# Patient Record
Sex: FEMALE | Race: WHITE | NOT HISPANIC OR LATINO | Employment: OTHER | URBAN - METROPOLITAN AREA
[De-identification: names, ages, dates, MRNs, and addresses within clinical notes are randomized per-mention and may not be internally consistent; named-entity substitution may affect disease eponyms.]

---

## 2017-04-03 ENCOUNTER — ALLSCRIPTS OFFICE VISIT (OUTPATIENT)
Dept: OTHER | Facility: OTHER | Age: 67
End: 2017-04-03

## 2017-04-03 ENCOUNTER — TRANSCRIBE ORDERS (OUTPATIENT)
Dept: ADMINISTRATIVE | Facility: HOSPITAL | Age: 67
End: 2017-04-03

## 2017-04-03 DIAGNOSIS — M75.41 IMPINGEMENT SYNDROME OF RIGHT SHOULDER: ICD-10-CM

## 2017-04-03 DIAGNOSIS — I10 ESSENTIAL (PRIMARY) HYPERTENSION: ICD-10-CM

## 2017-04-03 DIAGNOSIS — M75.41 IMPINGEMENT SYNDROME OF RIGHT SHOULDER: Primary | ICD-10-CM

## 2017-04-07 ENCOUNTER — HOSPITAL ENCOUNTER (OUTPATIENT)
Dept: RADIOLOGY | Age: 67
Discharge: HOME | End: 2017-04-07
Payer: MEDICARE

## 2017-04-07 DIAGNOSIS — M75.41 IMPINGEMENT SYNDROME OF RIGHT SHOULDER: ICD-10-CM

## 2017-04-07 PROCEDURE — 73221 MRI JOINT UPR EXTREM W/O DYE: CPT

## 2017-04-24 ENCOUNTER — ALLSCRIPTS OFFICE VISIT (OUTPATIENT)
Dept: OTHER | Facility: OTHER | Age: 67
End: 2017-04-24

## 2017-05-01 ENCOUNTER — ALLSCRIPTS OFFICE VISIT (OUTPATIENT)
Dept: OTHER | Facility: OTHER | Age: 67
End: 2017-05-01

## 2017-05-01 RX ORDER — TRIAMCINOLONE ACETONIDE 1 MG/G
1 CREAM TOPICAL DAILY
COMMUNITY
End: 2018-02-08 | Stop reason: ALTCHOICE

## 2017-05-01 RX ORDER — IBUPROFEN 400 MG/1
400 TABLET ORAL DAILY
COMMUNITY
End: 2017-07-01 | Stop reason: HOSPADM

## 2017-05-01 RX ORDER — ATENOLOL 50 MG/1
50 TABLET ORAL DAILY
COMMUNITY
End: 2018-03-01

## 2017-05-01 RX ORDER — ATORVASTATIN CALCIUM 20 MG/1
20 TABLET, FILM COATED ORAL
COMMUNITY
End: 2017-07-01 | Stop reason: HOSPADM

## 2017-05-01 RX ORDER — ACETAMINOPHEN 500 MG
500 TABLET ORAL DAILY
COMMUNITY
End: 2018-06-25

## 2017-05-01 RX ORDER — HYDROCHLOROTHIAZIDE 25 MG/1
12.5 TABLET ORAL DAILY
Status: ON HOLD | COMMUNITY
End: 2018-07-30 | Stop reason: SDUPTHER

## 2017-05-01 RX ORDER — FLUTICASONE FUROATE AND VILANTEROL 100; 25 UG/1; UG/1
1 POWDER RESPIRATORY (INHALATION) DAILY
Status: ON HOLD | COMMUNITY
End: 2017-05-10 | Stop reason: ALTCHOICE

## 2017-05-01 RX ORDER — CHOLECALCIFEROL (VITAMIN D3) 50 MCG
5000 TABLET ORAL DAILY
COMMUNITY
End: 2022-03-14

## 2017-05-01 RX ORDER — FLUOXETINE HYDROCHLORIDE 20 MG/1
20 CAPSULE ORAL DAILY
COMMUNITY
End: 2018-04-30 | Stop reason: SDUPTHER

## 2017-05-01 RX ORDER — BENZONATATE 100 MG/1
100 CAPSULE ORAL 3 TIMES DAILY PRN
Status: ON HOLD | COMMUNITY
End: 2017-05-10 | Stop reason: ALTCHOICE

## 2017-05-02 ENCOUNTER — GENERIC CONVERSION - ENCOUNTER (OUTPATIENT)
Dept: OTHER | Facility: OTHER | Age: 67
End: 2017-05-02

## 2017-05-02 LAB
AMBIG ABBREV BMP8 DEFAULT (HISTORICAL): NORMAL
AMBIG ABBREV LP DEFAULT (HISTORICAL): NORMAL
BASOPHILS # BLD AUTO: 0 %
BASOPHILS # BLD AUTO: 0 X10E3/UL (ref 0–0.2)
DEPRECATED RDW RBC AUTO: 13.3 % (ref 12.3–15.4)
EOSINOPHIL # BLD AUTO: 0.2 X10E3/UL (ref 0–0.4)
EOSINOPHIL # BLD AUTO: 3 %
HCT VFR BLD AUTO: 42.8 % (ref 34–46.6)
HGB BLD-MCNC: 14.3 G/DL (ref 11.1–15.9)
IMM.GRANULOCYTES (CD4/8) (HISTORICAL): 0 %
IMM.GRANULOCYTES (CD4/8) (HISTORICAL): 0 X10E3/UL (ref 0–0.1)
LYMPHOCYTES # BLD AUTO: 2.5 X10E3/UL (ref 0.7–3.1)
LYMPHOCYTES # BLD AUTO: 39 %
MCH RBC QN AUTO: 30 PG (ref 26.6–33)
MCHC RBC AUTO-ENTMCNC: 33.4 G/DL (ref 31.5–35.7)
MCV RBC AUTO: 90 FL (ref 79–97)
MONOCYTES # BLD AUTO: 0.4 X10E3/UL (ref 0.1–0.9)
MONOCYTES (HISTORICAL): 5 %
NEUTROPHILS # BLD AUTO: 3.4 X10E3/UL (ref 1.4–7)
NEUTROPHILS # BLD AUTO: 53 %
PLATELET # BLD AUTO: 309 X10E3/UL (ref 150–379)
RBC (HISTORICAL): 4.76 X10E6/UL (ref 3.77–5.28)
WBC # BLD AUTO: 6.5 X10E3/UL (ref 3.4–10.8)

## 2017-05-03 LAB
BUN SERPL-MCNC: 11 MG/DL (ref 8–27)
BUN/CREA RATIO (HISTORICAL): 10 (ref 12–28)
CALCIUM SERPL-MCNC: 9.7 MG/DL (ref 8.7–10.3)
CHLORIDE SERPL-SCNC: 102 MMOL/L (ref 96–106)
CHOLEST SERPL-MCNC: 174 MG/DL (ref 100–199)
CO2 SERPL-SCNC: 25 MMOL/L (ref 18–29)
CREAT SERPL-MCNC: 1.11 MG/DL (ref 0.57–1)
EGFR AFRICAN AMERICAN (HISTORICAL): 60 ML/MIN/1.73
EGFR-AMERICAN CALC (HISTORICAL): 52 ML/MIN/1.73
GLUCOSE SERPL-MCNC: 92 MG/DL (ref 65–99)
HDLC SERPL-MCNC: 74 MG/DL
LDLC SERPL CALC-MCNC: 76 MG/DL (ref 0–99)
POTASSIUM SERPL-SCNC: 4 MMOL/L (ref 3.5–5.2)
SODIUM SERPL-SCNC: 144 MMOL/L (ref 134–144)
TRIGL SERPL-MCNC: 118 MG/DL (ref 0–149)
TSH SERPL DL<=0.05 MIU/L-ACNC: 1.96 UIU/ML (ref 0.45–4.5)
VLDLC SERPL CALC-MCNC: 24 MG/DL (ref 5–40)

## 2017-05-04 ENCOUNTER — GENERIC CONVERSION - ENCOUNTER (OUTPATIENT)
Dept: OTHER | Facility: OTHER | Age: 67
End: 2017-05-04

## 2017-05-04 ENCOUNTER — APPOINTMENT (OUTPATIENT)
Dept: PHYSICAL THERAPY | Facility: CLINIC | Age: 67
End: 2017-05-04
Payer: MEDICARE

## 2017-05-04 DIAGNOSIS — M75.41 IMPINGEMENT SYNDROME OF RIGHT SHOULDER: ICD-10-CM

## 2017-05-04 PROCEDURE — G8990 OTHER PT/OT CURRENT STATUS: HCPCS

## 2017-05-04 PROCEDURE — 97161 PT EVAL LOW COMPLEX 20 MIN: CPT

## 2017-05-04 PROCEDURE — G8991 OTHER PT/OT GOAL STATUS: HCPCS

## 2017-05-05 ENCOUNTER — GENERIC CONVERSION - ENCOUNTER (OUTPATIENT)
Dept: OTHER | Facility: OTHER | Age: 67
End: 2017-05-05

## 2017-05-09 ENCOUNTER — ANESTHESIA EVENT (OUTPATIENT)
Dept: PERIOP | Facility: HOSPITAL | Age: 67
End: 2017-05-09
Payer: MEDICARE

## 2017-05-09 ENCOUNTER — GENERIC CONVERSION - ENCOUNTER (OUTPATIENT)
Dept: OTHER | Facility: OTHER | Age: 67
End: 2017-05-09

## 2017-05-09 DIAGNOSIS — I10 ESSENTIAL (PRIMARY) HYPERTENSION: ICD-10-CM

## 2017-05-09 LAB
AMBIG ABBREV BMP8 DEFAULT (HISTORICAL): NORMAL
BUN SERPL-MCNC: 14 MG/DL (ref 8–27)
BUN/CREA RATIO (HISTORICAL): 12 (ref 12–28)
CALCIUM SERPL-MCNC: 9.6 MG/DL (ref 8.7–10.3)
CHLORIDE SERPL-SCNC: 96 MMOL/L (ref 96–106)
CO2 SERPL-SCNC: 23 MMOL/L (ref 18–29)
CREAT SERPL-MCNC: 1.15 MG/DL (ref 0.57–1)
EGFR AFRICAN AMERICAN (HISTORICAL): 58 ML/MIN/1.73
EGFR-AMERICAN CALC (HISTORICAL): 50 ML/MIN/1.73
GLUCOSE SERPL-MCNC: 67 MG/DL (ref 65–99)
POTASSIUM SERPL-SCNC: 3.7 MMOL/L (ref 3.5–5.2)
SODIUM SERPL-SCNC: 137 MMOL/L (ref 134–144)

## 2017-05-10 ENCOUNTER — HOSPITAL ENCOUNTER (OUTPATIENT)
Facility: HOSPITAL | Age: 67
Setting detail: OUTPATIENT SURGERY
Discharge: HOME | End: 2017-05-10
Attending: ORTHOPAEDIC SURGERY | Admitting: ORTHOPAEDIC SURGERY
Payer: MEDICARE

## 2017-05-10 ENCOUNTER — ANESTHESIA (OUTPATIENT)
Dept: PERIOP | Facility: HOSPITAL | Age: 67
End: 2017-05-10
Payer: MEDICARE

## 2017-05-10 VITALS
OXYGEN SATURATION: 92 % | WEIGHT: 197 LBS | TEMPERATURE: 99.2 F | RESPIRATION RATE: 16 BRPM | DIASTOLIC BLOOD PRESSURE: 54 MMHG | HEIGHT: 60 IN | BODY MASS INDEX: 38.68 KG/M2 | SYSTOLIC BLOOD PRESSURE: 132 MMHG | HEART RATE: 69 BPM

## 2017-05-10 PROBLEM — M75.111 INCOMPLETE TEAR OF RIGHT ROTATOR CUFF: Status: ACTIVE | Noted: 2017-05-10

## 2017-05-10 PROCEDURE — C1713 ANCHOR/SCREW BN/BN,TIS/BN: HCPCS | Performed by: ORTHOPAEDIC SURGERY

## 2017-05-10 DEVICE — DEPUY MITEK HEALIX ADVANCE 4.5 BR: Type: IMPLANTABLE DEVICE | Site: SHOULDER | Status: FUNCTIONAL

## 2017-05-10 DEVICE — HEALIX ADVANCE KNOTLESS BR ANCHOR TCP/PLGA ABSORBABLE ANCHOR 5.5MM
Type: IMPLANTABLE DEVICE | Site: SHOULDER | Status: FUNCTIONAL
Brand: HEALIX ADVANCE

## 2017-05-10 RX ORDER — SODIUM CHLORIDE, SODIUM LACTATE, POTASSIUM CHLORIDE, CALCIUM CHLORIDE 600; 310; 30; 20 MG/100ML; MG/100ML; MG/100ML; MG/100ML
125 INJECTION, SOLUTION INTRAVENOUS CONTINUOUS
Status: DISCONTINUED | OUTPATIENT
Start: 2017-05-10 | End: 2017-05-10 | Stop reason: HOSPADM

## 2017-05-10 RX ORDER — OXYCODONE HYDROCHLORIDE 5 MG/1
TABLET ORAL
Qty: 30 TABLET | Refills: 0
Start: 2017-05-10 | End: 2018-02-08 | Stop reason: ALTCHOICE

## 2017-05-10 RX ORDER — ALBUTEROL SULFATE 2.5 MG/3ML
2.5 SOLUTION RESPIRATORY (INHALATION) AS NEEDED
Status: DISCONTINUED | OUTPATIENT
Start: 2017-05-10 | End: 2017-05-10 | Stop reason: HOSPADM

## 2017-05-10 RX ORDER — KETOROLAC TROMETHAMINE 30 MG/ML
INJECTION, SOLUTION INTRAMUSCULAR; INTRAVENOUS AS NEEDED
Status: DISCONTINUED | OUTPATIENT
Start: 2017-05-10 | End: 2017-05-10 | Stop reason: SURG

## 2017-05-10 RX ORDER — EPHEDRINE SULFATE 50 MG/ML
INJECTION, SOLUTION INTRAVENOUS AS NEEDED
Status: DISCONTINUED | OUTPATIENT
Start: 2017-05-10 | End: 2017-05-10 | Stop reason: SURG

## 2017-05-10 RX ORDER — METOCLOPRAMIDE HYDROCHLORIDE 5 MG/ML
10 INJECTION INTRAMUSCULAR; INTRAVENOUS EVERY 6 HOURS PRN
Status: DISCONTINUED | OUTPATIENT
Start: 2017-05-10 | End: 2017-05-10 | Stop reason: HOSPADM

## 2017-05-10 RX ORDER — MIDAZOLAM HYDROCHLORIDE 1 MG/ML
INJECTION INTRAMUSCULAR; INTRAVENOUS AS NEEDED
Status: DISCONTINUED | OUTPATIENT
Start: 2017-05-10 | End: 2017-05-10 | Stop reason: SURG

## 2017-05-10 RX ORDER — HYDROCODONE BITARTRATE AND ACETAMINOPHEN 5; 325 MG/1; MG/1
1 TABLET ORAL EVERY 6 HOURS PRN
Status: DISCONTINUED | OUTPATIENT
Start: 2017-05-10 | End: 2017-05-10 | Stop reason: HOSPADM

## 2017-05-10 RX ORDER — PROPOFOL 10 MG/ML
INJECTION, EMULSION INTRAVENOUS AS NEEDED
Status: DISCONTINUED | OUTPATIENT
Start: 2017-05-10 | End: 2017-05-10 | Stop reason: SURG

## 2017-05-10 RX ORDER — ROPIVACAINE HYDROCHLORIDE 5 MG/ML
INJECTION, SOLUTION EPIDURAL; INFILTRATION; PERINEURAL AS NEEDED
Status: DISCONTINUED | OUTPATIENT
Start: 2017-05-10 | End: 2017-05-10 | Stop reason: SURG

## 2017-05-10 RX ORDER — LIDOCAINE HYDROCHLORIDE 10 MG/ML
INJECTION, SOLUTION INFILTRATION; PERINEURAL AS NEEDED
Status: DISCONTINUED | OUTPATIENT
Start: 2017-05-10 | End: 2017-05-10 | Stop reason: SURG

## 2017-05-10 RX ORDER — ONDANSETRON 2 MG/ML
INJECTION INTRAMUSCULAR; INTRAVENOUS AS NEEDED
Status: DISCONTINUED | OUTPATIENT
Start: 2017-05-10 | End: 2017-05-10 | Stop reason: SURG

## 2017-05-10 RX ORDER — ONDANSETRON 2 MG/ML
4 INJECTION INTRAMUSCULAR; INTRAVENOUS ONCE AS NEEDED
Status: DISCONTINUED | OUTPATIENT
Start: 2017-05-10 | End: 2017-05-10 | Stop reason: HOSPADM

## 2017-05-10 RX ORDER — FENTANYL CITRATE 50 UG/ML
INJECTION, SOLUTION INTRAMUSCULAR; INTRAVENOUS AS NEEDED
Status: DISCONTINUED | OUTPATIENT
Start: 2017-05-10 | End: 2017-05-10 | Stop reason: SURG

## 2017-05-10 RX ORDER — FENTANYL CITRATE/PF 50 MCG/ML
25 SYRINGE (ML) INJECTION
Status: DISCONTINUED | OUTPATIENT
Start: 2017-05-10 | End: 2017-05-10 | Stop reason: HOSPADM

## 2017-05-10 RX ADMIN — CEFAZOLIN SODIUM 2000 MG: 2 SOLUTION INTRAVENOUS at 11:20

## 2017-05-10 RX ADMIN — ONDANSETRON 4 MG: 2 INJECTION INTRAMUSCULAR; INTRAVENOUS at 11:36

## 2017-05-10 RX ADMIN — EPHEDRINE SULFATE 5 MG: 50 INJECTION, SOLUTION INTRAMUSCULAR; INTRAVENOUS; SUBCUTANEOUS at 11:50

## 2017-05-10 RX ADMIN — MIDAZOLAM HYDROCHLORIDE 2 MG: 1 INJECTION, SOLUTION INTRAMUSCULAR; INTRAVENOUS at 10:45

## 2017-05-10 RX ADMIN — ROPIVACAINE HYDROCHLORIDE 20 ML: 5 INJECTION, SOLUTION EPIDURAL; INFILTRATION; PERINEURAL at 10:57

## 2017-05-10 RX ADMIN — FENTANYL CITRATE 50 MCG: 50 INJECTION, SOLUTION INTRAMUSCULAR; INTRAVENOUS at 10:45

## 2017-05-10 RX ADMIN — PROPOFOL 200 MG: 10 INJECTION, EMULSION INTRAVENOUS at 11:14

## 2017-05-10 RX ADMIN — DEXAMETHASONE SODIUM PHOSPHATE 4 MG: 10 INJECTION INTRAMUSCULAR; INTRAVENOUS at 12:08

## 2017-05-10 RX ADMIN — EPHEDRINE SULFATE 5 MG: 50 INJECTION, SOLUTION INTRAMUSCULAR; INTRAVENOUS; SUBCUTANEOUS at 11:39

## 2017-05-10 RX ADMIN — SODIUM CHLORIDE, SODIUM LACTATE, POTASSIUM CHLORIDE, AND CALCIUM CHLORIDE 125 ML/HR: .6; .31; .03; .02 INJECTION, SOLUTION INTRAVENOUS at 08:31

## 2017-05-10 RX ADMIN — DEXAMETHASONE SODIUM PHOSPHATE 1.5 MG: 10 INJECTION INTRAMUSCULAR; INTRAVENOUS at 10:57

## 2017-05-10 RX ADMIN — HYDROCODONE BITARTRATE AND ACETAMINOPHEN 1 TABLET: 5; 325 TABLET ORAL at 14:48

## 2017-05-10 RX ADMIN — FENTANYL CITRATE 50 MCG: 50 INJECTION, SOLUTION INTRAMUSCULAR; INTRAVENOUS at 10:48

## 2017-05-10 RX ADMIN — ROPIVACAINE HYDROCHLORIDE: 2 INJECTION, SOLUTION EPIDURAL; INFILTRATION at 15:05

## 2017-05-10 RX ADMIN — KETOROLAC TROMETHAMINE 15 MG: 30 INJECTION, SOLUTION INTRAMUSCULAR at 12:20

## 2017-05-10 RX ADMIN — LIDOCAINE HYDROCHLORIDE 2 ML: 10 INJECTION, SOLUTION INFILTRATION; PERINEURAL at 11:14

## 2017-05-15 ENCOUNTER — ALLSCRIPTS OFFICE VISIT (OUTPATIENT)
Dept: OTHER | Facility: OTHER | Age: 67
End: 2017-05-15

## 2017-05-16 ENCOUNTER — APPOINTMENT (OUTPATIENT)
Dept: PHYSICAL THERAPY | Facility: CLINIC | Age: 67
End: 2017-05-16
Payer: MEDICARE

## 2017-05-16 PROCEDURE — 97110 THERAPEUTIC EXERCISES: CPT

## 2017-05-19 ENCOUNTER — APPOINTMENT (OUTPATIENT)
Dept: PHYSICAL THERAPY | Facility: CLINIC | Age: 67
End: 2017-05-19
Payer: MEDICARE

## 2017-05-19 PROCEDURE — 97110 THERAPEUTIC EXERCISES: CPT

## 2017-05-19 PROCEDURE — 97140 MANUAL THERAPY 1/> REGIONS: CPT

## 2017-05-22 ENCOUNTER — APPOINTMENT (OUTPATIENT)
Dept: PHYSICAL THERAPY | Facility: CLINIC | Age: 67
End: 2017-05-22
Payer: MEDICARE

## 2017-05-22 PROCEDURE — 97140 MANUAL THERAPY 1/> REGIONS: CPT

## 2017-05-22 PROCEDURE — 97110 THERAPEUTIC EXERCISES: CPT

## 2017-05-24 ENCOUNTER — APPOINTMENT (OUTPATIENT)
Dept: PHYSICAL THERAPY | Facility: CLINIC | Age: 67
End: 2017-05-24
Payer: MEDICARE

## 2017-05-24 PROCEDURE — 97140 MANUAL THERAPY 1/> REGIONS: CPT

## 2017-05-24 PROCEDURE — 97110 THERAPEUTIC EXERCISES: CPT

## 2017-05-26 ENCOUNTER — APPOINTMENT (OUTPATIENT)
Dept: PHYSICAL THERAPY | Facility: CLINIC | Age: 67
End: 2017-05-26
Payer: MEDICARE

## 2017-05-26 PROCEDURE — 97140 MANUAL THERAPY 1/> REGIONS: CPT

## 2017-05-26 PROCEDURE — 97150 GROUP THERAPEUTIC PROCEDURES: CPT

## 2017-05-30 ENCOUNTER — APPOINTMENT (OUTPATIENT)
Dept: PHYSICAL THERAPY | Facility: CLINIC | Age: 67
End: 2017-05-30
Payer: MEDICARE

## 2017-05-30 PROCEDURE — 97140 MANUAL THERAPY 1/> REGIONS: CPT

## 2017-05-30 PROCEDURE — 97150 GROUP THERAPEUTIC PROCEDURES: CPT

## 2017-06-01 ENCOUNTER — APPOINTMENT (OUTPATIENT)
Dept: PHYSICAL THERAPY | Facility: CLINIC | Age: 67
End: 2017-06-01
Payer: MEDICARE

## 2017-06-01 PROCEDURE — 97110 THERAPEUTIC EXERCISES: CPT

## 2017-06-01 PROCEDURE — 97140 MANUAL THERAPY 1/> REGIONS: CPT

## 2017-06-05 ENCOUNTER — APPOINTMENT (OUTPATIENT)
Dept: PHYSICAL THERAPY | Facility: CLINIC | Age: 67
End: 2017-06-05
Payer: MEDICARE

## 2017-06-05 PROCEDURE — 97110 THERAPEUTIC EXERCISES: CPT

## 2017-06-05 PROCEDURE — 97140 MANUAL THERAPY 1/> REGIONS: CPT

## 2017-06-07 ENCOUNTER — APPOINTMENT (OUTPATIENT)
Dept: PHYSICAL THERAPY | Facility: CLINIC | Age: 67
End: 2017-06-07
Payer: MEDICARE

## 2017-06-07 ENCOUNTER — GENERIC CONVERSION - ENCOUNTER (OUTPATIENT)
Dept: OTHER | Facility: OTHER | Age: 67
End: 2017-06-07

## 2017-06-07 PROCEDURE — G8990 OTHER PT/OT CURRENT STATUS: HCPCS

## 2017-06-07 PROCEDURE — 97110 THERAPEUTIC EXERCISES: CPT

## 2017-06-07 PROCEDURE — G8991 OTHER PT/OT GOAL STATUS: HCPCS

## 2017-06-07 PROCEDURE — 97140 MANUAL THERAPY 1/> REGIONS: CPT

## 2017-06-09 ENCOUNTER — ALLSCRIPTS OFFICE VISIT (OUTPATIENT)
Dept: OTHER | Facility: OTHER | Age: 67
End: 2017-06-09

## 2017-06-09 ENCOUNTER — APPOINTMENT (OUTPATIENT)
Dept: PHYSICAL THERAPY | Facility: CLINIC | Age: 67
End: 2017-06-09
Payer: MEDICARE

## 2017-06-12 ENCOUNTER — APPOINTMENT (OUTPATIENT)
Dept: PHYSICAL THERAPY | Facility: CLINIC | Age: 67
End: 2017-06-12
Payer: MEDICARE

## 2017-06-12 PROCEDURE — 97140 MANUAL THERAPY 1/> REGIONS: CPT

## 2017-06-12 PROCEDURE — 97110 THERAPEUTIC EXERCISES: CPT

## 2017-06-15 ENCOUNTER — APPOINTMENT (OUTPATIENT)
Dept: PHYSICAL THERAPY | Facility: CLINIC | Age: 67
End: 2017-06-15
Payer: MEDICARE

## 2017-06-15 PROCEDURE — 97140 MANUAL THERAPY 1/> REGIONS: CPT

## 2017-06-15 PROCEDURE — 97150 GROUP THERAPEUTIC PROCEDURES: CPT

## 2017-06-19 ENCOUNTER — APPOINTMENT (OUTPATIENT)
Dept: PHYSICAL THERAPY | Facility: CLINIC | Age: 67
End: 2017-06-19
Payer: MEDICARE

## 2017-06-19 PROCEDURE — 97150 GROUP THERAPEUTIC PROCEDURES: CPT

## 2017-06-19 PROCEDURE — 97140 MANUAL THERAPY 1/> REGIONS: CPT

## 2017-06-22 ENCOUNTER — APPOINTMENT (OUTPATIENT)
Dept: PHYSICAL THERAPY | Facility: CLINIC | Age: 67
End: 2017-06-22
Payer: MEDICARE

## 2017-06-26 ENCOUNTER — APPOINTMENT (OUTPATIENT)
Dept: PHYSICAL THERAPY | Facility: CLINIC | Age: 67
End: 2017-06-26
Payer: MEDICARE

## 2017-06-26 PROCEDURE — 97140 MANUAL THERAPY 1/> REGIONS: CPT

## 2017-06-26 PROCEDURE — 97110 THERAPEUTIC EXERCISES: CPT

## 2017-06-29 ENCOUNTER — APPOINTMENT (OUTPATIENT)
Dept: PHYSICAL THERAPY | Facility: CLINIC | Age: 67
End: 2017-06-29
Payer: MEDICARE

## 2017-06-29 PROCEDURE — 97110 THERAPEUTIC EXERCISES: CPT

## 2017-06-29 PROCEDURE — 97140 MANUAL THERAPY 1/> REGIONS: CPT

## 2017-06-30 ENCOUNTER — OFFICE VISIT (OUTPATIENT)
Dept: LAB | Age: 67
End: 2017-06-30
Attending: PHYSICIAN ASSISTANT
Payer: MEDICARE

## 2017-06-30 ENCOUNTER — OFFICE VISIT (OUTPATIENT)
Dept: URGENT CARE | Age: 67
End: 2017-06-30
Payer: MEDICARE

## 2017-06-30 ENCOUNTER — TRANSCRIBE ORDERS (OUTPATIENT)
Dept: URGENT CARE | Age: 67
End: 2017-06-30

## 2017-06-30 ENCOUNTER — HOSPITAL ENCOUNTER (OUTPATIENT)
Facility: HOSPITAL | Age: 67
Setting detail: OBSERVATION
Discharge: HOME | End: 2017-07-01
Attending: EMERGENCY MEDICINE | Admitting: INTERNAL MEDICINE
Payer: MEDICARE

## 2017-06-30 ENCOUNTER — APPOINTMENT (EMERGENCY)
Dept: CT IMAGING | Facility: HOSPITAL | Age: 67
End: 2017-06-30
Payer: MEDICARE

## 2017-06-30 DIAGNOSIS — R10.9 ABDOMINAL PAIN, UNSPECIFIED LOCATION: ICD-10-CM

## 2017-06-30 DIAGNOSIS — R10.9 ABDOMINAL PAIN, UNSPECIFIED LOCATION: Primary | ICD-10-CM

## 2017-06-30 DIAGNOSIS — K85.90 PANCREATITIS, ACUTE: ICD-10-CM

## 2017-06-30 DIAGNOSIS — R10.9 ABDOMINAL PAIN: Primary | ICD-10-CM

## 2017-06-30 LAB
ALBUMIN SERPL BCP-MCNC: 3.7 G/DL (ref 3.5–5)
ALP SERPL-CCNC: 65 U/L (ref 46–116)
ALT SERPL W P-5'-P-CCNC: 30 U/L (ref 12–78)
ANION GAP SERPL CALCULATED.3IONS-SCNC: 10 MMOL/L (ref 4–13)
AST SERPL W P-5'-P-CCNC: 19 U/L (ref 5–45)
BASOPHILS # BLD AUTO: 0.02 THOUSANDS/ΜL (ref 0–0.1)
BASOPHILS NFR BLD AUTO: 0 % (ref 0–1)
BILIRUB SERPL-MCNC: 0.4 MG/DL (ref 0.2–1)
BUN SERPL-MCNC: 15 MG/DL (ref 5–25)
CALCIUM SERPL-MCNC: 9.3 MG/DL (ref 8.3–10.1)
CHLORIDE SERPL-SCNC: 100 MMOL/L (ref 100–108)
CO2 SERPL-SCNC: 26 MMOL/L (ref 21–32)
CREAT SERPL-MCNC: 1.15 MG/DL (ref 0.6–1.3)
EOSINOPHIL # BLD AUTO: 0.14 THOUSAND/ΜL (ref 0–0.61)
EOSINOPHIL NFR BLD AUTO: 1 % (ref 0–6)
ERYTHROCYTE [DISTWIDTH] IN BLOOD BY AUTOMATED COUNT: 12.9 % (ref 11.6–15.1)
GFR SERPL CREATININE-BSD FRML MDRD: 47.4 ML/MIN/1.73SQ M
GLUCOSE SERPL-MCNC: 112 MG/DL (ref 65–140)
HCT VFR BLD AUTO: 40 % (ref 34.8–46.1)
HGB BLD-MCNC: 13.6 G/DL (ref 11.5–15.4)
LIPASE SERPL-CCNC: 260 U/L (ref 73–393)
LYMPHOCYTES # BLD AUTO: 2.14 THOUSANDS/ΜL (ref 0.6–4.47)
LYMPHOCYTES NFR BLD AUTO: 15 % (ref 14–44)
MCH RBC QN AUTO: 30 PG (ref 26.8–34.3)
MCHC RBC AUTO-ENTMCNC: 34 G/DL (ref 31.4–37.4)
MCV RBC AUTO: 88 FL (ref 82–98)
MONOCYTES # BLD AUTO: 0.96 THOUSAND/ΜL (ref 0.17–1.22)
MONOCYTES NFR BLD AUTO: 7 % (ref 4–12)
NEUTROPHILS # BLD AUTO: 11.31 THOUSANDS/ΜL (ref 1.85–7.62)
NEUTS SEG NFR BLD AUTO: 77 % (ref 43–75)
PLATELET # BLD AUTO: 274 THOUSANDS/UL (ref 149–390)
PMV BLD AUTO: 10.3 FL (ref 8.9–12.7)
POTASSIUM SERPL-SCNC: 3.4 MMOL/L (ref 3.5–5.3)
PROT SERPL-MCNC: 7.4 G/DL (ref 6.4–8.2)
RBC # BLD AUTO: 4.54 MILLION/UL (ref 3.81–5.12)
SODIUM SERPL-SCNC: 136 MMOL/L (ref 136–145)
WBC # BLD AUTO: 14.57 THOUSAND/UL (ref 4.31–10.16)

## 2017-06-30 PROCEDURE — 85025 COMPLETE CBC W/AUTO DIFF WBC: CPT | Performed by: EMERGENCY MEDICINE

## 2017-06-30 PROCEDURE — 96374 THER/PROPH/DIAG INJ IV PUSH: CPT

## 2017-06-30 PROCEDURE — 96375 TX/PRO/DX INJ NEW DRUG ADDON: CPT

## 2017-06-30 PROCEDURE — 80053 COMPREHEN METABOLIC PANEL: CPT | Performed by: EMERGENCY MEDICINE

## 2017-06-30 PROCEDURE — 96361 HYDRATE IV INFUSION ADD-ON: CPT

## 2017-06-30 PROCEDURE — 99215 OFFICE O/P EST HI 40 MIN: CPT | Performed by: FAMILY MEDICINE

## 2017-06-30 PROCEDURE — 83690 ASSAY OF LIPASE: CPT | Performed by: EMERGENCY MEDICINE

## 2017-06-30 PROCEDURE — G0463 HOSPITAL OUTPT CLINIC VISIT: HCPCS | Performed by: FAMILY MEDICINE

## 2017-06-30 PROCEDURE — 36415 COLL VENOUS BLD VENIPUNCTURE: CPT | Performed by: EMERGENCY MEDICINE

## 2017-06-30 PROCEDURE — 74177 CT ABD & PELVIS W/CONTRAST: CPT

## 2017-06-30 PROCEDURE — 93005 ELECTROCARDIOGRAM TRACING: CPT

## 2017-06-30 RX ORDER — ONDANSETRON 2 MG/ML
4 INJECTION INTRAMUSCULAR; INTRAVENOUS ONCE
Status: COMPLETED | OUTPATIENT
Start: 2017-06-30 | End: 2017-06-30

## 2017-06-30 RX ORDER — ONDANSETRON 2 MG/ML
INJECTION INTRAMUSCULAR; INTRAVENOUS
Status: COMPLETED
Start: 2017-06-30 | End: 2017-06-30

## 2017-06-30 RX ORDER — MORPHINE SULFATE 10 MG/ML
6 INJECTION, SOLUTION INTRAMUSCULAR; INTRAVENOUS ONCE
Status: COMPLETED | OUTPATIENT
Start: 2017-06-30 | End: 2017-06-30

## 2017-06-30 RX ADMIN — MORPHINE SULFATE 6 MG: 10 INJECTION, SOLUTION INTRAMUSCULAR; INTRAVENOUS at 21:27

## 2017-06-30 RX ADMIN — ONDANSETRON 4 MG: 2 INJECTION INTRAMUSCULAR; INTRAVENOUS at 21:27

## 2017-06-30 RX ADMIN — IOHEXOL 100 ML: 350 INJECTION, SOLUTION INTRAVENOUS at 22:38

## 2017-06-30 RX ADMIN — SODIUM CHLORIDE 1000 ML: 0.9 INJECTION, SOLUTION INTRAVENOUS at 20:25

## 2017-07-01 VITALS
BODY MASS INDEX: 39.42 KG/M2 | WEIGHT: 200.8 LBS | DIASTOLIC BLOOD PRESSURE: 60 MMHG | RESPIRATION RATE: 16 BRPM | SYSTOLIC BLOOD PRESSURE: 133 MMHG | HEART RATE: 72 BPM | TEMPERATURE: 98.1 F | OXYGEN SATURATION: 95 % | HEIGHT: 60 IN

## 2017-07-01 PROBLEM — K85.90 ACUTE PANCREATITIS: Status: ACTIVE | Noted: 2017-07-01

## 2017-07-01 LAB
ANION GAP SERPL CALCULATED.3IONS-SCNC: 8 MMOL/L (ref 4–13)
ATRIAL RATE: 78 BPM
ATRIAL RATE: 79 BPM
BUN SERPL-MCNC: 12 MG/DL (ref 5–25)
CALCIUM SERPL-MCNC: 8.4 MG/DL (ref 8.3–10.1)
CHLORIDE SERPL-SCNC: 102 MMOL/L (ref 100–108)
CO2 SERPL-SCNC: 25 MMOL/L (ref 21–32)
CREAT SERPL-MCNC: 0.98 MG/DL (ref 0.6–1.3)
ERYTHROCYTE [DISTWIDTH] IN BLOOD BY AUTOMATED COUNT: 13 % (ref 11.6–15.1)
GFR SERPL CREATININE-BSD FRML MDRD: 57 ML/MIN/1.73SQ M
GLUCOSE P FAST SERPL-MCNC: 122 MG/DL (ref 65–99)
GLUCOSE SERPL-MCNC: 122 MG/DL (ref 65–140)
HCT VFR BLD AUTO: 35.7 % (ref 34.8–46.1)
HGB BLD-MCNC: 11.8 G/DL (ref 11.5–15.4)
LIPASE SERPL-CCNC: 178 U/L (ref 73–393)
MAGNESIUM SERPL-MCNC: 1.8 MG/DL (ref 1.6–2.6)
MCH RBC QN AUTO: 29.6 PG (ref 26.8–34.3)
MCHC RBC AUTO-ENTMCNC: 33.1 G/DL (ref 31.4–37.4)
MCV RBC AUTO: 90 FL (ref 82–98)
P AXIS: -15 DEGREES
P AXIS: 30 DEGREES
PHOSPHATE SERPL-MCNC: 2.9 MG/DL (ref 2.3–4.1)
PLATELET # BLD AUTO: 236 THOUSANDS/UL (ref 149–390)
PMV BLD AUTO: 10.1 FL (ref 8.9–12.7)
POTASSIUM SERPL-SCNC: 3.8 MMOL/L (ref 3.5–5.3)
PR INTERVAL: 140 MS
PR INTERVAL: 152 MS
QRS AXIS: -4 DEGREES
QRS AXIS: 0 DEGREES
QRSD INTERVAL: 132 MS
QRSD INTERVAL: 134 MS
QT INTERVAL: 402 MS
QT INTERVAL: 402 MS
QTC INTERVAL: 458 MS
QTC INTERVAL: 460 MS
RBC # BLD AUTO: 3.99 MILLION/UL (ref 3.81–5.12)
SODIUM SERPL-SCNC: 135 MMOL/L (ref 136–145)
T WAVE AXIS: 6 DEGREES
T WAVE AXIS: 8 DEGREES
VENTRICULAR RATE: 78 BPM
VENTRICULAR RATE: 79 BPM
WBC # BLD AUTO: 11.96 THOUSAND/UL (ref 4.31–10.16)

## 2017-07-01 PROCEDURE — 83690 ASSAY OF LIPASE: CPT | Performed by: PHYSICIAN ASSISTANT

## 2017-07-01 PROCEDURE — C9113 INJ PANTOPRAZOLE SODIUM, VIA: HCPCS | Performed by: PHYSICIAN ASSISTANT

## 2017-07-01 PROCEDURE — 85027 COMPLETE CBC AUTOMATED: CPT | Performed by: PHYSICIAN ASSISTANT

## 2017-07-01 PROCEDURE — 84100 ASSAY OF PHOSPHORUS: CPT | Performed by: PHYSICIAN ASSISTANT

## 2017-07-01 PROCEDURE — 80048 BASIC METABOLIC PNL TOTAL CA: CPT | Performed by: PHYSICIAN ASSISTANT

## 2017-07-01 PROCEDURE — 83735 ASSAY OF MAGNESIUM: CPT | Performed by: PHYSICIAN ASSISTANT

## 2017-07-01 PROCEDURE — 99285 EMERGENCY DEPT VISIT HI MDM: CPT

## 2017-07-01 RX ORDER — TRAMADOL HYDROCHLORIDE 50 MG/1
50 TABLET ORAL EVERY 6 HOURS PRN
Qty: 30 TABLET | Refills: 0 | Status: SHIPPED | OUTPATIENT
Start: 2017-07-01 | End: 2017-07-11

## 2017-07-01 RX ORDER — OMEPRAZOLE 20 MG/1
20 TABLET, DELAYED RELEASE ORAL 2 TIMES DAILY WITH MEALS
Qty: 60 TABLET | Refills: 0 | Status: SHIPPED | OUTPATIENT
Start: 2017-07-01 | End: 2018-02-26

## 2017-07-01 RX ORDER — MELATONIN
2000 DAILY
Status: DISCONTINUED | OUTPATIENT
Start: 2017-07-01 | End: 2017-07-01 | Stop reason: HOSPADM

## 2017-07-01 RX ORDER — PANTOPRAZOLE SODIUM 40 MG/1
40 TABLET, DELAYED RELEASE ORAL
Status: DISCONTINUED | OUTPATIENT
Start: 2017-07-01 | End: 2017-07-01 | Stop reason: HOSPADM

## 2017-07-01 RX ORDER — MORPHINE SULFATE 15 MG/1
15 TABLET ORAL EVERY 8 HOURS PRN
Status: DISCONTINUED | OUTPATIENT
Start: 2017-07-01 | End: 2017-07-01

## 2017-07-01 RX ORDER — METOCLOPRAMIDE HYDROCHLORIDE 5 MG/ML
10 INJECTION INTRAMUSCULAR; INTRAVENOUS
Status: DISCONTINUED | OUTPATIENT
Start: 2017-07-01 | End: 2017-07-01 | Stop reason: HOSPADM

## 2017-07-01 RX ORDER — TRAMADOL HYDROCHLORIDE 50 MG/1
50 TABLET ORAL EVERY 6 HOURS PRN
Status: DISCONTINUED | OUTPATIENT
Start: 2017-07-01 | End: 2017-07-01

## 2017-07-01 RX ORDER — ACETAMINOPHEN 325 MG/1
650 TABLET ORAL EVERY 6 HOURS PRN
Status: DISCONTINUED | OUTPATIENT
Start: 2017-07-01 | End: 2017-07-01 | Stop reason: HOSPADM

## 2017-07-01 RX ORDER — ONDANSETRON 2 MG/ML
4 INJECTION INTRAMUSCULAR; INTRAVENOUS EVERY 6 HOURS PRN
Status: DISCONTINUED | OUTPATIENT
Start: 2017-07-01 | End: 2017-07-01

## 2017-07-01 RX ORDER — TRAMADOL HYDROCHLORIDE 50 MG/1
50 TABLET ORAL EVERY 6 HOURS PRN
Status: DISCONTINUED | OUTPATIENT
Start: 2017-07-01 | End: 2017-07-01 | Stop reason: HOSPADM

## 2017-07-01 RX ORDER — OXYCODONE HYDROCHLORIDE 5 MG/1
5 TABLET ORAL EVERY 6 HOURS PRN
Status: DISCONTINUED | OUTPATIENT
Start: 2017-07-01 | End: 2017-07-01 | Stop reason: HOSPADM

## 2017-07-01 RX ORDER — FLUOXETINE HYDROCHLORIDE 20 MG/1
20 CAPSULE ORAL DAILY
Status: DISCONTINUED | OUTPATIENT
Start: 2017-07-01 | End: 2017-07-01 | Stop reason: HOSPADM

## 2017-07-01 RX ORDER — OXYCODONE HYDROCHLORIDE 5 MG/1
5 TABLET ORAL EVERY 6 HOURS PRN
Status: DISCONTINUED | OUTPATIENT
Start: 2017-07-01 | End: 2017-07-01

## 2017-07-01 RX ORDER — TRIAMCINOLONE ACETONIDE 1 MG/G
1 CREAM TOPICAL DAILY
Status: DISCONTINUED | OUTPATIENT
Start: 2017-07-01 | End: 2017-07-01 | Stop reason: HOSPADM

## 2017-07-01 RX ORDER — HYDROCODONE BITARTRATE AND ACETAMINOPHEN 5; 325 MG/1; MG/1
1 TABLET ORAL EVERY 4 HOURS PRN
Status: DISCONTINUED | OUTPATIENT
Start: 2017-07-01 | End: 2017-07-01 | Stop reason: HOSPADM

## 2017-07-01 RX ORDER — MORPHINE SULFATE 2 MG/ML
1 INJECTION, SOLUTION INTRAMUSCULAR; INTRAVENOUS EVERY 4 HOURS PRN
Status: DISCONTINUED | OUTPATIENT
Start: 2017-07-01 | End: 2017-07-01

## 2017-07-01 RX ORDER — ATENOLOL 50 MG/1
50 TABLET ORAL DAILY
Status: DISCONTINUED | OUTPATIENT
Start: 2017-07-01 | End: 2017-07-01 | Stop reason: HOSPADM

## 2017-07-01 RX ORDER — ATORVASTATIN CALCIUM 20 MG/1
20 TABLET, FILM COATED ORAL
Status: DISCONTINUED | OUTPATIENT
Start: 2017-07-01 | End: 2017-07-01

## 2017-07-01 RX ORDER — HYDROCHLOROTHIAZIDE 25 MG/1
25 TABLET ORAL DAILY
Status: DISCONTINUED | OUTPATIENT
Start: 2017-07-01 | End: 2017-07-01 | Stop reason: HOSPADM

## 2017-07-01 RX ORDER — ACETAMINOPHEN 325 MG/1
650 TABLET ORAL DAILY
Status: DISCONTINUED | OUTPATIENT
Start: 2017-07-01 | End: 2017-07-01 | Stop reason: HOSPADM

## 2017-07-01 RX ORDER — PANTOPRAZOLE SODIUM 40 MG/1
40 INJECTION, POWDER, FOR SOLUTION INTRAVENOUS
Status: DISCONTINUED | OUTPATIENT
Start: 2017-07-01 | End: 2017-07-01

## 2017-07-01 RX ORDER — ONDANSETRON 2 MG/ML
4 INJECTION INTRAMUSCULAR; INTRAVENOUS EVERY 4 HOURS PRN
Status: DISCONTINUED | OUTPATIENT
Start: 2017-07-01 | End: 2017-07-01 | Stop reason: HOSPADM

## 2017-07-01 RX ORDER — SODIUM CHLORIDE 9 MG/ML
75 INJECTION, SOLUTION INTRAVENOUS CONTINUOUS
Status: DISCONTINUED | OUTPATIENT
Start: 2017-07-01 | End: 2017-07-01 | Stop reason: HOSPADM

## 2017-07-01 RX ADMIN — ATENOLOL 50 MG: 50 TABLET ORAL at 08:02

## 2017-07-01 RX ADMIN — PANTOPRAZOLE SODIUM 40 MG: 40 INJECTION, POWDER, FOR SOLUTION INTRAVENOUS at 08:02

## 2017-07-01 RX ADMIN — SODIUM CHLORIDE 100 ML/HR: 0.9 INJECTION, SOLUTION INTRAVENOUS at 01:21

## 2017-07-01 RX ADMIN — METOCLOPRAMIDE 10 MG: 5 INJECTION, SOLUTION INTRAMUSCULAR; INTRAVENOUS at 10:58

## 2017-07-01 RX ADMIN — ACETAMINOPHEN 650 MG: 325 TABLET ORAL at 08:02

## 2017-07-01 RX ADMIN — HYDROCHLOROTHIAZIDE 25 MG: 25 TABLET ORAL at 08:02

## 2017-07-01 RX ADMIN — CHOLECALCIFEROL TAB 25 MCG (1000 UNIT) 2000 UNITS: 25 TAB at 08:02

## 2017-07-01 RX ADMIN — FLUOXETINE 20 MG: 20 CAPSULE ORAL at 08:02

## 2017-07-03 ENCOUNTER — APPOINTMENT (OUTPATIENT)
Dept: PHYSICAL THERAPY | Facility: CLINIC | Age: 67
End: 2017-07-03
Payer: MEDICARE

## 2017-07-03 PROCEDURE — 97140 MANUAL THERAPY 1/> REGIONS: CPT

## 2017-07-03 PROCEDURE — 97110 THERAPEUTIC EXERCISES: CPT

## 2017-07-06 ENCOUNTER — APPOINTMENT (OUTPATIENT)
Dept: PHYSICAL THERAPY | Facility: CLINIC | Age: 67
End: 2017-07-06
Payer: MEDICARE

## 2017-07-06 PROCEDURE — G8990 OTHER PT/OT CURRENT STATUS: HCPCS

## 2017-07-06 PROCEDURE — 97140 MANUAL THERAPY 1/> REGIONS: CPT

## 2017-07-06 PROCEDURE — G8991 OTHER PT/OT GOAL STATUS: HCPCS

## 2017-07-06 PROCEDURE — 97110 THERAPEUTIC EXERCISES: CPT

## 2017-07-11 ENCOUNTER — APPOINTMENT (OUTPATIENT)
Dept: PHYSICAL THERAPY | Facility: CLINIC | Age: 67
End: 2017-07-11
Payer: MEDICARE

## 2017-07-11 PROCEDURE — 97150 GROUP THERAPEUTIC PROCEDURES: CPT

## 2017-07-11 PROCEDURE — 97140 MANUAL THERAPY 1/> REGIONS: CPT

## 2017-07-12 ENCOUNTER — ALLSCRIPTS OFFICE VISIT (OUTPATIENT)
Dept: OTHER | Facility: OTHER | Age: 67
End: 2017-07-12

## 2017-07-12 DIAGNOSIS — R93.5 ABNORMAL FINDINGS ON DIAGNOSTIC IMAGING OF OTHER ABDOMINAL REGIONS, INCLUDING RETROPERITONEUM: ICD-10-CM

## 2017-07-12 DIAGNOSIS — R10.13 EPIGASTRIC PAIN: ICD-10-CM

## 2017-07-12 DIAGNOSIS — K85.90 ACUTE PANCREATITIS WITHOUT INFECTION OR NECROSIS: ICD-10-CM

## 2017-07-13 ENCOUNTER — TRANSCRIBE ORDERS (OUTPATIENT)
Dept: ADMINISTRATIVE | Facility: HOSPITAL | Age: 67
End: 2017-07-13

## 2017-07-13 ENCOUNTER — APPOINTMENT (OUTPATIENT)
Dept: PHYSICAL THERAPY | Facility: CLINIC | Age: 67
End: 2017-07-13
Payer: MEDICARE

## 2017-07-13 DIAGNOSIS — R93.5 ABNORMAL ABDOMINAL ULTRASOUND: ICD-10-CM

## 2017-07-13 DIAGNOSIS — R10.13 DYSPEPSIA AND OTHER SPECIFIED DISORDERS OF FUNCTION OF STOMACH: Primary | ICD-10-CM

## 2017-07-13 DIAGNOSIS — K31.89 DYSPEPSIA AND OTHER SPECIFIED DISORDERS OF FUNCTION OF STOMACH: Primary | ICD-10-CM

## 2017-07-17 ENCOUNTER — ALLSCRIPTS OFFICE VISIT (OUTPATIENT)
Dept: OTHER | Facility: OTHER | Age: 67
End: 2017-07-17

## 2017-07-18 ENCOUNTER — APPOINTMENT (OUTPATIENT)
Dept: PHYSICAL THERAPY | Facility: CLINIC | Age: 67
End: 2017-07-18
Payer: MEDICARE

## 2017-07-18 PROCEDURE — 97110 THERAPEUTIC EXERCISES: CPT

## 2017-07-20 ENCOUNTER — APPOINTMENT (OUTPATIENT)
Dept: PHYSICAL THERAPY | Facility: CLINIC | Age: 67
End: 2017-07-20
Payer: MEDICARE

## 2017-07-25 ENCOUNTER — APPOINTMENT (OUTPATIENT)
Dept: PHYSICAL THERAPY | Facility: CLINIC | Age: 67
End: 2017-07-25
Payer: MEDICARE

## 2017-07-26 ENCOUNTER — HOSPITAL ENCOUNTER (OUTPATIENT)
Dept: MRI IMAGING | Facility: HOSPITAL | Age: 67
Discharge: HOME | End: 2017-07-26
Payer: MEDICARE

## 2017-07-26 ENCOUNTER — GENERIC CONVERSION - ENCOUNTER (OUTPATIENT)
Dept: OTHER | Facility: OTHER | Age: 67
End: 2017-07-26

## 2017-07-26 DIAGNOSIS — R93.5 ABNORMAL FINDINGS ON DIAGNOSTIC IMAGING OF OTHER ABDOMINAL REGIONS, INCLUDING RETROPERITONEUM: ICD-10-CM

## 2017-07-26 DIAGNOSIS — R10.13 EPIGASTRIC PAIN: ICD-10-CM

## 2017-07-26 DIAGNOSIS — K85.90 ACUTE PANCREATITIS WITHOUT INFECTION OR NECROSIS: ICD-10-CM

## 2017-07-26 PROCEDURE — 76377 3D RENDER W/INTRP POSTPROCES: CPT

## 2017-07-26 PROCEDURE — 74183 MRI ABD W/O CNTR FLWD CNTR: CPT

## 2017-07-26 PROCEDURE — A9585 GADOBUTROL INJECTION: HCPCS | Performed by: INTERNAL MEDICINE

## 2017-07-26 RX ADMIN — GADOBUTROL 8 ML: 604.72 INJECTION INTRAVENOUS at 07:50

## 2017-07-27 ENCOUNTER — APPOINTMENT (OUTPATIENT)
Dept: PHYSICAL THERAPY | Facility: CLINIC | Age: 67
End: 2017-07-27
Payer: MEDICARE

## 2017-07-31 ENCOUNTER — APPOINTMENT (OUTPATIENT)
Dept: PHYSICAL THERAPY | Facility: CLINIC | Age: 67
End: 2017-07-31
Payer: MEDICARE

## 2017-09-12 DIAGNOSIS — F32.9 MAJOR DEPRESSIVE DISORDER, SINGLE EPISODE: ICD-10-CM

## 2017-09-12 DIAGNOSIS — K85.90 ACUTE PANCREATITIS WITHOUT INFECTION OR NECROSIS: ICD-10-CM

## 2017-09-12 DIAGNOSIS — R93.5 ABNORMAL FINDINGS ON DIAGNOSTIC IMAGING OF OTHER ABDOMINAL REGIONS, INCLUDING RETROPERITONEUM: ICD-10-CM

## 2017-09-12 DIAGNOSIS — M25.552 PAIN IN LEFT HIP: ICD-10-CM

## 2017-09-12 DIAGNOSIS — M25.551 PAIN IN RIGHT HIP: ICD-10-CM

## 2017-09-12 DIAGNOSIS — I10 ESSENTIAL (PRIMARY) HYPERTENSION: ICD-10-CM

## 2017-09-12 DIAGNOSIS — Z12.31 ENCOUNTER FOR SCREENING MAMMOGRAM FOR MALIGNANT NEOPLASM OF BREAST: ICD-10-CM

## 2017-09-12 DIAGNOSIS — R10.13 EPIGASTRIC PAIN: ICD-10-CM

## 2017-09-12 DIAGNOSIS — E78.5 HYPERLIPIDEMIA: ICD-10-CM

## 2017-09-18 ENCOUNTER — GENERIC CONVERSION - ENCOUNTER (OUTPATIENT)
Dept: OTHER | Facility: OTHER | Age: 67
End: 2017-09-18

## 2017-09-18 ENCOUNTER — HOSPITAL ENCOUNTER (OUTPATIENT)
Dept: RADIOLOGY | Facility: MEDICAL CENTER | Age: 67
Discharge: HOME | End: 2017-09-18
Payer: MEDICARE

## 2017-09-18 DIAGNOSIS — I10 ESSENTIAL (PRIMARY) HYPERTENSION: ICD-10-CM

## 2017-09-18 PROCEDURE — G0202 SCR MAMMO BI INCL CAD: HCPCS

## 2017-09-21 ENCOUNTER — HOSPITAL ENCOUNTER (OUTPATIENT)
Dept: RADIOLOGY | Facility: HOSPITAL | Age: 67
Discharge: HOME | End: 2017-09-21
Attending: ORTHOPAEDIC SURGERY
Payer: MEDICARE

## 2017-09-21 ENCOUNTER — TRANSCRIBE ORDERS (OUTPATIENT)
Dept: ADMINISTRATIVE | Facility: HOSPITAL | Age: 67
End: 2017-09-21

## 2017-09-21 DIAGNOSIS — M25.551 PAIN IN RIGHT HIP: ICD-10-CM

## 2017-09-21 DIAGNOSIS — M25.552 LEFT HIP PAIN: ICD-10-CM

## 2017-09-21 DIAGNOSIS — M25.552 PAIN IN LEFT HIP: ICD-10-CM

## 2017-09-21 DIAGNOSIS — M25.551 RIGHT HIP PAIN: Primary | ICD-10-CM

## 2017-09-21 PROCEDURE — 73522 X-RAY EXAM HIPS BI 3-4 VIEWS: CPT

## 2017-09-28 ENCOUNTER — HOSPITAL ENCOUNTER (OUTPATIENT)
Dept: RADIOLOGY | Facility: HOSPITAL | Age: 67
Discharge: HOME | End: 2017-09-28
Payer: MEDICARE

## 2017-09-28 DIAGNOSIS — M25.551 RIGHT HIP PAIN: ICD-10-CM

## 2017-09-28 DIAGNOSIS — M25.552 LEFT HIP PAIN: ICD-10-CM

## 2017-09-28 PROCEDURE — 77002 NEEDLE LOCALIZATION BY XRAY: CPT

## 2017-09-28 PROCEDURE — 20610 DRAIN/INJ JOINT/BURSA W/O US: CPT

## 2017-09-28 RX ORDER — LIDOCAINE HYDROCHLORIDE 10 MG/ML
20 INJECTION, SOLUTION INFILTRATION; PERINEURAL
Status: COMPLETED | OUTPATIENT
Start: 2017-09-28 | End: 2017-09-28

## 2017-09-28 RX ORDER — METHYLPREDNISOLONE ACETATE 80 MG/ML
80 INJECTION, SUSPENSION INTRA-ARTICULAR; INTRALESIONAL; INTRAMUSCULAR; SOFT TISSUE
Status: COMPLETED | OUTPATIENT
Start: 2017-09-28 | End: 2017-09-28

## 2017-09-28 RX ORDER — BUPIVACAINE HYDROCHLORIDE 2.5 MG/ML
30 INJECTION, SOLUTION EPIDURAL; INFILTRATION; INTRACAUDAL
Status: COMPLETED | OUTPATIENT
Start: 2017-09-28 | End: 2017-09-28

## 2017-09-28 RX ADMIN — METHYLPREDNISOLONE ACETATE 80 MG: 80 INJECTION, SUSPENSION INTRA-ARTICULAR; INTRALESIONAL; INTRAMUSCULAR; SOFT TISSUE at 13:35

## 2017-09-28 RX ADMIN — IOHEXOL 3 ML: 300 INJECTION, SOLUTION INTRAVENOUS at 13:15

## 2017-09-28 RX ADMIN — BUPIVACAINE HYDROCHLORIDE 2 ML: 2.5 INJECTION, SOLUTION EPIDURAL; INFILTRATION; INTRACAUDAL at 13:15

## 2017-09-28 RX ADMIN — LIDOCAINE HYDROCHLORIDE 2 ML: 10 INJECTION, SOLUTION INFILTRATION; PERINEURAL at 13:15

## 2017-09-28 RX ADMIN — IOHEXOL 3 ML: 300 INJECTION, SOLUTION INTRAVENOUS at 13:35

## 2017-09-28 RX ADMIN — METHYLPREDNISOLONE ACETATE 80 MG: 80 INJECTION, SUSPENSION INTRA-ARTICULAR; INTRALESIONAL; INTRAMUSCULAR; SOFT TISSUE at 13:15

## 2017-09-28 RX ADMIN — LIDOCAINE HYDROCHLORIDE 2 ML: 10 INJECTION, SOLUTION INFILTRATION; PERINEURAL at 13:35

## 2017-10-11 ENCOUNTER — APPOINTMENT (OUTPATIENT)
Dept: LAB | Facility: CLINIC | Age: 67
End: 2017-10-11
Payer: MEDICARE

## 2017-10-11 DIAGNOSIS — R10.13 EPIGASTRIC PAIN: ICD-10-CM

## 2017-10-11 DIAGNOSIS — I10 ESSENTIAL (PRIMARY) HYPERTENSION: ICD-10-CM

## 2017-10-11 DIAGNOSIS — R93.5 ABNORMAL FINDINGS ON DIAGNOSTIC IMAGING OF OTHER ABDOMINAL REGIONS, INCLUDING RETROPERITONEUM: ICD-10-CM

## 2017-10-11 DIAGNOSIS — E78.5 HYPERLIPIDEMIA: ICD-10-CM

## 2017-10-11 DIAGNOSIS — F32.9 MAJOR DEPRESSIVE DISORDER, SINGLE EPISODE: ICD-10-CM

## 2017-10-11 DIAGNOSIS — K85.90 ACUTE PANCREATITIS WITHOUT INFECTION OR NECROSIS: ICD-10-CM

## 2017-10-11 LAB
ALBUMIN SERPL BCP-MCNC: 3.4 G/DL (ref 3.5–5)
ALP SERPL-CCNC: 65 U/L (ref 46–116)
ALT SERPL W P-5'-P-CCNC: 30 U/L (ref 12–78)
AMYLASE SERPL-CCNC: 48 IU/L (ref 25–115)
ANION GAP SERPL CALCULATED.3IONS-SCNC: 7 MMOL/L (ref 4–13)
AST SERPL W P-5'-P-CCNC: 20 U/L (ref 5–45)
BASOPHILS # BLD AUTO: 0.03 THOUSANDS/ΜL (ref 0–0.1)
BASOPHILS NFR BLD AUTO: 0 % (ref 0–1)
BILIRUB SERPL-MCNC: 0.58 MG/DL (ref 0.2–1)
BUN SERPL-MCNC: 16 MG/DL (ref 5–25)
CALCIUM SERPL-MCNC: 9.2 MG/DL (ref 8.3–10.1)
CHLORIDE SERPL-SCNC: 101 MMOL/L (ref 100–108)
CHOLEST SERPL-MCNC: 174 MG/DL (ref 50–200)
CO2 SERPL-SCNC: 30 MMOL/L (ref 21–32)
CREAT SERPL-MCNC: 1.11 MG/DL (ref 0.6–1.3)
EOSINOPHIL # BLD AUTO: 0.13 THOUSAND/ΜL (ref 0–0.61)
EOSINOPHIL NFR BLD AUTO: 2 % (ref 0–6)
ERYTHROCYTE [DISTWIDTH] IN BLOOD BY AUTOMATED COUNT: 12.8 % (ref 11.6–15.1)
GFR SERPL CREATININE-BSD FRML MDRD: 52 ML/MIN/1.73SQ M
GLUCOSE P FAST SERPL-MCNC: 78 MG/DL (ref 65–99)
HCT VFR BLD AUTO: 42 % (ref 34.8–46.1)
HDLC SERPL-MCNC: 87 MG/DL (ref 40–60)
HGB BLD-MCNC: 13.9 G/DL (ref 11.5–15.4)
LDLC SERPL CALC-MCNC: 73 MG/DL (ref 0–100)
LIPASE SERPL-CCNC: 218 U/L (ref 73–393)
LYMPHOCYTES # BLD AUTO: 2.99 THOUSANDS/ΜL (ref 0.6–4.47)
LYMPHOCYTES NFR BLD AUTO: 35 % (ref 14–44)
MCH RBC QN AUTO: 29.7 PG (ref 26.8–34.3)
MCHC RBC AUTO-ENTMCNC: 33.1 G/DL (ref 31.4–37.4)
MCV RBC AUTO: 90 FL (ref 82–98)
MONOCYTES # BLD AUTO: 0.47 THOUSAND/ΜL (ref 0.17–1.22)
MONOCYTES NFR BLD AUTO: 6 % (ref 4–12)
NEUTROPHILS # BLD AUTO: 4.82 THOUSANDS/ΜL (ref 1.85–7.62)
NEUTS SEG NFR BLD AUTO: 57 % (ref 43–75)
NRBC BLD AUTO-RTO: 0 /100 WBCS
PLATELET # BLD AUTO: 244 THOUSANDS/UL (ref 149–390)
PMV BLD AUTO: 10.2 FL (ref 8.9–12.7)
POTASSIUM SERPL-SCNC: 3.5 MMOL/L (ref 3.5–5.3)
PROT SERPL-MCNC: 7.3 G/DL (ref 6.4–8.2)
RBC # BLD AUTO: 4.68 MILLION/UL (ref 3.81–5.12)
SODIUM SERPL-SCNC: 138 MMOL/L (ref 136–145)
TRIGL SERPL-MCNC: 71 MG/DL
TSH SERPL DL<=0.05 MIU/L-ACNC: 1.65 UIU/ML (ref 0.36–3.74)
WBC # BLD AUTO: 8.46 THOUSAND/UL (ref 4.31–10.16)

## 2017-10-11 PROCEDURE — 84443 ASSAY THYROID STIM HORMONE: CPT

## 2017-10-11 PROCEDURE — 80061 LIPID PANEL: CPT

## 2017-10-11 PROCEDURE — 82150 ASSAY OF AMYLASE: CPT

## 2017-10-11 PROCEDURE — 85025 COMPLETE CBC W/AUTO DIFF WBC: CPT

## 2017-10-11 PROCEDURE — 36415 COLL VENOUS BLD VENIPUNCTURE: CPT

## 2017-10-11 PROCEDURE — 83690 ASSAY OF LIPASE: CPT

## 2017-10-11 PROCEDURE — 80053 COMPREHEN METABOLIC PANEL: CPT

## 2017-10-19 ENCOUNTER — ALLSCRIPTS OFFICE VISIT (OUTPATIENT)
Dept: OTHER | Facility: OTHER | Age: 67
End: 2017-10-19

## 2017-10-19 DIAGNOSIS — M85.80 OTHER SPECIFIED DISORDERS OF BONE DENSITY AND STRUCTURE, UNSPECIFIED SITE (CODE): ICD-10-CM

## 2017-10-20 NOTE — PROGRESS NOTES
Assessment  1  Benign essential hypertension (401 1) (I10)   2  Depression (311) (F32 9)   3  Hyperlipidemia (272 4) (E78 5)   4  Advance directive discussed with patient (V65 49) (Z71 89)   5  Obesity due to excess calories, unspecified obesity severity (278 00) (E66 09)   6  Need for hepatitis C screening test (V73 89) (Z11 59)    Plan  Arthritis, Benign essential hypertension, Obesity due to excess calories, unspecified obesity  severity    · Contrave 8-90 MG Oral Tablet Extended Release 12 Hour; 1 CAP IN AM FOR 1 WEEK THEN 1  PO BID FOR 1 WEEK THEN 2 IN AM AND 1 AT PM FOR A WEEK THEN 2 PO BID  Benign essential hypertension    · (1) CBC/PLT/DIFF; Status:Active; Requested VYU:[id number];    · (1) COMPREHENSIVE METABOLIC PANEL; Status:Active; Requested for:19Apr2018;    · (1) TSH; Status:Active; Requested KNZ:[id number]; Benign essential hypertension, Hyperlipidemia    · (1) LIPID PANEL, FASTING; Status:Active; Requested for:19Apr2018;   Hyperlipidemia    · Omega-3-acid Ethyl Esters 1 GM Oral Capsule  Need for hepatitis C screening test, Need for prophylactic vaccination and inoculation against  influenza    · (1) HEP C ANTIBODY; Status:Active; Requested for:19Apr2018;   Need for prophylactic vaccination and inoculation against influenza    · Stop: Fluzone High-Dose 0 5 ML Intramuscular Suspension Prefilled Syringe  Osteopenia    · * DXA BONE DENSITY SPINE HIP AND PELVIS; Status:Hold For - Scheduling,Retrospective  Authorization; Requested [facility]:[id number];   Screening for genitourinary condition    · *VB - Urinary Incontinence Screen (Dx Z13 89 Screen for UI);  Status:Complete - Retrospective By  Protocol Authorization;   Done: [id number] 12:00AM  Subacute pancreatitis    · Omeprazole 20 MG Oral Tablet Delayed Release; 1 tab daily in am    Discussion/Summary  Discussion Summary:   HTN-P STABLE- ON FLUOXITINE= WILL START CONTRAVE FOR WT LOSS- IF SHE GETS TO 2 BID I MAY NEED TO DECREASE DOSE OF FLUOXITINE- FOLLOW UPPAIN - S/P INJECTIONS- DOING WELLHAD B/L KNEE REPLACEMENTSPAIN- SUBACUTE PANCREATITIS- STABLE- BETTER ON OMEPRAZOLE EVERY DAYUP TO DATE; IN [facility]IL AND FOLLOW UPUP IN 4-6 WEEKS IF STARTING CONTRAVE;  Chief Complaint  Chief Complaint Free Text Note Form: PT IS HERE FOR FOLLOW UPFEELS WELLHAD INJECTIONS INTO HIPS - AFTER THE INJECTIONS SHE HAD SOME FLUSHING AND ITCHING- USED BENADRYL - ;THEN SHE USED ALLEGRA AND GOT WORSE      History of Present Illness  Osteoarthritis (Follow-Up): The patient states her osteoarthritis has been B/L HIP OA- LEFT WORSE THAN RIGHT- RECENT HIP INJECTIONS, but improved since the last visit  Osteoarthritis complications include knee arthroplasty  The patient's osteoarthritis has not resulted in physical disability  She has no significant interval events  Symptoms: denies knee pain,-- improved hip pain,-- denies finger pain,-- denies shoulder pain-- and-- denies back pain  Associated symptoms include no localized joint swelling-- and-- no localized joint stiffness  Activities: no limitations  Abdominal Pain (Brief): The patient is being seen for follow-up from urgent care for and HAD SUBACUTE PANCREATITIS- BETTER ON OMEPRAZOLE- WAS ON QOD AND HAD SOME BREAKTHRU abdominal pain  Symptoms: abdominal pain, but-- no nausea,-- no vomiting-- and-- no diarrhea  Associated Symptoms: no abdominal bloating,-- no fever-- and-- no chills  Current Treatment: she is currently not being treated for this problem  Pertinent History: no pertinent medical history reported  Evaluation and Treatment History: she has not been previously evaluated for this problem  Obesity (Brief): The patient is being seen for a routine clinic follow-up of and PT LOST WEIGHT- WOULD LIKE TO LSOE MORE WEIGHT obesity  Symptoms:  excess weight-- and-- inability to lose weight, but-- no weight increase  Associated symptoms:  joint pain, but-- no depressed mood  No associated symptoms are reported   Current treatment includes food diary and exercise regimen  By report, there is good compliance with treatment, good tolerance of treatment and good symptom control  Reported interest in weight loss is high  Diets tried in the past include low calorie  Current diet includes well balanced meals  HPI: PT WITH OA OF HIPS- HAD CORTISONE INJECTION B/L WITH GOOD IMPROVEMENT- SOME S/E FROMT HE PREDNISONE; FEELING WELL NOW; HAS B/L KNEE REPLACEMENTS DONE IN [place] WOULD LIKE TO LOSE WEIGHT - SHE HAS LOST WEIGHT AND IS FOLLOWING A LOW CALORIE DIET; Depression (Follow-Up): The patient states her depression has ON FLUOXITINE 20 MG DAILY- WOULD LIKE TO TRY CONTRAVE- WILL NEED TO ADJUST DOSE AS WE TITRATE CONTRAVE, but been stable since the last visit  Interval Symptoms:       Hyperlipidemia (Follow-Up): The patient states her hyperlipidemia has been under good control since the last visit  Comorbid Illnesses: hypertension  She has no comorbid illnesses  She has no significant interval events  Symptoms: The patient is currently asymptomatic  Associated symptoms include no focal neurologic deficits-- and-- no memory loss  Hypertension (Follow-Up): The patient presents for follow-up of essential hypertension  The patient states she has been doing well with her blood pressure control since the last visit  She has no comorbid illnesses  She has no significant interval events  Symptoms: The patient is currently asymptomatic  Review of Systems  Complete-Female:   Constitutional: recent weight loss, but-- No fever, no chills, feels well, no tiredness, no recent weight gain or weight loss,-- no fever,-- not feeling poorly,-- no chills-- and-- not feeling tired  Eyes: WEARS GLASSES, but-- No complaints of eye pain, no red eyes, no eyesight problems, no discharge, no dry eyes, no itching of eyes,-- as noted in HPI-- and-- no eyesight problems     ENT: no complaints of earache, no loss of hearing, no nose bleeds, no nasal discharge, no sore throat, no hoarseness,-- no earache,-- no nosebleeds-- and-- no nasal discharge  Cardiovascular: No complaints of slow heart rate, no fast heart rate, no chest pain, no palpitations, no leg claudication, no lower extremity edema,-- the heart rate was not slow,-- no chest pain,-- the heart rate was not fast-- and-- no palpitations  Respiratory: No complaints of shortness of breath, no wheezing, no cough, no SOB on exertion, no orthopnea, no PND,-- no shortness of breath,-- no cough,-- no wheezing-- and-- no shortness of breath during exertion  Gastrointestinal: no abdominal pain,-- no nausea,-- no vomiting,-- no diarrhea-- and-- no blood in stools  Genitourinary: No complaints of dysuria, no incontinence, no pelvic pain, no dysmenorrhea, no vaginal discharge or bleeding-- and-- no dysuria  Musculoskeletal: arthralgias-- and-- HIP PAIN BETTER;, but-- as noted in HPI,-- no joint swelling-- and-- no joint stiffness  Integumentary: No complaints of skin rash or lesions, no itching, no skin wounds, no breast pain or lump,-- no rashes-- and-- no skin lesions  Neurological: No complaints of headache, no confusion, no convulsions, no numbness, no dizziness or fainting, no tingling, no limb weakness, no difficulty walking,-- no confusion-- and-- no convulsions  Psychiatric: Not suicidal, no sleep disturbance, no anxiety or depression, no change in personality, no emotional problems-- and-- no sleep disturbances  Endocrine: No complaints of proptosis, no hot flashes, no muscle weakness, no deepening of the voice, no feelings of weakness  Hematologic/Lymphatic: No complaints of swollen glands, no swollen glands in the neck, does not bleed easily, does not bruise easily,-- no tendency for easy bleeding-- and-- no tendency for easy bruising  Other Symptoms: C    ROS Reviewed:   ROS reviewed  Active Problems  1  Advance directive discussed with patient (V65 ) (U45 36)   2  Arthritis (716 90) (M19 90)   3  Benign essential hypertension (401 1) (I10)   4  Dense breast tissue on mammogram (793 89) (R92 2)   5  Depression (311) (F32 9)   6  Hyperlipidemia (272 4) (E78 5)   7  Need for prophylactic vaccination and inoculation against influenza (V04 81) (Z23)   8  Osteopenia (733 90) (M85 80)   9  Primary osteoarthritis of both hips (715 15) (M16 0)   10  Sacro-iliac pain (724 6) (M53 3)   11  Screening for genitourinary condition (V81 6) (Z13 89)   12  Subacromial impingement of right shoulder (726 19) (M75 41)   13  Subacute pancreatitis (577 0) (K85 90)   14  Urinary frequency (788 41) (R35 0)    Past Medical History  1  History of Abnormal CT of the abdomen (793 6) (R93 5)   2  History of Acute maxillary sinusitis, recurrence not specified (461 0) (J01 00)   3  History of Aftercare following surgery (V58 89) (Z48 89)   4  History of epigastric pain (V12 79) (Z87 19)   5  History of pneumococcal vaccination (V49 89) (Z92 29)   6  History of screening mammography (V15 89) (Z92 89)   7  History of Lower abdominal pain (789 09) (R10 30)   8  History of Pain of both hip joints (719 45) (M25 551,M25 552)   9  History of Pre-op evaluation (V72 84) (Z01 818)   10  History of Right bundle branch block (RBBB) on electrocardiography (426 4) (I45 10)   11  History of Welcome to Medicare preventive visit (V70 0) (Z00 00)  Active Problems And Past Medical History Reviewed: The active problems and past medical history were reviewed and updated today  Surgical History  1  History of Appendectomy   2  History of Hysterectomy   3  History of Knee Replacement   4  History of Rotator Cuff Repair   5  History of Tonsillectomy  Surgical History Reviewed: The surgical history was reviewed and updated today  Family History  Mother    1  Family history of atrial fibrillation (V17 49) (Z82 49)   2  Family history of breast cancer (V16 3) (Z80 3)   3   Family history of cerebrovascular accident (V17 1) (Z82 3)   4  Family history of coronary artery disease (V17 3) (Z82 49)   5  Family history of diabetes mellitus (V18 0) (Z83 3)   6  Family history of malignant neoplasm of female breast (V16 3) (Z80 3)   7  Family history of Pancreatitis, recurrent  Father    8  Family history of myocardial infarction (V17 3) (Z82 49)  Family History    9  Family history of arthritis (V17 7) (Z82 61)   10  Family history of malignant neoplasm (V16 9) (Z80 9)  Family History Reviewed: The family history was reviewed and updated today  Social History   · Drinks coffee   · Never a smoker   · Social alcohol use (Z78 9)  Social History Reviewed: The social history was reviewed and updated today  The social history was reviewed and is unchanged  Current Meds   1  Atenolol 50 MG Oral Tablet; take 1 tablet by mouth  daily as directed; Therapy: 15BNQ2831 to (Evaluate:02Jan2018)  Requested for: 08NOG8181; Last Rx:84Tdu3586 Ordered   2  Atorvastatin Calcium 20 MG Oral Tablet; Take 1 tablet by mouth 6  days a week; Therapy: 57NSI0197 to (Evaluate:36Roq1911)  Requested for: 88Kro6253; Last Rx:45Oqk3853   Ordered   3  Calcium + D TABS; TAKE 1 TABLET DAILY; Therapy: (XYYHDYFO:23HRZ5917) to Recorded   4  Centrum Silver TABS; Take 1 tablet daily; Therapy: (QUMRPJPA:43TRB8631) to Recorded   5  FLUoxetine HCl - 20 MG Oral Capsule; Take 1 capsule by mouth  every day; Therapy: 24JNU1712 to (Evaluate:77Sao9563)  Requested for: 55HAJ0654; Last Rx:81Tyz3679   Ordered   6  HydroCHLOROthiazide 25 MG Oral Tablet; Take 1 tablet daily  Requested for: 91Vnm2100; Last   Rx:92Bsl2333 Ordered   7  Omega-3 CAPS; Therapy: (Recorded:19Oct2017) to Recorded   8  Omega-3-acid Ethyl Esters 1 GM Oral Capsule; 1 CAPSULE TWICE A DAY; Therapy: 04Znt5955 to (Evaluate:02Zit2860)  Requested for: 39Gsi6601; Last Rx:24Ajl1850   Ordered   9  Omeprazole 20 MG Oral Tablet Delayed Release; take 1 tablet every other day;    Therapy: (Recorded:74Vsd5171) to Recorded   10  Vitamin D 2000 UNIT Oral Capsule; take 1 capsule daily; Therapy: (Recorded:46Ovw1126) to Recorded    Allergies  1  Bactrim DS TABS   2  Dilaudid   3  Sulfa Drugs    Vitals  Vital Signs    Recorded: 95HSZ2151 09:13AM   Temperature 97 7 F, Tympanic   Heart Rate 60   Respiration 16   Systolic 991, LUE, Sitting   Diastolic 66, LUE, Sitting   Height 5 ft    Weight 195 lb 6 oz   BMI Calculated 38 16   BSA Calculated 1 85     Physical Exam    Constitutional   General appearance: Abnormal   acutely ill  Eyes   Conjunctiva and lids: No swelling, erythema or discharge  Pupils and irises: Equal, round and reactive to light  Ears, Nose, Mouth, and Throat   External inspection of ears and nose: Normal     Otoscopic examination: Tympanic membranes translucent with normal light reflex  Canals patent without erythema  Nasal mucosa, septum, and turbinates: Normal without edema or erythema  Oropharynx: Normal with no erythema, edema, exudate or lesions  Inspection of the oropharynx showed fully visible tonsils, uvula and soft palate (Mallampati class 1)  Oral mucosa was moist, but-- was normal  The palate examination showed no abnormalities  The tongue was normal  The tonsils were normal    Pulmonary   Respiratory effort: No increased work of breathing or signs of respiratory distress  Auscultation of lungs: Clear to auscultation  Auscultation of the lungs revealed no expiratory wheezing,-- normal expiratory time-- and-- no inspiratory wheezing  no rales or crackles were heard bilaterally  no rhonchi  no friction rub  no wheezing  no diminished breath sounds  no bronchial breath sounds  Cardiovascular   Palpation of heart: Normal PMI, no thrills  Auscultation of heart: Normal rate and rhythm, normal S1 and S2, without murmurs  Examination of extremities for edema and/or varicosities: Normal   no edema  Carotid pulses: Normal  -- NO BRUITS     Abdomen   Abdomen: Non-tender, no masses  The abdomen was rounded  The abdomen was soft and nontender  The abdomen was not firm-- and-- not rigid  No guarding  There was a negative Weaver's sign,-- negative Rovsing's sign,-- negative obturator sign-- and-- negative psoas sign  no masses palpated  The abdomen was normal to percussion  no CVA tenderness   Liver and spleen: No hepatomegaly or splenomegaly  Lymphatic   Palpation of lymph nodes in neck: No lymphadenopathy  Musculoskeletal   Gait and station: Normal     Digits and nails: Normal without clubbing or cyanosis  Skin   Skin and subcutaneous tissue: Normal without rashes or lesions  Neurologic   Cranial nerves: Cranial nerves 2-12 intact  Reflexes: 2+ and symmetric  Sensation: No sensory loss  Psychiatric   Orientation to person, place, and time: Normal     Mood and affect: Normal          Results/Data  Falls Risk Assessment (Dx Z13 89 Screen for Neurologic Disorder) 58LUC7579 09:24AM User, [employer]     Test Name Result Flag Reference   Falls Risk      No falls in the past year     PHQ-9 Adult Depression Screening 19Oct2017 09:22AM User, [employer]     Test Name Result Flag Reference   PHQ-9 Adult Depression Score 2     Over the last two weeks, how often have you been bothered by any of the following problems? Little interest or pleasure in doing things: Not at all - 0  Feeling down, depressed, or hopeless: Not at all - 0  Trouble falling or staying asleep, or sleeping too much: Not at all - 0  Feeling tired or having little energy: Several days - 1  Poor appetite or over eating: Several days - 1  Feeling bad about yourself - or that you are a failure or have let yourself or your family down: Not at all - 0  Trouble concentrating on things, such as reading the newspaper or watching television: Not at all - 0  Moving or speaking so slowly that other people could have noticed   Or the opposite -  being so fidgety or restless that you have been moving around a lot more than usual: Not at all - 0  Thoughts that you would be better off dead, or of hurting yourself in some way: Not at all - 0   PHQ-9 Adult Depression Screening Negative     PHQ-9 Difficulty Level Not difficult at all     PHQ-9 Severity Minimal Depression       *VB - Urinary Incontinence Screen (Dx Z13 89 Screen for UI) 33UMX0172 12:00AM [facility]     Test Name Result Flag Reference   Urinary Incontinence Assessment 06CTS0419       (1) TSH 11Oct2017 09:52AM [facility]    Order Number: [id number]     Test Name Result Flag Reference   TSH 1 650 uIU/mL  0 358-3 740   Patients undergoing fluorescein dye angiography may retain small amounts of fluorescein in the body for 48-72 hours post procedure  Samples containing fluorescein can produce falsely depressed TSH values  If the patient had this procedure,a specimen should be resubmitted post fluorescein clearance  The recommended reference ranges for TSH during pregnancy are as follows:  First trimester 0 1 to 2 5 uIU/mL  Second trimester  0 2 to 3 0 uIU/mL  Third trimester 0 3 to 3 0 uIU/m     (1) LIPID PANEL, FASTING 96WMD1694 09:52AM [facility]    Order Number: [id number]     Test Name Result Flag Reference   CHOLESTEROL 174 mg/dL     HDL,DIRECT 87 mg/dL H 40-60   Specimen collection should occur prior to Metamizole administration due to the potential for falsley depressed results  LDL CHOLESTEROL CALCULATED 73 mg/dL  0-100   Triglyceride:        Normal <150 mg/dl   Borderline High 150-199 mg/dl   High 200-499 mg/dl   Very High >499 mg/dl      Cholesterol:       Desirable <200 mg/dl    Borderline High 200-239 mg/dl    High >239 mg/dl      HDL Cholesterol:       High>59 mg/dL    Low <41 mg/dL      This screening LDL is a calculated result  It does not have the accuracy of the Direct Measured LDL in the monitoring of patients with hyperlipidemia and/or statin therapy     Direct Measure LDL (RRJ688) must be ordered separately in these patients  TRIGLYCERIDES 71 mg/dL  <=150   Specimen collection should occur prior to N-Acetylcysteine or Metamizole administration due to the potential for falsely depressed results  (1) CBC/PLT/DIFF 90FCG8950 09:52AM [facility]   TW Order Number: [id number]     Test Name Result Flag Reference   WBC COUNT 8 46 Thousand/uL  4 31-10 16   RBC COUNT 4 68 Million/uL  3 81-5 12   HEMOGLOBIN 13 9 g/dL  11 5-15 4   HEMATOCRIT 42 0 %  34 8-46  1   MCV 90 fL  82-98   MCH 29 7 pg  26 8-34 3   MCHC 33 1 g/dL  31 4-37 4   RDW 12 8 %  11 6-15 1   MPV 10 2 fL  8 9-12 7   PLATELET COUNT 664 Thousands/uL  149-390   nRBC AUTOMATED 0 /100 WBCs     NEUTROPHILS RELATIVE PERCENT 57 %  43-75   LYMPHOCYTES RELATIVE PERCENT 35 %  14-44   MONOCYTES RELATIVE PERCENT 6 %  4-12   EOSINOPHILS RELATIVE PERCENT 2 %  0-6   BASOPHILS RELATIVE PERCENT 0 %  0-1   NEUTROPHILS ABSOLUTE COUNT 4 82 Thousands/? ??L  1 85-7 62   LYMPHOCYTES ABSOLUTE COUNT 2 99 Thousands/? ??L  0 60-4 47   MONOCYTES ABSOLUTE COUNT 0 47 Thousand/? ??L  0 17-1 22   EOSINOPHILS ABSOLUTE COUNT 0 13 Thousand/? ??L  0 00-0 61   BASOPHILS ABSOLUTE COUNT 0 03 Thousands/? ??L  0 00-0 10     (1) COMPREHENSIVE METABOLIC PANEL 03ZUM1191 27:18BH [facility]     Test Name Result Flag Reference   SODIUM 138 mmol/L  136-145   POTASSIUM 3 5 mmol/L  3 5-5 3   CHLORIDE 101 mmol/L  100-108   CARBON DIOXIDE 30 mmol/L  21-32   ANION GAP (CALC) 7 mmol/L  4-13   BLOOD UREA NITROGEN 16 mg/dL  5-25   CREATININE 1 11 mg/dL  0 60-1 30   Standardized to IDMS reference method   CALCIUM 9 2 mg/dL  8 3-10 1   BILI, TOTAL 0 58 mg/dL  0 20-1 00   ALK PHOSPHATAS 65 U/L     ALT (SGPT) 30 U/L  12-78   Specimen collection should occur prior to Sulfasalazine and/or Sulfapyridine administration due to the potential for falsely depressed results     AST(SGOT) 20 U/L  5-45   Specimen collection should occur prior to Sulfasalazine administration due to the potential for falsely depressed results  ALBUMIN 3 4 g/dL L 3 5-5 0   TOTAL PROTEIN 7 3 g/dL  6 4-8 2   eGFR 52 ml/min/1 73sq m     [facility] Disease Education Program recommendations are as follows:  GFR calculation is accurate only with a steady state creatinine  Chronic Kidney disease less than 60 ml/min/1 73 sq  meters  Kidney failure less than 15 ml/min/1 73 sq  meters  GLUCOSE FASTING 78 mg/dL  65-99   Specimen collection should occur prior to Sulfasalazine administration due to the potential for falsely depressed results  Specimen collection should occur prior to Sulfapyridine administration due to the potential for falsely elevated results  (1) AMYLASE 39TEG3728 09:52AM [facility]ust[facility]     Test Name Result Flag Reference   AMYLASE 48 IU/L         Future Appointments    Date/Time Provider Specialty Site   12/19/2017 09:00 AM GONZALO Baltazar  Orthopedic Surgery [address]     Signatures   Electronically signed by :  Vaishnavi [address] [place] [address]; Oct 19 2017 10:06AM EST                       (Author)

## 2017-12-19 ENCOUNTER — ALLSCRIPTS OFFICE VISIT (OUTPATIENT)
Dept: OTHER | Facility: OTHER | Age: 67
End: 2017-12-19

## 2017-12-20 NOTE — PROGRESS NOTES
Assessment  1  Primary osteoarthritis of both hips (715 15) (M16 0)  This patient had 6 weeks relief following injections of both hip joints, she is consider surgical options as her pain returns  I welcome the opportunity see him back in the office on an as-needed basis   Plan  Primary osteoarthritis of both hips    · Follow-up PRN Evaluation and Treatment  Follow-up  Status: Complete  Done:62Dzj1114 09:41AM    Chief Complaint  1  Hip Pain    History of Present Illness  HPI: 72-year-old female presents for follow-up  She is under fluoroscopic an injection of both hips, describes 6 weeks relief  She has had return of pain in both groins, she has pain that radiates down the anterior aspect of the thighs  Her function has been declining as result of increasing pain  Review of Systems   Constitutional: No fever, no chills, feels well, no tiredness, no recent weight gain or loss  Eyes: No complaints of eyesight problems, no red eyes  ENT: no loss of hearing, no nosebleeds, no sore throat  Cardiovascular: No complaints of chest pain, no palpitations, no leg claudication or lower extremity edema  Respiratory: no compliants of shortness of breath, no wheezing, no cough  Gastrointestinal: no complaints of abdominal pain, no constipation, no nausea or diarrhea, no vomiting, no bloody stools  Genitourinary: no complaints of dysuria, no incontinence  Musculoskeletal: as noted in HPI  Integumentary: no complaints of skin rash or lesion, no itching or dry skin, no skin wounds  Neurological: no complaints of headache, no confusion, no numbness or tingling, no dizziness  Endocrine: No complaints of muscle weakness, no feelings of weakness, no frequent urination, no excessive thirst   Psychiatric: No suicidal thoughts, no anxiety, no feelings of depression  Active Problems  1  Advance directive discussed with patient (V65 49) (Z71 89)   2  Arthritis (716 90) (M19 90)   3   Benign essential hypertension (401 1) (I10)   4  Dense breast tissue on mammogram (793 89) (R92 2)   5  Depression (311) (F32 9)   6  Hyperlipidemia (272 4) (E78 5)   7  Need for hepatitis C screening test (V73 89) (Z11 59)   8  Need for prophylactic vaccination and inoculation against influenza (V04 81) (Z23)   9  Obesity due to excess calories, unspecified obesity severity (278 00) (E66 09)   10  Osteopenia (733 90) (M85 80)   11  Primary osteoarthritis of both hips (715 15) (M16 0)   12  Sacro-iliac pain (724 6) (M53 3)   13  Screening for genitourinary condition (V81 6) (Z13 89)   14  Subacromial impingement of right shoulder (726 19) (M75 41)   15  Subacute pancreatitis (577 0) (K85 90)   16  Urinary frequency (788 41) (R35 0)    Past Medical History   · History of Abnormal CT of the abdomen (793 6) (R93 5)   · History of Acute maxillary sinusitis, recurrence not specified (461 0) (J01 00)   · History of Aftercare following surgery (V58 89) (Z48 89)   · History of epigastric pain (V12 79) (Z87 19)   · History of pneumococcal vaccination (V49 89) (Z92 29)   · History of screening mammography (V15 89) (Z92 89)   · History of Lower abdominal pain (789 09) (R10 30)   · History of Pain of both hip joints (719 45) (M25 551,M25 552)   · History of Pre-op evaluation (V72 84) (Z01 818)   · History of Right bundle branch block (RBBB) on electrocardiography (426 4) (I45 10)   · History of Welcome to Medicare preventive visit (V70 0) (Z00 00)    The active problems and past medical history were reviewed and updated today  Surgical History   · History of Appendectomy   · History of Hysterectomy   · History of Knee Replacement   · History of Rotator Cuff Repair   · History of Tonsillectomy    The surgical history was reviewed and updated today         Family History  Mother    · Family history of atrial fibrillation (V17 49) (Z82 49)   · Family history of breast cancer (V16 3) (Z80 3)   · Family history of cerebrovascular accident (V17 1) (Z82 3)   · Family history of coronary artery disease (V17 3) (Z82 49)   · Family history of diabetes mellitus (V18 0) (Z83 3)   · Family history of malignant neoplasm of female breast (V16 3) (Z80 3)   · Family history of Pancreatitis, recurrent  Father    · Family history of myocardial infarction (V17 3) (Z82 49)  Family History    · Family history of arthritis (V17 7) (Z82 61)   · Family history of malignant neoplasm (V16 9) (Z80 9)    Social History   · Drinks coffee   · Never a smoker   · Social alcohol use (Z78 9)    Current Meds   1  Atenolol 50 MG Oral Tablet; take 1 tablet by mouth  daily as directed; Therapy: 48IQI7577 to (Evaluate:2018)  Requested for: 17OJE0134; Last Rx:01Vdh9384 Ordered   2  Atorvastatin Calcium 20 MG Oral Tablet (Lipitor); Take 1 tablet by mouth 6  days a week; Therapy: 86RWF8731 to (Evaluate:16Wtb6838)  Requested for: 07Uih0574; Last Rx:75Ycf6888 Ordered   3  Calcium + D TABS; TAKE 1 TABLET DAILY; Therapy: (RXEXZMEN:52CWN9981) to Recorded   4  Centrum Silver TABS; Take 1 tablet daily; Therapy: (MMWHUV50OET9887) to Recorded   5  Contrave 8-90 MG Oral Tablet Extended Release 12 Hour; 1 CAP IN AM FOR 1 WEEK THEN 1 PO BID FOR 1 WEEK THEN 2 IN AM AND 1 AT PM FOR A WEEK THEN 2 PO BID; Therapy: 83NZO6060 to (Evaluate:20Dqd2690); Last Rx:86Eri6923 Ordered   6  FLUoxetine HCl - 20 MG Oral Capsule; Take 1 capsule by mouth  every day; Therapy: 10OOT6314 to (Evaluate:2018)  Requested for: 50Zap8448; Last Rx:32Oiy4365 Ordered   7  HydroCHLOROthiazide 25 MG Oral Tablet; Take 1 tablet daily  Requested for: 76Ncs3636; Last Rx:32Aje0296 Ordered   8  Omega-3 CAPS; Therapy: (Recorded:2017) to Recorded   9  Omeprazole 20 MG Oral Tablet Delayed Release; 1 tab daily in am  Requested for: 19ILY3979; Last Rx:2017 Ordered   10  Vitamin D 2000 UNIT Oral Capsule; take 1 capsule daily; Therapy: (Recorded:2015) to Recorded    Allergies  1  Bactrim DS TABS   2  Dilaudid   3   Sulfa Drugs    Vitals Recorded: 45AVO6604 09:17AM   Heart Rate 65   Systolic 389   Diastolic 82   Weight 599 lb 6 08 oz     Physical Exam  Gait pattern is with very little antalgia  Breathing is unlabored  There is modest restriction of hip motion bilaterally  This re-creates groin pain  There is no tenderness in the Area the foot toes strongly dorsiflex bilaterally        Future Appointments    Date/Time Provider Specialty Site   04/20/2018 09:00 AM Shelia Pino,  Internal Medicine [address] [facility]     Signatures   Electronically signed by : GONZALO Rivera ; Dec 19 2017  9:42AM EST                       (Author)

## 2018-01-09 NOTE — MISCELLANEOUS
Assessment    1  Abdominal pain, epigastric (789 06) (R10 13)   2  Subacute pancreatitis (577 0) (K85 90)   3  Abnormal CT of the abdomen (793 6) (R93 5)   4  Family history of Pancreatitis, recurrent : Mother   5  Benign essential hypertension (401 1) (I10)   6  Hyperlipidemia (272 4) (E78 5)    Plan  Abdominal pain, epigastric    · (1) AMYLASE; Status:Active; Requested for:33Bfc7830;    Perform:[facility]; Due:84Rdu7158; Ordered; For:Abdominal pain, epigastric; Ordered By:Kristine Pino; Abdominal pain, epigastric, Abnormal CT of the abdomen    · (1) COMPREHENSIVE METABOLIC PANEL; Status:Active; Requested for:44Wvi5231;    Perform:[facility]; Due:29Bmv4695; Ordered; For:Abdominal pain, epigastric, Abnormal CT of the abdomen; Ordered By:Kristine Pino; Abdominal pain, epigastric, Abnormal CT of the abdomen, Subacute pancreatitis    · * MRI ABDOMEN W WO CONTRAST AND MRCP; Status:Need Information - Financial  Authorization; Requested for:30Vny3506;    Perform:[address] [facility]; AXU:70INR8869; Ordered; For:Abdominal pain, epigastric, Abnormal CT of the abdomen, Subacute pancreatitis; Ordered By:Kristine Pino; Abdominal pain, epigastric, Subacute pancreatitis    · (1) LIPASE; Status:Active; Requested for:50Pwc9565;    Perform:[facility]; Due:23Dzi0203; Ordered; For:Abdominal pain, epigastric, Subacute pancreatitis; Ordered By:Kristine Pino;  Benign essential hypertension, Depression    · (1) CBC/PLT/DIFF; Status:Active; Requested for:46Dfu9029;    Perform:[facility]; Due:42Mti0063; Ordered; For:Benign essential hypertension, Depression; Ordered By:Kristine Pino;   · (1) TSH; Status:Active; Requested for:46Lqa8614;    Perform:[facility]; Due:82Zjd5197; Ordered;   For:Benign essential hypertension, Depression; Ordered By:Nevaeh Pino;  Benign essential hypertension, Hyperlipidemia    · (1) LIPID PANEL, FASTING; Status:Active; Requested for:05Ydf7619;    Perform:[facility]; Due:86Ktj3935; Ordered; For:Benign essential hypertension, Hyperlipidemia; Ordered By:Libby Pino;  Hyperlipidemia    · Atorvastatin Calcium 20 MG Oral Tablet (Lipitor); Take 1 tablet by mouth 6  days a  week   Rx By: Robin Leonard; Dispense: 90 Days ; #:78 Tablet; Refill: 0; For: Hyperlipidemia; GLENDY = N; Verified Transmission to [address] [place] [address]; Last Updated By: System, kooldiner; 7/12/2017 5:12:40 PM  Lower abdominal pain    · HydroCHLOROthiazide 25 MG Oral Tablet; Take 1 tablet daily   Rx By: Robin Leonard; Dispense: 90 Days ; #:1 X 90 Tablet Bottle; Refill: 3; For: Lower abdominal pain; GLENDY = N; Verified Transmission to [address] [place] [address]; Last Updated By: System, kooldiner; 7/12/2017 5:12:41 PM  Need for prophylactic vaccination against Streptococcus pneumoniae (pneumococcus)    · Prevnar 13 Intramuscular Suspension   For: Need for prophylactic vaccination against Streptococcus pneumoniae (pneumococcus); Ordered By:Libby Pino; Effective Date:12Jul2017; Administered by: Lisa Donis: 7/12/2017 5:00:00 PM; Last Updated By: Lisa Donis; 7/12/2017 5:26:28 PM    Discussion/Summary  Discussion Summary:   OBTAIN MRI AND MCRP OF ABDOMEN- RULE OUT PANCREATIC MALIGNANCY - IF ANY ABNORMALTIY WILL REFER TO DR Hamzah Raya FOR EVALUATION  ON PPI DAILY- ELEVATE HEAD OF BED      OK TO RESTART FISH OIL- MAY NEED TO CHANGE TO PRESCRIPTION   DEPRESSION - ON FLUOXITINE- STABLE  PT RSTARTED ATORVASTATIN  BP STABLE  Counseling Documentation With Imm: The patient was counseled regarding        Chief Complaint  Chief Complaint Free Text Note Form: PATIENT WAS IN HOSPiTAL FOR AB PAIN-P DX WITH PANCREATIITiS- HAD ABNorMAL CT OF ABDOMEN A ND ELEVATED LIPASe- RESOLVED IN 24 HOURS      History of Present Illness  TCM Communication [facility]: The patient is being contacted for follow-up after hospitalization and PANCREATITIS  Hospital records were reviewed  She was hospitalized at [facility]  Diagnosis: PANCREATITIS  Communication performed and completed by   HPI: PT DX WITH PANCREATITIS- HAD CT -SHOWED ABNORMALITY AT HEAD OF PANCREAS; HER MOTHER HAD PANCREATITIS X 2;   Abdominal Pain (Follow-Up): The patient is being seen for follow-up of abdominal pain and PANCREATITIS  The patient reports doing well  She has no comorbid illnesses  She has had no significant interval events  The patient is currently asymptomatic  Review of Systems  Complete-Female:   Constitutional: No fever, no chills, feels well, no tiredness, no recent weight gain or weight loss, no fever, not feeling poorly, no chills and not feeling tired  Eyes: WEARS GLASSES, but No complaints of eye pain, no red eyes, no eyesight problems, no discharge, no dry eyes, no itching of eyes, as noted in HPI and no eyesight problems  ENT: no complaints of earache, no loss of hearing, no nose bleeds, no nasal discharge, no sore throat, no hoarseness, no earache, no nosebleeds and no nasal discharge  Cardiovascular: No complaints of slow heart rate, no fast heart rate, no chest pain, no palpitations, no leg claudication, no lower extremity edema, the heart rate was not slow, no chest pain, the heart rate was not fast and no palpitations  Respiratory: No complaints of shortness of breath, no wheezing, no cough, no SOB on exertion, no orthopnea, no PND, no shortness of breath, no wheezing and no shortness of breath during exertion  Gastrointestinal: no abdominal pain, no nausea, no vomiting, no diarrhea and no blood in stools  Genitourinary: No complaints of dysuria, no incontinence, no pelvic pain, no dysmenorrhea, no vaginal discharge or bleeding and no dysuria  Musculoskeletal: arthralgias, joint stiffness and RIGHT SHOULDER PAIN, but as noted in HPI and no joint swelling     Integumentary: No complaints of skin rash or lesions, no itching, no skin wounds, no breast pain or lump, no rashes and no skin lesions  Neurological: No complaints of headache, no confusion, no convulsions, no numbness, no dizziness or fainting, no tingling, no limb weakness, no difficulty walking, no confusion and no convulsions  Psychiatric: Not suicidal, no sleep disturbance, no anxiety or depression, no change in personality, no emotional problems and no sleep disturbances  Endocrine: No complaints of proptosis, no hot flashes, no muscle weakness, no deepening of the voice, no feelings of weakness  Hematologic/Lymphatic: No complaints of swollen glands, no swollen glands in the neck, does not bleed easily, does not bruise easily, no tendency for easy bleeding and no tendency for easy bruising  Other Symptoms: C    ROS Reviewed:   ROS reviewed  Active Problems    1  Abdominal pain, epigastric (789 06) (R10 13)   2  Abnormal CT of the abdomen (793 6) (R93 5)   3  Acute maxillary sinusitis, recurrence not specified (461 0) (J01 00)   4  Aftercare following surgery (V58 89) (Z48 89)   5  Arthritis (716 90) (M19 90)   6  Benign essential hypertension (401 1) (I10)   7  Depression (311) (F32 9)   8  Hyperlipidemia (272 4) (E78 5)   9  Lower abdominal pain (789 09) (R10 30)   10  Osteopenia (733 90) (M85 80)   11  Pre-op evaluation (V72 84) (Z01 818)   12  Sacro-iliac pain (724 6) (M53 3)   13  Subacromial impingement of right shoulder (726 19) (M75 41)   14  Subacute pancreatitis (577 0) (K85 90)   15  Urinary frequency (788 41) (R35 0)    Past Medical History    1  History of Right bundle branch block (RBBB) on electrocardiography (426 4) (I45 10)    Surgical History    1  History of Appendectomy   2  History of Hysterectomy   3  History of Knee Replacement   4  History of Rotator Cuff Repair   5  History of Tonsillectomy  Surgical History Reviewed: The surgical history was reviewed and updated today  Family History  Mother    1   Family history of atrial fibrillation (V17 49) (Z82 49)   2  Family history of breast cancer (V16 3) (Z80 3)   3  Family history of cerebrovascular accident (V17 1) (Z82 3)   4  Family history of coronary artery disease (V17 3) (Z82 49)   5  Family history of diabetes mellitus (V18 0) (Z83 3)   6  Family history of malignant neoplasm of female breast (V16 3) (Z80 3)   7  Family history of Pancreatitis, recurrent  Father    8  Family history of myocardial infarction (V17 3) (Z82 49)  Family History    9  Family history of arthritis (V17 7) (Z82 61)   10  Family history of malignant neoplasm (V16 9) (Z80 9)  Family History Reviewed: The family history was reviewed and updated today  Social History    · Drinks coffee   · Never a smoker   · Social alcohol use (Z78 9)  Social History Reviewed: The social history was reviewed and updated today  The social history was reviewed and is unchanged  Current Meds   1  Atenolol 50 MG Oral Tablet; take 1 tablet by mouth  daily as directed; Therapy: 37GYT5917 to (Evaluate:02Jan2018)  Requested for: 62LEQ1798; Last   Rx:55Zxe5626 Ordered   2  Atorvastatin Calcium 20 MG Oral Tablet; Take 1 tablet by mouth 6  days a week; Therapy: 45LTN2165 to (Evaluate:41Mtu0559)  Requested for: 73WRY8325; Last   Rx:02Qaf0307 Ordered   3  Calcium + D TABS; TAKE 1 TABLET DAILY; Therapy: (HJKUJQRK:16WQX3728) to Recorded   4  Centrum Silver TABS; Take 1 tablet daily; Therapy: (EVKFCNQN:87CAQ5863) to Recorded   5  FLUoxetine HCl - 20 MG Oral Capsule; Take 1 capsule by mouth  every day; Therapy: 84VQE1966 to (Evaluate:77Jra0460)  Requested for: 04MIS7948; Last   Rx:66Zpo1891 Ordered   6  HydroCHLOROthiazide 25 MG Oral Tablet; Therapy: (Recorded:58Hec8003) to Recorded   7  Vitamin D 2000 UNIT Oral Capsule; take 1 capsule daily; Therapy: (UWNOGTWS:37ZYL0383) to Recorded  Medication List Reviewed: The medication list was reviewed and updated today  Allergies    1  Bactrim DS TABS   2  Dilaudid   3  Sulfa Drugs    Vitals  Signs   Recorded: 26Elj0864 04:31PM   Temperature: 97 6 F  Heart Rate: 78  Respiration: 16  Systolic: 024  Diastolic: 68  Height: 5 ft   Weight: 200 lb   BMI Calculated: 39 06  BSA Calculated: 1 87    Physical Exam    Constitutional   General appearance: Abnormal   acutely ill  Eyes   Conjunctiva and lids: No swelling, erythema or discharge  Pupils and irises: Equal, round and reactive to light  Ears, Nose, Mouth, and Throat   External inspection of ears and nose: Normal     Otoscopic examination: Tympanic membranes translucent with normal light reflex  Canals patent without erythema  Nasal mucosa, septum, and turbinates: Normal without edema or erythema  Oropharynx: Normal with no erythema, edema, exudate or lesions  Inspection of the oropharynx showed fully visible tonsils, uvula and soft palate (Mallampati class 1)  Oral mucosa was moist, but was normal  The palate examination showed no abnormalities  The tongue was normal  The tonsils were normal    Pulmonary   Respiratory effort: No increased work of breathing or signs of respiratory distress  Auscultation of lungs: Clear to auscultation  Auscultation of the lungs revealed no expiratory wheezing, normal expiratory time and no inspiratory wheezing  no rales or crackles were heard bilaterally  no rhonchi  no friction rub  no wheezing  no diminished breath sounds  no bronchial breath sounds  Cardiovascular   Palpation of heart: Normal PMI, no thrills  Auscultation of heart: Normal rate and rhythm, normal S1 and S2, without murmurs  Examination of extremities for edema and/or varicosities: Normal   no edema  Carotid pulses: Normal   NO BRUITS  Abdomen   Abdomen: Non-tender, no masses  The abdomen was rounded  The abdomen was soft and nontender  The abdomen was not firm and not rigid  No guarding  There was a negative Weaver's sign, negative Rovsing's sign, negative obturator sign and negative psoas sign   no masses palpated  The abdomen was normal to percussion  no CVA tenderness   Liver and spleen: No hepatomegaly or splenomegaly  Lymphatic   Palpation of lymph nodes in neck: No lymphadenopathy  Musculoskeletal   Gait and station: Normal     Digits and nails: Normal without clubbing or cyanosis  Skin   Skin and subcutaneous tissue: Normal without rashes or lesions  Neurologic   Cranial nerves: Cranial nerves 2-12 intact  Reflexes: 2+ and symmetric  Sensation: No sensory loss  Psychiatric   Orientation to person, place, and time: Normal     Mood and affect: Normal          Future Appointments    Date/Time Provider Specialty Site   10/19/2017 09:00 AM Miroslava Pino, DO Internal Medicine [address] [facility],[address]   07/17/2017 11:10 GONZALO Quiñones  Orthopedic Surgery [address]     Signatures   Electronically signed by :  Vaishnavi [address] [place] [address]; Jul 13 2017  7:26AM EST                       (Author)

## 2018-01-13 VITALS
DIASTOLIC BLOOD PRESSURE: 80 MMHG | SYSTOLIC BLOOD PRESSURE: 124 MMHG | HEIGHT: 60 IN | BODY MASS INDEX: 38.9 KG/M2 | HEART RATE: 64 BPM | WEIGHT: 198.13 LBS

## 2018-01-13 VITALS
BODY MASS INDEX: 39.08 KG/M2 | DIASTOLIC BLOOD PRESSURE: 75 MMHG | SYSTOLIC BLOOD PRESSURE: 118 MMHG | HEART RATE: 70 BPM | WEIGHT: 200.13 LBS

## 2018-01-13 VITALS
TEMPERATURE: 97.5 F | SYSTOLIC BLOOD PRESSURE: 130 MMHG | HEART RATE: 62 BPM | WEIGHT: 197.2 LBS | BODY MASS INDEX: 38.72 KG/M2 | RESPIRATION RATE: 16 BRPM | HEIGHT: 60 IN | DIASTOLIC BLOOD PRESSURE: 72 MMHG

## 2018-01-13 VITALS
WEIGHT: 195.38 LBS | DIASTOLIC BLOOD PRESSURE: 66 MMHG | BODY MASS INDEX: 38.36 KG/M2 | TEMPERATURE: 97.7 F | SYSTOLIC BLOOD PRESSURE: 132 MMHG | RESPIRATION RATE: 16 BRPM | HEART RATE: 60 BPM | HEIGHT: 60 IN

## 2018-01-13 NOTE — RESULT NOTES
Discussion/Summary   Labs are all stable  Verified Results  (1) CBC/PLT/DIFF 52NIF6743 09:33AM Beth Hardy     Test Name Result Flag Reference   WBC 6 5 x10E3/uL  3 4-10 8   RBC 4 76 x10E6/uL  3 77-5 28   Hemoglobin 14 3 g/dL  11 1-15 9   Hematocrit 42 8 %  34 0-46  6   MCV 90 fL  79-97   MCH 30 0 pg  26 6-33 0   MCHC 33 4 g/dL  31 5-35 7   RDW 13 3 %  12 3-15 4   Platelets 388 S74Y4/HH  150-379   Neutrophils 53 %     Lymphs 39 %     Monocytes 5 %     Eos 3 %     Basos 0 %     Neutrophils (Absolute) 3 4 x10E3/uL  1 4-7 0   Lymphs (Absolute) 2 5 x10E3/uL  0 7-3 1   Monocytes(Absolute) 0 4 x10E3/uL  0 1-0 9   Eos (Absolute) 0 2 x10E3/uL  0 0-0 4   Baso (Absolute) 0 0 x10E3/uL  0 0-0 2   Immature Granulocytes 0 %     Immature Grans (Abs) 0 0 x10E3/uL  0 0-0 1     (LC) Lipid Panel 02IKF0567 09:33AM Beth Hardy     Test Name Result Flag Reference   Cholesterol, Total 174 mg/dL  100-199   Triglycerides 118 mg/dL  0-149   HDL Cholesterol 74 mg/dL  >39   VLDL Cholesterol [place] 24 mg/dL  5-40   LDL Cholesterol Calc 76 mg/dL  0-99     (1) TSH 85EHS6517 09:33AM Beth Hardy     Test Name Result Flag Reference   TSH 1 960 uIU/mL  0 450-4 500     [facility]) Particia Velma LP Default 29WHU4750 09:33AM Beth Hardy     Test Name Result Flag Reference   Ambig Abbrev LP Default Comment     A hand-written panel/profile was received from your office  In  accordance with the [facility] Ambiguous Test Code Policy dated July 5423, we have completed your order by using the closest currently  or formerly recognized AMA panel  We have assigned Lipid Panel,  Test Code #166630 to this request  If this is not the testing you  wished to receive on this specimen, please contact the [address] [place] [address]  Client Inquiry/Technical Services Department to clarify the test  order  We appreciate your business

## 2018-01-14 VITALS
DIASTOLIC BLOOD PRESSURE: 68 MMHG | HEIGHT: 60 IN | HEART RATE: 78 BPM | TEMPERATURE: 97.6 F | SYSTOLIC BLOOD PRESSURE: 120 MMHG | WEIGHT: 200 LBS | RESPIRATION RATE: 16 BRPM | BODY MASS INDEX: 39.27 KG/M2

## 2018-01-14 VITALS
TEMPERATURE: 97.9 F | HEIGHT: 60 IN | WEIGHT: 199 LBS | SYSTOLIC BLOOD PRESSURE: 118 MMHG | BODY MASS INDEX: 39.07 KG/M2 | RESPIRATION RATE: 18 BRPM | DIASTOLIC BLOOD PRESSURE: 74 MMHG | HEART RATE: 70 BPM

## 2018-01-14 VITALS
WEIGHT: 199.5 LBS | HEART RATE: 67 BPM | SYSTOLIC BLOOD PRESSURE: 123 MMHG | DIASTOLIC BLOOD PRESSURE: 77 MMHG | BODY MASS INDEX: 37.69 KG/M2

## 2018-01-15 VITALS
SYSTOLIC BLOOD PRESSURE: 134 MMHG | WEIGHT: 198.38 LBS | BODY MASS INDEX: 37.48 KG/M2 | HEART RATE: 73 BPM | DIASTOLIC BLOOD PRESSURE: 81 MMHG

## 2018-01-15 NOTE — PROGRESS NOTES
Assessment    1  Abdominal pain, epigastric (789 06) (R10 13)   2  Subacute pancreatitis (577 0) (K85 90)   3  Abnormal CT of the abdomen (793 6) (R93 5)   4  Benign essential hypertension (401 1) (I10)   5  Family history of Pancreatitis, recurrent : Mother   6  Hyperlipidemia (272 4) (E78 5)   7  Welcome to Medicare preventive visit (V70 0) (Z00 00)    Plan  Abdominal pain, epigastric    · (1) AMYLASE; Status:Active; Requested for:92Agd2599; Abdominal pain, epigastric, Abnormal CT of the abdomen    · (1) COMPREHENSIVE METABOLIC PANEL; Status:Active; Requested for:55Rkx3023; Abdominal pain, epigastric, Abnormal CT of the abdomen, Subacute pancreatitis    · * MRI ABDOMEN W WO CONTRAST AND MRCP; Status:Need Information - Financial  Authorization; Requested for:[id number]; Abdominal pain, epigastric, Subacute pancreatitis    · (1) LIPASE; Status:Active; Requested for:82Zkd4648;   Benign essential hypertension, Depression    · (1) CBC/PLT/DIFF; Status:Active; Requested for:67Woy9567;    · (1) TSH; Status:Active; Requested for:91Lbm7347;   Benign essential hypertension, Hyperlipidemia    · (1) LIPID PANEL, FASTING; Status:Active; Requested for:70Xas3361;   Hyperlipidemia    · Atorvastatin Calcium 20 MG Oral Tablet (Lipitor); Take 1 tablet by mouth 6  days  a week  Lower abdominal pain    · HydroCHLOROthiazide 25 MG Oral Tablet;  Take 1 tablet daily  Need for prophylactic vaccination against Streptococcus pneumoniae (pneumococcus)    · Prevnar 13 Intramuscular Suspension    Discussion/Summary    OBTAIN MRI AND MCRP OF ABDOMEN- RULE OUT PANCREATIC MALIGNANCY - IF ANY ABNORMALTIY WILL REFER TO DR Teresita Bloom FOR EVALUATION  ON PPI DAILY- ELEVATE HEAD OF BED  PNEUMOVAX TODAY        OK TO RESTART FISH OIL- MAY NEED TO CHANGE TO PRESCRIPTION   DEPRESSION - ON FLUOXITINE- STABLE  PT RSTARTED ATORVASTATIN  BP STABLE  Impression: Subsequent Annual Wellness Visit, with preventive exam as well as age and risk appropriate counseling completed  Cardiovascular screening and counseling: screening is current, counseling was given on maintaining a healthy diet, counseling was given on maintaining a healthy weight and Dx - V81 2 Screen for CV Disorder  Diabetes screening and counseling: counseling was given on ways to improve physical activity and Dx - V77 1 Screen for DM  Cervical cancer screening and counseling: screening is current  Abdominal aortic aneurysm screening and counseling: Dx - V81 2 Screen for CV Disorder  Glaucoma screening and counseling: Dx - V80 1 Screen for Glaucoma  HIV screening and counseling: screening not indicated  Immunizations: influenza vaccine is up to date this year, the lifetime pneumococcal vaccine has been completed, hepatitis B vaccination series is not indicated at this time due to the patient's low risk of yuliya the disease and Zostavax vaccination up to date  Advance Directive Planning: complete and up to date, paperwork and instructions were given to the patient  The patient was counseled regarding  Chief Complaint  PATIENT WAS IN HOSPiTAL FOR AB PAIN-P DX WITH PANCREATIITiS- HAD ABNorMAL CT OF ABDOMEN A ND ELEVATED LIPASe- RESOLVED IN 24 HOURS  OTHERWISE SHE FEELS WELL AND ABDOMINAL PAIN RESOLVED; History of Present Illness  HPI: PT DX WITH PANCREATITIS- HAD CT -SHOWED ABNORMALITY AT HEAD OF PANCREAS; HER MOTHER HAD PANCREATITIS X 2;   PT FEELS WELL   Welcome to [employer] and Wellness Visits: The patient is being seen for the welcome to medicare visit  Medicare Screening and Risk Factors   Hospitalizations: she has been hospitalized 1 times  Medicare Screening Tests Risk Questions   Abdominal aortic aneurysm risk assessment: none indicated  Osteoporosis risk assessment: none indicated  HIV risk assessment: none indicated  Drug and Alcohol Use: The patient has never smoked cigarettes  The patient reports never drinking alcohol     Diet and Physical Activity: Current diet includes well balanced meals and low fat food choices  Mood Disorder and Cognitive Impairment Screening:   Depression screening  negative for symptoms  She denies feeling down, depressed, or hopeless over the past two weeks  She denies feeling little interest or pleasure in doing things over the past two weeks  Cognitive impairment screening: denies difficulty learning/retaining new information, denies difficulty handling complex tasks, denies difficulty with reasoning, denies difficulty with spatial ability and orientation, denies difficulty with language and denies difficulty with behavior  Functional Ability/Level of Safety: Hearing is normal bilaterally  The patient is currently able to do activities of daily living without limitations, able to do instrumental activities of daily living without limitations, able to participate in social activities without limitations and able to drive without limitations  Fall risk factors: The patient fell 0 times in the past 12 months , no polypharmacy, no alcohol use, no antidepressant use, no deconditioning, no sedative use, no antihypertensive use and no previous fall  Home safety risk factors:  no unfamiliar surroundings, no loose rugs, no poor household lighting, no uneven floors, no household clutter and handrails on the stairs  Advance Directives: Advance directives: no living will, no durable power of  for health care directives and no advance directives  end of life decisions were reviewed with the patient  Co-Managers and Medical Equipment/Suppliers: See Patient Care Team   Preventive Quality Program 65 and Older: Falls Risk: The patient fell 0 times in the past 12 months  The patient is currently asymptomatic Symptoms Include: no lightheadedness, no dizziness, no syncope, no impaired balance, no visual problems, no leg weakness, no recurring falls and no recent fall    The patient currently has no urinary incontinence symptoms  Date of last glaucoma screen was 2017   Abdominal Pain (Follow-Up): The patient is being seen for follow-up of abdominal pain and PT WITH PANCREATITIS- SHE HAD MID AB PAIN- GOT WORSE- WAS ADMITTED OVERNIGHT- AB NORMAL CT SCAN  The patient reports doing well  She has no comorbid illnesses  She has had no significant interval events  Interval symptoms:  denies abdominal pain, denies abdominal distention, denies pelvic pain and denies nausea  The patient is currently asymptomatic  Associated symptoms: no hematemesis, no melena, no hematochezia, no jaundice, no dysuria, no hematuria, no abnormal vaginal bleeding and no back pain  Patient Care Team    Care Team Member Role Specialty Office Number   Herber Mejia [phone]     Review of Systems    Constitutional: negative  Eyes: negative  ENT: negative  Cardiovascular: negative, no chest pain and no palpitations  Respiratory: negative, no shortness of breath and no wheezing  Gastrointestinal: abdominal pain and AB PAIN RESOOLVED, but negative  Genitourinary: negative, no dysuria, no urinary frequency, no suprapubic pain and no pelvic pain  Musculoskeletal: negative  Integumentary and Breasts: negative  Neurological: negative  Psychiatric: negative  Endocrine: negative  Hematologic and Lymphatic: negative, no tendency for easy bleeding and no tendency for easy bruising  Over the past 2 weeks, how often have you been bothered by the following problems? 1 ) Little interest or pleasure in doing things? Not at all    2 ) Feeling down, depressed or hopeless? Not at all    3 ) Trouble falling asleep or sleeping too much? Not at all    4 ) Feeling tired or having little energy? Not at all    5 ) Poor appetite or overeating? Not at all    6 ) Feeling bad about yourself, or that you are a failure, or have let yourself or your family down?  Not at all    7 ) Trouble concentrating on things, such as reading a newspaper or watching television? Not at all    8 ) Moving or speaking so slowly that other people could have noticed, or the opposite, moving or speaking faster than usual? Not at all  How difficult have these problems made it for you to do your work, take care of things at home, or get along with people? Not at all  Score      Constitutional: No fever, no chills, feels well, no tiredness, no recent weight gain or weight loss, no fever, not feeling poorly, no chills and not feeling tired  Eyes: WEARS GLASSES, but No complaints of eye pain, no red eyes, no eyesight problems, no discharge, no dry eyes, no itching of eyes, as noted in HPI and no eyesight problems  ENT: no complaints of earache, no loss of hearing, no nose bleeds, no nasal discharge, no sore throat, no hoarseness, no earache, no nosebleeds and no nasal discharge  Cardiovascular: No complaints of slow heart rate, no fast heart rate, no chest pain, no palpitations, no leg claudication, no lower extremity edema, the heart rate was not slow, no chest pain, the heart rate was not fast and no palpitations  Respiratory: No complaints of shortness of breath, no wheezing, no cough, no SOB on exertion, no orthopnea, no PND, no shortness of breath, no wheezing and no shortness of breath during exertion  Gastrointestinal: no abdominal pain, no nausea, no vomiting, no diarrhea and no blood in stools  Genitourinary: No complaints of dysuria, no incontinence, no pelvic pain, no dysmenorrhea, no vaginal discharge or bleeding and no dysuria  Musculoskeletal: arthralgias, joint stiffness and RIGHT SHOULDER PAIN, but as noted in HPI and no joint swelling  Integumentary: No complaints of skin rash or lesions, no itching, no skin wounds, no breast pain or lump, no rashes and no skin lesions     Neurological: No complaints of headache, no confusion, no convulsions, no numbness, no dizziness or fainting, no tingling, no limb weakness, no difficulty walking, no confusion and no convulsions  Psychiatric: Not suicidal, no sleep disturbance, no anxiety or depression, no change in personality, no emotional problems and no sleep disturbances  Endocrine: No complaints of proptosis, no hot flashes, no muscle weakness, no deepening of the voice, no feelings of weakness  Hematologic/Lymphatic: No complaints of swollen glands, no swollen glands in the neck, does not bleed easily, does not bruise easily, no tendency for easy bleeding and no tendency for easy bruising  Other Symptoms: C      ROS reviewed  Active Problems    1  Abdominal pain, epigastric (789 06) (R10 13)   2  Abnormal CT of the abdomen (793 6) (R93 5)   3  Acute maxillary sinusitis, recurrence not specified (461 0) (J01 00)   4  Aftercare following surgery (V58 89) (Z48 89)   5  Arthritis (716 90) (M19 90)   6  Benign essential hypertension (401 1) (I10)   7  Depression (311) (F32 9)   8  Hyperlipidemia (272 4) (E78 5)   9  Lower abdominal pain (789 09) (R10 30)   10  Osteopenia (733 90) (M85 80)   11  Pre-op evaluation (V72 84) (Z01 818)   12  Sacro-iliac pain (724 6) (M53 3)   13  Subacromial impingement of right shoulder (726 19) (M75 41)   14  Subacute pancreatitis (577 0) (K85 90)   15  Urinary frequency (788 41) (R35 0)    Past Medical History    · History of Right bundle branch block (RBBB) on electrocardiography (426 4) (I45 10)    The active problems and past medical history were reviewed and updated today  Surgical History    · History of Appendectomy   · History of Hysterectomy   · History of Knee Replacement   · History of Rotator Cuff Repair   · History of Tonsillectomy    The surgical history was reviewed and updated today         Family History  Mother    · Family history of atrial fibrillation (V17 49) (Z82 49)   · Family history of breast cancer (V16 3) (Z80 3)   · Family history of cerebrovascular accident (V17 1) (Z82 3)   · Family history of coronary artery disease (V17 3) (Z82 49)   · Family history of diabetes mellitus (V18 0) (Z83 3)   · Family history of malignant neoplasm of female breast (V16 3) (Z80 3)  Father    · Family history of myocardial infarction (V17 3) (Z82 49)  Family History    · Family history of arthritis (V17 7) (Z82 61)   · Family history of malignant neoplasm (V16 9) (Z80 9)    The family history was reviewed and updated today  The family history was reviewed and updated today  Social History    · Drinks coffee   · Never a smoker   · Social alcohol use (Z78 9)  The social history was reviewed and updated today  The social history was reviewed and is unchanged  The social history was reviewed and updated today  The social history was reviewed and is unchanged  Current Meds   1  Atenolol 50 MG Oral Tablet; take 1 tablet by mouth  daily as directed; Therapy: 96ELO4497 to (Evaluate:02Jan2018)  Requested for: 08PZG6110; Last   Rx:42Sxf6250 Ordered   2  Atorvastatin Calcium 20 MG Oral Tablet; Take 1 tablet by mouth 6  days a week; Therapy: 66HZK1074 to (Evaluate:93Jbl6500)  Requested for: 13FXM7178; Last   Rx:64Nmn4745 Ordered   3  Calcium + D TABS; TAKE 1 TABLET DAILY; Therapy: (UAHDFRMS:69TKN1720) to Recorded   4  Centrum Silver TABS; Take 1 tablet daily; Therapy: (KZOWFVIA:14SJG2094) to Recorded   5  FLUoxetine HCl - 20 MG Oral Capsule; Take 1 capsule by mouth  every day; Therapy: 95XWF4567 to (Evaluate:97Svx7751)  Requested for: 62JYR9265; Last   Rx:35Dgv3949 Ordered   6  HydroCHLOROthiazide 25 MG Oral Tablet; Therapy: (Recorded:43Gpa3598) to Recorded   7  Vitamin D 2000 UNIT Oral Capsule; take 1 capsule daily; Therapy: (Recorded:99Vba3161) to Recorded    The medication list was reviewed and updated today  Allergies    1  Bactrim DS TABS   2  Dilaudid   3   Sulfa Drugs    Immunizations   1 2 3    Influenza  15-Oct-2014  (64y) 05-Nov-2015  (65y) 18-Oct-2016  (66y)    PPSV  Oct 2013  (63y)      Tdap  24-Jun-2015  (41T) Zoster  2012       Vitals  Signs    Temperature: 97 6 F  Heart Rate: 78  Respiration: 16  Systolic: 744  Diastolic: 68  Height: 5 ft   Weight: 200 lb   BMI Calculated: 39 06  BSA Calculated: 1 87    Physical Exam    Constitutional   General appearance: No acute distress, well appearing and well nourished  WDWN FEMALE IN NAD  Eyes   Conjunctiva and lids: No swelling, erythema or discharge  Pupils and irises: Equal, round, reactive to light  Ears, Nose, Mouth, and Throat   External inspection of ears and nose: Normal     Otoscopic examination: Tympanic membranes translucent with normal light reflex  Canals patent without erythema  Hearing: Normal     Nasal mucosa, septum, and turbinates: Normal without edema or erythema  Lips, teeth, and gums: Normal, good dentition  Oropharynx: Normal with no erythema, edema, exudate or lesions  Inspection of the oropharynx showed visible hard and soft palate, upper portion of tonsils and uvula (Mallampati class 2)  Oral mucosa was moist, but was normal  The palate examination showed no abnormalities  The tongue was normal  The tonsils were normal    Neck   Neck: Supple, symmetric, trachea midline, no masses  Thyroid: Normal, no thyromegaly  Pulmonary   Respiratory effort: No increased work of breathing or signs of respiratory distress  Auscultation of lungs: Clear to auscultation  Auscultation of the lungs revealed no expiratory wheezing, normal expiratory time and no inspiratory wheezing  no rales or crackles were heard bilaterally  no rhonchi  no friction rub  no wheezing  no diminished breath sounds  no bronchial breath sounds  Cardiovascular   Palpation of heart: Normal PMI, no thrills  Auscultation of heart: Normal rate and rhythm, normal S1 and S2, no murmurs  Carotid pulses: 2+ bilaterally  Femoral pulses: 2+ bilaterally  Pedal pulses: 2+ bilaterally      Examination of extremities for edema and/or varicosities: Normal     Abdomen Abdomen: Non-tender, no masses  Liver and spleen: No hepatomegaly or splenomegaly  Lymphatic   Palpation of lymph nodes in neck: No lymphadenopathy  Musculoskeletal   Gait and station: Normal     Digits and nails: Normal without clubbing or cyanosis  Joints, bones, and muscles: Abnormal   PAIN RIGHT SHOULDER WITH A ROM;    Range of motion: Normal     Stability: Normal     Muscle strength/tone: Normal     Skin   Skin and subcutaneous tissue: Normal without rashes or lesions  Palpation of skin and subcutaneous tissue: Normal turgor  Neurologic   Cranial nerves: Cranial nerves II-XII intact  Reflexes: 2+ and symmetric  Sensation: No sensory loss  Psychiatric   Judgment and insight: Normal     Orientation to person, place, and time: Normal     Recent and remote memory: Intact  Mood and affect: Normal       Constitutional   General appearance: Abnormal   acutely ill  Eyes   Conjunctiva and lids: No swelling, erythema or discharge  Pupils and irises: Equal, round and reactive to light  Ears, Nose, Mouth, and Throat   External inspection of ears and nose: Normal     Otoscopic examination: Tympanic membranes translucent with normal light reflex  Canals patent without erythema  Nasal mucosa, septum, and turbinates: Normal without edema or erythema  Oropharynx: Normal with no erythema, edema, exudate or lesions  Inspection of the oropharynx showed fully visible tonsils, uvula and soft palate (Mallampati class 1)  Oral mucosa was moist, but was normal  The palate examination showed no abnormalities  The tongue was normal  The tonsils were normal    Pulmonary   Respiratory effort: No increased work of breathing or signs of respiratory distress  Auscultation of lungs: Clear to auscultation  Auscultation of the lungs revealed no expiratory wheezing, normal expiratory time and no inspiratory wheezing  no rales or crackles were heard bilaterally  no rhonchi  no friction rub   no wheezing  no diminished breath sounds  no bronchial breath sounds  Cardiovascular   Palpation of heart: Normal PMI, no thrills  Auscultation of heart: Normal rate and rhythm, normal S1 and S2, without murmurs  Examination of extremities for edema and/or varicosities: Normal   no edema  Carotid pulses: Normal   NO BRUITS  Abdomen   Abdomen: Non-tender, no masses  The abdomen was rounded  The abdomen was soft and nontender  The abdomen was not firm and not rigid  No guarding  There was a negative Weaver's sign, negative Rovsing's sign, negative obturator sign and negative psoas sign  no masses palpated  The abdomen was normal to percussion  no CVA tenderness   Liver and spleen: No hepatomegaly or splenomegaly  Lymphatic   Palpation of lymph nodes in neck: No lymphadenopathy  Musculoskeletal   Gait and station: Normal     Digits and nails: Normal without clubbing or cyanosis  Skin   Skin and subcutaneous tissue: Normal without rashes or lesions  Neurologic   Cranial nerves: Cranial nerves 2-12 intact  Reflexes: 2+ and symmetric  Sensation: No sensory loss  Psychiatric   Orientation to person, place, and time: Normal     Mood and affect: Normal          Future Appointments    Date/Time Provider Specialty Site   10/19/2017 09:00 AM Vernell Pino, DO Internal Medicine [id number] [address]   07/17/2017 11:10 GONZALO Mendez  Orthopedic Surgery [address]     Signatures   Electronically signed by :  [facility]ishnavi [facility]; Jul 13 2017  7:32AM EST                       (Author)

## 2018-01-16 NOTE — RESULT NOTES
Discussion/Summary   Mammogram stable- repeat in one year;  will do 3d mammogram next year due to dense breast tissue  Verified Results  * MAMMO SCREENING BILATERAL W CAD [id number] 07:58AM Natalio Verma    Order Number: [id number]    - Patient Instructions: To schedule this appointment, please contact Central Scheduling at [phone]  Do not wear any perfume, powder, lotion or deodorant on breast or underarm area  Please bring your doctors order, referral (if needed) and insurance information with you on the day of the test  Failure to bring this information may result in this test being rescheduled  Arrive 15 minutes prior to your appointment time to register  On the day of your test, please bring any prior mammogram or breast studies with you that were not performed at a [facility]  Failure to bring prior exams may result in your test needing to be rescheduled  Test Name Result Flag Reference   MAMMO SCREENING BILATERAL W CAD (Report)     Patient History:   Patient is postmenopausal and is nulliparous  Family history of breast cancer at age 76 in mother, endometrial    cancer at age 72 in maternal grandmother, prostate cancer at age    76 in paternal uncle, prostate cancer at age 54 in paternal    cousin, ovaries removed from maternal mother at age 48, ovaries    removed from sister at age 54  Benign cyst aspiration of the right breast, 1985  Patient has never smoked  Patient's BMI is 35 9  Reason for exam: screening, asymptomatic  Mammo Screening Bilateral W CAD: September 18, 2017 - Check In #:   [de-identified]   Bilateral MLO, CC, and XCCL view(s) were taken  Technologist: RT VINOD Shook   Prior study comparison: May 5, 2016, mammo screening bilateral W    CAD performed at [address] [facility]  April 9, 2015, bilateral [facility] dig scrn mammo w/CAD performed at [address] [facility]   September 19, 2013, digital bilateral screening mammogram, performed at [facility]  June 1, 2012, digital bilateral screening mammogram, performed    at [facility]  March 3, 2011, digital bilateral    screening mammogram, performed at [facility]  February    15, 2010, digital bilateral screening mammogram, performed at    [facility]  The breast tissue is heterogeneously dense, potentially limiting    the sensitivity of mammography  Therefore, automated breast    ultrasound or MRI may be beneficial  Patient risk, included in    this report, assists in determining the appropriate adjunct    screening exam  3-D mammography may also be indicated as next    year's screening mammogram (based again on the above factors)  No new dominant soft tissue mass, architectural distortion or    suspicious calcifications are noted  The skin and nipple    structures are within normal limits  Benign appearing    calcifications are noted  No mammographic evidence of malignancy  No    significant changes when compared with prior studies  ACR BI-RADSï¾® Assessments: BiRad:2 - Benign     Recommendation:   Routine screening mammogram of both breasts in 1 year  Analyzed by CAD     The patient is scheduled in a reminder system for screening    mammography  8-10% of cancers will be missed on mammography  Management of a    palpable abnormality must be based on clinical grounds  Patients   will be notified of their results via letter from our facility  Accredited by [employer] of Rad[employer] and FDA       Transcription Location:  [address]: [id number]     Risk Value(s):   Tyrer-Cuzick 10 Year: 4 800%, Tyrer-Cuzick Lifetime: 9 900%,    Myriad Table: 1 5%, BING 5 Year: 3 6%, NCI Lifetime: 13 1%   Signed by:   Stephany Collins MD   9/18/17

## 2018-01-17 NOTE — RESULT NOTES
Verified Results  * MRI ABDOMEN W WO CONTRAST AND MRCP [id number] 06:44AM Clarice Tolbert Order Number: [id number]    - Patient Instructions: To schedule this appointment, please contact Central Scheduling at [phone]  Test Name Result Flag Reference   MRI ABDOMEN W WO CONTRAST AND MRCP (Report)     This is a summary report  The complete report is available in the patient's medical record  If you cannot access the medical record, please contact the sending organization for a detailed fax or copy  MRI OF THE ABDOMEN WITH AND WITHOUT CONTRAST WITH MRCP     INDICATION: Abdominal pain with acute pancreatitis  Follow-up exam      COMPARISON: CT AP 6/30/2017  TECHNIQUE: The following pulse sequences were obtained on a 1 5 T scanner: Coronal and axial T2 with TE of 90 and 180 respectively, axial T2 with fat saturation, axial FIESTA fat-sat, axial T1-weighted in-and-out-of phase, axial DWI/ADC, pre-contrast    axial T1 with fat saturation, post-contrast dynamic axial T1 with fat saturation at 20, 70, and 180 seconds, followed by coronal and 7 minute delayed axial T1 with fat saturation  3D MRCP images were obtained with radial thick slabs and projections  3D    rendering was performed from the acquisition scanner  IV Contrast: 8 mL of Gadobutrol injection (SINGLE-DOSE)      FINDINGS:     LIVER:    General: Normal in size and contour  No loss of signal on out-of-phase images to suggest hepatic steatosis  Lesions: Scattered tiny 1-3 mm cysts  No solid lesions identified  No areas of abnormal arterial enhancement  Vasculature: Portal and hepatic veins patent without evidence of thrombosis  BILIARY TREE:    No intra-hepatic biliary ductal dilatation  Common bile duct is normal in caliber  No evidence of bile duct stricture or choledocholithiasis  No mass identified       GALLBLADDER: Normal      PANCREAS: Normal      ADRENAL GLANDS: Normal      SPLEEN: Normal      KIDNEYS: Normal      ABDOMINAL CAVITY: No lymphadenopathy or mass  No ascites  BOWEL: Unremarkable MRI appearance  OSSEOUS STRUCTURES: No osseous destruction  EXTRAHEPATIC VASCULAR STRUCTURES: Visualized vasculature is normal      ABDOMINAL WALL: Normal      LOWER CHEST: Unremarkable MRI appearance  IMPRESSION:     Normal enhanced MRI of the abdomen  The pancreas maintains a normal appearance  Workstation performed: [id number]     Signed by:    Demetrice Padilla MD   7/26/17

## 2018-01-23 VITALS
SYSTOLIC BLOOD PRESSURE: 141 MMHG | DIASTOLIC BLOOD PRESSURE: 82 MMHG | HEART RATE: 65 BPM | BODY MASS INDEX: 39.52 KG/M2 | WEIGHT: 202.38 LBS

## 2018-02-08 ENCOUNTER — OFFICE VISIT (OUTPATIENT)
Dept: OBGYN CLINIC | Facility: HOSPITAL | Age: 68
End: 2018-02-08
Payer: MEDICARE

## 2018-02-08 VITALS
BODY MASS INDEX: 39.42 KG/M2 | HEIGHT: 60 IN | DIASTOLIC BLOOD PRESSURE: 85 MMHG | WEIGHT: 200.8 LBS | HEART RATE: 60 BPM | SYSTOLIC BLOOD PRESSURE: 142 MMHG

## 2018-02-08 DIAGNOSIS — M16.12 PRIMARY OSTEOARTHRITIS OF LEFT HIP: Primary | ICD-10-CM

## 2018-02-08 PROCEDURE — 99213 OFFICE O/P EST LOW 20 MIN: CPT | Performed by: ORTHOPAEDIC SURGERY

## 2018-02-08 RX ORDER — ATORVASTATIN CALCIUM 20 MG/1
20 TABLET, FILM COATED ORAL DAILY
COMMUNITY
Start: 2016-10-25 | End: 2018-03-09 | Stop reason: SDUPTHER

## 2018-02-08 RX ORDER — CHLORAL HYDRATE 500 MG
CAPSULE ORAL DAILY
COMMUNITY

## 2018-02-08 NOTE — PROGRESS NOTES
74 y o female presents for evaluation  She has worsening pain in both hips left being greater than right  She has pain level both hip joints, the pain is made worse bear weight, the pain increases with increased activities  She desires a permanent solution to her current left hip problem, as greatly interferes with her activities daily living  Injection of corticosteroid performed fluoroscopically resulted only 6 weeks relief from both hip joints      Review of Systems  Review of systems negative unless otherwise specified in HPI    Past Medical History  Past Medical History:   Diagnosis Date    Arthritis     Depression     Hyperlipidemia     Hypertension     Osteopenia     Pneumonia        Past Surgical History  Past Surgical History:   Procedure Laterality Date    CARPAL TUNNEL RELEASE      COLONOSCOPY      HAND SURGERY      HYSTERECTOMY      JOINT REPLACEMENT      KNEE    DC ARTHROSCOPY SHOULDER SURGICAL BICEPS TENODESIS Right 5/10/2017    Procedure: ARTHROSCOPIC BICEPS TENODESIS WITH ROTATOR CUFF REPAIR ;  Surgeon: Heidy Thomas MD;  Location: [facility];  Service: Orthopedics    DC Reji Washington Right 5/10/2017    Procedure: SHOULDER ARTHROSCOPY SUBACROMIAL DECOMPRESSION;  Surgeon: Heidy Thomas MD;  Location: [facility];  Service: Orthopedics    TONSILLECTOMY         Current Medications  Current Outpatient Prescriptions on File Prior to Visit   Medication Sig Dispense Refill    acetaminophen (TYLENOL) 500 mg tablet Take 500 mg by mouth daily      atenolol (TENORMIN) 50 mg tablet Take 50 mg by mouth daily      calcium-vitamin D (OSCAL) 250-125 MG-UNIT per tablet Take 1 tablet by mouth daily      Cholecalciferol (VITAMIN D) 2000 units tablet Take 2,000 Units by mouth daily      FLUoxetine (PROzac) 20 mg capsule Take 20 mg by mouth daily      hydrochlorothiazide (HYDRODIURIL) 25 mg tablet Take 25 mg by mouth daily      Multiple Vitamins-Minerals (CENTRUM SILVER ULTRA WOMENS PO) Take by mouth      omeprazole (PriLOSEC OTC) 20 MG tablet Take 1 tablet by mouth 2 (two) times a day with meals for 30 days 60 tablet 0    [DISCONTINUED] oxyCODONE (ROXICODONE) 5 mg immediate release tablet Take 1-2 tablets by mouth every 4-6 hours as needed for pain  30 tablet 0    [DISCONTINUED] triamcinolone (KENALOG) 0 1 % cream Apply 1 application topically daily       No current facility-administered medications on file prior to visit  Recent Labs [facility])  @LABALLVALUEIP(HCT,HGB,WBC,PT,INR,ESR,CRP,GLUCOSE,HGBA1C)@      Physical exam  Gait pattern is with modest antalgia  Breathing is nonlabored  Cardiovascular exam reveals regular rate rhythm  Lungs are clear bilaterally  Left hip has intact soft tissues  There is nontender trochanteric flare  There is recreated will groin pain during forced internal external rotation of her flexed left hip  The foot and toes strongly dorsiflex  She has well-healed anterior knee scar  Imaging  There is no tenderness in the left calf  Review of previous x-ray show a scant arthritic change in both hips    Procedure  None indicated or performed today    Assessment/Plan:   74 y  o female with osteoarthritis of both hips left being symptomatic greater than right  After review of the risks and benefits, consent was established for elective left total hip replacement  No guarantees given    I would welcome the opportunity see back in the office a postoperative patient

## 2018-02-20 DIAGNOSIS — M16.12 PRIMARY OSTEOARTHRITIS OF ONE HIP, LEFT: Primary | ICD-10-CM

## 2018-02-26 ENCOUNTER — APPOINTMENT (OUTPATIENT)
Dept: LAB | Facility: HOSPITAL | Age: 68
End: 2018-02-26
Attending: ORTHOPAEDIC SURGERY
Payer: MEDICARE

## 2018-02-26 DIAGNOSIS — M16.12 PRIMARY OSTEOARTHRITIS OF ONE HIP, LEFT: ICD-10-CM

## 2018-02-26 LAB
ABO GROUP BLD: NORMAL
ALBUMIN SERPL BCP-MCNC: 3.7 G/DL (ref 3.5–5)
ALP SERPL-CCNC: 58 U/L (ref 46–116)
ALT SERPL W P-5'-P-CCNC: 32 U/L (ref 12–78)
ANION GAP SERPL CALCULATED.3IONS-SCNC: 6 MMOL/L (ref 4–13)
APTT PPP: 30 SECONDS (ref 23–35)
AST SERPL W P-5'-P-CCNC: 20 U/L (ref 5–45)
BASOPHILS # BLD AUTO: 0.02 THOUSANDS/ΜL (ref 0–0.1)
BASOPHILS NFR BLD AUTO: 0 % (ref 0–1)
BILIRUB SERPL-MCNC: 0.52 MG/DL (ref 0.2–1)
BLD GP AB SCN SERPL QL: NEGATIVE
BUN SERPL-MCNC: 17 MG/DL (ref 5–25)
CALCIUM SERPL-MCNC: 9 MG/DL (ref 8.3–10.1)
CHLORIDE SERPL-SCNC: 105 MMOL/L (ref 100–108)
CO2 SERPL-SCNC: 29 MMOL/L (ref 21–32)
CREAT SERPL-MCNC: 1.21 MG/DL (ref 0.6–1.3)
EOSINOPHIL # BLD AUTO: 0.15 THOUSAND/ΜL (ref 0–0.61)
EOSINOPHIL NFR BLD AUTO: 3 % (ref 0–6)
ERYTHROCYTE [DISTWIDTH] IN BLOOD BY AUTOMATED COUNT: 12.7 % (ref 11.6–15.1)
EST. AVERAGE GLUCOSE BLD GHB EST-MCNC: 108 MG/DL
GFR SERPL CREATININE-BSD FRML MDRD: 47 ML/MIN/1.73SQ M
GLUCOSE P FAST SERPL-MCNC: 86 MG/DL (ref 65–99)
HBA1C MFR BLD: 5.4 % (ref 4.2–6.3)
HCT VFR BLD AUTO: 40.2 % (ref 34.8–46.1)
HGB BLD-MCNC: 13.8 G/DL (ref 11.5–15.4)
INR PPP: 0.92 (ref 0.86–1.16)
LYMPHOCYTES # BLD AUTO: 2.28 THOUSANDS/ΜL (ref 0.6–4.47)
LYMPHOCYTES NFR BLD AUTO: 39 % (ref 14–44)
MCH RBC QN AUTO: 30.2 PG (ref 26.8–34.3)
MCHC RBC AUTO-ENTMCNC: 34.3 G/DL (ref 31.4–37.4)
MCV RBC AUTO: 88 FL (ref 82–98)
MONOCYTES # BLD AUTO: 0.38 THOUSAND/ΜL (ref 0.17–1.22)
MONOCYTES NFR BLD AUTO: 7 % (ref 4–12)
NEUTROPHILS # BLD AUTO: 2.95 THOUSANDS/ΜL (ref 1.85–7.62)
NEUTS SEG NFR BLD AUTO: 51 % (ref 43–75)
NRBC BLD AUTO-RTO: 0 /100 WBCS
PLATELET # BLD AUTO: 273 THOUSANDS/UL (ref 149–390)
PMV BLD AUTO: 10.4 FL (ref 8.9–12.7)
POTASSIUM SERPL-SCNC: 3.7 MMOL/L (ref 3.5–5.3)
PROT SERPL-MCNC: 7.4 G/DL (ref 6.4–8.2)
PROTHROMBIN TIME: 12.4 SECONDS (ref 12.1–14.4)
RBC # BLD AUTO: 4.57 MILLION/UL (ref 3.81–5.12)
RH BLD: NEGATIVE
SODIUM SERPL-SCNC: 140 MMOL/L (ref 136–145)
SPECIMEN EXPIRATION DATE: NORMAL
WBC # BLD AUTO: 5.78 THOUSAND/UL (ref 4.31–10.16)

## 2018-02-26 PROCEDURE — 83036 HEMOGLOBIN GLYCOSYLATED A1C: CPT

## 2018-02-26 PROCEDURE — 86901 BLOOD TYPING SEROLOGIC RH(D): CPT

## 2018-02-26 PROCEDURE — 80053 COMPREHEN METABOLIC PANEL: CPT

## 2018-02-26 PROCEDURE — 36415 COLL VENOUS BLD VENIPUNCTURE: CPT

## 2018-02-26 PROCEDURE — 86900 BLOOD TYPING SEROLOGIC ABO: CPT

## 2018-02-26 PROCEDURE — 85730 THROMBOPLASTIN TIME PARTIAL: CPT

## 2018-02-26 PROCEDURE — 86850 RBC ANTIBODY SCREEN: CPT

## 2018-02-26 PROCEDURE — 85610 PROTHROMBIN TIME: CPT

## 2018-02-26 PROCEDURE — 85025 COMPLETE CBC W/AUTO DIFF WBC: CPT

## 2018-02-26 RX ORDER — FOLIC ACID 1 MG/1
TABLET ORAL DAILY
COMMUNITY
End: 2018-04-24

## 2018-02-26 RX ORDER — ASCORBIC ACID 500 MG
500 TABLET ORAL DAILY
COMMUNITY
End: 2018-04-24

## 2018-02-26 RX ORDER — OMEPRAZOLE 20 MG/1
20 CAPSULE, DELAYED RELEASE ORAL DAILY
COMMUNITY
End: 2018-08-20 | Stop reason: SDUPTHER

## 2018-02-26 RX ORDER — FERROUS SULFATE 325(65) MG
325 TABLET ORAL
COMMUNITY
End: 2018-04-24

## 2018-02-26 NOTE — PRE-PROCEDURE INSTRUCTIONS
Pre-Surgery Instructions:   Medication Instructions    acetaminophen (TYLENOL) 500 mg tablet Instructed patient per Anesthesia Guidelines   ascorbic acid (VITAMIN C) 500 mg tablet Patient was instructed by Physician and understands   atenolol (TENORMIN) 50 mg tablet Instructed patient per Anesthesia Guidelines   atorvastatin (LIPITOR) 20 mg tablet Instructed patient per Anesthesia Guidelines   calcium-vitamin D (OSCAL) 250-125 MG-UNIT per tablet Instructed patient per Anesthesia Guidelines   Cholecalciferol (VITAMIN D) 2000 units tablet Instructed patient per Anesthesia Guidelines   ferrous sulfate 325 (65 Fe) mg tablet Patient was instructed by Physician and understands   FLUoxetine (PROzac) 20 mg capsule Instructed patient per Anesthesia Guidelines   folic acid (FOLVITE) 1 mg tablet Patient was instructed by Physician and understands   hydrochlorothiazide (HYDRODIURIL) 25 mg tablet Instructed patient per Anesthesia Guidelines   Multiple Vitamins-Minerals (CENTRUM SILVER ULTRA WOMENS PO) Instructed patient per Anesthesia Guidelines   Omega-3 1000 MG CAPS Instructed patient per Anesthesia Guidelines   omeprazole (PriLOSEC) 20 mg delayed release capsule Instructed patient per Anesthesia Guidelines  Patient had PAT appt  Medication list reviewed & instructed  As of 3 5 18 pt to stop all vitamins except folic acid, vit C, iron  Pt instructed on tylenol only  Pt will continue folic acid, vit C, iron until 3 11 18  On am DOS pt to take omeprazole, fluoxetine, lipitor, atenolol  Showering instructions & cloths given by patient  Soap and incentive given at PAT appt  All instructed & reviewed  No further questions  All instructions verbally understood by patient  Pt has callback number  Spoke to PCP office 3  1 18 - requested recommendations for GFR  Spoke to PCP office 3 2  18  Per PCP note in EPIC, hctz dose change  Recheck creat and BP in 1 week  PAT will f/u  Diuretic Med Class     Continue this medication up to the evening before surgery/procedure, but do not take the morning of the day of surgery  Acetaminophen Med Class     Continue to take this medication on your normal schedule  If this is an oral medication and you take it in the morning, then you may take this medicine with a sip of water  Antidepressant Med Class     Continue to take this medication on your normal schedule  If this is an oral medication and you take it in the morning, then you may take this medicine with a sip of water  Beta blocker Med Class     Continue to take this heart medication on your normal schedule  If this is an oral medication and you take it in the morning, then you may take this medicine with a sip of water  Statin Med Class     Continue to take this medication on your normal schedule  If this is an oral medication and you take it in the morning, then you may take this medicine with a sip of water

## 2018-03-01 ENCOUNTER — TELEPHONE (OUTPATIENT)
Dept: FAMILY MEDICINE CLINIC | Facility: CLINIC | Age: 68
End: 2018-03-01

## 2018-03-01 ENCOUNTER — OFFICE VISIT (OUTPATIENT)
Dept: FAMILY MEDICINE CLINIC | Facility: CLINIC | Age: 68
End: 2018-03-01
Payer: MEDICARE

## 2018-03-01 VITALS
RESPIRATION RATE: 16 BRPM | BODY MASS INDEX: 38.6 KG/M2 | DIASTOLIC BLOOD PRESSURE: 84 MMHG | HEART RATE: 74 BPM | SYSTOLIC BLOOD PRESSURE: 138 MMHG | WEIGHT: 196.6 LBS | HEIGHT: 60 IN

## 2018-03-01 DIAGNOSIS — Z01.818 PRE-OP EXAMINATION: Primary | ICD-10-CM

## 2018-03-01 DIAGNOSIS — M16.12 PRIMARY OSTEOARTHRITIS OF LEFT HIP: ICD-10-CM

## 2018-03-01 DIAGNOSIS — I10 ESSENTIAL HYPERTENSION: ICD-10-CM

## 2018-03-01 PROBLEM — K85.90 ACUTE PANCREATITIS: Status: RESOLVED | Noted: 2017-07-01 | Resolved: 2018-03-01

## 2018-03-01 PROCEDURE — 93000 ELECTROCARDIOGRAM COMPLETE: CPT | Performed by: INTERNAL MEDICINE

## 2018-03-01 PROCEDURE — 99214 OFFICE O/P EST MOD 30 MIN: CPT | Performed by: INTERNAL MEDICINE

## 2018-03-01 RX ORDER — METOPROLOL SUCCINATE 50 MG/1
50 TABLET, EXTENDED RELEASE ORAL DAILY
Qty: 30 TABLET | Refills: 3 | Status: SHIPPED | OUTPATIENT
Start: 2018-03-01 | End: 2018-03-08 | Stop reason: SDUPTHER

## 2018-03-01 NOTE — TELEPHONE ENCOUNTER
Lisandro Munoz from [facility] Pre Admission called  Pt has surgery on March 12 th [address]87 for Hip Replacement  She is asking if you have any recommendations for patient on her GFR because the value drop to 47 and if you would add that to her note   Call back number [phone]

## 2018-03-01 NOTE — PROGRESS NOTES
Assessment/Plan:   Bismark Gardner is a 74 y o  female with:   Problem List Items Addressed This Visit     Hypertension     Change to xr metoprolol for better coverage  Lower dose of hctz to 1/2 tab and check creat  Follow up bp in one week          Primary osteoarthritis of left hip    Pre-op examination - Primary     EKG:  Sinus rhythm withj rbbb (similar to previous) with occ svt noted;           Relevant Orders    Basic metabolic panel        Cardiology Pre Operative Clearance      PRE OPERATIVE CARDIAC RISK ASSESSMENT    03/01/18    Bismark Gardner  1950  [id number]    Date of Surgery:     Type of Surgery:thr    Surgeon: dr Yesi Barroso (see coments below)    Physician Comment: patient with long standing rbbb - unchanged; Will change meds and repeat blood pressure and creat  If improved, pt will be cleared for surgery  Anticoagulation: no    Physician Comment:     Calixto Lockett DO  Subjective:   Bismark Gardner is a 74 y o  female who presents today with a chief complaint of Pre-op Exam (left hip surgery (Dr Quang Sorto))    Patient here for pre op evaluation for hip rplacment- left dr Siddiqi Agent; Review of Systems   Constitutional: Positive for activity change (not as acitve due to hip pain)  HENT: Negative  Eyes: Negative  Respiratory: Negative  Cardiovascular: Negative  Negative for chest pain, palpitations and leg swelling  Gastrointestinal: Negative  Endocrine: Negative  Genitourinary: Negative  Musculoskeletal: Positive for arthralgias (hip pain b/l) and gait problem (hip pain)  Skin: Negative  Allergic/Immunologic: Negative  Hematological: Negative  Psychiatric/Behavioral: Negative  I have reviewed the patient's PMH, Social History, Medication List and Allergies        Objective:  /84   Pulse 74   Resp 16   Ht 5' (1 524 m)   Wt 89 2 kg (196 lb 9 6 oz)   BMI 38 40 kg/m²   Physical Exam   Constitutional: She is oriented to person, place, and time  She appears well-developed and well-nourished  HENT:   Head: Normocephalic and atraumatic  Right Ear: External ear normal    Left Ear: External ear normal    Nose: Nose normal    Eyes: Conjunctivae and EOM are normal  Pupils are equal, round, and reactive to light  Neck: Normal range of motion  Neck supple  No JVD present  No tracheal deviation present  No thyromegaly present  Cardiovascular: Normal rate, regular rhythm, normal heart sounds and intact distal pulses  Pulmonary/Chest: Effort normal and breath sounds normal  No stridor  No respiratory distress  She has no wheezes  She has no rales  She exhibits no tenderness  Abdominal: Soft  Bowel sounds are normal  She exhibits no distension and no mass  There is no tenderness  There is no rebound and no guarding  Obese ntnd    Musculoskeletal: She exhibits tenderness (pain left hip; antalgic gait )  Lymphadenopathy:     She has no cervical adenopathy  Neurological: She is alert and oriented to person, place, and time  She has normal reflexes  No cranial nerve deficit  Coordination normal    Skin: Skin is warm and dry  No rash noted  No erythema  Psychiatric: She has a normal mood and affect  Her behavior is normal  Judgment and thought content normal    Nursing note and vitals reviewed

## 2018-03-01 NOTE — ASSESSMENT & PLAN NOTE
Change to xr metoprolol for better coverage  Lower dose of hctz to 1/2 tab and check creat  Follow up bp in one week

## 2018-03-02 ENCOUNTER — EVALUATION (OUTPATIENT)
Dept: PHYSICAL THERAPY | Facility: CLINIC | Age: 68
End: 2018-03-02
Payer: MEDICARE

## 2018-03-02 DIAGNOSIS — M25.552 LEFT HIP PAIN: Primary | ICD-10-CM

## 2018-03-02 DIAGNOSIS — M16.12 PRIMARY OSTEOARTHRITIS OF LEFT HIP: ICD-10-CM

## 2018-03-02 PROCEDURE — 97110 THERAPEUTIC EXERCISES: CPT | Performed by: PHYSICAL THERAPIST

## 2018-03-02 PROCEDURE — 97162 PT EVAL MOD COMPLEX 30 MIN: CPT | Performed by: PHYSICAL THERAPIST

## 2018-03-02 PROCEDURE — G8979 MOBILITY GOAL STATUS: HCPCS | Performed by: PHYSICAL THERAPIST

## 2018-03-02 PROCEDURE — G8978 MOBILITY CURRENT STATUS: HCPCS | Performed by: PHYSICAL THERAPIST

## 2018-03-02 NOTE — PROGRESS NOTES
PT Evaluation     Today's date: 3/5/2018  Patient name: Renea Hinojosa  : 1950  MRN: [id number]  Referring provider: Kapil Morris PA-C  Dx:   Encounter Diagnosis     ICD-10-CM    1  Left hip pain M25 552 PT plan of care cert/re-cert   2  Primary osteoarthritis of left hip M16 12 PT plan of care cert/re-cert       Start Time: 1010  Stop Time: 1110  Total time in clinic (min): 60 minutes    Assessment    Assessment details: Renea Hinojosa is a pleasant 79 y o  presenting to physical therapy with MD referral for Left hip pain  Pt will be undergoing a left total hip replacement on on 3-12-18  Problem list:  Limited hip A/PROM bilaterally, decreased hip/core strength, limited lower extremity flexibility, poor balance, and abnormal gait  Treatment to include: Manual therapy techniques, lower extremity/core strengthening, neuromuscular control exercises, balance/proprioception training,  instruction in a comprehensive HEP, and modalities as needed  This pt would benefit from skilled PT services to address their impairments and functional limitations to maximize functional outcome  Barriers to therapy: Undergoing L GERARDO; however signficant limitation on R hip, depression, and osteopenia  Understanding of Dx/Px/POC: good   Prognosis: good    Goals  ST  Pt will improve hip flexion ROM to at least 60 degrees AROM in 4 weeks  2  Pt will improve SLS to at least 5 seconds post operatively in 4 weeks  LT  Pt will be able to walk >30 minutes with least restrictive device in 8 weeks  2  Pt will be independent in a comprehensive HEP in 8 weeks  * Goals will be adjusted as appropriate after surgery  Plan  Patient would benefit from: skilled PT  Planned modality interventions: cryotherapy  Frequency: 2x week  Duration in weeks: 10  Treatment plan discussed with: patient  Plan details: 2-3 x per week for 8-10 weeks          Subjective Evaluation    History of Present Illness  Mechanism of injury: Patient reports onset of left hip pain 1 year with insidious onset  Pt saw ortho in the fall who ordered x-rays which revealed "bone on bone" arthritis  Pt underwent cortisone injections in bilateral hips with 6 weeks of relief  Pt returned to ortho who scheduled patient for a total hip replacement of the left hip on 3-12-18  As per pt, both hips are very bad; however, the left causes her more symptoms so it will be replaced first     Premorbid status:  - ADLs: Independent with no difficulty  - Work: Not a working individual  - Recreation: going 2-3 x per week    Current status:  - ADLs/Functional activities:   - Stairs Reciprocal pattern with Pain Levels: moderate pain   - Sit to stand with mild pain   - Walking 10 minutes with moderate pain   - Standing 10 minutes with moderate pain   - Sleeping with 1 nightly sleep disturbances due to pain  - Work: Not a working individual  - Recreation: unable due to pain    LEFS: = 57 5% function  RAPT: 10/12= likely discharge destination to directly home    Pain  Current pain ratin  At best pain ratin  At worst pain ratin  Location: Groin and wrapping around to lower back  Quality: burning and sharp  Aggravating factors: stair climbing, standing and walking  Progression: worsening    Social Support  Steps to enter house: yes (3 with no handrail)  Lives in: multiple-level home  Lives with: spouse    Employment status: not working    Diagnostic Tests  X-ray: abnormal  Patient Goals  Patient goals for therapy: decreased pain, independence with ADLs/IADLs, increased motion, improved balance and increased strength          Objective     Active Range of Motion   Left Hip   Flexion: 90 degrees   Extension: 6 degrees   Abduction: 30 degrees   Adduction: 20 degrees   External rotation (90/90): 34 degrees   Internal rotation (90/90): 20 degrees     Right Hip   Flexion: 70 degrees   Extension: 0 degrees   Abduction: 30 degrees   Adduction: 16 degrees   External rotation (90/90): 35 degrees   Internal rotation (90/90): 25 degrees     Passive Range of Motion   Left Hip   Flexion: 101 degrees   Extension: 10 degrees   External rotation (90/90): 50 degrees   Internal rotation (90/90): 40 degrees     Right Hip   Flexion: 90 degrees   Extension: 8 degrees   External rotation (90/90): 45 degrees   Internal rotation (90/90): 32 degrees     Strength/Myotome Testing     Left Hip   Planes of Motion   Flexion: 5  External rotation: 5  Internal rotation: 5    Right Hip   Planes of Motion   Flexion: 5  External rotation: 5  Internal rotation: 4+    Left Knee   Flexion: 5  Extension: 5    Right Knee   Flexion: 5  Extension: 5    Left Ankle/Foot   Dorsiflexion: 5  Plantar flexion: 5    Right Ankle/Foot   Dorsiflexion: 5  Plantar flexion: 5    Additional Strength Details  SLS L: 12 seconds  SLS R: > 30 seconds    TUG: (no assistive device)  Trial 1: 10"  Trail 2: 10"  Trial 3: 11"  Average: 10 33" which is not indicative of increased fall risk      Flowsheet Rows    Flowsheet Row Most Recent Value   PT/OT G-Codes   Assessment Type  Evaluation   G code set  Mobility: Walking & Moving Around   Mobility: Walking and Moving Around Current Status ()  CK   Mobility: Walking and Moving Around Goal Status ()  CJ          Precautions:  Osteopenia, severe OA in right hip, depression    Daily Treatment Diary     Manual  3-2            PROM with gentle overpressure into hip flexion (< 90), Er, abduction, and extension (no hip flexion>  90, Ir, or adduction)                                                                     Exercise Diary  3-2                         Standing:             - hip abd 2 x 10 ea NV            - hip ext 2 x 10 ea NV            - mini squat 2 x 10 NV            - heel raises  2 x 10 NV            - toe raises 2 x 10 NV            - HS curls 2 x 10 ea NV            Sitting:             - LAQ 2 x 10 NV            - isometric adduction 10 x 6" hold NV Supine:             - heel slides  15x NV            - quad sets 10 x 10" NV                                                                                 Modalities  3-2            Cryo as needed                                         *On initial evaluation, performed post- operative gait training for level ground, stairs, and car transfers with a straight cane and rolling walker maintaining posterior total hip precautions  Reviewed immediate post-operative home exercise program and provided to patient  Reviewed virtual home assessment and home preparation checklist  Reviewed post operative pain management expectation as well as DOC and answered any questions  * Documentation performed late due to power outage in clinic on Friday, 3-2-18

## 2018-03-02 NOTE — TELEPHONE ENCOUNTER
Left message for Laura Hernandez that there is a note in Epic, asked her to call back and let us know if that is sufficient

## 2018-03-06 ENCOUNTER — APPOINTMENT (OUTPATIENT)
Dept: LAB | Facility: CLINIC | Age: 68
DRG: 470 | End: 2018-03-06
Payer: MEDICARE

## 2018-03-06 ENCOUNTER — DOCUMENTATION (OUTPATIENT)
Dept: FAMILY MEDICINE CLINIC | Facility: CLINIC | Age: 68
End: 2018-03-06

## 2018-03-06 DIAGNOSIS — Z01.818 PRE-OP EXAMINATION: ICD-10-CM

## 2018-03-06 LAB
ANION GAP SERPL CALCULATED.3IONS-SCNC: 6 MMOL/L (ref 4–13)
BUN SERPL-MCNC: 15 MG/DL (ref 5–25)
CALCIUM SERPL-MCNC: 9.1 MG/DL (ref 8.3–10.1)
CHLORIDE SERPL-SCNC: 100 MMOL/L (ref 100–108)
CO2 SERPL-SCNC: 28 MMOL/L (ref 21–32)
CREAT SERPL-MCNC: 1.17 MG/DL (ref 0.6–1.3)
GFR SERPL CREATININE-BSD FRML MDRD: 49 ML/MIN/1.73SQ M
GLUCOSE P FAST SERPL-MCNC: 109 MG/DL (ref 65–99)
POTASSIUM SERPL-SCNC: 3.9 MMOL/L (ref 3.5–5.3)
SODIUM SERPL-SCNC: 134 MMOL/L (ref 136–145)

## 2018-03-06 PROCEDURE — 80048 BASIC METABOLIC PNL TOTAL CA: CPT

## 2018-03-06 PROCEDURE — 36415 COLL VENOUS BLD VENIPUNCTURE: CPT

## 2018-03-06 NOTE — PROGRESS NOTES
Patient had repeat basic metabolic profile with some improvement in GFR and creat; No changes pre op; Monitor creat post op  Monitor fluid status

## 2018-03-08 ENCOUNTER — ANESTHESIA EVENT (OUTPATIENT)
Dept: PERIOP | Facility: HOSPITAL | Age: 68
DRG: 470 | End: 2018-03-08
Payer: MEDICARE

## 2018-03-08 ENCOUNTER — CLINICAL SUPPORT (OUTPATIENT)
Dept: FAMILY MEDICINE CLINIC | Facility: CLINIC | Age: 68
End: 2018-03-08
Payer: MEDICARE

## 2018-03-08 VITALS
HEIGHT: 60 IN | SYSTOLIC BLOOD PRESSURE: 124 MMHG | WEIGHT: 196 LBS | HEART RATE: 72 BPM | RESPIRATION RATE: 16 BRPM | TEMPERATURE: 98.9 F | BODY MASS INDEX: 38.48 KG/M2 | DIASTOLIC BLOOD PRESSURE: 76 MMHG

## 2018-03-08 DIAGNOSIS — I10 ESSENTIAL HYPERTENSION: ICD-10-CM

## 2018-03-08 DIAGNOSIS — I10 HYPERTENSION, UNSPECIFIED TYPE: ICD-10-CM

## 2018-03-08 DIAGNOSIS — Z96.649 HISTORY OF TOTAL HIP REPLACEMENT, UNSPECIFIED LATERALITY: Primary | ICD-10-CM

## 2018-03-08 PROCEDURE — 99211 OFF/OP EST MAY X REQ PHY/QHP: CPT | Performed by: INTERNAL MEDICINE

## 2018-03-08 RX ORDER — METOPROLOL SUCCINATE 50 MG/1
50 TABLET, EXTENDED RELEASE ORAL DAILY
Qty: 90 TABLET | Refills: 1 | Status: ON HOLD | OUTPATIENT
Start: 2018-03-08 | End: 2018-07-30 | Stop reason: SDUPTHER

## 2018-03-08 RX ORDER — AMOXICILLIN 500 MG/1
CAPSULE ORAL
Qty: 4 CAPSULE | Refills: 4 | Status: SHIPPED | OUTPATIENT
Start: 2018-03-08 | End: 2018-03-09

## 2018-03-08 NOTE — PROGRESS NOTES
Patient was here for blood pressure check per Dr Lien Small  At her last visit she changed her medications to Metoprolol Succ and decreased her HCTZ to 1/2 tab  Per Dr Lien Small I advised for her to continue on same medication  I also had Dr Lien Small review her recent blood work which was normal, and filled out a handicapped placard for her

## 2018-03-08 NOTE — TELEPHONE ENCOUNTER
Pt needs refill on Metoprolol 50 mg   She wants it sent to [facility]

## 2018-03-08 NOTE — TELEPHONE ENCOUNTER
Patient was here for B/P check and is asking for you to prescribe her predental Amoxil and send to [place] please

## 2018-03-09 DIAGNOSIS — E78.00 PURE HYPERCHOLESTEROLEMIA: Primary | ICD-10-CM

## 2018-03-09 RX ORDER — ATORVASTATIN CALCIUM 20 MG/1
TABLET, FILM COATED ORAL
Qty: 90 TABLET | Refills: 3 | Status: SHIPPED | OUTPATIENT
Start: 2018-03-09 | End: 2019-01-04 | Stop reason: SDUPTHER

## 2018-03-12 ENCOUNTER — ANESTHESIA (OUTPATIENT)
Dept: PERIOP | Facility: HOSPITAL | Age: 68
DRG: 470 | End: 2018-03-12
Payer: MEDICARE

## 2018-03-12 ENCOUNTER — HOSPITAL ENCOUNTER (INPATIENT)
Facility: HOSPITAL | Age: 68
LOS: 1 days | Discharge: HOME | DRG: 470 | End: 2018-03-13
Attending: ORTHOPAEDIC SURGERY | Admitting: ORTHOPAEDIC SURGERY
Payer: MEDICARE

## 2018-03-12 ENCOUNTER — APPOINTMENT (OUTPATIENT)
Dept: PHYSICAL THERAPY | Facility: CLINIC | Age: 68
End: 2018-03-12
Payer: MEDICARE

## 2018-03-12 DIAGNOSIS — E78.5 HYPERLIPIDEMIA, UNSPECIFIED HYPERLIPIDEMIA TYPE: ICD-10-CM

## 2018-03-12 DIAGNOSIS — M16.12 PRIMARY OSTEOARTHRITIS OF LEFT HIP: ICD-10-CM

## 2018-03-12 DIAGNOSIS — I10 HYPERTENSION, UNSPECIFIED TYPE: ICD-10-CM

## 2018-03-12 DIAGNOSIS — Z96.642 STATUS POST TOTAL REPLACEMENT OF LEFT HIP: Primary | ICD-10-CM

## 2018-03-12 PROCEDURE — 97163 PT EVAL HIGH COMPLEX 45 MIN: CPT

## 2018-03-12 PROCEDURE — C1776 JOINT DEVICE (IMPLANTABLE): HCPCS | Performed by: ORTHOPAEDIC SURGERY

## 2018-03-12 PROCEDURE — G8978 MOBILITY CURRENT STATUS: HCPCS

## 2018-03-12 PROCEDURE — C1713 ANCHOR/SCREW BN/BN,TIS/BN: HCPCS | Performed by: ORTHOPAEDIC SURGERY

## 2018-03-12 PROCEDURE — 0SRB02A REPLACEMENT OF LEFT HIP JOINT WITH METAL ON POLYETHYLENE SYNTHETIC SUBSTITUTE, UNCEMENTED, OPEN APPROACH: ICD-10-PCS | Performed by: ORTHOPAEDIC SURGERY

## 2018-03-12 PROCEDURE — 27130 TOTAL HIP ARTHROPLASTY: CPT | Performed by: ORTHOPAEDIC SURGERY

## 2018-03-12 PROCEDURE — 99024 POSTOP FOLLOW-UP VISIT: CPT | Performed by: ORTHOPAEDIC SURGERY

## 2018-03-12 PROCEDURE — G8979 MOBILITY GOAL STATUS: HCPCS

## 2018-03-12 DEVICE — ARTICUL/EZE FEMORAL HEAD DIAMETER 32MM +1 12/14 TAPER
Type: IMPLANTABLE DEVICE | Status: FUNCTIONAL
Brand: ARTICUL/EZE

## 2018-03-12 DEVICE — CORAIL HIP SYSTEM CEMENTLESS FEMORAL STEM 12/14 AMT 135 DEGREES KS SIZE 11 HA COATED STANDARD NO COLLAR
Type: IMPLANTABLE DEVICE | Status: FUNCTIONAL
Brand: CORAIL

## 2018-03-12 DEVICE — PINNACLE POROCOAT ACETABULAR SHELL SECTOR II 48MM OD
Type: IMPLANTABLE DEVICE | Status: FUNCTIONAL
Brand: PINNACLE POROCOAT

## 2018-03-12 DEVICE — PINNACLE HIP SOLUTIONS ALTRX POLYETHYLENE ACETABULAR LINER +4 10 DEGREES 32MM ID 48MM OD
Type: IMPLANTABLE DEVICE | Status: FUNCTIONAL
Brand: PINNACLE ALTRX

## 2018-03-12 DEVICE — PINNACLE CANCELLOUS BONE SCREW 6.5MM X 25MM
Type: IMPLANTABLE DEVICE | Status: FUNCTIONAL
Brand: PINNACLE

## 2018-03-12 RX ORDER — ONDANSETRON 2 MG/ML
4 INJECTION INTRAMUSCULAR; INTRAVENOUS EVERY 6 HOURS PRN
Status: DISCONTINUED | OUTPATIENT
Start: 2018-03-12 | End: 2018-03-13 | Stop reason: HOSPADM

## 2018-03-12 RX ORDER — TRAMADOL HYDROCHLORIDE 50 MG/1
50 TABLET ORAL EVERY 6 HOURS SCHEDULED
Status: DISCONTINUED | OUTPATIENT
Start: 2018-03-12 | End: 2018-03-13 | Stop reason: HOSPADM

## 2018-03-12 RX ORDER — ATORVASTATIN CALCIUM 20 MG/1
20 TABLET, FILM COATED ORAL
Status: DISCONTINUED | OUTPATIENT
Start: 2018-03-12 | End: 2018-03-13 | Stop reason: HOSPADM

## 2018-03-12 RX ORDER — SODIUM CHLORIDE, SODIUM LACTATE, POTASSIUM CHLORIDE, CALCIUM CHLORIDE 600; 310; 30; 20 MG/100ML; MG/100ML; MG/100ML; MG/100ML
1.5 INJECTION, SOLUTION INTRAVENOUS CONTINUOUS
Status: DISCONTINUED | OUTPATIENT
Start: 2018-03-12 | End: 2018-03-13 | Stop reason: HOSPADM

## 2018-03-12 RX ORDER — GABAPENTIN 300 MG/1
300 CAPSULE ORAL ONCE
Status: COMPLETED | OUTPATIENT
Start: 2018-03-12 | End: 2018-03-12

## 2018-03-12 RX ORDER — FENTANYL CITRATE 50 UG/ML
INJECTION, SOLUTION INTRAMUSCULAR; INTRAVENOUS AS NEEDED
Status: DISCONTINUED | OUTPATIENT
Start: 2018-03-12 | End: 2018-03-12 | Stop reason: SURG

## 2018-03-12 RX ORDER — ACETAMINOPHEN 325 MG/1
975 TABLET ORAL ONCE
Status: COMPLETED | OUTPATIENT
Start: 2018-03-12 | End: 2018-03-12

## 2018-03-12 RX ORDER — METOPROLOL SUCCINATE 50 MG/1
50 TABLET, EXTENDED RELEASE ORAL DAILY
Status: DISCONTINUED | OUTPATIENT
Start: 2018-03-13 | End: 2018-03-12

## 2018-03-12 RX ORDER — HYDRALAZINE HYDROCHLORIDE 25 MG/1
25 TABLET, FILM COATED ORAL EVERY 8 HOURS PRN
Status: DISCONTINUED | OUTPATIENT
Start: 2018-03-12 | End: 2018-03-13 | Stop reason: HOSPADM

## 2018-03-12 RX ORDER — MIDAZOLAM HYDROCHLORIDE 1 MG/ML
INJECTION INTRAMUSCULAR; INTRAVENOUS AS NEEDED
Status: DISCONTINUED | OUTPATIENT
Start: 2018-03-12 | End: 2018-03-12 | Stop reason: SURG

## 2018-03-12 RX ORDER — MAGNESIUM HYDROXIDE 1200 MG/15ML
LIQUID ORAL AS NEEDED
Status: DISCONTINUED | OUTPATIENT
Start: 2018-03-12 | End: 2018-03-12 | Stop reason: HOSPADM

## 2018-03-12 RX ORDER — PANTOPRAZOLE SODIUM 40 MG/1
40 TABLET, DELAYED RELEASE ORAL
Status: DISCONTINUED | OUTPATIENT
Start: 2018-03-13 | End: 2018-03-13 | Stop reason: HOSPADM

## 2018-03-12 RX ORDER — SODIUM CHLORIDE, SODIUM LACTATE, POTASSIUM CHLORIDE, CALCIUM CHLORIDE 600; 310; 30; 20 MG/100ML; MG/100ML; MG/100ML; MG/100ML
125 INJECTION, SOLUTION INTRAVENOUS CONTINUOUS
Status: DISCONTINUED | OUTPATIENT
Start: 2018-03-12 | End: 2018-03-12

## 2018-03-12 RX ORDER — ONDANSETRON 2 MG/ML
INJECTION INTRAMUSCULAR; INTRAVENOUS AS NEEDED
Status: DISCONTINUED | OUTPATIENT
Start: 2018-03-12 | End: 2018-03-12 | Stop reason: SURG

## 2018-03-12 RX ORDER — FERROUS SULFATE 325(65) MG
325 TABLET ORAL
Status: DISCONTINUED | OUTPATIENT
Start: 2018-03-13 | End: 2018-03-13 | Stop reason: HOSPADM

## 2018-03-12 RX ORDER — GABAPENTIN 100 MG/1
100 CAPSULE ORAL EVERY 8 HOURS SCHEDULED
Status: DISCONTINUED | OUTPATIENT
Start: 2018-03-12 | End: 2018-03-13 | Stop reason: HOSPADM

## 2018-03-12 RX ORDER — MORPHINE SULFATE 2 MG/ML
2 INJECTION, SOLUTION INTRAMUSCULAR; INTRAVENOUS EVERY 2 HOUR PRN
Status: DISCONTINUED | OUTPATIENT
Start: 2018-03-12 | End: 2018-03-13 | Stop reason: HOSPADM

## 2018-03-12 RX ORDER — HYDROCHLOROTHIAZIDE 12.5 MG/1
12.5 TABLET ORAL DAILY
Status: DISCONTINUED | OUTPATIENT
Start: 2018-03-13 | End: 2018-03-12

## 2018-03-12 RX ORDER — DIPHENHYDRAMINE HYDROCHLORIDE 50 MG/ML
12.5 INJECTION INTRAMUSCULAR; INTRAVENOUS ONCE AS NEEDED
Status: DISCONTINUED | OUTPATIENT
Start: 2018-03-12 | End: 2018-03-12 | Stop reason: HOSPADM

## 2018-03-12 RX ORDER — ONDANSETRON 2 MG/ML
4 INJECTION INTRAMUSCULAR; INTRAVENOUS ONCE AS NEEDED
Status: DISCONTINUED | OUTPATIENT
Start: 2018-03-12 | End: 2018-03-12 | Stop reason: HOSPADM

## 2018-03-12 RX ORDER — DOCUSATE SODIUM 100 MG/1
100 CAPSULE, LIQUID FILLED ORAL 2 TIMES DAILY
Status: DISCONTINUED | OUTPATIENT
Start: 2018-03-12 | End: 2018-03-13 | Stop reason: HOSPADM

## 2018-03-12 RX ORDER — METOCLOPRAMIDE HYDROCHLORIDE 5 MG/ML
10 INJECTION INTRAMUSCULAR; INTRAVENOUS ONCE AS NEEDED
Status: DISCONTINUED | OUTPATIENT
Start: 2018-03-12 | End: 2018-03-12 | Stop reason: HOSPADM

## 2018-03-12 RX ORDER — HYDROCODONE BITARTRATE AND ACETAMINOPHEN 5; 325 MG/1; MG/1
TABLET ORAL
Qty: 30 TABLET | Refills: 0 | Status: SHIPPED | OUTPATIENT
Start: 2018-03-12 | End: 2018-03-20 | Stop reason: SDUPTHER

## 2018-03-12 RX ORDER — METOPROLOL SUCCINATE 50 MG/1
50 TABLET, EXTENDED RELEASE ORAL DAILY
Status: DISCONTINUED | OUTPATIENT
Start: 2018-03-13 | End: 2018-03-13 | Stop reason: HOSPADM

## 2018-03-12 RX ORDER — BUPIVACAINE HYDROCHLORIDE 7.5 MG/ML
INJECTION, SOLUTION EPIDURAL; RETROBULBAR AS NEEDED
Status: DISCONTINUED | OUTPATIENT
Start: 2018-03-12 | End: 2018-03-12 | Stop reason: SURG

## 2018-03-12 RX ORDER — GABAPENTIN 100 MG/1
100 CAPSULE ORAL 3 TIMES DAILY
Qty: 60 CAPSULE | Refills: 0 | Status: SHIPPED | OUTPATIENT
Start: 2018-03-12 | End: 2018-06-25

## 2018-03-12 RX ORDER — ASCORBIC ACID 500 MG
500 TABLET ORAL DAILY
Status: DISCONTINUED | OUTPATIENT
Start: 2018-03-12 | End: 2018-03-13 | Stop reason: HOSPADM

## 2018-03-12 RX ORDER — SENNOSIDES 8.6 MG
1 TABLET ORAL DAILY
Status: DISCONTINUED | OUTPATIENT
Start: 2018-03-12 | End: 2018-03-13 | Stop reason: HOSPADM

## 2018-03-12 RX ORDER — TRAMADOL HYDROCHLORIDE 50 MG/1
50 TABLET ORAL EVERY 6 HOURS PRN
Qty: 30 TABLET | Refills: 0 | Status: SHIPPED | OUTPATIENT
Start: 2018-03-12 | End: 2018-03-20 | Stop reason: SDUPTHER

## 2018-03-12 RX ORDER — HYDROCODONE BITARTRATE AND ACETAMINOPHEN 5; 325 MG/1; MG/1
1 TABLET ORAL EVERY 4 HOURS PRN
Status: DISCONTINUED | OUTPATIENT
Start: 2018-03-12 | End: 2018-03-13 | Stop reason: HOSPADM

## 2018-03-12 RX ORDER — FENTANYL CITRATE/PF 50 MCG/ML
25 SYRINGE (ML) INJECTION
Status: DISCONTINUED | OUTPATIENT
Start: 2018-03-12 | End: 2018-03-12 | Stop reason: HOSPADM

## 2018-03-12 RX ORDER — FOLIC ACID 1 MG/1
1 TABLET ORAL DAILY
Status: DISCONTINUED | OUTPATIENT
Start: 2018-03-12 | End: 2018-03-13 | Stop reason: HOSPADM

## 2018-03-12 RX ORDER — HYDROCODONE BITARTRATE AND ACETAMINOPHEN 5; 325 MG/1; MG/1
2 TABLET ORAL EVERY 6 HOURS PRN
Status: DISCONTINUED | OUTPATIENT
Start: 2018-03-12 | End: 2018-03-13 | Stop reason: HOSPADM

## 2018-03-12 RX ORDER — MELATONIN
2000 DAILY
Status: DISCONTINUED | OUTPATIENT
Start: 2018-03-12 | End: 2018-03-13 | Stop reason: HOSPADM

## 2018-03-12 RX ORDER — FLUOXETINE HYDROCHLORIDE 20 MG/1
20 CAPSULE ORAL DAILY
Status: DISCONTINUED | OUTPATIENT
Start: 2018-03-12 | End: 2018-03-13 | Stop reason: HOSPADM

## 2018-03-12 RX ORDER — PROPOFOL 10 MG/ML
INJECTION, EMULSION INTRAVENOUS CONTINUOUS PRN
Status: DISCONTINUED | OUTPATIENT
Start: 2018-03-12 | End: 2018-03-12 | Stop reason: SURG

## 2018-03-12 RX ADMIN — SODIUM CHLORIDE, SODIUM LACTATE, POTASSIUM CHLORIDE, AND CALCIUM CHLORIDE 1.5 ML/KG/HR: .6; .31; .03; .02 INJECTION, SOLUTION INTRAVENOUS at 10:07

## 2018-03-12 RX ADMIN — CEFAZOLIN SODIUM 2000 MG: 2 SOLUTION INTRAVENOUS at 15:17

## 2018-03-12 RX ADMIN — GABAPENTIN 100 MG: 100 CAPSULE ORAL at 21:27

## 2018-03-12 RX ADMIN — SODIUM CHLORIDE, SODIUM LACTATE, POTASSIUM CHLORIDE, AND CALCIUM CHLORIDE: .6; .31; .03; .02 INJECTION, SOLUTION INTRAVENOUS at 07:18

## 2018-03-12 RX ADMIN — SENNOSIDES 8.6 MG: 8.6 TABLET, FILM COATED ORAL at 11:11

## 2018-03-12 RX ADMIN — FENTANYL CITRATE 40 MCG: 50 INJECTION, SOLUTION INTRAMUSCULAR; INTRAVENOUS at 07:51

## 2018-03-12 RX ADMIN — CEFAZOLIN SODIUM 2000 MG: 2 SOLUTION INTRAVENOUS at 07:43

## 2018-03-12 RX ADMIN — FENTANYL CITRATE 10 MCG: 50 INJECTION, SOLUTION INTRAMUSCULAR; INTRAVENOUS at 07:44

## 2018-03-12 RX ADMIN — MIDAZOLAM HYDROCHLORIDE 1 MG: 1 INJECTION, SOLUTION INTRAMUSCULAR; INTRAVENOUS at 07:34

## 2018-03-12 RX ADMIN — ACETAMINOPHEN 975 MG: 325 TABLET, FILM COATED ORAL at 06:01

## 2018-03-12 RX ADMIN — MORPHINE SULFATE 2 MG: 2 INJECTION, SOLUTION INTRAMUSCULAR; INTRAVENOUS at 16:45

## 2018-03-12 RX ADMIN — CEFAZOLIN SODIUM 2000 MG: 2 SOLUTION INTRAVENOUS at 23:38

## 2018-03-12 RX ADMIN — HYDROCODONE BITARTRATE AND ACETAMINOPHEN 2 TABLET: 5; 325 TABLET ORAL at 15:17

## 2018-03-12 RX ADMIN — TRAMADOL HYDROCHLORIDE 50 MG: 50 TABLET, FILM COATED ORAL at 11:12

## 2018-03-12 RX ADMIN — FOLIC ACID 1 MG: 1 TABLET ORAL at 11:11

## 2018-03-12 RX ADMIN — ATORVASTATIN CALCIUM 20 MG: 20 TABLET, FILM COATED ORAL at 16:45

## 2018-03-12 RX ADMIN — FENTANYL CITRATE 50 MCG: 50 INJECTION, SOLUTION INTRAMUSCULAR; INTRAVENOUS at 07:40

## 2018-03-12 RX ADMIN — PROPOFOL 100 MCG/KG/MIN: 10 INJECTION, EMULSION INTRAVENOUS at 08:00

## 2018-03-12 RX ADMIN — ONDANSETRON 4 MG: 2 INJECTION INTRAMUSCULAR; INTRAVENOUS at 08:28

## 2018-03-12 RX ADMIN — GABAPENTIN 300 MG: 300 CAPSULE ORAL at 06:01

## 2018-03-12 RX ADMIN — SODIUM CHLORIDE, SODIUM LACTATE, POTASSIUM CHLORIDE, AND CALCIUM CHLORIDE 500 ML: .6; .31; .03; .02 INJECTION, SOLUTION INTRAVENOUS at 09:24

## 2018-03-12 RX ADMIN — SODIUM CHLORIDE, SODIUM LACTATE, POTASSIUM CHLORIDE, AND CALCIUM CHLORIDE: .6; .31; .03; .02 INJECTION, SOLUTION INTRAVENOUS at 08:54

## 2018-03-12 RX ADMIN — GABAPENTIN 100 MG: 100 CAPSULE ORAL at 13:05

## 2018-03-12 RX ADMIN — SODIUM CHLORIDE, SODIUM LACTATE, POTASSIUM CHLORIDE, AND CALCIUM CHLORIDE 1.5 ML/KG/HR: .6; .31; .03; .02 INJECTION, SOLUTION INTRAVENOUS at 16:51

## 2018-03-12 RX ADMIN — MIDAZOLAM HYDROCHLORIDE 1 MG: 1 INJECTION, SOLUTION INTRAMUSCULAR; INTRAVENOUS at 07:40

## 2018-03-12 RX ADMIN — DOCUSATE SODIUM 100 MG: 100 CAPSULE, LIQUID FILLED ORAL at 17:38

## 2018-03-12 RX ADMIN — HYDROCODONE BITARTRATE AND ACETAMINOPHEN 1 TABLET: 5; 325 TABLET ORAL at 10:48

## 2018-03-12 RX ADMIN — TRAMADOL HYDROCHLORIDE 50 MG: 50 TABLET, FILM COATED ORAL at 17:38

## 2018-03-12 RX ADMIN — VITAMIN D, TAB 1000IU (100/BT) 2000 UNITS: 25 TAB at 11:11

## 2018-03-12 RX ADMIN — OXYCODONE HYDROCHLORIDE AND ACETAMINOPHEN 500 MG: 500 TABLET ORAL at 11:12

## 2018-03-12 RX ADMIN — HYDROCODONE BITARTRATE AND ACETAMINOPHEN 2 TABLET: 5; 325 TABLET ORAL at 21:28

## 2018-03-12 RX ADMIN — BUPIVACAINE HYDROCHLORIDE 1.2 ML: 7.5 INJECTION, SOLUTION EPIDURAL; RETROBULBAR at 07:44

## 2018-03-12 RX ADMIN — MORPHINE SULFATE 2 MG: 2 INJECTION, SOLUTION INTRAMUSCULAR; INTRAVENOUS at 12:52

## 2018-03-12 NOTE — ANESTHESIA PREPROCEDURE EVALUATION
Review of Systems/Medical History  Patient summary reviewed  Chart reviewed  No history of anesthetic complications     Cardiovascular  Hyperlipidemia, Hypertension ,    Pulmonary  Negative pulmonary ROS No recent URI , No sleep apnea ,        GI/Hepatic    GERD well controlled,   Comment: Pancreatitis 6/2017     Negative  ROS        Endo/Other    Obesity (BMI 38)    GYN    Hysterectomy,        Hematology  Negative hematology ROS      Musculoskeletal    Arthritis     Neurology  Negative neurology ROS      Psychology   Depression ,              Physical Exam    Airway    Mallampati score: II  TM Distance: >3 FB  Neck ROM: full     Dental   No notable dental hx     Cardiovascular      Pulmonary      Other Findings       Lab Results   Component Value Date    WBC 5 78 02/26/2018    HGB 13 8 02/26/2018     02/26/2018     Lab Results   Component Value Date     (L) 03/06/2018    K 3 9 03/06/2018    BUN 15 03/06/2018    CREATININE 1 17 03/06/2018    GLUCOSE 122 07/01/2017     Lab Results   Component Value Date    PTT 30 02/26/2018      Lab Results   Component Value Date    INR 0 92 02/26/2018     Blood type AB-/antibody neg  Lab Results   Component Value Date    HGBA1C 5 4 02/26/2018         Anesthesia Plan  ASA Score- 3     Anesthesia Type- spinal with ASA Monitors  Additional Monitors:   Airway Plan:         Plan Factors-    Induction- intravenous  Postoperative Plan-     Informed Consent- Anesthetic plan and risks discussed with patient and spouse  I personally reviewed this patient with the CRNA  Discussed and agreed on the Anesthesia Plan with the CRNA  Qamar Hahn

## 2018-03-12 NOTE — PLAN OF CARE
Problem: DISCHARGE PLANNING - CARE MANAGEMENT  Goal: Discharge to post-acute care or home with appropriate resources  INTERVENTIONS:  - Conduct assessment to determine patient/family and health care team treatment goals, and need for post-acute services based on payer coverage, community resources, and patient preferences, and barriers to discharge  - Address psychosocial, clinical, and financial barriers to discharge as identified in assessment in conjunction with the patient/family and health care team  - Arrange appropriate level of post-acute services according to patient's   needs and preference and payer coverage in collaboration with the physician and health care team  - Communicate with and update the patient/family, physician, and health care team regarding progress on the discharge plan  - Arrange appropriate transportation to post-acute venues  - Home w/ PT  Outcome: Progressing

## 2018-03-12 NOTE — INTERVAL H&P NOTE
H&P reviewed  After examining the patient I find no changes in the patients condition since the H&P had been written      [facility]:RRR  Lungs clear b/l    Preop for left total hip replacement

## 2018-03-12 NOTE — ANESTHESIA PROCEDURE NOTES
Spinal Block    Patient location during procedure: OR  Start time: 3/12/2018 7:44 AM  Reason for block: procedure for pain, at surgeon's request and primary anesthetic  Staffing  Anesthesiologist: Claudene Marine R  Preanesthetic Checklist  Completed: patient identified, site marked, surgical consent, pre-op evaluation, timeout performed, IV checked, risks and benefits discussed and monitors and equipment checked  Spinal Block  Patient position: sitting  Prep: ChloraPrep  Patient monitoring: cardiac monitor and frequent blood pressure checks  Approach: midline  Location: L3-4  Injection technique: single-shot  Needle  Needle type: pencil-tip   Needle gauge: 25 G  Needle length: 10 cm  Assessment  Sensory level: [id number] Assessment:  negative aspiration for heme, no paresthesia on injection and positive aspiration for clear CSF    Post-procedure:  adhesive bandage applied, pressure dressing applied, secured with tape, site cleaned and sterile dressing applied

## 2018-03-12 NOTE — PLAN OF CARE
Problem: PHYSICAL THERAPY ADULT  Goal: Performs mobility at highest level of function for planned discharge setting  See evaluation for individualized goals  Treatment/Interventions: Functional transfer training, LE strengthening/ROM, Elevations, Therapeutic exercise, Endurance training, Patient/family training, Equipment eval/education, Bed mobility, Gait training, Spoke to nursing  Equipment Recommended:  (Pt owns all DME)       See flowsheet documentation for full assessment, interventions and recommendations  Prognosis: Good  Problem List: Decreased strength, Decreased range of motion, Decreased endurance, Impaired balance, Decreased mobility, Orthopedic restrictions, Pain  Assessment: Pt is a 78 yo female presenting to [address] [place] [address] s/p ARTHROPLASTY HIP TOTAL (Left Hip)  [facility] significant for depression, hyperlipidemia, HTN, osteopenia, and pneumonia  PTA, pt living with  in multilevel home with first floor set-up and 3 COLLIN  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  Pt owns all necessary DME  PT consulted to assess functional mobility and safe discharge planning  Prior to mobilization, pt was educated on posterior hip precautions  Pt able to verbally and physically demonstrate throughout session  Pt was Min A for supine to sit for LE management  Pt was educated on proper use of RW for transfers and ambulation  Pt was supervision for sit to stand, stand to sit, and ambulation with RW and assist for line management  Current problem list includes pain, decreased strength and ROM L Le, decreased functional mobility and endurance, impaired balance, decreased activity tolerance, and orthopedic restrictions  PT to follow pt throughout course of stay to address above limitations and maximize functional mobility  Current recommendation is home with available family support and OPPT when medically appropriate    Barriers to Discharge: Inaccessible home environment (COLLIN)     Recommendation: Home with family support, Outpatient PT          See flowsheet documentation for full assessment

## 2018-03-12 NOTE — PHYSICAL THERAPY NOTE
PT EVALUATION   03/12/18 1508   Note Type   Note type Eval only   Pain Assessment   Pain Assessment 0-10   Pain Score 5   Pain Type Acute pain   Pain Location Hip   Pain Orientation Left   Hospital Pain Intervention(s) Cold applied;Repositioned; Ambulation/increased activity   Home Living   Type of [address] Multi-level;Performs ADLs on one level; Able to live on main level with bedroom/bathroom;Stairs to enter without rails   Bathroom Toilet Raised   Bathroom Equipment Commode;Grab bars in [facility] [address];Cane;Grab bars; Other (Comment)  (BS)   Additional Comments Pt states having full first floor set-up  Pt owns all Necessary  DME   Prior Function   Level of [place] Independent with ADLs and functional mobility   Lives With Spouse   Receives Help From Family   ADL Assistance Independent   IADLs Independent   Falls in the last 6 months 0   Vocational Retired   Comments Retired RN  Independent with all ADLs and driving   Restrictions/Precautions   Weight Bearing Precautions Per Order Yes   LLE Weight Bearing Per Order WBAT   Other Precautions WBS;THR;Fall Risk;Pain;Multiple lines   General   Additional Pertinent History POD #0 L GERARDO   Family/Caregiver Present No   Cognition   Overall Cognitive Status WFL   Arousal/Participation Alert   Orientation Level Oriented X4   Memory Within functional limits   Following Commands Follows all commands and directions without difficulty   Comments Pt friendly and cooperative throughout session   Pt able to converse appropriately   RUE Assessment   RUE Assessment WFL   LUE Assessment   LUE Assessment WFL   RLE Assessment   RLE Assessment WFL   LLE Assessment   LLE Assessment X   Strength LLE   L Hip Flexion 3/5   L Knee Extension 3/5   Coordination   Movements are Fluid and Coordinated 1   Sensation WFL   Proprioception   RLE Proprioception Grossly intact   LLE Proprioception Grossly Intact   Bed Mobility   Supine to Sit 4  Minimal assistance   Additional items Assist x 1; Increased time required;LE management   Transfers   Sit to Stand 5  Supervision   Additional items Increased time required;Verbal cues   Stand to Sit 5  Supervision   Additional items Increased time required;Verbal cues   Ambulation/Elevation   Gait pattern Improper Weight shift; Antalgic;Decreased L stance;Decreased foot clearance   Gait Assistance 5  Supervision   Additional items Assist x 1;Verbal cues  (line management)   Assistive Device Rolling walker   Distance 100'   Stair Management Assistance Not tested   Balance   Static Sitting Fair   Dynamic Sitting Fair   Static Standing Fair -   Ambulatory Fair -   Endurance Deficit   Endurance Deficit Yes   Endurance Deficit Description pain   Activity Tolerance   Activity Tolerance Patient tolerated treatment well   Nurse Made Aware Arlene Sinclair RN   Assessment   Prognosis Good   Problem List Decreased strength;Decreased range of motion;Decreased endurance; Impaired balance;Decreased mobility;Orthopedic restrictions;Pain   Assessment Pt is a 80 yo female presenting to [address] [place] [address] s/p ARTHROPLASTY HIP TOTAL (Left Hip)  [facility] significant for depression, hyperlipidemia, HTN, osteopenia, and pneumonia  PTA, pt living with  in multilevel home with first floor set-up and 3 COLLIN  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  Pt owns all necessary DME  PT consulted to assess functional mobility and safe discharge planning  Prior to mobilization, pt was educated on posterior hip precautions  Pt able to verbally and physically demonstrate throughout session  Pt was Min A for supine to sit for LE management  Pt was educated on proper use of RW for transfers and ambulation  Pt was supervision for sit to stand, stand to sit, and ambulation with RW and assist for line management   Current problem list includes pain, decreased strength and ROM L Le, decreased functional mobility and endurance, impaired balance, decreased activity tolerance, and orthopedic restrictions  PT to follow pt throughout course of stay to address above limitations and maximize functional mobility  Current recommendation is home with available family support and OPPT when medically appropriate  Barriers to Discharge Inaccessible home environment  (COLLIN)   Goals   Patient Goals to go home   STG Expiration Date 03/17/18   Short Term Goal #1 Pt will be Mod I for bed mobility  Pt will be Mod I for transfers  Pt will be Mod I for ambulation of 150' with least restrictive AD  Pt will be S for ascending and descending stairs  Pt will demonstrate posterior hip precautions 100%  Pt will participate in HEP  Pt will tolerate 40 minute treatment sessions including therex, balance, and ambulation   Treatment Day 0   Plan   Treatment/Interventions Functional transfer training;LE strengthening/ROM; Elevations; Therapeutic exercise; Endurance training;Patient/family training;Equipment eval/education; Bed mobility;Gait training;Spoke to nursing   PT Frequency 7x/wk; Twice a day   Recommendation   Recommendation Home with family support; Outpatient PT   Equipment Recommended (Pt owns all DME)   Additional Comments Pt left OOB in chair with all needs within reach   Ice donned and SCDs activated   Barthel Index   Feeding 10   Bathing 0   Grooming Score 5   Dressing Score 5   Bladder Score 0   Bowels Score 10   Toilet Use Score 5   Transfers (Bed/Chair) Score 10   Mobility (Level Surface) Score 0   Stairs Score 0   Barthel Index Score 45     VENITA Buck

## 2018-03-12 NOTE — DISCHARGE INSTRUCTIONS
Discharge Instructions - Orthopedics  Cornelius Chou 79 y o  female MRN: [id number]  Unit/Bed#: PACU 02    Weight Bearing Status:                                           Weight Bearing as tolerated to left leg  Be sure to maintain Hip Precautions (no bending at waist, no crossing legs, on internally rotating surgical leg)    DVT prophylaxis:  Complete course of Lovenox as directed    Pain:  Continue analgesics as directed    Showering Instructions:   Do not shower until 5 days    Dressing Instructions:   Keep dressing clean, dry and intact until follow up appointment  Driving Instructions:  No driving until cleared by Orthopaedic Surgery  PT/OT:  Continue PT/OT on outpatient basis as directed    Appt Instructions: If you do not have your appointment, please call the clinic at [phone] to schedule follow-up with Dr David Carreon in 1 week after surgery date (3/12/18)    Contact the office sooner if you experience any increased numbness/tingling in the extremities        Miscellaneous:  None

## 2018-03-12 NOTE — ANESTHESIA POSTPROCEDURE EVALUATION
Post-Op Assessment Note      CV Status:  Stable    Mental Status:  Alert and awake    Hydration Status:  Euvolemic    PONV Controlled:  Controlled    Airway Patency:  Patent    Post Op Vitals Reviewed: Yes          Staff: CRNA           BP   107/52   Temp   97 7   Pulse  52   Resp   18   SpO2   100%

## 2018-03-12 NOTE — CONSULTS
Office Visit     2/8/2018  Gabe Campuzano MD   Orthopedic Surgery   Primary osteoarthritis of left hip   Dx   Left Hip - Pain   Reason for Visit    Progress Notes     Expand All Collapse All    74 y  o female presents for evaluation  She has worsening pain in both hips left being greater than right  She has pain level both hip joints, the pain is made worse bear weight, the pain increases with increased activities  She desires a permanent solution to her current left hip problem, as greatly interferes with her activities daily living    Injection of corticosteroid performed fluoroscopically resulted only 6 weeks relief from both hip joints      Review of Systems  Review of systems negative unless otherwise specified in HPI     Past Medical History  Medical History        Past Medical History:   Diagnosis Date    Arthritis      Depression      Hyperlipidemia      Hypertension      Osteopenia      Pneumonia              Past Surgical History  Surgical History         Past Surgical History:   Procedure Laterality Date    CARPAL TUNNEL RELEASE        COLONOSCOPY        HAND SURGERY        HYSTERECTOMY        JOINT REPLACEMENT         KNEE    AK ARTHROSCOPY SHOULDER SURGICAL BICEPS TENODESIS Right 5/10/2017     Procedure: ARTHROSCOPIC BICEPS TENODESIS WITH ROTATOR CUFF REPAIR ;  Surgeon: Lacey Holley MD;  [facility];  Service: Orthopedics    AK Neriilla Mayito Right 5/10/2017     Procedure: SHOULDER ARTHROSCOPY SUBACROMIAL DECOMPRESSION;  Surgeon: Lacey Holley MD;  [facility];  Service: Orthopedics    TONSILLECTOMY                Current Medications         Current Outpatient Prescriptions on File Prior to Visit   Medication Sig Dispense Refill    acetaminophen (TYLENOL) 500 mg tablet Take 500 mg by mouth daily        atenolol (TENORMIN) 50 mg tablet Take 50 mg by mouth daily        calcium-vitamin D (OSCAL) 250-125 MG-UNIT per tablet Take 1 tablet by mouth daily        Cholecalciferol (VITAMIN D) 2000 units tablet Take 2,000 Units by mouth daily        FLUoxetine (PROzac) 20 mg capsule Take 20 mg by mouth daily        hydrochlorothiazide (HYDRODIURIL) 25 mg tablet Take 25 mg by mouth daily        Multiple Vitamins-Minerals (CENTRUM SILVER ULTRA WOMENS PO) Take by mouth        omeprazole (PriLOSEC OTC) 20 MG tablet Take 1 tablet by mouth 2 (two) times a day with meals for 30 days 60 tablet 0    [DISCONTINUED] oxyCODONE (ROXICODONE) 5 mg immediate release tablet Take 1-2 tablets by mouth every 4-6 hours as needed for pain  30 tablet 0    [DISCONTINUED] triamcinolone (KENALOG) 0 1 % cream Apply 1 application topically daily          No current facility-administered medications on file prior to visit           Recent Labs [facility])  @LABALLVALUEIP(HCT,HGB,WBC,PT,INR,ESR,CRP,GLUCOSE,HGBA1C)@        Physical exam  Gait pattern is with modest antalgia  Breathing is nonlabored  Cardiovascular exam reveals regular rate rhythm  Lungs are clear bilaterally  Left hip has intact soft tissues  There is nontender trochanteric flare  There is recreated will groin pain during forced internal external rotation of her flexed left hip  The foot and toes strongly dorsiflex  She has well-healed anterior knee scar      Imaging  There is no tenderness in the left calf  Review of previous x-ray show a scant arthritic change in both hips     Procedure  None indicated or performed today     Assessment/Plan:   74 y  o female with osteoarthritis of both hips left being symptomatic greater than right  After review of the risks and benefits, consent was established for elective left total hip replacement  No guarantees given    I would welcome the opportunity see back in the office a postoperative patient      Instructions      After Visit Summary (Printed 2/8/2018)   Additional Documentation Vitals:    /85    Pulse 60    Ht 5' (1 524 m)    Wt 91 1 kg (200 lb 12 8 oz)    BMI 39 22 kg/m²    BSA 1 87 m²         More Vitals    Flowsheets:    Custom Formula Data       SmartForms:    CATHY PRE-CHARTING       Encounter Info:    Billing Info,    History,    Allergies,    Detailed Report       Orders Placed      Case request operating room: ARTHROPLASTY HIP TOTAL Once    Questionnaire Series Once   Medication Changes           (Therapy completed)         (Therapy completed)         (Therapy completed)      Medication List   Visit Diagnoses         Primary osteoarthritis of left hip      Problem List

## 2018-03-12 NOTE — CONSULTS
Consultation - Marivel Oneal 79 y o  female MRN: [id number]    Unit/Bed#: CW3 338-01 Encounter: [id number]        History of Present Illness     HPI: Marivel Oneal is a 79y o  year old female, with PMH of HTN, hyperlipidemia, GERD, and depression, who presents for an elective Lt GERARDO by Dr To Kang  She had been experiencing worsening pain in the bilat hips Lt > Rt  She failed conservative tx and elected to have surgical correction  Pt tolerated the surgery well with an EBL of 75ml  ROS:  Constitutional: Negative  HENT: Negative  Respiratory: Negative  Cardiovascular: Negative  Gastrointestinal: Negative  Musculoskeletal: Lt hip pain   Neurological: Negative  Psychiatric/Behavioral: Negative          Historical Information   Past Medical History:   Diagnosis Date    Arthritis     Depression     Endometriosis     GERD (gastroesophageal reflux disease)     Hyperlipidemia     Hypertension     Osteopenia     Pancreatitis 06/2017    Pneumonia      Past Surgical History:   Procedure Laterality Date    ABDOMINAL SURGERY      endometriosis     APPENDECTOMY      CARPAL TUNNEL RELEASE      COLONOSCOPY      HAND SURGERY      HYSTERECTOMY      JOINT REPLACEMENT      KNEE    AL ARTHROSCOPY SHOULDER SURGICAL BICEPS TENODESIS Right 5/10/2017    Procedure: ARTHROSCOPIC BICEPS TENODESIS WITH ROTATOR CUFF REPAIR ;  Surgeon: Daniel Schneider MD;  [facility];  Service: Orthopedics    AL MERRICK Amato Right 5/10/2017    Procedure: SHOULDER ARTHROSCOPY SUBACROMIAL DECOMPRESSION;  Surgeon: Daniel Schneider MD;  [facility];  Service: Orthopedics    TONSILLECTOMY       Social History   History   Alcohol Use    Yes     Comment: SOCIAL     History   Drug Use No     History   Smoking Status    Never Smoker   Smokeless Tobacco    Never Used     Family History   Problem Relation Age of Onset    Atrial fibrillation Mother     Breast cancer Mother     Stroke Mother     Coronary artery disease Mother     Diabetes Mother     Pancreatitis Mother     Heart attack Father     Arthritis Family     Cancer Family        Meds/Allergies   current meds:  Current Facility-Administered Medications   Medication Dose Route Frequency    ascorbic acid (VITAMIN C) tablet 500 mg  500 mg Oral Daily    atorvastatin (LIPITOR) tablet 20 mg  20 mg Oral Daily With Dinner    ceFAZolin (ANCEF) IVPB (premix) 2,000 mg  2,000 mg Intravenous Q8H    cholecalciferol (VITAMIN D3) tablet 2,000 Units  2,000 Units Oral Daily    docusate sodium (COLACE) capsule 100 mg  100 mg Oral BID    [START ON 3/13/2018] enoxaparin (LOVENOX) subcutaneous injection 40 mg  40 mg Subcutaneous Q24H [address]    [START ON 3/13/2018] ferrous sulfate tablet 325 mg  325 mg Oral Daily With Breakfast    FLUoxetine (PROzac) capsule 20 mg  20 mg Oral Daily    folic acid (FOLVITE) tablet 1 mg  1 mg Oral Daily    gabapentin (NEURONTIN) capsule 100 mg  100 mg Oral Q8H [address]    [START ON 3/13/2018] hydrochlorothiazide (HYDRODIURIL) tablet 12 5 mg  12 5 mg Oral Daily    HYDROcodone-acetaminophen (NORCO) 5-325 mg per tablet 1 tablet  1 tablet Oral Q4H PRN    HYDROcodone-acetaminophen (NORCO) 5-325 mg per tablet 2 tablet  2 tablet Oral Q6H PRN    HYDROmorphone (DILAUDID) injection 1 mg  1 mg Intravenous Q4H PRN    lactated ringers infusion  1 5 mL/kg/hr Intravenous Continuous    [START ON 3/13/2018] metoprolol succinate (TOPROL-XL) 24 hr tablet 50 mg  50 mg Oral Daily    ondansetron (ZOFRAN) injection 4 mg  4 mg Intravenous Q6H PRN    [START ON 3/13/2018] pantoprazole (PROTONIX) EC tablet 40 mg  40 mg Oral Early Morning    senna (SENOKOT) tablet 8 6 mg  1 tablet Oral Daily    traMADol (ULTRAM) tablet 50 mg  50 mg Oral Q6H [address]       PTA meds:   Prescriptions Prior to Admission   Medication    acetaminophen (TYLENOL) 500 mg tablet    ascorbic acid (VITAMIN C) 500 mg tablet    atorvastatin (LIPITOR) 20 mg tablet    calcium-vitamin D (OSCAL) 250-125 MG-UNIT per tablet    ferrous sulfate 325 (65 Fe) mg tablet    FLUoxetine (PROzac) 20 mg capsule    folic acid (FOLVITE) 1 mg tablet    hydrochlorothiazide (HYDRODIURIL) 25 mg tablet    metoprolol succinate (TOPROL-XL) 50 mg 24 hr tablet    omeprazole (PriLOSEC) 20 mg delayed release capsule    Cholecalciferol (VITAMIN D) 2000 units tablet    Multiple Vitamins-Minerals (CENTRUM SILVER ULTRA WOMENS PO)    Omega-3 1000 MG CAPS     Allergies   Allergen Reactions    Hydromorphone Hcl Itching    Percocet [Oxycodone-Acetaminophen] GI Intolerance    Sulfamethoxazole-Trimethoprim Rash       Objective   Vitals: Blood pressure 115/64, pulse 56, temperature (!) 97 4 °F (36 3 °C), temperature source Oral, resp  rate 18, height 5' (1 524 m), weight 88 9 kg (196 lb), SpO2 99 %  Physical Exam   Constitutional: Pt is oriented to person, place, and time  HENT:   Head: Normocephalic  Eyes: EOM are normal  Pupils are equal, round, and reactive to light  Neck: Neck supple  Cardiovascular: Normal rate and regular rhythm  No murmur heard  Pulmonary/Chest: Breath sounds normal  No respiratory distress  Pt has no wheezes  Pt has no rales  Abdominal: Soft  Bowel sounds are normal  Pt exhibits no distension  There is no tenderness  There is no rebound and no guarding  Musculoskeletal: No edema  Hip incision dressed  Neurological: Pt is alert and oriented to person, place, and time  Psychiatric: Pt has a normal mood and affect         Lab Results:       Results from last 7 days  Lab Units 03/06/18  1051   SODIUM mmol/L 134*   POTASSIUM mmol/L 3 9   CHLORIDE mmol/L 100   CO2 mmol/L 28   BUN mg/dL 15   CREATININE mg/dL 1 17   CALCIUM mg/dL 9 1               Glucose (mg/dL)   Date Value   07/01/2017 122   06/30/2017 112   05/09/2017 67   05/02/2017 92   06/30/2014 89       Labs reviewed    Imaging: reviewed  EKG, Pathology, and Other Studies: I have personally reviewed pertinent reports  VTE Prophylaxis: Enoxaparin (Lovenox)    Code Status: Level 1 - Full Code   Advance Directive and Living Will:      Power of :    POLST:      Assessment/Plan     1  Lt hip OA s/p Lt GERARDO:  Continue post op pain control measures as prescribed  Follow bowel regimen to help decrease narcotic induced constipation  Follow post operative hemoglobin with serial CBC and treat accordingly  Monitor WBC and fever curve post op while encouraging use of incentive spirometer  DVT prophylaxis in place and reviewed  2  HTN:  Monitor BP  Pt takes Toprol XL 50mg daily and HCTZ 12 5mg daily  Will hold HCTZ in the post-op period  Add Hydralazine prn SBP > 160     3   Hyponatremia:  Mild  Present on pre-op labs  Hold HCTZ  BMP in AM     4   Hyperlipidemia:  Continue atorvastatin  Counseling / Coordination of Care  Total floor / unit time spent today 30 minutes  Greater than 50% of total time was spent with the patient and / or family counseling and / or coordination of care        Denisse Santizo PA-C

## 2018-03-12 NOTE — SOCIAL WORK
CM met w/pt and  Isabelle Palomino @ bedside to discuss CM role and possible d/c needs  Pt has POA and living will   is POA  Pt states she was driving, independent in ADLs/IADLs PTA  Pt lives w/ w/3STE; 1st fl setup  Pt has RW cane, raised toilet set, shower chair, bedside rail  Denies hx [facility], inpatient rehab  Preferred pharmacy is [facility]  Pt wishes to use [facility] for meds @ discharge   states he will transport home @ d/c  Pt confirms PT/OT set up by MD prior to surgery  CM reviewed d/c planning process including the following: identifying help at home, patient preference for d/c planning needs, Discharge Lounge, [facility] Meds to Bed program, availability of treatment team to discuss questions or concerns patient and/or family may have regarding understanding medications and recognizing signs and symptoms once discharged  CM also encouraged patient to follow up with all recommended appointments after discharge  Patient advised of importance for patient and family to participate in managing patients medical well being

## 2018-03-12 NOTE — H&P (VIEW-ONLY)
Office Visit     2/8/2018  Gabe Campuzano MD   Orthopedic Surgery   Primary osteoarthritis of left hip   Dx   Left Hip - Pain   Reason for Visit    Progress Notes     Expand All Collapse All    74 y  o female presents for evaluation  She has worsening pain in both hips left being greater than right  She has pain level both hip joints, the pain is made worse bear weight, the pain increases with increased activities  She desires a permanent solution to her current left hip problem, as greatly interferes with her activities daily living    Injection of corticosteroid performed fluoroscopically resulted only 6 weeks relief from both hip joints      Review of Systems  Review of systems negative unless otherwise specified in HPI     Past Medical History  Medical History        Past Medical History:   Diagnosis Date    Arthritis      Depression      Hyperlipidemia      Hypertension      Osteopenia      Pneumonia              Past Surgical History  Surgical History         Past Surgical History:   Procedure Laterality Date    CARPAL TUNNEL RELEASE        COLONOSCOPY        HAND SURGERY        HYSTERECTOMY        JOINT REPLACEMENT         KNEE    MN ARTHROSCOPY SHOULDER SURGICAL BICEPS TENODESIS Right 5/10/2017     Procedure: ARTHROSCOPIC BICEPS TENODESIS WITH ROTATOR CUFF REPAIR ;  Surgeon: Aram Ureña MD;  [facility];  Service: Orthopedics    MN Norma Marlow Right 5/10/2017     Procedure: SHOULDER ARTHROSCOPY SUBACROMIAL DECOMPRESSION;  Surgeon: Aram Ureña MD;  [facility];  Service: Orthopedics    TONSILLECTOMY                Current Medications         Current Outpatient Prescriptions on File Prior to Visit   Medication Sig Dispense Refill    acetaminophen (TYLENOL) 500 mg tablet Take 500 mg by mouth daily        atenolol (TENORMIN) 50 mg tablet Take 50 mg by mouth daily        calcium-vitamin D (OSCAL) 250-125 MG-UNIT per tablet Take 1 tablet by mouth daily        Cholecalciferol (VITAMIN D) 2000 units tablet Take 2,000 Units by mouth daily        FLUoxetine (PROzac) 20 mg capsule Take 20 mg by mouth daily        hydrochlorothiazide (HYDRODIURIL) 25 mg tablet Take 25 mg by mouth daily        Multiple Vitamins-Minerals (CENTRUM SILVER ULTRA WOMENS PO) Take by mouth        omeprazole (PriLOSEC OTC) 20 MG tablet Take 1 tablet by mouth 2 (two) times a day with meals for 30 days 60 tablet 0    [DISCONTINUED] oxyCODONE (ROXICODONE) 5 mg immediate release tablet Take 1-2 tablets by mouth every 4-6 hours as needed for pain  30 tablet 0    [DISCONTINUED] triamcinolone (KENALOG) 0 1 % cream Apply 1 application topically daily          No current facility-administered medications on file prior to visit           Recent Labs [facility])  @LABALLVALUEIP(HCT,HGB,WBC,PT,INR,ESR,CRP,GLUCOSE,HGBA1C)@        Physical exam  Gait pattern is with modest antalgia  Breathing is nonlabored  Cardiovascular exam reveals regular rate rhythm  Lungs are clear bilaterally  Left hip has intact soft tissues  There is nontender trochanteric flare  There is recreated will groin pain during forced internal external rotation of her flexed left hip  The foot and toes strongly dorsiflex  She has well-healed anterior knee scar      Imaging  There is no tenderness in the left calf  Review of previous x-ray show a scant arthritic change in both hips     Procedure  None indicated or performed today     Assessment/Plan:   74 y  o female with osteoarthritis of both hips left being symptomatic greater than right  After review of the risks and benefits, consent was established for elective left total hip replacement  No guarantees given    I would welcome the opportunity see back in the office a postoperative patient      Instructions      After Visit Summary (Printed 2/8/2018)   Additional Documentation Vitals:    /85    Pulse 60    Ht 5' (1 524 m)    Wt 91 1 kg (200 lb 12 8 oz)    BMI 39 22 kg/m²    BSA 1 87 m²         More Vitals    Flowsheets:    Custom Formula Data       SmartForms:    CATHY PRE-CHARTING       Encounter Info:    Billing Info,    History,    Allergies,    Detailed Report       Orders Placed      Case request operating room: ARTHROPLASTY HIP TOTAL Once    Questionnaire Series Once   Medication Changes           (Therapy completed)         (Therapy completed)         (Therapy completed)      Medication List   Visit Diagnoses         Primary osteoarthritis of left hip      Problem List

## 2018-03-13 VITALS
HEART RATE: 87 BPM | RESPIRATION RATE: 17 BRPM | DIASTOLIC BLOOD PRESSURE: 58 MMHG | HEIGHT: 60 IN | OXYGEN SATURATION: 98 % | TEMPERATURE: 99.5 F | SYSTOLIC BLOOD PRESSURE: 125 MMHG | WEIGHT: 196 LBS | BODY MASS INDEX: 38.48 KG/M2

## 2018-03-13 PROBLEM — Z96.642 STATUS POST TOTAL REPLACEMENT OF LEFT HIP: Status: ACTIVE | Noted: 2018-03-13

## 2018-03-13 LAB
ANION GAP SERPL CALCULATED.3IONS-SCNC: 6 MMOL/L (ref 4–13)
BUN SERPL-MCNC: 12 MG/DL (ref 5–25)
CALCIUM SERPL-MCNC: 8.2 MG/DL (ref 8.3–10.1)
CHLORIDE SERPL-SCNC: 103 MMOL/L (ref 100–108)
CO2 SERPL-SCNC: 27 MMOL/L (ref 21–32)
CREAT SERPL-MCNC: 1.11 MG/DL (ref 0.6–1.3)
ERYTHROCYTE [DISTWIDTH] IN BLOOD BY AUTOMATED COUNT: 13.3 % (ref 11.6–15.1)
GFR SERPL CREATININE-BSD FRML MDRD: 52 ML/MIN/1.73SQ M
GLUCOSE SERPL-MCNC: 101 MG/DL (ref 65–140)
HCT VFR BLD AUTO: 32.9 % (ref 34.8–46.1)
HGB BLD-MCNC: 11 G/DL (ref 11.5–15.4)
MCH RBC QN AUTO: 29.6 PG (ref 26.8–34.3)
MCHC RBC AUTO-ENTMCNC: 33.4 G/DL (ref 31.4–37.4)
MCV RBC AUTO: 89 FL (ref 82–98)
PLATELET # BLD AUTO: 196 THOUSANDS/UL (ref 149–390)
PMV BLD AUTO: 9.8 FL (ref 8.9–12.7)
POTASSIUM SERPL-SCNC: 3.8 MMOL/L (ref 3.5–5.3)
RBC # BLD AUTO: 3.71 MILLION/UL (ref 3.81–5.12)
SODIUM SERPL-SCNC: 136 MMOL/L (ref 136–145)
WBC # BLD AUTO: 7.11 THOUSAND/UL (ref 4.31–10.16)

## 2018-03-13 PROCEDURE — 97110 THERAPEUTIC EXERCISES: CPT

## 2018-03-13 PROCEDURE — 80048 BASIC METABOLIC PNL TOTAL CA: CPT | Performed by: ORTHOPAEDIC SURGERY

## 2018-03-13 PROCEDURE — 97535 SELF CARE MNGMENT TRAINING: CPT

## 2018-03-13 PROCEDURE — 97166 OT EVAL MOD COMPLEX 45 MIN: CPT

## 2018-03-13 PROCEDURE — G8988 SELF CARE GOAL STATUS: HCPCS

## 2018-03-13 PROCEDURE — G8989 SELF CARE D/C STATUS: HCPCS

## 2018-03-13 PROCEDURE — 97116 GAIT TRAINING THERAPY: CPT

## 2018-03-13 PROCEDURE — G8987 SELF CARE CURRENT STATUS: HCPCS

## 2018-03-13 PROCEDURE — 85027 COMPLETE CBC AUTOMATED: CPT | Performed by: ORTHOPAEDIC SURGERY

## 2018-03-13 RX ORDER — SENNOSIDES 8.6 MG
TABLET ORAL
Status: DISCONTINUED
Start: 2018-03-13 | End: 2018-03-13 | Stop reason: HOSPADM

## 2018-03-13 RX ORDER — FERROUS SULFATE 325(65) MG
TABLET ORAL
Status: DISCONTINUED
Start: 2018-03-13 | End: 2018-03-13 | Stop reason: HOSPADM

## 2018-03-13 RX ORDER — DOCUSATE SODIUM 100 MG/1
CAPSULE, LIQUID FILLED ORAL
Status: DISCONTINUED
Start: 2018-03-13 | End: 2018-03-13 | Stop reason: HOSPADM

## 2018-03-13 RX ORDER — PANTOPRAZOLE SODIUM 40 MG/1
TABLET, DELAYED RELEASE ORAL
Status: DISCONTINUED
Start: 2018-03-13 | End: 2018-03-13 | Stop reason: HOSPADM

## 2018-03-13 RX ORDER — GABAPENTIN 100 MG/1
CAPSULE ORAL
Status: DISCONTINUED
Start: 2018-03-13 | End: 2018-03-13 | Stop reason: HOSPADM

## 2018-03-13 RX ORDER — DOCUSATE SODIUM 100 MG/1
100 CAPSULE, LIQUID FILLED ORAL 2 TIMES DAILY
Qty: 20 CAPSULE | Refills: 0 | Status: SHIPPED | OUTPATIENT
Start: 2018-03-13 | End: 2018-04-24

## 2018-03-13 RX ORDER — HYDROCODONE BITARTRATE AND ACETAMINOPHEN 5; 325 MG/1; MG/1
TABLET ORAL
Status: DISCONTINUED
Start: 2018-03-13 | End: 2018-03-13 | Stop reason: HOSPADM

## 2018-03-13 RX ORDER — FOLIC ACID 1 MG/1
TABLET ORAL
Status: DISCONTINUED
Start: 2018-03-13 | End: 2018-03-13 | Stop reason: HOSPADM

## 2018-03-13 RX ORDER — TRAMADOL HYDROCHLORIDE 50 MG/1
TABLET ORAL
Status: DISCONTINUED
Start: 2018-03-13 | End: 2018-03-13 | Stop reason: HOSPADM

## 2018-03-13 RX ORDER — MELATONIN
Status: DISCONTINUED
Start: 2018-03-13 | End: 2018-03-13 | Stop reason: HOSPADM

## 2018-03-13 RX ORDER — ASCORBIC ACID 500 MG
TABLET ORAL
Status: DISCONTINUED
Start: 2018-03-13 | End: 2018-03-13 | Stop reason: HOSPADM

## 2018-03-13 RX ORDER — METOPROLOL SUCCINATE 50 MG/1
TABLET, EXTENDED RELEASE ORAL
Status: DISCONTINUED
Start: 2018-03-13 | End: 2018-03-13 | Stop reason: HOSPADM

## 2018-03-13 RX ADMIN — VITAMIN D, TAB 1000IU (100/BT) 2000 UNITS: 25 TAB at 08:07

## 2018-03-13 RX ADMIN — FLUOXETINE 20 MG: 20 CAPSULE ORAL at 08:07

## 2018-03-13 RX ADMIN — GABAPENTIN 100 MG: 100 CAPSULE ORAL at 05:05

## 2018-03-13 RX ADMIN — FERROUS SULFATE TAB 325 MG (65 MG ELEMENTAL FE) 325 MG: 325 (65 FE) TAB at 08:07

## 2018-03-13 RX ADMIN — PANTOPRAZOLE SODIUM 40 MG: 40 TABLET, DELAYED RELEASE ORAL at 05:06

## 2018-03-13 RX ADMIN — DOCUSATE SODIUM 100 MG: 100 CAPSULE, LIQUID FILLED ORAL at 08:07

## 2018-03-13 RX ADMIN — SODIUM CHLORIDE, SODIUM LACTATE, POTASSIUM CHLORIDE, AND CALCIUM CHLORIDE 1.5 ML/KG/HR: .6; .31; .03; .02 INJECTION, SOLUTION INTRAVENOUS at 00:51

## 2018-03-13 RX ADMIN — TRAMADOL HYDROCHLORIDE 50 MG: 50 TABLET, FILM COATED ORAL at 05:05

## 2018-03-13 RX ADMIN — METOPROLOL SUCCINATE 50 MG: 50 TABLET, EXTENDED RELEASE ORAL at 08:06

## 2018-03-13 RX ADMIN — OXYCODONE HYDROCHLORIDE AND ACETAMINOPHEN 500 MG: 500 TABLET ORAL at 08:07

## 2018-03-13 RX ADMIN — ENOXAPARIN SODIUM 40 MG: 40 INJECTION SUBCUTANEOUS at 08:06

## 2018-03-13 RX ADMIN — SENNOSIDES 8.6 MG: 8.6 TABLET, FILM COATED ORAL at 08:07

## 2018-03-13 RX ADMIN — TRAMADOL HYDROCHLORIDE 50 MG: 50 TABLET, FILM COATED ORAL at 00:50

## 2018-03-13 RX ADMIN — HYDROCODONE BITARTRATE AND ACETAMINOPHEN 2 TABLET: 5; 325 TABLET ORAL at 08:07

## 2018-03-13 RX ADMIN — FOLIC ACID 1 MG: 1 TABLET ORAL at 08:07

## 2018-03-13 RX ADMIN — MORPHINE SULFATE 2 MG: 2 INJECTION, SOLUTION INTRAMUSCULAR; INTRAVENOUS at 00:56

## 2018-03-13 NOTE — PHYSICAL THERAPY NOTE
PT Progress Note     03/13/18 0850   Pain Assessment   Pain Assessment 0-10   Pain Score 4   Pain Type Acute pain;Surgical pain   Pain Location Hip   Pain Orientation Left   Hospital Pain Intervention(s) Ambulation/increased activity;Repositioned;Distraction   Response to Interventions unchanged   Restrictions/Precautions   Weight Bearing Precautions Per Order Yes   LLE Weight Bearing Per Order WBAT   General   Chart Reviewed Yes   Family/Caregiver Present No   Cognition   Overall Cognitive Status WFL   Attention Within functional limits   Orientation Level Oriented X4   Memory Within functional limits   Following Commands Follows all commands and directions without difficulty   Subjective   Subjective pt in chair nsg cleared, pt agreeable for PT amb   Transfers   Sit to Stand 5  Supervision   Additional items Increased time required   Stand to Sit 5  Supervision   Additional items Increased time required   Stand pivot 5  Supervision   Additional items Increased time required  (RW)   Ambulation/Elevation   Gait pattern Improper Weight shift   Gait Assistance 5  Supervision   Additional items Verbal cues   Assistive Device Rolling walker   Distance 100 ft x2   Stair Management Assistance 5  Supervision   Curbs ascent backward descent forward   Balance   Static Standing Good  (RW)   Dynamic Standing Fair -  (RW)   Endurance Deficit   Endurance Deficit Yes   Endurance Deficit Description fatigue   Activity Tolerance   Activity Tolerance Patient tolerated treatment well   Nurse Made Aware yes   Exercises   Quad Sets Sitting   Glute Sets Sitting   Knee AROM Sitting   Ankle Pumps Sitting   Assessment   Prognosis Good   Problem List Decreased strength;Decreased range of motion;Decreased endurance; Impaired balance;Decreased mobility;Orthopedic restrictions;Pain   Assessment pt progerssed w mob and funct activity tolerance; requires supervision w transf and amb 100 ft x2 using RW WBAT - gait steady w RW- no LOB noted on level surface appropriately cutious on turns and around environ obstacles; initiated step through pattern; this session pt educated on seated HEP as noted; also reviewed poster GERARDO precautions- pt exhibits appropraite recall w execution; appropriate for d/c home w fam support when med cleared; pt reports she is set up w outpt therapy    Goals   Patient Goals go home today   STG Expiration Date 03/17/18   Plan   Treatment/Interventions Functional transfer training;LE strengthening/ROM; Elevations; Therapeutic exercise; Endurance training;Cognitive reorientation;Patient/family training;Equipment eval/education; Bed mobility;Gait training; Compensatory technique education;Continued evaluation;Spoke to nursing;OT   Progress Progressing toward goals   PT Frequency Twice a day   Recommendation   Recommendation Outpatient PT; Home with family support   Equipment Recommended Walker   PT - OK to Discharge Yes

## 2018-03-13 NOTE — CASE MANAGEMENT
Initial Clinical Review    Age/Sex: 79 y o  female admitted on 3/12 for elective surgery - OR    Surgery Date: 3/12    Procedure: S/P ARTHROPLASTY HIP TOTAL (Left Hip)    Anesthesia: Spinal    Admission Orders: Date/Time/Statement: Inpatient 3/12/18 @ 0917 Med Surg    Orders Placed This Encounter   Procedures    Inpatient Admission     Standing Status:   Standing     Number of Occurrences:   1     Order Specific Question:   Admitting Physician     Answer:   Starlette Baumgarten [[id number]]     Order Specific Question:   Level of Care     Answer:   Med Surg [16]     Order Specific Question:   Estimated length of stay     Answer:   More than 2 Midnights     Order Specific Question:   Certification     Answer:   I certify that inpatient services are medically necessary for this patient for a duration of greater than two midnights  See H&P and MD Progress Notes for additional information about the patient's course of treatment  Vital Signs: /58 (BP Location: Right arm)   Pulse 87   Temp 99 5 °F (37 5 °C) (Oral)   Resp 17   Ht 5' (1 524 m)   Wt 88 9 kg (196 lb)   SpO2 98%   BMI 38 28 kg/m²     Diet:        Diet Orders            Start     Ordered    03/12/18 1035  Diet Regular; Regular House  Diet effective now     Question Answer Comment   Diet Type Regular    Regular Regular House    RD to adjust diet per protocol?  Yes        03/12/18 1034          Mobility: WBAT BLE  PT eval and treat    DVT Prophylaxis: Sequential compression device    Scheduled Meds:    Cefazolin  Intravenous x3   ascorbic acid 500 mg Oral Daily   atorvastatin 20 mg Oral Daily With Dinner   cholecalciferol 2,000 Units Oral Daily   docusate sodium 100 mg Oral BID   enoxaparin 40 mg Subcutaneous Q24H ELYSIA   ferrous sulfate 325 mg Oral Daily With Breakfast   FLUoxetine 20 mg Oral Daily   folic acid 1 mg Oral Daily   gabapentin 100 mg Oral Q8H [address]   metoprolol succinate 50 mg Oral Daily   pantoprazole 40 mg Oral Early Morning   senna 1 tablet Oral Daily   traMADol 50 mg Oral Q6H [facility]     Continuous Infusions:  lactated ringers 1 5 mL/kg/hr Last Rate: Stopped (03/13/18 0812)     PRN Meds:  hydrALAZINE    HYDROcodone-acetaminophen po x4    morphine injection    ondansetron

## 2018-03-13 NOTE — PLAN OF CARE
Problem: OCCUPATIONAL THERAPY ADULT  Goal: Performs self-care activities at highest level of function for planned discharge setting  See evaluation for individualized goals  Treatment Interventions: ADL retraining, Equipment evaluation/education, Compensatory technique education, Activityengagement          See flowsheet documentation for full assessment, interventions and recommendations  Limitation: Decreased ADL status, Decreased endurance, Decreased self-care trans, Decreased high-level ADLs  Prognosis: Good  Assessment: Pt is a 80 y/o female seen for OT eval s/p adm to [facility] w/ Primary osteoarthritis of left hip receiving ARTHROPLASTY HIP TOTAL (Left) performed on 3/12/18  Comorbidities include a h/o arthritis, depression, endometriosis, GERD, HTN, HLD, osteopenia, pancreatitis and pneumonia  Pt w/ extensive past surgical history  Pt with active OT orders, WBAT L LE and up and OOB orders  Pt lives with her  in a single story home w/ + COLLIN  Pt was I w/ ADLS and IADLS, drove, & required no use of DME PTA  Pt is currently demonstrating the following occupational deficits: S UB ADLS, mod A LB ADLS, S functional transfers and S functional mobility using rw  Pt with deficits and limitations in all baseline areas of occupation 2* significant pain, decreased endurance/activity tolerance, decreased functional forward reach, decreased ADL status, impaired balance, decreased mobility status and orthopedic restrictions  The following Occupational Performance Areas to address include: bathing/shower, toilet hygiene, dressing, functional mobility, community mobility, meal prep, household maintenance and job performance/volunteering  Pt scored overall 75/100 on the Barthel Index  Based on the aforementioned OT evaluation, functional performance deficits, and assessments, pt has been identified as a moderate complexity evaluation  Recommend home with increased family support once medically stable   Pt to continue to benefit from acute immediate OT services to address the following goals 3-5x/wk to  w/in 1-5 days:     OT Discharge Recommendation: Home with family support  OT - OK to Discharge: Yes      Pt participated in additional OT Tx session focusing on LB dressing w/ use of adaptive equipment, maintenance of precautions, and functional transfer training  Pt education on use of LHAE to maintain precautions w/ LB dressing  Pt able to perform LB dressing of socks w/ use of reacher and sock aid w/ SBA for incidental verbal cues  Pt able to perform LB dressing of pants/underpants w/ use of reacher w/ SBA for verbal cues  Pt able to perform LB dressing of shoes using long handled shoe horn w/ incidental A for thoroughness  Pt able to verbalize/demonstrate use of long handled sponge for LB bathing in seated position  Pt able to perform functional sit to stand tranfers t/o LB dressing trial w/ mod I for increased time  Educated pt regarding car transfer, pt verbalized understanding  Pt able to maintain precautions t/o session  Educated pt regarding safety in the home, energy conservation and activity engagement  Pt with no concerns for D/C and all questions answered at this time  Pt no longer requires acute care skilled OT services  Recommend pt participate in ADLs and ambulate hallways with non-OT staff  Please re-consult if changes occur  D/C OT           Roxanne Barrow MS, OTR/L

## 2018-03-13 NOTE — PROGRESS NOTES
Orthopedics   Mary Sotelo 79 y o  female MRN: [id number]  Unit/Bed#: [id number]      Subjective:  79 y  o female post operative day 1 left total hip arthroplasty  Pt doing well  Pain controlled   Reported she did very in PT yesterday evening     Labs:    0  Lab Value Date/Time   HCT 32 9 (L) 03/13/2018 0540   HCT 40 2 02/26/2018 0926   HCT 42 0 10/11/2017 0952   HCT 42 8 05/02/2017 0933   HGB 11 0 (L) 03/13/2018 0540   HGB 13 8 02/26/2018 0926   HGB 13 9 10/11/2017 0952   HGB 14 3 05/02/2017 0933   INR 0 92 02/26/2018 0926   WBC 7 11 03/13/2018 0540   WBC 5 78 02/26/2018 0926   WBC 8 46 10/11/2017 0952   WBC 6 5 05/02/2017 0933       Meds:    Current Facility-Administered Medications:     ascorbic acid (VITAMIN C) tablet 500 mg, 500 mg, Oral, Daily, Shanelle Macias, 500 mg at 03/12/18 1112    atorvastatin (LIPITOR) tablet 20 mg, 20 mg, Oral, Daily With Regina Thomas, 20 mg at 03/12/18 1645    cholecalciferol (VITAMIN D3) tablet 2,000 Units, 2,000 Units, Oral, Daily, Shanelle Macias, 2,000 Units at 03/12/18 1111    docusate sodium (COLACE) capsule 100 mg, 100 mg, Oral, BID, Shanelle Macias, 100 mg at 03/12/18 1738    enoxaparin (LOVENOX) subcutaneous injection 40 mg, 40 mg, Subcutaneous, Q24H [facility], Shanelle Macias    ferrous sulfate tablet 325 mg, 325 mg, Oral, Daily With Breakfast, Shanelle Macias    FLUoxetine (PROzac) capsule 20 mg, 20 mg, Oral, Daily, Shanelle Macias    folic acid (FOLVITE) tablet 1 mg, 1 mg, Oral, Daily, Shanelle Macias, 1 mg at 03/12/18 1111    gabapentin (NEURONTIN) capsule 100 mg, 100 mg, Oral, Q8H [address], Aiden Ramski, 100 mg at 03/13/18 0505    hydrALAZINE (APRESOLINE) tablet 25 mg, 25 mg, Oral, Q8H PRN, Harriet Barr PA-C    HYDROcodone-acetaminophen (NORCO) 5-325 mg per tablet 1 tablet, 1 tablet, Oral, Q4H PRN, Shanelle Macias, 1 tablet at 03/12/18 1048    HYDROcodone-acetaminophen (NORCO) 5-325 mg per tablet 2 tablet, 2 tablet, Oral, Q6H PRN, Shanelle Macias, 2 tablet at 03/12/18 8418   lactated ringers infusion, 1 5 mL/kg/hr, Intravenous, Continuous, Elver Madsenry, Last Rate: 133 4 mL/hr at 03/13/18 0051, 1 5 mL/kg/hr at 03/13/18 0051    metoprolol succinate (TOPROL-XL) 24 hr tablet 50 mg, 50 mg, Oral, Daily, Harriet Barr PA-C    morphine injection 2 mg, 2 mg, Intravenous, Q2H PRN, Dara Horowitz MD, 2 mg at 03/13/18 0056    ondansetron (ZOFRAN) injection 4 mg, 4 mg, Intravenous, Q6H PRN, Elver Hoskins    pantoprazole (PROTONIX) EC tablet 40 mg, 40 mg, Oral, Early Morning, Keirae Gato, 40 mg at 03/13/18 0506    senna (SENOKOT) tablet 8 6 mg, 1 tablet, Oral, Daily, Elver Centerpoint, 8 6 mg at 03/12/18 1111    traMADol (ULTRAM) tablet 50 mg, 50 mg, Oral, Q6H [address], Carolanne Centerpoint, 50 mg at 03/13/18 0505    Blood Culture:   No results found for: BLOODCX    Wound Culture:   No results found for: WOUNDCULT    Ins and Outs:  I/O last 24 hours: In: 3120 [P O :1320; I V :1300; IV Piggyback:500]  Out: 1365 [Urine:1290; Blood:75]          Physical Exam:  Vitals:    03/13/18 0228   BP: 121/59   Pulse: 77   Resp: 17   Temp: 99 2 °F (37 3 °C)   SpO2: 94%     left lower extremity:  · Dressings C/D/I  · Sensation intact L1/S1  · Motor intact L3-S1  · 2+ dorsalis pedis, Abduction pillow     _*_*_*_*_*_*_*_*_*_*_*_*_*_*_*_*_*_*_*_*_*_*_*_*_*_*_*_*_*_*_*_*_*_*_*_*_*_*_*_*_*    Assessment: 79 y  o female post operative day 1 left total hip arthroplasty   Doing well    Plan:  · Weight Bearing as tolerated  · Up and out of bed  · Total hip precautions  · DVT prophylaxis  · Analgesics  · PT/OT  · Patient noted to have acute blood loss anemia due to a drop in Hbg of > 2 0g from preop levels, will monitor vital signs and resuscitate with IV fluids as needed    Greta Cardozo MD

## 2018-03-13 NOTE — PROGRESS NOTES
Internal Medicine Progress Note  Patient: Avelina Penn  Age/sex: 79 y o  female  Medical Record #: [id number]      ASSESSMENT/PLAN:  Avelina Penn is seen and examined and mangement for following issues:      1  Lt hip OA s/p Lt GERARDO:    Patient with adequate pain control this morning  No nausea reported  Blood pressure is stable on rounds this morning  Hemoglobin 11 0  Creatinine within normal limits  Respiratory status stable using incentive spirometry regularly      2  HTN:    Blood pressure well controlled therapeutic range  Pt takes Toprol XL 50mg daily and HCTZ 12 5mg daily  Will hold HCTZ in the post-op period  Add Hydralazine prn SBP > 160      3  Hyponatremia:   Resolved     4  Hyperlipidemia:  Continue atorvastatin  Subjective: Patient seen and examined  New or overnight issues include  Patient did well overnight without any acute events  Doing well this morning ate breakfast without difficulty  Awaiting physical therapy      ROS:   GI: denies abdominal pain, change bowel habits or reflux symptoms  Neuro: No new neurologic changes  Respiratory: No Cough, SOB  Cardiovascular: No CP, palpitations     Scheduled Meds:    Current Facility-Administered Medications:  ascorbic acid 500 mg Oral Daily Aiden Astudillo    atorvastatin 20 mg Oral Daily With [employer] Wilda    cholecalciferol 2,000 Units Oral Daily Aiden Astudillo    docusate sodium 100 mg Oral BID Aiden Astudillo    enoxaparin 40 mg Subcutaneous Q24H [facility] Aiden sAtudillo    ferrous sulfate 325 mg Oral Daily With Breakfast Aiden Astudillo    FLUoxetine 20 mg Oral Daily Aiden Astudillo    folic acid 1 mg Oral Daily Brittany Furnace    gabapentin 100 mg Oral Q8H [facility] Aiden Astudillo    hydrALAZINE 25 mg Oral Q8H PRN Harriet Barr PA-C    HYDROcodone-acetaminophen 1 tablet Oral Q4H PRN Brittany Furnace    HYDROcodone-acetaminophen 2 tablet Oral Q6H PRN Aiden Astudillo    lactated ringers 1 5 mL/kg/hr Intravenous Continuous Brittany Furnace Last Rate: Stopped (03/13/18 9522)   metoprolol succinate 50 mg Oral Daily Harriet Barr PA-C    morphine injection 2 mg Intravenous Q2H PRN Martha Cha MD    ondansetron 4 mg Intravenous Q6H PRN Jennifergautam Simso    pantoprazole 40 mg Oral Early Morning Aiden Wilda    senna 1 tablet Oral Daily Jennifer Simswes    traMADol 50 mg Oral Q6H [address] Aiden Astudillo        Labs:       Results from last 7 days  Lab Units 03/13/18  0540   WBC Thousand/uL 7 11   HEMOGLOBIN g/dL 11 0*   HEMATOCRIT % 32 9*   PLATELETS Thousands/uL 196       Results from last 7 days  Lab Units 03/13/18  0602 03/06/18  1051   SODIUM mmol/L 136 134*   POTASSIUM mmol/L 3 8 3 9   CHLORIDE mmol/L 103 100   CO2 mmol/L 27 28   BUN mg/dL 12 15   CREATININE mg/dL 1 11 1 17   GLUCOSE RANDOM mg/dL 101  --    CALCIUM mg/dL 8 2* 9 1                Glucose (mg/dL)   Date Value   03/13/2018 101   07/01/2017 122   06/30/2017 112   05/09/2017 67   05/02/2017 92   06/30/2014 89       Labs reviewed    Physical Examination:  Vitals:   Vitals:    03/12/18 1900 03/12/18 2300 03/13/18 0228 03/13/18 0731   BP: 128/60 108/53 121/59 125/58   BP Location: Right arm Right arm Right arm Right arm   Pulse: 71 73 77 87   Resp: 17 16 17 17   Temp: 98 4 °F (36 9 °C) 98 9 °F (37 2 °C) 99 2 °F (37 3 °C) 99 5 °F (37 5 °C)   TempSrc: Oral Oral Oral Oral   SpO2: 97% 92% 94% 98%   Weight:       Height:           GEN: NAD  HEENT: NC/AT, EOMI  RESP: CTAB, no R/R/W  CV: +S1 S2, regular rate, no rubs  ABD: soft, NT, ND, normal BS   :   Catheter removed  EXT:   Dorsal pedis pulse intact  Skin:  No rashes  Neuro:   Awake alert oriented x3 no focality  [x ] Total time spent: 30 Mins and greater than 50% of this time was spent counseling/coordinating care        Sharie Pallas, DO  Internal Medicine

## 2018-03-13 NOTE — SOCIAL WORK
CM met with patient regarding Outpt Pt   Pt scheduled at [facility]  on [facility] at 7:30am

## 2018-03-13 NOTE — PLAN OF CARE
Problem: PHYSICAL THERAPY ADULT  Goal: Performs mobility at highest level of function for planned discharge setting  See evaluation for individualized goals  Treatment/Interventions: Functional transfer training, LE strengthening/ROM, Elevations, Therapeutic exercise, Endurance training, Patient/family training, Equipment eval/education, Bed mobility, Gait training, Spoke to nursing  Equipment Recommended:  (Pt owns all DME)       See flowsheet documentation for full assessment, interventions and recommendations  Prognosis: Good  Problem List: Decreased strength, Decreased range of motion, Decreased endurance, Impaired balance, Decreased mobility, Orthopedic restrictions, Pain  Assessment: pt progerssed w mob and funct activity tolerance; requires supervision w transf and amb 100 ft x2 using RW WBAT - gait steady w RW- no LOB noted on level surface appropriately cutious on turns and around environ obstacles; initiated step through pattern; this session pt educated on seated HEP as noted; also reviewed poster GERARDO precautions- pt exhibits appropraite recall w execution; appropriate for d/c home w fam support when med cleared; pt reports she is set up w outpt therapy   Barriers to Discharge: Inaccessible home environment (COLLIN)     Recommendation: Outpatient PT, Home with family support     PT - OK to Discharge: Yes    See flowsheet documentation for full assessment

## 2018-03-13 NOTE — OCCUPATIONAL THERAPY NOTE
[address]  Evaluation     Patient Name: Qi Wise  XVTWE'B Date: 3/13/2018  Problem List  Patient Active Problem List   Diagnosis    Incomplete tear of right rotator cuff    Hypertension    Hyperlipidemia    Depression    Abdominal pain    Primary osteoarthritis of left hip    Pre-op examination    Status post total replacement of left hip     Past Medical History  Past Medical History:   Diagnosis Date    Arthritis     Depression     Endometriosis     GERD (gastroesophageal reflux disease)     Hyperlipidemia     Hypertension     Osteopenia     Pancreatitis 06/2017    Pneumonia      Past Surgical History  Past Surgical History:   Procedure Laterality Date    ABDOMINAL SURGERY      endometriosis     APPENDECTOMY      CARPAL TUNNEL RELEASE      COLONOSCOPY      HAND SURGERY      HYSTERECTOMY      JOINT REPLACEMENT      KNEE    MT ARTHROSCOPY SHOULDER SURGICAL BICEPS TENODESIS Right 5/10/2017    Procedure: ARTHROSCOPIC BICEPS TENODESIS WITH ROTATOR CUFF REPAIR ;  Surgeon: Heidy Thomas MD;  [facility];  Service: Orthopedics    MT MERRICK Nogueira Right 5/10/2017    Procedure: SHOULDER ARTHROSCOPY SUBACROMIAL DECOMPRESSION;  Surgeon: Heidy Thomas MD;  [facility];  Service: Orthopedics    MT TOTAL HIP ARTHROPLASTY Left 3/12/2018    Procedure: ARTHROPLASTY HIP TOTAL;  Surgeon: Jacque Hurd MD;  [facility];  Service: Orthopedics    TONSILLECTOMY           03/13/18 0928   Note Type   Note type Eval/Treat   Restrictions/Precautions   Weight Bearing Precautions Per Order Yes   LLE Weight Bearing Per Order WBAT   Braces or Orthoses (abduction pillow )   Other Precautions THR; Telemetry;Pain;WBS   Pain Assessment   Pain Assessment 0-10   Pain Score 5   Pain Type Acute pain   Pain Location Hip   Pain Orientation Left   Hospital Pain Intervention(s) Ambulation/increased activity;Repositioned;Cold applied   Response to Interventions tolerated   Home Living   Type of [address] One level   Bathroom Shower/Tub Walk-in shower   Bathroom Toilet Standard   Bathroom Equipment Shower chair;Commode   Bathroom Accessibility Accessible   Home Equipment Reacher;Walker;Cane; Leg   (long handled sponge )   Additional Comments Pt lives with her  in a single story home w/ + COLLIN   Prior Function   Level of [place] Independent with ADLs and functional mobility   Lives With [place] Help From Family   ADL Assistance Independent   IADLs Independent   Falls in the last 6 months 0   Vocational Retired   Comments Pt was I w/ ADLS and IADLS, drove, & required no use of DME PTA     Lifestyle   Autonomy Pt was I w/ ADLS/IADLS PTA   Reciprocal Relationships Pt lives with her  who is able to provide A as needed    Service to Others Pt is retired   Intrinsic Gratification 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 [facility]    Psychosocial   Psychosocial (WDL) WDL   ADL   Eating Assistance 7  Independent   Grooming Assistance 7  Independent   UB Bathing Assistance 5  Supervision/Setup   LB Bathing Assistance 3  Moderate Assistance   UB Dressing Assistance 5  Supervision/Setup   LB Dressing Assistance 3  Moderate Assistance   Toileting Assistance  5  Supervision/Setup   Bed Mobility   Supine to Sit Unable to assess   Sit to Supine Unable to assess   Additional Comments Pt OOB in chair upon arrival and return to chair at end of therapy session    Transfers   Sit to Stand 5  Supervision   Additional items Increased time required   Stand to Sit 5  Supervision   Additional items Increased time required   Toilet transfer 5  Supervision   Additional items Increased time required;Commode   Functional Mobility   Functional Mobility 5  Supervision   Additional items Rolling walker   Balance   Static Sitting Good   Dynamic Sitting Fair +   Static Standing Good   Ambulatory Fair +   Activity Tolerance   Activity Tolerance Patient tolerated treatment well   RUE Assessment   RUE Assessment WFL   LUE Assessment   LUE Assessment WFL   Hand Function   Gross Motor Coordination Functional   Fine Motor Coordination Functional   Cognition   Overall Cognitive Status WFL   Arousal/Participation Alert; Responsive; Cooperative   Attention Within functional limits   Memory Within functional limits   Following Commands Follows all commands and directions without difficulty   Assessment   Limitation Decreased ADL status; Decreased endurance;Decreased self-care trans;Decreased high-level ADLs   Prognosis Good   Assessment Pt is a 78 y/o female seen for OT eval s/p adm to [facility] w/ Primary osteoarthritis of left hip receiving ARTHROPLASTY HIP TOTAL (Left) performed on 3/12/18  Comorbidities include a h/o arthritis, depression, endometriosis, GERD, HTN, HLD, osteopenia, pancreatitis and pneumonia  Pt w/ extensive past surgical history  Pt with active OT orders, WBAT L LE and up and OOB orders  Pt lives with her  in a single story home w/ + COLLIN  Pt was I w/ ADLS and IADLS, drove, & required no use of DME PTA  Pt is currently demonstrating the following occupational deficits: S UB ADLS, mod A LB ADLS, S functional transfers and S functional mobility using rw  Pt with deficits and limitations in all baseline areas of occupation 2* significant pain, decreased endurance/activity tolerance, decreased functional forward reach, decreased ADL status, impaired balance, decreased mobility status and orthopedic restrictions  The following Occupational Performance Areas to address include: bathing/shower, toilet hygiene, dressing, functional mobility, community mobility, meal prep, household maintenance and job performance/volunteering  Pt scored overall 75/100 on the Barthel Index  Based on the aforementioned OT evaluation, functional performance deficits, and assessments, pt has been identified as a moderate complexity evaluation   Recommend home with increased family support once medically stable  Pt to continue to benefit from acute immediate OT services to address the following goals 3-5x/wk to  w/in 1-5 days:   Goals   Patient Goals to go home    Functional Transfer Goals   Pt Will Perform All Functional Transfers With mod indep; With good judgment/safety   ADL Goals   Pt Will Perform LE Dressing With stand by assist;Maintaining hip precautions; With adaptive equipment; With safety/supervision; With setup   Plan   Treatment Interventions ADL retraining;Equipment evaluation/education; Compensatory technique education; Activityengagement   Goal Expiration Date 18   OT Frequency 3-5x/wk   Additional Treatment Session   Start Time 915   End Time 928   Treatment Assessment Pt participated in additional OT Tx session focusing on LB dressing w/ use of adaptive equipment, maintenance of precautions, and functional transfer training  Pt education on use of LHAE to maintain precautions w/ LB dressing  Pt able to perform LB dressing of socks w/ use of reacher and sock aid w/ SBA for incidental verbal cues  Pt able to perform LB dressing of pants/underpants w/ use of reacher w/ SBA for verbal cues  Pt able to perform LB dressing of shoes using long handled shoe horn w/ incidental A for thoroughness  Pt able to verbalize/demonstrate use of long handled sponge for LB bathing in seated position  Pt able to perform functional sit to stand tranfers t/o LB dressing trial w/ mod I for increased time  Educated pt regarding car transfer, pt verbalized understanding  Pt able to maintain precautions t/o session  Educated pt regarding safety in the home, energy conservation and activity engagement  Pt with no concerns for D/C and all questions answered at this time  Pt no longer requires acute care skilled OT services  Recommend pt participate in ADLs and ambulate hallways with non-OT staff  Please re-consult if changes occur  D/C OT      Additional Treatment Day 1   Recommendation   OT Discharge Recommendation Home with family support   OT - OK to Discharge Yes   Barthel Index   Feeding 10   Bathing 0   Grooming Score 5   Dressing Score 5   Bladder Score 10   Bowels Score 10   Toilet Use Score 10   Transfers (Bed/Chair) Score 15   Mobility (Level Surface) Score 10   Stairs Score 0   Barthel Index Score 75   Modified Gunnison Scale   Modified Gunnison Scale 3         Xiang Ahumada MS, OTR/L

## 2018-03-13 NOTE — DISCHARGE SUMMARY
ORTHOPEDICS DISCHARGE SUMMARY   Marivel Oneal 79 y o  female MRN: [id number]  Unit/Bed#: [id number]      Attending Physician: To Kang    Admitting diagnosis: Primary osteoarthritis of left hip [M16 12]    Discharge diagnosis: Primary osteoarthritis of left hip [M16 12]    Date of admission: 3/12/2018    Date of discharge: 03/13/18    Procedure: left total hip arthroplasty    HPI  This is a 79y o  year old female that presented to the office with signs and symptoms of left hip osteoarthritis  They tried and failed conservative treatment measures and wished to proceed with surgical intervention  The risks, benefits, and complications of the procedure were discussed with the patient and informed consent was obtained  Hospital Course: The patient was admitted to the hospital on 3/12/2018 and underwent an uncomplicated left total hip arthroplasty  They were transferred to the floor after a brief stay in the post-anesthesia care unit  Their pain was well managed with IV and oral pain medications  They began therapy on post operative day #1  Lovenox was also started for DVT prophylaxis post operative day #1  Post operative course was uneventful On discharge date pt was cleared by PT and the medicine team and determined to be safe for discharge  Daily discussion was had with the patient, nursing staff, orthopaedic team, and family members if present  All questions were answered to the patients satisifaction  0  Lab Value Date/Time   HGB 11 0 (L) 03/13/2018 0540   HGB 13 8 02/26/2018 0926   HGB 13 9 10/11/2017 0952   HGB 11 8 07/01/2017 0528   HGB 13 6 06/30/2017 2024   HGB 14 3 05/02/2017 0933       Greater than 2 gram drop which qualifies for diagnosis of acute blood loss anemia  Vital signs remained stable and pt was resuscitated with IVF as needed     Discharge Instructions: The patient was discharged weight bearing as tolerated to the left lower extremity  Lovenox will be continued for 28 days   Continue PT/OT  Take pain medications as instructed  Maintain posterior  hip precautions  Discharge Medications: For the complete list of discharge medications, please refer to the patient's medication reconciliation

## 2018-03-14 ENCOUNTER — APPOINTMENT (OUTPATIENT)
Dept: PHYSICAL THERAPY | Facility: CLINIC | Age: 68
End: 2018-03-14
Payer: MEDICARE

## 2018-03-14 ENCOUNTER — TRANSITIONAL CARE MANAGEMENT (OUTPATIENT)
Dept: FAMILY MEDICINE CLINIC | Facility: CLINIC | Age: 68
End: 2018-03-14

## 2018-03-15 ENCOUNTER — EVALUATION (OUTPATIENT)
Dept: PHYSICAL THERAPY | Facility: CLINIC | Age: 68
End: 2018-03-15
Payer: MEDICARE

## 2018-03-15 DIAGNOSIS — Z96.642 STATUS POST LEFT HIP REPLACEMENT: Primary | ICD-10-CM

## 2018-03-15 DIAGNOSIS — M16.12 PRIMARY OSTEOARTHRITIS OF LEFT HIP: ICD-10-CM

## 2018-03-15 DIAGNOSIS — M25.552 LEFT HIP PAIN: ICD-10-CM

## 2018-03-15 PROCEDURE — G8978 MOBILITY CURRENT STATUS: HCPCS | Performed by: PHYSICAL THERAPIST

## 2018-03-15 PROCEDURE — G8979 MOBILITY GOAL STATUS: HCPCS | Performed by: PHYSICAL THERAPIST

## 2018-03-15 PROCEDURE — 97140 MANUAL THERAPY 1/> REGIONS: CPT | Performed by: PHYSICAL THERAPIST

## 2018-03-15 PROCEDURE — 97164 PT RE-EVAL EST PLAN CARE: CPT | Performed by: PHYSICAL THERAPIST

## 2018-03-15 PROCEDURE — 97110 THERAPEUTIC EXERCISES: CPT | Performed by: PHYSICAL THERAPIST

## 2018-03-15 NOTE — PROGRESS NOTES
PT RE-EVALUATION    Today's date: 3/15/2018  Patient name: Nico Vanegas  : 1950  MRN: [id number]  Referring provider: Torey Venegas PA-C  Dx:   Encounter Diagnosis     ICD-10-CM    1  Status post left hip replacement Z96 [id number]    2  Left hip pain M25 552    3  Primary osteoarthritis of left hip M16 12                   Assessment    Assessment details: Nico Vanegas is a pleasant 79 y o  presenting to physical therapy with MD referral status post a left total hip replacement on on 3-12-18  Problem list:  Limited hip A/PROM bilaterally, decreased hip/core strength, limited lower extremity flexibility, poor balance, and abnormal gait  Treatment to include: Manual therapy techniques, lower extremity/core strengthening, neuromuscular control exercises, balance/proprioception training,  instruction in a comprehensive HEP, and modalities as needed  This pt would benefit from skilled PT services to address their impairments and functional limitations to maximize functional outcome  Barriers to therapy: Undergoing L GERARDO; however signficant limitation on R hip, depression, and osteopenia  Understanding of Dx/Px/POC: good   Prognosis: good    Goals  ST  Pt will improve hip flexion ROM to at least 60 degrees AROM in 4 weeks  2  Pt will improve SLS to at least 5 seconds post operatively in 4 weeks  LT  Pt will be able to walk >30 minutes with least restrictive device in 8 weeks  2  Pt will be independent in a comprehensive HEP in 8 weeks  Plan  Patient would benefit from: skilled PT  Planned modality interventions: cryotherapy  Frequency: 2x week  Duration in weeks: 10  Treatment plan discussed with: patient  Plan details: 2-3 x per week for 8-10 weeks  Subjective Evaluation    History of Present Illness  Mechanism of injury: Pt underwent total hip replacement of the left hip on 3-12-18  Pt denies any complications  Pt states she has posterior hip precautions   Pt reports lateral thigh numbness continues      Current status:  - ADLs/Functional activities:   - Stairs Step to pattern with Pain Levels: moderate pain   - Sit to stand with moderate pain with RW   - Walking household distances with RW with moderate pain   - Standing 5 minutes with RW with moderate pain   - Sleeping with 1 nightly sleep disturbances due to pain  - Work: Not a working individual  - Recreation: unable due to pain      Pain  Current pain ratin  At best pain ratin  At worst pain ratin  Location: Groin and wrapping around to lower back  Quality: sharp  Aggravating factors: stair climbing, standing and walking  Progression: worsening    Social Support  Steps to enter house: yes (3 with no handrail)  Lives in: multiple-level home  Lives with: spouse    Employment status: not working    Diagnostic Tests  X-ray: abnormal  Patient Goals  Patient goals for therapy: decreased pain, independence with ADLs/IADLs, increased motion, improved balance and increased strength          Objective     Neurological Testing     Sensation     Hip   Left Hip   Intact: light touch    Right Hip   Intact: light touch    Active Range of Motion   Left Hip   Flexion: 53 degrees   Extension: 0 degrees   Abduction: 16 degrees   External rotation (90/90): 0 degrees     Right Hip   Flexion: 70 degrees   Extension: 0 degrees   Abduction: 30 degrees   Adduction: 16 degrees   External rotation (90/90): 35 degrees   Internal rotation (90/90): 25 degrees     Passive Range of Motion   Left Hip   Flexion: 80 degrees   Extension: 2 degrees   Abduction: 22 degrees   External rotation (90/90): 30 degrees     Right Hip   Flexion: 90 degrees   Extension: 8 degrees   External rotation (90/90): 45 degrees   Internal rotation (90/90): 32 degrees     Strength/Myotome Testing     Left Hip   Planes of Motion   Flexion: 3+  External rotation: 3+    Right Hip   Planes of Motion   Flexion: 5  External rotation: 5  Internal rotation: 4+    Left Knee   Flexion: 4+  Extension: 4    Right Knee   Flexion: 5  Extension: 5    Left Ankle/Foot   Dorsiflexion: 5  Plantar flexion: 5    Right Ankle/Foot   Dorsiflexion: 5  Plantar flexion: 5    Additional Strength Details  SLS L: unable  SLS R: > 30 seconds          Flowsheet Rows    Flowsheet Row Most Recent Value   PT/OT G-Codes   Current Score  23   Projected Score  46   FOTO information reviewed  Yes   Assessment Type  Re-evaluation   G code set  Mobility: Walking & Moving Around   Mobility: Walking and Moving Around Current Status ()  CL   Mobility: Walking and Moving Around Goal Status ()  CJ          Precautions:  Osteopenia, severe OA in right hip, depression    Daily Treatment Diary     Manual  3-2 3-15 (RE)           PROM with gentle overpressure into hip flexion (< 90), Er, abduction, and extension (no hip flexion>  90, Ir, or adduction)  12'                                                                   Exercise Diary  3-2 3-15 (RE)                        Standing:             - hip abd 2 x 10 ea NV 2 x 5           - hip ext 2 x 10 ea NV 2 x 5           - mini squat 2 x 10 NV NV           - heel raises  2 x 10 NV 2 x 10           - toe raises 2 x 10 NV 2 x 10           - HS curls 2 x 10 ea NV 2 x 5           Sitting:             - LAQ 2 x 10 NV 2 x 10           - isometric adduction 10 x 6" hold NV 10 x 6" hold                        Supine:             - heel slides  15x NV 15x           - quad sets 10 x 10" NV 10 x 10"                                                                                Modalities  3-2 3-15 (RE)           Cryo as needed  defers                                       *on first follow-up appointment, reviewed posterior hip precautions, signs and symptoms of infection, and DVT  Educated pt if any signs are present to call MD and or go to ER

## 2018-03-16 ENCOUNTER — APPOINTMENT (OUTPATIENT)
Dept: PHYSICAL THERAPY | Facility: CLINIC | Age: 68
End: 2018-03-16
Payer: MEDICARE

## 2018-03-19 ENCOUNTER — APPOINTMENT (OUTPATIENT)
Dept: PHYSICAL THERAPY | Facility: CLINIC | Age: 68
End: 2018-03-19
Payer: MEDICARE

## 2018-03-19 ENCOUNTER — OFFICE VISIT (OUTPATIENT)
Dept: PHYSICAL THERAPY | Facility: CLINIC | Age: 68
End: 2018-03-19
Payer: MEDICARE

## 2018-03-19 DIAGNOSIS — M16.12 PRIMARY OSTEOARTHRITIS OF LEFT HIP: ICD-10-CM

## 2018-03-19 DIAGNOSIS — M25.552 LEFT HIP PAIN: Primary | ICD-10-CM

## 2018-03-19 DIAGNOSIS — Z96.642 STATUS POST LEFT HIP REPLACEMENT: ICD-10-CM

## 2018-03-19 PROCEDURE — 97110 THERAPEUTIC EXERCISES: CPT | Performed by: PHYSICAL THERAPIST

## 2018-03-19 PROCEDURE — 97140 MANUAL THERAPY 1/> REGIONS: CPT | Performed by: PHYSICAL THERAPIST

## 2018-03-19 NOTE — PROGRESS NOTES
Daily Note     Today's date: 3/19/2018  Patient name: Christina Khan  : 1950  MRN: [id number]  Referring provider: Nanette Chu PA-C  Dx:   Encounter Diagnosis     ICD-10-CM    1  Left hip pain M25 552    2  Primary osteoarthritis of left hip M16 12    3  Status post left hip replacement [id number]                   Subjective: Patient reports she felt really good following previous session  Pt states she felt more sore over the weekend  Objective: See treatment diary below      Assessment: Tolerated treatment well  Patient demonstrated fatigue post treatment  Progressed exercises as charted  Pt stated right hip discomfort after prolonged standing tasks, otherwise, pt tolerated all exercises with minima discomfort  Plan: Progress treatment as tolerated        Precautions:  Osteopenia, severe OA in right hip, depression     Daily Treatment Diary      Manual  3-2 3-15 (RE)  3-19                 PROM with gentle overpressure into hip flexion (< 90), Er, abduction, and extension (no hip flexion>  90, Ir, or adduction)   12'  12'                                                                                                                       Exercise Diary  3-2 3-15 (RE)  3-19                                         Standing:                       - hip abd 2 x 10 ea NV 2 x 5  2 x 10 ea                 - hip ext 2 x 10 ea NV 2 x 5  2 x 10 ea                 - mini squat 2 x 10 NV NV  2 x 10                  - heel raises  2 x 10 NV 2 x 10  2 x 10                 - toe raises 2 x 10 NV 2 x 10  2 x 10                 - HS curls 2 x 10 ea NV 2 x 5  2 x 10 ea                 Sitting:                       - LAQ 2 x 10 NV 2 x 10  2 x 10                 - isometric adduction 10 x 6" hold NV 10 x 6" hold  15 x 6" hold                  - SAQ     NV                 Supine:                       - heel slides  15x NV 15x  2 mins x 6" hold                 - quad sets 10 x 10" NV 10 x 10"  10 x 10"                                                                                                                                             Modalities  3-2 3-15 (RE)  3-19                 Cryo as needed   defers 10 mins

## 2018-03-20 ENCOUNTER — OFFICE VISIT (OUTPATIENT)
Dept: OBGYN CLINIC | Facility: HOSPITAL | Age: 68
End: 2018-03-20

## 2018-03-20 ENCOUNTER — HOSPITAL ENCOUNTER (OUTPATIENT)
Dept: RADIOLOGY | Facility: HOSPITAL | Age: 68
Discharge: HOME | End: 2018-03-20
Attending: ORTHOPAEDIC SURGERY
Payer: MEDICARE

## 2018-03-20 VITALS — SYSTOLIC BLOOD PRESSURE: 138 MMHG | HEIGHT: 60 IN | HEART RATE: 70 BPM | DIASTOLIC BLOOD PRESSURE: 78 MMHG

## 2018-03-20 DIAGNOSIS — Z47.1 AFTERCARE FOLLOWING LEFT HIP JOINT REPLACEMENT SURGERY: Primary | ICD-10-CM

## 2018-03-20 DIAGNOSIS — Z96.642 AFTERCARE FOLLOWING LEFT HIP JOINT REPLACEMENT SURGERY: ICD-10-CM

## 2018-03-20 DIAGNOSIS — M16.12 PRIMARY OSTEOARTHRITIS OF LEFT HIP: ICD-10-CM

## 2018-03-20 DIAGNOSIS — Z47.1 AFTERCARE FOLLOWING LEFT HIP JOINT REPLACEMENT SURGERY: ICD-10-CM

## 2018-03-20 DIAGNOSIS — Z96.642 AFTERCARE FOLLOWING LEFT HIP JOINT REPLACEMENT SURGERY: Primary | ICD-10-CM

## 2018-03-20 PROCEDURE — 99024 POSTOP FOLLOW-UP VISIT: CPT | Performed by: ORTHOPAEDIC SURGERY

## 2018-03-20 PROCEDURE — 73502 X-RAY EXAM HIP UNI 2-3 VIEWS: CPT

## 2018-03-20 RX ORDER — HYDROCODONE BITARTRATE AND ACETAMINOPHEN 5; 325 MG/1; MG/1
TABLET ORAL
Qty: 30 TABLET | Refills: 0 | Status: SHIPPED | OUTPATIENT
Start: 2018-03-20 | End: 2018-03-27 | Stop reason: SDUPTHER

## 2018-03-20 RX ORDER — TRAMADOL HYDROCHLORIDE 50 MG/1
50 TABLET ORAL EVERY 6 HOURS PRN
Qty: 30 TABLET | Refills: 0 | Status: SHIPPED | OUTPATIENT
Start: 2018-03-20 | End: 2018-03-30

## 2018-03-20 RX ORDER — CEPHALEXIN 500 MG/1
CAPSULE ORAL
Qty: 12 CAPSULE | Refills: 3 | Status: SHIPPED | OUTPATIENT
Start: 2018-03-20 | End: 2018-03-27

## 2018-03-20 NOTE — PATIENT INSTRUCTIONS
You had a very successful postoperative visit    Your pain medication was refilled    You are to continue DVT prophylaxis    You will continue physical therapy    You were also provided medication for dental antibiotic prophylaxis    We will see you next week for proposed staple removal

## 2018-03-20 NOTE — PROGRESS NOTES
79 y o female 1st postoperative visit status post left total hip replacement    She is accompanied by her   She needs refills of Vicodin and tramadol  She feels better with physical therapy  She is observing DVT prophylaxis  X-rays of the left hip were taken on arrival to the office    Review of Systems  Review of systems negative unless otherwise specified in HPI    Past Medical History  Past Medical History:   Diagnosis Date    Arthritis     Depression     Endometriosis     GERD (gastroesophageal reflux disease)     Hyperlipidemia     Hypertension     Osteopenia     Pancreatitis 06/2017    Pneumonia        Past Surgical History  Past Surgical History:   Procedure Laterality Date    ABDOMINAL SURGERY      endometriosis     APPENDECTOMY      CARPAL TUNNEL RELEASE      COLONOSCOPY      HAND SURGERY      HYSTERECTOMY      JOINT REPLACEMENT      KNEE    IN ARTHROSCOPY SHOULDER SURGICAL BICEPS TENODESIS Right 5/10/2017    Procedure: ARTHROSCOPIC BICEPS TENODESIS WITH ROTATOR CUFF REPAIR ;  Surgeon: David Luna MD;  [facility];  Service: Orthopedics    IN Vianey Harryreynaldo Right 5/10/2017    Procedure: SHOULDER ARTHROSCOPY SUBACROMIAL DECOMPRESSION;  Surgeon: David Luna MD;  [facility];  Service: Orthopedics    IN TOTAL HIP ARTHROPLASTY Left 3/12/2018    Procedure: ARTHROPLASTY HIP TOTAL;  Surgeon: Sade Castañeda MD;  [facility];  Service: Orthopedics    TONSILLECTOMY         Current Medications  Current Outpatient Prescriptions on File Prior to Visit   Medication Sig Dispense Refill    acetaminophen (TYLENOL) 500 mg tablet Take 500 mg by mouth daily      ascorbic acid (VITAMIN C) 500 mg tablet Take 500 mg by mouth daily      atorvastatin (LIPITOR) 20 mg tablet TAKE 1 TABLET BY MOUTH 6  DAYS A WEEK 90 tablet 3    calcium-vitamin D (OSCAL) 250-125 MG-UNIT per tablet Take 1 tablet by mouth daily      Cholecalciferol (VITAMIN D) 2000 units tablet Take 2,000 Units by mouth daily      docusate sodium (COLACE) 100 mg capsule Take 1 capsule (100 mg total) by mouth 2 (two) times a day 20 capsule 0    enoxaparin (LOVENOX) 40 mg/0 4 mL Inject 0 4 mL (40 mg total) under the skin daily for 28 days 11 2 mL 0    ferrous sulfate 325 (65 Fe) mg tablet Take 325 mg by mouth daily with breakfast      FLUoxetine (PROzac) 20 mg capsule Take 20 mg by mouth daily      folic acid (FOLVITE) 1 mg tablet Take by mouth daily      gabapentin (NEURONTIN) 100 mg capsule Take 1 capsule (100 mg total) by mouth 3 (three) times a day for 30 days 60 capsule 0    hydrochlorothiazide (HYDRODIURIL) 25 mg tablet Take 12 5 mg by mouth daily       HYDROcodone-acetaminophen (NORCO) 5-325 mg per tablet 1-2 tabs po q6 hr prn pain  Do not exceed 4 g of acetaminophen in 24 hr period 30 tablet 0    metoprolol succinate (TOPROL-XL) 50 mg 24 hr tablet Take 1 tablet (50 mg total) by mouth daily 90 tablet 1    Multiple Vitamins-Minerals (CENTRUM SILVER ULTRA WOMENS PO) Take by mouth      Omega-3 1000 MG CAPS Take by mouth      omeprazole (PriLOSEC) 20 mg delayed release capsule Take 20 mg by mouth daily      traMADol (ULTRAM) 50 mg tablet Take 1 tablet (50 mg total) by mouth every 6 (six) hours as needed for moderate pain for up to 10 days 30 tablet 0     No current facility-administered medications on file prior to visit          Recent Labs [facility])    0  Lab Value Date/Time   HCT 32 9 (L) 03/13/2018 0540   HCT 42 8 05/02/2017 0933   HGB 11 0 (L) 03/13/2018 0540   HGB 14 3 05/02/2017 0933   WBC 7 11 03/13/2018 0540   WBC 6 5 05/02/2017 0933   INR 0 92 02/26/2018 0926   GLUCOSE 101 03/13/2018 0602   GLUCOSE 67 05/09/2017 1017   HGBA1C 5 4 02/26/2018 0926         Physical exam  · General: Awake, Alert, Oriented  Musculoskeletal,   Patient has a intact staple line clean dry and intact  She has the absence of any calf pain  She has no significant pain with range of motion of the left hip          Imaging  X-rays left hip show total hip replacement components in excellent position    Procedure  N/A    Assessment/Plan:   79 y  o female who with very successful 1st postoperative visit status post left total hip arthroplasty  She was given refills of Vicodin and tramadol    She is to continue DVT prophylaxis  She was afforded dental antibiotic prophylaxis medication  She is to continue PT  We will see her next week for proposed staple removal

## 2018-03-21 ENCOUNTER — APPOINTMENT (OUTPATIENT)
Dept: PHYSICAL THERAPY | Facility: CLINIC | Age: 68
End: 2018-03-21
Payer: MEDICARE

## 2018-03-22 ENCOUNTER — OFFICE VISIT (OUTPATIENT)
Dept: PHYSICAL THERAPY | Facility: CLINIC | Age: 68
End: 2018-03-22
Payer: MEDICARE

## 2018-03-22 DIAGNOSIS — M16.12 PRIMARY OSTEOARTHRITIS OF LEFT HIP: ICD-10-CM

## 2018-03-22 DIAGNOSIS — M25.552 LEFT HIP PAIN: Primary | ICD-10-CM

## 2018-03-22 DIAGNOSIS — Z96.642 STATUS POST LEFT HIP REPLACEMENT: ICD-10-CM

## 2018-03-22 PROCEDURE — 97140 MANUAL THERAPY 1/> REGIONS: CPT

## 2018-03-22 PROCEDURE — 97110 THERAPEUTIC EXERCISES: CPT

## 2018-03-22 NOTE — PROGRESS NOTES
Daily Note     Today's date: 3/22/2018  Patient name: Albert Taylor  : 1950  MRN: [id number]  Referring provider: Dustin Leggett PA-C  Dx:   Encounter Diagnosis     ICD-10-CM    1  Left hip pain M25 552    2  Primary osteoarthritis of left hip M16 12    3  Status post left hip replacement Z96 [id number]                   Subjective: Pt states she continues to have discomfort        Objective: See treatment diary below    Precautions:  Osteopenia, severe OA in right hip, depression     Daily Treatment Diary      Manual  3-2 3-15 (RE)  3-19  3/22               PROM with gentle overpressure into hip flexion (< 90), Er, abduction, and extension (no hip flexion>  90, Ir, or adduction)   12'  12'  12'                                                                                                                     Exercise Diary  3-2 3-15 (RE)  3-19  3/22                                       Standing:                       - hip abd 2 x 10 ea NV 2 x 5  2 x 10 ea  2x10 ea               - hip ext 2 x 10 ea NV 2 x 5  2 x 10 ea  2x10 ea               - mini squat 2 x 10 NV NV  2 x 10   2x10               - heel raises  2 x 10 NV 2 x 10  2 x 10  2x10               - toe raises 2 x 10 NV 2 x 10  2 x 10  2x10               - HS curls 2 x 10 ea NV 2 x 5  2 x 10 ea  2x10 ea               Sitting:                       - LAQ 2 x 10 NV 2 x 10  2 x 10  2x10 ea               - isometric adduction 10 x 6" hold NV 10 x 6" hold  15 x 6" hold  15x6" hold                - SAQ     NV                 Supine:                       - heel slides  15x NV 15x  2 mins x 6" hold  2 min x6"               - quad sets 10 x 10" NV 10 x 10"  10 x 10" 10x10"                                                                                                                                             Modalities  3-2 3-15 (RE)  3-19  3/22               Cryo as needed   defers 10 mins defers                                           Assessment: Tolerated treatment well  Patient exhibited good technique with therapeutic exercises  Pt complains of increased discomfort w/ L LE WB when performing standing hip exercises  Tolerates manual stretching well  Plan: Progress treatment as tolerated

## 2018-03-23 ENCOUNTER — APPOINTMENT (OUTPATIENT)
Dept: PHYSICAL THERAPY | Facility: CLINIC | Age: 68
End: 2018-03-23
Payer: MEDICARE

## 2018-03-23 ENCOUNTER — OFFICE VISIT (OUTPATIENT)
Dept: PHYSICAL THERAPY | Facility: CLINIC | Age: 68
End: 2018-03-23
Payer: MEDICARE

## 2018-03-23 DIAGNOSIS — Z96.642 STATUS POST LEFT HIP REPLACEMENT: ICD-10-CM

## 2018-03-23 DIAGNOSIS — M25.552 LEFT HIP PAIN: Primary | ICD-10-CM

## 2018-03-23 DIAGNOSIS — M16.12 PRIMARY OSTEOARTHRITIS OF LEFT HIP: ICD-10-CM

## 2018-03-23 PROCEDURE — 97110 THERAPEUTIC EXERCISES: CPT

## 2018-03-23 PROCEDURE — 97140 MANUAL THERAPY 1/> REGIONS: CPT

## 2018-03-23 PROCEDURE — 97112 NEUROMUSCULAR REEDUCATION: CPT

## 2018-03-23 NOTE — PROGRESS NOTES
Daily Note     Today's date: 3/23/2018  Patient name: Altaf Reyes  : 1950  MRN: [id number]  Referring provider: Kelvin Delacruz PA-C  Dx:   Encounter Diagnosis     ICD-10-CM    1  Left hip pain M25 552    2  Primary osteoarthritis of left hip M16 12    3  Status post left hip replacement [id number] [id number]                   Subjective: Pt states she is still "pretty sore"   Pt states she has an appt Tuesday for staple removal       Objective: See treatment diary below  Precautions:  Osteopenia, severe OA in right hip, depression     Daily Treatment Diary      Manual  3-2 3-15 (RE)  3-19  3/22  3/23             PROM with gentle overpressure into hip flexion (< 90), Er, abduction, and extension (no hip flexion>  90, Ir, or adduction)   15'  12'  12' 10'                                                                                                                   Exercise Diary  3-2 3-15 (RE)  3-19  3/22  3/23                                     Standing:                       - hip abd 2 x 10 ea NV 2 x 5  2 x 10 ea  2x10 ea  2x10 ea             - hip ext 2 x 10 ea NV 2 x 5  2 x 10 ea  2x10 ea  2x10 ea             - mini squat 2 x 10 NV NV  2 x 10   2x10  2x10             - heel raises  2 x 10 NV 2 x 10  2 x 10  2x10  2x10             - toe raises 2 x 10 NV 2 x 10  2 x 10  2x10  2x10             - HS curls 2 x 10 ea NV 2 x 5  2 x 10 ea  2x10 ea  2x10 ea             Sitting:                       - LAQ 2 x 10 NV 2 x 10  2 x 10  2x10 ea  2x10 ea             - isometric adduction 10 x 6" hold NV 10 x 6" hold  15 x 6" hold  15x6" hold  15x6"              - SAQ     NV    2x10             Supine:                       - heel slides  15x NV 15x  2 mins x 6" hold  2 min x6"  2 minx 6"             - quad sets 10 x 10" NV 10 x 10"  10 x 10" 10x10" 10x10"              sidestepping          2 laps                                                                                                                   Modalities  3-2 3-15 (RE)  3-19  3/22 3/23             Cryo as needed   defers 10 mins defers defers                                           Assessment: Tolerated treatment well  Patient exhibited good technique with therapeutic exercises  Added small step ups and side stepping to program today w/ out sig difficulty  Group setting 9-9:14      Plan: Progress treatment as tolerated

## 2018-03-26 ENCOUNTER — APPOINTMENT (OUTPATIENT)
Dept: PHYSICAL THERAPY | Facility: CLINIC | Age: 68
End: 2018-03-26
Payer: MEDICARE

## 2018-03-26 ENCOUNTER — OFFICE VISIT (OUTPATIENT)
Dept: PHYSICAL THERAPY | Facility: CLINIC | Age: 68
End: 2018-03-26
Payer: MEDICARE

## 2018-03-26 DIAGNOSIS — Z96.642 STATUS POST LEFT HIP REPLACEMENT: ICD-10-CM

## 2018-03-26 DIAGNOSIS — M16.12 PRIMARY OSTEOARTHRITIS OF LEFT HIP: ICD-10-CM

## 2018-03-26 DIAGNOSIS — M25.552 LEFT HIP PAIN: Primary | ICD-10-CM

## 2018-03-26 PROCEDURE — 97140 MANUAL THERAPY 1/> REGIONS: CPT | Performed by: PHYSICAL THERAPIST

## 2018-03-26 PROCEDURE — 97110 THERAPEUTIC EXERCISES: CPT | Performed by: PHYSICAL THERAPIST

## 2018-03-26 NOTE — PROGRESS NOTES
Daily Note     Today's date: 3/26/2018  Patient name: Renea Hinojosa  : 1950  MRN: [id number]  Referring provider: Kapil Morris PA-C  Dx:   Encounter Diagnosis     ICD-10-CM    1  Left hip pain M25 552    2  Status post left hip replacement Z96 642    3  Primary osteoarthritis of left hip M16 12                   Subjective: Patient states she was a little sore after last week because she had two sessions back to back  Pt reports she gets her staples out tomorrow and she is very excited  Objective: See treatment diary below      Assessment: Tolerated treatment well  Patient exhibited good technique with therapeutic exercises  Progressed exercises this date to focus on functional strength and balance with initiation of front and lateral step ups  Plan to add SLS and lateral cone step up and overs NV  Plan: Progress treatment as tolerated        Precautions:  Osteopenia, severe OA in right hip, depression     Daily Treatment Diary      Manual  3-2 3-15 (RE)  3-19  3/22  3/23  3           PROM with gentle overpressure into hip flexion (< 90), Er, abduction, and extension (no hip flexion>  90, Ir, or adduction)   15'  12'  12' 10'  12'                                                                                                                 Exercise Diary  3-2 3-15 (RE)  3-19  3/22  3/23  3                                   Standing:                       - hip abd 2 x 10 ea NV 2 x 5  2 x 10 ea  2x10 ea  2x10 ea  3 x 10 ea           - hip ext 2 x 10 ea NV 2 x 5  2 x 10 ea  2x10 ea  2x10 ea  3 x 10 ea           - mini squat 2 x 10 NV NV  2 x 10   2x10  2x10  2 x 10           - heel raises  2 x 10 NV 2 x 10  2 x 10  2x10  2x10  3 x 10           - toe raises 2 x 10 NV 2 x 10  2 x 10  2x10  2x10  3 x 10           - HS curls 2 x 10 ea NV 2 x 5  2 x 10 ea  2x10 ea  2x10 ea  3 x 10           - sidestepping     2 laps 2 laps       - front step up      2 x 10 4"       - lateral step up and over      1 x 10 4"       - SLS      NV       - lateral cone walks      NV                                 Sitting:                       - LAQ 2 x 10 NV 2 x 10  2 x 10  2x10 ea  2x10 ea  3 x 10 2#           - isometric adduction 10 x 6" hold NV 10 x 6" hold  15 x 6" hold  15x6" hold  15x6"  30 x 6"            - SAQ     NV    2x10  NV           Supine:                       - heel slides  15x NV 15x  2 mins x 6" hold  2 min x6"  2 minx 6"  HEP only           - quad sets 10 x 10" NV 10 x 10"  10 x 10" 10x10" 10x10"  HEP only            sidestepping          2 laps  2 laps                                                                                                                 Modalities  3-2 3-15 (RE)  3-19  3/22 3/23  3-26           Cryo as needed   defers 10 mins defers defers  10 mins                                     * All time 1:1 time this date

## 2018-03-27 ENCOUNTER — OFFICE VISIT (OUTPATIENT)
Dept: OBGYN CLINIC | Facility: HOSPITAL | Age: 68
End: 2018-03-27

## 2018-03-27 VITALS
HEIGHT: 60 IN | SYSTOLIC BLOOD PRESSURE: 104 MMHG | HEART RATE: 75 BPM | BODY MASS INDEX: 38.48 KG/M2 | DIASTOLIC BLOOD PRESSURE: 66 MMHG | WEIGHT: 195.99 LBS

## 2018-03-27 DIAGNOSIS — Z47.1 AFTERCARE FOLLOWING LEFT HIP JOINT REPLACEMENT SURGERY: Primary | ICD-10-CM

## 2018-03-27 DIAGNOSIS — Z96.642 AFTERCARE FOLLOWING LEFT HIP JOINT REPLACEMENT SURGERY: Primary | ICD-10-CM

## 2018-03-27 DIAGNOSIS — M16.12 PRIMARY OSTEOARTHRITIS OF LEFT HIP: ICD-10-CM

## 2018-03-27 PROCEDURE — 99024 POSTOP FOLLOW-UP VISIT: CPT | Performed by: ORTHOPAEDIC SURGERY

## 2018-03-27 RX ORDER — HYDROCODONE BITARTRATE AND ACETAMINOPHEN 5; 325 MG/1; MG/1
TABLET ORAL
Qty: 30 TABLET | Refills: 0 | Status: SHIPPED | OUTPATIENT
Start: 2018-03-27 | End: 2018-06-25

## 2018-03-27 NOTE — PROGRESS NOTES
Two weeks following left total hip replacement, this patient describes appropriate pain in her left hip  Examination finds left hip the heel postal incision  There is no tenderness in the left calf  Assessment/plan:  2 weeks following left total hip replacement, this patient had her staples removed today  She is granted a prescription for Vicodin  She will continue strengthening left hip in physical therapy    I would welcome the opportunity see back in 4 weeks time for repeat physical exam

## 2018-03-28 ENCOUNTER — OFFICE VISIT (OUTPATIENT)
Dept: PHYSICAL THERAPY | Facility: CLINIC | Age: 68
End: 2018-03-28
Payer: MEDICARE

## 2018-03-28 DIAGNOSIS — Z96.642 STATUS POST LEFT HIP REPLACEMENT: ICD-10-CM

## 2018-03-28 DIAGNOSIS — M16.12 PRIMARY OSTEOARTHRITIS OF LEFT HIP: ICD-10-CM

## 2018-03-28 DIAGNOSIS — M25.552 LEFT HIP PAIN: Primary | ICD-10-CM

## 2018-03-28 PROCEDURE — 97112 NEUROMUSCULAR REEDUCATION: CPT

## 2018-03-28 PROCEDURE — 97140 MANUAL THERAPY 1/> REGIONS: CPT

## 2018-03-28 PROCEDURE — 97110 THERAPEUTIC EXERCISES: CPT

## 2018-03-28 NOTE — PROGRESS NOTES
Daily Note     Today's date: 3/28/2018  Patient name: Golden Pagan  : 1950  MRN: [id number]  Referring provider: Aleksandar Walton PA-C  Dx:   Encounter Diagnosis     ICD-10-CM    1  Left hip pain M25 552    2  Status post left hip replacement Z96 [id number]    3  Primary osteoarthritis of left hip M16 12                   Subjective: Pt states she feels much better since getting the staples removed         Objective: See treatment diary below    Precautions:  Osteopenia, severe OA in right hip, depression     Daily Treatment Diary      Manual  3-2 3-15 (RE)  3-19  3/22  3/23  3-26  3/28         PROM with gentle overpressure into hip flexion (< 90), Er, abduction, and extension (no hip flexion>  90, Ir, or adduction)   15'  12'  12' 10'  12'  10'                                                                                                               Exercise Diary  3-2 3-15 (RE)  3-19  3/22  3/23  3-26  3/28                                 Standing:                       - hip abd 2 x 10 ea NV 2 x 5  2 x 10 ea  2x10 ea  2x10 ea  3 x 10 ea 1# 3x10 ea         - hip ext 2 x 10 ea NV 2 x 5  2 x 10 ea  2x10 ea  2x10 ea  3 x 10 ea 1# 3x10 ea         - mini squat 2 x 10 NV NV  2 x 10   2x10  2x10  2 x 10  2x10         - heel raises  2 x 10 NV 2 x 10  2 x 10  2x10  2x10  3 x 10  3x10         - toe raises 2 x 10 NV 2 x 10  2 x 10  2x10  2x10  3 x 10  3x10         - HS curls 2 x 10 ea NV 2 x 5  2 x 10 ea  2x10 ea  2x10 ea  3 x 10  1# 3x10         - sidestepping         2 laps 2 laps  3 laps         - front step up           2 x 10 4"  2x10 4"         - lateral step up and over           1 x 10 4"  x10, 4"         - SLS           NV  15"x3         - lateral cone walks           NV  NV                                                         Sitting:                       - LAQ 2 x 10 NV 2 x 10  2 x 10  2x10 ea  2x10 ea  3 x 10 2# 2# 3x10 ea         - isometric adduction 10 x 6" hold NV 10 x 6" hold  15 x 6" hold  15x6" hold  15x6"  30 x 6"  30x6"          - SAQ     NV    2x10  NV 2# 2x10         Supine:                       - heel slides  15x NV 15x  2 mins x 6" hold  2 min x6"  2 minx 6"  HEP only  HEP         - quad sets 10 x 10" NV 10 x 10"  10 x 10" 10x10" 10x10"  HEP only  HEP                                                                                                                                    Modalities  3-2 3-15 (RE)  3-19  3/22 3/23  3-26  3/28         Cryo as needed   defers 10 mins defers defers  10 mins  10'                                     Assessment: Tolerated treatment well  Patient exhibited good technique with therapeutic exercises  Added 1lb to standing hip exercises w/out difficulty  Also added SLS and SAQ to program       Plan: Progress treatment as tolerated

## 2018-03-29 ENCOUNTER — APPOINTMENT (OUTPATIENT)
Dept: PHYSICAL THERAPY | Facility: CLINIC | Age: 68
End: 2018-03-29
Payer: MEDICARE

## 2018-03-30 ENCOUNTER — OFFICE VISIT (OUTPATIENT)
Dept: PHYSICAL THERAPY | Facility: CLINIC | Age: 68
End: 2018-03-30
Payer: MEDICARE

## 2018-03-30 DIAGNOSIS — Z96.642 STATUS POST LEFT HIP REPLACEMENT: ICD-10-CM

## 2018-03-30 DIAGNOSIS — M16.12 PRIMARY OSTEOARTHRITIS OF LEFT HIP: ICD-10-CM

## 2018-03-30 DIAGNOSIS — M25.552 LEFT HIP PAIN: Primary | ICD-10-CM

## 2018-03-30 PROCEDURE — 97112 NEUROMUSCULAR REEDUCATION: CPT

## 2018-03-30 PROCEDURE — 97140 MANUAL THERAPY 1/> REGIONS: CPT

## 2018-03-30 PROCEDURE — 97110 THERAPEUTIC EXERCISES: CPT

## 2018-03-30 NOTE — PROGRESS NOTES
Daily Note     Today's date: 3/30/2018  Patient name: Misty Bae  : 1950  MRN: [id number]  Referring provider: Yousif Schaefer PA-C  Dx:   Encounter Diagnosis     ICD-10-CM    1  Left hip pain M25 552    2  Status post left hip replacement Z96 642    3  Primary osteoarthritis of left hip M16 12                   Subjective: Pt states no new complaints        Objective: See treatment diary below  Precautions:  Osteopenia, severe OA in right hip, depression     Daily Treatment Diary      Manual  3-2 3-15 (RE)  3-19  3/22  3/23  3-26  3/28  3/30       PROM with gentle overpressure into hip flexion (< 90), Er, abduction, and extension (no hip flexion>  90, Ir, or adduction)   15'  12'  12' 10'  12'  10'  10'                                                                                                             Exercise Diary  3-2 3-15 (RE)  3-19  3/22  3/23  3-26  3/28  3/30                               Standing:                       - hip abd 2 x 10 ea NV 2 x 5  2 x 10 ea  2x10 ea  2x10 ea  3 x 10 ea 1# 3x10 ea  1# 3x10 ea       - hip ext 2 x 10 ea NV 2 x 5  2 x 10 ea  2x10 ea  2x10 ea  3 x 10 ea 1# 3x10 ea  1# 3x10 ea       - mini squat 2 x 10 NV NV  2 x 10   2x10  2x10  2 x 10  2x10  3x10       - heel raises  2 x 10 NV 2 x 10  2 x 10  2x10  2x10  3 x 10  3x10  3x10       - toe raises 2 x 10 NV 2 x 10  2 x 10  2x10  2x10  3 x 10  3x10  3x10       - HS curls 2 x 10 ea NV 2 x 5  2 x 10 ea  2x10 ea  2x10 ea  3 x 10  1# 3x10  1# 3x10       - sidestepping         2 laps 2 laps  3 laps  3 laps       - front step up           2 x 10 4"  2x10 4" 2x10 4"       - lateral step up and over           1 x 10 4"  x10, 4"  x10, 4"       - SLS           NV  15"x3 15"x3       - lateral cone walks           NV  NV                                                         Sitting:                       - LAQ 2 x 10 NV 2 x 10  2 x 10  2x10 ea  2x10 ea  3 x 10 2# 2# 3x10 ea  2# 3x10 ea       - isometric adduction 10 x 6" hold NV 10 x 6" hold  15 x 6" hold  15x6" hold  15x6"  30 x 6"  30x6"  30x6"        - SAQ     NV    2x10  NV 2# 2x10  2# 2x10       Supine:                       - heel slides  15x NV 15x  2 mins x 6" hold  2 min x6"  2 minx 6"  HEP only  HEP  HEP       - quad sets 10 x 10" NV 10 x 10"  10 x 10" 10x10" 10x10"  HEP only  HEP  HEP                                                                                                                                     Modalities  3-2 3-15 (RE)  3-19  3/22 3/23  3-26  3/28  3/30       Cryo as needed   defers 10 mins defers defers  10 mins  10'  10'                                   Assessment: Tolerated treatment well  Patient exhibited good technique with therapeutic exercises  No adverse effects noted w/ tx program         Plan: Progress treatment as tolerated

## 2018-04-02 ENCOUNTER — OFFICE VISIT (OUTPATIENT)
Dept: PHYSICAL THERAPY | Facility: CLINIC | Age: 68
End: 2018-04-02
Payer: MEDICARE

## 2018-04-02 ENCOUNTER — APPOINTMENT (OUTPATIENT)
Dept: PHYSICAL THERAPY | Facility: CLINIC | Age: 68
End: 2018-04-02
Payer: MEDICARE

## 2018-04-02 DIAGNOSIS — M16.12 PRIMARY OSTEOARTHRITIS OF LEFT HIP: ICD-10-CM

## 2018-04-02 DIAGNOSIS — M25.552 LEFT HIP PAIN: ICD-10-CM

## 2018-04-02 DIAGNOSIS — Z96.642 STATUS POST LEFT HIP REPLACEMENT: Primary | ICD-10-CM

## 2018-04-02 PROCEDURE — 97140 MANUAL THERAPY 1/> REGIONS: CPT

## 2018-04-02 PROCEDURE — 97110 THERAPEUTIC EXERCISES: CPT

## 2018-04-02 PROCEDURE — 97112 NEUROMUSCULAR REEDUCATION: CPT

## 2018-04-02 NOTE — PROGRESS NOTES
Daily Note     Today's date: 2018  Patient name: Toro Forrest  : 1950  MRN: [id number]  Referring provider: Kenneth Sargent PA-C  Dx:   Encounter Diagnosis     ICD-10-CM    1  Status post left hip replacement Z96 [id number]    2  Left hip pain M25 552    3  Primary osteoarthritis of left hip M16 12                   Subjective: Pt reports minimal pain on Friday night after forgetting to take pain meds for 10 hours but has noted no increase in pain since  She feels that her balance has been improving and is able to ambulate short distances at home without AD  She owns a [facility] and would like to be able to use that      Objective: See treatment diary below    Precautions:  Osteopenia, severe OA in right hip, depression     Daily Treatment Diary      Manual  3-15 (RE)  3-19  3/22  3/23  3-26  3/28  3/30 4/2      PROM with gentle overpressure into hip flexion (< 90), Er, abduction, and extension (no hip flexion>  90, Ir, or adduction) 12'  12'  12' 10'  12'  10'  10'  10'                               Exercise Diary  3-2 3-15 (RE)  3-19  3/22  3/23  3-26  3/28  3/30 4/2                              Standing:                       - hip abd 2 x 10 ea NV 2 x 5  2 x 10 ea  2x10 ea  2x10 ea  3 x 10 ea 1# 3x10 ea  1# 3x10 ea 1#  3x10 ea      - hip ext 2 x 10 ea NV 2 x 5  2 x 10 ea  2x10 ea  2x10 ea  3 x 10 ea 1# 3x10 ea  1# 3x10 ea 1#  3x10 ea      - mini squat 2 x 10 NV NV  2 x 10   2x10  2x10  2 x 10  2x10  3x10  3x10     - heel raises  2 x 10 NV 2 x 10  2 x 10  2x10  2x10  3 x 10  3x10  3x10  3x10     - toe raises 2 x 10 NV 2 x 10  2 x 10  2x10  2x10  3 x 10  3x10  3x10  3x10     - HS curls 2 x 10 ea NV 2 x 5  2 x 10 ea  2x10 ea  2x10 ea  3 x 10  1# 3x10  1# 3x10  1#  3x10 ea     - sidestepping         2 laps 2 laps  3 laps  3 laps 3 laps      - front step up           2 x 10 4"  2x10 4" 2x10 4" 8"   15x     - lateral step up and over           1 x 10 4"  x10, 4"  x10, 4" 10x  8"      - SLS           NV  15"x3 15"x3  20"x3 ea     - lateral cone walks           NV  NV    -     - quad cane gait training                 150'                              Sitting:                       - LAQ 2 x 10 NV 2 x 10  2 x 10  2x10 ea  2x10 ea  3 x 10 2# 2# 3x10 ea  2# 3x10 ea 2#  3x10      - isometric adduction 10 x 6" hold NV 10 x 6" hold  15 x 6" hold  15x6" hold  15x6"  30 x 6"  30x6"  30x6"  6"x30      - SAQ     NV    2x10  NV 2# 2x10  2# 2x10  -     Supine:                       - heel slides  15x NV 15x  2 mins x 6" hold  2 min x6"  2 minx 6"  HEP only  HEP  HEP  -     - quad sets 10 x 10" NV 10 x 10"  10 x 10" 10x10" 10x10"  HEP only  HEP  HEP  -                                 Modalities  3-2 3-15 (RE)  3-19  3/22 3/23  3-26  3/28  3/30 4/2      Cryo as needed   defers 10 mins defers defers  10 mins  10'  10' 10'                                Assessment: Tolerated treatment well  Patient exhibited good technique with therapeutic exercises and would benefit from continued PT  Pt ambulated very well with NBQC and was able to progress to step-through, 2-point gait pattern without difficulty or LOB  Yokasta Greggnatalie was adjusted for height but remains too high even at lowest setting  Plan: Progress treament per protocol       Amara Certain, PTA

## 2018-04-05 ENCOUNTER — APPOINTMENT (OUTPATIENT)
Dept: PHYSICAL THERAPY | Facility: CLINIC | Age: 68
End: 2018-04-05
Payer: MEDICARE

## 2018-04-05 ENCOUNTER — OFFICE VISIT (OUTPATIENT)
Dept: PHYSICAL THERAPY | Facility: CLINIC | Age: 68
End: 2018-04-05
Payer: MEDICARE

## 2018-04-05 DIAGNOSIS — M16.12 PRIMARY OSTEOARTHRITIS OF LEFT HIP: ICD-10-CM

## 2018-04-05 DIAGNOSIS — M25.552 LEFT HIP PAIN: Primary | ICD-10-CM

## 2018-04-05 DIAGNOSIS — Z96.642 STATUS POST LEFT HIP REPLACEMENT: ICD-10-CM

## 2018-04-05 PROCEDURE — G8990 OTHER PT/OT CURRENT STATUS: HCPCS

## 2018-04-05 PROCEDURE — 97140 MANUAL THERAPY 1/> REGIONS: CPT

## 2018-04-05 PROCEDURE — 97110 THERAPEUTIC EXERCISES: CPT

## 2018-04-05 PROCEDURE — 97112 NEUROMUSCULAR REEDUCATION: CPT

## 2018-04-05 PROCEDURE — G8991 OTHER PT/OT GOAL STATUS: HCPCS

## 2018-04-05 NOTE — PROGRESS NOTES
Daily Note     Today's date: 2018  Patient name: Diana Bland  : 1950  MRN: [id number]  Referring provider: Felix Toure PA-C  Dx:   Encounter Diagnosis     ICD-10-CM    1  Left hip pain M25 552    2  Status post left hip replacement Z96 642    3  Primary osteoarthritis of left hip M16 12                   Subjective: Pt presents to PT w/ SPC  Pt states no new complaints        Objective: See treatment diary below  Precautions:  Osteopenia, severe OA in right hip, depression     Daily Treatment Diary      Manual  3-15 (RE)  3-19  3/22  3/23  3-26  3/28  3/30 4/2   4/5   PROM with gentle overpressure into hip flexion (< 90), Er, abduction, and extension (no hip flexion>  90, Ir, or adduction) 12'  12'  12' 10'  12'  10'  10'  10'  10'                             Exercise Diary  3-2 3-15 (RE)  3-19  3/22  3/23  3-26  3/28  3/30 4/2   4                           Standing:                       - hip abd 2 x 10 ea NV 2 x 5  2 x 10 ea  2x10 ea  2x10 ea  3 x 10 ea 1# 3x10 ea  1# 3x10 ea 1#  3x10 ea   1# 3x10 ea   - hip ext 2 x 10 ea NV 2 x 5  2 x 10 ea  2x10 ea  2x10 ea  3 x 10 ea 1# 3x10 ea  1# 3x10 ea 1#  3x10 ea   1# 3x10   - mini squat 2 x 10 NV NV  2 x 10   2x10  2x10  2 x 10  2x10  3x10  3x10  3x10   - heel raises  2 x 10 NV 2 x 10  2 x 10  2x10  2x10  3 x 10  3x10  3x10  3x10  3x10   - toe raises 2 x 10 NV 2 x 10  2 x 10  2x10  2x10  3 x 10  3x10  3x10  3x10  3x10   - HS curls 2 x 10 ea NV 2 x 5  2 x 10 ea  2x10 ea  2x10 ea  3 x 10  1# 3x10  1# 3x10  1#  3x10 ea  1# 3x10 ea   - sidestepping         2 laps 2 laps  3 laps  3 laps 3 laps      - front step up           2 x 10 4"  2x10 4" 2x10 4" 8"   15x  8" 2x10   - lateral step up and over           1 x 10 4"  x10, 4"  x10, 4" 10x  8"   8" x10   - SLS           NV  15"x3 15"x3  20"x3 ea  20"x3   - lateral cone walks           NV  NV    -  3 laps   - quad cane gait training                 150'                              Sitting:                     - LAQ 2 x 10 NV 2 x 10  2 x 10  2x10 ea  2x10 ea  3 x 10 2# 2# 3x10 ea  2# 3x10 ea 2#  3x10  2# 3x10   - isometric adduction 10 x 6" hold NV 10 x 6" hold  15 x 6" hold  15x6" hold  15x6"  30 x 6"  30x6"  30x6"  6"x30  30x6"    - SAQ     NV    2x10  NV 2# 2x10  2# 2x10  -  2# 3x10   Supine:                       - heel slides  15x NV 15x  2 mins x 6" hold  2 min x6"  2 minx 6"  HEP only  HEP  HEP  -  hep   - quad sets 10 x 10" NV 10 x 10"  10 x 10" 10x10" 10x10"  HEP only  HEP  HEP  -  hep                               Modalities  3-2 3-15 (RE)  3-19  3/22 3/23  3-26  3/28  3/30 4/2  4/5   Cryo as needed   defers 10 mins defers defers  10 mins  10'  10' 10'                                    Assessment: Tolerated treatment well  Patient exhibited good technique with therapeutic exercises   Pt challenged by addition of side stepping over cones  Plan: Progress treatment as tolerated

## 2018-04-09 ENCOUNTER — OFFICE VISIT (OUTPATIENT)
Dept: PHYSICAL THERAPY | Facility: CLINIC | Age: 68
End: 2018-04-09
Payer: MEDICARE

## 2018-04-09 ENCOUNTER — APPOINTMENT (OUTPATIENT)
Dept: PHYSICAL THERAPY | Facility: CLINIC | Age: 68
End: 2018-04-09
Payer: MEDICARE

## 2018-04-09 DIAGNOSIS — Z96.642 STATUS POST LEFT HIP REPLACEMENT: Primary | ICD-10-CM

## 2018-04-09 DIAGNOSIS — M16.12 PRIMARY OSTEOARTHRITIS OF LEFT HIP: ICD-10-CM

## 2018-04-09 DIAGNOSIS — M25.552 LEFT HIP PAIN: ICD-10-CM

## 2018-04-09 PROCEDURE — 97112 NEUROMUSCULAR REEDUCATION: CPT | Performed by: PHYSICAL THERAPIST

## 2018-04-09 PROCEDURE — 97110 THERAPEUTIC EXERCISES: CPT | Performed by: PHYSICAL THERAPIST

## 2018-04-09 PROCEDURE — 97140 MANUAL THERAPY 1/> REGIONS: CPT | Performed by: PHYSICAL THERAPIST

## 2018-04-09 NOTE — PROGRESS NOTES
Daily Note     Today's date: 2018  Patient name: Carroll Priest  : 1950  MRN: [id number]  Referring provider: Chandler Diaz PA-C  Dx:   Encounter Diagnosis     ICD-10-CM    1  Status post left hip replacement Z96 642    2  Primary osteoarthritis of left hip M16 12    3  Left hip pain M25 552                   Subjective: Patient reports she was helping to set up for a [employer] sale earlier today causing her to feel a little sore  Objective: See treatment diary below      Assessment: Tolerated treatment well  Patient demonstrated fatigue post treatment  Minimal exercise progressions this date due to entering into session fatigued and sore  Plan: Progress treatment as tolerated          Precautions:  Osteopenia, severe OA in right hip, depression     Daily Treatment Diary      Manual  4-9           PROM with gentle overpressure into hip flexion (< 90), Er, abduction, and extension (no hip flexion>  90, Ir, or adduction) 12'                                     Exercise Diary  4-9                                     Standing:                       - hip abd 1# 3x10 ea 2#            - hip ext 1# 3x10 2#            - mini squat 3x10             - HS curls  1# 3x10 ea 2#           - front step up 8" 2x10             - lateral step up and over 8" x20             - SLS 20"x3             - lateral cone walks 3 laps                            Sitting:              - LAQ 2# 3x10            - isometric adduction 30x6"                                        Laying:             - SAQ 2# 3x10                                        Modalities  4-9            Cryo as needed  10 mins

## 2018-04-12 ENCOUNTER — APPOINTMENT (OUTPATIENT)
Dept: PHYSICAL THERAPY | Facility: CLINIC | Age: 68
End: 2018-04-12
Payer: MEDICARE

## 2018-04-12 ENCOUNTER — OFFICE VISIT (OUTPATIENT)
Dept: PHYSICAL THERAPY | Facility: CLINIC | Age: 68
End: 2018-04-12
Payer: MEDICARE

## 2018-04-12 DIAGNOSIS — M25.552 LEFT HIP PAIN: ICD-10-CM

## 2018-04-12 DIAGNOSIS — Z96.642 STATUS POST LEFT HIP REPLACEMENT: Primary | ICD-10-CM

## 2018-04-12 DIAGNOSIS — M16.12 PRIMARY OSTEOARTHRITIS OF LEFT HIP: ICD-10-CM

## 2018-04-12 PROCEDURE — 97112 NEUROMUSCULAR REEDUCATION: CPT

## 2018-04-12 PROCEDURE — 97140 MANUAL THERAPY 1/> REGIONS: CPT

## 2018-04-12 NOTE — PROGRESS NOTES
Daily Note     Today's date: 2018  Patient name: Patt Hodgkin  : 1950  MRN: [id number]  Referring provider: Gabe Lucia PA-C  Dx:   Encounter Diagnosis     ICD-10-CM    1  Status post left hip replacement [id number] [id number]    2  Primary osteoarthritis of left hip M16 12    3  Left hip pain M25 552                   Subjective: Pt states she was fatigued, sore after volunteering at her [employer] the other day  Objective: See treatment diary below  Precautions:  Osteopenia, severe OA in right hip, depression     Daily Treatment Diary      Manual                   PROM with gentle overpressure into hip flexion (< 90), Er, abduction, and extension (no hip flexion>  90, Ir, or adduction) 12'  12'                                           Exercise Diary                                             Standing:                       - hip abd 1# 3x10 ea 2# 3x10 ea                   - hip ext 1# 3x10 2# 3x10 ea                   - mini squat 3x10  3x10                   - HS curls  1# 3x10 ea 2# 3x10 ea                   - front step up 8" 2x10 8" 2x10                   - lateral step up and over 8" x20  8" 2x10                   - SLS 20"x3  20"x3                   - lateral cone walks 3 laps  3 laps                                           Sitting:                       - LAQ 2# 3x10  2# 3x10                   - isometric adduction 30x6"  30x6"                                                                   Laying:                       - SAQ 2# 3x10  2# 3x10                                               Modalities                     Cryo as needed  10 mins  10 min                                                 Assessment: Tolerated treatment well  Patient demonstrated fatigue post treatment  Pt most challenged by step ups, front and lateral         Plan: Progress treatment as tolerated

## 2018-04-16 ENCOUNTER — APPOINTMENT (OUTPATIENT)
Dept: PHYSICAL THERAPY | Facility: CLINIC | Age: 68
End: 2018-04-16
Payer: MEDICARE

## 2018-04-16 ENCOUNTER — EVALUATION (OUTPATIENT)
Dept: PHYSICAL THERAPY | Facility: CLINIC | Age: 68
End: 2018-04-16
Payer: MEDICARE

## 2018-04-16 DIAGNOSIS — Z96.642 STATUS POST LEFT HIP REPLACEMENT: Primary | ICD-10-CM

## 2018-04-16 DIAGNOSIS — M16.12 PRIMARY OSTEOARTHRITIS OF LEFT HIP: ICD-10-CM

## 2018-04-16 DIAGNOSIS — Z96.642 STATUS POST TOTAL REPLACEMENT OF LEFT HIP: ICD-10-CM

## 2018-04-16 DIAGNOSIS — M25.552 LEFT HIP PAIN: ICD-10-CM

## 2018-04-16 PROCEDURE — G8979 MOBILITY GOAL STATUS: HCPCS | Performed by: PHYSICAL THERAPIST

## 2018-04-16 PROCEDURE — 97110 THERAPEUTIC EXERCISES: CPT | Performed by: PHYSICAL THERAPIST

## 2018-04-16 PROCEDURE — 97112 NEUROMUSCULAR REEDUCATION: CPT | Performed by: PHYSICAL THERAPIST

## 2018-04-16 PROCEDURE — G8978 MOBILITY CURRENT STATUS: HCPCS | Performed by: PHYSICAL THERAPIST

## 2018-04-16 PROCEDURE — 97140 MANUAL THERAPY 1/> REGIONS: CPT | Performed by: PHYSICAL THERAPIST

## 2018-04-16 RX ORDER — IBUPROFEN 200 MG
200 TABLET ORAL EVERY 6 HOURS PRN
COMMUNITY
End: 2019-06-18 | Stop reason: ALTCHOICE

## 2018-04-16 NOTE — PROGRESS NOTES
PT RE-EVALUATION    Today's date: 2018  Patient name: Debbie Wayne  : 1950  MRN: [id number]  Referring provider: Lluvia Paredes PA-C  Dx:   Encounter Diagnosis     ICD-10-CM    1  Status post left hip replacement Z96 642    2  Primary osteoarthritis of left hip M16 12    3  Left hip pain M25 552                   Assessment    Assessment details: Debbie Wayne is a pleasant 79 y o  presenting to physical therapy with MD referral status post a left total hip replacement on on 3-12-18  Since time of initial evaluation, pt has made great improvements in hip ROM, lower extremity strength, functional balance, and gait mechanics  Pt plans to undergo right total hip replacement this year as well  Due to Medicare cap, we will now reduce to 1 x per week and continue PT for 2-4 weeks maximum to allow sufficient funds for appropriate rehab of right hip  Problem list:  Limited hip A/PROM bilaterally, decreased hip/core strength, limited lower extremity flexibility, fair balance, and mild gait limitations  Treatment to include: Manual therapy techniques, lower extremity/core strengthening, neuromuscular control exercises, balance/proprioception training,  instruction in a comprehensive HEP, and modalities as needed  This pt would benefit from skilled PT services to address their impairments and functional limitations to maximize functional outcome  Barriers to therapy: Undergoing L GERARDO; however signficant limitation on R hip, depression, and osteopenia  Understanding of Dx/Px/POC: good   Prognosis: good    Goals  ST  Pt will improve hip flexion ROM to at least 60 degrees AROM in 4 weeks  MET  2  Pt will improve SLS to at least 5 seconds post operatively in 4 weeks  MET    LT  Pt will be able to walk >30 minutes with least restrictive device in 2-4 weeks  PARTIALLY MET  2  Pt will be independent in a comprehensive HEP in 2-4 weeks   PARTIALLY MET      Plan  Patient would benefit from: skilled PT  Planned modality interventions: cryotherapy  Frequency: 2x week  Duration in weeks: 4  Treatment plan discussed with: patient  Plan details: 1 x per week for 2-4 weeks  Subjective Evaluation    History of Present Illness  Mechanism of injury: Current status:  - ADLs/Functional activities:   - Stairs Reciprocal pattern with occasional  Mild/moderate painn   - Sit to stand with no pain   - Walking 15 minutes with no AD with no pain (stops due to fatigue and R hip pain)   - Standing 15 minutes with pain in R hip   - Sleeping with 0 nightly sleep disturbances due to pain  - Work: Not a working individual  - Recreation: unable due to pain in R hip    Since time of initial evaluation, functionally, pt reports she is now able to negotiate the stairs with reciprocal pattern, walk and stand 15 minutes prior to fatigue and no longer experiences sleep disturbances due to pain  Although improvements have been made, this pt would continue to benefit from skilled PT services to maximize functional outcome      Pain  Current pain ratin  At best pain ratin  At worst pain ratin  Location: incision  Quality: sharp  Aggravating factors: stair climbing, standing and walking  Progression: improved    Social Support  Steps to enter house: yes (3 with no handrail)  Lives in: multiple-level home  Lives with: spouse    Employment status: not working    Diagnostic Tests  X-ray: abnormal  Patient Goals  Patient goals for therapy: decreased pain, independence with ADLs/IADLs, increased motion, improved balance and increased strength          Objective     Neurological Testing     Sensation     Hip   Left Hip   Intact: light touch    Right Hip   Intact: light touch    Active Range of Motion   Left Hip   Flexion: 90 degrees   Extension: 5 degrees   Abduction: 24 degrees   External rotation (90/90): 14 degrees     Right Hip   Flexion: 90 degrees   Extension: 0 degrees   Abduction: 30 degrees   Adduction: 16 degrees External rotation (90/90): 35 degrees   Internal rotation (90/90): 25 degrees     Passive Range of Motion   Left Hip   Flexion: 80 degrees   Extension: 8 degrees   Abduction: 35 degrees   External rotation (90/90): 47 degrees     Right Hip   Flexion: 90 degrees   Extension: 8 degrees   External rotation (90/90): 45 degrees   Internal rotation (90/90): 32 degrees     Strength/Myotome Testing     Left Hip   Planes of Motion   Flexion: 5  External rotation: 3+    Right Hip   Planes of Motion   Flexion: 5  External rotation: 5  Internal rotation: 4+    Left Knee   Flexion: 4+  Extension: 5    Right Knee   Flexion: 5  Extension: 5    Left Ankle/Foot   Dorsiflexion: 5  Plantar flexion: 5    Right Ankle/Foot   Dorsiflexion: 5  Plantar flexion: 5    Additional Strength Details  SLS L: 12 seconds  SLS R: > 30 seconds              Precautions:  Osteopenia, severe OA in right hip, depression     Daily Treatment Diary      Manual  4-9 4/12 4-16 (RE)               PROM with gentle overpressure into hip flexion (< 90), Er, abduction, and extension (no hip flexion>  90, Ir, or adduction) 12'  12'  10'                                         Exercise Diary  4-9 4/12 4-16                                         Standing:                       - hip abd 1# 3x10 ea 2# 3x10 ea  3# 3 x10 ea                 - hip ext 1# 3x10 2# 3x10 ea  3# 3 x10 ea                 - mini squat 3x10  3x10  3 x10                  - HS curls  1# 3x10 ea 2# 3x10 ea  3# 3 x10 ea                - front step up 8" 2x10 8" 2x10  8" 2x10                 - lateral step up and over 8" x20  8" 2x10  8" 2x10                 - SLS 20"x3  20"x3  20"x3                 - lateral cone walks 3 laps  3 laps  3 laps                                         Sitting:                       - LAQ 2# 3x10  2# 3x10  3# 3 x10 ea                 - isometric adduction 30x6"  30x6"  30x6"                                                                 Laying:                     - SAQ 2# 3x10  2# 3x10  30x6"                  - bridge with TB around knees     2 x 10 Green TB                     Modalities  4-9  4/12  4-16 (RE)                 Cryo as needed  10 mins  10 min  10min                                           * Provided pt with updated HEP this date  1:1 time from 9:08am- 10:00am     Access Code: [email or web address]   URL: [email or web address]   Date: 04/16/2018   Prepared by: Emmalene Krabbe     Exercises    Standing Repeated Hip Abduction with Resistance - 10 reps - 3 sets - 4x weekly    Standing Repeated Hip Extension with Resistance - 10 reps - 3 sets - 4x weekly    Seated Long Arc Quad - 10 reps - 3 sets - 4x weekly    Seated Hip Adduction Isometrics with Ball - 10 reps - 3 sets - 6 seconds hold - 4x weekly    Mini Squat with Chair - 10 reps - 3 sets - 4x weekly    Forward Step Up with Counter Support - 10 reps - 3 sets - 4x weekly

## 2018-04-17 ENCOUNTER — OFFICE VISIT (OUTPATIENT)
Dept: FAMILY MEDICINE CLINIC | Facility: CLINIC | Age: 68
End: 2018-04-17
Payer: MEDICARE

## 2018-04-17 VITALS
BODY MASS INDEX: 39.07 KG/M2 | WEIGHT: 199 LBS | DIASTOLIC BLOOD PRESSURE: 72 MMHG | TEMPERATURE: 97.9 F | RESPIRATION RATE: 16 BRPM | SYSTOLIC BLOOD PRESSURE: 118 MMHG | HEART RATE: 72 BPM | HEIGHT: 60 IN

## 2018-04-17 DIAGNOSIS — E78.01 FAMILIAL HYPERCHOLESTEROLEMIA: ICD-10-CM

## 2018-04-17 DIAGNOSIS — M16.12 PRIMARY OSTEOARTHRITIS OF LEFT HIP: ICD-10-CM

## 2018-04-17 DIAGNOSIS — Z96.642 STATUS POST TOTAL REPLACEMENT OF LEFT HIP: ICD-10-CM

## 2018-04-17 DIAGNOSIS — I10 ESSENTIAL HYPERTENSION: Primary | ICD-10-CM

## 2018-04-17 PROCEDURE — 99213 OFFICE O/P EST LOW 20 MIN: CPT | Performed by: INTERNAL MEDICINE

## 2018-04-17 NOTE — PROGRESS NOTES
ASSESSMENT and PLAN:  Tresa Faye is a 79 y o  female with:   Problem List Items Addressed This Visit     Hypertension - Primary    Primary osteoarthritis of left hip    Status post total replacement of left hip        She is s/p b/l knee replacements 2005 and 2006 and left thr- due for right thr this summer Dr Juan Mcghee  Knees were done by Dr Chloe Tinoco in [place]    SUBJECTIVE:  Tresa Faye is a 79 y o  female who presents today with a chief complaint of Follow-up (post op LTHR 3/12)    Patient here for post op check- she had hip replacement left side on 3/12/18 and was d/c 3/13/18; She did very well post op and has done well  Review of Systems   Constitutional: Negative  HENT: Negative  Eyes: Negative  Respiratory: Negative  Cardiovascular: Negative  Gastrointestinal: Negative  Endocrine: Negative  Genitourinary: Negative  Musculoskeletal: Positive for arthralgias (improved mobility  )  Skin: Negative  Allergic/Immunologic: Negative  Neurological: Negative  Hematological: Negative  Psychiatric/Behavioral: Negative  I have reviewed the patient's PMH, Social History, Medication List and Allergies  OBJECTIVE:  /72   Pulse 72   Temp 97 9 °F (36 6 °C)   Resp 16   Ht 4' 11 5" (1 511 m)   Wt 90 3 kg (199 lb)   LMP  (LMP Unknown) Comment: hysterectomy  Breastfeeding? No   BMI 39 52 kg/m²   Physical Exam   Constitutional: She is oriented to person, place, and time  She appears well-developed and well-nourished  No distress  HENT:   Head: Normocephalic and atraumatic  Right Ear: External ear normal    Left Ear: External ear normal    Nose: Nose normal    Mouth/Throat: Oropharynx is clear and moist    Eyes: Conjunctivae and EOM are normal  Pupils are equal, round, and reactive to light  Left eye exhibits no discharge  Neck: Normal range of motion  Neck supple  No JVD present  No tracheal deviation present  No thyromegaly present  Cardiovascular: Normal rate, regular rhythm, normal heart sounds and intact distal pulses  Exam reveals no gallop  No murmur heard  Pulmonary/Chest: Effort normal and breath sounds normal  No respiratory distress  She has no wheezes  She has no rales  Abdominal: Soft  Bowel sounds are normal  She exhibits no distension  There is no tenderness  There is no rebound  Musculoskeletal: Normal range of motion  She exhibits no edema, tenderness or deformity  Improved rom of left hip;      Neurological: She is alert and oriented to person, place, and time  She has normal reflexes  No cranial nerve deficit  Coordination normal    Skin: Skin is warm and dry  No rash noted  She is not diaphoretic  No erythema  Psychiatric: She has a normal mood and affect  Her behavior is normal  Judgment and thought content normal    Nursing note and vitals reviewed

## 2018-04-19 ENCOUNTER — APPOINTMENT (OUTPATIENT)
Dept: PHYSICAL THERAPY | Facility: CLINIC | Age: 68
End: 2018-04-19
Payer: MEDICARE

## 2018-04-19 DIAGNOSIS — I10 ESSENTIAL (PRIMARY) HYPERTENSION: ICD-10-CM

## 2018-04-19 DIAGNOSIS — E78.5 HYPERLIPIDEMIA: ICD-10-CM

## 2018-04-19 DIAGNOSIS — Z23 ENCOUNTER FOR IMMUNIZATION: ICD-10-CM

## 2018-04-19 DIAGNOSIS — Z11.59 ENCOUNTER FOR SCREENING FOR OTHER VIRAL DISEASES (CODE): ICD-10-CM

## 2018-04-23 ENCOUNTER — OFFICE VISIT (OUTPATIENT)
Dept: PHYSICAL THERAPY | Facility: CLINIC | Age: 68
End: 2018-04-23
Payer: MEDICARE

## 2018-04-23 ENCOUNTER — APPOINTMENT (OUTPATIENT)
Dept: PHYSICAL THERAPY | Facility: CLINIC | Age: 68
End: 2018-04-23
Payer: MEDICARE

## 2018-04-23 DIAGNOSIS — M25.552 LEFT HIP PAIN: ICD-10-CM

## 2018-04-23 DIAGNOSIS — Z96.642 STATUS POST TOTAL REPLACEMENT OF LEFT HIP: ICD-10-CM

## 2018-04-23 DIAGNOSIS — M16.12 PRIMARY OSTEOARTHRITIS OF LEFT HIP: ICD-10-CM

## 2018-04-23 DIAGNOSIS — Z96.642 STATUS POST LEFT HIP REPLACEMENT: Primary | ICD-10-CM

## 2018-04-23 PROCEDURE — G8980 MOBILITY D/C STATUS: HCPCS | Performed by: PHYSICAL THERAPIST

## 2018-04-23 PROCEDURE — 97530 THERAPEUTIC ACTIVITIES: CPT

## 2018-04-23 PROCEDURE — G8979 MOBILITY GOAL STATUS: HCPCS | Performed by: PHYSICAL THERAPIST

## 2018-04-23 PROCEDURE — 97140 MANUAL THERAPY 1/> REGIONS: CPT

## 2018-04-23 PROCEDURE — 97110 THERAPEUTIC EXERCISES: CPT

## 2018-04-23 NOTE — PROGRESS NOTES
Daily Note     Today's date: 2018  Patient name: Carroll Priest  : 1950  MRN: [id number]  Referring provider: Chandler Diaz PA-C  Dx:   Encounter Diagnosis     ICD-10-CM    1  Status post left hip replacement Z96 642    2  Primary osteoarthritis of left hip M16 12    3  Left hip pain M25 552    4  Status post total replacement of left hip Z96 642                   Subjective: Patient reports her hip has felt good since last visit, she has been walking without AD with no difficulty          Objective: See treatment diary below  Precautions:  Osteopenia, severe OA in right hip, depression     Daily Treatment Diary      Manual   (RE)               PROM with gentle overpressure into hip flexion (< 90), Er, abduction, and extension (no hip flexion>  90, Ir, or adduction) 12'  12'  10'  10'                                       Exercise Diary                                         Standing:                       - hip abd 1# 3x10 ea 2# 3x10 ea  3# 3 x10 ea  3# 3x10 ea               - hip ext 1# 3x10 2# 3x10 ea  3# 3 x10 ea  3# 3x10               - mini squat 3x10  3x10  3 x10   3x10               - HS curls  1# 3x10 ea 2# 3x10 ea  3# 3 x10 ea  3# 3x10               - front step up 8" 2x10 8" 2x10  8" 2x10  8" 2x10               - lateral step up and over 8" x20  8" 2x10  8" 2x10  8" 2x10               - SLS 20"x3  20"x3  20"x3  20"x3                - lateral cone walks 3 laps  3 laps  3 laps  3 laps                                       Sitting:                       - LAQ 2# 3x10  2# 3x10  3# 3 x10 ea  3# 3x10                - isometric adduction 30x6"  30x6"  30x6"  6" x30                                                               Laying:                       - SAQ 2# 3x10  2# 3x10  30x6" 3# 6" x30                - bridge with TB around knees     2 x 10 Green TB 3x10 Green TB                   Modalities  - (RE)                 Cryo as needed  10 mins  10 min  10min  10'                                          Continued with exercise progression today as indicated  Patient feels PROM continues to be beneficial   Intermittent cues need for form maintenance with good carry over  No increase in pain throughout session  FUV with surgeon tomorrow  Assessment: Tolerated treatment well  Patient demonstrated fatigue post treatment      Plan: Progress treatment as tolerated

## 2018-04-24 ENCOUNTER — OFFICE VISIT (OUTPATIENT)
Dept: OBGYN CLINIC | Facility: HOSPITAL | Age: 68
End: 2018-04-24

## 2018-04-24 VITALS
WEIGHT: 199.08 LBS | HEIGHT: 59 IN | DIASTOLIC BLOOD PRESSURE: 72 MMHG | BODY MASS INDEX: 40.13 KG/M2 | SYSTOLIC BLOOD PRESSURE: 110 MMHG | HEART RATE: 71 BPM

## 2018-04-24 DIAGNOSIS — Z47.1 AFTERCARE FOLLOWING LEFT HIP JOINT REPLACEMENT SURGERY: Primary | ICD-10-CM

## 2018-04-24 DIAGNOSIS — Z96.642 AFTERCARE FOLLOWING LEFT HIP JOINT REPLACEMENT SURGERY: Primary | ICD-10-CM

## 2018-04-24 PROCEDURE — 99024 POSTOP FOLLOW-UP VISIT: CPT | Performed by: ORTHOPAEDIC SURGERY

## 2018-04-24 RX ORDER — TRAMADOL HYDROCHLORIDE 50 MG/1
50 TABLET ORAL EVERY 6 HOURS PRN
Qty: 60 TABLET | Refills: 0 | Status: SHIPPED | OUTPATIENT
Start: 2018-04-24 | End: 2018-05-24 | Stop reason: SDUPTHER

## 2018-04-24 NOTE — PROGRESS NOTES
Six weeks following left total hip replacement, this patient describes appropriate pain in left hip, and only request a prescription for tramadol  For supplemental relief  Exam finds gait pattern without antalgia, without assist device  Her left hip incision is clean and dry  There is no groin pain with motion  There is no tenderness in the left calf  Assessment/plan:  6 weeks following left total hip replacement, this patient will continue home exercises struck strength to her left hip    A prescription for tramadol was granted at her request   I would welcome the opportunity see back in 6 weeks time, this include x-rays two views left hip upon arrival for 3 month checkup

## 2018-04-26 ENCOUNTER — APPOINTMENT (OUTPATIENT)
Dept: PHYSICAL THERAPY | Facility: CLINIC | Age: 68
End: 2018-04-26
Payer: MEDICARE

## 2018-04-30 DIAGNOSIS — F39 AFFECTIVE DISORDER (HCC): Primary | ICD-10-CM

## 2018-04-30 RX ORDER — FLUOXETINE HYDROCHLORIDE 20 MG/1
CAPSULE ORAL
Qty: 90 CAPSULE | Refills: 1 | Status: SHIPPED | OUTPATIENT
Start: 2018-04-30 | End: 2018-08-20 | Stop reason: SDUPTHER

## 2018-04-30 NOTE — PROGRESS NOTES
Addendum: Resolved episode and added discharge G-codes  Pt saw ortho who was pleased with progress and is alright with patient discharging to HEP to allow for adequate medicare money for right hip replacement later this year  Answered all of patient's questions about HEP and exercises and educated pt to call with any questions or concerns

## 2018-05-24 DIAGNOSIS — Z96.642 AFTERCARE FOLLOWING LEFT HIP JOINT REPLACEMENT SURGERY: ICD-10-CM

## 2018-05-24 DIAGNOSIS — Z47.1 AFTERCARE FOLLOWING LEFT HIP JOINT REPLACEMENT SURGERY: ICD-10-CM

## 2018-05-24 RX ORDER — TRAMADOL HYDROCHLORIDE 50 MG/1
50 TABLET ORAL EVERY 6 HOURS PRN
Qty: 60 TABLET | Refills: 0 | Status: SHIPPED | OUTPATIENT
Start: 2018-05-24 | End: 2018-06-25

## 2018-06-12 ENCOUNTER — HOSPITAL ENCOUNTER (OUTPATIENT)
Dept: RADIOLOGY | Facility: HOSPITAL | Age: 68
Discharge: HOME | End: 2018-06-12
Attending: ORTHOPAEDIC SURGERY
Payer: MEDICARE

## 2018-06-12 ENCOUNTER — OFFICE VISIT (OUTPATIENT)
Dept: OBGYN CLINIC | Facility: HOSPITAL | Age: 68
End: 2018-06-12
Payer: MEDICARE

## 2018-06-12 VITALS — SYSTOLIC BLOOD PRESSURE: 128 MMHG | DIASTOLIC BLOOD PRESSURE: 79 MMHG | HEIGHT: 59 IN | HEART RATE: 65 BPM

## 2018-06-12 DIAGNOSIS — Z96.642 AFTERCARE FOLLOWING LEFT HIP JOINT REPLACEMENT SURGERY: ICD-10-CM

## 2018-06-12 DIAGNOSIS — Z47.1 AFTERCARE FOLLOWING LEFT HIP JOINT REPLACEMENT SURGERY: Primary | ICD-10-CM

## 2018-06-12 DIAGNOSIS — M16.11 PRIMARY OSTEOARTHRITIS OF ONE HIP, RIGHT: ICD-10-CM

## 2018-06-12 DIAGNOSIS — Z96.642 AFTERCARE FOLLOWING LEFT HIP JOINT REPLACEMENT SURGERY: Primary | ICD-10-CM

## 2018-06-12 DIAGNOSIS — Z47.1 AFTERCARE FOLLOWING LEFT HIP JOINT REPLACEMENT SURGERY: ICD-10-CM

## 2018-06-12 PROCEDURE — 99213 OFFICE O/P EST LOW 20 MIN: CPT | Performed by: ORTHOPAEDIC SURGERY

## 2018-06-12 PROCEDURE — 73502 X-RAY EXAM HIP UNI 2-3 VIEWS: CPT

## 2018-06-12 RX ORDER — ACETAMINOPHEN 325 MG/1
975 TABLET ORAL ONCE
Status: CANCELLED | OUTPATIENT
Start: 2018-06-12 | End: 2018-06-12

## 2018-06-12 RX ORDER — ASCORBIC ACID 500 MG
500 TABLET ORAL 2 TIMES DAILY
Qty: 60 TABLET | Refills: 0 | Status: ON HOLD | OUTPATIENT
Start: 2018-06-12 | End: 2018-07-30

## 2018-06-12 RX ORDER — SODIUM CHLORIDE, SODIUM LACTATE, POTASSIUM CHLORIDE, CALCIUM CHLORIDE 600; 310; 30; 20 MG/100ML; MG/100ML; MG/100ML; MG/100ML
125 INJECTION, SOLUTION INTRAVENOUS CONTINUOUS
Status: CANCELLED | OUTPATIENT
Start: 2018-06-12

## 2018-06-12 RX ORDER — CHLORHEXIDINE GLUCONATE 4 G/100ML
SOLUTION TOPICAL DAILY PRN
Status: CANCELLED | OUTPATIENT
Start: 2018-06-12

## 2018-06-12 RX ORDER — FOLIC ACID 1 MG/1
1 TABLET ORAL DAILY
Qty: 30 TABLET | Refills: 0 | Status: ON HOLD | OUTPATIENT
Start: 2018-06-12 | End: 2018-07-30

## 2018-06-12 RX ORDER — FERROUS SULFATE TAB EC 324 MG (65 MG FE EQUIVALENT) 324 (65 FE) MG
324 TABLET DELAYED RESPONSE ORAL
Qty: 60 TABLET | Refills: 0 | Status: ON HOLD | OUTPATIENT
Start: 2018-06-12 | End: 2018-07-30

## 2018-06-12 NOTE — PROGRESS NOTES
79 y o female presents for  Continue postoperative care after a left total hip arthroplasty which she is now 12 weeks postoperative from  She has been overall doing well has some mild residual muscle discomfort at the area of with max  She states that she had a period of time where she had heavy activity and increasing pain but this has resolved since time  She denies motor sensory deficits on the left lower extremity and stock complain of any weakness  She now complains of right hip pain which is similar in nature to the left hip pain that she had prior to undergoing total hip arthroplasty  She wishes to schedule surgery for this hip today      Review of Systems  Review of systems negative unless otherwise specified in HPI    Past Medical History  Past Medical History:   Diagnosis Date    Arthritis     Depression     Endometriosis     GERD (gastroesophageal reflux disease)     Hyperlipidemia     Hypertension     Osteopenia     Pancreatitis 06/2017    Pneumonia        Past Surgical History  Past Surgical History:   Procedure Laterality Date    ABDOMINAL SURGERY      endometriosis     APPENDECTOMY      CARPAL TUNNEL RELEASE      COLONOSCOPY      HAND SURGERY      HYSTERECTOMY      JOINT REPLACEMENT      KNEE    OR ARTHROSCOPY SHOULDER SURGICAL BICEPS TENODESIS Right 5/10/2017    Procedure: ARTHROSCOPIC BICEPS TENODESIS WITH ROTATOR CUFF REPAIR ;  Surgeon: Justina Ovalles MD;  Location: [facility];  Service: Orthopedics    OR Demar Benitezara Right 5/10/2017    Procedure: SHOULDER ARTHROSCOPY SUBACROMIAL DECOMPRESSION;  Surgeon: Justina Ovalles MD;  Location: [facility];  Service: Orthopedics    OR TOTAL HIP ARTHROPLASTY Left 3/12/2018    Procedure: ARTHROPLASTY HIP TOTAL;  Surgeon: Horacio Treadwell MD;  Location: [facility];  Service: Orthopedics    TONSILLECTOMY         Current Medications  Current Outpatient Prescriptions on File Prior to Visit   Medication Sig Dispense Refill    acetaminophen (TYLENOL) 500 mg tablet Take 500 mg by mouth daily      atorvastatin (LIPITOR) 20 mg tablet TAKE 1 TABLET BY MOUTH 6  DAYS A WEEK 90 tablet 3    calcium-vitamin D (OSCAL) 250-125 MG-UNIT per tablet Take 1 tablet by mouth daily      Cholecalciferol (VITAMIN D) 2000 units tablet Take 2,000 Units by mouth daily      FLUoxetine (PROzac) 20 mg capsule TAKE 1 CAPSULE BY MOUTH  EVERY DAY 90 capsule 1    gabapentin (NEURONTIN) 100 mg capsule Take 1 capsule (100 mg total) by mouth 3 (three) times a day for 30 days 60 capsule 0    hydrochlorothiazide (HYDRODIURIL) 25 mg tablet Take 12 5 mg by mouth daily       HYDROcodone-acetaminophen (NORCO) 5-325 mg per tablet 1-2 tabs po q6 hr prn pain  Do not exceed 4 g of acetaminophen in 24 hr period 30 tablet 0    ibuprofen (MOTRIN) 200 mg tablet Take 200 mg by mouth every 6 (six) hours as needed for mild pain      metoprolol succinate (TOPROL-XL) 50 mg 24 hr tablet Take 1 tablet (50 mg total) by mouth daily 90 tablet 1    Multiple Vitamins-Minerals (CENTRUM SILVER ULTRA WOMENS PO) Take by mouth      Omega-3 1000 MG CAPS Take by mouth      omeprazole (PriLOSEC) 20 mg delayed release capsule Take 20 mg by mouth daily      Polyethylene Glycol 3350 (MIRALAX PO) Take by mouth      traMADol (ULTRAM) 50 mg tablet Take 1 tablet (50 mg total) by mouth every 6 (six) hours as needed for moderate pain 60 tablet 0     No current facility-administered medications on file prior to visit          Recent Labs [facility])    0  Lab Value Date/Time   HCT 32 9 (L) 03/13/2018 0540   HCT 42 8 05/02/2017 0933   HGB 11 0 (L) 03/13/2018 0540   HGB 14 3 05/02/2017 0933   WBC 7 11 03/13/2018 0540   WBC 6 5 05/02/2017 0933   INR 0 92 02/26/2018 0926   GLUCOSE 101 03/13/2018 0602   GLUCOSE 67 05/09/2017 1017   HGBA1C 5 4 02/26/2018 0926         Physical exam  General: Awake, Alert, Oriented  HEENT: No scleral injection, no evidence of facial trauma  Heart: Extremities warm and well perfused  Lungs: No audible wheezing  ·   left Lower extremity  ·   No pain with hip internal rotation   · 5/5 hip flexion, abduction, adduction strength  · Painless active range of motion in all planes    Right lower extremity   mild pain elicited with flexion and internal rotation   No tenderness to palpation over greater trochanter bursa   extremity warm well perfused    Procedure  None    Imaging   plain films of left hip reveal total hip arthroplasty components in satisfactory position with no evidence of subsidence, failure, osteolysis     70-year-old female now 3 months status post left total hip arthroplasty, doing well from this perspective and ongoing right hip osteoarthritis  Plan:  weightbear as tolerated bilateral lower extremities  Patient may continue to take oral analgesics as needed for pain  After discussing risks, benefits, alternatives, complications patient will schedule for right total hip arthroplasty at a time that is convenient for her  Follow-up in routine post-operative visit after R hip is scheduled at which point left hip can also be evaluated        Farhan Burrell  06/12/18

## 2018-06-18 ENCOUNTER — TELEPHONE (OUTPATIENT)
Dept: PREADMISSION TESTING | Facility: HOSPITAL | Age: 68
End: 2018-06-18

## 2018-06-18 DIAGNOSIS — Z41.9 SURGERY, ELECTIVE: Primary | ICD-10-CM

## 2018-06-21 ENCOUNTER — TELEPHONE (OUTPATIENT)
Dept: PREADMISSION TESTING | Facility: HOSPITAL | Age: 68
End: 2018-06-21

## 2018-06-25 NOTE — PRE-PROCEDURE INSTRUCTIONS
Pre-Surgery Instructions:   Medication Instructions    ascorbic acid (VITAMIN C) 500 mg tablet Patient was instructed by Physician and understands   atorvastatin (LIPITOR) 20 mg tablet Instructed patient per Anesthesia Guidelines   calcium-vitamin D (OSCAL) 250-125 MG-UNIT per tablet Instructed patient per Anesthesia Guidelines   Cholecalciferol (VITAMIN D) 2000 units tablet Instructed patient per Anesthesia Guidelines   ferrous sulfate 324 (65 Fe) mg Patient was instructed by Physician and understands   FLUoxetine (PROzac) 20 mg capsule Instructed patient per Anesthesia Guidelines   folic acid (FOLVITE) 1 mg tablet Patient was instructed by Physician and understands   hydrochlorothiazide (HYDRODIURIL) 25 mg tablet Instructed patient per Anesthesia Guidelines   ibuprofen (MOTRIN) 200 mg tablet Instructed patient per Anesthesia Guidelines   metoprolol succinate (TOPROL-XL) 50 mg 24 hr tablet Instructed patient per Anesthesia Guidelines   Multiple Vitamins-Minerals (CENTRUM SILVER ULTRA WOMENS PO) Instructed patient per Anesthesia Guidelines   Omega-3 1000 MG CAPS Instructed patient per Anesthesia Guidelines   omeprazole (PriLOSEC) 20 mg delayed release capsule Instructed patient per Anesthesia Guidelines  Spoke to pt  Medication list reviewed & instructed  Had L hip done 3/2018  As of 7 23 18 pt to stop calcium, vit D, ibuprofen, multivitamin, omega-3  Instructed on tylenol only  Pt will continue folic acid, vit C, iron until 7 29 18  Am DOS pt to take omeprazole, fluoxetine, lipitor, metoprolol with 1-2 sips of water  Pt has lovenox script for post-op use, pt understands to get filled at least 1 week prior  Showering instructions and cloths given by office, reviewed at time of call  Pt reports she has a bottle of soap  All instructions verbally understood by patient  No further questions     Requested instructions be mailed to pt, sent 6 25 18

## 2018-07-05 ENCOUNTER — TRANSCRIBE ORDERS (OUTPATIENT)
Dept: LAB | Facility: CLINIC | Age: 68
End: 2018-07-05

## 2018-07-05 ENCOUNTER — APPOINTMENT (OUTPATIENT)
Dept: LAB | Facility: CLINIC | Age: 68
End: 2018-07-05
Payer: MEDICARE

## 2018-07-05 DIAGNOSIS — Z23 ENCOUNTER FOR IMMUNIZATION: ICD-10-CM

## 2018-07-05 DIAGNOSIS — Z96.642 STATUS POST TOTAL REPLACEMENT OF LEFT HIP: ICD-10-CM

## 2018-07-05 DIAGNOSIS — Z11.59 ENCOUNTER FOR SCREENING FOR OTHER VIRAL DISEASES (CODE): ICD-10-CM

## 2018-07-05 DIAGNOSIS — E78.01 FAMILIAL HYPERCHOLESTEROLEMIA: ICD-10-CM

## 2018-07-05 DIAGNOSIS — Z96.60 HISTORY OF JOINT REPLACEMENT, UNSPECIFIED JOINT: Primary | ICD-10-CM

## 2018-07-05 DIAGNOSIS — I10 ESSENTIAL HYPERTENSION: ICD-10-CM

## 2018-07-05 DIAGNOSIS — E78.5 HYPERLIPIDEMIA: ICD-10-CM

## 2018-07-05 DIAGNOSIS — M16.12 PRIMARY OSTEOARTHRITIS OF LEFT HIP: ICD-10-CM

## 2018-07-05 DIAGNOSIS — M16.11 PRIMARY OSTEOARTHRITIS OF ONE HIP, RIGHT: ICD-10-CM

## 2018-07-05 DIAGNOSIS — I10 ESSENTIAL (PRIMARY) HYPERTENSION: ICD-10-CM

## 2018-07-05 LAB
ABO GROUP BLD: NORMAL
ALBUMIN SERPL BCP-MCNC: 3.9 G/DL (ref 3.5–5)
ALP SERPL-CCNC: 61 U/L (ref 46–116)
ALT SERPL W P-5'-P-CCNC: 26 U/L (ref 12–78)
ANION GAP SERPL CALCULATED.3IONS-SCNC: 6 MMOL/L (ref 4–13)
APTT PPP: 31 SECONDS (ref 24–36)
AST SERPL W P-5'-P-CCNC: 18 U/L (ref 5–45)
BASOPHILS # BLD AUTO: 0.03 THOUSANDS/ΜL (ref 0–0.1)
BASOPHILS NFR BLD AUTO: 1 % (ref 0–1)
BILIRUB SERPL-MCNC: 0.62 MG/DL (ref 0.2–1)
BILIRUB UR QL STRIP: NEGATIVE
BLD GP AB SCN SERPL QL: NEGATIVE
BUN SERPL-MCNC: 16 MG/DL (ref 5–25)
CALCIUM SERPL-MCNC: 9.6 MG/DL (ref 8.3–10.1)
CHLORIDE SERPL-SCNC: 103 MMOL/L (ref 100–108)
CHOLEST SERPL-MCNC: 188 MG/DL (ref 50–200)
CLARITY UR: CLEAR
CO2 SERPL-SCNC: 29 MMOL/L (ref 21–32)
COLOR UR: YELLOW
CREAT SERPL-MCNC: 1.11 MG/DL (ref 0.6–1.3)
EOSINOPHIL # BLD AUTO: 0.13 THOUSAND/ΜL (ref 0–0.61)
EOSINOPHIL NFR BLD AUTO: 2 % (ref 0–6)
ERYTHROCYTE [DISTWIDTH] IN BLOOD BY AUTOMATED COUNT: 12.5 % (ref 11.6–15.1)
EST. AVERAGE GLUCOSE BLD GHB EST-MCNC: 111 MG/DL
GFR SERPL CREATININE-BSD FRML MDRD: 52 ML/MIN/1.73SQ M
GLUCOSE P FAST SERPL-MCNC: 90 MG/DL (ref 65–99)
GLUCOSE UR STRIP-MCNC: NEGATIVE MG/DL
HBA1C MFR BLD: 5.5 % (ref 4.2–6.3)
HCT VFR BLD AUTO: 44 % (ref 34.8–46.1)
HCV AB SER QL: NORMAL
HDLC SERPL-MCNC: 68 MG/DL (ref 40–60)
HGB BLD-MCNC: 14.3 G/DL (ref 11.5–15.4)
HGB UR QL STRIP.AUTO: NEGATIVE
IMM GRANULOCYTES # BLD AUTO: 0.01 THOUSAND/UL (ref 0–0.2)
IMM GRANULOCYTES NFR BLD AUTO: 0 % (ref 0–2)
INR PPP: 0.92 (ref 0.86–1.17)
KETONES UR STRIP-MCNC: NEGATIVE MG/DL
LDLC SERPL CALC-MCNC: 85 MG/DL (ref 0–100)
LEUKOCYTE ESTERASE UR QL STRIP: NEGATIVE
LYMPHOCYTES # BLD AUTO: 2.32 THOUSANDS/ΜL (ref 0.6–4.47)
LYMPHOCYTES NFR BLD AUTO: 37 % (ref 14–44)
MCH RBC QN AUTO: 29.4 PG (ref 26.8–34.3)
MCHC RBC AUTO-ENTMCNC: 32.5 G/DL (ref 31.4–37.4)
MCV RBC AUTO: 91 FL (ref 82–98)
MONOCYTES # BLD AUTO: 0.41 THOUSAND/ΜL (ref 0.17–1.22)
MONOCYTES NFR BLD AUTO: 7 % (ref 4–12)
NEUTROPHILS # BLD AUTO: 3.32 THOUSANDS/ΜL (ref 1.85–7.62)
NEUTS SEG NFR BLD AUTO: 53 % (ref 43–75)
NITRITE UR QL STRIP: NEGATIVE
NONHDLC SERPL-MCNC: 120 MG/DL
NRBC BLD AUTO-RTO: 0 /100 WBCS
PH UR STRIP.AUTO: 7 [PH] (ref 4.5–8)
PLATELET # BLD AUTO: 296 THOUSANDS/UL (ref 149–390)
PMV BLD AUTO: 10.6 FL (ref 8.9–12.7)
POTASSIUM SERPL-SCNC: 4.1 MMOL/L (ref 3.5–5.3)
PROT SERPL-MCNC: 7.7 G/DL (ref 6.4–8.2)
PROT UR STRIP-MCNC: NEGATIVE MG/DL
PROTHROMBIN TIME: 12.5 SECONDS (ref 11.8–14.2)
RBC # BLD AUTO: 4.86 MILLION/UL (ref 3.81–5.12)
RH BLD: NEGATIVE
SODIUM SERPL-SCNC: 138 MMOL/L (ref 136–145)
SP GR UR STRIP.AUTO: 1.01 (ref 1–1.03)
SPECIMEN EXPIRATION DATE: NORMAL
TRIGL SERPL-MCNC: 175 MG/DL
TSH SERPL DL<=0.05 MIU/L-ACNC: 2.2 UIU/ML (ref 0.36–3.74)
UROBILINOGEN UR QL STRIP.AUTO: 0.2 E.U./DL
WBC # BLD AUTO: 6.22 THOUSAND/UL (ref 4.31–10.16)

## 2018-07-05 PROCEDURE — 86803 HEPATITIS C AB TEST: CPT

## 2018-07-05 PROCEDURE — 84443 ASSAY THYROID STIM HORMONE: CPT

## 2018-07-05 PROCEDURE — 86900 BLOOD TYPING SEROLOGIC ABO: CPT

## 2018-07-05 PROCEDURE — 83036 HEMOGLOBIN GLYCOSYLATED A1C: CPT

## 2018-07-05 PROCEDURE — 85610 PROTHROMBIN TIME: CPT

## 2018-07-05 PROCEDURE — 81003 URINALYSIS AUTO W/O SCOPE: CPT | Performed by: ORTHOPAEDIC SURGERY

## 2018-07-05 PROCEDURE — 85025 COMPLETE CBC W/AUTO DIFF WBC: CPT

## 2018-07-05 PROCEDURE — 86901 BLOOD TYPING SEROLOGIC RH(D): CPT

## 2018-07-05 PROCEDURE — 85730 THROMBOPLASTIN TIME PARTIAL: CPT

## 2018-07-05 PROCEDURE — 80053 COMPREHEN METABOLIC PANEL: CPT

## 2018-07-05 PROCEDURE — 86850 RBC ANTIBODY SCREEN: CPT

## 2018-07-05 PROCEDURE — 80061 LIPID PANEL: CPT

## 2018-07-05 PROCEDURE — 36415 COLL VENOUS BLD VENIPUNCTURE: CPT

## 2018-07-11 ENCOUNTER — TELEPHONE (OUTPATIENT)
Dept: FAMILY MEDICINE CLINIC | Facility: CLINIC | Age: 68
End: 2018-07-11

## 2018-07-11 DIAGNOSIS — R89.9 ABNORMAL LABORATORY TEST: Primary | ICD-10-CM

## 2018-07-11 NOTE — TELEPHONE ENCOUNTER
What time is her appt "?  If it is later in the day have her get a bmp and drink a lot of water prior to it- le tme know and ill order - ty

## 2018-07-12 ENCOUNTER — APPOINTMENT (OUTPATIENT)
Dept: LAB | Facility: CLINIC | Age: 68
End: 2018-07-12
Payer: MEDICARE

## 2018-07-12 ENCOUNTER — OFFICE VISIT (OUTPATIENT)
Dept: FAMILY MEDICINE CLINIC | Facility: CLINIC | Age: 68
End: 2018-07-12
Payer: MEDICARE

## 2018-07-12 VITALS
HEIGHT: 59 IN | RESPIRATION RATE: 16 BRPM | DIASTOLIC BLOOD PRESSURE: 74 MMHG | TEMPERATURE: 98.5 F | SYSTOLIC BLOOD PRESSURE: 126 MMHG | HEART RATE: 64 BPM | BODY MASS INDEX: 41.33 KG/M2 | WEIGHT: 205 LBS

## 2018-07-12 DIAGNOSIS — I10 ESSENTIAL HYPERTENSION: ICD-10-CM

## 2018-07-12 DIAGNOSIS — E78.5 HYPERLIPIDEMIA, UNSPECIFIED HYPERLIPIDEMIA TYPE: ICD-10-CM

## 2018-07-12 DIAGNOSIS — Z01.818 PRE-OP EXAMINATION: Primary | ICD-10-CM

## 2018-07-12 DIAGNOSIS — M16.11 PRIMARY OSTEOARTHRITIS OF ONE HIP, RIGHT: ICD-10-CM

## 2018-07-12 DIAGNOSIS — R89.9 ABNORMAL LABORATORY TEST: ICD-10-CM

## 2018-07-12 PROBLEM — N28.9 RENAL INSUFFICIENCY: Status: ACTIVE | Noted: 2018-07-12

## 2018-07-12 LAB
ANION GAP SERPL CALCULATED.3IONS-SCNC: 5 MMOL/L (ref 4–13)
BUN SERPL-MCNC: 15 MG/DL (ref 5–25)
CALCIUM SERPL-MCNC: 9.3 MG/DL (ref 8.3–10.1)
CHLORIDE SERPL-SCNC: 101 MMOL/L (ref 100–108)
CO2 SERPL-SCNC: 28 MMOL/L (ref 21–32)
CREAT SERPL-MCNC: 1.12 MG/DL (ref 0.6–1.3)
GFR SERPL CREATININE-BSD FRML MDRD: 51 ML/MIN/1.73SQ M
GLUCOSE P FAST SERPL-MCNC: 89 MG/DL (ref 65–99)
POTASSIUM SERPL-SCNC: 3.9 MMOL/L (ref 3.5–5.3)
SODIUM SERPL-SCNC: 134 MMOL/L (ref 136–145)

## 2018-07-12 PROCEDURE — 36415 COLL VENOUS BLD VENIPUNCTURE: CPT

## 2018-07-12 PROCEDURE — 80048 BASIC METABOLIC PNL TOTAL CA: CPT

## 2018-07-12 PROCEDURE — 99214 OFFICE O/P EST MOD 30 MIN: CPT | Performed by: INTERNAL MEDICINE

## 2018-07-12 NOTE — ASSESSMENT & PLAN NOTE
Patient is medically stable for surgery  gfr stable  Hold diuretic pre op  Follow creat and gfr post op

## 2018-07-12 NOTE — ASSESSMENT & PLAN NOTE
Patient scheduled for right total hip 7/30/18   She is medically stable for surgery  ekg reviewed from 3/2018  Follow creat post op  Hold diuretic pre op

## 2018-07-12 NOTE — TELEPHONE ENCOUNTER
I talked to patient yesterday   She was going to have labs drawn first thing this AM at [facility]

## 2018-07-12 NOTE — PROGRESS NOTES
ASSESSMENT and PLAN:  Raulito Dumont is a 79 y o  female with:   Problem List Items Addressed This Visit     Hypertension     bp stable  Will hold diuretic due to gfr 51 - stable  Repeat labs post op   Hold nsaids pre op          Hyperlipidemia     Hold fish oil pre op          Pre-op examination - Primary     Patient is medically stable for surgery  gfr stable  Hold diuretic pre op  Follow creat and gfr post op          Primary osteoarthritis of one hip, right     Patient scheduled for right total hip 7/30/18   She is medically stable for surgery  ekg reviewed from 3/2018  Follow creat post op  Hold diuretic pre op            patient is medically stable for surgery  Follow up post op for labs and med review      Recent Results (from the past 672 hour(s))   CBC and differential    Collection Time: 07/05/18  9:29 AM   Result Value Ref Range    WBC 6 22 4 31 - 10 16 Thousand/uL    RBC 4 86 3 81 - 5 12 Million/uL    Hemoglobin 14 3 11 5 - 15 4 g/dL    Hematocrit 44 0 34 8 - 46 1 %    MCV 91 82 - 98 fL    MCH 29 4 26 8 - 34 3 pg    MCHC 32 5 31 4 - 37 4 g/dL    RDW 12 5 11 6 - 15 1 %    MPV 10 6 8 9 - 12 7 fL    Platelets 857 566 - 702 Thousands/uL    nRBC 0 /100 WBCs    Neutrophils Relative 53 43 - 75 %    Immat GRANS % 0 0 - 2 %    Lymphocytes Relative 37 14 - 44 %    Monocytes Relative 7 4 - 12 %    Eosinophils Relative 2 0 - 6 %    Basophils Relative 1 0 - 1 %    Neutrophils Absolute 3 32 1 85 - 7 62 Thousands/µL    Immature Grans Absolute 0 01 0 00 - 0 20 Thousand/uL    Lymphocytes Absolute 2 32 0 60 - 4 47 Thousands/µL    Monocytes Absolute 0 41 0 17 - 1 22 Thousand/µL    Eosinophils Absolute 0 13 0 00 - 0 61 Thousand/µL    Basophils Absolute 0 03 0 00 - 0 10 Thousands/µL   Comprehensive metabolic panel    Collection Time: 07/05/18  9:29 AM   Result Value Ref Range    Sodium 138 136 - 145 mmol/L    Potassium 4 1 3 5 - 5 3 mmol/L    Chloride 103 100 - 108 mmol/L    CO2 29 21 - 32 mmol/L    Anion Gap 6 4 - 13 mmol/L BUN 16 5 - 25 mg/dL    Creatinine 1 11 0 60 - 1 30 mg/dL    Glucose, Fasting 90 65 - 99 mg/dL    Calcium 9 6 8 3 - 10 1 mg/dL    AST 18 5 - 45 U/L    ALT 26 12 - 78 U/L    Alkaline Phosphatase 61 46 - 116 U/L    Total Protein 7 7 6 4 - 8 2 g/dL    Albumin 3 9 3 5 - 5 0 g/dL    Total Bilirubin 0 62 0 20 - 1 00 mg/dL    eGFR 52 ml/min/1 73sq m   Lipid panel    Collection Time: 07/05/18  9:29 AM   Result Value Ref Range    Cholesterol 188 50 - 200 mg/dL    Triglycerides 175 (H) <=150 mg/dL    HDL, Direct 68 (H) 40 - 60 mg/dL    LDL Calculated 85 0 - 100 mg/dL    Non-HDL-Chol (CHOL-HDL) 120 mg/dl   TSH, 3rd generation    Collection Time: 07/05/18  9:29 AM   Result Value Ref Range    TSH 3RD GENERATON 2 200 0 358 - 3 740 uIU/mL   Hepatitis C antibody    Collection Time: 07/05/18  9:29 AM   Result Value Ref Range    Hepatitis C Ab Non-reactive Non-reactive   Protime-INR    Collection Time: 07/05/18  9:29 AM   Result Value Ref Range    Protime 12 5 11 8 - 14 2 seconds    INR 0 92 0 86 - 1 17   APTT    Collection Time: 07/05/18  9:29 AM   Result Value Ref Range    PTT 31 24 - 36 seconds   Hemoglobin A1C    Collection Time: 07/05/18  9:29 AM   Result Value Ref Range    Hemoglobin A1C 5 5 4 2 - 6 3 %     mg/dl   UA (URINE) with reflex to Microscopic    Collection Time: 07/05/18  9:30 AM   Result Value Ref Range    Color, UA Yellow     Clarity, UA Clear     Specific Gravity, UA 1 007 1 003 - 1 030    pH, UA 7 0 4 5 - 8 0    Leukocytes, UA Negative Negative    Nitrite, UA Negative Negative    Protein, UA Negative Negative mg/dl    Glucose, UA Negative Negative mg/dl    Ketones, UA Negative Negative mg/dl    Urobilinogen, UA 0 2 0 2, 1 0 E U /dl E U /dl    Bilirubin, UA Negative Negative    Blood, UA Negative Negative   Type and screen    Collection Time: 07/05/18 12:49 PM   Result Value Ref Range    ABO Grouping AB     Rh Factor Negative     Antibody Screen Negative     Specimen Expiration Date [id number]    Basic metabolic panel    Collection Time: 07/12/18 10:03 AM   Result Value Ref Range    Sodium 134 (L) 136 - 145 mmol/L    Potassium 3 9 3 5 - 5 3 mmol/L    Chloride 101 100 - 108 mmol/L    CO2 28 21 - 32 mmol/L    Anion Gap 5 4 - 13 mmol/L    BUN 15 5 - 25 mg/dL    Creatinine 1 12 0 60 - 1 30 mg/dL    Glucose, Fasting 89 65 - 99 mg/dL    Calcium 9 3 8 3 - 10 1 mg/dL    eGFR 51 ml/min/1 73sq m       SUBJECTIVE:  Santa Contreras is a 79 y o  female who presents today with a chief complaint of Pre-op Exam    Patient here for pre op evaluation - dr María Elena Collazo 7/30/18 right total hip replacement /   She has had left total hip replacment in the past and did well  She has more pain in her right hip and it is impacting her ability to walk  She had labs done- her gfr was borderline- her diuretic was decreased and it was repeated - similar results  She offers no complaints  Review of Systems   Constitutional: Negative  HENT: Negative  Eyes: Negative  Respiratory: Negative  Cardiovascular: Negative  Gastrointestinal: Negative  Endocrine: Negative  Genitourinary: Negative  Musculoskeletal: Positive for arthralgias (right hip pain ) and gait problem (hip pain)  Allergic/Immunologic: Negative  Neurological: Negative for dizziness, seizures, facial asymmetry, speech difficulty, light-headedness, numbness and headaches  Hematological: Negative  Psychiatric/Behavioral: Negative  EKG: normal EKG, normal sinus rhythm, unchanged from previous tracings, RBBB ekg was done in 3/2018- reviewed   I have reviewed the patient's PMH, Social History, Medication List and Allergies  OBJECTIVE:  /74   Pulse 64   Temp 98 5 °F (36 9 °C)   Resp 16   Ht 4' 11 2" (1 504 m)   Wt 93 kg (205 lb)   LMP  (LMP Unknown) Comment: hysterectomy  BMI 41 13 kg/m²   Physical Exam   Constitutional: She is oriented to person, place, and time  She appears well-developed and well-nourished  No distress     HENT: Head: Normocephalic and atraumatic  Right Ear: External ear normal    Left Ear: External ear normal    Nose: Nose normal    Eyes: Conjunctivae and EOM are normal  Pupils are equal, round, and reactive to light  Neck: Normal range of motion  Neck supple  No JVD present  No tracheal deviation present  No thyromegaly present  Cardiovascular: Normal rate, regular rhythm, normal heart sounds and intact distal pulses  Exam reveals no gallop  No murmur heard  Pulmonary/Chest: Effort normal and breath sounds normal  No respiratory distress  She has no wheezes  She has no rales  She exhibits no tenderness  Abdominal: Soft  Bowel sounds are normal  She exhibits no distension  There is no tenderness  There is no rebound  Musculoskeletal: Normal range of motion  She exhibits tenderness (pain in hip;  limited rom of right hip )  She exhibits no edema  Neurological: She is alert and oriented to person, place, and time  She has normal reflexes  No cranial nerve deficit  Coordination normal    Skin: Skin is warm and dry  No rash noted  No erythema  Psychiatric: Her behavior is normal  Judgment and thought content normal    Nursing note and vitals reviewed

## 2018-07-26 ENCOUNTER — ANESTHESIA EVENT (OUTPATIENT)
Dept: PERIOP | Facility: HOSPITAL | Age: 68
DRG: 470 | End: 2018-07-26
Payer: MEDICARE

## 2018-07-27 ENCOUNTER — TELEPHONE (OUTPATIENT)
Dept: FAMILY MEDICINE CLINIC | Facility: CLINIC | Age: 68
End: 2018-07-27

## 2018-07-27 NOTE — TELEPHONE ENCOUNTER
Riley Ge from Dr [facility] called stating that they received call from anesthesia stating that they cannot accept pre-op clearance not as is  Cardiac and Pulmonary fields cannot state negative  Garcia must state that heart NSR and lung fields clear to ausculation  Patient 's OR is scheduled for 7/30 at 9:45am

## 2018-07-30 ENCOUNTER — ANESTHESIA (OUTPATIENT)
Dept: PERIOP | Facility: HOSPITAL | Age: 68
DRG: 470 | End: 2018-07-30
Payer: MEDICARE

## 2018-07-30 ENCOUNTER — HOSPITAL ENCOUNTER (INPATIENT)
Facility: HOSPITAL | Age: 68
LOS: 1 days | Discharge: HOME | DRG: 470 | End: 2018-07-31
Attending: ORTHOPAEDIC SURGERY | Admitting: ORTHOPAEDIC SURGERY
Payer: MEDICARE

## 2018-07-30 DIAGNOSIS — I10 ESSENTIAL HYPERTENSION: ICD-10-CM

## 2018-07-30 DIAGNOSIS — N28.9 RENAL INSUFFICIENCY: ICD-10-CM

## 2018-07-30 DIAGNOSIS — M16.11 PRIMARY OSTEOARTHRITIS OF ONE HIP, RIGHT: Primary | ICD-10-CM

## 2018-07-30 DIAGNOSIS — Z96.641 STATUS POST RIGHT HIP REPLACEMENT: ICD-10-CM

## 2018-07-30 DIAGNOSIS — E78.5 HYPERLIPIDEMIA, UNSPECIFIED HYPERLIPIDEMIA TYPE: ICD-10-CM

## 2018-07-30 DIAGNOSIS — Z96.642 AFTERCARE FOLLOWING LEFT HIP JOINT REPLACEMENT SURGERY: ICD-10-CM

## 2018-07-30 DIAGNOSIS — Z47.1 AFTERCARE FOLLOWING LEFT HIP JOINT REPLACEMENT SURGERY: ICD-10-CM

## 2018-07-30 LAB
ABO GROUP BLD: NORMAL
BLD GP AB SCN SERPL QL: NEGATIVE
RH BLD: NEGATIVE
SPECIMEN EXPIRATION DATE: NORMAL

## 2018-07-30 PROCEDURE — C1776 JOINT DEVICE (IMPLANTABLE): HCPCS | Performed by: ORTHOPAEDIC SURGERY

## 2018-07-30 PROCEDURE — 0SR902A REPLACEMENT OF RIGHT HIP JOINT WITH METAL ON POLYETHYLENE SYNTHETIC SUBSTITUTE, UNCEMENTED, OPEN APPROACH: ICD-10-PCS | Performed by: ORTHOPAEDIC SURGERY

## 2018-07-30 PROCEDURE — 86900 BLOOD TYPING SEROLOGIC ABO: CPT | Performed by: ORTHOPAEDIC SURGERY

## 2018-07-30 PROCEDURE — 86901 BLOOD TYPING SEROLOGIC RH(D): CPT | Performed by: ORTHOPAEDIC SURGERY

## 2018-07-30 PROCEDURE — G8979 MOBILITY GOAL STATUS: HCPCS

## 2018-07-30 PROCEDURE — 86850 RBC ANTIBODY SCREEN: CPT | Performed by: ORTHOPAEDIC SURGERY

## 2018-07-30 PROCEDURE — 27130 TOTAL HIP ARTHROPLASTY: CPT | Performed by: ORTHOPAEDIC SURGERY

## 2018-07-30 PROCEDURE — C1713 ANCHOR/SCREW BN/BN,TIS/BN: HCPCS | Performed by: ORTHOPAEDIC SURGERY

## 2018-07-30 PROCEDURE — G8978 MOBILITY CURRENT STATUS: HCPCS

## 2018-07-30 PROCEDURE — 97163 PT EVAL HIGH COMPLEX 45 MIN: CPT

## 2018-07-30 DEVICE — PINNACLE POROCOAT ACETABULAR SHELL SECTOR II 48MM OD
Type: IMPLANTABLE DEVICE | Site: HIP | Status: FUNCTIONAL
Brand: PINNACLE POROCOAT

## 2018-07-30 DEVICE — ARTICUL/EZE FEMORAL HEAD DIAMETER 32MM +1 12/14 TAPER
Type: IMPLANTABLE DEVICE | Site: HIP | Status: FUNCTIONAL
Brand: ARTICUL/EZE

## 2018-07-30 DEVICE — PINNACLE CANCELLOUS BONE SCREW 6.5MM X 25MM
Type: IMPLANTABLE DEVICE | Site: HIP | Status: FUNCTIONAL
Brand: PINNACLE

## 2018-07-30 DEVICE — CORAIL HIP SYSTEM CEMENTLESS FEMORAL STEM 12/14 AMT 135 DEGREES KS SIZE 12 HA COATED STANDARD NO COLLAR
Type: IMPLANTABLE DEVICE | Site: HIP | Status: FUNCTIONAL
Brand: CORAIL

## 2018-07-30 DEVICE — PINNACLE HIP SOLUTIONS ALTRX POLYETHYLENE ACETABULAR LINER +4 10 DEGREES 32MM ID 48MM OD
Type: IMPLANTABLE DEVICE | Site: HIP | Status: FUNCTIONAL
Brand: PINNACLE ALTRX

## 2018-07-30 RX ORDER — ASCORBIC ACID 500 MG
500 TABLET ORAL DAILY
COMMUNITY
End: 2018-08-15 | Stop reason: ALTCHOICE

## 2018-07-30 RX ORDER — EPHEDRINE SULFATE 50 MG/ML
INJECTION, SOLUTION INTRAVENOUS AS NEEDED
Status: DISCONTINUED | OUTPATIENT
Start: 2018-07-30 | End: 2018-07-30 | Stop reason: SURG

## 2018-07-30 RX ORDER — OXYCODONE HYDROCHLORIDE 10 MG/1
10 TABLET ORAL EVERY 4 HOURS PRN
Status: DISCONTINUED | OUTPATIENT
Start: 2018-07-30 | End: 2018-07-31 | Stop reason: HOSPADM

## 2018-07-30 RX ORDER — TRAMADOL HYDROCHLORIDE 50 MG/1
50 TABLET ORAL EVERY 6 HOURS PRN
Qty: 60 TABLET | Refills: 0 | Status: SHIPPED | OUTPATIENT
Start: 2018-07-30 | End: 2018-08-14 | Stop reason: SDUPTHER

## 2018-07-30 RX ORDER — GABAPENTIN 100 MG/1
100 CAPSULE ORAL EVERY 8 HOURS SCHEDULED
Status: DISCONTINUED | OUTPATIENT
Start: 2018-07-30 | End: 2018-07-31 | Stop reason: HOSPADM

## 2018-07-30 RX ORDER — PROPOFOL 10 MG/ML
INJECTION, EMULSION INTRAVENOUS CONTINUOUS PRN
Status: DISCONTINUED | OUTPATIENT
Start: 2018-07-30 | End: 2018-07-30 | Stop reason: SURG

## 2018-07-30 RX ORDER — HYDROCHLOROTHIAZIDE 25 MG/1
TABLET ORAL
Qty: 90 TABLET | Refills: 1 | Status: SHIPPED | OUTPATIENT
Start: 2018-07-30 | End: 2019-01-04 | Stop reason: SDUPTHER

## 2018-07-30 RX ORDER — SODIUM CHLORIDE, SODIUM LACTATE, POTASSIUM CHLORIDE, CALCIUM CHLORIDE 600; 310; 30; 20 MG/100ML; MG/100ML; MG/100ML; MG/100ML
125 INJECTION, SOLUTION INTRAVENOUS CONTINUOUS
Status: DISCONTINUED | OUTPATIENT
Start: 2018-07-30 | End: 2018-07-31 | Stop reason: HOSPADM

## 2018-07-30 RX ORDER — CHLORHEXIDINE GLUCONATE 4 G/100ML
SOLUTION TOPICAL DAILY PRN
Status: DISCONTINUED | OUTPATIENT
Start: 2018-07-30 | End: 2018-07-30 | Stop reason: HOSPADM

## 2018-07-30 RX ORDER — METOCLOPRAMIDE HYDROCHLORIDE 5 MG/ML
10 INJECTION INTRAMUSCULAR; INTRAVENOUS ONCE AS NEEDED
Status: DISCONTINUED | OUTPATIENT
Start: 2018-07-30 | End: 2018-07-30 | Stop reason: HOSPADM

## 2018-07-30 RX ORDER — ACETAMINOPHEN 500 MG
1000 TABLET ORAL EVERY 6 HOURS PRN
COMMUNITY
End: 2019-07-15

## 2018-07-30 RX ORDER — OXYCODONE HYDROCHLORIDE 5 MG/1
TABLET ORAL
Qty: 40 TABLET | Refills: 0 | Status: SHIPPED | OUTPATIENT
Start: 2018-07-30 | End: 2018-08-07 | Stop reason: SDUPTHER

## 2018-07-30 RX ORDER — ONDANSETRON 2 MG/ML
INJECTION INTRAMUSCULAR; INTRAVENOUS AS NEEDED
Status: DISCONTINUED | OUTPATIENT
Start: 2018-07-30 | End: 2018-07-30 | Stop reason: SURG

## 2018-07-30 RX ORDER — METHOCARBAMOL 750 MG/1
750 TABLET, FILM COATED ORAL EVERY 6 HOURS PRN
Status: DISCONTINUED | OUTPATIENT
Start: 2018-07-30 | End: 2018-07-31 | Stop reason: HOSPADM

## 2018-07-30 RX ORDER — FOLIC ACID 1 MG/1
1 TABLET ORAL DAILY
COMMUNITY
End: 2018-08-15 | Stop reason: ALTCHOICE

## 2018-07-30 RX ORDER — SODIUM CHLORIDE, SODIUM LACTATE, POTASSIUM CHLORIDE, CALCIUM CHLORIDE 600; 310; 30; 20 MG/100ML; MG/100ML; MG/100ML; MG/100ML
50 INJECTION, SOLUTION INTRAVENOUS CONTINUOUS
Status: DISCONTINUED | OUTPATIENT
Start: 2018-07-30 | End: 2018-07-31 | Stop reason: HOSPADM

## 2018-07-30 RX ORDER — ONDANSETRON 2 MG/ML
4 INJECTION INTRAMUSCULAR; INTRAVENOUS ONCE AS NEEDED
Status: DISCONTINUED | OUTPATIENT
Start: 2018-07-30 | End: 2018-07-30 | Stop reason: HOSPADM

## 2018-07-30 RX ORDER — FENTANYL CITRATE 50 UG/ML
INJECTION, SOLUTION INTRAMUSCULAR; INTRAVENOUS AS NEEDED
Status: DISCONTINUED | OUTPATIENT
Start: 2018-07-30 | End: 2018-07-30 | Stop reason: SURG

## 2018-07-30 RX ORDER — MORPHINE SULFATE 2 MG/ML
2 INJECTION, SOLUTION INTRAMUSCULAR; INTRAVENOUS EVERY 2 HOUR PRN
Status: DISCONTINUED | OUTPATIENT
Start: 2018-07-30 | End: 2018-07-31 | Stop reason: HOSPADM

## 2018-07-30 RX ORDER — FENTANYL CITRATE/PF 50 MCG/ML
25 SYRINGE (ML) INJECTION
Status: DISCONTINUED | OUTPATIENT
Start: 2018-07-30 | End: 2018-07-30 | Stop reason: HOSPADM

## 2018-07-30 RX ORDER — HYDRALAZINE HYDROCHLORIDE 25 MG/1
25 TABLET, FILM COATED ORAL EVERY 8 HOURS PRN
Status: DISCONTINUED | OUTPATIENT
Start: 2018-07-30 | End: 2018-07-31 | Stop reason: HOSPADM

## 2018-07-30 RX ORDER — METOPROLOL SUCCINATE 50 MG/1
TABLET, EXTENDED RELEASE ORAL
Qty: 90 TABLET | Refills: 1 | Status: SHIPPED | OUTPATIENT
Start: 2018-07-30 | End: 2019-01-04 | Stop reason: SDUPTHER

## 2018-07-30 RX ORDER — BUPIVACAINE HYDROCHLORIDE 7.5 MG/ML
INJECTION, SOLUTION INTRASPINAL AS NEEDED
Status: DISCONTINUED | OUTPATIENT
Start: 2018-07-30 | End: 2018-07-30 | Stop reason: SURG

## 2018-07-30 RX ORDER — MIDAZOLAM HYDROCHLORIDE 1 MG/ML
INJECTION INTRAMUSCULAR; INTRAVENOUS AS NEEDED
Status: DISCONTINUED | OUTPATIENT
Start: 2018-07-30 | End: 2018-07-30 | Stop reason: SURG

## 2018-07-30 RX ORDER — HYDROCHLOROTHIAZIDE 25 MG/1
25 TABLET ORAL DAILY
Status: DISCONTINUED | OUTPATIENT
Start: 2018-07-31 | End: 2018-07-30

## 2018-07-30 RX ORDER — PANTOPRAZOLE SODIUM 40 MG/1
40 TABLET, DELAYED RELEASE ORAL
Status: DISCONTINUED | OUTPATIENT
Start: 2018-07-31 | End: 2018-07-31 | Stop reason: HOSPADM

## 2018-07-30 RX ORDER — ATORVASTATIN CALCIUM 20 MG/1
20 TABLET, FILM COATED ORAL
Status: DISCONTINUED | OUTPATIENT
Start: 2018-07-30 | End: 2018-07-31 | Stop reason: HOSPADM

## 2018-07-30 RX ORDER — ONDANSETRON 2 MG/ML
4 INJECTION INTRAMUSCULAR; INTRAVENOUS EVERY 6 HOURS PRN
Status: DISCONTINUED | OUTPATIENT
Start: 2018-07-30 | End: 2018-07-31 | Stop reason: HOSPADM

## 2018-07-30 RX ORDER — ACETAMINOPHEN 325 MG/1
975 TABLET ORAL ONCE
Status: COMPLETED | OUTPATIENT
Start: 2018-07-30 | End: 2018-07-30

## 2018-07-30 RX ORDER — DOCUSATE SODIUM 100 MG/1
100 CAPSULE, LIQUID FILLED ORAL 2 TIMES DAILY
Status: DISCONTINUED | OUTPATIENT
Start: 2018-07-30 | End: 2018-07-31 | Stop reason: HOSPADM

## 2018-07-30 RX ORDER — FERROUS SULFATE 325(65) MG
325 TABLET ORAL
COMMUNITY
End: 2018-09-24

## 2018-07-30 RX ORDER — OXYCODONE HYDROCHLORIDE 5 MG/1
5 TABLET ORAL EVERY 4 HOURS PRN
Status: DISCONTINUED | OUTPATIENT
Start: 2018-07-30 | End: 2018-07-31 | Stop reason: HOSPADM

## 2018-07-30 RX ORDER — FLUOXETINE HYDROCHLORIDE 20 MG/1
20 CAPSULE ORAL DAILY
Status: DISCONTINUED | OUTPATIENT
Start: 2018-07-31 | End: 2018-07-31 | Stop reason: HOSPADM

## 2018-07-30 RX ORDER — METOPROLOL SUCCINATE 50 MG/1
50 TABLET, EXTENDED RELEASE ORAL DAILY
Status: DISCONTINUED | OUTPATIENT
Start: 2018-07-31 | End: 2018-07-31 | Stop reason: HOSPADM

## 2018-07-30 RX ORDER — MAGNESIUM HYDROXIDE 1200 MG/15ML
LIQUID ORAL AS NEEDED
Status: DISCONTINUED | OUTPATIENT
Start: 2018-07-30 | End: 2018-07-30 | Stop reason: HOSPADM

## 2018-07-30 RX ORDER — ALBUMIN, HUMAN INJ 5% 5 %
SOLUTION INTRAVENOUS CONTINUOUS PRN
Status: DISCONTINUED | OUTPATIENT
Start: 2018-07-30 | End: 2018-07-30 | Stop reason: SURG

## 2018-07-30 RX ORDER — ACETAMINOPHEN 325 MG/1
650 TABLET ORAL EVERY 6 HOURS SCHEDULED
Status: DISCONTINUED | OUTPATIENT
Start: 2018-07-30 | End: 2018-07-31 | Stop reason: HOSPADM

## 2018-07-30 RX ORDER — SENNOSIDES 8.6 MG
1 TABLET ORAL DAILY
Status: DISCONTINUED | OUTPATIENT
Start: 2018-07-30 | End: 2018-07-31 | Stop reason: HOSPADM

## 2018-07-30 RX ORDER — SODIUM CHLORIDE, SODIUM LACTATE, POTASSIUM CHLORIDE, CALCIUM CHLORIDE 600; 310; 30; 20 MG/100ML; MG/100ML; MG/100ML; MG/100ML
1.5 INJECTION, SOLUTION INTRAVENOUS CONTINUOUS
Status: DISCONTINUED | OUTPATIENT
Start: 2018-07-30 | End: 2018-07-31 | Stop reason: HOSPADM

## 2018-07-30 RX ADMIN — ALBUMIN (HUMAN): 12.5 SOLUTION INTRAVENOUS at 09:41

## 2018-07-30 RX ADMIN — ONDANSETRON 4 MG: 2 INJECTION INTRAMUSCULAR; INTRAVENOUS at 23:50

## 2018-07-30 RX ADMIN — FENTANYL CITRATE 50 MCG: 50 INJECTION, SOLUTION INTRAMUSCULAR; INTRAVENOUS at 10:49

## 2018-07-30 RX ADMIN — OXYCODONE HYDROCHLORIDE 10 MG: 10 TABLET ORAL at 17:14

## 2018-07-30 RX ADMIN — DOCUSATE SODIUM 100 MG: 100 CAPSULE, LIQUID FILLED ORAL at 17:14

## 2018-07-30 RX ADMIN — METHOCARBAMOL 750 MG: 750 TABLET ORAL at 19:01

## 2018-07-30 RX ADMIN — SODIUM CHLORIDE, SODIUM LACTATE, POTASSIUM CHLORIDE, AND CALCIUM CHLORIDE 1.5 ML/KG/HR: .6; .31; .03; .02 INJECTION, SOLUTION INTRAVENOUS at 18:52

## 2018-07-30 RX ADMIN — OXYCODONE HYDROCHLORIDE 10 MG: 10 TABLET ORAL at 22:15

## 2018-07-30 RX ADMIN — FENTANYL CITRATE 10 MCG: 50 INJECTION, SOLUTION INTRAMUSCULAR; INTRAVENOUS at 09:26

## 2018-07-30 RX ADMIN — SODIUM CHLORIDE, SODIUM LACTATE, POTASSIUM CHLORIDE, AND CALCIUM CHLORIDE: .6; .31; .03; .02 INJECTION, SOLUTION INTRAVENOUS at 09:55

## 2018-07-30 RX ADMIN — BUPIVACAINE HYDROCHLORIDE IN DEXTROSE 1.2 ML: 7.5 INJECTION, SOLUTION SUBARACHNOID at 09:26

## 2018-07-30 RX ADMIN — EPHEDRINE SULFATE 5 MG: 50 INJECTION, SOLUTION INTRAMUSCULAR; INTRAVENOUS; SUBCUTANEOUS at 09:52

## 2018-07-30 RX ADMIN — MIDAZOLAM 2 MG: 1 INJECTION INTRAMUSCULAR; INTRAVENOUS at 09:13

## 2018-07-30 RX ADMIN — SODIUM CHLORIDE, SODIUM LACTATE, POTASSIUM CHLORIDE, AND CALCIUM CHLORIDE: .6; .31; .03; .02 INJECTION, SOLUTION INTRAVENOUS at 08:30

## 2018-07-30 RX ADMIN — PROPOFOL 80 MCG/KG/MIN: 10 INJECTION, EMULSION INTRAVENOUS at 09:30

## 2018-07-30 RX ADMIN — ACETAMINOPHEN 650 MG: 325 TABLET, FILM COATED ORAL at 17:14

## 2018-07-30 RX ADMIN — GABAPENTIN 100 MG: 100 CAPSULE ORAL at 13:14

## 2018-07-30 RX ADMIN — MORPHINE SULFATE 2 MG: 2 INJECTION, SOLUTION INTRAMUSCULAR; INTRAVENOUS at 18:43

## 2018-07-30 RX ADMIN — MORPHINE SULFATE 2 MG: 2 INJECTION, SOLUTION INTRAMUSCULAR; INTRAVENOUS at 21:04

## 2018-07-30 RX ADMIN — MORPHINE SULFATE 2 MG: 2 INJECTION, SOLUTION INTRAMUSCULAR; INTRAVENOUS at 16:06

## 2018-07-30 RX ADMIN — CEFAZOLIN SODIUM 2000 MG: 2 SOLUTION INTRAVENOUS at 17:16

## 2018-07-30 RX ADMIN — SODIUM CHLORIDE, SODIUM LACTATE, POTASSIUM CHLORIDE, AND CALCIUM CHLORIDE 125 ML/HR: .6; .31; .03; .02 INJECTION, SOLUTION INTRAVENOUS at 08:10

## 2018-07-30 RX ADMIN — EPHEDRINE SULFATE 5 MG: 50 INJECTION, SOLUTION INTRAMUSCULAR; INTRAVENOUS; SUBCUTANEOUS at 09:40

## 2018-07-30 RX ADMIN — EPHEDRINE SULFATE 5 MG: 50 INJECTION, SOLUTION INTRAMUSCULAR; INTRAVENOUS; SUBCUTANEOUS at 09:43

## 2018-07-30 RX ADMIN — EPHEDRINE SULFATE 5 MG: 50 INJECTION, SOLUTION INTRAMUSCULAR; INTRAVENOUS; SUBCUTANEOUS at 10:19

## 2018-07-30 RX ADMIN — EPHEDRINE SULFATE 5 MG: 50 INJECTION, SOLUTION INTRAMUSCULAR; INTRAVENOUS; SUBCUTANEOUS at 09:55

## 2018-07-30 RX ADMIN — GABAPENTIN 100 MG: 100 CAPSULE ORAL at 21:04

## 2018-07-30 RX ADMIN — ONDANSETRON 4 MG: 2 INJECTION INTRAMUSCULAR; INTRAVENOUS at 10:27

## 2018-07-30 RX ADMIN — ACETAMINOPHEN 650 MG: 325 TABLET, FILM COATED ORAL at 12:19

## 2018-07-30 RX ADMIN — CEFAZOLIN SODIUM 2000 MG: 1 SOLUTION INTRAVENOUS at 09:16

## 2018-07-30 RX ADMIN — SODIUM CHLORIDE, SODIUM LACTATE, POTASSIUM CHLORIDE, AND CALCIUM CHLORIDE 1.5 ML/KG/HR: .6; .31; .03; .02 INJECTION, SOLUTION INTRAVENOUS at 11:26

## 2018-07-30 RX ADMIN — ACETAMINOPHEN 975 MG: 325 TABLET, FILM COATED ORAL at 07:47

## 2018-07-30 RX ADMIN — OXYCODONE HYDROCHLORIDE 10 MG: 10 TABLET ORAL at 13:14

## 2018-07-30 RX ADMIN — ACETAMINOPHEN 650 MG: 325 TABLET, FILM COATED ORAL at 23:49

## 2018-07-30 RX ADMIN — FENTANYL CITRATE 40 MCG: 50 INJECTION, SOLUTION INTRAMUSCULAR; INTRAVENOUS at 09:30

## 2018-07-30 NOTE — ANESTHESIA PREPROCEDURE EVALUATION
Review of Systems/Medical History    Chart reviewed  No history of anesthetic complications     Cardiovascular  EKG reviewed, Exercise tolerance (METS): >4,  Hyperlipidemia, Hypertension ,    Pulmonary  Negative pulmonary ROS No pneumonia,        GI/Hepatic    GERD well controlled,        Negative  ROS        Endo/Other  Negative endo/other ROS      GYN       Hematology  Negative hematology ROS   No coagulation disorder ,    Musculoskeletal    Arthritis     Neurology   Psychology   Depression ,              Physical Exam    Airway    Mallampati score: IV  TM Distance: >3 FB  Neck ROM: full     Dental   No notable dental hx     Cardiovascular      Pulmonary      Other Findings        Anesthesia Plan  ASA Score- 2     Anesthesia Type- spinal with ASA Monitors  Additional Monitors:   Airway Plan:     Comment: Spinal, GA as backup  Plan Factors-    Induction- intravenous  Postoperative Plan-     Informed Consent- Anesthetic plan and risks discussed with patient  I personally reviewed this patient with the CRNA  Discussed and agreed on the Anesthesia Plan with the CRNA  Ethel Amador

## 2018-07-30 NOTE — CONSULTS
Consultation - Lynne Isaac 79 y o  female MRN: [id number]    Unit/Bed#: CW3 344-01 Encounter: [id number]        History of Present Illness     HPI: Lynne Isaac is a 79y o  year old female, with PMH of bilat hip OA and recent Lt GERARDO, HTN, hyperlipidemia, and GERD, who presents for an elective Rt GERARDO by Dr Charis Bowling  She had been experiencing increasing Rt hip pain despite conservative tx  She tolerated the surgery with minimal blood loss  ROS:  Constitutional: Negative  HENT: Negative  Respiratory: Negative  Cardiovascular: Negative  Gastrointestinal: Negative  Musculoskeletal: Rt hip pain    Neurological: Negative  Psychiatric/Behavioral: Negative          Historical Information   Past Medical History:   Diagnosis Date    Arthritis     Depression     Endometriosis     GERD (gastroesophageal reflux disease)     Hyperlipidemia     Hypertension     Osteopenia     Pancreatitis 06/2017    Pneumonia      Past Surgical History:   Procedure Laterality Date    ABDOMINAL SURGERY      endometriosis     APPENDECTOMY      CARPAL TUNNEL RELEASE      COLONOSCOPY      HAND SURGERY      HYSTERECTOMY      JOINT REPLACEMENT Bilateral     KNEE    JOINT REPLACEMENT Left     HIP    OH ARTHROSCOPY SHOULDER SURGICAL BICEPS TENODESIS Right 5/10/2017    Procedure: ARTHROSCOPIC BICEPS TENODESIS WITH ROTATOR CUFF REPAIR ;  Surgeon: Abdi Reyes MD;  Location: [facility];  Service: Orthopedics    [place] Right 5/10/2017    Procedure: SHOULDER ARTHROSCOPY SUBACROMIAL DECOMPRESSION;  Surgeon: Abdi Reyes MD;  [facility];  Service: Orthopedics    OH TOTAL HIP ARTHROPLASTY Left 3/12/2018    Procedure: ARTHROPLASTY HIP TOTAL;  Surgeon: Toro Mora MD;  Location: [facility];  Service: Orthopedics    TONSILLECTOMY       Social History   History   Alcohol Use    Yes     Comment: 1-2x / year     History   Drug Use No     History   Smoking Status    Never Smoker   Smokeless Tobacco    Never Used     Family History   Problem Relation Age of Onset    Atrial fibrillation Mother     Breast cancer Mother     Stroke Mother     Coronary artery disease Mother     Diabetes Mother     Pancreatitis Mother     Heart attack Father     Arthritis Family     Cancer Family        Meds/Allergies   current meds:  Current Facility-Administered Medications   Medication Dose Route Frequency    acetaminophen (TYLENOL) tablet 650 mg  650 mg Oral Q6H [address]    atorvastatin (LIPITOR) tablet 20 mg  20 mg Oral Daily With Dinner    ceFAZolin (ANCEF) IVPB (premix) 2,000 mg  2,000 mg Intravenous Q8H    docusate sodium (COLACE) capsule 100 mg  100 mg Oral BID    [START ON 7/31/2018] enoxaparin (LOVENOX) subcutaneous injection 40 mg  40 mg Subcutaneous Daily    [START ON 7/31/2018] FLUoxetine (PROzac) capsule 20 mg  20 mg Oral Daily    gabapentin (NEURONTIN) capsule 100 mg  100 mg Oral Q8H [address]    [START ON 7/31/2018] hydrochlorothiazide (HYDRODIURIL) tablet 25 mg  25 mg Oral Daily    lactated ringers bolus 1,000 mL  1,000 mL Intravenous Once    lactated ringers infusion  125 mL/hr Intravenous Continuous    lactated ringers infusion  50 mL/hr Intravenous Continuous    lactated ringers infusion  1 5 mL/kg/hr Intravenous Continuous    [START ON 7/31/2018] metoprolol succinate (TOPROL-XL) 24 hr tablet 50 mg  50 mg Oral Daily    morphine injection 2 mg  2 mg Intravenous Q2H PRN    ondansetron (ZOFRAN) injection 4 mg  4 mg Intravenous Q6H PRN    oxyCODONE (ROXICODONE) immediate release tablet 10 mg  10 mg Oral Q4H PRN    oxyCODONE (ROXICODONE) IR tablet 5 mg  5 mg Oral Q4H PRN    senna (SENOKOT) tablet 8 6 mg  1 tablet Oral Daily       PTA meds:   Prescriptions Prior to Admission   Medication    acetaminophen (TYLENOL) 500 mg tablet    ascorbic acid (VITAMIN C) 500 mg tablet    atorvastatin (LIPITOR) 20 mg tablet    calcium-vitamin D (OSCAL) 250-125 MG-UNIT per tablet    Cholecalciferol (VITAMIN D) 2000 units tablet    ferrous sulfate 325 (65 Fe) mg tablet    FLUoxetine (PROzac) 20 mg capsule    folic acid (FOLVITE) 1 mg tablet    ibuprofen (MOTRIN) 200 mg tablet    Multiple Vitamins-Minerals (CENTRUM SILVER ULTRA WOMENS PO)    omeprazole (PriLOSEC) 20 mg delayed release capsule    enoxaparin (LOVENOX) 40 mg/0 4 mL    Omega-3 1000 MG CAPS     Allergies   Allergen Reactions    Hydromorphone Hcl Itching    Percocet [Oxycodone-Acetaminophen] GI Intolerance    Sulfamethoxazole-Trimethoprim Rash       Objective   Vitals: Blood pressure 128/63, pulse 67, temperature 98 °F (36 7 °C), temperature source Oral, resp  rate 16, height 4' 11" (1 499 m), weight 93 kg (205 lb), SpO2 99 %, not currently breastfeeding  Physical Exam   Constitutional: Pt is oriented to person, place, and time  HENT:  Normocephalic  EOM are normal  PERRLAt  Neck supple  Cardiovascular: Normal rate and regular rhythm  Pulmonary: Breath sounds normal  No respiratory distress  Pt has no wheezes nor rales  Abdominal: Soft  Bowel sounds are normal  Pt exhibits no distension  There is no tenderness  There is no rebound and no guarding  Neurological: Pt is alert and oriented to person, place, and time  Psychiatric: Pt has a normal mood and affect  Lab Results:                   Glucose (mg/dL)   Date Value   03/13/2018 101   07/01/2017 122   06/30/2017 112   05/09/2017 67   05/02/2017 92   06/30/2014 89       Labs reviewed    Imaging: reviewed  EKG, Pathology, and Other Studies: I have personally reviewed pertinent reports  VTE Prophylaxis: Enoxaparin (Lovenox)    Code Status: Level 1 - Full Code   Advance Directive and Living Will:      Power of :    POLST:      Assessment/Plan     # Rt hip OA s/p Rt GERARDO: Continue post op pain control measures as prescribed  Follow bowel regimen to help decrease narcotic induced constipation   Follow post operative hemoglobin with serial CBC and treat accordingly  Monitor WBC and fever curve post op while encouraging use of incentive spirometer  DVT prophylaxis in place and reviewed  # HTN: Pt takes HCTZ 25mg daily and Toprol-XL 50mg daily  Will hold diuretic to decrease the risk of HARMONY in the post-op period  Add Hydralazine 25mg every 8 hours as needed for SBP > 160  # GERD: Pt uses Prilosec as an outpt  Will substitute with Protonix  # Hyperlipidemia: Continue atorvastatin  Counseling / Coordination of Care  Total floor / unit time spent today 20 minutes  Greater than 50% of total time was spent with the patient and / or family counseling and / or coordination of care        Kanu Franco PA-C

## 2018-07-30 NOTE — ANESTHESIA PROCEDURE NOTES
Spinal Block    Patient location during procedure: OR  Start time: 7/30/2018 9:21 AM  End time: 7/30/2018 9:27 AM  Reason for block: primary anesthetic  Staffing  Anesthesiologist: Juan C Sarah  Performed: anesthesiologist   Preanesthetic Checklist  Completed: patient identified, site marked, surgical consent, pre-op evaluation, timeout performed, IV checked, risks and benefits discussed and monitors and equipment checked  Spinal Block  Patient position: sitting  Prep: Betadine and site prepped and draped  Patient monitoring: heart rate, cardiac monitor, continuous pulse ox and frequent blood pressure checks  Approach: midline  Location: L3-4  Injection technique: single-shot  Needle  Needle type: pencan kit  Needle length: 5 cm  Assessment  Sensory level: T10  Injection Assessment:  negative aspiration for heme, no paresthesia on injection and positive aspiration for clear CSF    Post-procedure:  site cleaned  Additional Notes  Pt tolerated well  Adequate surgical anesthesia

## 2018-07-30 NOTE — CONSULTS
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, describes very little pain left hip  She describes worsening pain right hip and right groin  She has pain level right hip joint, the pain is made worse bearing weight, the pain increases with  Increased activities  Gait pattern is without antalgia, without assist device  Left hip is heel postal incision  There is no groin pain with motion  There is no limb of the quality  Right hip has intact soft tissues  There is restriction of forced internal rotation of the right hip  This recreates groin pain  The right thighs devoid of atrophy  Right knee is not effused  The foot and toes strongly dorsiflex  Two views left hip show total hip replacement satisfactory position  On the AP pelvis, arthritic right hip was identified  Assessment/plan:  62-year-old female now 3 months removed left total hip replacement with well clinical radiographic exam   The right hip symptoms worsen, I think elective right total hip replacement surgery is indicated  After review of the risks and benefits, consent was that the above-mentioned surgery  No guarantees were given  I would welcome the opportunity see back in the office a postoperative patient      Expand All Collapse All    79 y  o female presents for  Continue postoperative care after a left total hip arthroplasty which she is now 12 weeks postoperative from  She has been overall doing well has some mild residual muscle discomfort at the area of with max  She states that she had a period of time where she had heavy activity and increasing pain but this has resolved since time  She denies motor sensory deficits on the left lower extremity and stock complain of any weakness        She now complains of right hip pain which is similar in nature to the left hip pain that she had prior to undergoing total hip arthroplasty    She wishes to schedule surgery for this hip today      Review of Systems  Review of systems negative unless otherwise specified in HPI     Past Medical History  Medical History        Past Medical History:   Diagnosis Date    Arthritis      Depression      Endometriosis      GERD (gastroesophageal reflux disease)      Hyperlipidemia      Hypertension      Osteopenia      Pancreatitis 06/2017    Pneumonia              Past Surgical History  Surgical History         Past Surgical History:   Procedure Laterality Date    ABDOMINAL SURGERY         endometriosis     APPENDECTOMY        CARPAL TUNNEL RELEASE        COLONOSCOPY        HAND SURGERY        HYSTERECTOMY        JOINT REPLACEMENT         KNEE    HI ARTHROSCOPY SHOULDER SURGICAL BICEPS TENODESIS Right 5/10/2017     Procedure: ARTHROSCOPIC BICEPS TENODESIS WITH ROTATOR CUFF REPAIR ;  Surgeon: Justina Ovlales MD;  [facility];  Service: Orthopedics    HI Demar Pritchard Right 5/10/2017     Procedure: SHOULDER ARTHROSCOPY SUBACROMIAL DECOMPRESSION;  Surgeon: Justina Ovalles MD;  [facility];  Service: Orthopedics    HI TOTAL HIP ARTHROPLASTY Left 3/12/2018     Procedure: ARTHROPLASTY HIP TOTAL;  Surgeon: Horacio Treadwell MD;  [facility];  Service: Orthopedics    TONSILLECTOMY                Current Medications         Current Outpatient Prescriptions on File Prior to Visit   Medication Sig Dispense Refill    acetaminophen (TYLENOL) 500 mg tablet Take 500 mg by mouth daily        atorvastatin (LIPITOR) 20 mg tablet TAKE 1 TABLET BY MOUTH 6  DAYS A WEEK 90 tablet 3    calcium-vitamin D (OSCAL) 250-125 MG-UNIT per tablet Take 1 tablet by mouth daily        Cholecalciferol (VITAMIN D) 2000 units tablet Take 2,000 Units by mouth daily        FLUoxetine (PROzac) 20 mg capsule TAKE 1 CAPSULE BY MOUTH  EVERY DAY 90 capsule 1    gabapentin (NEURONTIN) 100 mg capsule Take 1 capsule (100 mg total) by mouth 3 (three) times a day for 30 days 60 capsule 0    hydrochlorothiazide (HYDRODIURIL) 25 mg tablet Take 12 5 mg by mouth daily         HYDROcodone-acetaminophen (NORCO) 5-325 mg per tablet 1-2 tabs po q6 hr prn pain   Do not exceed 4 g of acetaminophen in 24 hr period 30 tablet 0    ibuprofen (MOTRIN) 200 mg tablet Take 200 mg by mouth every 6 (six) hours as needed for mild pain        metoprolol succinate (TOPROL-XL) 50 mg 24 hr tablet Take 1 tablet (50 mg total) by mouth daily 90 tablet 1    Multiple Vitamins-Minerals (CENTRUM SILVER ULTRA WOMENS PO) Take by mouth        Omega-3 1000 MG CAPS Take by mouth        omeprazole (PriLOSEC) 20 mg delayed release capsule Take 20 mg by mouth daily        Polyethylene Glycol 3350 (MIRALAX PO) Take by mouth        traMADol (ULTRAM) 50 mg tablet Take 1 tablet (50 mg total) by mouth every 6 (six) hours as needed for moderate pain 60 tablet 0      No current facility-administered medications on file prior to visit           Recent Labs (HCT,HGB,PT,INR,ESR,CRP,GLU,HgA1C)     0  Lab Value Date/Time   HCT 32 9 (L) 03/13/2018 0540   HCT 42 8 05/02/2017 0933   HGB 11 0 (L) 03/13/2018 0540   HGB 14 3 05/02/2017 0933   WBC 7 11 03/13/2018 0540   WBC 6 5 05/02/2017 0933   INR 0 92 02/26/2018 0926   GLUCOSE 101 03/13/2018 0602   GLUCOSE 67 05/09/2017 1017   HGBA1C 5 4 02/26/2018 0926            Physical exam  · General: Awake, Alert, Oriented  · HEENT: No scleral injection, no evidence of facial trauma  · Heart: Extremities warm and well perfused  · Lungs: No audible wheezing  ·    left Lower extremity  ·   No pain with hip internal rotation   · 5/5 hip flexion, abduction, adduction strength  · Painless active range of motion in all planes     Right lower extremity   mild pain elicited with flexion and internal rotation   No tenderness to palpation over greater trochanter bursa   extremity warm well perfused     Procedure  None     Imaging   plain films of left hip reveal total hip arthroplasty components in satisfactory position with no evidence of subsidence, failure, osteolysis      56-year-old female now 3 months status post left total hip arthroplasty, doing well from this perspective and ongoing right hip osteoarthritis  Plan:  weightbear as tolerated bilateral lower extremities  Patient may continue to take oral analgesics as needed for pain  After discussing risks, benefits, alternatives, complications patient will schedule for right total hip arthroplasty at a time that is convenient for her  Follow-up in routine post-operative visit after R hip is scheduled at which point left hip can also be evaluated         Romy Avelar  06/12/18            Instructions         Return for after surgery in post-op visit      After Visit Summary (Printed 6/12/2018)   Additional Documentation     Vitals:    /79    Pulse 65    Ht 4' 11 49" (1 511 m)    LMP  (LMP Unknown)          More Vitals    Flowsheets:    Custom Formula Data       SmartForms:    CATHY PRE-CHARTING       Encounter Info:    Billing Info,    History,    Allergies,    Detailed Report       Orders Placed         Protime-INR       Type and screen       UA (URINE) with reflex to Microscopic      CBC and differential      Comprehensive metabolic panel       XR hip/pelv 2-3 vws left if performed      Ambulatory referral to Internal Medicine Pending Review      Case request operating room: ARTHROPLASTY RIGHT HIP TOTAL Once      All Encounter Results   Medication Changes         Ascorbic Acid 500 mg Oral 2 times daily       Ferrous Sulfate 324 mg Oral 2 times daily before meals       Folic Acid 1 mg Oral Daily      Medication List   Visit Diagnoses         Aftercare following left hip joint replacement surgery      Primary osteoarthritis of one hip, right      Problem List

## 2018-07-30 NOTE — ANESTHESIA POSTPROCEDURE EVALUATION
Post-Op Assessment Note      CV Status:  Stable    Mental Status:  Alert and awake    Hydration Status:  Euvolemic    PONV Controlled:  Controlled    Airway Patency:  Patent    Post Op Vitals Reviewed: Yes          Staff: CRNA           BP   117/55   Temp   98 6   Pulse  77   Resp   14   SpO2   100       Patient c/o 6/10 pain in PACU  50mcg fentanyl given IV  RN aware

## 2018-07-30 NOTE — OP NOTE
OPERATIVE REPORT  PATIENT NAME: Debbie Wayne    :  1950  MRN: [id number]  Pt Location: [facility] 04    SURGERY DATE: 2018    Surgeon(s) and Role:     * Saad Foster MD - Primary     * Helen Beverly PA-C - Assisting     * Dominic Solomon MD - Assisting    Preop Diagnosis:  Primary osteoarthritis of one hip, right [M16 11]    Post-Op Diagnosis Codes:     * Primary osteoarthritis of one hip, right [M16 11]    Procedure(s) (LRB):  RIGHT TOTAL HIP ARTHROPLASTY (Right)    Specimen(s):  * No specimens in log *    Estimated Blood Loss:   Minimal    Drains:  Urethral Catheter Latex 16 Fr  (Active)   Number of days: 0       Anesthesia Type:   General    Operative Indications:  Primary osteoarthritis of one hip, right [M16 11]      Operative Findings:  depuy   Cup-48mm metal   Liner-10 degree lipped poly   Head/neck-32 + 1 5m metal   EEGFE-55    Complications:   None    Procedure and Technique: Following induction of adequate level of spinal anesthesia, Aguirre catheter was sterilely introduced this patient's bladder  Antibiotics were administered  She was then placed in the left leg is, right-side-up position  An axillary roll was placed underneath the left axilla  The right hip and lateral thigh were then prepped draped sterilely  A posterior-lateral approach was created order gain access the hip  Full-thickness flaps raised get to the tensor fascia  This was split, expose the deep layer the hip  With the hip in internal rotation, the piriformis tendon was identified, transected, and retracted in a posterior fashion  The remainder of the short external rotators were sectioned as well  A T-type capsulotomy was used open the hip  The femoral head was then delivered posteriorly  Utilizing the femoral neck osteotomy guide, the proper femoral neck cut was then made  A posterior capsulectomy, and anterior capsulotomy was then created in order to circumferentially expose the right acetabulum    Her reaming started at 55, and extended to 50 which point time hemisphere of bleeding cancellous bone countered  Forty trial was inserted and noted to fit well  Therefore the 48 mm insert was then hammered into place  A single screw was then placed within the posterior superior quadrant for additional fixation  The 10 degree lip liner was snapped into position  The proximal femur was then prepared for insertion of a Press-Fit component  The box osteotome was used of the proximal femur  Patient's canal sequentially broached  12   In, 32+ 1 5 femoral head and neck, the hip was located, taken through range of motion, found to be quite stable  The trial components removed if the hip was carefully dislocated  The insert components were assembled and introduced the patient's right hip in standard fashion  The hip was once again located, taken through range of motion, and found to be quite stable  Satisfied with the extent of surgery, the wounds then flushed with saline and closed  A Betadine soak was initiated  The piriformis tendon was reapproximated to the greater trochanter number Vicryl suture  The tensor fascia was then closed number Vicryl suture  The subcu tissue closed 2 Vicryl suture  The skin was closed staples  A sterile dressing applied  Abduction pillow placed    She was then transferred to recovery room stable condition with plans to include physical therapy weight-bearing to tolerance, she will require DVT prophylaxis with Lovenox   I was present for the entire procedure    Patient Disposition:  PACU     SIGNATURE: Everardo Hicks MD  DATE: July 30, 2018  TIME: 10:38 AM

## 2018-07-30 NOTE — PHYSICAL THERAPY NOTE
PHYSICAL THERAPY NOTE          Patient Name: Dorene GENAO'I Date: 7/30/2018 07/30/18 0417   Note Type   Note type Eval only   Pain Assessment   Pain Assessment 0-10   Pain Score 2   Pain Type Acute pain;Surgical pain   Pain Location Hip   Pain Orientation Right   Effect of Pain on Daily Activities Pain does not prohibit mobility, requires increased time and assistance   Home Living   Type of [address] Two level; Able to live on main level with bedroom/bathroom;Stairs to enter with rails   Bathroom Shower/Tub Tub/shower unit   Bathroom Toilet Standard   Bathroom Equipment Toilet raiser;Grab bars in shower; Shower chair; Other (Comment)  (bed lift)   [facility]   Additional Comments Pt did not ambulate with walker at baseline, but owns from prior surgeries   Prior Function   Level of [place] Independent with ADLs and functional mobility   Lives With Spouse   Receives Help From Family   ADL Assistance Independent   IADLs Independent   Falls in the last 6 months 0   Vocational Retired   Comments Pt is retired  Pt has socially active hobbies  Pt's  is also retired and able to assist if needed   Restrictions/Precautions   Weight Bearing Precautions Per Order Yes   RLE Weight Bearing Per Order WBAT   Braces or Orthoses Other (Comment)  (adduction pillow)   Other Precautions WBS;THR;Multiple lines;Pain  (catheter, IV)   General   Additional Pertinent History Pt admitted for R THR  Pt hx includes L THR, BL knee replacements, R RTC tear, depression, HTN, and HLD   Family/Caregiver Present No   Cognition   Overall Cognitive Status WFL   Arousal/Participation Alert   Orientation Level Oriented X4   Memory Within functional limits   Following Commands Follows all commands and directions without difficulty   Comments Pt is friendly and cooperative   Pt was able to answer all questions and engage inconversation appropriately  Pt was explained, demonstrated and verbally expressed understanding of precautions  RUE Assessment   RUE Assessment WFL   LUE Assessment   LUE Assessment WFL   RLE Assessment   RLE Assessment X   LLE Assessment   LLE Assessment WFL   Coordination   Movements are Fluid and Coordinated 0   Coordination and Movement Description Pt requires assistance for bed mobility with RLE  Pt movements slowed due to pain and weakness from surgery   Bed Mobility   Supine to Sit 4  Minimal assistance   Additional items Assist x 1; Increased time required;LE management;Verbal cues   Additional Comments Pt was supine with HOB elevated upon PT arrival  Pt was able to sit EOB with min A for LE management and VCs  Pt deines nausea or dizziness sitting EOB  Pt was able to maintain sitting balance (I)  Transfers   Sit to Stand 5  Supervision   Additional items Verbal cues;Armrests   Stand to Sit 5  Supervision   Additional items Armrests; Verbal cues   Additional Comments Pt was able to stand from EOB without physical assistance  Pt required VCs for safety to use UEs on bed rather than RW  Pt denied dizziness or nausea with standing  Pt was able to ambulate 5' and then maintain static standing balance with RW while RN added bandage to pt's incision site  Pt denied adverse symptoms with standing, reporting only "heaviness" of RLE  Ambulation/Elevation   Gait pattern Antalgic;Decreased R stance; Short stride; Excessively slow   Gait Assistance 5  Supervision   Additional items Verbal cues   Assistive Device Rolling walker   Distance 150'   Balance   Static Sitting Fair +   Dynamic Sitting Fair +   Static Standing Fair -   Dynamic Standing Fair -   Ambulatory Fair -   Endurance Deficit   Endurance Deficit Yes   Endurance Deficit Description due to surgery   Activity Tolerance   Activity Tolerance Patient tolerated treatment well   Nurse Made Aware RN   Assessment   Prognosis Good Problem List Decreased strength;Decreased range of motion;Decreased endurance; Impaired balance;Decreased mobility; Decreased coordination;Orthopedic restrictions;Pain   Assessment Pt is a 79 y o  F [facility] elective R THR  Pt hx includes L THR, BL knee replacements, R RTC tear, depression, HTN, and HLD  Pt is no longer on precautions from L THR  Pt lives with her  in a 2 story home  Pt bedroom and bathroom are on main level and pt is able to reside on 1st floor setup if needed  Pt has shower chair, and grab bar for her tub, raised toilet seats, and a bed stool  Pt owns RW from previous surgeries but was ambulating without AD PTA  PTA pt was (I) with ADLs/IADLs  Pt is retired and hobbies include [employer] activities and social activities  Pt does drive and denies falls within the past 6 months  Upon PT arrival pt was supine in bed alert and oriented  Pt was motivated to participate in PT  Pt rated pain as 2/10 and denied dizziness or numbness  Pt reports pain status and denial of other adverse symptoms remained the same throughout session despite movement and repositioning  Pt was educated on hip precautions and verbally expressed and physically demonstrated understanding of precautions  Pt BP was taken prior to mobilization by RN and read 102/56  Pt denied any adverse symptoms  Pt was min A to sit EOB for LE management  Pt was able to transfer sit to stand and stand to sit from bed and chair without physical assistance, but required S and verbal cues for safe hand placement  Pt was able to maintain standing balance for several minutes as RN added bandage to incision site  Pt was able to ambulate 150' S with RW  Pt is motivated to return home and tolerated session well  Pt presents with Decreased strength;Decreased range of motion;Decreased endurance; Impaired balance;Decreased mobility; Decreased coordination;Orthopedic restrictions; and Pain but is expected to progress quickly   Pt to continue to work with PT during duration of hospital stay  Pt to d/c to home family support and outpatient PT after achieving stair goal and when medically appropriate  Goals   Patient Goals Pt has goal to walk again without pain   STG Expiration Date 08/04/18   Short Term Goal #1 Pt to be min A with bed mobility to increase accessibility to home environment  Pt to be mod (I) with sit to stand, stand to sit and toilet transfers to allow ability to participate in ADLs/IADLs  Pt to maintain static standing balance for 5 minutes with RW to increase ability to participate in ADLs/IADLs  Pt to ambulate 150' mod (I) with RW to increase home accessibility  PT to ascend and descend 5 stairs with S to allow home accessibility  Pt to consistently verbally express and physically demonstrate  adherence to precautions  Pt to participate in HEP including strength, ROM, endurance, balance, coordination, mobility, transfer, ambulation and stair training activities  Treatment Day 0   Plan   Treatment/Interventions ADL retraining;Functional transfer training; Therapeutic exercise; Endurance training;Patient/family training;Equipment eval/education; Bed mobility;Gait training; Compensatory technique education;Spoke to nursing   PT Frequency Twice a day   Recommendation   Recommendation Home with family support; Outpatient PT   Equipment Recommended Walker  (Pt owns)   PT - OK to Discharge No   Additional Comments Pt to complete stair goal prior to d/c    Barthel Index   Feeding 10   Bathing 0   Grooming Score 5   Dressing Score 5   Bladder Score 0   Bowels Score 10   Toilet Use Score 5   Transfers (Bed/Chair) Score 10   Mobility (Level Surface) Score 10   Stairs Score 0   Barthel Index Score [address] [facility]

## 2018-07-30 NOTE — INTERVAL H&P NOTE
H&P reviewed  After examining the patient I find no changes in the patients condition since the H&P had been written      Preop for tight total hip arthroplasty

## 2018-07-30 NOTE — H&P (VIEW-ONLY)
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, describes very little pain left hip  She describes worsening pain right hip and right groin  She has pain level right hip joint, the pain is made worse bearing weight, the pain increases with  Increased activities  Gait pattern is without antalgia, without assist device  Left hip is heel postal incision  There is no groin pain with motion  There is no limb of the quality  Right hip has intact soft tissues  There is restriction of forced internal rotation of the right hip  This recreates groin pain  The right thighs devoid of atrophy  Right knee is not effused  The foot and toes strongly dorsiflex  Two views left hip show total hip replacement satisfactory position  On the AP pelvis, arthritic right hip was identified  Assessment/plan:  80-year-old female now 3 months removed left total hip replacement with well clinical radiographic exam   The right hip symptoms worsen, I think elective right total hip replacement surgery is indicated  After review of the risks and benefits, consent was that the above-mentioned surgery  No guarantees were given  I would welcome the opportunity see back in the office a postoperative patient      Expand All Collapse All    79 y  o female presents for  Continue postoperative care after a left total hip arthroplasty which she is now 12 weeks postoperative from  She has been overall doing well has some mild residual muscle discomfort at the area of with max  She states that she had a period of time where she had heavy activity and increasing pain but this has resolved since time  She denies motor sensory deficits on the left lower extremity and stock complain of any weakness        She now complains of right hip pain which is similar in nature to the left hip pain that she had prior to undergoing total hip arthroplasty    She wishes to schedule surgery for this hip today      Review of Systems  Review of systems negative unless otherwise specified in HPI     Past Medical History  Medical History        Past Medical History:   Diagnosis Date    Arthritis      Depression      Endometriosis      GERD (gastroesophageal reflux disease)      Hyperlipidemia      Hypertension      Osteopenia      Pancreatitis 06/2017    Pneumonia              Past Surgical History  Surgical History         Past Surgical History:   Procedure Laterality Date    ABDOMINAL SURGERY         endometriosis     APPENDECTOMY        CARPAL TUNNEL RELEASE        COLONOSCOPY        HAND SURGERY        HYSTERECTOMY        JOINT REPLACEMENT         KNEE    AK ARTHROSCOPY SHOULDER SURGICAL BICEPS TENODESIS Right 5/10/2017     Procedure: ARTHROSCOPIC BICEPS TENODESIS WITH ROTATOR CUFF REPAIR ;  Surgeon: Jennifer Sampson MD;  [facility]: [facility];  Service: Orthopedics    AK Bonnee Roles Right 5/10/2017     Procedure: SHOULDER ARTHROSCOPY SUBACROMIAL DECOMPRESSION;  Surgeon: Jennifer Sampson MD;  Location[facility];  Service: Orthopedics    AK TOTAL HIP ARTHROPLASTY Left 3/12/2018     Procedure: ARTHROPLASTY HIP TOTAL;  Surgeon: Charito Hrae MD;  Location[facility];  Service: Orthopedics    TONSILLECTOMY                Current Medications         Current Outpatient Prescriptions on File Prior to Visit   Medication Sig Dispense Refill    acetaminophen (TYLENOL) 500 mg tablet Take 500 mg by mouth daily        atorvastatin (LIPITOR) 20 mg tablet TAKE 1 TABLET BY MOUTH 6  DAYS A WEEK 90 tablet 3    calcium-vitamin D (OSCAL) 250-125 MG-UNIT per tablet Take 1 tablet by mouth daily        Cholecalciferol (VITAMIN D) 2000 units tablet Take 2,000 Units by mouth daily        FLUoxetine (PROzac) 20 mg capsule TAKE 1 CAPSULE BY MOUTH  EVERY DAY 90 capsule 1    gabapentin (NEURONTIN) 100 mg capsule Take 1 capsule (100 mg total) by mouth 3 (three) times a day for 30 days 60 capsule 0    hydrochlorothiazide (HYDRODIURIL) 25 mg tablet Take 12 5 mg by mouth daily         HYDROcodone-acetaminophen (NORCO) 5-325 mg per tablet 1-2 tabs po q6 hr prn pain   Do not exceed 4 g of acetaminophen in 24 hr period 30 tablet 0    ibuprofen (MOTRIN) 200 mg tablet Take 200 mg by mouth every 6 (six) hours as needed for mild pain        metoprolol succinate (TOPROL-XL) 50 mg 24 hr tablet Take 1 tablet (50 mg total) by mouth daily 90 tablet 1    Multiple Vitamins-Minerals (CENTRUM SILVER ULTRA WOMENS PO) Take by mouth        Omega-3 1000 MG CAPS Take by mouth        omeprazole (PriLOSEC) 20 mg delayed release capsule Take 20 mg by mouth daily        Polyethylene Glycol 3350 (MIRALAX PO) Take by mouth        traMADol (ULTRAM) 50 mg tablet Take 1 tablet (50 mg total) by mouth every 6 (six) hours as needed for moderate pain 60 tablet 0      No current facility-administered medications on file prior to visit           Recent Labs (HCT,HGB,PT,INR,ESR,CRP,GLU,HgA1C)     0  Lab Value Date/Time   HCT 32 9 (L) 03/13/2018 0540   HCT 42 8 05/02/2017 0933   HGB 11 0 (L) 03/13/2018 0540   HGB 14 3 05/02/2017 0933   WBC 7 11 03/13/2018 0540   WBC 6 5 05/02/2017 0933   INR 0 92 02/26/2018 0926   GLUCOSE 101 03/13/2018 0602   GLUCOSE 67 05/09/2017 1017   HGBA1C 5 4 02/26/2018 0926            Physical exam  · General: Awake, Alert, Oriented  · HEENT: No scleral injection, no evidence of facial trauma  · Heart: Extremities warm and well perfused  · Lungs: No audible wheezing  ·    left Lower extremity  ·   No pain with hip internal rotation   · 5/5 hip flexion, abduction, adduction strength  · Painless active range of motion in all planes     Right lower extremity   mild pain elicited with flexion and internal rotation   No tenderness to palpation over greater trochanter bursa   extremity warm well perfused     Procedure  None     Imaging   plain films of left hip reveal total hip arthroplasty components in satisfactory position with no evidence of subsidence, failure, osteolysis      72-year-old female now 3 months status post left total hip arthroplasty, doing well from this perspective and ongoing right hip osteoarthritis  Plan:  weightbear as tolerated bilateral lower extremities  Patient may continue to take oral analgesics as needed for pain  After discussing risks, benefits, alternatives, complications patient will schedule for right total hip arthroplasty at a time that is convenient for her  Follow-up in routine post-operative visit after R hip is scheduled at which point left hip can also be evaluated         Rogerio Real  06/12/18            Instructions         Return for after surgery in post-op visit      After Visit Summary (Printed 6/12/2018)   Additional Documentation     Vitals:    /79    Pulse 65    Ht 4' 11 49" (1 511 m)    LMP  (LMP Unknown)          More Vitals    Flowsheets:    Custom Formula Data       SmartForms:    CATHY PRE-CHARTING       Encounter Info:    Billing Info,    History,    Allergies,    Detailed Report       Orders Placed         Protime-INR       Type and screen       UA (URINE) with reflex to Microscopic      CBC and differential      Comprehensive metabolic panel       XR hip/pelv 2-3 vws left if performed      Ambulatory referral to Internal Medicine Pending Review      Case request operating room: ARTHROPLASTY RIGHT HIP TOTAL Once      All Encounter Results   Medication Changes         Ascorbic Acid 500 mg Oral 2 times daily       Ferrous Sulfate 324 mg Oral 2 times daily before meals       Folic Acid 1 mg Oral Daily      Medication List   Visit Diagnoses         Aftercare following left hip joint replacement surgery      Primary osteoarthritis of one hip, right      Problem List

## 2018-07-30 NOTE — PLAN OF CARE
Problem: PHYSICAL THERAPY ADULT  Goal: Performs mobility at highest level of function for planned discharge setting  See evaluation for individualized goals  Treatment/Interventions: ADL retraining, Functional transfer training, Therapeutic exercise, Endurance training, Patient/family training, Equipment eval/education, Bed mobility, Gait training, Compensatory technique education, Spoke to nursing  Equipment Recommended: Carmita Carreno (Pt owns)       See flowsheet documentation for full assessment, interventions and recommendations  Prognosis: Good  Problem List: Decreased strength, Decreased range of motion, Decreased endurance, Impaired balance, Decreased mobility, Decreased coordination, Orthopedic restrictions, Pain  Assessment: Pt is a 79 y o  F admitted to [facility] for an elective R THR  Pt hx includes L THR, BL knee replacements, R RTC tear, depression, HTN, and HLD  Pt is no longer on precautions from L THR  Pt lives with her  in a 2 story home  Pt bedroom and bathroom are on main level and pt is able to reside on 1st floor setup if needed  Pt has shower chair, and grab bar for her tub, raised toilet seats, and a bed stool  Pt owns RW from previous surgeries but was ambulating without AD PTA  PTA pt was (I) with ADLs/IADLs  Pt is retired and hobbies include [employer] activities and social activities  Pt does drive and denies falls within the past 6 months  Upon PT arrival pt was supine in bed alert and oriented  Pt was motivated to participate in PT  Pt rated pain as 2/10 and denied dizziness or numbness  Pt reports pain status and denial of other adverse symptoms remained the same throughout session despite movement and repositioning  Pt was educated on hip precautions and verbally expressed and physically demonstrated understanding of precautions  Pt BP was taken prior to mobilization by RN and read 102/56  Pt denied any adverse symptoms  Pt was min A to sit EOB for LE management   Pt was able to transfer sit to stand and stand to sit from bed and chair without physical assistance, but required S and verbal cues for safe hand placement  Pt was able to maintain standing balance for several minutes as RN added bandage to incision site  Pt was able to ambulate 150' S with RW  Pt is motivated to return home and tolerated session well  Pt presents with Decreased strength;Decreased range of motion;Decreased endurance; Impaired balance;Decreased mobility; Decreased coordination;Orthopedic restrictions; and Pain but is expected to progress quickly  Pt to continue to work with PT during duration of hospital stay  Pt to d/c to home family support and outpatient PT after achieving stair goal and when medically appropriate  Recommendation: Home with family support, Outpatient PT     PT - OK to Discharge: No    See flowsheet documentation for full assessment

## 2018-07-30 NOTE — DISCHARGE INSTRUCTIONS
Discharge Instructions - Orthopedics  Sudarshan Mena 79 y o  female MRN: [id number]  Unit/Bed#: PACU 04    Weight Bearing Status:                                           Weight Bearing as tolerated to the right lower extremity  DVT prophylaxis:  Complete course of Lovenox as directed    Pain:  Continue analgesics as directed    Showering Instructions:   Do not shower until POD #3    Dressing Instructions:   Keep dressing clean, dry and intact until follow up appointment  It may be removed for showering,  then reapply    Driving Instructions:  No driving until cleared by Orthopaedic Surgery  PT/OT:  Continue PT/OT on outpatient basis as directed    Appt Instructions:    If you do not have your appointment, please call the clinic at [phone]  Otherwise followup as scheduled below:

## 2018-07-31 VITALS
BODY MASS INDEX: 41.33 KG/M2 | WEIGHT: 205 LBS | HEART RATE: 72 BPM | SYSTOLIC BLOOD PRESSURE: 113 MMHG | OXYGEN SATURATION: 92 % | RESPIRATION RATE: 16 BRPM | TEMPERATURE: 98.2 F | DIASTOLIC BLOOD PRESSURE: 56 MMHG | HEIGHT: 59 IN

## 2018-07-31 PROBLEM — Z96.641 S/P HIP REPLACEMENT, RIGHT: Status: ACTIVE | Noted: 2018-07-31

## 2018-07-31 LAB
ANION GAP SERPL CALCULATED.3IONS-SCNC: 6 MMOL/L (ref 4–13)
ANION GAP SERPL CALCULATED.3IONS-SCNC: 9 MMOL/L (ref 4–13)
BUN SERPL-MCNC: 13 MG/DL (ref 5–25)
BUN SERPL-MCNC: 13 MG/DL (ref 5–25)
CALCIUM SERPL-MCNC: 8.4 MG/DL (ref 8.3–10.1)
CALCIUM SERPL-MCNC: 8.4 MG/DL (ref 8.3–10.1)
CHLORIDE SERPL-SCNC: 100 MMOL/L (ref 100–108)
CHLORIDE SERPL-SCNC: 105 MMOL/L (ref 100–108)
CO2 SERPL-SCNC: 25 MMOL/L (ref 21–32)
CO2 SERPL-SCNC: 27 MMOL/L (ref 21–32)
CREAT SERPL-MCNC: 1.12 MG/DL (ref 0.6–1.3)
CREAT SERPL-MCNC: 1.26 MG/DL (ref 0.6–1.3)
ERYTHROCYTE [DISTWIDTH] IN BLOOD BY AUTOMATED COUNT: 13.1 % (ref 11.6–15.1)
GFR SERPL CREATININE-BSD FRML MDRD: 45 ML/MIN/1.73SQ M
GFR SERPL CREATININE-BSD FRML MDRD: 51 ML/MIN/1.73SQ M
GLUCOSE SERPL-MCNC: 110 MG/DL (ref 65–140)
GLUCOSE SERPL-MCNC: 133 MG/DL (ref 65–140)
HCT VFR BLD AUTO: 32.6 % (ref 34.8–46.1)
HGB BLD-MCNC: 10.6 G/DL (ref 11.5–15.4)
MCH RBC QN AUTO: 29 PG (ref 26.8–34.3)
MCHC RBC AUTO-ENTMCNC: 32.5 G/DL (ref 31.4–37.4)
MCV RBC AUTO: 89 FL (ref 82–98)
PLATELET # BLD AUTO: 225 THOUSANDS/UL (ref 149–390)
PMV BLD AUTO: 10.6 FL (ref 8.9–12.7)
POTASSIUM SERPL-SCNC: 3.6 MMOL/L (ref 3.5–5.3)
POTASSIUM SERPL-SCNC: 3.7 MMOL/L (ref 3.5–5.3)
RBC # BLD AUTO: 3.65 MILLION/UL (ref 3.81–5.12)
SODIUM SERPL-SCNC: 134 MMOL/L (ref 136–145)
SODIUM SERPL-SCNC: 138 MMOL/L (ref 136–145)
WBC # BLD AUTO: 8.2 THOUSAND/UL (ref 4.31–10.16)

## 2018-07-31 PROCEDURE — 85027 COMPLETE CBC AUTOMATED: CPT | Performed by: STUDENT IN AN ORGANIZED HEALTH CARE EDUCATION/TRAINING PROGRAM

## 2018-07-31 PROCEDURE — 97530 THERAPEUTIC ACTIVITIES: CPT

## 2018-07-31 PROCEDURE — G8987 SELF CARE CURRENT STATUS: HCPCS

## 2018-07-31 PROCEDURE — 97110 THERAPEUTIC EXERCISES: CPT

## 2018-07-31 PROCEDURE — G8989 SELF CARE D/C STATUS: HCPCS

## 2018-07-31 PROCEDURE — 80048 BASIC METABOLIC PNL TOTAL CA: CPT | Performed by: STUDENT IN AN ORGANIZED HEALTH CARE EDUCATION/TRAINING PROGRAM

## 2018-07-31 PROCEDURE — 97116 GAIT TRAINING THERAPY: CPT

## 2018-07-31 PROCEDURE — G8988 SELF CARE GOAL STATUS: HCPCS

## 2018-07-31 PROCEDURE — 97166 OT EVAL MOD COMPLEX 45 MIN: CPT

## 2018-07-31 PROCEDURE — 80048 BASIC METABOLIC PNL TOTAL CA: CPT | Performed by: ORTHOPAEDIC SURGERY

## 2018-07-31 RX ORDER — METHOCARBAMOL 750 MG/1
750 TABLET, FILM COATED ORAL EVERY 6 HOURS PRN
Refills: 0
Start: 2018-07-31 | End: 2018-07-31

## 2018-07-31 RX ORDER — SENNOSIDES 8.6 MG
1 TABLET ORAL DAILY
Qty: 120 EACH | Refills: 0
Start: 2018-08-01 | End: 2018-09-24

## 2018-07-31 RX ORDER — DOCUSATE SODIUM 100 MG/1
100 CAPSULE, LIQUID FILLED ORAL 2 TIMES DAILY
Qty: 10 CAPSULE | Refills: 0
Start: 2018-07-31 | End: 2019-04-18 | Stop reason: ALTCHOICE

## 2018-07-31 RX ORDER — METHOCARBAMOL 750 MG/1
750 TABLET, FILM COATED ORAL EVERY 6 HOURS PRN
Qty: 30 TABLET | Refills: 0 | Status: SHIPPED | OUTPATIENT
Start: 2018-07-31 | End: 2018-08-14 | Stop reason: SDUPTHER

## 2018-07-31 RX ADMIN — METHOCARBAMOL 750 MG: 750 TABLET ORAL at 05:42

## 2018-07-31 RX ADMIN — ACETAMINOPHEN 650 MG: 325 TABLET, FILM COATED ORAL at 11:11

## 2018-07-31 RX ADMIN — PANTOPRAZOLE SODIUM 40 MG: 40 TABLET, DELAYED RELEASE ORAL at 05:42

## 2018-07-31 RX ADMIN — METOPROLOL SUCCINATE 50 MG: 50 TABLET, EXTENDED RELEASE ORAL at 08:17

## 2018-07-31 RX ADMIN — DOCUSATE SODIUM 100 MG: 100 CAPSULE, LIQUID FILLED ORAL at 08:17

## 2018-07-31 RX ADMIN — ACETAMINOPHEN 650 MG: 325 TABLET, FILM COATED ORAL at 05:42

## 2018-07-31 RX ADMIN — ENOXAPARIN SODIUM 40 MG: 40 INJECTION SUBCUTANEOUS at 08:17

## 2018-07-31 RX ADMIN — CEFAZOLIN SODIUM 2000 MG: 2 SOLUTION INTRAVENOUS at 00:41

## 2018-07-31 RX ADMIN — FLUOXETINE 20 MG: 20 CAPSULE ORAL at 08:17

## 2018-07-31 RX ADMIN — GABAPENTIN 100 MG: 100 CAPSULE ORAL at 05:42

## 2018-07-31 RX ADMIN — GABAPENTIN 100 MG: 100 CAPSULE ORAL at 13:33

## 2018-07-31 RX ADMIN — SENNOSIDES 8.6 MG: 8.6 TABLET, FILM COATED ORAL at 08:17

## 2018-07-31 RX ADMIN — ONDANSETRON 4 MG: 2 INJECTION INTRAMUSCULAR; INTRAVENOUS at 08:17

## 2018-07-31 RX ADMIN — OXYCODONE HYDROCHLORIDE 10 MG: 10 TABLET ORAL at 03:43

## 2018-07-31 NOTE — DISCHARGE SUMMARY
ORTHOPEDICS DISCHARGE SUMMARY   Estelle Dominguez 79 y o  female MRN: [id number]  Unit/Bed#: CW3 344-01      Attending Physician: Mame Leroy    Admitting diagnosis: Primary osteoarthritis of one hip, right [M16 11]    Discharge diagnosis: Primary osteoarthritis of one hip, right [M16 11]    Date of admission: 7/30/2018    Date of discharge: 07/31/18    Procedure: R GERARDO    HPI  This is a 79y o  year old female that presented to the office with signs and symptoms of right hip osteoarthritis  They tried and failed conservative treatment measures and wished to proceed with surgical intervention  The risks, benefits, and complications of the procedure were discussed with the patient and informed consent was obtained  Hospital Course: The patient was admitted to the hospital on 7/30/2018 and underwent an uncomplicated right total hip arthroplasty  They were transferred to the floor after a brief stay in the post-anesthesia care unit  Their pain was well managed with IV and oral pain medications  They began therapy on post operative day #1  Lovenox was also started for DVT prophylaxis post operative day #1  On discharge date pt was cleared by PT and the medicine team and determined to be safe for discharge  Daily discussion was had with the patient, nursing staff, orthopaedic team, and family members if present  All questions were answered to the patients satisifaction  0  Lab Value Date/Time   HGB 10 6 (L) 07/31/2018 0450   HGB 14 3 07/05/2018 0929   HGB 11 0 (L) 03/13/2018 0540   HGB 13 8 02/26/2018 0926   HGB 13 9 10/11/2017 0952   HGB 11 8 07/01/2017 0528   HGB 13 6 06/30/2017 2024   HGB 14 3 05/02/2017 0933       Vital signs remained stable and pt was resuscitated with IVF as needed     Discharge Instructions: The patient was discharged weight bearing as tolerated to the right lower extremity  Lovenox will be continued for 28 days  Continue PT/OT  Take pain medications as instructed   Maintain posterior  hip precautions  Discharge Medications: For the complete list of discharge medications, please refer to the patient's medication reconciliation

## 2018-07-31 NOTE — PROGRESS NOTES
Orthopedics   Travon Ventura 79 y o  female MRN: [id number]  Unit/Bed#: CW3 344-01      Subjective:  79 y  o female post operative day 1 sp right total hip arthroplasty  Pt doing well  Pain controlled      Labs:    0  Lab Value Date/Time   HCT 32 6 (L) 07/31/2018 0450   HCT 44 0 07/05/2018 0929   HCT 32 9 (L) 03/13/2018 0540   HCT 42 8 05/02/2017 0933   HGB 10 6 (L) 07/31/2018 0450   HGB 14 3 07/05/2018 0929   HGB 11 0 (L) 03/13/2018 0540   HGB 14 3 05/02/2017 0933   INR 0 92 07/05/2018 0929   WBC 8 20 07/31/2018 0450   WBC 6 22 07/05/2018 0929   WBC 7 11 03/13/2018 0540   WBC 6 5 05/02/2017 0933       Meds:    Current Facility-Administered Medications:     acetaminophen (TYLENOL) tablet 650 mg, 650 mg, Oral, Q6H [address], Clem Diane MD, 650 mg at 07/31/18 0542    atorvastatin (LIPITOR) tablet 20 mg, 20 mg, Oral, Daily With Dinner, Clem Diane MD    docusate sodium (COLACE) capsule 100 mg, 100 mg, Oral, BID, Clem Diane MD, 100 mg at 07/30/18 1714    enoxaparin (LOVENOX) subcutaneous injection 40 mg, 40 mg, Subcutaneous, Daily, Clem Diane MD    FLUoxetine (PROzac) capsule 20 mg, 20 mg, Oral, Daily, Clem Diane MD    gabapentin (NEURONTIN) capsule 100 mg, 100 mg, Oral, Q8H [address], Clem Diane MD, 100 mg at 07/31/18 0542    hydrALAZINE (APRESOLINE) tablet 25 mg, 25 mg, Oral, Q8H PRN, Harriet Barr PA-C    Nursing communcation Step 2: Recheck BP one hour after LR bolus completed, , , Until Discontinued **AND** lactated ringers bolus 1,000 mL, 1,000 mL, Intravenous, Once, Clem Diane MD    lactated ringers infusion, 125 mL/hr, Intravenous, Continuous, Anna Seth, Stopped at 07/30/18 1039    lactated ringers infusion, 50 mL/hr, Intravenous, Continuous, Radha Valverde CRNA, Stopped at 07/30/18 1126    lactated ringers infusion, 1 5 mL/kg/hr, Intravenous, Continuous, Clem Diane MD, Last Rate: 139 5 mL/hr at 07/30/18 1852, 1 5 mL/kg/hr at 07/30/18 1852   methocarbamol (ROBAXIN) tablet 750 mg, 750 mg, Oral, Q6H PRN, Sita Hutson MD, 750 mg at 07/31/18 0542    metoprolol succinate (TOPROL-XL) 24 hr tablet 50 mg, 50 mg, Oral, Daily, Deepa Hogan MD    morphine injection 2 mg, 2 mg, Intravenous, Q2H PRN, Sita Hutosn MD, 2 mg at 07/30/18 2104    [facility]ansetron [facility]) injection 4 mg, 4 mg, Intravenous, Q6H PRN, Deepa Hogan MD, 4 mg at 07/30/18 2350    oxyCODONE (ROXICODONE) immediate release tablet 10 mg, 10 mg, Oral, Q4H PRN, Deepa Hogan MD, 10 mg at 07/31/18 0343    oxyCODONE (ROXICODONE) IR tablet 5 mg, 5 mg, Oral, Q4H PRN, Deepa Hogan MD    pantoprazole (PROTONIX) EC tablet 40 mg, 40 mg, Oral, Early Morning, Harriet Barr PA-C, 40 mg at 07/31/18 0542    senna (SENOKOT) tablet 8 6 mg, 1 tablet, Oral, Daily, Deepa Hogan MD    Blood Culture:   No results found for: BLOODCX    Wound Culture:   No results found for: WOUNDCULT    Ins and Outs:  I/O last 24 hours: In: 1850 [I V :1600; IV Piggyback:250]  Out: 1025 [Urine:925; Blood:100]          Physical Exam:  Vitals:    07/31/18 0300   BP: 130/60   Pulse: 87   Resp: 17   Temp: 98 6 °F (37 °C)   SpO2: 94%     right lower extremity:  · Dressings C/D/I at hip  · Sensation intact sp/dp distribution  · Motor intact ankle df/pf, ehl/fhl motor function  · 2+ dorsalis pedis , foot warm and well perfused    _*_*_*_*_*_*_*_*_*_*_*_*_*_*_*_*_*_*_*_*_*_*_*_*_*_*_*_*_*_*_*_*_*_*_*_*_*_*_*_*_*    Assessment: 79 y  o female post operative day 1 sp right total hip arthroplasty   Doing well    Plan:  · Weight Bearing as tolerated RLE  · Up and out of bed  · Total hip precautions - posterior precautions, abduction pillow in bed at all times and in positions of high risk for dislocation  · DVT prophylaxis - lovenox, mechanical  · Analgesics  · PT/OT  · Patient noted to have acute blood loss anemia due to a drop in Hbg of > 2 0g from preop levels, will monitor vital signs and resuscitate with IV fluids as needed    Yanely Ordaz  07/31/18

## 2018-07-31 NOTE — PLAN OF CARE
Problem: PHYSICAL THERAPY ADULT  Goal: Performs mobility at highest level of function for planned discharge setting  See evaluation for individualized goals  Treatment/Interventions: ADL retraining, Functional transfer training, Therapeutic exercise, Endurance training, Patient/family training, Equipment eval/education, Bed mobility, Gait training, Compensatory technique education, Spoke to nursing  Equipment Recommended: Storm Valadez (Pt owns)       See flowsheet documentation for full assessment, interventions and recommendations  Outcome: Adequate for Discharge  Prognosis: Good  Problem List: Decreased strength, Decreased range of motion, Decreased mobility, Orthopedic restrictions, Pain  Assessment: The pt  is ambulating beyond community distances without difficulty  She had no losses of balance, and she was able to recall and adhere to her total hip precautions throughout the session  She has demonstrated the necessary mobility in order to safely return home at discharge  Barriers to Discharge: None     Recommendation: Outpatient PT, Home with family support     PT - OK to Discharge: Yes    See flowsheet documentation for full assessment

## 2018-07-31 NOTE — PROGRESS NOTES
Pt urine output very minimal today after agosto removed  BMP ordered   Per ortho if labs come back WDL, pt ok to D/C

## 2018-07-31 NOTE — PLAN OF CARE
Problem: PHYSICAL THERAPY ADULT  Goal: Performs mobility at highest level of function for planned discharge setting  See evaluation for individualized goals  Treatment/Interventions: ADL retraining, Functional transfer training, Therapeutic exercise, Endurance training, Patient/family training, Equipment eval/education, Bed mobility, Gait training, Compensatory technique education, Spoke to nursing  Equipment Recommended: Conan Boxer (Pt owns)       See flowsheet documentation for full assessment, interventions and recommendations  Outcome: Progressing  Prognosis: Good  Problem List: Decreased strength, Decreased range of motion, Decreased endurance, Impaired balance, Decreased mobility, Decreased coordination, Orthopedic restrictions, Pain  Assessment: Pt was OOB in chair upon PT arrival  Pt reported feeling weak today  She reported getting sick (vomitting) at 00:30 and minimal sleep  Pt reports pain is not currently bad and rated it 3/10  Pt reports positive for lightheadedness and nausea  Pt was agreeable to PT treatment  Pt tolerated seated therex well with no increase in adverse symptoms  Pt performed sit to stand and stand to sit transfers with S  Pt required extra time due to lgihtheadedness but did not require physical assistance to perform transfers  Pt was able to ambulate 100'x2 with S and RW  Pt requird seated rest due to nausea and lgihtheadedness between trials  Pt BP was taken and read 123/58  Pt symptoms decreased upon seated rest  Pt was able to perform 5  stairs with S, one rail and a cane  Pt able to perform curb step with forward ascending technique with RW with S  Pt experienced increased nausea after steps and vomited into basin  Pt states she is no longer sure if she wants to go home today due to nausea, and dizziness  Pt continues to present with Decreased strength;Decreased range of motion;Decreased endurance; Impaired balance;Decreased mobility; Decreased coordination;Orthopedic restrictions; and Pain  She is able to perform functional mobility with S and progressing toward goals well  Pt limited due to nausea and vomiting  Pt appropriate to d/c home with family support and outpatient PT when medically appropriate  Recommendation: Home with family support, Outpatient PT     PT - OK to Discharge: Yes (when medically appropriate)    See flowsheet documentation for full assessment

## 2018-07-31 NOTE — CASE MANAGEMENT
Initial Clinical Review    Age/Sex: 79 y o  female admitted on 7/30 for elective surgery - OR    Surgery Date: 7/30    Procedure: S/P RIGHT TOTAL HIP ARTHROPLASTY (Right Hip)    Anesthesia: Spinal    Admission Orders: Date/Time/Statement: Inpatient 7/30/18 @ 1048 Med Surg    Orders Placed This Encounter   Procedures    Inpatient Admission     Standing Status:   Standing     Number of Occurrences:   1     Order Specific Question:   Admitting Physician     Answer:   Nicole Cross [[id number]]     Order Specific Question:   Level of Care     Answer:   Med Surg [16]     Order Specific Question:   Estimated length of stay     Answer:   Inpatient Only Surgery       Vital Signs: /58 (BP Location: Right arm)   Pulse 84   Temp 98 9 °F (37 2 °C) (Oral)   Resp 18   Ht 4' 11" (1 499 m)   Wt 93 kg (205 lb)   LMP  (LMP Unknown) Comment: hysterectomy  SpO2 96%   BMI 41 40 kg/m²     Diet:        Diet Orders            Start     Ordered    07/30/18 1155  Diet Regular; Regular House  Diet effective now     Question Answer Comment   Diet Type Regular    Regular Regular House    RD to adjust diet per protocol?  Yes        07/30/18 1154          Mobility: Activity as tolerated  PT/OT eval and treat    DVT Prophylaxis: Foot Pump    Scheduled Meds:  Current Facility-Administered Medications:  Cefazolin  Intravenous x2   acetaminophen 650 mg Oral Q6H [address]   atorvastatin 20 mg Oral Daily With Dinner   docusate sodium 100 mg Oral BID   enoxaparin 40 mg Subcutaneous Daily   FLUoxetine 20 mg Oral Daily   gabapentin 100 mg Oral Q8H [address]   metoprolol succinate 50 mg Oral Daily   pantoprazole 40 mg Oral Early Morning   senna 1 tablet Oral Daily     Continuous Infusions:  lactated ringers 1 5 mL/kg/hr Last Rate: 1 5 mL/kg/hr (07/30/18 1852)     PRN Meds:  hydrALAZINE    Methocarbamol po x2    morphine injection Iv x3    ondansetron    oxyCODONE po x4

## 2018-07-31 NOTE — OCCUPATIONAL THERAPY NOTE
[address] Evaluation     Patient Name: Prince Kc  HBJWJ'P Date: 7/31/2018  Problem List  Patient Active Problem List   Diagnosis    Incomplete tear of right rotator cuff    Hypertension    Hyperlipidemia    Depression    Abdominal pain    Primary osteoarthritis of left hip    Pre-op examination    Status post total replacement of left hip    Aftercare following left hip joint replacement surgery    Primary osteoarthritis of one hip, right    Renal insufficiency    S/P hip replacement, right     Past Medical History  Past Medical History:   Diagnosis Date    Arthritis     Depression     Endometriosis     GERD (gastroesophageal reflux disease)     Hyperlipidemia     Hypertension     Osteopenia     Pancreatitis 06/2017    Pneumonia      Past Surgical History  Past Surgical History:   Procedure Laterality Date    ABDOMINAL SURGERY      endometriosis     APPENDECTOMY      CARPAL TUNNEL RELEASE      COLONOSCOPY      HAND SURGERY      HYSTERECTOMY      JOINT REPLACEMENT Bilateral     KNEE    JOINT REPLACEMENT Left     HIP    WA ARTHROSCOPY SHOULDER SURGICAL BICEPS TENODESIS Right 5/10/2017    Procedure: ARTHROSCOPIC BICEPS TENODESIS WITH ROTATOR CUFF REPAIR ;  Surgeon: Stacy Huffman MD;  [facility];  Service: Orthopedics    WA Lazaro Kramer Right 5/10/2017    Procedure: SHOULDER ARTHROSCOPY SUBACROMIAL DECOMPRESSION;  Surgeon: Stacy Huffman MD;  [facility];  Service: Orthopedics    WA TOTAL HIP ARTHROPLASTY Left 3/12/2018    Procedure: ARTHROPLASTY HIP TOTAL;  Surgeon: Evalyn Collet, MD;  [facility];  Service: Orthopedics    WA TOTAL HIP ARTHROPLASTY Right 7/30/2018    Procedure: RIGHT TOTAL HIP ARTHROPLASTY;  Surgeon: Evalyn Collet, MD;  [facility];  Service: Orthopedics    TONSILLECTOMY           07/31/18 6941   Note Type   Note type Eval only   Restrictions/Precautions   Weight Bearing Precautions Per Order Yes   RUE Weight Bearing Per Order WBAT   LUE Weight Bearing Per Order WBAT   RLE Weight Bearing Per Order WBAT   LLE Weight Bearing Per Order WBAT   Other Precautions THR   Pain Assessment   Pain Assessment 0-10   Pain Score 2   Pain Type Acute pain   Pain Location Hip   Pain Orientation Right   Hospital Pain Intervention(s) Cold applied;Repositioned; Ambulation/increased activity; Emotional support   Home Living   Type of [address] Two level; Able to live on main level with bedroom/bathroom   Bathroom Shower/Tub Tub/shower unit   Bathroom Toilet Standard   Bathroom Equipment Toilet raiser  (uses excercise stepper to set into bed)   [address] [facility] Walker;Reacher  (did not use sock aide after prior thr - spouse assists )   Prior Function   Level of [place] Independent with ADLs and functional mobility   Lives With Jade Help From Family   ADL Assistance Independent   IADLs Independent   Falls in the last 6 months 0   Vocational Retired   Lifestyle   Autonomy i adls and mobility - i adls - shares homemaking with spouse   Reciprocal Relationships supportive family   Service to Others retired   Intrinsic Gratification mostly sedentary   [address] [facility] offers no c/o    ADL   Eating Assistance 7  [place] 5  [address] [facility] 5  [address] 4  [address]  5  Supervision/Setup   Bed Mobility   Additional Comments oob in chair   Transfers   Sit to [address] to Sit 5  Supervision   Stand pivot 5  Supervision   Toilet transfer 5  Supervision   Functional Mobility   Functional Mobility 5  Supervision   Additional items Rolling walker   Balance   Static Sitting Good   Dynamic Sitting Fair +   Static Standing Fair +   Dynamic Standing Fair   Ambulatory Fair   Activity Tolerance   Activity Tolerance Patient tolerated treatment well   RUE Assessment   RUE Assessment WFL   LUE Assessment   LUE Assessment WFL   Cognition   Overall Cognitive Status WFL   Assessment   Limitation Decreased ADL status; Decreased endurance;Decreased self-care trans;Decreased high-level ADLs   Prognosis Good   Assessment Pt is a 79 y o  female who [facility] with Primary osteoarthritis of one hip, right s/p R posterior THR   Pt's problem list also includes PMH of HTN, obesity, previous surgery and arthritis, depression, endometriosis, GERD, hld, osteopenia, pancreatitis, pneumonia  At baseline pt was completing adls and mobility independently  Pt lives with spouse in 2 story home with first floor setup  Currently pt requires min assist for overall ADLS and sba for functional mobility/transfers  Pt currently presents with impairments in the following categories -difficulty performing ADLS, difficulty performing IADLS  and health management  activity tolerance, endurance and standing balance/tolerance   These impairments, as well as pt's pain, hip precautions, orthopedic restricitions  and WBS   limit pt's ability to safely engage in all baseline areas of occupation, includingbathing, dressing, functional mobility/transfers, community mobility, house maintenance, meal prep, cleaning, social participation  and leisure activities however reports spouse is able to assist prn - offered [address]  [facility] however pt reports she did not use sockaide/shoehorn after prior thr ~5-6mo ago and prefers to have spouse assist as he did after prior sx - has reacher to assist with pants/undergarments and expresses good understanding with use of same - able to recite 3/3 THR prec w/o difficulty - has all necessary dme from prior THR -  From OT standpoint, recommend home with family support  upon D/C, continued OOB for meals, setup for adls, ambulation to/from BR and in hallway with PT/restorative - no further acute OT needs indicated at this time- d/c from caseload   Goals   Patient Goals go home   Plan   Treatment Interventions ADL retraining;Functional transfer training; Endurance training;Cognitive reorientation;Patient/family training;Equipment evaluation/education; Compensatory technique education; Activityengagement   OT Frequency Eval only   Recommendation   OT Discharge Recommendation Home with family support   OT - OK to Discharge Yes   Barthel Index   Feeding 10   Bathing 0   Grooming Score 5   Dressing Score 5   Bladder Score 10   Bowels Score 10   Toilet Use Score 5   Transfers (Bed/Chair) Score 10   Mobility (Level Surface) Score 10   Stairs Score 0   Barthel Index Score 65     [place], [place]

## 2018-07-31 NOTE — PHYSICAL THERAPY NOTE
Physical Therapy Progress Note     07/31/18 1420   Pain Assessment   Pain Assessment 0-10   Pain Score 2   Pain Type Acute pain;Surgical pain   Pain Location Hip   Pain Orientation Right   Hospital Pain Intervention(s) Cold applied;Repositioned; Ambulation/increased activity   Response to Interventions Tolerated  Restrictions/Precautions   RLE Weight Bearing Per Order WBAT   Other Precautions THR;Pain   Subjective   Subjective The pt  states that she is doing well, and she has been walking well today  She notes that she is ready to go home  Transfers   Sit to Stand 6  Modified independent   Stand to Sit 6  Modified independent   Ambulation/Elevation   Gait pattern Excessively slow; Inconsistent sruthi;Decreased foot clearance   Gait Assistance 6  Modified independent   Assistive Device Rolling walker   Distance 260 feet  Balance   Static Sitting Normal   Dynamic Sitting Good   Static Standing Fair   Ambulatory Fair   Activity Tolerance   Activity Tolerance Patient tolerated treatment well   Nurse Trang Castillo RN  Assessment   Prognosis Good   Problem List Decreased strength;Decreased range of motion;Decreased mobility;Orthopedic restrictions;Pain   Assessment The pt  is ambulating beyond community distances without difficulty  She had no losses of balance, and she was able to recall and adhere to her total hip precautions throughout the session  She has demonstrated the necessary mobility in order to safely return home at discharge  Barriers to Discharge None   Goals   Patient Goals To go home, and to regain her mobility  STG Expiration Date 08/04/18   Short Term Goal #1 Pt to be min A with bed mobility to increase accessibility to home environment  Pt to be mod (I) with sit to stand, stand to sit and toilet transfers to allow ability to participate in ADLs/IADLs  Pt to maintain static standing balance for 5 minutes with RW to increase ability to participate in ADLs/IADLs   Pt to ambulate 150' mod (I) with RW to increase home accessibility  PT to ascend and descend 5 stairs with S to allow home accessibility  Pt to consistently verbally express and physically demonstrate adherence to precautions  Pt to participate in HEP including strength, ROM, endurance, balance, coordination, mobility, transfer, ambulation and stair training activities  by Katie Simon at 07/31/18 0815   Treatment Day 2   Plan   Treatment/Interventions Functional transfer training;LE strengthening/ROM; Elevations; Therapeutic exercise; Endurance training;Patient/family training;Bed mobility;Gait training   Progress Improving as expected   PT Frequency Twice a day   Recommendation   Recommendation Outpatient PT; Home with family support   PT - OK to Discharge Yes     Wally Buchanan, PTA

## 2018-07-31 NOTE — PROGRESS NOTES
Internal Medicine Progress Note  Patient: Tresa Faye  Age/sex: 79 y o  female  Medical Record #: [id number]      ASSESSMENT/PLAN:  Tresa Faye is seen and examined and mangement for following issues:     # Rt hip OA s/p Rt GERARDO: Pain controlled- Hgb 10 6  Monitor WBC and fever curve post op while encouraging use of incentive spirometer  DVT prophylaxis in place and reviewed      # HTN: BP stable this AM; Pt takes HCTZ 25mg daily and Toprol-XL 50mg daily  Will hold diuretic to decrease the risk of HARMONY in the post-op period  Add Hydralazine 25mg every 8 hours as needed for SBP > 160      # GERD: Pt uses Prilosec as an outpt  Will substitute with Protonix      # Hyperlipidemia: Continue atorvastatin  # Post op nausea: Had one episode of NV this AM; Seemed to be after receiving pain med; Will give zofran and maintain IVF for now      Subjective: Patient seen and examined  Some narcotic related NV;  Will try to use robaxin for spasm and rely on Tylenol initially   ROS:   GI: denies abdominal pain, change bowel habits or reflux symptoms  Neuro: No new neurologic changes  Respiratory: No Cough, SOB  Cardiovascular: No CP, palpitations     Scheduled Meds:    Current Facility-Administered Medications:  acetaminophen 650 mg Oral Q6H [facility] Magali Rinne, MD    atorvastatin 20 mg Oral Daily With Leana Anderson MD    docusate sodium 100 mg Oral BID Magali Rinne, MD    enoxaparin 40 mg Subcutaneous Daily Magali Rinne, MD    FLUoxetine 20 mg Oral Daily Magali Rinne, MD    gabapentin 100 mg Oral [facility] Magali Rinne, MD    hydrALAZINE 25 mg Oral Q8H PRN Harriet Barr PA-C    lactated ringers 1,000 mL Intravenous Once Magali Rinne, MD    lactated ringers 125 mL/hr Intravenous Continuous Pete Felix Last Rate: Stopped (07/30/18 1039)   lactated ringers 50 mL/hr Intravenous Continuous Fernando Millan CRNA Last Rate: Stopped (07/30/18 1126)   lactated ringers 1 5 mL/kg/hr Intravenous Continuous Gali Murrieta MD Last Rate: 1 5 mL/kg/hr (07/30/18 1852)   methocarbamol 750 mg Oral Q6H PRN Lopez Glaser MD    metoprolol succinate 50 mg Oral Daily Gali Murrieta MD    morphine injection 2 mg Intravenous Q2H PRN Lopez Glaser MD    ondansetron 4 mg Intravenous Q6H PRN Gali Murrieta MD    oxyCODONE 10 mg Oral Q4H PRN Gali Murrieta MD    oxyCODONE 5 mg Oral Q4H PRN Gali Murrieta MD    pantoprazole 40 mg Oral Early Morning Harriet Barr PA-C    senna 1 tablet Oral Daily Gali Murrieta MD        Labs:       Results from last 7 days  Lab Units 07/31/18  0450   WBC Thousand/uL 8 20   HEMOGLOBIN g/dL 10 6*   HEMATOCRIT % 32 6*   PLATELETS Thousands/uL 225       Results from last 7 days  Lab Units 07/31/18  0450   SODIUM mmol/L 138   POTASSIUM mmol/L 3 7   CHLORIDE mmol/L 105   CO2 mmol/L 27   BUN mg/dL 13   CREATININE mg/dL 1 12   GLUCOSE RANDOM mg/dL 133   CALCIUM mg/dL 8 4                Glucose (mg/dL)   Date Value   07/31/2018 133   03/13/2018 101   07/01/2017 122   06/30/2017 112   05/09/2017 67   05/02/2017 92   06/30/2014 89       Labs reviewed    Physical Examination:  Vitals:   Vitals:    07/30/18 1500 07/30/18 1900 07/30/18 2349 07/31/18 0300   BP: 102/58 112/69 140/52 130/60   BP Location: Right arm Right arm Right arm Right arm   Pulse: 72 70 82 87   Resp: 18 18 17 17   Temp: 97 7 °F (36 5 °C) 97 9 °F (36 6 °C) 98 4 °F (36 9 °C) 98 6 °F (37 °C)   TempSrc: Oral Oral Oral Oral   SpO2: 99% 98% 97% 94%   Weight:       Height:           GEN: NAD  RESP: CTAB, no R/R/W  CV: +S1 S2, regular rate, no rubs  ABD: soft, NT, ND, normal BS   : catheter removed  EXT: DP pulses intact b/l  Neuro: AAOx3    [ X ] Total time spent: 30 Mins and greater than 50% of this time was spent counseling/coordinating care

## 2018-07-31 NOTE — PHYSICAL THERAPY NOTE
PHYSICAL THERAPY NOTE          Patient Name: Sudarshan Mena  SQHMN'W Date: 7/31/2018 07/31/18 0815   Pain Assessment   Pain Assessment 0-10   Pain Score 3   Pain Location Hip   Pain Orientation Right   Effect of Pain on Daily Activities Pt mobility not limited due to pain   Restrictions/Precautions   Weight Bearing Precautions Per Order Yes   RLE Weight Bearing Per Order WBAT   Braces or Orthoses Other (Comment)  (Adduction pillow)   Other Precautions WBS;THR;Pain  (dizziness, nausea, IV)   General   Additional Pertinent History Pt admitted for R THR  Pt hx includes L THR, BL knee replacements, R RTC tear, depression, HTN, and HLD   Family/Caregiver Present No   Cognition   Overall Cognitive Status WFL   Attention Within functional limits   Orientation Level Oriented X4   Memory Within functional limits   Following Commands Follows all commands and directions without difficulty   Comments Pt is friendly and cooperative  Pt able to engage in conversation and answer questions appropriately  Pt retained 2/3 hip precuations from last session  All precuations were reviewed prior to session  Pt verbally expressed and physically demonstrated understanding  Pt was able to make basic needs met  Pt communicated symptoms throughout session   Bed Mobility   Additional Comments Pt OOB in chair upon PT arrival  Pt left OOB in chair upon PT session termination   Transfers   Sit to Stand 5  Supervision   Additional items Increased time required   Stand to Sit 5  Supervision   Additional items Increased time required   Additional Comments Pt able to sit to stand from reclining chair and standard chair several times throughout session  Pt was not limited by pain  Pt speed was slowed due to present signs of lightheadedness and dizziness   Pt did not require physical assistance to perform transfers   Ambulation/Elevation   Gait pattern Decreased R stance; Excessively slow   Gait Assistance 5  Supervision   Assistive Device Rolling walker   Distance 200'   Stair Management Assistance 5  Supervision   Additional items Verbal cues; Increased time required   Stair Management Technique One rail L;Foreward; Step to pattern; With cane  (cane L)   Number of Stairs 6  (5 + 1 )   Balance   Static Sitting Good   Dynamic Sitting Fair +   Static Standing Fair -   Dynamic Standing Fair -   Ambulatory Fair -   Endurance Deficit   Endurance Deficit No   Endurance Deficit Description Pt limited by lightheadedness, dizziness and nausea  Pt required several seated rest breaks throughout session  Pt symptoms decreased with rest     Activity Tolerance   Activity Tolerance Treatment limited secondary to medical complications (Comment)  (dizziness, lightheadedness, nausea)   Nurse Made Aware RN   Assessment   Prognosis Good   Problem List Decreased strength;Decreased range of motion;Decreased endurance; Impaired balance;Decreased mobility; Decreased coordination;Orthopedic restrictions;Pain   Assessment Pt was OOB in chair upon PT arrival  Pt reported feeling weak today  She reported getting sick (vomitting) at 00:30 and minimal sleep  Pt reports pain is not currently bad and rated it 3/10  Pt reports positive for lightheadedness and nausea  Pt was agreeable to PT treatment  Pt tolerated seated therex well with no increase in adverse symptoms  Pt performed sit to stand and stand to sit transfers with S  Pt required extra time due to lgihtheadedness but did not require physical assistance to perform transfers  Pt was able to ambulate 100'x2 with S and RW  Pt requird seated rest due to nausea and lgihtheadedness between trials  Pt BP was taken and read 123/58  Pt symptoms decreased upon seated rest  Pt was able to perform 5  stairs with S, one rail and a cane   Pt able to perform curb step with forward ascending technique with RW with S  Pt experienced increased nausea after steps and vomited into basin  Pt states she is no longer sure if she wants to go home today due to nausea, and dizziness  Pt continues to present with Decreased strength;Decreased range of motion;Decreased endurance; Impaired balance;Decreased mobility; Decreased coordination;Orthopedic restrictions; and Pain  She is able to perform functional mobility with S and progressing toward goals well  Pt limited due to nausea and vomiting  Pt appropriate to d/c home with family support and outpatient PT when medically appropriate  Goals   Patient Goals to go home    STG Expiration Date 08/04/18   Short Term Goal #1 Pt to be min A with bed mobility to increase accessibility to home environment  Pt to be mod (I) with sit to stand, stand to sit and toilet transfers to allow ability to participate in ADLs/IADLs  Pt to maintain static standing balance for 5 minutes with RW to increase ability to participate in ADLs/IADLs  Pt to ambulate 150' mod (I) with RW to increase home accessibility  PT to ascend and descend 5 stairs with S to allow home accessibility  Pt to consistently verbally express and physically demonstrate  adherence to precautions  Pt to participate in HEP including strength, ROM, endurance, balance, coordination, mobility, transfer, ambulation and stair training activities  Treatment Day 1   Plan   Treatment/Interventions ADL retraining;Functional transfer training;LE strengthening/ROM; Elevations; Therapeutic exercise; Endurance training;Equipment eval/education;Patient/family training;Gait training; Compensatory technique education;Spoke to nursing;Spoke to MD   PT Frequency Twice a day   Recommendation   Recommendation Home with family support; Outpatient PT   Equipment Recommended Anum Boo   PT - OK to Discharge Yes  (when medically appropriate)     Bettie Kumar, SPT

## 2018-08-02 ENCOUNTER — EVALUATION (OUTPATIENT)
Dept: PHYSICAL THERAPY | Facility: CLINIC | Age: 68
End: 2018-08-02
Payer: MEDICARE

## 2018-08-02 DIAGNOSIS — Z96.641 S/P HIP REPLACEMENT, RIGHT: ICD-10-CM

## 2018-08-02 DIAGNOSIS — M16.11 PRIMARY OSTEOARTHRITIS OF RIGHT HIP: Primary | ICD-10-CM

## 2018-08-02 PROCEDURE — G8978 MOBILITY CURRENT STATUS: HCPCS | Performed by: PHYSICAL THERAPIST

## 2018-08-02 PROCEDURE — G8979 MOBILITY GOAL STATUS: HCPCS | Performed by: PHYSICAL THERAPIST

## 2018-08-02 PROCEDURE — 97110 THERAPEUTIC EXERCISES: CPT | Performed by: PHYSICAL THERAPIST

## 2018-08-02 PROCEDURE — 97162 PT EVAL MOD COMPLEX 30 MIN: CPT | Performed by: PHYSICAL THERAPIST

## 2018-08-02 NOTE — PROGRESS NOTES
PT Evaluation     Today's date: 2018  Patient name: Tresa Faye  : 1950  MRN: [id number]  Referring provider: Jaime Herrera PA-C  Dx:   Encounter Diagnosis     ICD-10-CM    1  Primary osteoarthritis of right hip M16 11                   Assessment  Impairments: abnormal gait, abnormal or restricted ROM, activity intolerance, impaired physical strength, pain with function and weight-bearing intolerance    Assessment details: Patient is recovering from a right THR, posterior approach  Understanding of Dx/Px/POC: good   Prognosis: good  Prognosis details: STG Patient is independent with HEP   STG Range of motion is improved by 25% in 4 weeks  LTG Increase ROM 10 degrees in 4 weeks  LTG Balance & endurance & gait & locomotion are improved by 50% in 4 weeks  LTG ADL performance improved to prior level of function      Plan  Planned therapy interventions: manual therapy, balance/weight bearing training, patient education, strengthening, stretching, therapeutic exercise, therapeutic activities, functional ROM exercises, gait training, graded activity, graded exercise and home exercise program  Frequency: 3x week  Duration in visits: 16  Duration in weeks: 6  Treatment plan discussed with: patient        Subjective Evaluation    History of Present Illness  Date of surgery: 2018  Mechanism of injury: surgery  Mechanism of injury: S/p right THR, posterior approach  She had spasms in the right leg and gluteals after surgery and continues to right buttock pain that wraps around the groin  She is using a rolling walker with 3-4/10 pain  She notes the right thigh seems swollen  Total hip precautions were reviewed, patient able to recite them independently      Pain  At best pain ratin  At worst pain ratin  Location: right buttock, around anterior hip  Quality: sharp and dull ache  Relieving factors: medications    Treatments  Previous treatment: physical therapy  Current treatment: medication and physical therapy  Patient Goals  Patient goals for therapy: decreased pain, increased motion, increased strength and independence with ADLs/IADLs        Objective     Active Range of Motion     Right Hip   Flexion: 60 degrees   Extension: -17 degrees   Abduction: 18 degrees     Strength/Myotome Testing     Right Hip   Planes of Motion   Flexion: 3-  Extension: 3-  Abduction: 3-  External rotation: 3  Internal rotation: 3+    General Comments     Hip Comments   Girth at widest portion of gastroc:  Left 46cm, right 48 8cm, wnl  Inspection:  No active drainage, color/temp wnl  Gait:  Right lateral pelvic shift, decreased stance time on the right, tolerance 75 ft        Flowsheet Rows      Most Recent Value   PT/OT G-Codes   Current Score  29   Projected Score  52   Assessment Type  Evaluation   G code set  Mobility: Walking & Moving Around   Mobility: Walking and Moving Around Current Status ()  CL   Mobility: Walking and Moving Around Goal Status ()  CK          Precautions:  Total hip precautions, posterior approach    Daily Treatment Diary     Manual  8/2            PROM prn np                                                                    Exercise Diary  8/2            Heel slides on slide board 12x            Add squeeze, bolster 15            ER isometrics, neutral, gentle             SAQ             Standing hip 3-way 10ea            SLR standing             Side stepping             2" step ups             Standing knee flexion             SB stretch             Gait training             Stairs when able             Mini squats                                                                                                            Modalities

## 2018-08-03 ENCOUNTER — TRANSITIONAL CARE MANAGEMENT (OUTPATIENT)
Dept: FAMILY MEDICINE CLINIC | Facility: CLINIC | Age: 68
End: 2018-08-03

## 2018-08-06 ENCOUNTER — OFFICE VISIT (OUTPATIENT)
Dept: PHYSICAL THERAPY | Facility: CLINIC | Age: 68
End: 2018-08-06
Payer: MEDICARE

## 2018-08-06 DIAGNOSIS — Z96.641 S/P HIP REPLACEMENT, RIGHT: Primary | ICD-10-CM

## 2018-08-06 DIAGNOSIS — M16.11 PRIMARY OSTEOARTHRITIS OF RIGHT HIP: ICD-10-CM

## 2018-08-06 PROCEDURE — 97140 MANUAL THERAPY 1/> REGIONS: CPT

## 2018-08-06 PROCEDURE — 97110 THERAPEUTIC EXERCISES: CPT

## 2018-08-06 NOTE — PROGRESS NOTES
Daily Note     Today's date: 2018  Patient name: Gordon Lima  : 1950  MRN: [id number]  Referring provider: Kaelyn Mazariegos PA-C  Dx:   Encounter Diagnosis     ICD-10-CM    1  S/P hip replacement, right Z96 641    2  Primary osteoarthritis of right hip M16 11      1:1 with PTA CR 2:00-2:35  CP 2:35-2:45  Subjective: Pain 3/10  No complaints following I E  Reports compliance with HEP offering no questions or concerns  Patient also reports being compliant with precautions  Objective: See treatment diary below      Assessment: Tolerated treatment fair  Patient demonstrated fatigue post treatment and would benefit from continued PT  Poor squat technique requiring cues; monitor NV  Demonstrates good understanding of exercises form previous hip surgery  Plan: Continue per plan of care  Precautions: Total hip precautions, posterior approach     Daily Treatment Diary      Manual                     PROM prn np  10'                                                                                                                          Exercise Diary                     Heel slides on slide board 12x  15                   Add squeeze, bolster 15  5"x20                   ER isometrics, neutral, gentle    5"x20                   SAQ    5"x20                   Standing hip 3-way 10ea  10 ea    B/L                   SLR standing    10 ea                    Side stepping    3 laps                   2" step ups    15                   Standing knee flexion    15 ea                    SB stretch    30"x4                   Gait training    300 ft                   Stairs when able                       Mini squats    15                                                                                                                         Modalities

## 2018-08-07 ENCOUNTER — OFFICE VISIT (OUTPATIENT)
Dept: OBGYN CLINIC | Facility: HOSPITAL | Age: 68
End: 2018-08-07

## 2018-08-07 ENCOUNTER — HOSPITAL ENCOUNTER (OUTPATIENT)
Dept: RADIOLOGY | Facility: HOSPITAL | Age: 68
Discharge: HOME | End: 2018-08-07
Payer: MEDICARE

## 2018-08-07 VITALS
DIASTOLIC BLOOD PRESSURE: 76 MMHG | HEART RATE: 85 BPM | SYSTOLIC BLOOD PRESSURE: 131 MMHG | BODY MASS INDEX: 41.33 KG/M2 | WEIGHT: 205 LBS | HEIGHT: 59 IN

## 2018-08-07 DIAGNOSIS — Z47.1 AFTERCARE FOLLOWING RIGHT HIP JOINT REPLACEMENT SURGERY: Primary | ICD-10-CM

## 2018-08-07 DIAGNOSIS — Z96.641 AFTERCARE FOLLOWING RIGHT HIP JOINT REPLACEMENT SURGERY: ICD-10-CM

## 2018-08-07 DIAGNOSIS — Z96.641 AFTERCARE FOLLOWING RIGHT HIP JOINT REPLACEMENT SURGERY: Primary | ICD-10-CM

## 2018-08-07 DIAGNOSIS — M16.11 PRIMARY OSTEOARTHRITIS OF ONE HIP, RIGHT: ICD-10-CM

## 2018-08-07 DIAGNOSIS — Z47.1 AFTERCARE FOLLOWING RIGHT HIP JOINT REPLACEMENT SURGERY: ICD-10-CM

## 2018-08-07 PROCEDURE — 73502 X-RAY EXAM HIP UNI 2-3 VIEWS: CPT

## 2018-08-07 PROCEDURE — 99024 POSTOP FOLLOW-UP VISIT: CPT | Performed by: PHYSICIAN ASSISTANT

## 2018-08-07 RX ORDER — OXYCODONE HYDROCHLORIDE 5 MG/1
TABLET ORAL
Qty: 40 TABLET | Refills: 0 | Status: SHIPPED | OUTPATIENT
Start: 2018-08-07 | End: 2018-08-14 | Stop reason: SDUPTHER

## 2018-08-07 NOTE — PROGRESS NOTES
First postoperative visit status post right total hip replacement  Patient is accompanied by her   She also has a history positive for left total hip replacement  She does not describe and denies fatigue pallor and exertional dyspnea  X-rays were performed at today's visit  She in PT voice a alteration in gait, she questions a leg length discrepancy      Past Medical History:   Diagnosis Date    Arthritis     Depression     Endometriosis     GERD (gastroesophageal reflux disease)     Hyperlipidemia     Hypertension     Osteopenia     Pancreatitis 06/2017    Pneumonia        Past Surgical History:   Procedure Laterality Date    ABDOMINAL SURGERY      endometriosis     APPENDECTOMY      CARPAL TUNNEL RELEASE      COLONOSCOPY      HAND SURGERY      HIP SURGERY      HYSTERECTOMY      JOINT REPLACEMENT Bilateral     KNEE    JOINT REPLACEMENT Left     HIP    OR ARTHROSCOPY SHOULDER SURGICAL BICEPS TENODESIS Right 5/10/2017    Procedure: ARTHROSCOPIC BICEPS TENODESIS WITH ROTATOR CUFF REPAIR ;  Surgeon: Antoni Oleary MD;  Location: [facility];  Service: Orthopedics    OR Madeleine Ruiz Right 5/10/2017    Procedure: SHOULDER ARTHROSCOPY SUBACROMIAL DECOMPRESSION;  Surgeon: Antoni Oleary MD;  [facility];  Service: Orthopedics    OR TOTAL HIP ARTHROPLASTY Left 3/12/2018    Procedure: ARTHROPLASTY HIP TOTAL;  Surgeon: Poly Chaves MD;  [facility];  Service: Orthopedics    OR TOTAL HIP ARTHROPLASTY Right 7/30/2018    Procedure: RIGHT TOTAL HIP ARTHROPLASTY;  Surgeon: Poly Chaves MD;  [facility];  Service: Orthopedics    TONSILLECTOMY         Family History   Problem Relation Age of Onset    Atrial fibrillation Mother     Breast cancer Mother     Stroke Mother     Coronary artery disease Mother     Diabetes Mother     Pancreatitis Mother     Heart attack Father     Arthritis Family     Cancer Family        Social History Substance Use Topics    Smoking status: Never Smoker    Smokeless tobacco: Never Used    Alcohol use Yes      Comment: 1-2x / year     Exam;    Right hip incision has an intact staple line and very scant drainage  The right hip incision has appropriate bruising very colors bluish purplish green but no gross collection such as a seroma  She has the absence of calf pain  Her preprocedural hip articulation pain has been removed  Using a shoe lift she felt more balanced and comfortable between 1/4 of an inch and 3 8th of an inch  Placed under the left foot  X-rays;    X-rays of the right hip show total hip prosthetic components the AP pelvis shows both total hip prostheses  Impression;     First postoperative visit right total hip replacement    Plan;    She is to continue DVT prophylaxis  She is to continue physical therapy  We will remove her staples at next visit if the incision and her exam allow  She should be afforded a joint recipient identification card at next week's visit  She will be afforded dental antibiotic prophylaxis medication at next week's visit  In the interim for her comfort a quarter-inch heel pad or heel left was ordered  She was also given a refill of oxycodone her pain relieving analgesics  This completed her care this date it was my privilege to assist her and at her expressed directive

## 2018-08-08 ENCOUNTER — APPOINTMENT (OUTPATIENT)
Dept: PHYSICAL THERAPY | Facility: CLINIC | Age: 68
End: 2018-08-08
Payer: MEDICARE

## 2018-08-09 ENCOUNTER — OFFICE VISIT (OUTPATIENT)
Dept: PHYSICAL THERAPY | Facility: CLINIC | Age: 68
End: 2018-08-09
Payer: MEDICARE

## 2018-08-09 DIAGNOSIS — Z96.641 S/P HIP REPLACEMENT, RIGHT: Primary | ICD-10-CM

## 2018-08-09 DIAGNOSIS — M16.11 PRIMARY OSTEOARTHRITIS OF RIGHT HIP: ICD-10-CM

## 2018-08-09 PROCEDURE — 97116 GAIT TRAINING THERAPY: CPT | Performed by: PHYSICAL THERAPIST

## 2018-08-09 PROCEDURE — 97110 THERAPEUTIC EXERCISES: CPT | Performed by: PHYSICAL THERAPIST

## 2018-08-09 NOTE — PROGRESS NOTES
Daily Note     Today's date: 2018  Patient name: Tessa Jasso  : 1950  MRN: [id number]  Referring provider: Samantha Farley PA-C  Dx:   Encounter Diagnosis     ICD-10-CM    1  S/P hip replacement, right Z96 641    2  Primary osteoarthritis of right hip M16 11                 Subjective:  Patient also reports being compliant with precautions  She states pain has decreased mildly but she continues to have aching in the lateral and anterior hip as well as the groin  Overall swelling seems to have decreased  Objective: See treatment diary below      Assessment: Tolerated treatment fair  Patient demonstrated fatigue post treatment and would benefit from continued PT  No significant swelling in te lower leg  Able to increase step height to 4" though compensation noted compared to 2"  Able to perform active heel slide without assistance      Plan: Continue per plan of care  Precautions: Total hip precautions, posterior approach     Daily Treatment Diary   20 min unsupervised fitness     Manual                   PROM prn np  10'   np                                                                                                                       Exercise Diary                   Heel slides on slide board 12x  15  3x10                 Add squeeze, bolster 15  5"x20 5"x20                 ER isometrics, neutral, gentle    5"x20  5"x20                 SAQ    5"x20  5"x20                 Standing hip 3-way 10ea  10 ea    B/L  20ea                 SLR standing    10 ea                    Side stepping    3 laps  5 laps                 2" step ups    15  20x                 Standing knee flexion    15 ea                    SB stretch    30"x4  :30x4                 Gait training    300 ft                   Stairs when able                       Mini squats    15  15                  4" step ups     4" x5                                                                                               Modalities

## 2018-08-10 ENCOUNTER — OFFICE VISIT (OUTPATIENT)
Dept: PHYSICAL THERAPY | Facility: CLINIC | Age: 68
End: 2018-08-10
Payer: MEDICARE

## 2018-08-10 DIAGNOSIS — Z96.641 S/P HIP REPLACEMENT, RIGHT: Primary | ICD-10-CM

## 2018-08-10 DIAGNOSIS — M16.11 PRIMARY OSTEOARTHRITIS OF RIGHT HIP: ICD-10-CM

## 2018-08-10 PROCEDURE — 97110 THERAPEUTIC EXERCISES: CPT

## 2018-08-10 NOTE — PROGRESS NOTES
Daily Note     Today's date: 8/10/2018  Patient name: Siva Denise  : 1950  MRN: [id number]  Referring provider: Clarita Epley, PA-C  Dx:   Encounter Diagnosis     ICD-10-CM    1  S/P hip replacement, right Z96 641    2  Primary osteoarthritis of right hip M16 11                   Subjective: Patient reports increased soreness following yesterday's session, mainly in posterior hip      Objective: See treatment diary below     Precautions: Total hip precautions, posterior approach     Daily Treatment Diary   20 min unsupervised fitness     Manual  8/2  8/6  8/9  8/10               PROM prn np  10'   np                                                                                                                       Exercise Diary  8/2  8/6  8/9  8/10               Heel slides on slide board 12x  15  3x10  3x10               Add squeeze, bolster 15  5"x20 5"x20 :05 20x               ER isometrics, neutral, gentle    5"x20  5"x20 :05 20x               SAQ    5"x20  5"x20 :05 20x               Standing hip 3-way 10ea  10 ea  B/L  20ea  20 ea              SLR standing    10 ea     15 ea               Side stepping    3 laps  5 laps  5 laps               Standing knee flexion    15 ea                    SB stretch    30"x4  :30x4  :30x4               Gait training    300 ft                   Stairs when able                        mini squats    15  15  20x                step ups     4" x5  4" 5x                                                                                             Modalities                                                                                                    Assessment: Tolerated treatment well  Patient had increased difficulty with step ups, could not perform more than 5 reps due to gluteal "cramping"  Patient would benefit from continued PT    Plan: Progress treatment as tolerated

## 2018-08-13 ENCOUNTER — OFFICE VISIT (OUTPATIENT)
Dept: PHYSICAL THERAPY | Facility: CLINIC | Age: 68
End: 2018-08-13
Payer: MEDICARE

## 2018-08-13 DIAGNOSIS — M16.11 PRIMARY OSTEOARTHRITIS OF RIGHT HIP: ICD-10-CM

## 2018-08-13 DIAGNOSIS — Z96.641 S/P HIP REPLACEMENT, RIGHT: Primary | ICD-10-CM

## 2018-08-13 PROCEDURE — 97110 THERAPEUTIC EXERCISES: CPT | Performed by: PHYSICAL MEDICINE & REHABILITATION

## 2018-08-13 PROCEDURE — 97112 NEUROMUSCULAR REEDUCATION: CPT | Performed by: PHYSICAL MEDICINE & REHABILITATION

## 2018-08-13 NOTE — PROGRESS NOTES
Daily Note     Today's date: 2018  Patient name: Marcy Paniagua  : 1950  MRN: [id number]  Referring provider: Camila Bermudez PA-C  Dx:   Encounter Diagnosis     ICD-10-CM    1  S/P hip replacement, right Z96 641    2  Primary osteoarthritis of right hip M16 11                   Subjective: Patient presents with reported soreness, notes increased discomfort after 2 consecutive sessions last week       Objective: See treatment diary below     Precautions: Total hip precautions, posterior approach     Daily Treatment Diary   20 min unsupervised fitness     Manual  8/2  8/6  8/9  8/10  8/13             PROM prn np  10'   np    np                                                                                                                   Exercise Diary  8/2  8/6  8/9  8/10  8/13             Heel slides on slide board 12x  15  3x10  3x10  15x10"             Add squeeze, bolster 15  5"x20 5"x20 :05 20x  20x5"             ER isometrics, neutral, gentle    5"x20  5"x20 :05 20x               SAQ    5"x20  5"x20 :05 20x  20x5"             Standing hip 3-way 10ea  10 ea  B/L  20ea  20 ea 20x ea             SLR standing    10 ea     15 ea               Side stepping    3 laps  5 laps  5 laps  5 laps             Standing knee flexion    15 ea       Walking march 2 laps across gym             SB stretch    30"x4  :30x4  :30x4  4x30"             Gait training    300 ft                   Stairs when able                        mini squats    15  15  20x  20x              step ups     4" x5  4" 5x  4", 2x5                                                                                           Modalities   18                        Cp, 10' post                                                                         Assessment: Tolerated treatment well  Patient notes fatigue vs  Pain with TE as charted, cueing for form maintenance and hip position throughout   Patient would benefit from continued PT    Plan: Progress treatment as tolerated

## 2018-08-14 ENCOUNTER — OFFICE VISIT (OUTPATIENT)
Dept: OBGYN CLINIC | Facility: HOSPITAL | Age: 68
End: 2018-08-14

## 2018-08-14 VITALS
BODY MASS INDEX: 41.33 KG/M2 | HEART RATE: 69 BPM | SYSTOLIC BLOOD PRESSURE: 114 MMHG | DIASTOLIC BLOOD PRESSURE: 74 MMHG | WEIGHT: 205.03 LBS | HEIGHT: 59 IN

## 2018-08-14 DIAGNOSIS — M16.11 PRIMARY OSTEOARTHRITIS OF ONE HIP, RIGHT: ICD-10-CM

## 2018-08-14 DIAGNOSIS — Z96.641 STATUS POST RIGHT HIP REPLACEMENT: ICD-10-CM

## 2018-08-14 DIAGNOSIS — Z96.641 S/P HIP REPLACEMENT, RIGHT: Primary | ICD-10-CM

## 2018-08-14 PROCEDURE — 99024 POSTOP FOLLOW-UP VISIT: CPT | Performed by: ORTHOPAEDIC SURGERY

## 2018-08-14 RX ORDER — OXYCODONE HYDROCHLORIDE 5 MG/1
TABLET ORAL
Qty: 40 TABLET | Refills: 0 | Status: SHIPPED | OUTPATIENT
Start: 2018-08-14 | End: 2018-09-24

## 2018-08-14 RX ORDER — TRAMADOL HYDROCHLORIDE 50 MG/1
50 TABLET ORAL EVERY 6 HOURS PRN
Qty: 40 TABLET | Refills: 0 | Status: SHIPPED | OUTPATIENT
Start: 2018-08-14 | End: 2019-04-18 | Stop reason: ALTCHOICE

## 2018-08-14 RX ORDER — METHOCARBAMOL 750 MG/1
750 TABLET, FILM COATED ORAL EVERY 6 HOURS PRN
Qty: 30 TABLET | Refills: 0 | Status: SHIPPED | OUTPATIENT
Start: 2018-08-14 | End: 2018-10-08

## 2018-08-14 NOTE — PROGRESS NOTES
Assessment/Plan:      Patient seen examined  She is roughly two weeks status post right total hip arthroplasty (7/30/2018)  Her right hip incision is clean dry and intact without evidence of infection or wound dehiscence  Staples removed and Steri-Strips applied  Overall she states she continues to progress with physical therapy, and her pain is decreasing  Medications were renewed at today's visit and a joint replacement identification card was provided  She is to continue DVT prophylaxis  Follow-up in the office in 4 weeks for repeat examination and right hip x-rays  Problem List Items Addressed This Visit     Primary osteoarthritis of one hip, right    Relevant Medications    oxyCODONE (ROXICODONE) 5 mg immediate release tablet    traMADol (ULTRAM) 50 mg tablet    S/P hip replacement, right - Primary      Other Visit Diagnoses     Status post right hip replacement        Relevant Medications    methocarbamol (ROBAXIN) 750 mg tablet            Subjective:      Patient ID: Wisam Jiang is a 79 y o  female  HPI    The patient is a 30-year-old female who presents for a postop visit  She is roughly two weeks status post right total hip, operative date 7/30/2018  Today she presents for wound check and staple removal   She states that she has had mild serosanguineous drainage from no erythema or no pus drainage  She denies any fever chills  She feels like she is doing well with outpatient physical therapy  Still has ongoing RLE pain and soreness that is improving  Today she also notes her leg length discrepancy is no longer present  The following portions of the patient's history were reviewed and updated as appropriate: allergies, current medications, past family history, past medical history, past social history, past surgical history and problem list     Review of Systems   Constitutional: Negative for chills, diaphoresis, fatigue and fever     Respiratory: Negative for shortness of breath and wheezing  Musculoskeletal: Positive for arthralgias  Skin: Negative for color change, pallor, rash and wound  Neurological: Positive for weakness  Objective:      /74   Pulse 69   Ht 4' 11 02" (1 499 m)   Wt 93 kg (205 lb 0 4 oz)   LMP  (LMP Unknown) Comment: hysterectomy  BMI 41 39 kg/m²          Physical Exam   Constitutional: She is oriented to person, place, and time  She appears well-developed and well-nourished  No distress  HENT:   Head: Normocephalic and atraumatic  Pulmonary/Chest: Effort normal    Musculoskeletal: She exhibits no tenderness  Neurological: She is alert and oriented to person, place, and time  Skin: Skin is warm and dry  She is not diaphoretic  No erythema  Psychiatric: She has a normal mood and affect  Nursing note and vitals reviewed  No acute distress  Gait is assisted with a rolling walker  Right hip incision is clean dry and intact no erythema drainage or evidence of wound dehiscence  There is no subcutaneous collection evident  There is no pitting edema, negative Homans sign

## 2018-08-15 ENCOUNTER — OFFICE VISIT (OUTPATIENT)
Dept: PHYSICAL THERAPY | Facility: CLINIC | Age: 68
End: 2018-08-15
Payer: MEDICARE

## 2018-08-15 ENCOUNTER — OFFICE VISIT (OUTPATIENT)
Dept: FAMILY MEDICINE CLINIC | Facility: CLINIC | Age: 68
End: 2018-08-15
Payer: MEDICARE

## 2018-08-15 VITALS
SYSTOLIC BLOOD PRESSURE: 140 MMHG | WEIGHT: 204.8 LBS | HEIGHT: 59 IN | BODY MASS INDEX: 41.29 KG/M2 | DIASTOLIC BLOOD PRESSURE: 58 MMHG | HEART RATE: 76 BPM | TEMPERATURE: 98.2 F | RESPIRATION RATE: 12 BRPM

## 2018-08-15 DIAGNOSIS — E78.01 FAMILIAL HYPERCHOLESTEROLEMIA: ICD-10-CM

## 2018-08-15 DIAGNOSIS — M16.11 PRIMARY OSTEOARTHRITIS OF RIGHT HIP: ICD-10-CM

## 2018-08-15 DIAGNOSIS — Z96.641 S/P HIP REPLACEMENT, RIGHT: Primary | ICD-10-CM

## 2018-08-15 DIAGNOSIS — D50.8 OTHER IRON DEFICIENCY ANEMIA: ICD-10-CM

## 2018-08-15 DIAGNOSIS — IMO0001 TRANSITION OF CARE PERFORMED WITH SHARING OF CLINICAL SUMMARY: Primary | ICD-10-CM

## 2018-08-15 DIAGNOSIS — Z96.641 S/P HIP REPLACEMENT, RIGHT: ICD-10-CM

## 2018-08-15 PROBLEM — Z78.9 TRANSITION OF CARE PERFORMED WITH SHARING OF CLINICAL SUMMARY: Status: ACTIVE | Noted: 2018-08-15

## 2018-08-15 PROCEDURE — 99495 TRANSJ CARE MGMT MOD F2F 14D: CPT | Performed by: INTERNAL MEDICINE

## 2018-08-15 PROCEDURE — 97110 THERAPEUTIC EXERCISES: CPT | Performed by: PHYSICAL THERAPIST

## 2018-08-15 PROCEDURE — 97112 NEUROMUSCULAR REEDUCATION: CPT | Performed by: PHYSICAL THERAPIST

## 2018-08-15 PROCEDURE — 97530 THERAPEUTIC ACTIVITIES: CPT | Performed by: PHYSICAL THERAPIST

## 2018-08-15 RX ORDER — POLYETHYLENE GLYCOL 3350 17 G/17G
17 POWDER, FOR SOLUTION ORAL DAILY
COMMUNITY
End: 2019-04-18 | Stop reason: ALTCHOICE

## 2018-08-15 NOTE — ASSESSMENT & PLAN NOTE
Patient is s/p right total hip replacement  She is progressing with pt and using walker; Her pain is controlled  She had some spasm noted post op- resolved with robaxin  She is currently on lovenox sq currently  She ahs follow up with pt and orthopedics

## 2018-08-15 NOTE — PROGRESS NOTES
Date and time hospital follow up call was made:  8/3/2018  4:47 PM  Hospital care reviewed:  Records reviewed  Patient was hopsitalized at:  [facility]  Date of admission:  7/30/18  Date of discharge:  7/31/18  Diagnosis:  Hip replacement  Disposition:  Home  Were the patients medicaitons reviewed and updated:  Yes  Current symptoms:  None  Post hospital issues:  Reduced activity, Poor ADL (Activities of Daily Living) performance  Scheduled for follow up?:  Refused  Patient refusal reason:  Has a f/u appt 4/17, she is f/u with Dr Raisa Wagner and PT  Patients specialists:  Other (comment)  Other specialists Name:  Dr Raisa Wagner  Did you obtain your prescribed medications:  Yes  Do you need help managing your perscriptions or medications:  No  I have advised the patient to call PCP with any new or worsening symptoms (please type in name along with any credentials):  Siobhan Range  Living Arrangements:  Spouse or Significiant other  Support System:  Spouse  The type of support provided:  Emotional, Financial, Physical  Do you have social support:  Yes, as much as I need  Are you recieving outpatient services:  Yes  What type(s) of services:  [facility]  Are you recieving home care services:  No  Are you using any community resources:  No  Current waiver service:  No  Have you fallen in the last 12 months:  No  Interperter language line required?:  No  Counseling:  Patient  Comments:  Patient is not coming in office for TCM

## 2018-08-15 NOTE — PROGRESS NOTES
ASSESSMENT and PLAN:  Endy Antunez is a 79 y o  female with:   Problem List Items Addressed This Visit     Hyperlipidemia    Relevant Orders    Lipid panel    Comprehensive metabolic panel    CBC and differential    S/P hip replacement, right     Patient doing well post op  Using walker  Follow up with ortho  Pain controlled currenlty and using robaxin on occasion  She is using oxycodone 3x a day and less ; she is using tramadol occasionally and progressing well   No opioid constipation noted  Transition of care performed with sharing of clinical summary - Primary     Patient is s/p right total hip replacement  She is progressing with pt and using walker; Her pain is controlled  She had some spasm noted post op- resolved with robaxin  She is currently on lovenox sq currently  She ahs follow up with pt and orthopedics  Other Visit Diagnoses     Other iron deficiency anemia        Relevant Orders    CBC and differential          SUBJECTIVE:  Endy Antunez is a 79 y o  female who presents today with a chief complaint of Transition of Care Management (dc'd [facility] 07/31/2018-Right Hip Replacement)    Patient here s/p hip replacement- she had significant spasm after right total hip replacement; she is currently using a walker; She used robaxin for spasm  It is improving as an out patient  Review of Systems   Constitutional: Positive for activity change  Appetite change: using a walker post op  HENT: Negative  Eyes: Negative  Respiratory: Negative  Cardiovascular: Negative  Gastrointestinal: Negative  Endocrine: Negative  Genitourinary: Negative  Musculoskeletal: Positive for arthralgias, back pain and gait problem  Skin: Negative  Allergic/Immunologic: Negative  Hematological: Negative  Psychiatric/Behavioral: Negative  I have reviewed the patient's PMH, Social History, Medication List and Allergies        OBJECTIVE:  /58 (BP Location: Left arm, Patient Position: Sitting, Cuff Size: Large)   Pulse 76   Temp 98 2 °F (36 8 °C)   Resp 12   Ht 4' 11" (1 499 m)   Wt 92 9 kg (204 lb 12 8 oz)   LMP  (LMP Unknown) Comment: hysterectomy  Breastfeeding? No   BMI 41 36 kg/m²   Physical Exam   Constitutional: She is oriented to person, place, and time  She appears well-developed and well-nourished  HENT:   Head: Normocephalic and atraumatic  Right Ear: External ear normal    Left Ear: External ear normal    Nose: Nose normal    Mouth/Throat: Oropharynx is clear and moist    Eyes: Conjunctivae and EOM are normal  Pupils are equal, round, and reactive to light  Neck: Normal range of motion  Neck supple  No JVD present  No tracheal deviation present  No thyromegaly present  Cardiovascular: Normal rate, regular rhythm, normal heart sounds and intact distal pulses  Pulmonary/Chest: Effort normal and breath sounds normal  No respiratory distress  She has no wheezes  Abdominal: Bowel sounds are normal    Musculoskeletal: Normal range of motion  She exhibits tenderness (improved rom of right hip)  Neurological: She is alert and oriented to person, place, and time  She has normal reflexes  No cranial nerve deficit  Coordination normal    Skin: Skin is warm and dry  Psychiatric: She has a normal mood and affect  Her behavior is normal  Judgment and thought content normal    Nursing note and vitals reviewed

## 2018-08-15 NOTE — PROGRESS NOTES
Daily Note     Today's date: 8/15/2018  Patient name: Sanket Quintero  : 1950  MRN: [id number]  Referring provider: Jeanna Corbin PA-C  Dx:   Encounter Diagnosis     ICD-10-CM    1  S/P hip replacement, right Z96 641    2  Primary osteoarthritis of right hip M16 11                   Subjective: Patient stated she felt slight dizziness after getting out of the shower this am; stated no sensation of dizziness at start of treatment session       Objective: See treatment diary below     Precautions: Total hip precautions, posterior approach     Daily Treatment Diary   20 min unsupervised fitness     Manual  8/2  8/6  8/9  8/10  8/13             PROM prn np  10'   np    np                                                                                                                   Exercise Diary  8/2  8/6  8/9  8/10  8/13  8/15           Heel slides on slide board 12x  15  3x10  3x10  15x10"  20x 10"           Add squeeze, bolster 15  5"x20 5"x20 :05 20x  20x5"  25x5"           ER isometrics, neutral, gentle    5"x20  5"x20 :05 20x               SAQ    5"x20  5"x20 :05 20x  20x5"  25x5"           Standing hip 3-way 10ea  10 ea    B/L  20ea  20 ea 20x ea  25 ea            SLR standing    10 ea     15 ea               Side stepping    3 laps  5 laps  5 laps  5 laps  5 laps           Standing knee flexion    15 ea       Walking march 2 laps across gym  walk march 2 laps           SB stretch    30"x4  :30x4  :30x4  4x30"  4x30"           Gait training    300 ft                   Stairs when able                        mini squats    15  15  20x  20x  25x            step ups     4" x5  4" 5x  4", 2x5  4", 3x5                                                                                         Modalities   8/13/18  8/15                    CP  Cp, 10' post  10' post                                                                       Assessment: Patient performed increased reps with exercises as listed without c/o pain; no significant pain at completion of treatment session  Plan: Progress treatment as tolerated

## 2018-08-15 NOTE — ASSESSMENT & PLAN NOTE
DERMATOCHALASIS / PTOSIS OU:  VISUALLY SIGNIFICANT TO PATIENT. REFER TO OCULOPLASTIC SPECIALIST IF PATIENT DESIRES. Patient doing well post op  Using walker  Follow up with ortho  Pain controlled currenlty and using robaxin on occasion  She is using oxycodone 3x a day and less ; she is using tramadol occasionally and progressing well   No opioid constipation noted

## 2018-08-17 ENCOUNTER — OFFICE VISIT (OUTPATIENT)
Dept: PHYSICAL THERAPY | Facility: CLINIC | Age: 68
End: 2018-08-17
Payer: MEDICARE

## 2018-08-17 DIAGNOSIS — Z96.641 S/P HIP REPLACEMENT, RIGHT: Primary | ICD-10-CM

## 2018-08-17 DIAGNOSIS — M16.11 PRIMARY OSTEOARTHRITIS OF RIGHT HIP: ICD-10-CM

## 2018-08-17 PROCEDURE — 97110 THERAPEUTIC EXERCISES: CPT

## 2018-08-17 PROCEDURE — 97116 GAIT TRAINING THERAPY: CPT

## 2018-08-17 NOTE — PROGRESS NOTES
Daily Note     Today's date: 2018  Patient name: Carroll Priest  : 1950  MRN: [id number]  Referring provider: Chandler Diaz PA-C  Dx:   Encounter Diagnosis     ICD-10-CM    1  S/P hip replacement, right Z96 641    2  Primary osteoarthritis of right hip M16 11                   Subjective: Patient reports she is doing better overall, her hip pain is decreasing and she is taking her pain medication less  Objective: Initiated bridges and gait training with SPC, added weight to standing hip exercises and SAQs  See treatment diary below     Precautions: Total hip precautions, posterior approach      Daily Treatment Diary   20 min unsupervised fitness     Manual  8/2  8/6  8/9  8/10  8/13  8/15  8/17         PROM prn np  10'   np    np                                                                                                                   Exercise Diary  8/2  8/6  8/9  8/10  8/13  8/15  8/17         Heel slides on slide board 12x  15  3x10  3x10  15x10"  20x 10" :05 30x         Add squeeze, bolster 15  5"x20 5"x20 :05 20x  20x5"  25x5" :05 30x         ER isometrics, neutral, gentle    5"x20  5"x20 :05 20x     :05 30x         SAQ    5"x20  5"x20 :05 20x  20x5"  25x5" 2# :05 20x         bridges       2x10      Standing hip 3-way 10ea  10 ea    B/L  20ea  20 ea 20x ea  25 ea   2# 2x10 ea         SLR standing    10 ea     15 ea      2# 2x10 ea         Side stepping    3 laps  5 laps  5 laps  5 laps  5 laps  2'         Standing knee flexion    15 ea       Walking march 2 laps across gym  walk march 2 laps  2# 20x         SB stretch    30"x4  :30x4  :30x4  4x30"  4x30" :30 4x         Gait training    300 ft          3 laps SPC         Stairs when able                        mini squats    15  15  20x  20x  25x  30x          step ups     4" x5  4" 5x  4", 2x5  4", 3x5                                                                                         Modalities   8/13/18  8/15  8/17                CP  Cp, 10' post  10' post defers                                                                      Assessment: Tolerated treatment well  Patient tolerated added weight and new exercises without significant difficulty  Patient demonstrates a step through pattern when gait training with SPC, no significant gait deviations present, min cues needed for proper sequencing with cane and CS for safety  Advised patient to begin to wean from walker and use SPC when at home and at PT, may continue to use walker when in uncontrolled environment (going to the store, etc)  Patient denies increased hip pain throughout session  Patient demonstrated fatigue post treatment and would benefit from continued PT       Plan: Progress treatment as tolerated

## 2018-08-20 DIAGNOSIS — F39 AFFECTIVE DISORDER (HCC): ICD-10-CM

## 2018-08-20 RX ORDER — FLUOXETINE HYDROCHLORIDE 20 MG/1
CAPSULE ORAL
Qty: 90 CAPSULE | Refills: 0 | Status: SHIPPED | OUTPATIENT
Start: 2018-08-20 | End: 2019-01-04 | Stop reason: SDUPTHER

## 2018-08-20 RX ORDER — OMEPRAZOLE 20 MG/1
CAPSULE, DELAYED RELEASE ORAL
Qty: 90 CAPSULE | Refills: 0 | Status: SHIPPED | OUTPATIENT
Start: 2018-08-20 | End: 2019-01-04 | Stop reason: SDUPTHER

## 2018-08-21 ENCOUNTER — OFFICE VISIT (OUTPATIENT)
Dept: PHYSICAL THERAPY | Facility: CLINIC | Age: 68
End: 2018-08-21
Payer: MEDICARE

## 2018-08-21 DIAGNOSIS — Z96.641 S/P HIP REPLACEMENT, RIGHT: Primary | ICD-10-CM

## 2018-08-21 DIAGNOSIS — M16.11 PRIMARY OSTEOARTHRITIS OF RIGHT HIP: ICD-10-CM

## 2018-08-21 PROCEDURE — 97110 THERAPEUTIC EXERCISES: CPT

## 2018-08-21 PROCEDURE — 97116 GAIT TRAINING THERAPY: CPT

## 2018-08-21 NOTE — PROGRESS NOTES
Daily Note     Today's date: 2018  Patient name: Marcy Paniagua  : 1950  MRN: [id number]  Referring provider: Camila Bermudez PA-C  Dx:   Encounter Diagnosis     ICD-10-CM    1  S/P hip replacement, right Z96 641    2  Primary osteoarthritis of right hip M16 11                   Subjective: Pt reports that she is doing pretty good today having a 4/10 pain in her R hip  She also notes that she is now only taking pain medication 2 times a day  She took pain meds directly before PT today  Objective: See treatment diary below  Precautions: Total hip precautions, posterior approach      Daily Treatment Diary   20 min unsupervised fitness     Manual  8/2  8/6  8/9  8/10  8/13  8/15  8/17  8/21       PROM prn np  10'   np    np                                                                                                                   Exercise Diary  8/2  8/6  8/9  8/10  8/13  8/15  8/17  8/21       Heel slides on slide board 12x  15  3x10  3x10  15x10"  20x 10" :05 30x  :05 30x       Add squeeze, bolster 15  5"x20 5"x20 :05 20x  20x5"  25x5" :05 30x  :05 30x       ER isometrics, neutral, gentle    5"x20  5"x20 :05 20x     :05 30x  :05 30x       SAQ    5"x20  5"x20 :05 20x  20x5"  25x5" 2# :05 20x  2# :05 20x       bridges       2x10 2x10     Standing hip 3-way 10ea  10 ea    B/L  20ea  20 ea 20x ea  25 ea   2# 2x10 ea  2# 2x10 ea       SLR standing    10 ea     15 ea      2# 2x10 ea         Side stepping    3 laps  5 laps  5 laps  5 laps  5 laps  2'  2'       Standing knee flexion    15 ea       Walking march 2 laps across gym  walk march 2 laps  2# 20x  2# 20x       SB stretch    30"x4  :30x4  :30x4  4x30"  4x30" :30 4x :30x4       Gait training    300 ft          3 laps SPC  3 laps no ad       Stairs when able                        mini squats    15  15  20x  20x  25x  30x  30x        step ups     4" x5  4" 5x  4", 2x5  4", 3x5    4", 3x5                                                                                     Modalities   8/13/18  8/15  8/17  8/21                CP  Cp, 10' post  10' post defers  defers                                                                   Assessment: Pt presented to therapy today with a SPC, but noted that she is not using it much  Pt was educated to use it as needed especially with un even terrain  Ambulated around clinic with no AD demonstrating no signs of unsteadiness  Tolerated treatment well continuing with all exercises as charted with no increased hip pain  Patient demonstrated fatigue post treatment, exhibited good technique with therapeutic exercises and would benefit from continued PT      Plan: Continue per plan of care

## 2018-08-23 ENCOUNTER — OFFICE VISIT (OUTPATIENT)
Dept: PHYSICAL THERAPY | Facility: CLINIC | Age: 68
End: 2018-08-23
Payer: MEDICARE

## 2018-08-23 DIAGNOSIS — M16.11 PRIMARY OSTEOARTHRITIS OF RIGHT HIP: ICD-10-CM

## 2018-08-23 DIAGNOSIS — Z96.641 S/P HIP REPLACEMENT, RIGHT: Primary | ICD-10-CM

## 2018-08-23 PROCEDURE — 97110 THERAPEUTIC EXERCISES: CPT

## 2018-08-23 PROCEDURE — 97530 THERAPEUTIC ACTIVITIES: CPT

## 2018-08-23 NOTE — PROGRESS NOTES
Daily Note     Today's date: 2018  Patient name: Mary Curran  : 1950  MRN: [id number]  Referring provider: Dannie Corea PA-C  Dx:   Encounter Diagnosis     ICD-10-CM    1  S/P hip replacement, right Z96 641    2  Primary osteoarthritis of right hip M16 11                   Subjective: Patient reports minimal soreness following last session, she has discontinued using her cane over the last week and has not felt the need to ice  She notes continued difficulty negotiating stairs at home, ascending worse than descending  Objective: Continued with exercise progression today as noted, initiated gait training on stairs with 1 UE to mimic home environment  See treatment diary below    Precautions: Total hip precautions, posterior approach       Daily Treatment Diary   20 min unsupervised fitness     Manual  8/2  8/6  8/9  8/10  8/13  8/15  8/17  8/21  8/23     PROM prn np  10'   np    np                                                                                                                   Exercise Diary  8/2  8/6  8/9  8/10  8/13  8/15  8/17  8/21  8/23     Heel slides on slide board 12x  15  3x10  3x10  15x10"  20x 10" :05 30x  :05 30x       Add squeeze, bolster 15  5"x20 5"x20 :05 20x  20x5"  25x5" :05 30x  :05 30x :05 30x     ER isometrics, neutral, gentle    5"x20  5"x20 :05 20x     :05 30x  :05 30x :05 30x     SAQ    5"x20  5"x20 :05 20x  20x5"  25x5" 2# :05 20x  2# :05 20x  3# :05 20x     LAQ          3# :05 20x    bridges             2x10 2x10  3x10     Standing hip 3-way 10ea  10 ea    B/L  20ea  20 ea 20x ea  25 ea   2# 2x10 ea  2# 2x10 ea  2# 3x10 ea     SLR standing    10 ea     15 ea      2# 2x10 ea         Side stepping    3 laps  5 laps  5 laps  5 laps  5 laps  2'  2'  2'      Standing knee flexion    15 ea       Walking march 2 laps across gym  walk march 2 laps  2# 20x  2# 20x  2# 30x     SB stretch    30"x4  :30x4  :30x4  4x30"  4x30" :30 4x :30x4 :30x4    Gait training    300 ft          3 laps SPC  3 laps no ad       Stairs when able                        mini squats    15  15  20x  20x  25x  30x  30x  30x      step ups     4" x5  4" 5x  4", 2x5  4", 3x5    4", 3x5  6" 20x      stairs                  6" x5 1 UE                                                           Modalities   8/13/18  8/15  8/17  8/21  8/23              CP  Cp, 10' post  10' post defers  defers defers                                                                  Assessment: Tolerated treatment well  Patient had no difficulty negotiating stairs with 1 UE and CS for safety  No increased hip pain with exercise progressions performed today  Patient demonstrated fatigue post treatment and would benefit from continued PT  Patient defers CP to conclude session, advised patient to ice at home prn  Plan: Progress treatment as tolerated

## 2018-08-28 ENCOUNTER — OFFICE VISIT (OUTPATIENT)
Dept: PHYSICAL THERAPY | Facility: CLINIC | Age: 68
End: 2018-08-28
Payer: MEDICARE

## 2018-08-28 DIAGNOSIS — Z96.641 S/P HIP REPLACEMENT, RIGHT: Primary | ICD-10-CM

## 2018-08-28 DIAGNOSIS — M16.11 PRIMARY OSTEOARTHRITIS OF RIGHT HIP: ICD-10-CM

## 2018-08-28 PROCEDURE — 97140 MANUAL THERAPY 1/> REGIONS: CPT

## 2018-08-28 NOTE — PROGRESS NOTES
Daily Note     Today's date: 2018  Patient name: Phu Tejeda  : 1950  MRN: [id number]  Referring provider: Beatriz Celeste, SAADIA  Dx:   Encounter Diagnosis     ICD-10-CM    1  S/P hip replacement, right Z96 641    2  Primary osteoarthritis of right hip M16 11                   Subjective: Patient is without complaints today, she was not sore following last session and she has not had significant pain in hip  Objective: Continued with exercise progression today as noted  See treatment diary below     Precautions: Total hip precautions, posterior approach       Daily Treatment Diary   20 min unsupervised fitness     Manual  8/2  8/6  8/9  8/10  8/13  8/15  8/17  8/21  8/23  8/28   PROM prn np  10'   np    np                                                                                                                   Exercise Diary  8/2  8/6  8/9  8/10  8/13  8/15  8/17  8/21  8/23  8/28   Heel slides on slide board 12x  15  3x10  3x10  15x10"  20x 10" :05 30x  :05 30x       Add squeeze, bolster 15  5"x20 5"x20 :05 20x  20x5"  25x5" :05 30x  :05 30x :05 30x :05 30x   ER isometrics, neutral, gentle    5"x20  5"x20 :05 20x     :05 30x  :05 30x :05 30x :05 30x   SAQ    5"x20  5"x20 :05 20x  20x5"  25x5" 2# :05 20x  2# :05 20x  3# :05 20x  4# :05 20x   LAQ                  3# :05 20x  4# :05 20x   bridges             2x10 2x10  3x10  3x10   Standing hip 3-way 10ea  10 ea    B/L  20ea  20 ea 20x ea  25 ea   2# 2x10 ea  2# 2x10 ea  2# 3x10 ea  3# 2x10 ea   SLR standing    10 ea     15 ea      2# 2x10 ea         Side stepping    3 laps  5 laps  5 laps  5 laps  5 laps  2'  2'  2'   2'   Standing knee flexion    15 ea       Walking march 2 laps across gym  walk march 2 laps  2# 20x  2# 20x  2# 30x  3# 20x   SB stretch    30"x4  :30x4  :30x4  4x30"  4x30" :30 4x :30x4 :30x4  :30 4x   Gait training    300 ft          3 laps SPC  3 laps no ad       Stairs when able                        mini squats    15  15  20x  20x  25x  30x  30x  30x  30x    step ups     4" x5  4" 5x  4", 2x5  4", 3x5    4", 3x5  6" 20x  8" 20x   Lateral step up and overs            4" 10x    stairs                  6" x5 1 UE                                                           Modalities   8/13/18  8/15  8/17  8/21  8/23              CP  Cp, 10' post  10' post defers  defers defers                                                                 *unsupervised fitness for 20 minutes of session  Assessment: Tolerated treatment well  No significant difficulty or increased pain with exercises performed today  Minimal verbal cues needed for proper technique with good carry over  Patient demonstrated fatigue post treatment and would benefit from continued PT      Plan: RE NV

## 2018-08-30 ENCOUNTER — EVALUATION (OUTPATIENT)
Dept: PHYSICAL THERAPY | Facility: CLINIC | Age: 68
End: 2018-08-30
Payer: MEDICARE

## 2018-08-30 DIAGNOSIS — M16.11 PRIMARY OSTEOARTHRITIS OF RIGHT HIP: ICD-10-CM

## 2018-08-30 DIAGNOSIS — Z96.641 S/P HIP REPLACEMENT, RIGHT: Primary | ICD-10-CM

## 2018-08-30 PROCEDURE — 97530 THERAPEUTIC ACTIVITIES: CPT

## 2018-08-30 PROCEDURE — 97110 THERAPEUTIC EXERCISES: CPT

## 2018-08-30 NOTE — PROGRESS NOTES
Daily Note     Today's date: 2018  Patient name: Bill Macias  : 1950  MRN: [id number]  Referring provider: oSphie Arrington PA-C  Dx:   Encounter Diagnosis     ICD-10-CM    1  S/P hip replacement, right Z96 641    2  Primary osteoarthritis of right hip M16 11                   Subjective: Patient reports increased soreness the night following last session, resolved within 24 hours  Objective: See treatment diary below     Precautions: Total hip precautions, posterior approach       Daily Treatment Diary   20 min unsupervised fitness     Manual     PROM prn np                                                                                                                   Exercise Diary     Heel slides on slide board              Add squeeze, bolster :05 20x         :05 30x   ER isometrics, neutral, gentle :05 20x         :05 30x   SAQ           4# :05 20x   LAQ  5# :05 20x          4# :05 20x   bridges  3x10          3x10   Standing hip 3-way  3# 2x10 ea          3# 2x10 ea   SLR standing              Side stepping  2'          2'   Standing knee flexion  3# 20x          3# 20x   SB stretch :30 4x          :30 4x    SLS :10 4x            Gait training              Stairs when able               mini squats  30x          30x    step ups  8" 20x          8" 20x   Lateral step up and overs   4" 10x          4" 10x    stairs                                                                   Modalities   8/13/18  8/15  8/17  8/21  8/23  8/28  8/30          CP  Cp, 10' post  10' post defers  defers defers                                                                  Assessment: Tolerated treatment well  Patient demonstrated fatigue post treatment and would benefit from continued PT  Refer to RE for additional information  Plan: Progress treatment as tolerated

## 2018-08-30 NOTE — PROGRESS NOTES
PT Re-Evaluation      Today's date: 2018  Patient name: Ronny Solares  : 1950  MRN: [id number]  Referring provider: Antonio Leone PA-C  Dx:         Encounter Diagnosis      ICD-10-CM     1  Primary osteoarthritis of right hip M16 11              Assessment  Impairments: abnormal gait, abnormal or restricted ROM, activity intolerance, impaired physical strength, pain with function and weight-bearing intolerance    Assessment details: Patient is recovering from a right THR, posterior approach  Understanding of Dx/Px/POC: good     Prognosis: good  Prognosis details: STG Patient is independent with HEP   STG Range of motion is improved by 25% in 4 weeks  LTG Increase ROM 10 degrees in 4 weeks  LTG Balance & endurance & gait & locomotion are improved by 50% in 4 weeks  LTG ADL performance improved to prior level of function     Plan  Planned therapy interventions: manual therapy, balance/weight bearing training, patient education, strengthening, stretching, therapeutic exercise, therapeutic activities, functional ROM exercises, gait training, graded activity, graded exercise and home exercise program  Frequency: 3x week  Duration in visits: 8  Duration in weeks: 4  Treatment plan discussed with: patient         Subjective Evaluation     History of Present Illness  Date of surgery: 2018  Mechanism of injury: surgery  Mechanism of injury: S/p right THR, posterior approach  Patient reports her hip is feeling much better overall and she now has soreness and burning along the incision that occurs with prolonged weight bearing or when she 'over does it'  She has no difficulty with getting in and out of the car  She has a flight of stairs at home but rarely has to use them  She has been able to ascend them with a step to step pattern  She is no longer using an assistive device at home and generally feels sturdy  She does feel one leg is long than the other         Pain  At best pain ratin  At worst pain ratin  Location: along the incision  Quality: soreness and burning  Relieving factors: medications     Treatments  Previous treatment: physical therapy  Current treatment: medication and physical therapy  Patient Goals  Patient goals for therapy: decreased pain, increased motion, increased strength and independence with ADLs/IADLs        Objective      Active Range of Motion      Right Hip   Flexion: 90 degrees  (from 60)  Extension: 2 degrees  (from -17)  Abduction: 24 degrees (from 18)     Strength/Myotome Testing      Right Hip   Planes of Motion   Flexion: 4  Extension: 4  Abduction: 3+  External rotation: 4  Internal rotation: 4     General Comments      Hip Comments   Girth at widest portion of gastroc:  Left 46cm, right 46cm  (from 48 8cm), wnl  Gait:  decreased stance time on the right with slight right lateral trunk shift

## 2018-09-04 ENCOUNTER — APPOINTMENT (OUTPATIENT)
Dept: PHYSICAL THERAPY | Facility: CLINIC | Age: 68
End: 2018-09-04
Payer: MEDICARE

## 2018-09-06 ENCOUNTER — OFFICE VISIT (OUTPATIENT)
Dept: PHYSICAL THERAPY | Facility: CLINIC | Age: 68
End: 2018-09-06
Payer: MEDICARE

## 2018-09-06 DIAGNOSIS — Z96.641 S/P HIP REPLACEMENT, RIGHT: Primary | ICD-10-CM

## 2018-09-06 DIAGNOSIS — M16.11 PRIMARY OSTEOARTHRITIS OF RIGHT HIP: ICD-10-CM

## 2018-09-06 PROCEDURE — G8979 MOBILITY GOAL STATUS: HCPCS | Performed by: PHYSICAL THERAPIST

## 2018-09-06 PROCEDURE — 97530 THERAPEUTIC ACTIVITIES: CPT | Performed by: PHYSICAL THERAPIST

## 2018-09-06 PROCEDURE — 97110 THERAPEUTIC EXERCISES: CPT | Performed by: PHYSICAL THERAPIST

## 2018-09-06 PROCEDURE — G8978 MOBILITY CURRENT STATUS: HCPCS | Performed by: PHYSICAL THERAPIST

## 2018-09-06 NOTE — PROGRESS NOTES
Daily Note     Today's date: 2018  Patient name: Gypsy Soliman  : 1950  MRN: [id number]  Referring provider: Anabell Oliveira PA-C  Dx:   No diagnosis found  Precautions: Total hip precautions, posterior approach        Patient is has no complaints today  She notes that she was able to do wash, vacuumed, and completed other household chores the other day and they all went well  She still reports pain every once in awhile, especially when first waking up in the morning  Objective: Continued with exercise progression today as noted  See treatment diary below    Assessment: Tolerated treatment well  No significant difficulty or increased pain with exercises performed today  Patient demonstrated fatigue post treatment and would benefit from continued PT  She performed the squats on the [employer] and kept her weight through both legs equally  She did not need any cuing during exercises to maintain proper posture       Plan: resume as tolerated     Daily Treatment Diary      Manual                       PROM prn                                                                                                                            Exercise Diary     Heel slides on slide board                       Add squeeze, bolster :05 20x  :05 20x               :05 30x   ER isometrics, neutral, gentle :05 20x  :05 20x               :05 30x   SAQ                    4# :05 20x   LAQ  5# :05 20x  5# :05 20x                4# :05 20x   bridges  3x10                  3x10   Standing hip 3-way  3# 2x10 ea  3# 2x10 ea                3# 2x10 ea   SLR standing                       Side stepping  2'  2' with 3#                2'   Standing knee flexion  3# 20x  3# 20x                3# 20x   SB stretch :30 4x  :30x4                :30 4x   SLS :10 4x  :10 4x                   Gait training                       SLR   small :05 hold, 20x                    mini squats  30x  30x                30x    step ups  8" 20x  8" 20x                8" 20x   Lateral step up and overs   4" 10x  4" 10x                4" 10x   Stairs   5x                   Bi[employer] squats   20x %WB training                                                 Modalities   8/13/18  8/15  8/17  8/21  8/23  8/28  8/30          CP  Cp, 10' post  10' post defers  defers defers

## 2018-09-10 ENCOUNTER — TELEPHONE (OUTPATIENT)
Dept: FAMILY MEDICINE CLINIC | Facility: CLINIC | Age: 68
End: 2018-09-10

## 2018-09-10 DIAGNOSIS — Z12.39 SCREENING FOR BREAST CANCER: Primary | ICD-10-CM

## 2018-09-11 ENCOUNTER — OFFICE VISIT (OUTPATIENT)
Dept: OBGYN CLINIC | Facility: HOSPITAL | Age: 68
End: 2018-09-11

## 2018-09-11 ENCOUNTER — HOSPITAL ENCOUNTER (OUTPATIENT)
Dept: RADIOLOGY | Facility: HOSPITAL | Age: 68
Discharge: HOME | End: 2018-09-11
Attending: ORTHOPAEDIC SURGERY

## 2018-09-11 ENCOUNTER — APPOINTMENT (OUTPATIENT)
Dept: PHYSICAL THERAPY | Facility: CLINIC | Age: 68
End: 2018-09-11
Payer: MEDICARE

## 2018-09-11 VITALS
HEART RATE: 76 BPM | BODY MASS INDEX: 41.33 KG/M2 | WEIGHT: 205 LBS | HEIGHT: 59 IN | SYSTOLIC BLOOD PRESSURE: 140 MMHG | DIASTOLIC BLOOD PRESSURE: 78 MMHG

## 2018-09-11 DIAGNOSIS — Z96.641 S/P HIP REPLACEMENT, RIGHT: Primary | ICD-10-CM

## 2018-09-11 DIAGNOSIS — M25.551 PAIN OF RIGHT HIP JOINT: ICD-10-CM

## 2018-09-11 PROCEDURE — 99024 POSTOP FOLLOW-UP VISIT: CPT | Performed by: ORTHOPAEDIC SURGERY

## 2018-09-11 NOTE — PROGRESS NOTES
Subjective; Six week follow-up status post right total hip replacement  Patient is known to have a left total hip replacement  She comes the office accompanied by her  and no need for a ambulatory assistive device  All of her preprocedural pain is been removed  She is doing abductors strengthening exercises,   And is currently working on stairs, and there improvement      Past Medical History:   Diagnosis Date    Arthritis     Depression     Endometriosis     GERD (gastroesophageal reflux disease)     Hyperlipidemia     Hypertension     Osteopenia     Pancreatitis 06/2017    Pneumonia        Past Surgical History:   Procedure Laterality Date    ABDOMINAL SURGERY      endometriosis     APPENDECTOMY      CARPAL TUNNEL RELEASE      COLONOSCOPY      HAND SURGERY      HIP SURGERY      HYSTERECTOMY      JOINT REPLACEMENT Bilateral     KNEE    JOINT REPLACEMENT Left     HIP    MA ARTHROSCOPY SHOULDER SURGICAL BICEPS TENODESIS Right 5/10/2017    Procedure: ARTHROSCOPIC BICEPS TENODESIS WITH ROTATOR CUFF REPAIR ;  Surgeon: Jennifer Sampson MD;  Location: [facility];  Service: Orthopedics    MA Bonnee Roles Right 5/10/2017    Procedure: SHOULDER ARTHROSCOPY SUBACROMIAL DECOMPRESSION;  Surgeon: Jennifer Sampson MD;  Location: [facility];  Service: Orthopedics    MA TOTAL HIP ARTHROPLASTY Left 3/12/2018    Procedure: ARTHROPLASTY HIP TOTAL;  Surgeon: Charito Hare MD;  Location: [facility];  Service: Orthopedics    MA TOTAL HIP ARTHROPLASTY Right 7/30/2018    Procedure: RIGHT TOTAL HIP ARTHROPLASTY;  Surgeon: Charito Hare MD;  Location: [facility];  Service: Orthopedics    TONSILLECTOMY         Family History   Problem Relation Age of Onset    Atrial fibrillation Mother     Breast cancer Mother     Stroke Mother     Coronary artery disease Mother     Diabetes Mother     Pancreatitis Mother     Heart attack Father    [place] 89 Cancer Family Social History   Substance Use Topics    Smoking status: Never Smoker    Smokeless tobacco: Never Used    Alcohol use Yes      Comment: 1-2x / year     Exam;    Right hip incision devoid is any drainage noted his since  Right hip and thigh of appropriate color  Previous lymphedema of the leg has subsided  No calf pain    Impression; Six weeks status post right total hip replacement    Plan;    Continue physical therapy exercise  Use of a cane or a walker outdoors when it is inclimate  She may drive  We will see her in 6 additional weeks x-rays of the right hip would be appropriate at that time  Her exam was supervised by and plan formulated by the attending surgeon, it was my privilege to assist him in its delivery

## 2018-09-13 ENCOUNTER — OFFICE VISIT (OUTPATIENT)
Dept: PHYSICAL THERAPY | Facility: CLINIC | Age: 68
End: 2018-09-13
Payer: MEDICARE

## 2018-09-13 DIAGNOSIS — Z96.641 S/P HIP REPLACEMENT, RIGHT: Primary | ICD-10-CM

## 2018-09-13 DIAGNOSIS — M16.11 PRIMARY OSTEOARTHRITIS OF RIGHT HIP: ICD-10-CM

## 2018-09-13 PROCEDURE — 97110 THERAPEUTIC EXERCISES: CPT

## 2018-09-13 PROCEDURE — 97530 THERAPEUTIC ACTIVITIES: CPT

## 2018-09-13 NOTE — PROGRESS NOTES
Daily Note     Today's date: 2018  Patient name: Tessa Jasso  : 1950  MRN: [id number]  Referring provider: Samantha Farley PA-C  Dx:   Encounter Diagnosis     ICD-10-CM    1  S/P hip replacement, right Z96 641    2  Primary osteoarthritis of right hip M16 11                   Subjective: Patient reports she was cleared by the MD to drive, attempted this morning for a short duration and experienced increased discomfort along incision area  She has had increased soreness since  Precautions:  Total hip precautions, posterior approach    Objective: See treatment diary below      Daily Treatment Diary      Manual                        PROM prn                                                                                                                             Exercise Diary     Heel slides on slide board                       Add squeeze, bolster :05 20x  :05 20x :05 20x             :05 30x   ER isometrics, neutral, gentle :05 20x  :05 20x :05 20x             :05 30x   SAQ                    4# :05 20x   LAQ  5# :05 20x  5# :05 20x 5# :05 20x              4# :05 20x   bridges  3x10    3x10              3x10   Standing hip 3-way  3# 2x10 ea  3# 2x10 ea  3# 3x10 ea              3# 2x10 ea   SLR standing                       Side stepping  2'  2' with 3#  2' /c 3#              2'   Standing knee flexion  3# 20x  3# 20x  3# 20x              3# 20x   SB stretch :30 4x  :30x4 :30x4              :30 4x   SLS :10 4x  :10 4x :15x4                 Gait training                       SLR   small :05 hold, 20x small :05 20x                  mini squats  30x  30x  30x               30x    step ups  8" 20x  8" 20x  8" 20x              8" 20x   Lateral step up and overs   4" 10x  4" 10x  4" 10x              4" 10x   Stairs   5x                   Biodex squats   20x %WB training 20x %WB training                                               Modalities   8/13/18  8/15  8/17  8/21 8/23 8/28 8/30          CP  Cp, 10' post  10' post defers  defers defers                                                                    Assessment: Tolerated treatment well  Seated rest breaks needed every 3-4 standing exercises due to increased soreness  Most challenged small SLR added last visit  No increased hip pain throughout session though soreness persists  Patient demonstrated fatigue post treatment and would benefit from continued PT      Plan: Progress treatment as tolerated

## 2018-09-17 ENCOUNTER — OFFICE VISIT (OUTPATIENT)
Dept: PHYSICAL THERAPY | Facility: CLINIC | Age: 68
End: 2018-09-17
Payer: MEDICARE

## 2018-09-17 DIAGNOSIS — M16.11 PRIMARY OSTEOARTHRITIS OF RIGHT HIP: ICD-10-CM

## 2018-09-17 DIAGNOSIS — Z96.641 S/P HIP REPLACEMENT, RIGHT: Primary | ICD-10-CM

## 2018-09-17 PROCEDURE — 97530 THERAPEUTIC ACTIVITIES: CPT

## 2018-09-17 PROCEDURE — 97110 THERAPEUTIC EXERCISES: CPT

## 2018-09-17 NOTE — PROGRESS NOTES
Daily Note     Today's date: 2018  Patient name: Estelle Dominguez  : 1950  MRN: [id number]  Referring provider: Jeanne Prieto PA-C  Dx:   Encounter Diagnosis     ICD-10-CM    1  S/P hip replacement, right Z96 641    2  Primary osteoarthritis of right hip M16 11                   Subjective: Patient reports decreased soreness since last visit, her hip feels good overall though she notes she has not yet attempted to drive again  Objective: See treatment diary below     Precautions:  Total hip precautions, posterior approach        Daily Treatment Diary      Manual                        PROM prn                                                                                                                             Exercise Diary     Heel slides on slide board                       Add squeeze, bolster :05 20x  :05 20x :05 20x :05 20x           :05 30x   ER isometrics, neutral, gentle :05 20x  :05 20x :05 20x :05 20x           :05 30x   SAQ                    4# :05 20x   LAQ  5# :05 20x  5# :05 20x 5# :05 20x              4# :05 20x   bridges  3x10    3x10  3x10            3x10   Standing hip 3-waySLR standing  3# 2x10 ea  3# 2x10 ea  3# 3x10 ea  3# 3x10 ea            3# 2x10 ea                          Side stepping  2'  2' with 3#  2' /c 3#  2' /c 3#            2'   Standing knee flexion  3# 20x  3# 20x  3# 20x  3# 30x            3# 20x   SB stretch :30 4x  :30x4 :30x4 :30x4            :30 4x   SLS :10 4x  :10 4x :15x4 :20x3               Gait training                       SLR   small :05 hold, 20x small :05 20x small :05 20x                mini squats  30x  30x  30x               30x    step ups  8" 20x  8" 20x  8" 20x  8" 20x            8" 20x   Lateral step up and overs   4" 10x  4" 10x  4" 10x  4" 10x            4" 10x   Stairs   5x                   Biodex squats   20x %WB training 20x %WB training  30x % WB training               Knee extension machine 11# 20x         Knee flexion machine    11# 20x         Upright bike        5'                     Modalities   8/13/18  8/15  8/17  8/21  8/23  8/28  8/30  9/17        CP  Cp, 10' post  10' post defers  defers defers                                                                  Assessment: Tolerated treatment well  No significant difficulty or increased pain noted with exercises performed today  Continues to be challenged by 3# weight fot standing hip 3-way and sidestepping  Patient demonstrated fatigue post treatment and would benefit from continued PT    Plan: Progress treatment as tolerated

## 2018-09-20 ENCOUNTER — OFFICE VISIT (OUTPATIENT)
Dept: PHYSICAL THERAPY | Facility: CLINIC | Age: 68
End: 2018-09-20
Payer: MEDICARE

## 2018-09-20 DIAGNOSIS — M16.11 PRIMARY OSTEOARTHRITIS OF RIGHT HIP: ICD-10-CM

## 2018-09-20 DIAGNOSIS — Z96.641 S/P HIP REPLACEMENT, RIGHT: Primary | ICD-10-CM

## 2018-09-20 PROCEDURE — 97110 THERAPEUTIC EXERCISES: CPT

## 2018-09-20 NOTE — PROGRESS NOTES
Daily Note     Today's date: 2018  Patient name: Diallo Mansfield  : 1950  MRN: [id number]  Referring provider: Candy Gardner PA-C  Dx:   Encounter Diagnosis     ICD-10-CM    1  S/P hip replacement, right Z96 641    2  Primary osteoarthritis of right hip M16 11                   Subjective: Patient has not had significant hip pain since last session, she drove to therapy this morning with no issues for first time since surgery  Objective: Initiated leg press (with seat back to start) and continued with TE progression as noted  See treatment diary below       Precautions:  Total hip precautions, posterior approach        Daily Treatment Diary      Manual                        PROM prn                                                                                                                             Exercise Diary     Heel slides on slide board                       Add squeeze, bolster :05 20x  :05 20x :05 20x :05 20x :05 20x         :05 30x   ER isometrics, neutral, gentle :05 20x  :05 20x :05 20x :05 20x :05 20x         :05 30x   SAQ                    4# :05 20x   LAQ  5# :05 20x  5# :05 20x 5# :05 20x              4# :05 20x   bridges  3x10    3x10  3x10  3x10          3x10   Standing hip 3-waySLR standing  3# 2x10 ea  3# 2x10 ea  3# 3x10 ea  3# 3x10 ea  3# 3x10 ea          3# 2x10 ea                           Side stepping  2'  2' with 3#  2' /c 3#  2' /c 3#  2' c/ 3#          2'   Standing knee flexion  3# 20x  3# 20x  3# 20x  3# 30x  3# 30x          3# 20x   SB stretch :30 4x  :30x4 :30x4 :30x4 :30x4          :30 4x   SLS :10 4x  :10 4x :15x4 :20x3               Gait training                       SLR   small :05 hold, 20x small :05 20x small :05 20x Small :05 20x              mini squats  30x  30x  30x               30x    step ups  8" 20x  8" 20x  8" 20x  8" 20x  8" 20x          8" 20x   Lateral step up and overs   4" 10x  4" 10x  4" 10x  4" 10x  4" 10x          4" 10x   Stairs   5x                   Biodex squats   20x %WB training 20x %WB training  30x % WB training               Knee extension machine       11# 20x  22# 2x10             Knee flexion machine       11# 20x  22# 2x10             Leg press (seat back)      35# 2x10        Upright bike        5'  7'                   Modalities   8/13/18  8/15  8/17  8/21  8/23  8/28  8/30  9/17  9/20      CP  Cp, 10' post  10' post defers  defers defers                                                                    Assessment: Tolerated treatment well  Patient was able to increase time on bike as well as initiate leg press with no difficulty  Continues to be challenged by current resistance for exercise program   Fatigues quickly with sidestepping using 3# weight  Patient demonstrated fatigue post treatment and would benefit from continued PT      Plan: Progress treatment as tolerated

## 2018-09-21 ENCOUNTER — APPOINTMENT (OUTPATIENT)
Dept: RADIOLOGY | Facility: MEDICAL CENTER | Age: 68
End: 2018-09-21
Payer: MEDICARE

## 2018-09-21 ENCOUNTER — OFFICE VISIT (OUTPATIENT)
Dept: URGENT CARE | Facility: MEDICAL CENTER | Age: 68
End: 2018-09-21
Payer: MEDICARE

## 2018-09-21 VITALS
RESPIRATION RATE: 20 BRPM | WEIGHT: 205 LBS | OXYGEN SATURATION: 96 % | HEART RATE: 63 BPM | SYSTOLIC BLOOD PRESSURE: 124 MMHG | TEMPERATURE: 98.6 F | BODY MASS INDEX: 41.33 KG/M2 | HEIGHT: 59 IN | DIASTOLIC BLOOD PRESSURE: 80 MMHG

## 2018-09-21 DIAGNOSIS — S92.354A CLOSED NONDISPLACED FRACTURE OF FIFTH METATARSAL BONE OF RIGHT FOOT, INITIAL ENCOUNTER: Primary | ICD-10-CM

## 2018-09-21 DIAGNOSIS — S99.911A INJURY OF RIGHT ANKLE, INITIAL ENCOUNTER: ICD-10-CM

## 2018-09-21 PROCEDURE — G0463 HOSPITAL OUTPT CLINIC VISIT: HCPCS | Performed by: FAMILY MEDICINE

## 2018-09-21 PROCEDURE — 73610 X-RAY EXAM OF ANKLE: CPT

## 2018-09-21 PROCEDURE — 99213 OFFICE O/P EST LOW 20 MIN: CPT | Performed by: FAMILY MEDICINE

## 2018-09-21 PROCEDURE — 73630 X-RAY EXAM OF FOOT: CPT

## 2018-09-21 NOTE — PATIENT INSTRUCTIONS
Fracture 5th metatarsal  Splint in place  Non weight bearing right foot  Follow up with PCP in 3-5 days  Proceed to  ER if symptoms worsen  Foot Fracture in Adults   WHAT YOU NEED TO KNOW:   A foot fracture is a break in one or more of the bones in your foot  Foot fractures are commonly caused by trauma, falls, or repeated stress injuries  DISCHARGE INSTRUCTIONS:   Medicines:   · Antibiotics: This medicine is given to help treat or prevent an infection caused by bacteria  · NSAIDs:  These medicines decrease swelling and pain  NSAIDs are available without a doctor's order  Ask which medicine is right for you  Ask how much to take and when to take it  Take as directed  NSAIDs can cause stomach bleeding and kidney problems if not taken correctly  · Pain medicine: You may be given a prescription medicine to decrease pain  Do not wait until the pain is severe before you take this medicine  · Take your medicine as directed  Contact your healthcare provider if you think your medicine is not helping or if you have side effects  Tell him of her if you are allergic to any medicine  Keep a list of the medicines, vitamins, and herbs you take  Include the amounts, and when and why you take them  Bring the list or the pill bottles to follow-up visits  Carry your medicine list with you in case of an emergency  Follow up with your healthcare provider or bone specialist as directed: You may need to return to have your splint or stitches removed  You may also need to return for tests to make sure your foot is healing  Write down your questions so you remember to ask them during your visits  Wound care:  Carefully wash the wound with soap and water  Dry the area and put on new, clean bandages as directed  Change your bandages when they get wet or dirty  Self-care:   · Rest:  You may need to rest your foot and avoid activities that cause pain   For stress fractures, you will need to avoid the activity that caused the fracture until it heals  Ask when you can return to your normal activities such as work and sports  · Ice:  Ice helps decrease swelling and pain  Ice may also help prevent tissue damage  Use an ice pack or put crushed ice in a plastic bag  Cover it with a towel, and place it on your foot for 15 to 20 minutes every hour as directed  · Elevate your foot:  Raise your foot at or above the level of your heart as often as you can  This will help decrease swelling and pain  Prop your foot on pillows or blankets to keep it elevated comfortably  · Physical therapy: Once your foot has healed, a physical therapist can teach you exercises to help improve movement and strength, and to decrease pain  Splint care:   · Check the skin around your splint daily for any redness or open areas  · Do not use a sharp or pointed object to scratch your skin under the splint  · Do not remove your splint unless your healthcare provider or orthopedic surgeon says it is okay  Bathing with a splint:  Do not let your splint get wet  Before bathing, cover the splint with a plastic bag  Tape the bag to your skin above the splint to seal out the water  Keep your foot out of the water in case the bag leaks  Ask when it is okay to take a bath or shower  Assistive devices: You may be given a hard-soled shoe to wear while your foot is healing  You also may need to use crutches to help you walk while your foot heals  It is important to use your crutches correctly  Ask for more information about how to use crutches  Contact your healthcare provider or bone specialist if:   · You have a fever  · You have new sores around your boot or splint  · You have new or worsening trouble moving your foot  · You notice a foul smell coming from under your splint  · Your boot or splint gets damaged  · You have questions or concerns about your condition or care    Return to the emergency department if:   · The pain in your injured foot gets worse even after you rest and take pain medicine  · The skin or toes of your foot become numb, swollen, cold, white, or blue  · You have more pain or swelling than you did before the splint was put on  · Your wound is draining fluid or pus  · Blood soaks through your bandage  · Your leg feels warm, tender, and painful  It may look swollen and red  · You suddenly feel lightheaded and short of breath  · You have chest pain when you take a deep breath or cough  You may cough up blood  © 2017 [facility] Information is for End User's use only and may not be sold, redistributed or otherwise used for commercial purposes  All illustrations and images included in CareNotes® are the copyrighted property of [employer] , [employer]  or Jose Tubbs  The above information is an  only  It is not intended as medical advice for individual conditions or treatments  Talk to your doctor, nurse or pharmacist before following any medical regimen to see if it is safe and effective for you

## 2018-09-21 NOTE — PROGRESS NOTES
[facility]        NAME: Bill Macias is a 79 y o  female  : 1950    MRN: [id number]  DATE: 2018  TIME: 1:16 PM    Assessment and Plan   Closed nondisplaced fracture of fifth metatarsal bone of right foot, initial encounter [S92 354A]  1  Closed nondisplaced fracture of fifth metatarsal bone of right foot, initial encounter  XR ankle 3+ vw right         Patient Instructions     Fracture 5th metatarsal  Splint in place  Non weight bearing right foot  Follow up with PCP in 3-5 days  Proceed to  ER if symptoms worsen  Chief Complaint     Chief Complaint   Patient presents with    Foot Pain     this am rolled right  foot coming down stairs  two small hematoma noted  History of Present Illness       80 y/o female states she rolled her foot going down the stairs  Denies falling, head trauma, LOC        Review of Systems   Review of Systems   Constitutional: Negative  Respiratory: Negative  Cardiovascular: Negative  Musculoskeletal: Positive for gait problem           Current Medications       Current Outpatient Prescriptions:     acetaminophen (TYLENOL) 500 mg tablet, Take 1,000 mg by mouth every 6 (six) hours as needed for mild pain, Disp: , Rfl:     atorvastatin (LIPITOR) 20 mg tablet, TAKE 1 TABLET BY MOUTH 6  DAYS A WEEK, Disp: 90 tablet, Rfl: 3    calcium-vitamin D (OSCAL) 250-125 MG-UNIT per tablet, Take 1 tablet by mouth daily, Disp: , Rfl:     Cholecalciferol (VITAMIN D) 2000 units tablet, Take 2,000 Units by mouth daily, Disp: , Rfl:     docusate sodium (COLACE) 100 mg capsule, Take 1 capsule (100 mg total) by mouth 2 (two) times a day, Disp: 10 capsule, Rfl: 0    ferrous sulfate 325 (65 Fe) mg tablet, Take 325 mg by mouth daily with breakfast, Disp: , Rfl:     FLUoxetine (PROzac) 20 mg capsule, TAKE 1 CAPSULE BY MOUTH  EVERY DAY, Disp: 90 capsule, Rfl: 0    hydrochlorothiazide (HYDRODIURIL) 25 mg tablet, TAKE 1 TABLET BY MOUTH  DAILY, Disp: 90 tablet, Rfl: 1    ibuprofen (MOTRIN) 200 mg tablet, Take 200 mg by mouth every 6 (six) hours as needed for mild pain, Disp: , Rfl:     metoprolol succinate (TOPROL-XL) 50 mg 24 hr tablet, TAKE 1 TABLET BY MOUTH  DAILY THIS REPLACES  ATENOLOL, Disp: 90 tablet, Rfl: 1    Multiple Vitamins-Minerals (CENTRUM SILVER ULTRA WOMENS PO), Take by mouth, Disp: , Rfl:     Omega-3 1000 MG CAPS, Take by mouth, Disp: , Rfl:     omeprazole (PriLOSEC) 20 mg delayed release capsule, TAKE 1 CAPSULE BY MOUTH  EVERY MORNING, Disp: 90 capsule, Rfl: 0    polyethylene glycol (GLYCOLAX) powder, Take 17 g by mouth daily, Disp: , Rfl:     senna (SENOKOT) 8 6 mg, Take 1 tablet (8 6 mg total) by mouth daily, Disp: 120 each, Rfl: 0    traMADol (ULTRAM) 50 mg tablet, Take 1 tablet (50 mg total) by mouth every 6 (six) hours as needed for moderate pain for up to 60 doses, Disp: 40 tablet, Rfl: 0    enoxaparin (LOVENOX) 40 mg/0 4 mL, INJECT 1 SYRINGE SUBCUTANEOUSLY IN ABDOMINAL REGION DAILY FOR 28 DAYS (Patient not taking: Reported on 9/21/2018), Disp: 11 2 mL, Rfl: 0    methocarbamol (ROBAXIN) 750 mg tablet, Take 1 tablet (750 mg total) by mouth every 6 (six) hours as needed for muscle spasms for up to 7 days, Disp: 30 tablet, Rfl: 0    oxyCODONE (ROXICODONE) 5 mg immediate release tablet, Take 1 pill po Q4 Hrs prn (Patient not taking: Reported on 9/21/2018 ), Disp: 40 tablet, Rfl: 0    Current Allergies     Allergies as of 09/21/2018 - Reviewed 09/21/2018   Allergen Reaction Noted    Hydromorphone hcl Itching     Percocet [oxycodone-acetaminophen] GI Intolerance 05/01/2017    Sulfamethoxazole-trimethoprim Rash             The following portions of the patient's history were reviewed and updated as appropriate: allergies, current medications, past family history, past medical history, past social history, past surgical history and problem list      Past Medical History:   Diagnosis Date    Arthritis     Depression     Endometriosis     GERD (gastroesophageal reflux disease)     Hyperlipidemia     Hypertension     Osteopenia     Pancreatitis 06/2017    Pneumonia        Past Surgical History:   Procedure Laterality Date    ABDOMINAL SURGERY      endometriosis     APPENDECTOMY      CARPAL TUNNEL RELEASE      COLONOSCOPY      HAND SURGERY      HIP SURGERY      HYSTERECTOMY      JOINT REPLACEMENT Bilateral     KNEE    JOINT REPLACEMENT Left     HIP    IA ARTHROSCOPY SHOULDER SURGICAL BICEPS TENODESIS Right 5/10/2017    Procedure: ARTHROSCOPIC BICEPS TENODESIS WITH ROTATOR CUFF REPAIR ;  Surgeon: Neva Gomez MD;  [facility];  Service: Orthopedics    IA Lidia Cassis Right 5/10/2017    Procedure: SHOULDER ARTHROSCOPY SUBACROMIAL DECOMPRESSION;  Surgeon: Neva Gomez MD;  [facility];  Service: Orthopedics    IA TOTAL HIP ARTHROPLASTY Left 3/12/2018    Procedure: ARTHROPLASTY HIP TOTAL;  Surgeon: Portillo Canchola MD;  [facility];  Service: Orthopedics    IA TOTAL HIP ARTHROPLASTY Right 7/30/2018    Procedure: RIGHT TOTAL HIP ARTHROPLASTY;  Surgeon: Portillo Canchola MD;  [facility];  Service: Orthopedics    TONSILLECTOMY         Family History   Problem Relation Age of Onset    Atrial fibrillation Mother     Breast cancer Mother     Stroke Mother     Coronary artery disease Mother     Diabetes Mother     Pancreatitis Mother     Heart attack Father     Arthritis Family     Cancer Family          Medications have been verified  Objective   /80   Pulse 63   Temp 98 6 °F (37 °C) (Temporal)   Resp 20   Ht 4' 11" (1 499 m)   Wt 93 kg (205 lb)   LMP  (LMP Unknown) Comment: hysterectomy  SpO2 96%   BMI 41 40 kg/m²        Physical Exam     Physical Exam   Constitutional: She appears well-developed and well-nourished  HENT:   Head: Normocephalic and atraumatic     Right Ear: External ear normal    Left Ear: External ear normal    Nose: Nose normal  Mouth/Throat: Oropharynx is clear and moist  No oropharyngeal exudate  Neck: Normal range of motion  Neck supple  Cardiovascular: Normal rate, regular rhythm, normal heart sounds and intact distal pulses  Pulmonary/Chest: Effort normal and breath sounds normal  No respiratory distress  She has no wheezes  She has no rales  She exhibits no tenderness  Musculoskeletal:        Right ankle: She exhibits decreased range of motion, swelling and ecchymosis  She exhibits no deformity, no laceration and normal pulse  Tenderness  Head of 5th metatarsal tenderness found  No posterior TFL and no proximal fibula tenderness found  Lymphadenopathy:     She has no cervical adenopathy         Procedures

## 2018-09-24 ENCOUNTER — APPOINTMENT (OUTPATIENT)
Dept: PHYSICAL THERAPY | Facility: CLINIC | Age: 68
End: 2018-09-24
Payer: MEDICARE

## 2018-09-24 ENCOUNTER — OFFICE VISIT (OUTPATIENT)
Dept: OBGYN CLINIC | Facility: MEDICAL CENTER | Age: 68
End: 2018-09-24
Payer: MEDICARE

## 2018-09-24 VITALS — RESPIRATION RATE: 18 BRPM | HEART RATE: 67 BPM | DIASTOLIC BLOOD PRESSURE: 79 MMHG | SYSTOLIC BLOOD PRESSURE: 125 MMHG

## 2018-09-24 DIAGNOSIS — S92.354A NONDISPLACED FRACTURE OF FIFTH METATARSAL BONE, RIGHT FOOT, INITIAL ENCOUNTER FOR CLOSED FRACTURE: ICD-10-CM

## 2018-09-24 DIAGNOSIS — M25.571 PAIN, JOINT, ANKLE AND FOOT, RIGHT: ICD-10-CM

## 2018-09-24 DIAGNOSIS — M79.671 RIGHT FOOT PAIN: Primary | ICD-10-CM

## 2018-09-24 PROCEDURE — 28470 CLTX METATARSAL FX WO MNP EA: CPT | Performed by: FAMILY MEDICINE

## 2018-09-24 PROCEDURE — 99214 OFFICE O/P EST MOD 30 MIN: CPT | Performed by: FAMILY MEDICINE

## 2018-09-24 NOTE — PROGRESS NOTES
Assessment:     1  Right foot pain    2  Nondisplaced fracture of fifth metatarsal bone, right foot, initial encounter for closed fracture    3  Pain, joint, ankle and foot, right        Plan:     Problem List Items Addressed This Visit     Right foot pain - Primary    Nondisplaced fracture of fifth metatarsal bone, right foot, initial encounter for closed fracture     Short-leg hard cast immobilization at this time  Crutches and nonweightbearing  Return to the office for follow-up in 2 weeks for repeat x-rays with cast off  If there is any evidence of callus formation, consideration will be made for transitioning her into a Cam boot, although this is still fairly early however given her current hip rehabilitation program that she is currently doing, I have given her the option for earlier mobilization out of the cast if callus formation has been initiated  Her hip rehabilitation program should be modified to accommodate her nonweightbearing status  Pain, joint, ankle and foot, right         Subjective:     Patient ID: Pierre Lopez is a 79 y o  female  Chief Complaint:  Patient is a 53-year-old female who is status post right hip arthroplasty presenting today for evaluation of right foot and ankle pain  She reports on September 21, 2018, being missing the final step and falling forward  She reports inverting her ankle and hearing a crunch  She reported immediate onset pain in the right foot and ankle  Pain continues today is a throbbing, achy pain along the dorsal and lateral aspect of the foot  She is currently using a wheelchair for ambulation  Any attempted weight-bearing reproduces pain  She reports no alleviating factors  She denies any crepitus, warmth, numbness or tingling        Allergy:  Allergies   Allergen Reactions    Hydromorphone Hcl Itching    Percocet [Oxycodone-Acetaminophen] GI Intolerance    Sulfamethoxazole-Trimethoprim Rash     Medications:  all current active meds have been reviewed  Past Medical History:  Past Medical History:   Diagnosis Date    Arthritis     Depression     Endometriosis     GERD (gastroesophageal reflux disease)     Hyperlipidemia     Hypertension     Osteopenia     Pancreatitis 06/2017    Pneumonia      Past Surgical History:  Past Surgical History:   Procedure Laterality Date    ABDOMINAL SURGERY      endometriosis     APPENDECTOMY      CARPAL TUNNEL RELEASE      COLONOSCOPY      HAND SURGERY      HIP SURGERY      HYSTERECTOMY      JOINT REPLACEMENT Bilateral     KNEE    JOINT REPLACEMENT Left     HIP    TN ARTHROSCOPY SHOULDER SURGICAL BICEPS TENODESIS Right 5/10/2017    Procedure: ARTHROSCOPIC BICEPS TENODESIS WITH ROTATOR CUFF REPAIR ;  Surgeon: Moriah Jackson MD;  [facility];  Service: Orthopedics    TN SHLDR [place] Manual Right 5/10/2017    Procedure: SHOULDER ARTHROSCOPY SUBACROMIAL DECOMPRESSION;  Surgeon: Moriah Jackson MD;  [facility];  Service: Orthopedics    TN TOTAL HIP ARTHROPLASTY Left 3/12/2018    Procedure: ARTHROPLASTY HIP TOTAL;  Surgeon: Marc Day MD;  [facility];  Service: Orthopedics    TN TOTAL HIP ARTHROPLASTY Right 7/30/2018    Procedure: RIGHT TOTAL HIP ARTHROPLASTY;  Surgeon: Marc Day MD;  [facility];  Service: Orthopedics    TONSILLECTOMY       Family History:  Family History   Problem Relation Age of Onset    Atrial fibrillation Mother    Noemy Birch Breast cancer Mother     Stroke Mother     Coronary artery disease Mother     Diabetes Mother     Pancreatitis Mother     Heart attack Father     Arthritis Family     Cancer Family      Social History:  History   Alcohol Use    Yes     Comment: 1-2x / year     History   Drug Use No     History   Smoking Status    Never Smoker   Smokeless Tobacco    Never Used     Review of Systems   Constitutional: Negative  HENT: Negative  Eyes: Negative  Respiratory: Negative      Cardiovascular: Negative  Gastrointestinal: Negative  Genitourinary: Negative  Musculoskeletal: Positive for arthralgias and myalgias  Skin: Negative  Allergic/Immunologic: Negative  Neurological: Negative  Hematological: Negative  Psychiatric/Behavioral: Negative  Objective:  BP Readings from Last 1 Encounters:   09/24/18 125/79      Wt Readings from Last 1 Encounters:   09/21/18 93 kg (205 lb)      BMI:   Estimated body mass index is 41 4 kg/m² as calculated from the following:    Height as of 9/21/18: 4' 11" (1 499 m)  Weight as of 9/21/18: 93 kg (205 lb)  BSA:   Estimated body surface area is 1 86 meters squared as calculated from the following:    Height as of 9/21/18: 4' 11" (1 499 m)  Weight as of 9/21/18: 93 kg (205 lb)  Physical Exam   Constitutional: She is oriented to person, place, and time  Vital signs are normal  She appears well-developed  HENT:   Head: Normocephalic  Eyes: Pupils are equal, round, and reactive to light  Pulmonary/Chest: Effort normal    Musculoskeletal: She exhibits tenderness  Neurological: She is alert and oriented to person, place, and time  Skin: Skin is warm and dry  Psychiatric: She has a normal mood and affect  Nursing note and vitals reviewed  Right Ankle Exam   Swelling: mild    Tenderness   The patient is experiencing tenderness in the ATF and lateral malleolus  Range of Motion   Dorsiflexion: abnormal   Plantar flexion: abnormal   Inversion: abnormal   Eversion: abnormal     Muscle Strength   Dorsiflexion:  4/5  Plantar flexion:  4/5  Anterior tibial:  4/5  Posterior tibial:  4/5  Gastrocsoleus:  4/5  Peroneal muscle:  4/5    Tests   Anterior drawer: negative  Varus tilt: negative    Other   Erythema: absent  Sensation: normal  Pulse: present     Comments:  Palpable tenderness along the base of the 5th metatarsal   Positive for edema and ecchymosis        Left Ankle Exam   Left ankle exam is normal     Range of Motion   The patient has normal left ankle ROM  Muscle Strength   The patient has normal left ankle strength  I have personally reviewed pertinent films in PACS     Spiral oblique nondisplaced fracture of the 5th metatarsal

## 2018-09-24 NOTE — ASSESSMENT & PLAN NOTE
Short-leg hard cast immobilization at this time  Crutches and nonweightbearing  Return to the office for follow-up in 2 weeks for repeat x-rays with cast off  If there is any evidence of callus formation, consideration will be made for transitioning her into a Cam boot, although this is still fairly early however given her current hip rehabilitation program that she is currently doing, I have given her the option for earlier mobilization out of the cast if callus formation has been initiated  Her hip rehabilitation program should be modified to accommodate her nonweightbearing status

## 2018-09-27 ENCOUNTER — APPOINTMENT (OUTPATIENT)
Dept: PHYSICAL THERAPY | Facility: CLINIC | Age: 68
End: 2018-09-27
Payer: MEDICARE

## 2018-10-01 ENCOUNTER — APPOINTMENT (OUTPATIENT)
Dept: PHYSICAL THERAPY | Facility: CLINIC | Age: 68
End: 2018-10-01
Payer: MEDICARE

## 2018-10-04 ENCOUNTER — APPOINTMENT (OUTPATIENT)
Dept: PHYSICAL THERAPY | Facility: CLINIC | Age: 68
End: 2018-10-04
Payer: MEDICARE

## 2018-10-08 ENCOUNTER — OFFICE VISIT (OUTPATIENT)
Dept: OBGYN CLINIC | Facility: MEDICAL CENTER | Age: 68
End: 2018-10-08
Payer: MEDICARE

## 2018-10-08 ENCOUNTER — APPOINTMENT (OUTPATIENT)
Dept: RADIOLOGY | Facility: MEDICAL CENTER | Age: 68
End: 2018-10-08
Payer: MEDICARE

## 2018-10-08 VITALS — HEART RATE: 63 BPM | DIASTOLIC BLOOD PRESSURE: 79 MMHG | SYSTOLIC BLOOD PRESSURE: 124 MMHG

## 2018-10-08 DIAGNOSIS — M79.671 RIGHT FOOT PAIN: ICD-10-CM

## 2018-10-08 DIAGNOSIS — S92.354A NONDISPLACED FRACTURE OF FIFTH METATARSAL BONE, RIGHT FOOT, INITIAL ENCOUNTER FOR CLOSED FRACTURE: ICD-10-CM

## 2018-10-08 DIAGNOSIS — M79.671 RIGHT FOOT PAIN: Primary | ICD-10-CM

## 2018-10-08 PROCEDURE — 29405 APPL SHORT LEG CAST: CPT | Performed by: FAMILY MEDICINE

## 2018-10-08 PROCEDURE — 73630 X-RAY EXAM OF FOOT: CPT

## 2018-10-08 PROCEDURE — 99024 POSTOP FOLLOW-UP VISIT: CPT | Performed by: FAMILY MEDICINE

## 2018-10-08 RX ORDER — METHOCARBAMOL 750 MG/1
500 TABLET, FILM COATED ORAL EVERY 6 HOURS PRN
COMMUNITY
End: 2019-04-18 | Stop reason: ALTCHOICE

## 2018-10-08 NOTE — PROGRESS NOTES
Assessment:     1  Right foot pain    2  Nondisplaced fracture of fifth metatarsal bone, right foot, initial encounter for closed fracture        Plan:     Problem List Items Addressed This Visit     Right foot pain - Primary    Relevant Orders    XR foot 3+ vw right    Nondisplaced fracture of fifth metatarsal bone, right foot, initial encounter for closed fracture     X-ray demonstrates no evidence of callus formation  Will recommend short-leg hard cast, nonweightbearing at this time for an additional 2 weeks  At that time will recommend repeat x-rays with cast off and I anticipate transitioning into a Cam boot and beginning partial weight-bearing  Subjective:     Patient ID: Cristel Casarez is a 79 y o  female  Chief Complaint:  Patient continues with pain in the right foot since being immobilized in her cast for the past 2 weeks  Swelling continues to improve  She denies any numbness or tingling  She denies any warmth or crepitus        Allergy:  Allergies   Allergen Reactions    Hydromorphone Hcl Itching    Percocet [Oxycodone-Acetaminophen] GI Intolerance    Sulfamethoxazole-Trimethoprim Rash     Medications:  all current active meds have been reviewed  Past Medical History:  Past Medical History:   Diagnosis Date    Arthritis     Depression     Endometriosis     GERD (gastroesophageal reflux disease)     Hyperlipidemia     Hypertension     Osteopenia     Pancreatitis 06/2017    Pneumonia      Past Surgical History:  Past Surgical History:   Procedure Laterality Date    ABDOMINAL SURGERY      endometriosis     APPENDECTOMY      CARPAL TUNNEL RELEASE      COLONOSCOPY      HAND SURGERY      HIP SURGERY      HYSTERECTOMY      JOINT REPLACEMENT Bilateral     KNEE    JOINT REPLACEMENT Left     HIP    [place] ARTHROSCOPY SHOULDER SURGICAL BICEPS TENODESIS Right 5/10/2017    Procedure: ARTHROSCOPIC BICEPS TENODESIS WITH ROTATOR CUFF REPAIR ;  Surgeon: Williams Durant MD;  [facility];  Service: Orthopedics    [place] MERRICK Franco Right 5/10/2017    Procedure: SHOULDER ARTHROSCOPY SUBACROMIAL DECOMPRESSION;  Surgeon: Tani Mckee MD;  [facility];  Service: Orthopedics    [place] TOTAL HIP ARTHROPLASTY Left 3/12/2018    Procedure: ARTHROPLASTY HIP TOTAL;  Surgeon: Sorin Barney MD;  [facility];  Service: Orthopedics    [place] TOTAL HIP ARTHROPLASTY Right 7/30/2018    Procedure: RIGHT TOTAL HIP ARTHROPLASTY;  Surgeon: Sorin Barney MD;  [facility];  Service: Orthopedics    TONSILLECTOMY       Family History:  Family History   Problem Relation Age of Onset    Atrial fibrillation Mother     Breast cancer Mother     Stroke Mother     Coronary artery disease Mother     Diabetes Mother     Pancreatitis Mother     Heart attack Father     Arthritis Family     Cancer Family      Social History:  History   Alcohol Use    Yes     Comment: 1-2x / year     History   Drug Use No     History   Smoking Status    Never Smoker   Smokeless Tobacco    Never Used     Review of Systems   Constitutional: Negative  HENT: Negative  Eyes: Negative  Respiratory: Negative  Cardiovascular: Negative  Gastrointestinal: Negative  Genitourinary: Negative  Musculoskeletal: Positive for arthralgias and myalgias  Skin: Negative  Allergic/Immunologic: Negative  Neurological: Negative  Hematological: Negative  Psychiatric/Behavioral: Negative  Objective:  BP Readings from Last 1 Encounters:   10/08/18 124/79      Wt Readings from Last 1 Encounters:   09/21/18 93 kg (205 lb)      BMI:   Estimated body mass index is 41 4 kg/m² as calculated from the following:    Height as of 9/21/18: 4' 11" (1 499 m)  Weight as of 9/21/18: 93 kg (205 lb)  BSA:   Estimated body surface area is 1 86 meters squared as calculated from the following:    Height as of 9/21/18: 4' 11" (1 499 m)  Weight as of 9/21/18: 93 kg (205 lb)  Physical Exam   Constitutional: She is oriented to person, place, and time  Vital signs are normal  She appears well-developed  HENT:   Head: Normocephalic  Eyes: Pupils are equal, round, and reactive to light  Pulmonary/Chest: Effort normal    Musculoskeletal: Normal range of motion  Neurological: She is alert and oriented to person, place, and time  Skin: Skin is warm and dry  Psychiatric: She has a normal mood and affect  Nursing note and vitals reviewed  Right Ankle Exam   Swelling: mild    Tenderness   The patient is experiencing tenderness in the ATF and lateral malleolus  Muscle Strength   Dorsiflexion:  4/5  Plantar flexion:  4/5  Anterior tibial:  4/5  Posterior tibial:  4/5  Gastrocsoleus:  4/5  Peroneal muscle:  4/5    Tests   Anterior drawer: negative  Varus tilt: negative    Other   Erythema: absent  Sensation: normal  Pulse: present     Comments:  Palpable tenderness along the base of the 5th metatarsal   Positive for edema and ecchymosis  Left Ankle Exam   Left ankle exam is normal     Range of Motion   The patient has normal left ankle ROM  Muscle Strength   The patient has normal left ankle strength  I have personally reviewed pertinent films in PACS

## 2018-10-08 NOTE — ASSESSMENT & PLAN NOTE
X-ray demonstrates no evidence of callus formation  Will recommend short-leg hard cast, nonweightbearing at this time for an additional 2 weeks  At that time will recommend repeat x-rays with cast off and I anticipate transitioning into a Cam boot and beginning partial weight-bearing

## 2018-10-22 ENCOUNTER — OFFICE VISIT (OUTPATIENT)
Dept: OBGYN CLINIC | Facility: MEDICAL CENTER | Age: 68
End: 2018-10-22

## 2018-10-22 ENCOUNTER — APPOINTMENT (OUTPATIENT)
Dept: RADIOLOGY | Facility: MEDICAL CENTER | Age: 68
End: 2018-10-22
Payer: MEDICARE

## 2018-10-22 VITALS — DIASTOLIC BLOOD PRESSURE: 78 MMHG | HEART RATE: 61 BPM | SYSTOLIC BLOOD PRESSURE: 119 MMHG

## 2018-10-22 DIAGNOSIS — S92.354A NONDISPLACED FRACTURE OF FIFTH METATARSAL BONE, RIGHT FOOT, INITIAL ENCOUNTER FOR CLOSED FRACTURE: ICD-10-CM

## 2018-10-22 DIAGNOSIS — M79.671 RIGHT FOOT PAIN: Primary | ICD-10-CM

## 2018-10-22 DIAGNOSIS — M79.671 RIGHT FOOT PAIN: ICD-10-CM

## 2018-10-22 PROCEDURE — 99024 POSTOP FOLLOW-UP VISIT: CPT | Performed by: FAMILY MEDICINE

## 2018-10-22 PROCEDURE — 73630 X-RAY EXAM OF FOOT: CPT

## 2018-10-22 NOTE — ASSESSMENT & PLAN NOTE
X-ray findings today does demonstrate initial onset of callus formation  Today she may be discontinued from her cast and we will transition her into a long Cam boot  Return the office for follow-up in 2 weeks for repeat x-rays to assess for further callus formation healing

## 2018-10-22 NOTE — PROGRESS NOTES
Assessment:     1  Right foot pain    2  Nondisplaced fracture of fifth metatarsal bone, right foot, initial encounter for closed fracture        Plan:     Problem List Items Addressed This Visit     Right foot pain - Primary    Relevant Orders    XR foot 3+ vw right    Nondisplaced fracture of fifth metatarsal bone, right foot, initial encounter for closed fracture     X-ray findings today does demonstrate initial onset of callus formation  Today she may be discontinued from her cast and we will transition her into a long Cam boot  Return the office for follow-up in 2 weeks for repeat x-rays to assess for further callus formation healing  Subjective:     Patient ID: Qi Wise is a 79 y o  female  Chief Complaint:  Patient continues with pain in the right foot since being immobilized in her cast for the past 4 weeks  She no longer reports any swelling  She denies any numbness or tingling  She denies any warmth or crepitus        Allergy:  Allergies   Allergen Reactions    Hydromorphone Hcl Itching    Percocet [Oxycodone-Acetaminophen] GI Intolerance    Sulfamethoxazole-Trimethoprim Rash     Medications:  all current active meds have been reviewed  Past Medical History:  Past Medical History:   Diagnosis Date    Arthritis     Depression     Endometriosis     GERD (gastroesophageal reflux disease)     Hyperlipidemia     Hypertension     Osteopenia     Pancreatitis 06/2017    Pneumonia      Past Surgical History:  Past Surgical History:   Procedure Laterality Date    ABDOMINAL SURGERY      endometriosis     APPENDECTOMY      CARPAL TUNNEL RELEASE      COLONOSCOPY      HAND SURGERY      HIP SURGERY      HYSTERECTOMY      JOINT REPLACEMENT Bilateral     KNEE    JOINT REPLACEMENT Left     HIP    [place] ARTHROSCOPY SHOULDER SURGICAL BICEPS TENODESIS Right 5/10/2017    Procedure: ARTHROSCOPIC BICEPS TENODESIS WITH ROTATOR CUFF REPAIR ;  Surgeon: Heidy Thomas MD;  Location: [facility];  Service: Orthopedics    [place] MERRICK Tony Right 5/10/2017    Procedure: SHOULDER ARTHROSCOPY SUBACROMIAL DECOMPRESSION;  Surgeon: David Luna MD;  [facility];  Service: Orthopedics    [place] TOTAL HIP ARTHROPLASTY Left 3/12/2018    Procedure: ARTHROPLASTY HIP TOTAL;  Surgeon: Sade Castañeda MD;  [facility];  Service: Orthopedics    [place] TOTAL HIP ARTHROPLASTY Right 7/30/2018    Procedure: RIGHT TOTAL HIP ARTHROPLASTY;  Surgeon: Sade Castañeda MD;  [facility];  Service: Orthopedics    TONSILLECTOMY       Family History:  Family History   Problem Relation Age of Onset    Atrial fibrillation Mother     Breast cancer Mother     Stroke Mother     Coronary artery disease Mother     Diabetes Mother     Pancreatitis Mother     Heart attack Father     Arthritis Family     Cancer Family      Social History:  History   Alcohol Use    Yes     Comment: 1-2x / year     History   Drug Use No     History   Smoking Status    Never Smoker   Smokeless Tobacco    Never Used     Review of Systems   Constitutional: Negative  HENT: Negative  Eyes: Negative  Respiratory: Negative  Cardiovascular: Negative  Gastrointestinal: Negative  Genitourinary: Negative  Musculoskeletal: Positive for arthralgias and myalgias  Skin: Negative  Allergic/Immunologic: Negative  Neurological: Negative  Hematological: Negative  Psychiatric/Behavioral: Negative  Objective:  BP Readings from Last 1 Encounters:   10/22/18 119/78      Wt Readings from Last 1 Encounters:   09/21/18 93 kg (205 lb)      BMI:   Estimated body mass index is 41 4 kg/m² as calculated from the following:    Height as of 9/21/18: 4' 11" (1 499 m)  Weight as of 9/21/18: 93 kg (205 lb)  BSA:   Estimated body surface area is 1 86 meters squared as calculated from the following:    Height as of 9/21/18: 4' 11" (1 499 m)  Weight as of 9/21/18: 93 kg (205 lb)     Physical Exam   Constitutional: She is oriented to person, place, and time  Vital signs are normal  She appears well-developed  HENT:   Head: Normocephalic  Eyes: Pupils are equal, round, and reactive to light  Pulmonary/Chest: Effort normal    Musculoskeletal: Normal range of motion  Neurological: She is alert and oriented to person, place, and time  Skin: Skin is warm and dry  Psychiatric: She has a normal mood and affect  Nursing note and vitals reviewed  Right Ankle Exam   Swelling: mild    Tenderness   The patient is experiencing tenderness in the ATF and lateral malleolus  Muscle Strength   Dorsiflexion:  4/5  Plantar flexion:  4/5  Anterior tibial:  4/5  Posterior tibial:  4/5  Gastrocsoleus:  4/5  Peroneal muscle:  4/5    Tests   Anterior drawer: negative  Varus tilt: negative    Other   Erythema: absent  Sensation: normal  Pulse: present     Comments:  Palpable tenderness along the base of the 5th metatarsal   Positive for edema and ecchymosis  Left Ankle Exam   Left ankle exam is normal     Range of Motion   The patient has normal left ankle ROM  Muscle Strength   The patient has normal left ankle strength  I have personally reviewed pertinent films in PACS     Appropriate callus formation and healing

## 2018-10-23 ENCOUNTER — OFFICE VISIT (OUTPATIENT)
Dept: OBGYN CLINIC | Facility: HOSPITAL | Age: 68
End: 2018-10-23

## 2018-10-23 ENCOUNTER — HOSPITAL ENCOUNTER (OUTPATIENT)
Dept: RADIOLOGY | Facility: HOSPITAL | Age: 68
Discharge: HOME | End: 2018-10-23
Attending: ORTHOPAEDIC SURGERY
Payer: MEDICARE

## 2018-10-23 VITALS
BODY MASS INDEX: 40.32 KG/M2 | HEIGHT: 59 IN | HEART RATE: 71 BPM | SYSTOLIC BLOOD PRESSURE: 122 MMHG | DIASTOLIC BLOOD PRESSURE: 78 MMHG | WEIGHT: 200 LBS

## 2018-10-23 DIAGNOSIS — Z47.1 AFTERCARE FOLLOWING RIGHT HIP JOINT REPLACEMENT SURGERY: Primary | ICD-10-CM

## 2018-10-23 DIAGNOSIS — Z96.641 AFTERCARE FOLLOWING RIGHT HIP JOINT REPLACEMENT SURGERY: ICD-10-CM

## 2018-10-23 DIAGNOSIS — Z47.1 AFTERCARE FOLLOWING RIGHT HIP JOINT REPLACEMENT SURGERY: ICD-10-CM

## 2018-10-23 DIAGNOSIS — Z96.641 AFTERCARE FOLLOWING RIGHT HIP JOINT REPLACEMENT SURGERY: Primary | ICD-10-CM

## 2018-10-23 PROCEDURE — 99024 POSTOP FOLLOW-UP VISIT: CPT | Performed by: ORTHOPAEDIC SURGERY

## 2018-10-23 PROCEDURE — 73502 X-RAY EXAM HIP UNI 2-3 VIEWS: CPT

## 2018-10-23 NOTE — PROGRESS NOTES
Assessment:  1  Aftercare following right hip joint replacement surgery  XR hip/pelv 2-3 vws right if performed     Patient Active Problem List   Diagnosis    Incomplete tear of right rotator cuff    Hypertension    Hyperlipidemia    Depression    Abdominal pain    Primary osteoarthritis of left hip    Pre-op examination    Status post total replacement of left hip    Aftercare following left hip joint replacement surgery    Primary osteoarthritis of one hip, right    Renal insufficiency    S/P hip replacement, right    Transition of care performed with sharing of clinical summary    Right foot pain    Nondisplaced fracture of fifth metatarsal bone, right foot, initial encounter for closed fracture    Pain, joint, ankle and foot, right           Plan      Recommendations are to resume physical therapy  She is under sports medicine care for her 5th metatarsal fracture  She is weight-bearing as tolerated  Her right hip replacement appears well otherwise  She will follow up in another 6 weeks for recheck  No x-rays are needed at that time  Patient was seen, examined and plan formulated by Dr Brook Degroot              Subjective:     Patient ID:    Chief Eliza Stevenson 79 y o  female      HPI    53-year-old female who is 3 months status post right total hip arthroplasty  She has suffered a small setback with a right foot 5th metatarsal fracture being treated by Dr Bea Hamman  She is now weight-bearing and wishes to resume her physical therapy  She has some generalized muscle achiness but otherwise no complaints regarding the right hip        The following portions of the patient's history were reviewed and updated as appropriate: allergies, current medications, past family history, past social history, past surgical history and problem list     All organ systems normal    Social History     Social History    Marital status: /Civil Union     Spouse name: N/A    Number of children: N/A  Years of education: N/A     Occupational History    Not on file       Social History Main Topics    Smoking status: Never Smoker    Smokeless tobacco: Never Used    Alcohol use Yes      Comment: 1-2x / year    Drug use: No    Sexual activity: Not on file     Other Topics Concern    Not on file     Social History Narrative    Drinks coffee         Past Medical History:   Diagnosis Date    Arthritis     Depression     Endometriosis     GERD (gastroesophageal reflux disease)     Hyperlipidemia     Hypertension     Osteopenia     Pancreatitis 06/2017    Pneumonia      Past Surgical History:   Procedure Laterality Date    ABDOMINAL SURGERY      endometriosis     APPENDECTOMY      CARPAL TUNNEL RELEASE      COLONOSCOPY      HAND SURGERY      HIP SURGERY      HYSTERECTOMY      JOINT REPLACEMENT Bilateral     KNEE    JOINT REPLACEMENT Left     HIP    [place] ARTHROSCOPY SHOULDER SURGICAL BICEPS TENODESIS Right 5/10/2017    Procedure: ARTHROSCOPIC BICEPS TENODESIS WITH ROTATOR CUFF REPAIR ;  Surgeon: Daniel Schneider MD;  [facility];  Service: Orthopedics    [place] Sherwin Salgado Right 5/10/2017    Procedure: SHOULDER ARTHROSCOPY SUBACROMIAL DECOMPRESSION;  Surgeon: Daniel Schneider MD;  [facility];  Service: Orthopedics    [place] TOTAL HIP ARTHROPLASTY Left 3/12/2018    Procedure: ARTHROPLASTY HIP TOTAL;  Surgeon: Moira Kimball MD;  [facility];  Service: Orthopedics    [place] TOTAL HIP ARTHROPLASTY Right 7/30/2018    Procedure: RIGHT TOTAL HIP ARTHROPLASTY;  Surgeon: Moira Kimball MD;  [facility];  Service: Orthopedics    TONSILLECTOMY       Allergies   Allergen Reactions    Hydromorphone Hcl Itching    Percocet [Oxycodone-Acetaminophen] GI Intolerance    Sulfamethoxazole-Trimethoprim Rash     Current Outpatient Prescriptions on File Prior to Visit   Medication Sig Dispense Refill    acetaminophen (TYLENOL) 500 mg tablet Take 1,000 mg by mouth every 6 (six) hours as needed for mild pain      atorvastatin (LIPITOR) 20 mg tablet TAKE 1 TABLET BY MOUTH 6  DAYS A WEEK 90 tablet 3    calcium-vitamin D (OSCAL) 250-125 MG-UNIT per tablet Take 1 tablet by mouth daily      Cholecalciferol (VITAMIN D) 2000 units tablet Take 2,000 Units by mouth daily      docusate sodium (COLACE) 100 mg capsule Take 1 capsule (100 mg total) by mouth 2 (two) times a day 10 capsule 0    FLUoxetine (PROzac) 20 mg capsule TAKE 1 CAPSULE BY MOUTH  EVERY DAY 90 capsule 0    hydrochlorothiazide (HYDRODIURIL) 25 mg tablet TAKE 1 TABLET BY MOUTH  DAILY 90 tablet 1    ibuprofen (MOTRIN) 200 mg tablet Take 200 mg by mouth every 6 (six) hours as needed for mild pain      methocarbamol (ROBAXIN) 750 mg tablet Take 500 mg by mouth every 6 (six) hours as needed for muscle spasms      metoprolol succinate (TOPROL-XL) 50 mg 24 hr tablet TAKE 1 TABLET BY MOUTH  DAILY THIS REPLACES  ATENOLOL 90 tablet 1    Multiple Vitamins-Minerals (CENTRUM SILVER ULTRA WOMENS PO) Take by mouth      Omega-3 1000 MG CAPS Take by mouth      omeprazole (PriLOSEC) 20 mg delayed release capsule TAKE 1 CAPSULE BY MOUTH  EVERY MORNING 90 capsule 0    polyethylene glycol (GLYCOLAX) powder Take 17 g by mouth daily      traMADol (ULTRAM) 50 mg tablet Take 1 tablet (50 mg total) by mouth every 6 (six) hours as needed for moderate pain for up to 60 doses 40 tablet 0     No current facility-administered medications on file prior to visit  Objective:        Right Hip Exam     Comments:  No reproducible right hip pain with gentle passive motion  Her right hip incision shows no signs of infection                I have personally reviewed pertinent films in PACS and my interpretation is stable and located bilateral GERARDO  Balta De La Garza     Portions of the record may have been created with voice recognition software   Occasional wrong word or "sound a like" substitutions may have occurred due to the inherent limitations of voice recognition software   Read the chart carefully and recognize, using context, where substitutions have occurred

## 2018-10-26 ENCOUNTER — EVALUATION (OUTPATIENT)
Dept: PHYSICAL THERAPY | Facility: CLINIC | Age: 68
End: 2018-10-26
Payer: MEDICARE

## 2018-10-26 DIAGNOSIS — M16.11 PRIMARY LOCALIZED OSTEOARTHROSIS OF RIGHT HIP: ICD-10-CM

## 2018-10-26 DIAGNOSIS — Z96.641 PRESENCE OF RIGHT ARTIFICIAL HIP JOINT: Primary | ICD-10-CM

## 2018-10-26 PROCEDURE — 97140 MANUAL THERAPY 1/> REGIONS: CPT | Performed by: PHYSICAL THERAPIST

## 2018-10-26 PROCEDURE — G8978 MOBILITY CURRENT STATUS: HCPCS | Performed by: PHYSICAL THERAPIST

## 2018-10-26 PROCEDURE — G8979 MOBILITY GOAL STATUS: HCPCS | Performed by: PHYSICAL THERAPIST

## 2018-10-26 NOTE — PROGRESS NOTES
PT Re-Evaluation      Today's date: 10/26/2018  Patient name: Joann Leopold Dull  BOY: [id number]  Referring Yudith Acevedo PA-C  Dx:             Encounter Diagnosis       ICD-10-CM     1  Primary osteoarthritis of right hip M16 11              Assessment  Impairments: abnormal gait, abnormal or restricted ROM, activity intolerance, impaired physical strength, pain with function and weight-bearing intolerance    Assessment details: Patient has maintained her ROM (with the exception of extension) with her home exercises during her time in the cast and CAM boot  She has limited insurance coverage remaining and would like to save visits for when the CAM boot is removed  She was given and updated home exercise program         Understanding of Dx/Px/POC: good     Prognosis: good  Prognosis details: STG Patient is independent with HEP  (met)  STG Range of motion is improved by 25% in 4 weeks (partially met)  LTG Increase ROM 10 degrees in 4 weeks  LTG Balance & endurance & gait & locomotion are improved by 50% in 4 weeks (not met)  LTG ADL performance improved to prior level of function     Plan  Planned therapy interventions: manual therapy, balance/weight bearing training, patient education, strengthening, stretching, therapeutic exercise, therapeutic activities, functional ROM exercises, gait training, graded activity, graded exercise and home exercise program  Frequency: 2x week  Duration in visits: 8  Duration in weeks: 4  Treatment plan discussed with: patient         Subjective Evaluation     History of Present Illness  Date of surgery: 7/30/2018  Mechanism of injury: surgery  Mechanism of injury: S/p right THR, posterior approach  Patient stumbled and rolled her right ankle, fracturing her 5th metatarsal on 9/21/18  She was casted then transitioned to a CAM boot which she continues to use  No other significant injuries reported and her hip has been examined by the surgeon    She has been keeping up with her exercises at home tough feels her right leg is weaker after the immobilization  She has soreness in bilateral hips which she feels is from deconditioning  She was non weight bearing for an extended period of time  Pain  At best pain ratin  At worst pain ratin  Location: lateral hips and gluteals (bilat)  Quality: soreness     Treatments  Previous treatment: physical therapy  Current treatment: medication and physical therapy  Patient Goals  Patient goals for therapy: decreased pain, increased motion, increased strength and independence with ADLs/IADLs        Objective      Active Range of Motion      Right Hip   Flexion: 90 degrees  (from 60)  Extension: -9  (from 2 and -17)  Abduction: 28 (from 24 and 18)     Strength/Myotome Testing      Right Hip   Planes of Motion   Flexion: 4  Extension: 4  Abduction: 3+  External rotation: 4  Internal rotation: 4           Daily Note     Today's date: 10/26/2018  Patient name: Alanna Kaminski  : 1950  MRN: [id number]  Referring provider: Ronald Almaraz PA-C  Dx:   Encounter Diagnosis     ICD-10-CM    1  Presence of right artificial hip joint Z96 [id number]    2  Primary localized osteoarthrosis of right hip M16 11                 Precautions:  Total hip precautions, posterior approach        Daily Treatment Diary      Manual                        PROM prn                                                                                                                             Exercise Diary  8/30  9/6  9/13  9/17  9/20  10/26        8/28   Heel slides on slide board                       Add squeeze, bolster :05 20x  :05 20x :05 20x :05 20x :05 20x         :05 30x   ER isometrics, neutral, gentle :05 20x  :05 20x :05 20x :05 20x :05 20x         :05 30x   SAQ                    4# :05 20x   LAQ  5# :05 20x  5# :05 20x 5# :05 20x              4# :05 20x   bridges  3x10    3x10  3x10  3x10          3x10   Standing hip 3-waySLR standing  3# 2x10 ea  3# 2x10 ea  3# 3x10 ea  3# 3x10 ea  3# 3x10 ea          3# 2x10 ea                           Side stepping  2'  2' with 3#  2' /c 3#  2' /c 3#  2' c/ 3#          2'   Standing knee flexion  3# 20x  3# 20x  3# 20x  3# 30x  3# 30x          3# 20x   SB stretch :30 4x  :30x4 :30x4 :30x4 :30x4          :30 4x   SLS :10 4x  :10 4x :15x4 :20x3               Gait training                       SLR   small :05 hold, 20x small :05 20x small :05 20x Small :05 20x              mini squats  30x  30x  30x               30x    step ups  8" 20x  8" 20x  8" 20x  8" 20x  8" 20x          8" 20x   Lateral step up and overs   4" 10x  4" 10x  4" 10x  4" 10x  4" 10x          4" 10x   Stairs   5x                   Biodex squats   20x %WB training 20x %WB training  30x % WB training               Knee extension machine       11# 20x  22# 2x10             Knee flexion machine       11# 20x  22# 2x10             Leg press (seat back)      35# 2x10        Upright bike        5'  7'             Lumbar extension in standing      Up to neutral today 15x       Seated LAQ,  to place band around ankles      :03x15                                                                 Modalities   8/13/18  8/15  8/17  8/21  8/23  8/28  8/30  9/17  9/20      CP  Cp, 10' post  10' post defers  defers defers

## 2018-11-05 ENCOUNTER — APPOINTMENT (OUTPATIENT)
Dept: RADIOLOGY | Facility: MEDICAL CENTER | Age: 68
End: 2018-11-05
Payer: MEDICARE

## 2018-11-05 ENCOUNTER — OFFICE VISIT (OUTPATIENT)
Dept: OBGYN CLINIC | Facility: MEDICAL CENTER | Age: 68
End: 2018-11-05

## 2018-11-05 VITALS
BODY MASS INDEX: 40.32 KG/M2 | WEIGHT: 200 LBS | HEART RATE: 73 BPM | SYSTOLIC BLOOD PRESSURE: 114 MMHG | DIASTOLIC BLOOD PRESSURE: 73 MMHG | HEIGHT: 59 IN

## 2018-11-05 DIAGNOSIS — S92.354D NONDISPLACED FRACTURE OF FIFTH METATARSAL BONE, RIGHT FOOT, SUBSEQUENT ENCOUNTER FOR FRACTURE WITH ROUTINE HEALING: Primary | ICD-10-CM

## 2018-11-05 DIAGNOSIS — S92.354D NONDISPLACED FRACTURE OF FIFTH METATARSAL BONE, RIGHT FOOT, SUBSEQUENT ENCOUNTER FOR FRACTURE WITH ROUTINE HEALING: ICD-10-CM

## 2018-11-05 PROCEDURE — 73630 X-RAY EXAM OF FOOT: CPT

## 2018-11-05 PROCEDURE — 99024 POSTOP FOLLOW-UP VISIT: CPT | Performed by: FAMILY MEDICINE

## 2018-11-05 NOTE — PROGRESS NOTES
Assessment:     1  Nondisplaced fracture of fifth metatarsal bone, right foot, subsequent encounter for fracture with routine healing        Plan:     Problem List Items Addressed This Visit     Nondisplaced fracture of fifth metatarsal bone, right foot, initial encounter for closed fracture - Primary         Subjective:     Patient ID: Renea Hinojosa is a 79 y o  female  Chief Complaint:  Patient reports good overall improvement of her foot pain since being immobilized in a Cam boot for the past 2 weeks  Swelling continues to improve  She denies any numbness, tingling, warmth or crepitus        Allergy:  Allergies   Allergen Reactions    Hydromorphone Hcl Itching    Percocet [Oxycodone-Acetaminophen] GI Intolerance    Sulfamethoxazole-Trimethoprim Rash     Medications:  all current active meds have been reviewed  Past Medical History:  Past Medical History:   Diagnosis Date    Arthritis     Depression     Endometriosis     GERD (gastroesophageal reflux disease)     Hyperlipidemia     Hypertension     Osteopenia     Pancreatitis 06/2017    Pneumonia      Past Surgical History:  Past Surgical History:   Procedure Laterality Date    ABDOMINAL SURGERY      endometriosis     APPENDECTOMY      CARPAL TUNNEL RELEASE      COLONOSCOPY      HAND SURGERY      HIP SURGERY      HYSTERECTOMY      JOINT REPLACEMENT Bilateral     KNEE    JOINT REPLACEMENT Left     HIP    [place] ARTHROSCOPY SHOULDER SURGICAL BICEPS TENODESIS Right 5/10/2017    Procedure: ARTHROSCOPIC BICEPS TENODESIS WITH ROTATOR CUFF REPAIR ;  Surgeon: Michael Fuller MD;  [facility];  Service: Orthopedics    [place] Neftaly Pobraydon Right 5/10/2017    Procedure: SHOULDER ARTHROSCOPY SUBACROMIAL DECOMPRESSION;  Surgeon: Michael Fuller MD;  [facility];  Service: Orthopedics    [place] TOTAL HIP ARTHROPLASTY Left 3/12/2018    Procedure: ARTHROPLASTY HIP TOTAL;  Surgeon: Courtney Zhu MD;  [facility]; Service: Orthopedics    [place] TOTAL HIP ARTHROPLASTY Right 7/30/2018    Procedure: RIGHT TOTAL HIP ARTHROPLASTY;  Surgeon: Yolanda Saldaña MD;  [facility];  Service: Orthopedics    TONSILLECTOMY       Family History:  Family History   Problem Relation Age of Onset    Atrial fibrillation Mother    [place] Breast cancer Mother     Stroke Mother     Coronary artery disease Mother     Diabetes Mother     Pancreatitis Mother     Heart attack Father     Arthritis Family     Cancer Family      Social History:  History   Alcohol Use    Yes     Comment: 1-2x / year     History   Drug Use No     History   Smoking Status    Never Smoker   Smokeless Tobacco    Never Used     Review of Systems   Constitutional: Negative  HENT: Negative  Eyes: Negative  Respiratory: Negative  Cardiovascular: Negative  Gastrointestinal: Negative  Genitourinary: Negative  Musculoskeletal: Negative for arthralgias and myalgias  Skin: Negative  Allergic/Immunologic: Negative  Neurological: Negative  Hematological: Negative  Psychiatric/Behavioral: Negative  Objective:  BP Readings from Last 1 Encounters:   11/05/18 114/73      Wt Readings from Last 1 Encounters:   11/05/18 90 7 kg (200 lb)      BMI:   Estimated body mass index is 40 4 kg/m² as calculated from the following:    Height as of this encounter: 4' 11" (1 499 m)  Weight as of this encounter: 90 7 kg (200 lb)  BSA:   Estimated body surface area is 1 84 meters squared as calculated from the following:    Height as of this encounter: 4' 11" (1 499 m)  Weight as of this encounter: 90 7 kg (200 lb)  Physical Exam   Constitutional: She is oriented to person, place, and time  Vital signs are normal  She appears well-developed  HENT:   Head: Normocephalic  Eyes: Pupils are equal, round, and reactive to light  Pulmonary/Chest: Effort normal    Musculoskeletal: Normal range of motion     Neurological: She is alert and oriented to person, place, and time  Skin: Skin is warm and dry  Psychiatric: She has a normal mood and affect  Nursing note and vitals reviewed  Right Ankle Exam   Swelling: mild    Tenderness   The patient is experiencing tenderness in the ATF and lateral malleolus  Range of Motion   The patient has normal right ankle ROM  Muscle Strength   Dorsiflexion:  4/5  Plantar flexion:  4/5  Anterior tibial:  4/5  Posterior tibial:  4/5  Gastrocsoleus:  4/5  Peroneal muscle:  4/5    Tests   Anterior drawer: negative  Varus tilt: negative    Other   Erythema: absent  Sensation: normal  Pulse: present     Comments:  Palpable tenderness along the base of the 5th metatarsal   Positive for edema and ecchymosis  Left Ankle Exam   Left ankle exam is normal     Range of Motion   The patient has normal left ankle ROM  Muscle Strength   The patient has normal left ankle strength  I have personally reviewed pertinent films in PACS  Further bone healing and additional callus formation from her previous x-ray

## 2018-11-06 ENCOUNTER — EVALUATION (OUTPATIENT)
Dept: PHYSICAL THERAPY | Facility: CLINIC | Age: 68
End: 2018-11-06
Payer: MEDICARE

## 2018-11-06 DIAGNOSIS — M16.11 PRIMARY LOCALIZED OSTEOARTHROSIS OF RIGHT HIP: ICD-10-CM

## 2018-11-06 DIAGNOSIS — Z96.641 PRESENCE OF RIGHT ARTIFICIAL HIP JOINT: Primary | ICD-10-CM

## 2018-11-06 DIAGNOSIS — S92.354D CLOSED NONDISPLACED FRACTURE OF FIFTH METATARSAL BONE OF RIGHT FOOT WITH ROUTINE HEALING, SUBSEQUENT ENCOUNTER: ICD-10-CM

## 2018-11-06 DIAGNOSIS — S92.354D NONDISPLACED FRACTURE OF FIFTH METATARSAL BONE, RIGHT FOOT, SUBSEQUENT ENCOUNTER FOR FRACTURE WITH ROUTINE HEALING: ICD-10-CM

## 2018-11-06 PROCEDURE — 97164 PT RE-EVAL EST PLAN CARE: CPT | Performed by: PHYSICAL THERAPIST

## 2018-11-06 PROCEDURE — G8978 MOBILITY CURRENT STATUS: HCPCS | Performed by: PHYSICAL THERAPIST

## 2018-11-06 PROCEDURE — G8979 MOBILITY GOAL STATUS: HCPCS | Performed by: PHYSICAL THERAPIST

## 2018-11-06 PROCEDURE — 97110 THERAPEUTIC EXERCISES: CPT | Performed by: PHYSICAL THERAPIST

## 2018-11-06 NOTE — PROGRESS NOTES
PT Evaluation     Today's date: 2018  Patient name: Albert Taylor  : 1950  MRN: [id number]  Referring provider: Shalini Masters DO  Dx:   Encounter Diagnosis     ICD-10-CM    1  Presence of right artificial hip joint Z96 641    2  Nondisplaced fracture of fifth metatarsal bone, right foot, subsequent encounter for fracture with routine healing S92 354D Ambulatory referral to Physical Therapy   3  Primary localized osteoarthrosis of right hip M16 11    4  Closed nondisplaced fracture of fifth metatarsal bone of right foot with routine healing, subsequent encounter S92 354D                   Assessment  Impairments: abnormal gait, activity intolerance, impaired physical strength and weight-bearing intolerance    Assessment details: Patient presents with right foot tenderness following a 5th metatarsal fracture; will resume PT for the hip as well no that she is WBAT  Prognosis: good    Goals  STG Patient is independent with HEP   STG Increase strength by half grade in 2 weeks  LTG Balance & endurance & gait & locomotion are improved by 50% in 4 weeks  LTG ADL performance improved to prior level of function      Plan  Planned therapy interventions: graded activity, graded exercise, home exercise program, therapeutic activities, therapeutic exercise, stretching, strengthening, flexibility and functional ROM exercises  Frequency: 2x week  Duration in visits: 8  Duration in weeks: 4  Treatment plan discussed with: patient        Subjective Evaluation    History of Present Illness  Mechanism of injury: Patient reports missing a steep step and her right foot turned outward, feeling a crunch in the foot  She did not fall completely but hit her arm against a railing  She was diagnosed with a fifth metatarsal fracture  She was in a cast for 4 weeks and a CAM walker for 2 weels  She now has a soft ankle brace which does not fit in any of her shoes    She continues to have intermittent tenderness with weight bearing at the plantar surface fifth MTP  She is walking less than usual to avoid exacerbation of symptoms  Pain  Current pain ratin  At worst pain ratin  Relieving factors: rest    Patient Goals  Patient goals for therapy: decreased pain, increased strength and independence with ADLs/IADLs          Objective     Tenderness     Right Ankle/Foot   Tenderness in the fifth metatarsal base  Active Range of Motion     Right Ankle/Foot   Dorsiflexion (ke): WFL  Dorsiflexion (kf): WFL  Plantar flexion: WFL  Inversion: WFL  Eversion: WFL    Strength/Myotome Testing     Right Ankle/Foot   Dorsiflexion: 4  Plantar flexion: 4  Inversion: 4  Eversion: 4          Precautions:  Total hip precautions, posterior approach        Daily Treatment Diary      Manual                        PROM prn                                                                                                                             Exercise Diary  8/30  9/6  9/13  9/17  9/20  10/26 11/6      8/28   Heel slides on slide board                       Add squeeze, bolster :05 20x  :05 20x :05 20x :05 20x :05 20x    :05x20     :05 30x   ER isometrics, neutral, gentle :05 20x  :05 20x :05 20x :05 20x :05 20x    :05x20     :05 30x   SAQ                    4# :05 20x   LAQ  5# :05 20x  5# :05 20x 5# :05 20x              4# :05 20x   bridges  3x10    3x10  3x10  3x10    3x10      3x10   Standing hip 3-waySLR standing  3# 2x10 ea  3# 2x10 ea  3# 3x10 ea  3# 3x10 ea  3# 3x10 ea   #4  20x right only      3# 2x10 ea                           Side stepping  2'  2' with 3#  2' /c 3#  2' /c 3#  2' c/ 3#    #4  2'      2'   Standing knee flexion  3# 20x  3# 20x  3# 20x  3# 30x  3# 30x    #4  30x      3# 20x   SB stretch :30 4x  :30x4 :30x4 :30x4 :30x4  dc        :30 4x   SLS :10 4x  :10 4x :15x4 :20x3               Gait training                       SLR   small :05 hold, 20x small :05 20x small :05 20x Small :05 20x              mini squats  30x  30x  30x               30x    step ups  8" 20x  8" 20x  8" 20x  8" 20x  8" 20x    8"  20x right up, left down      8" 20x   Lateral step up and overs   4" 10x  4" 10x  4" 10x  4" 10x  4" 10x          4" 10x   Stairs   5x                   Biodex squats   20x %WB training 20x %WB training  30x % WB training               Knee extension machine       11# 20x  22# 2x10    #22 3x10         Knee flexion machine       11# 20x  22# 2x10    #22 3x10         Leg press (seat back)          35# 2x10             Upright bike        5'  7'             Lumbar extension in standing           Up to neutral today 15x :05x15         Seated LAQ,  to place band around ankles           :03x15  HEP                                                                                                               Modalities   8/13/18  8/15  8/17  8/21  8/23  8/28  8/30  9/17  9/20      CP  Cp, 10' post  10' post defers  defers defers

## 2018-11-09 ENCOUNTER — OFFICE VISIT (OUTPATIENT)
Dept: PHYSICAL THERAPY | Facility: CLINIC | Age: 68
End: 2018-11-09
Payer: MEDICARE

## 2018-11-09 DIAGNOSIS — S92.354D CLOSED NONDISPLACED FRACTURE OF FIFTH METATARSAL BONE OF RIGHT FOOT WITH ROUTINE HEALING, SUBSEQUENT ENCOUNTER: ICD-10-CM

## 2018-11-09 DIAGNOSIS — S92.354D NONDISPLACED FRACTURE OF FIFTH METATARSAL BONE, RIGHT FOOT, SUBSEQUENT ENCOUNTER FOR FRACTURE WITH ROUTINE HEALING: Primary | ICD-10-CM

## 2018-11-09 DIAGNOSIS — Z96.641 PRESENCE OF RIGHT ARTIFICIAL HIP JOINT: ICD-10-CM

## 2018-11-09 DIAGNOSIS — M16.11 PRIMARY LOCALIZED OSTEOARTHROSIS OF RIGHT HIP: ICD-10-CM

## 2018-11-09 PROCEDURE — 97116 GAIT TRAINING THERAPY: CPT | Performed by: PHYSICAL THERAPIST

## 2018-11-09 PROCEDURE — 97110 THERAPEUTIC EXERCISES: CPT | Performed by: PHYSICAL THERAPIST

## 2018-11-09 NOTE — PROGRESS NOTES
Daily Note      Today's date: 2018  Patient name: Marivel Oneal  : 1950  MRN: [id number]  Referring provider: Gary Mahoney DO  Dx:   Encounter Diagnosis     ICD-10-CM    1  Nondisplaced fracture of fifth metatarsal bone, right foot, subsequent encounter for fracture with routine healing S92 354D    2  Primary localized osteoarthrosis of right hip M16 11    3  Closed nondisplaced fracture of fifth metatarsal bone of right foot with routine healing, subsequent encounter S92 354D    4  Presence of right artificial hip joint Z96 641               Subjective: notes increased soreness in both hips- just superior to lateral femurs  and continued soreness in the right foot  Noted more foot soreness after grocery shopping trips  She attributes hip pain due immobilization of the right foot for a period of time  Objective: See treatment diary below      Assessment: Tolerated treatment well  Patient exhibited good technique with therapeutic exercises and would benefit from continued PT  Progressed LE strengthening while continuing to avoid excessive weight bearing on the right  Continued tightness/pulling noted in the right anterior hip with lumbar ext in standing  Plan: Progress treatment as tolerated  Precautions:  Total hip precautions, posterior approach, limit weight bearing on right due to healing 5th met fracture*      Daily Treatment Diary      Manual                        PROM prn                                                                                                                             Exercise Diary  8/30  9/6  9/13  9/17  9/20  10/26 11/6  11/9      Heel slides on slide board                      Add squeezelashaunster :05 20x  :05 20x :05 20x :05 20x :05 20x    :05x20  :05x20      ER isometrics, neutral, gentle :05 20x  :05 20x :05 20x :05 20x :05 20x    :05x20 :05x20                            LAQ  5# :05 20x  5# :05 20x 5# :05 20x                bridges  3x10    3x10  3x10  3x10    3x10  3x10      Standing hip 3-waySLR standing  3# 2x10 ea  3# 2x10 ea  3# 3x10 ea  3# 3x10 ea  3# 3x10 ea   #4  20x right only  #4  20x right only                             Side stepping  2'  2' with 3#  2' /c 3#  2' /c 3#  2' c/ 3#    #4  2'  #4  2'      Standing knee flexion  3# 20x  3# 20x  3# 20x  3# 30x  3# 30x    #4  30x  #4  30x right only      SB stretch :30 4x  :30x4 :30x4 :30x4 :30x4  dc  D/C        SLS :10 4x  :10 4x :15x4 :20x3      D/C       Gait training                      SLR   small :05 hold, 20x small :05 20x small :05 20x Small :05 20x      :05x20       mini squats  30x  30x  30x          20x       step ups  8" 20x  8" 20x  8" 20x  8" 20x  8" 20x    8"  20x right up, left down   8"  20x right up, left down       Lateral step up and overs   4" 10x  4" 10x  4" 10x  4" 10x  4" 10x            Stairs   5x                  Biodex squats   20x %WB training 20x %WB training  30x % WB training              Knee extension machine       11# 20x  22# 2x10    #22 3x10        Knee flexion machine       11# 20x  22# 2x10    #22 3x10         Leg press (seat back)          35# 2x10             Upright bike        5'  7'             Lumbar extension in standing           Up to neutral today 15x :05x15  :05x20       Seated LAQ,  to place band around ankles           :44W99  HEP          SAQ                :10x10  #3                                                                                     Modalities   8/13/18  8/15  8/17  8/21  8/23  8/28  8/30  9/17  9/20      CP  Cp, 10' post  10' post defers  defers defers

## 2018-11-13 ENCOUNTER — OFFICE VISIT (OUTPATIENT)
Dept: PHYSICAL THERAPY | Facility: CLINIC | Age: 68
End: 2018-11-13
Payer: MEDICARE

## 2018-11-13 DIAGNOSIS — M16.11 PRIMARY LOCALIZED OSTEOARTHROSIS OF RIGHT HIP: ICD-10-CM

## 2018-11-13 DIAGNOSIS — S92.354D NONDISPLACED FRACTURE OF FIFTH METATARSAL BONE, RIGHT FOOT, SUBSEQUENT ENCOUNTER FOR FRACTURE WITH ROUTINE HEALING: Primary | ICD-10-CM

## 2018-11-13 DIAGNOSIS — S92.354D CLOSED NONDISPLACED FRACTURE OF FIFTH METATARSAL BONE OF RIGHT FOOT WITH ROUTINE HEALING, SUBSEQUENT ENCOUNTER: ICD-10-CM

## 2018-11-13 DIAGNOSIS — Z96.641 PRESENCE OF RIGHT ARTIFICIAL HIP JOINT: ICD-10-CM

## 2018-11-13 PROCEDURE — 97110 THERAPEUTIC EXERCISES: CPT

## 2018-11-13 PROCEDURE — 97112 NEUROMUSCULAR REEDUCATION: CPT

## 2018-11-13 NOTE — PROGRESS NOTES
Daily Note     Today's date: 2018  Patient name: Laura Deluca  : 1950  MRN: [id number]  Referring provider: Tresa Krishna DO  Dx:   Encounter Diagnosis     ICD-10-CM    1  Nondisplaced fracture of fifth metatarsal bone, right foot, subsequent encounter for fracture with routine healing S92 354D    2  Primary localized osteoarthrosis of right hip M16 11    3  Closed nondisplaced fracture of fifth metatarsal bone of right foot with routine healing, subsequent encounter S92 354D    4  Presence of right artificial hip joint Z96 641                   Subjective: Patient reports transient LE soreness following last session and continued sensitivity in right foot  Objective: See treatment diary below     Precautions:  Total hip precautions, posterior approach, limit weight bearing on right due to healing 5th met fracture*      Daily Treatment Diary      Manual                        PROM prn                                                                                                                             Exercise Diary  8/30  9/6  9/13  9/17  9/20  10/26 11/6  11/9  11/13     Heel slides on slide board                       Add squeeze, bolster :05 20x  :05 20x :05 20x :05 20x :05 20x    :05x20  :05x20  :05x20     ER isometrics, neutral, gentle :05 20x  :05 20x :05 20x :05 20x :05 20x    :05x20 :05x20  :05x20                             LAQ  5# :05 20x  5# :05 20x 5# :05 20x            3# :05x20     bridges  3x10    3x10  3x10  3x10    3x10  3x10  3x10     Standing hip 3-waySLR standing  3# 2x10 ea  3# 2x10 ea  3# 3x10 ea  3# 3x10 ea  3# 3x10 ea   #4  20x right only  #4  20x right only  4# 30x right only                             Side stepping  2'  2' with 3#  2' /c 3#  2' /c 3#  2' c/ 3#    #4  2'  #4  2'  4# 2'      Standing knee flexion  3# 20x  3# 20x  3# 20x  3# 30x  3# 30x    #4  30x  #4  30x right only  4# 30x right only     SB stretch :30 4x  :30x4 :30x4 :30x4 :30x4  dc  D/C       SLS :10 4x  :10 4x :15x4 :20x3      D/C         Gait training                       SLR   small :05 hold, 20x small :05 20x small :05 20x Small :05 20x      :05x20 :05x20      mini squats  30x  30x  30x          20x  20x      step ups  8" 20x  8" 20x  8" 20x  8" 20x  8" 20x    8"  20x right up, left down   8"  20x right up, left down   8" 20x     Lateral step up and overs   4" 10x  4" 10x  4" 10x  4" 10x  4" 10x             Stairs   5x                   Biodex squats   20x %WB training 20x %WB training  30x % WB training               Knee extension machine       11# 20x  22# 2x10    #22 3x10    22# 2x10     Knee flexion machine       11# 20x  22# 2x10    #22 3x10    22# 2x10     Leg press (seat back)          35# 2x10             Upright bike        5'  7'             Lumbar extension in standing           Up to neutral today 15x :05x15  :05x20  :05x20     Seated LAQ,  to place band around ankles           :24F80  HEP          SAQ                :10x10  #3  3# :10x10                                                                                   Modalities   8/13/18  8/15  8/17  8/21  8/23  8/28  8/30  9/17  9/20      CP  Cp, 10' post  10' post defers  defers defers                                                                  Assessment: Tolerated treatment well  Patient demonstrates good knowledge of exercises and moves quickly through program with minimal cues needed for proper form  No pain reported throughout session  Most challenged by knee extension machine noting fatigue at 20 reps  Patient demonstrated fatigue post treatment and would benefit from continued PT       Plan: Progress treatment as tolerated

## 2018-11-15 ENCOUNTER — OFFICE VISIT (OUTPATIENT)
Dept: PHYSICAL THERAPY | Facility: CLINIC | Age: 68
End: 2018-11-15
Payer: MEDICARE

## 2018-11-15 DIAGNOSIS — S92.354D CLOSED NONDISPLACED FRACTURE OF FIFTH METATARSAL BONE OF RIGHT FOOT WITH ROUTINE HEALING, SUBSEQUENT ENCOUNTER: ICD-10-CM

## 2018-11-15 DIAGNOSIS — S92.354D NONDISPLACED FRACTURE OF FIFTH METATARSAL BONE, RIGHT FOOT, SUBSEQUENT ENCOUNTER FOR FRACTURE WITH ROUTINE HEALING: Primary | ICD-10-CM

## 2018-11-15 DIAGNOSIS — M16.11 PRIMARY LOCALIZED OSTEOARTHROSIS OF RIGHT HIP: ICD-10-CM

## 2018-11-15 DIAGNOSIS — Z96.641 PRESENCE OF RIGHT ARTIFICIAL HIP JOINT: ICD-10-CM

## 2018-11-15 PROCEDURE — 97110 THERAPEUTIC EXERCISES: CPT

## 2018-11-15 PROCEDURE — 97112 NEUROMUSCULAR REEDUCATION: CPT

## 2018-11-15 NOTE — PROGRESS NOTES
Daily Note     Today's date: 11/15/2018  Patient name: Cristel Casarez  : 1950  MRN: [id number]  Referring provider: Ozzie Badillo DO  Dx:   Encounter Diagnosis     ICD-10-CM    1  Nondisplaced fracture of fifth metatarsal bone, right foot, subsequent encounter for fracture with routine healing S92 354D    2  Primary localized osteoarthrosis of right hip M16 11    3  Closed nondisplaced fracture of fifth metatarsal bone of right foot with routine healing, subsequent encounter S92 354D    4  Presence of right artificial hip joint Z96 641                   Subjective: Patient reports she felt good following last session though continues to have intermittent tenderness in foot, no worse following PT  Objective: See treatment diary below     Precautions:  Total hip precautions, posterior approach, limit weight bearing on right due to healing 5th met fracture*      Daily Treatment Diary      Manual                        PROM prn                                                                                                                             Exercise Diary  8/30  9/6  9/13  9/17  9/20  10/26 11/6  11/9  11/13  11/15   Heel slides on slide board                       Add squeeze, bolster :05 20x  :05 20x :05 20x :05 20x :05 20x    :05x20  :05x20  :05x20 :05x30   ER isometrics, neutral, gentle :05 20x  :05 20x :05 20x :05 20x :05 20x    :05x20 :05x20  :05x20 :05x30                           LAQ  5# :05 20x  5# :05 20x 5# :05 20x            3# :05x20  4# :05x20   bridges  3x10    3x10  3x10  3x10    3x10  3x10  3x10 3x10   Standing hip 3-way  3# 2x10 ea  3# 2x10 ea  3# 3x10 ea  3# 3x10 ea  3# 3x10 ea   #4  20x right only  #4  20x right only  4# 30x right only  4# 30x R only                           Side stepping  2'  2' with 3#  2' /c 3#  2' /c 3#  2' c/ 3#    #4  2'  #4  2'  4# 2'   4# 2'   Standing knee flexion  3# 20x  3# 20x  3# 20x  3# 30x  3# 30x    #4  30x  #4  30x right only  4# 30x right only  4# 30x R only   SB stretch :30 4x  :30x4 :30x4 :30x4 :30x4  dc  D/C         SLS :10 4x  :10 4x :15x4 :20x3      D/C         Gait training                       SLR   small :05 hold, 20x small :05 20x small :05 20x Small :05 20x      :05x20 :05x20  2# 2x10    mini squats  30x  30x  30x          20x  20x  20x    step ups  8" 20x  8" 20x  8" 20x  8" 20x  8" 20x    8"  20x right up, left down   8"  20x right up, left down   8" 20x  8" 30x   Lateral step up and overs   4" 10x  4" 10x  4" 10x  4" 10x  4" 10x             Stairs   5x                   Biodex squats   20x %WB training 20x %WB training  30x % WB training               Knee extension machine       11# 20x  22# 2x10    #22 3x10    22# 2x10  22# 3x10   Knee flexion machine       11# 20x  22# 2x10    #22 3x10    22# 2x10  22# 3x10   Leg press (seat back)          35# 2x10             Upright bike        5'  7'          6'   Lumbar extension in standing           Up to neutral today 15x :05x15  :05x20  :05x20 :05x20   Seated LAQ,  to place band around ankles           :03x15  HEP          SAQ                :10x10  #3  3# :10x10  4# :10x10                                                                                 Modalities   8/13/18  8/15  8/17  8/21  8/23  8/28  8/30  9/17  9/20      CP  Cp, 10' post  10' post defers  defers defers                                                               Assessment: Tolerated treatment well  Challenged by the addition of 2# weight for SLR and notes fatigue during 3rd set on knee extension machine  No hip or foot pain reported throughout session  Patient demonstrated fatigue post treatment and would benefit from continued PT      Plan: Progress treatment as tolerated

## 2018-11-20 ENCOUNTER — OFFICE VISIT (OUTPATIENT)
Dept: PHYSICAL THERAPY | Facility: CLINIC | Age: 68
End: 2018-11-20
Payer: MEDICARE

## 2018-11-20 DIAGNOSIS — Z96.641 PRESENCE OF RIGHT ARTIFICIAL HIP JOINT: ICD-10-CM

## 2018-11-20 DIAGNOSIS — M16.11 PRIMARY LOCALIZED OSTEOARTHROSIS OF RIGHT HIP: ICD-10-CM

## 2018-11-20 DIAGNOSIS — S92.354D CLOSED NONDISPLACED FRACTURE OF FIFTH METATARSAL BONE OF RIGHT FOOT WITH ROUTINE HEALING, SUBSEQUENT ENCOUNTER: ICD-10-CM

## 2018-11-20 DIAGNOSIS — S92.354D NONDISPLACED FRACTURE OF FIFTH METATARSAL BONE, RIGHT FOOT, SUBSEQUENT ENCOUNTER FOR FRACTURE WITH ROUTINE HEALING: Primary | ICD-10-CM

## 2018-11-20 PROCEDURE — 97110 THERAPEUTIC EXERCISES: CPT

## 2018-11-20 PROCEDURE — 97530 THERAPEUTIC ACTIVITIES: CPT

## 2018-11-20 NOTE — PROGRESS NOTES
Daily Note     Today's date: 2018  Patient name: Renea Hinojosa  : 1950  MRN: [id number]  Referring provider: Rl Maguire DO  Dx:   Encounter Diagnosis     ICD-10-CM    1  Nondisplaced fracture of fifth metatarsal bone, right foot, subsequent encounter for fracture with routine healing S92 354D    2  Primary localized osteoarthrosis of right hip M16 11    3  Closed nondisplaced fracture of fifth metatarsal bone of right foot with routine healing, subsequent encounter S92 354D    4  Presence of right artificial hip joint Z96 641                   Subjective: Patient reports she is tired today and feels her endurance is limited, she denies soreness following last session  Objective: See treatment diary below     Precautions:  Total hip precautions, posterior approach, limit weight bearing on right due to healing 5th met fracture*      Daily Treatment Diary      Manual                        PROM prn                                                                                                                             Exercise Diary   11/20         11/13  11/15   Heel slides on slide board                       Add squeeze, bolster :05x30         :05x20 :05x30   ER isometrics, neutral, gentle :05x30         :05x20 :05x30                   LAQ  4# :05x20         3# :05x20  4# :05x20   bridges  3x10         3x10 3x10   Standing hip 3-way  4# 3x10 R only         4# 30x right only  4# 30x R only                   Side stepping  4# 2'         4# 2'   4# 2'   Standing knee flexion  4# 30x R only         4# 30x right only  4# 30x R only   SB stretch                SLS               Gait training               SLR  4# 2x10        :05x20  2# 2x10    mini squats  20x         20x  20x    step ups  8" 30x         8" 20x  8" 30x   Lateral step up and overs                Stairs               [employer] squats               Knee extension machine  22# 3x10         22# 2x10  22# 3x10   Knee flexion machine  22# 3x10         22# 2x10  22# 3x10   Leg press (seat back)               Upright bike  7'           6'   Lumbar extension in standing :05x20         :05x20 :05x20   Seated LAQ,  to place band around ankles                SAQ  4# :10x10         3# :10x10  4# :10x10                                                                                 Modalities   8/13/18  8/15  8/17  8/21  8/23  8/28  8/30  9/17  9/20      CP  Cp, 10' post  10' post defers  defers defers                                                               Assessment: Tolerated treatment well  Minimal cues needed for proper technique with good carry over  1 seated rest break required with standing TE due to fatigue  No increased pain noted throughout  Patient demonstrated fatigue post treatment and would benefit from continued PT    Plan: Progress treatment as tolerated

## 2018-11-27 ENCOUNTER — APPOINTMENT (OUTPATIENT)
Dept: PHYSICAL THERAPY | Facility: CLINIC | Age: 68
End: 2018-11-27
Payer: MEDICARE

## 2018-11-29 ENCOUNTER — OFFICE VISIT (OUTPATIENT)
Dept: PHYSICAL THERAPY | Facility: CLINIC | Age: 68
End: 2018-11-29
Payer: MEDICARE

## 2018-11-29 DIAGNOSIS — S92.354D CLOSED NONDISPLACED FRACTURE OF FIFTH METATARSAL BONE OF RIGHT FOOT WITH ROUTINE HEALING, SUBSEQUENT ENCOUNTER: ICD-10-CM

## 2018-11-29 DIAGNOSIS — Z96.641 PRESENCE OF RIGHT ARTIFICIAL HIP JOINT: ICD-10-CM

## 2018-11-29 DIAGNOSIS — S92.354D NONDISPLACED FRACTURE OF FIFTH METATARSAL BONE, RIGHT FOOT, SUBSEQUENT ENCOUNTER FOR FRACTURE WITH ROUTINE HEALING: Primary | ICD-10-CM

## 2018-11-29 DIAGNOSIS — M16.11 PRIMARY LOCALIZED OSTEOARTHROSIS OF RIGHT HIP: ICD-10-CM

## 2018-11-29 PROCEDURE — 97116 GAIT TRAINING THERAPY: CPT | Performed by: PHYSICAL THERAPIST

## 2018-11-29 PROCEDURE — 97110 THERAPEUTIC EXERCISES: CPT | Performed by: PHYSICAL THERAPIST

## 2018-11-29 NOTE — PROGRESS NOTES
Daily Note     Today's date: 2018  Patient name: Laura Deluca  : 1950  MRN: [id number]  Referring provider: Tresa Krishna DO  Dx:   Encounter Diagnosis     ICD-10-CM    1  Nondisplaced fracture of fifth metatarsal bone, right foot, subsequent encounter for fracture with routine healing S92 354D    2  Primary localized osteoarthrosis of right hip M16 11    3  Closed nondisplaced fracture of fifth metatarsal bone of right foot with routine healing, subsequent encounter S92 354D    4  Presence of right artificial hip joint Z96 641               Subjective: patient states her endurance is definitely getting better and she is able to go shopping without having to rest   She has no difficulty getting in and out of a chair and was able to stand 30 minutes last Friday while at a store  She has burning in the superior lateral hips when fatigued  Objective: See treatment diary below     Precautions:  Total hip precautions, posterior approach, limit weight bearing on right due to healing 5th met fracture*      Daily Treatment Diary      Manual                        PROM prn                                                                                                                             Exercise Diary   11/20 11/29        11/13  11/15                          Add squeeze, bolster :05x30 :05x30        :05x20 :05x30   ER isometrics, neutral, gentle :05x30 :05x30        :05x20 :05x30                   LAQ  4# :05x20         3# :05x20  4# :05x20   bridges  3x10         3x10 3x10   Standing hip 3-way  4# 3x10 R only 4# 3x10 R only        4# 30x right only  4# 30x R only                   Side stepping  4# 2'  4# 2'        4# 2'   4# 2'   Standing knee flexion  4# 30x R only 4# 3x10 R only        4# 30x right only  4# 30x R only                   SLS               Gait training               SLR  4# 2x10 Supine 4" lift 20x (tap heel down)       :05x20  2# 2x10    mini squats  20x 20x        20x  20x  step ups  8" 30x 8" 30x        8" 20x  8" 30x   Lateral step up and overs                Stairs               Biodex squats               Knee extension machine  22# 3x10         22# 2x10  22# 3x10   Knee flexion machine  22# 3x10         22# 2x10  22# 3x10   Leg press (seat back)               Upright bike  7' 7'           6'   Lumbar extension in standing :05x20 :05x20        :05x20 :05x20   Seated LAQ,  to place band around ankles                SAQ  4# :10x10 #4  :05x20        3# :10x10  4# :10x10                                                                                 Modalities   8/13/18  8/15  8/17  8/21  8/23  8/28  8/30  9/17  9/20      CP  Cp, 10' post  10' post defers  defers defers                                                               Assessment: Tolerated treatment well  Patient demonstrated fatigue post treatment and would benefit from continued PT  No pain reported throughout treatment; required one rest break during standing exercises due to fatigue  She will return to her surgeon for follow-up next week  Plan: Progress treatment as tolerated

## 2018-12-04 ENCOUNTER — OFFICE VISIT (OUTPATIENT)
Dept: OBGYN CLINIC | Facility: HOSPITAL | Age: 68
End: 2018-12-04
Payer: MEDICARE

## 2018-12-04 VITALS
WEIGHT: 200 LBS | HEART RATE: 71 BPM | BODY MASS INDEX: 40.32 KG/M2 | DIASTOLIC BLOOD PRESSURE: 67 MMHG | SYSTOLIC BLOOD PRESSURE: 100 MMHG | HEIGHT: 59 IN

## 2018-12-04 DIAGNOSIS — M25.551 RIGHT HIP PAIN: ICD-10-CM

## 2018-12-04 DIAGNOSIS — Z96.643 STATUS POST TOTAL REPLACEMENT OF BOTH HIPS: Primary | ICD-10-CM

## 2018-12-04 PROCEDURE — 99212 OFFICE O/P EST SF 10 MIN: CPT | Performed by: ORTHOPAEDIC SURGERY

## 2018-12-04 NOTE — PROGRESS NOTES
Orthopedics   Bismark Jj 79 y o  female MRN: [id number]    Chief Complaint:   bilateral hip pain    HPI:   79 y  o female returns to the clinic 4 months status post right total hip arthroplasty and 8 months status post left total hip arthroplasty  Patient feels well at this point other than occasional burning on the lateral aspect of either hip because with rest   She is still recovering from her right side metatarsal fracture for which she has started weight-bearing and resuming all her activities  She has also resumed physical therapy, but continues to feel some pain in her right foot when she ambulates  No nausea, vomiting, fevers, sweats, or chills  Review Of Systems:   Review of Systems   Constitutional: Positive for activity change  HENT: Negative  Eyes: Negative  Respiratory: Negative  Cardiovascular: Negative  Gastrointestinal: Negative  Endocrine: Negative  Genitourinary: Negative  Musculoskeletal: Positive for arthralgias and myalgias  Skin: Negative  Allergic/Immunologic: Negative  Neurological: Negative  Hematological: Negative  Psychiatric/Behavioral: Negative          Past Medical History:   Past Medical History:   Diagnosis Date    Arthritis     Depression     Endometriosis     GERD (gastroesophageal reflux disease)     Hyperlipidemia     Hypertension     Osteopenia     Pancreatitis 06/2017    Pneumonia        Past Surgical History:   Past Surgical History:   Procedure Laterality Date    ABDOMINAL SURGERY      endometriosis     APPENDECTOMY      CARPAL TUNNEL RELEASE      COLONOSCOPY      HAND SURGERY      HIP SURGERY      HYSTERECTOMY      JOINT REPLACEMENT Bilateral     KNEE    JOINT REPLACEMENT Left     HIP    [place] ARTHROSCOPY SHOULDER SURGICAL BICEPS TENODESIS Right 5/10/2017    Procedure: ARTHROSCOPIC BICEPS TENODESIS WITH ROTATOR CUFF REPAIR ;  Surgeon: Berenice Swenson MD;  [facility];  Service: Orthopedics    [place] [facility] Right 5/10/2017    Procedure: SHOULDER ARTHROSCOPY SUBACROMIAL DECOMPRESSION;  Surgeon: Michael Fuller MD;  [facility];  Service: Orthopedics    MD TOTAL HIP ARTHROPLASTY Left 3/12/2018    Procedure: ARTHROPLASTY HIP TOTAL;  Surgeon: Courtnye Zhu MD;  [facility];  Service: Orthopedics    MD TOTAL HIP ARTHROPLASTY Right 7/30/2018    Procedure: RIGHT TOTAL HIP ARTHROPLASTY;  Surgeon: Courtney Zhu MD;  [facility];  Service: Orthopedics    TONSILLECTOMY         Family History:  Family history reviewed and non-contributory  Family History   Problem Relation Age of Onset    Atrial fibrillation Mother     Breast cancer Mother     Stroke Mother     Coronary artery disease Mother     Diabetes Mother     Pancreatitis Mother     Heart attack Father     Arthritis Family     Cancer Family        Social History:  Social History     Social History    Marital status: /Civil Union     Spouse name: N/A    Number of children: N/A    Years of education: N/A     Social History Main Topics    Smoking status: Never Smoker    Smokeless tobacco: Never Used    Alcohol use Yes      Comment: 1-2x / year    Drug use: No    Sexual activity: Not Asked     Other Topics Concern    None     Social History Narrative    Drinks coffee           Allergies:    Allergies   Allergen Reactions    Hydromorphone Hcl Itching    Percocet [Oxycodone-Acetaminophen] GI Intolerance    Sulfamethoxazole-Trimethoprim Rash       Labs:    0  Lab Value Date/Time   HCT 32 6 (L) 07/31/2018 0450   HCT 44 0 07/05/2018 0929   HCT 32 9 (L) 03/13/2018 0540   HCT 42 8 05/02/2017 0933   HGB 10 6 (L) 07/31/2018 0450   HGB 14 3 07/05/2018 0929   HGB 11 0 (L) 03/13/2018 0540   HGB 14 3 05/02/2017 0933   INR 0 92 07/05/2018 0929   WBC 8 20 07/31/2018 0450   WBC 6 22 07/05/2018 0929   WBC 7 11 03/13/2018 0540   WBC 6 5 05/02/2017 0933       Meds:    Current Outpatient Prescriptions:    acetaminophen (TYLENOL) 500 mg tablet, Take 1,000 mg by mouth every 6 (six) hours as needed for mild pain, Disp: , Rfl:     atorvastatin (LIPITOR) 20 mg tablet, TAKE 1 TABLET BY MOUTH 6  DAYS A WEEK, Disp: 90 tablet, Rfl: 3    calcium-vitamin D (OSCAL) 250-125 MG-UNIT per tablet, Take 1 tablet by mouth daily, Disp: , Rfl:     Cholecalciferol (VITAMIN D) 2000 units tablet, Take 2,000 Units by mouth daily, Disp: , Rfl:     docusate sodium (COLACE) 100 mg capsule, Take 1 capsule (100 mg total) by mouth 2 (two) times a day, Disp: 10 capsule, Rfl: 0    FLUoxetine (PROzac) 20 mg capsule, TAKE 1 CAPSULE BY MOUTH  EVERY DAY, Disp: 90 capsule, Rfl: 0    hydrochlorothiazide (HYDRODIURIL) 25 mg tablet, TAKE 1 TABLET BY MOUTH  DAILY, Disp: 90 tablet, Rfl: 1    ibuprofen (MOTRIN) 200 mg tablet, Take 200 mg by mouth every 6 (six) hours as needed for mild pain, Disp: , Rfl:     methocarbamol (ROBAXIN) 750 mg tablet, Take 500 mg by mouth every 6 (six) hours as needed for muscle spasms, Disp: , Rfl:     metoprolol succinate (TOPROL-XL) 50 mg 24 hr tablet, TAKE 1 TABLET BY MOUTH  DAILY THIS REPLACES  ATENOLOL, Disp: 90 tablet, Rfl: 1    Multiple Vitamins-Minerals (CENTRUM SILVER ULTRA WOMENS PO), Take by mouth, Disp: , Rfl:     Omega-3 1000 MG CAPS, Take by mouth, Disp: , Rfl:     omeprazole (PriLOSEC) 20 mg delayed release capsule, TAKE 1 CAPSULE BY MOUTH  EVERY MORNING, Disp: 90 capsule, Rfl: 0    polyethylene glycol (GLYCOLAX) powder, Take 17 g by mouth daily, Disp: , Rfl:     traMADol (ULTRAM) 50 mg tablet, Take 1 tablet (50 mg total) by mouth every 6 (six) hours as needed for moderate pain for up to 60 doses, Disp: 40 tablet, Rfl: 0    Physical Exam:   /67   Pulse 71   Ht 4' 11" (1 499 m)   Wt 90 7 kg (200 lb)   LMP  (LMP Unknown) Comment: hysterectomy  BMI 40 40 kg/m²   Gen: Alert and oriented to person, place, time  HEENT: EOMI, eyes clear, moist mucus membranes, hearing intact  Respiratory: Bilateral chest rise  No audible wheezing found  Cardiovascular: No audible murmurs  Abdomen: soft  Musculoskeletal: bilateral lower extremity  · Well-healing incisions  · Minimal pain on active range of motion of hip joint  · Motor sensory examination is grossly intact distally    Radiology:   I personally reviewed the films  Assessment:  79 y  o female 4 months status post right total hip arthroplasty and 8 months status post left total hip arthroplasty    Plan:   Weight-bearing as tolerated bilateral lower extremity  Continue exercising and activities tolerated  Patient can transition from physical therapy to self exercise achieved  Will follow up the patient in 3 months for repeat evaluation  Patient understands and agrees with the treatment plan    Yaron Rashid MD

## 2018-12-12 ENCOUNTER — TRANSCRIBE ORDERS (OUTPATIENT)
Dept: PHYSICAL THERAPY | Facility: CLINIC | Age: 68
End: 2018-12-12

## 2018-12-12 DIAGNOSIS — Z96.641 PRESENCE OF RIGHT ARTIFICIAL HIP JOINT: Primary | ICD-10-CM

## 2019-01-04 DIAGNOSIS — F39 AFFECTIVE DISORDER (HCC): ICD-10-CM

## 2019-01-04 DIAGNOSIS — E78.00 PURE HYPERCHOLESTEROLEMIA: ICD-10-CM

## 2019-01-04 DIAGNOSIS — I10 ESSENTIAL HYPERTENSION: ICD-10-CM

## 2019-01-04 RX ORDER — ATORVASTATIN CALCIUM 20 MG/1
TABLET, FILM COATED ORAL
Qty: 78 TABLET | Refills: 1 | Status: SHIPPED | OUTPATIENT
Start: 2019-01-04 | End: 2019-08-20 | Stop reason: SDUPTHER

## 2019-01-04 RX ORDER — METOPROLOL SUCCINATE 50 MG/1
TABLET, EXTENDED RELEASE ORAL
Qty: 90 TABLET | Refills: 1 | Status: SHIPPED | OUTPATIENT
Start: 2019-01-04 | End: 2019-08-20 | Stop reason: SDUPTHER

## 2019-01-04 RX ORDER — OMEPRAZOLE 20 MG/1
CAPSULE, DELAYED RELEASE ORAL
Qty: 90 CAPSULE | Refills: 3 | Status: SHIPPED | OUTPATIENT
Start: 2019-01-04 | End: 2019-08-20 | Stop reason: SDUPTHER

## 2019-01-04 RX ORDER — FLUOXETINE HYDROCHLORIDE 20 MG/1
CAPSULE ORAL
Qty: 90 CAPSULE | Refills: 1 | Status: SHIPPED | OUTPATIENT
Start: 2019-01-04 | End: 2019-08-20 | Stop reason: SDUPTHER

## 2019-01-04 RX ORDER — HYDROCHLOROTHIAZIDE 25 MG/1
TABLET ORAL
Qty: 90 TABLET | Refills: 1 | Status: SHIPPED | OUTPATIENT
Start: 2019-01-04 | End: 2019-08-20 | Stop reason: SDUPTHER

## 2019-01-16 ENCOUNTER — HOSPITAL ENCOUNTER (OUTPATIENT)
Dept: RADIOLOGY | Facility: MEDICAL CENTER | Age: 69
Discharge: HOME | End: 2019-01-16
Payer: MEDICARE

## 2019-01-16 VITALS — BODY MASS INDEX: 40.32 KG/M2 | HEIGHT: 59 IN | WEIGHT: 200 LBS

## 2019-01-16 DIAGNOSIS — Z12.39 SCREENING FOR BREAST CANCER: ICD-10-CM

## 2019-01-16 PROCEDURE — 77063 BREAST TOMOSYNTHESIS BI: CPT

## 2019-01-16 PROCEDURE — 77067 SCR MAMMO BI INCL CAD: CPT

## 2019-02-12 ENCOUNTER — OFFICE VISIT (OUTPATIENT)
Dept: OBGYN CLINIC | Facility: HOSPITAL | Age: 69
End: 2019-02-12
Payer: MEDICARE

## 2019-02-12 ENCOUNTER — HOSPITAL ENCOUNTER (OUTPATIENT)
Dept: RADIOLOGY | Facility: HOSPITAL | Age: 69
Discharge: HOME | End: 2019-02-12
Attending: ORTHOPAEDIC SURGERY
Payer: MEDICARE

## 2019-02-12 VITALS
HEIGHT: 59 IN | HEART RATE: 73 BPM | SYSTOLIC BLOOD PRESSURE: 122 MMHG | DIASTOLIC BLOOD PRESSURE: 76 MMHG | BODY MASS INDEX: 40.32 KG/M2 | WEIGHT: 200 LBS

## 2019-02-12 DIAGNOSIS — M70.61 TROCHANTERIC BURSITIS OF RIGHT HIP: ICD-10-CM

## 2019-02-12 DIAGNOSIS — Z96.641 AFTERCARE FOLLOWING RIGHT HIP JOINT REPLACEMENT SURGERY: ICD-10-CM

## 2019-02-12 DIAGNOSIS — Z96.641 AFTERCARE FOLLOWING RIGHT HIP JOINT REPLACEMENT SURGERY: Primary | ICD-10-CM

## 2019-02-12 DIAGNOSIS — Z47.1 AFTERCARE FOLLOWING RIGHT HIP JOINT REPLACEMENT SURGERY: Primary | ICD-10-CM

## 2019-02-12 DIAGNOSIS — Z47.1 AFTERCARE FOLLOWING RIGHT HIP JOINT REPLACEMENT SURGERY: ICD-10-CM

## 2019-02-12 DIAGNOSIS — M70.62 TROCHANTERIC BURSITIS OF LEFT HIP: ICD-10-CM

## 2019-02-12 PROCEDURE — 20610 DRAIN/INJ JOINT/BURSA W/O US: CPT | Performed by: ORTHOPAEDIC SURGERY

## 2019-02-12 PROCEDURE — 73502 X-RAY EXAM HIP UNI 2-3 VIEWS: CPT

## 2019-02-12 PROCEDURE — 99213 OFFICE O/P EST LOW 20 MIN: CPT | Performed by: ORTHOPAEDIC SURGERY

## 2019-02-12 RX ORDER — BETAMETHASONE SODIUM PHOSPHATE AND BETAMETHASONE ACETATE 3; 3 MG/ML; MG/ML
12 INJECTION, SUSPENSION INTRA-ARTICULAR; INTRALESIONAL; INTRAMUSCULAR; SOFT TISSUE
Status: COMPLETED | OUTPATIENT
Start: 2019-02-12 | End: 2019-02-12

## 2019-02-12 RX ORDER — BUPIVACAINE HYDROCHLORIDE 2.5 MG/ML
2 INJECTION, SOLUTION INFILTRATION; PERINEURAL
Status: COMPLETED | OUTPATIENT
Start: 2019-02-12 | End: 2019-02-12

## 2019-02-12 RX ORDER — LIDOCAINE HYDROCHLORIDE 20 MG/ML
2 INJECTION, SOLUTION EPIDURAL; INFILTRATION; INTRACAUDAL; PERINEURAL
Status: COMPLETED | OUTPATIENT
Start: 2019-02-12 | End: 2019-02-12

## 2019-02-12 RX ADMIN — BUPIVACAINE HYDROCHLORIDE 2 ML: 2.5 INJECTION, SOLUTION INFILTRATION; PERINEURAL at 10:41

## 2019-02-12 RX ADMIN — LIDOCAINE HYDROCHLORIDE 2 ML: 20 INJECTION, SOLUTION EPIDURAL; INFILTRATION; INTRACAUDAL; PERINEURAL at 10:41

## 2019-02-12 RX ADMIN — BETAMETHASONE SODIUM PHOSPHATE AND BETAMETHASONE ACETATE 12 MG: 3; 3 INJECTION, SUSPENSION INTRA-ARTICULAR; INTRALESIONAL; INTRAMUSCULAR; SOFT TISSUE at 10:41

## 2019-02-12 NOTE — PROGRESS NOTES
Assessment:  1  Aftercare following right hip joint replacement surgery  XR hip/pelv 2-3 vws right if performed   2  Trochanteric bursitis of right hip  Large joint arthrocentesis: R greater trochanteric bursa   3  Trochanteric bursitis of left hip  Large joint arthrocentesis: L greater trochanteric bursa       Plan:  X-rays taken physical exam performed  She is doing very well 7 months status post right total hip arthroplasty nearly 11 months status post left total hip arthroplasty  She has persistent lateral based hip pain at both hips resulting in trochanteric bursitis  Patient was offered, accepted, performed trochanteric bursal injections today in the office  Ice and post injection protocol advised  Weightbearing activities as tolerated  Total hip precautions with antibiotic use prophylactically as discussed  To do next visit:  Return in about 3 months (around 5/12/2019) for re-check of her trochanteric bursitis without x-rays  The above stated was discussed in layman's terms and the patient expressed understanding  All questions were answered to the patient's satisfaction  Scribe Attestation    I,:   Melissa Perez am acting as a scribe while in the presence of the attending physician :        I,:   Sade Castañeda MD personally performed the services described in this documentation    as scribed in my presence :              Subjective:   Elena Mitchell is a 79 y o  female who presents repeat evaluation 7 months status post right total hip arthroplasty  She notes improvement of her groin and anterior hip pain as result of her surgery however has persistent lateral based hip pain it increases or is exacerbated with weight-bearing activities  She has pain lying on either hip at night for prolonged periods of time  She does note she has intermittent low back discomfort muscle spasms however though symptoms are so different than her lateral based hip pain    Her left hip is approximately 11 months status post total hip arthroplasty is doing well except for lateral based hip pain        Review of systems negative unless otherwise specified in HPI    Past Medical History:   Diagnosis Date    Arthritis     Depression     Endometriosis     GERD (gastroesophageal reflux disease)     Hyperlipidemia     Hypertension     Osteopenia     Pancreatitis 06/2017    Pneumonia        Past Surgical History:   Procedure Laterality Date    ABDOMINAL SURGERY      endometriosis     APPENDECTOMY      BREAST BIOPSY Right 1985    CARPAL TUNNEL RELEASE      COLONOSCOPY      HAND SURGERY      HIP SURGERY      HYSTERECTOMY Bilateral 1985    JOINT REPLACEMENT Bilateral     KNEE    JOINT REPLACEMENT Left     HIP    OOPHORECTOMY Bilateral 1985    [place] ARTHROSCOPY SHOULDER SURGICAL BICEPS TENODESIS Right 5/10/2017    Procedure: ARTHROSCOPIC BICEPS TENODESIS WITH ROTATOR CUFF REPAIR ;  Surgeon: Guy Kimball MD;  [facility];  Service: Orthopedics    [place] SHLDR [place] [facility] Right 5/10/2017    Procedure: SHOULDER ARTHROSCOPY SUBACROMIAL DECOMPRESSION;  Surgeon: Gyu Kimball MD;  [facility];  Service: Orthopedics    [place] TOTAL HIP ARTHROPLASTY Left 3/12/2018    Procedure: ARTHROPLASTY HIP TOTAL;  Surgeon: David Paris MD;  [facility];  Service: Orthopedics    [place] TOTAL HIP ARTHROPLASTY Right 7/30/2018    Procedure: RIGHT TOTAL HIP ARTHROPLASTY;  Surgeon: David Paris MD;  [facility];  Service: Orthopedics    TONSILLECTOMY         Family History   Problem Relation Age of Onset    Atrial fibrillation Mother     Breast cancer Mother 76    Stroke Mother     Coronary artery disease Mother     Diabetes Mother     Pancreatitis Mother     Heart attack Father     Arthritis Family     Cancer Family     Endometrial cancer Maternal Grandmother 76    Prostate cancer Paternal Uncle 54       Social History     Occupational History    Not on file   Tobacco Use    Smoking status: Never Smoker    Smokeless tobacco: Never Used   Substance and Sexual Activity    Alcohol use: Yes     Comment: 1-2x / year    Drug use: No    Sexual activity: Not on file         Current Outpatient Medications:     acetaminophen (TYLENOL) 500 mg tablet, Take 1,000 mg by mouth every 6 (six) hours as needed for mild pain, Disp: , Rfl:     atorvastatin (LIPITOR) 20 mg tablet, TAKE 1 TABLET BY MOUTH 6  DAYS A WEEK, Disp: 78 tablet, Rfl: 1    calcium-vitamin D (OSCAL) 250-125 MG-UNIT per tablet, Take 1 tablet by mouth daily, Disp: , Rfl:     Cholecalciferol (VITAMIN D) 2000 units tablet, Take 2,000 Units by mouth daily, Disp: , Rfl:     docusate sodium (COLACE) 100 mg capsule, Take 1 capsule (100 mg total) by mouth 2 (two) times a day, Disp: 10 capsule, Rfl: 0    FLUoxetine (PROzac) 20 mg capsule, TAKE 1 CAPSULE BY MOUTH  EVERY DAY, Disp: 90 capsule, Rfl: 1    hydrochlorothiazide (HYDRODIURIL) 25 mg tablet, TAKE 1 TABLET BY MOUTH  DAILY, Disp: 90 tablet, Rfl: 1    ibuprofen (MOTRIN) 200 mg tablet, Take 200 mg by mouth every 6 (six) hours as needed for mild pain, Disp: , Rfl:     methocarbamol (ROBAXIN) 750 mg tablet, Take 500 mg by mouth every 6 (six) hours as needed for muscle spasms, Disp: , Rfl:     metoprolol succinate (TOPROL-XL) 50 mg 24 hr tablet, TAKE 1 TABLET BY MOUTH  DAILY, Disp: 90 tablet, Rfl: 1    Multiple Vitamins-Minerals (CENTRUM SILVER ULTRA WOMENS PO), Take by mouth, Disp: , Rfl:     Omega-3 1000 MG CAPS, Take by mouth, Disp: , Rfl:     omeprazole (PriLOSEC) 20 mg delayed release capsule, TAKE 1 CAPSULE BY MOUTH  EVERY MORNING, Disp: 90 capsule, Rfl: 3    polyethylene glycol (GLYCOLAX) powder, Take 17 g by mouth daily, Disp: , Rfl:     traMADol (ULTRAM) 50 mg tablet, Take 1 tablet (50 mg total) by mouth every 6 (six) hours as needed for moderate pain for up to 60 doses, Disp: 40 tablet, Rfl: 0    Allergies   Allergen Reactions    Hydromorphone Hcl Itching    Percocet [Oxycodone-Acetaminophen] GI Intolerance    Sulfamethoxazole-Trimethoprim Rash            Vitals:    02/12/19 0952   BP: 122/76   Pulse: 73       Objective:                    Right Hip Exam     Tenderness   The patient is experiencing tenderness in the greater trochanter  Muscle Strength   The patient has normal right hip strength (Increased pain laterally resistance to abduction)  Other   Erythema: absent  Sensation: normal      Left Hip Exam     Tenderness   The patient is experiencing tenderness in the greater trochanter  Muscle Strength   The patient has normal left hip strength (Increased pain laterally resistance to hip abduction)  Other   Erythema: absent  Sensation: normal    Comments: Both hips show well healed posterior lateral incisions without evidence of infection  Painless PROM in all planes at both hips  Clinically both hips are located  Diagnostics, reviewed and taken today if performed as documented: The attending physician has personally reviewed the pertinent films in PACS and interpretation is as follows:    Right hip and pelvis x-rays taken reviewed the office today show:  Well placed, aligned and positioned total hip arthroplasties at both hips  He has no evidence of loosening or fractured either hip  Radiographically both hips are located          Procedures, if performed today:    Large joint arthrocentesis: L greater trochanteric bursa  Date/Time: 2/12/2019 10:41 AM  Consent given by: patient  Site marked: site marked  Supporting Documentation  Indications: pain   Procedure Details  Location: hip - L greater trochanteric bursa  Preparation: Patient was prepped and draped in the usual sterile fashion  Needle size: 22 G  Ultrasound guidance: no  Approach: lateral  Medications administered: 12 mg betamethasone acetate-betamethasone sodium phosphate 6 (3-3) mg/mL; 2 mL bupivacaine 0 25 %; 2 mL lidocaine (PF) 2 %    Patient tolerance: patient tolerated the procedure well with no immediate complications  Dressing:  Sterile dressing applied    Large joint arthrocentesis: R greater trochanteric bursa  Date/Time: 2/12/2019 10:41 AM  Consent given by: patient  Site marked: site marked  Supporting Documentation  Indications: pain   Procedure Details  Location: hip - R greater trochanteric bursa  Preparation: Patient was prepped and draped in the usual sterile fashion  Needle size: 22 G  Ultrasound guidance: no  Approach: lateral  Medications administered: 12 mg betamethasone acetate-betamethasone sodium phosphate 6 (3-3) mg/mL; 2 mL bupivacaine 0 25 %; 2 mL lidocaine (PF) 2 %    Patient tolerance: patient tolerated the procedure well with no immediate complications  Dressing:  Sterile dressing applied              Portions of the record may have been created with voice recognition software   Occasional wrong word or "sound a like" substitutions may have occurred due to the inherent limitations of voice recognition software   Read the chart carefully and recognize, using context, where substitutions have occurred

## 2019-04-18 ENCOUNTER — OFFICE VISIT (OUTPATIENT)
Dept: FAMILY MEDICINE CLINIC | Facility: CLINIC | Age: 69
End: 2019-04-18
Payer: MEDICARE

## 2019-04-18 VITALS
TEMPERATURE: 97.9 F | WEIGHT: 209.4 LBS | SYSTOLIC BLOOD PRESSURE: 120 MMHG | HEIGHT: 59 IN | BODY MASS INDEX: 42.21 KG/M2 | HEART RATE: 74 BPM | DIASTOLIC BLOOD PRESSURE: 82 MMHG

## 2019-04-18 DIAGNOSIS — Z12.11 COLON CANCER SCREENING: ICD-10-CM

## 2019-04-18 DIAGNOSIS — E78.00 HYPERCHOLESTEROLEMIA: ICD-10-CM

## 2019-04-18 DIAGNOSIS — S92.901S CLOSED FRACTURE OF RIGHT FOOT, SEQUELA: ICD-10-CM

## 2019-04-18 DIAGNOSIS — H02.403 PTOSIS, BILATERAL: ICD-10-CM

## 2019-04-18 DIAGNOSIS — I10 ESSENTIAL HYPERTENSION: ICD-10-CM

## 2019-04-18 DIAGNOSIS — E55.9 VITAMIN D DEFICIENCY: ICD-10-CM

## 2019-04-18 DIAGNOSIS — Z01.818 PREOPERATIVE CLEARANCE: Primary | ICD-10-CM

## 2019-04-18 DIAGNOSIS — R53.83 OTHER FATIGUE: ICD-10-CM

## 2019-04-18 PROCEDURE — 99215 OFFICE O/P EST HI 40 MIN: CPT | Performed by: FAMILY MEDICINE

## 2019-04-18 PROCEDURE — 93000 ELECTROCARDIOGRAM COMPLETE: CPT | Performed by: FAMILY MEDICINE

## 2019-06-18 ENCOUNTER — OFFICE VISIT (OUTPATIENT)
Dept: FAMILY MEDICINE CLINIC | Facility: CLINIC | Age: 69
End: 2019-06-18
Payer: MEDICARE

## 2019-06-18 ENCOUNTER — OFFICE VISIT (OUTPATIENT)
Dept: OBGYN CLINIC | Facility: HOSPITAL | Age: 69
End: 2019-06-18
Payer: MEDICARE

## 2019-06-18 VITALS
WEIGHT: 211 LBS | DIASTOLIC BLOOD PRESSURE: 78 MMHG | HEIGHT: 59 IN | SYSTOLIC BLOOD PRESSURE: 126 MMHG | BODY MASS INDEX: 42.54 KG/M2 | HEART RATE: 68 BPM

## 2019-06-18 VITALS
SYSTOLIC BLOOD PRESSURE: 128 MMHG | RESPIRATION RATE: 20 BRPM | HEART RATE: 80 BPM | BODY MASS INDEX: 42.62 KG/M2 | DIASTOLIC BLOOD PRESSURE: 84 MMHG | WEIGHT: 211.4 LBS | HEIGHT: 59 IN | TEMPERATURE: 98.2 F

## 2019-06-18 DIAGNOSIS — R63.5 WEIGHT GAIN: ICD-10-CM

## 2019-06-18 DIAGNOSIS — Z96.643 STATUS POST TOTAL REPLACEMENT OF BOTH HIPS: ICD-10-CM

## 2019-06-18 DIAGNOSIS — M54.16 LUMBAR RADICULOPATHY: Primary | ICD-10-CM

## 2019-06-18 DIAGNOSIS — M54.16 LUMBAR BACK PAIN WITH RADICULOPATHY AFFECTING LOWER EXTREMITY: Primary | ICD-10-CM

## 2019-06-18 PROCEDURE — 99213 OFFICE O/P EST LOW 20 MIN: CPT | Performed by: ORTHOPAEDIC SURGERY

## 2019-06-18 PROCEDURE — 99214 OFFICE O/P EST MOD 30 MIN: CPT | Performed by: FAMILY MEDICINE

## 2019-06-18 RX ORDER — ERYTHROMYCIN 5 MG/G
OINTMENT OPHTHALMIC
Refills: 3 | COMMUNITY
Start: 2019-05-15 | End: 2019-06-18 | Stop reason: ALTCHOICE

## 2019-06-18 RX ORDER — ACETAMINOPHEN AND CODEINE PHOSPHATE 300; 30 MG/1; MG/1
TABLET ORAL
Refills: 0 | COMMUNITY
Start: 2019-05-15 | End: 2019-06-18 | Stop reason: ALTCHOICE

## 2019-06-19 ENCOUNTER — TELEPHONE (OUTPATIENT)
Dept: FAMILY MEDICINE CLINIC | Facility: CLINIC | Age: 69
End: 2019-06-19

## 2019-06-20 ENCOUNTER — APPOINTMENT (OUTPATIENT)
Dept: LAB | Facility: CLINIC | Age: 69
End: 2019-06-20
Payer: MEDICARE

## 2019-06-20 DIAGNOSIS — R63.5 WEIGHT GAIN: ICD-10-CM

## 2019-06-20 LAB — TSH SERPL DL<=0.05 MIU/L-ACNC: 2.13 UIU/ML (ref 0.36–3.74)

## 2019-06-20 PROCEDURE — 86800 THYROGLOBULIN ANTIBODY: CPT

## 2019-06-20 PROCEDURE — 84443 ASSAY THYROID STIM HORMONE: CPT

## 2019-06-20 PROCEDURE — 36415 COLL VENOUS BLD VENIPUNCTURE: CPT

## 2019-06-21 ENCOUNTER — HOSPITAL ENCOUNTER (OUTPATIENT)
Dept: RADIOLOGY | Facility: MEDICAL CENTER | Age: 69
Discharge: HOME | End: 2019-06-21
Payer: MEDICARE

## 2019-06-21 DIAGNOSIS — E06.0 ACUTE THYROIDITIS: Primary | ICD-10-CM

## 2019-06-21 DIAGNOSIS — M54.16 LUMBAR RADICULOPATHY: ICD-10-CM

## 2019-06-21 LAB — THYROGLOB AB SERPL-ACNC: 8.5 IU/ML (ref 0–0.9)

## 2019-06-21 PROCEDURE — 72131 CT LUMBAR SPINE W/O DYE: CPT

## 2019-06-21 RX ORDER — PREDNISONE 10 MG/1
TABLET ORAL
Qty: 26 TABLET | Refills: 0 | Status: SHIPPED | OUTPATIENT
Start: 2019-06-21 | End: 2019-07-15

## 2019-06-24 DIAGNOSIS — E78.00 PURE HYPERCHOLESTEROLEMIA: ICD-10-CM

## 2019-06-24 DIAGNOSIS — F39 AFFECTIVE DISORDER (HCC): ICD-10-CM

## 2019-06-24 DIAGNOSIS — I10 ESSENTIAL HYPERTENSION: ICD-10-CM

## 2019-06-24 RX ORDER — METOPROLOL SUCCINATE 50 MG/1
TABLET, EXTENDED RELEASE ORAL
Qty: 90 TABLET | Refills: 1 | OUTPATIENT
Start: 2019-06-24

## 2019-06-24 RX ORDER — ATORVASTATIN CALCIUM 20 MG/1
TABLET, FILM COATED ORAL
Qty: 78 TABLET | Refills: 1 | OUTPATIENT
Start: 2019-06-24

## 2019-06-24 RX ORDER — HYDROCHLOROTHIAZIDE 25 MG/1
TABLET ORAL
Qty: 90 TABLET | Refills: 1 | OUTPATIENT
Start: 2019-06-24

## 2019-06-24 RX ORDER — FLUOXETINE HYDROCHLORIDE 20 MG/1
CAPSULE ORAL
Qty: 90 CAPSULE | Refills: 1 | OUTPATIENT
Start: 2019-06-24

## 2019-06-27 ENCOUNTER — TELEPHONE (OUTPATIENT)
Dept: FAMILY MEDICINE CLINIC | Facility: CLINIC | Age: 69
End: 2019-06-27

## 2019-07-15 ENCOUNTER — CONSULT (OUTPATIENT)
Dept: PAIN MEDICINE | Facility: CLINIC | Age: 69
End: 2019-07-15
Payer: MEDICARE

## 2019-07-15 VITALS
BODY MASS INDEX: 42.62 KG/M2 | WEIGHT: 211 LBS | DIASTOLIC BLOOD PRESSURE: 78 MMHG | TEMPERATURE: 97.9 F | HEART RATE: 84 BPM | SYSTOLIC BLOOD PRESSURE: 140 MMHG

## 2019-07-15 DIAGNOSIS — M54.16 LUMBAR BACK PAIN WITH RADICULOPATHY AFFECTING LOWER EXTREMITY: ICD-10-CM

## 2019-07-15 DIAGNOSIS — M51.16 INTERVERTEBRAL DISC DISORDER WITH RADICULOPATHY OF LUMBAR REGION: Primary | ICD-10-CM

## 2019-07-15 DIAGNOSIS — M47.816 LUMBAR SPONDYLOSIS: ICD-10-CM

## 2019-07-15 PROCEDURE — 99204 OFFICE O/P NEW MOD 45 MIN: CPT | Performed by: ANESTHESIOLOGY

## 2019-07-15 RX ORDER — GABAPENTIN 300 MG/1
300 CAPSULE ORAL
Qty: 30 CAPSULE | Refills: 1 | Status: SHIPPED | OUTPATIENT
Start: 2019-07-15 | End: 2019-08-23 | Stop reason: SDUPTHER

## 2019-07-15 NOTE — PROGRESS NOTES
Assessment  1  Intervertebral disc disorder with radiculopathy of lumbar region    2  Lumbar back pain with radiculopathy affecting lower extremity    3  Lumbar spondylosis        Plan  The patient's symptoms, history/physical are consistent with pain that is multifactorial in origin but predominantly the result of her underlying lumbar degenerative disc disease, disc bulging and spondylosis that is worst at L4-5 leading to bilateral radicular symptoms  At this time, I discussed performing bilateral L4 transforaminal epidural steroid injection to help reduce swelling and inflammation which is leading to her pain symptoms  She was apprised of the most common risks and would like to proceed  She will be scheduled for an upcoming Tuesday or Thursday under fluoroscopic guidance  In addition, I will start her on gabapentin 300 mg at bedtime to help with radicular symptoms  She was apprised most common side effects including sleepiness and dizziness  After she is feeling better, I will have her start a physical therapy program focusing on back and core strengthening  Complete risks and benefits including bleeding, infection, tissue reaction, nerve injury and allergic reaction were discussed  The approach was demonstrated using models and literature was provided  Verbal and written consent was obtained  My impressions and treatment recommendations were discussed in detail with the patient who verbalized understanding and had no further questions  Discharge instructions were provided  I personally saw and examined the patient and I agree with the above discussed plan of care  Orders Placed This Encounter   Procedures    FL spine and pain procedure     Standing Status:   Future     Standing Expiration Date:   7/15/2023     Order Specific Question:   Reason for Exam:     Answer:   Bilateral L4 TF JOSE     Order Specific Question:   Anticoagulant hold needed?      Answer:   No     New Medications Ordered This Visit   Medications    gabapentin (NEURONTIN) 300 mg capsule     Sig: Take 1 capsule (300 mg total) by mouth daily at bedtime for 30 days     Dispense:  30 capsule     Refill:  1       History of Present Illness    Debbie Wayne is a 81 y o  female who presents for consultation in regards to lower back and bilateral thigh pain  Symptoms have been present for 1 year without any precipitating injury or trauma  Pain is moderate to severe rated 1/10 on numeric rating scale and felt nearly constant  Symptoms described to be burning, throbbing, sharp with numbness that radiates into the thighs bilaterally  Symptoms are aggravated with standing, bending, walking, exercise and decreased with lying down and sitting  There is no change with coughing, sneezing or bowel movements  Treatment history has included exercise on her own which provided no relief  She takes Tylenol or ibuprofen as needed which provides minimal relief  I have personally reviewed and/or updated the patient's past medical history, past surgical history, family history, social history, current medications, allergies, and vital signs today  Review of Systems   Constitutional: Negative for fever and unexpected weight change  HENT: Negative for trouble swallowing  Eyes: Negative for visual disturbance  Respiratory: Negative for shortness of breath and wheezing  Cardiovascular: Negative for chest pain and palpitations  Gastrointestinal: Negative for constipation, diarrhea, nausea and vomiting  Endocrine: Negative for cold intolerance, heat intolerance and polydipsia  Genitourinary: Negative for difficulty urinating and frequency  Musculoskeletal: Negative for arthralgias, gait problem, joint swelling and myalgias  Skin: Negative for rash  Neurological: Negative for dizziness, seizures, syncope, weakness and headaches  Hematological: Does not bruise/bleed easily     Psychiatric/Behavioral: Negative for dysphoric mood    All other systems reviewed and are negative        Patient Active Problem List   Diagnosis    Incomplete tear of right rotator cuff    Hypertension    Hyperlipidemia    Depression    Abdominal pain    Primary osteoarthritis of left hip    Pre-op examination    Status post total replacement of both hips    Aftercare following right hip joint replacement surgery    Primary osteoarthritis of one hip, right    Renal insufficiency    S/P hip replacement, right    Transition of care performed with sharing of clinical summary    Right foot pain    Nondisplaced fracture of fifth metatarsal bone, right foot, initial encounter for closed fracture    Pain, joint, ankle and foot, right    Right hip pain    Trochanteric bursitis of left hip    Trochanteric bursitis of right hip    Lumbar back pain with radiculopathy affecting lower extremity       Past Medical History:   Diagnosis Date    Arthritis     Depression     Endometriosis     GERD (gastroesophageal reflux disease)     Hyperlipidemia     Hypertension     Osteopenia     Pancreatitis 06/2017    Pneumonia        Past Surgical History:   Procedure Laterality Date    ABDOMINAL SURGERY      endometriosis     APPENDECTOMY      BREAST BIOPSY Right 1985    CARPAL TUNNEL RELEASE      COLONOSCOPY      HAND SURGERY      HIP SURGERY      HYSTERECTOMY Bilateral 1985    JOINT REPLACEMENT Bilateral     KNEE    JOINT REPLACEMENT Left     HIP    OOPHORECTOMY Bilateral 1985    [place] ARTHROSCOPY SHOULDER SURGICAL BICEPS TENODESIS Right 5/10/2017    Procedure: ARTHROSCOPIC BICEPS TENODESIS WITH ROTATOR CUFF REPAIR ;  Surgeon: Emily Negro MD;  [facility];  Service: Orthopedics    MA Omar Feast Right 5/10/2017    Procedure: SHOULDER ARTHROSCOPY SUBACROMIAL DECOMPRESSION;  Surgeon: Emily Negro MD;  [facility];  Service: Orthopedics    [place] TOTAL HIP ARTHROPLASTY Left 3/12/2018    Procedure: ARTHROPLASTY HIP TOTAL;  Surgeon: Cleo Tran MD;  [facility];  Service: Orthopedics    [place] TOTAL HIP ARTHROPLASTY Right 7/30/2018    Procedure: RIGHT TOTAL HIP ARTHROPLASTY;  Surgeon: Cleo Tran MD;  [facility];  Service: Orthopedics    TONSILLECTOMY         Family History   Problem Relation Age of Onset    Atrial fibrillation Mother     Breast cancer Mother 76    Stroke Mother     Coronary artery disease Mother     Diabetes Mother     Pancreatitis Mother     Heart attack Father     Arthritis Family     Cancer Family     Endometrial cancer Maternal Grandmother 76    Prostate cancer Paternal Uncle 54       Social History     Occupational History    Not on file   Tobacco Use    Smoking status: Never Smoker    Smokeless tobacco: Never Used   Substance and Sexual Activity    Alcohol use: Yes     Comment: 1-2x / year    Drug use: No    Sexual activity: Not on file       Current Outpatient Medications on File Prior to Visit   Medication Sig    atorvastatin (LIPITOR) 20 mg tablet TAKE 1 TABLET BY MOUTH 6  DAYS A WEEK    calcium-vitamin D (OSCAL) 250-125 MG-UNIT per tablet Take 1 tablet by mouth daily    Cholecalciferol (VITAMIN D) 2000 units tablet Take 2,000 Units by mouth daily    FLUoxetine (PROzac) 20 mg capsule TAKE 1 CAPSULE BY MOUTH  EVERY DAY    hydrochlorothiazide (HYDRODIURIL) 25 mg tablet TAKE 1 TABLET BY MOUTH  DAILY    metoprolol succinate (TOPROL-XL) 50 mg 24 hr tablet TAKE 1 TABLET BY MOUTH  DAILY    Multiple Vitamins-Minerals (CENTRUM SILVER ULTRA WOMENS PO) Take by mouth    Omega-3 1000 MG CAPS Take by mouth    omeprazole (PriLOSEC) 20 mg delayed release capsule TAKE 1 CAPSULE BY MOUTH  EVERY MORNING    [DISCONTINUED] acetaminophen (TYLENOL) 500 mg tablet Take 1,000 mg by mouth every 6 (six) hours as needed for mild pain    [DISCONTINUED] predniSONE 10 mg tablet 3 tabs po bid x2 days, then 2 tabs po bid x2 days, then 1 tab bid x2 days, then 1 daily until done  No current facility-administered medications on file prior to visit  Allergies   Allergen Reactions    Hydromorphone Hcl Itching    Percocet [Oxycodone-Acetaminophen] GI Intolerance    Sulfamethoxazole-Trimethoprim Rash       Physical Exam    /78   Pulse 84   Temp 97 9 °F (36 6 °C) (Oral)   Wt 95 7 kg (211 lb)   LMP  (LMP Unknown) Comment: hysterectomy  BMI 42 62 kg/m²     Constitutional: normal, well developed, well nourished, alert, in no distress and non-toxic and no overt pain behavior  and obese  Eyes: anicteric  HEENT: grossly intact  Neck: supple, symmetric, trachea midline and no masses   Pulmonary:even and unlabored  Cardiovascular:No edema or pitting edema present  Skin:Normal without rashes or lesions and well hydrated  Psychiatric:Mood and affect appropriate  Neurologic:Cranial Nerves II-XII grossly intact  Musculoskeletal:normal     Lumbar Spine Exam  Appearance:  Normal lordosis  Palpation/Tenderness:  left lumbar paraspinal tenderness  right lumbar paraspinal tenderness  Sensory:  no sensory deficits noted  Range of Motion:  Flexion:   Moderately limited  with pain  Extension:  No limitation  without pain  Lateral Flexion - Left:  Minimally limited  with pain  Lateral Flexion - Right:  Minimally limited  with pain  Rotation - Left:  No limitation  without pain  Rotation - Right:  No limitation  without pain  Motor Strength:  Left hip flexion:  5/5  Left hip extension:  5/5  Right hip flexion:  5/5  Right hip extension:  5/5  Left knee flexion:  5/5  Left knee extension:  5/5  Right knee flexion:  5/5  Right knee extension:  5/5  Left foot dorsiflexion:  5/5  Left foot plantar flexion:  5/5  Right foot dorsiflexion:  5/5  Right foot plantar flexion:  5/5  Reflexes:  Left Patellar:  1+   Right Patellar:  1+   Left Achilles:  1+   Right Achilles:  1+   Special Tests:  Left Straight Leg Test:  negative  Right Straight Leg Test:  negative  Left Dread's Maneuver: negative  Right Dread's Maneuver:  negative      Imaging        CT LUMBAR SPINE (6/21/2019)     INDICATION:   M54 16: Radiculopathy, lumbar region      COMPARISON: None      TECHNIQUE:  Contiguous axial images through the lumbar spine were obtained  Sagittal and coronal reconstructions were performed        Radiation dose length product (DLP) for this visit:  146 mGy-cm   This examination, like all CT scans performed in the [facility], was performed utilizing techniques to minimize radiation dose exposure, including the use of iterative   reconstruction and automated exposure control        IMAGE QUALITY:  Diagnostic      FINDINGS:     ALIGNMENT:  There is mild levoscoliosis of the lumbar spine  There is trace anterolisthesis of L4-L5      VERTEBRAL BODIES:  The bones are osteopenic  No acute compression fractures are identified      DEGENERATIVE CHANGES:     Lower Thoracic spine:  Normal lower thoracic disc spaces  ]     L1-2:  There is disc space narrowing  There is vacuum disc phenomenon  There is a bulging annulus  There is no significant bony canal stenosis  There is mild foraminal encroachment      L2-3:  There is disc space narrowing  There is a bulging annulus  There is facet arthrosis  There is mild canal stenosis  There is mild foraminal narrowing      L3-4:  There is a bulging annulus  There is facet arthrosis  There is no significant canal stenosis  There is no significant foraminal narrowing      L4-5:  There is disc space narrowing  There is vacuum disc phenomenon  There is a calcified bulging annulus  There is right-sided endplate spurring  There is facet arthrosis  There is mild canal stenosis  There is mild left foraminal narrowing  There is   moderate to severe right foraminal narrowing      L5-S1:  There is disc space narrowing  There is vacuum disc phenomenon  There is a bulging annulus  There is facet arthrosis  There is no significant canal stenosis   There is left-sided endplate spurring  There is mild left foraminal narrowing      PARASPINAL SOFT TISSUES:  There is atherosclerotic calcification      IMPRESSION:     Multilevel degenerative changes of the lumbar spine, as described above      Most notable level is at L4-L5 where multifactorial disease results in overall mild canal stenosis  Moderate to severe right foraminal narrowing is present at this level

## 2019-07-30 ENCOUNTER — HOSPITAL ENCOUNTER (OUTPATIENT)
Dept: RADIOLOGY | Facility: CLINIC | Age: 69
Discharge: HOME | End: 2019-07-30
Attending: ANESTHESIOLOGY | Admitting: ANESTHESIOLOGY
Payer: MEDICARE

## 2019-07-30 VITALS
SYSTOLIC BLOOD PRESSURE: 111 MMHG | TEMPERATURE: 98.1 F | DIASTOLIC BLOOD PRESSURE: 72 MMHG | OXYGEN SATURATION: 97 % | HEART RATE: 63 BPM | RESPIRATION RATE: 20 BRPM

## 2019-07-30 DIAGNOSIS — M51.16 INTERVERTEBRAL DISC DISORDER WITH RADICULOPATHY OF LUMBAR REGION: ICD-10-CM

## 2019-07-30 PROCEDURE — 64483 NJX AA&/STRD TFRM EPI L/S 1: CPT | Performed by: ANESTHESIOLOGY

## 2019-07-30 RX ORDER — 0.9 % SODIUM CHLORIDE 0.9 %
10 VIAL (ML) INJECTION ONCE
Status: COMPLETED | OUTPATIENT
Start: 2019-07-30 | End: 2019-07-30

## 2019-07-30 RX ORDER — METHOCARBAMOL 750 MG/1
750 TABLET, FILM COATED ORAL DAILY PRN
COMMUNITY
End: 2019-08-23 | Stop reason: SDUPTHER

## 2019-07-30 RX ORDER — METHYLPREDNISOLONE ACETATE 80 MG/ML
80 INJECTION, SUSPENSION INTRA-ARTICULAR; INTRALESIONAL; INTRAMUSCULAR; PARENTERAL; SOFT TISSUE ONCE
Status: COMPLETED | OUTPATIENT
Start: 2019-07-30 | End: 2019-07-30

## 2019-07-30 RX ORDER — BUPIVACAINE HCL/PF 2.5 MG/ML
10 VIAL (ML) INJECTION ONCE
Status: COMPLETED | OUTPATIENT
Start: 2019-07-30 | End: 2019-07-30

## 2019-07-30 RX ADMIN — Medication 5 ML: at 14:09

## 2019-07-30 RX ADMIN — BUPIVACAINE HYDROCHLORIDE 2 ML: 2.5 INJECTION, SOLUTION EPIDURAL; INFILTRATION; INTRACAUDAL at 14:12

## 2019-07-30 RX ADMIN — IOHEXOL 1 ML: 300 INJECTION, SOLUTION INTRAVENOUS at 14:12

## 2019-07-30 RX ADMIN — METHYLPREDNISOLONE ACETATE 80 MG: 80 INJECTION, SUSPENSION INTRA-ARTICULAR; INTRALESIONAL; INTRAMUSCULAR; PARENTERAL; SOFT TISSUE at 14:12

## 2019-07-30 RX ADMIN — SODIUM CHLORIDE 5 ML: 9 INJECTION, SOLUTION INTRAMUSCULAR; INTRAVENOUS; SUBCUTANEOUS at 14:09

## 2019-07-30 NOTE — H&P
History of Present Illness: The patient is a 81 y o  female who presents with complaints of lower back and leg pain secondary to lumbar degenerative disc disease and is here today for bilateral L4 transforaminal epidural steroid injection      Patient Active Problem List   Diagnosis    Incomplete tear of right rotator cuff    Hypertension    Hyperlipidemia    Depression    Abdominal pain    Primary osteoarthritis of left hip    Pre-op examination    Status post total replacement of both hips    Aftercare following right hip joint replacement surgery    Primary osteoarthritis of one hip, right    Renal insufficiency    S/P hip replacement, right    Transition of care performed with sharing of clinical summary    Right foot pain    Nondisplaced fracture of fifth metatarsal bone, right foot, initial encounter for closed fracture    Pain, joint, ankle and foot, right    Right hip pain    Trochanteric bursitis of left hip    Trochanteric bursitis of right hip    Lumbar back pain with radiculopathy affecting lower extremity       Past Medical History:   Diagnosis Date    Arthritis     Depression     Endometriosis     GERD (gastroesophageal reflux disease)     Hyperlipidemia     Hypertension     Osteopenia     Pancreatitis 06/2017    Pneumonia        Past Surgical History:   Procedure Laterality Date    ABDOMINAL SURGERY      endometriosis     APPENDECTOMY      BREAST BIOPSY Right 1985    CARPAL TUNNEL RELEASE      COLONOSCOPY      HAND SURGERY      HIP SURGERY      HYSTERECTOMY Bilateral 1985    JOINT REPLACEMENT Bilateral     KNEE    JOINT REPLACEMENT Left     HIP    OOPHORECTOMY Bilateral 1985    [place] ARTHROSCOPY SHOULDER SURGICAL BICEPS TENODESIS Right 5/10/2017    Procedure: ARTHROSCOPIC BICEPS TENODESIS WITH ROTATOR CUFF REPAIR ;  Surgeon: Page Costello MD;  [facility];  Service: Orthopedics    [place] [address] Right 5/10/2017    Procedure: SHOULDER ARTHROSCOPY SUBACROMIAL DECOMPRESSION;  Surgeon: Celina Phan MD;  [facility];  Service: Orthopedics    [place] TOTAL HIP ARTHROPLASTY Left 3/12/2018    Procedure: ARTHROPLASTY HIP TOTAL;  Surgeon: Sonja Newsome MD;  [facility];  Service: Orthopedics    [place] TOTAL HIP ARTHROPLASTY Right 7/30/2018    Procedure: RIGHT TOTAL HIP ARTHROPLASTY;  Surgeon: Sonja Newsome MD;  [facility];  Service: Orthopedics    TONSILLECTOMY           Current Outpatient Medications:     gabapentin (NEURONTIN) 300 mg capsule, Take 1 capsule (300 mg total) by mouth daily at bedtime for 30 days (Patient taking differently: Take 300 mg by mouth daily after breakfast ), Disp: 30 capsule, Rfl: 1    methocarbamol (ROBAXIN) 750 mg tablet, Take 750 mg by mouth daily as needed for muscle spasms, Disp: , Rfl:     Omega-3 1000 MG CAPS, Take by mouth daily , Disp: , Rfl:     atorvastatin (LIPITOR) 20 mg tablet, TAKE 1 TABLET BY MOUTH 6  DAYS A WEEK, Disp: 78 tablet, Rfl: 1    calcium-vitamin D (OSCAL) 250-125 MG-UNIT per tablet, Take 1 tablet by mouth daily, Disp: , Rfl:     Cholecalciferol (VITAMIN D) 2000 units tablet, Take 2,000 Units by mouth daily, Disp: , Rfl:     FLUoxetine (PROzac) 20 mg capsule, TAKE 1 CAPSULE BY MOUTH  EVERY DAY, Disp: 90 capsule, Rfl: 1    hydrochlorothiazide (HYDRODIURIL) 25 mg tablet, TAKE 1 TABLET BY MOUTH  DAILY, Disp: 90 tablet, Rfl: 1    metoprolol succinate (TOPROL-XL) 50 mg 24 hr tablet, TAKE 1 TABLET BY MOUTH  DAILY, Disp: 90 tablet, Rfl: 1    Multiple Vitamins-Minerals (CENTRUM SILVER ULTRA WOMENS PO), Take by mouth, Disp: , Rfl:     omeprazole (PriLOSEC) 20 mg delayed release capsule, TAKE 1 CAPSULE BY MOUTH  EVERY MORNING, Disp: 90 capsule, Rfl: 3    Current Facility-Administered Medications:     bupivacaine (PF) (MARCAINE) 0 25 % injection 10 mL, 10 mL, Epidural, Once, Audrey Little MD    iohexol (OMNIPAQUE) 300 mg/mL injection 50 mL, 50 mL, Epidural, Once, Brenda Ramirez MD    lidocaine (PF) (XYLOCAINE-MPF) 2 % injection 5 mL, 5 mL, Infiltration, Once, Brenda Ramirez MD    methylPREDNISolone acetate (DEPO-MEDROL) injection 80 mg, 80 mg, Epidural, Once, Brenda Ramirez MD    sodium chloride (PF) 0 9 % injection 10 mL, 10 mL, Infiltration, Once, Brenda Ramirez MD    Allergies   Allergen Reactions    Hydromorphone Hcl Itching    Percocet [Oxycodone-Acetaminophen] GI Intolerance    Sulfamethoxazole-Trimethoprim Rash       Physical Exam:   Vitals:    07/30/19 1355   BP: 143/63   Pulse: 67   Resp: 20   Temp: 98 1 °F (36 7 °C)   SpO2: 97%     General: Awake, Alert, Oriented x 3, Mood and affect appropriate  Respiratory: Respirations even and unlabored  Cardiovascular: Peripheral pulses intact; no edema  Musculoskeletal Exam:   Lower back tenderness    ASA Score: 2    Patient/Chart Verification  Patient ID Verified: Verbal  Consents Confirmed: To be obtained in the Pre-Procedure area  H&P( within 30 days) Verified: To be obtained in the Pre-Procedure area  Allergies Reviewed: Yes  Anticoag/NSAID held?: NA  Currently on antibiotics?: No    Assessment:   1   Intervertebral disc disorder with radiculopathy of lumbar region        Plan: Bilateral L4 TF JOSE

## 2019-07-30 NOTE — DISCHARGE INSTR - LAB
Epidural Steroid Injection   WHAT YOU NEED TO KNOW:   An epidural steroid injection (JOSE) is a procedure to inject steroid medicine into the epidural space  The epidural space is between your spinal cord and vertebrae  Steroids reduce inflammation and fluid buildup in your spine that may be causing pain  You may be given pain medicine along with the steroids  ACTIVITY  · Do not drive or operate machinery today  · No strenuous activity today - bending, lifting, etc   · You may resume normal activites starting tomorrow - start slowly and as tolerated  · You may shower today, but no tub baths or hot tubs  · You may have numbness for several hours from the local anesthetic  Please use caution and common sense, especially with weight-bearing activities  CARE OF THE INJECTION SITE  · If you have soreness or pain, apply ice to the area today (20 minutes on/20 minutes off)  · Starting tomorrow, you may use warm, moist heat or ice if needed  · You may have an increase or change in your discomfort for 36-48 hours after your treatment  · Apply ice and continue with any pain medication you have been prescribed  · Notify [facility] if you have any of the following: redness, drainage, swelling, headache, stiff neck or fever above 100°F     SPECIAL INSTRUCTIONS  · Our office will contact you in approximately 7 days for a progress report  MEDICATIONS  · Continue to take all routine medications  · Our office may have instructed you to hold some medications  If you have a problem specifically related to your procedure, please call our office at [phone]  Problems not related to your procedure should be directed to your primary care physician

## 2019-08-06 ENCOUNTER — TELEPHONE (OUTPATIENT)
Dept: PAIN MEDICINE | Facility: CLINIC | Age: 69
End: 2019-08-06

## 2019-08-06 DIAGNOSIS — M51.16 INTERVERTEBRAL DISC DISORDER WITH RADICULOPATHY OF LUMBAR REGION: ICD-10-CM

## 2019-08-06 NOTE — TELEPHONE ENCOUNTER
Pt reports 90% improvement post inj   Pain level 1/10    Pt wants to know since physical therapy was mentioned at her first visit if she can have an order put in  ?

## 2019-08-08 DIAGNOSIS — M51.16 INTERVERTEBRAL DISC DISORDER WITH RADICULOPATHY OF LUMBAR REGION: Primary | ICD-10-CM

## 2019-08-08 NOTE — TELEPHONE ENCOUNTER
Glad she is feeling better   Steroid can take 2 weeks to reach full effects, so may get more relief over the next week    Order for PT placed in [facility]

## 2019-08-12 ENCOUNTER — TELEPHONE (OUTPATIENT)
Dept: FAMILY MEDICINE CLINIC | Facility: CLINIC | Age: 69
End: 2019-08-12

## 2019-08-12 ENCOUNTER — APPOINTMENT (OUTPATIENT)
Dept: LAB | Facility: CLINIC | Age: 69
End: 2019-08-12
Payer: MEDICARE

## 2019-08-12 DIAGNOSIS — R53.83 OTHER FATIGUE: ICD-10-CM

## 2019-08-12 DIAGNOSIS — R53.83 OTHER FATIGUE: Primary | ICD-10-CM

## 2019-08-12 LAB — T3 SERPL-MCNC: 1 NG/ML (ref 0.6–1.8)

## 2019-08-12 PROCEDURE — 36415 COLL VENOUS BLD VENIPUNCTURE: CPT

## 2019-08-12 PROCEDURE — 84480 ASSAY TRIIODOTHYRONINE (T3): CPT

## 2019-08-20 DIAGNOSIS — E78.00 PURE HYPERCHOLESTEROLEMIA: ICD-10-CM

## 2019-08-20 DIAGNOSIS — F39 AFFECTIVE DISORDER (HCC): ICD-10-CM

## 2019-08-20 DIAGNOSIS — I10 ESSENTIAL HYPERTENSION: ICD-10-CM

## 2019-08-20 RX ORDER — ATORVASTATIN CALCIUM 20 MG/1
20 TABLET, FILM COATED ORAL DAILY
Qty: 90 TABLET | Refills: 3 | Status: SHIPPED | OUTPATIENT
Start: 2019-08-20 | End: 2020-08-24

## 2019-08-20 RX ORDER — METOPROLOL SUCCINATE 50 MG/1
50 TABLET, EXTENDED RELEASE ORAL DAILY
Qty: 90 TABLET | Refills: 0 | Status: SHIPPED | OUTPATIENT
Start: 2019-08-20 | End: 2019-10-08 | Stop reason: SDUPTHER

## 2019-08-20 RX ORDER — FLUOXETINE HYDROCHLORIDE 20 MG/1
20 CAPSULE ORAL DAILY
Qty: 90 CAPSULE | Refills: 0 | Status: SHIPPED | OUTPATIENT
Start: 2019-08-20 | End: 2019-10-08 | Stop reason: SDUPTHER

## 2019-08-20 RX ORDER — OMEPRAZOLE 20 MG/1
20 CAPSULE, DELAYED RELEASE ORAL EVERY MORNING
Qty: 90 CAPSULE | Refills: 0 | Status: SHIPPED | OUTPATIENT
Start: 2019-08-20 | End: 2019-08-22 | Stop reason: SDUPTHER

## 2019-08-20 RX ORDER — HYDROCHLOROTHIAZIDE 25 MG/1
25 TABLET ORAL DAILY
Qty: 90 TABLET | Refills: 0 | Status: SHIPPED | OUTPATIENT
Start: 2019-08-20 | End: 2019-08-22 | Stop reason: SDUPTHER

## 2019-08-22 DIAGNOSIS — I10 ESSENTIAL HYPERTENSION: ICD-10-CM

## 2019-08-22 DIAGNOSIS — F39 AFFECTIVE DISORDER (HCC): ICD-10-CM

## 2019-08-23 RX ORDER — OMEPRAZOLE 20 MG/1
20 CAPSULE, DELAYED RELEASE ORAL EVERY MORNING
Qty: 90 CAPSULE | Refills: 0 | Status: SHIPPED | OUTPATIENT
Start: 2019-08-23 | End: 2020-02-03

## 2019-08-23 RX ORDER — METHOCARBAMOL 750 MG/1
750 TABLET, FILM COATED ORAL 2 TIMES DAILY PRN
Qty: 60 TABLET | Refills: 2 | Status: SHIPPED | OUTPATIENT
Start: 2019-08-23 | End: 2019-11-21 | Stop reason: SDUPTHER

## 2019-08-23 RX ORDER — HYDROCHLOROTHIAZIDE 25 MG/1
25 TABLET ORAL DAILY
Qty: 90 TABLET | Refills: 0 | Status: SHIPPED | OUTPATIENT
Start: 2019-08-23 | End: 2020-02-04

## 2019-08-23 RX ORDER — GABAPENTIN 300 MG/1
300 CAPSULE ORAL
Qty: 30 CAPSULE | Refills: 2 | Status: SHIPPED | OUTPATIENT
Start: 2019-08-23 | End: 2019-09-17 | Stop reason: SDUPTHER

## 2019-08-23 NOTE — TELEPHONE ENCOUNTER
Pt informed of the same as stated in previous task  Pt aware both scripts were sent to her pharmacy with refills  Pt will c/b in 2 wks with update for FQ and then he will decide what the next step will be  No

## 2019-08-23 NOTE — TELEPHONE ENCOUNTER
Pt reports her muscle spasms got better after the 7/30 inj, it's only been recently that the muscle spasms of the left LB/buttocks returned and they are getting worse  She said the pain and numbness she had before the inj is gone  She takes gabapentin 300 mg Q AM and then robaxin once a day around 2-3 pm  The robaxin was prescribed 8/2018 by Dr Sari Rubio after her hip replacement, it was prescribed Q 6 hrs, she has 8 left  Pt denied any drowsiness with the robaxin but she's never taken it ore than once a day  I told pt she could try taking more often but to be aware it can cause drowsiness and dizziness  Pt said she took flexeril in the past too, not sure if one any better than the other  Pt said if she is to stay on the same dose of gabapentin she will need RF, she has 2 wks left  If FQ wants her to continue to use the Robaxin and take more than once a day she only has 8 left and would need RF  Told pt I will make FQ aware of worsening spasms and ask if ov needed, a repeat inj or change in medications? Pt uses jeffy on file  Pls advise

## 2019-08-23 NOTE — TELEPHONE ENCOUNTER
Patient called back stating she is experiencing muscle spasms on her back left side which seem to be getting worse  Patient is currently taking Neurontin & Robaxin but doesn't help with her muscle spasm  Patient would like to know should she schedule a f/u appointment or requires another injection   Please adviseluther    Call back# [phone]

## 2019-08-23 NOTE — TELEPHONE ENCOUNTER
I will have her increase the methocarbamol 750 mg twice daily and have her continue the gabapentin 300 mg once daily  She should call with update in 2 weeks and at that time we will decide next treatment plan

## 2019-08-29 ENCOUNTER — OFFICE VISIT (OUTPATIENT)
Dept: FAMILY MEDICINE CLINIC | Facility: CLINIC | Age: 69
End: 2019-08-29
Payer: MEDICARE

## 2019-08-29 VITALS
DIASTOLIC BLOOD PRESSURE: 80 MMHG | BODY MASS INDEX: 41.73 KG/M2 | TEMPERATURE: 98.4 F | WEIGHT: 207 LBS | SYSTOLIC BLOOD PRESSURE: 138 MMHG | HEIGHT: 59 IN | HEART RATE: 70 BPM

## 2019-08-29 DIAGNOSIS — M54.50 LUMBAR BACK PAIN: ICD-10-CM

## 2019-08-29 DIAGNOSIS — R63.8 INCREASED BMI: Primary | ICD-10-CM

## 2019-08-29 PROCEDURE — 99213 OFFICE O/P EST LOW 20 MIN: CPT | Performed by: FAMILY MEDICINE

## 2019-08-29 RX ORDER — PREDNISONE 10 MG/1
TABLET ORAL
Qty: 26 TABLET | Refills: 0 | Status: SHIPPED | OUTPATIENT
Start: 2019-08-29 | End: 2019-10-16

## 2019-08-29 NOTE — PROGRESS NOTES
Patient ID: Diana Bland is a 81 y o  female  HPI: 81 y  o female presents to discuss going on belviq for weight loss  She is currently having lumbar back pain flair up   Her lumbar back pain is radiating into left buttock area  She denies any paresthesia down left leg   She denies any weakness of her left leg       SUBJECTIVE    Family History   Problem Relation Age of Onset    Atrial fibrillation Mother     Breast cancer Mother 76    Stroke Mother     Coronary artery disease Mother     Diabetes Mother     Pancreatitis Mother     Heart attack Father     Arthritis Family     Cancer Family     Endometrial cancer Maternal Grandmother 76    Prostate cancer Paternal Uncle 54     Social History     Socioeconomic History    Marital status: /Civil Union     Spouse name: Not on file    Number of children: Not on file    Years of education: Not on file    Highest education level: Not on file   Occupational History    Not on file   Social Needs    Financial resource strain: Not on file    Food insecurity:     Worry: Not on file     Inability: Not on file    Transportation needs:     Medical: Not on file     Non-medical: Not on file   Tobacco Use    Smoking status: Never Smoker    Smokeless tobacco: Never Used   Substance and Sexual Activity    Alcohol use: Yes     Comment: 1-2x / year    Drug use: No    Sexual activity: Not on file   Lifestyle    Physical activity:     Days per week: Not on file     Minutes per session: Not on file    Stress: Not on file   Relationships    Social connections:     Talks on phone: Not on file     Gets together: Not on file     Attends [employer] service: Not on file     Active member of club or organization: Not on file     Attends meetings of clubs or organizations: Not on file     Relationship status: Not on file    Intimate partner violence:     Fear of current or ex partner: Not on file     Emotionally abused: Not on file     Physically abused: Not on file     Forced sexual activity: Not on file   Other Topics Concern    Not on file   Social History Narrative    Drinks coffee     Past Medical History:   Diagnosis Date    Arthritis     Depression     Endometriosis     GERD (gastroesophageal reflux disease)     Hyperlipidemia     Hypertension     Osteopenia     Pancreatitis 06/2017    Pneumonia      Past Surgical History:   Procedure Laterality Date    ABDOMINAL SURGERY      endometriosis     APPENDECTOMY      BREAST BIOPSY Right 1985    CARPAL TUNNEL RELEASE      COLONOSCOPY      HAND SURGERY      HIP SURGERY      HYSTERECTOMY Bilateral 1985    JOINT REPLACEMENT Bilateral     KNEE    JOINT REPLACEMENT Left     HIP    OOPHORECTOMY Bilateral 1985    [place] ARTHROSCOPY SHOULDER SURGICAL BICEPS TENODESIS Right 5/10/2017    Procedure: ARTHROSCOPIC BICEPS TENODESIS WITH ROTATOR CUFF REPAIR ;  Surgeon: Antoni Oleary MD;  [facility];  Service: Orthopedics    [place] Madeleine Sara Right 5/10/2017    Procedure: SHOULDER ARTHROSCOPY SUBACROMIAL DECOMPRESSION;  Surgeon: Antoni Oleary MD;  [facility];  Service: Orthopedics    [place] TOTAL HIP ARTHROPLASTY Left 3/12/2018    Procedure: ARTHROPLASTY HIP TOTAL;  Surgeon: Poly Chaves MD;  [facility];  Service: Orthopedics    [place] TOTAL HIP ARTHROPLASTY Right 7/30/2018    Procedure: RIGHT TOTAL HIP ARTHROPLASTY;  Surgeon: Poly Chaves MD;  [facility];  Service: Orthopedics    TONSILLECTOMY       Allergies   Allergen Reactions    Hydromorphone Hcl Itching    Percocet [Oxycodone-Acetaminophen] GI Intolerance    Sulfamethoxazole-Trimethoprim Rash       Current Outpatient Medications:     atorvastatin (LIPITOR) 20 mg tablet, Take 1 tablet (20 mg total) by mouth daily for 90 days, Disp: 90 tablet, Rfl: 3    calcium-vitamin D (OSCAL) 250-125 MG-UNIT per tablet, Take 1 tablet by mouth daily, Disp: , Rfl:     Cholecalciferol (VITAMIN D) 2000 units tablet, Take 2,000 Units by mouth daily, Disp: , Rfl:     FLUoxetine (PROzac) 20 mg capsule, Take 1 capsule (20 mg total) by mouth daily, Disp: 90 capsule, Rfl: 0    gabapentin (NEURONTIN) 300 mg capsule, Take 1 capsule (300 mg total) by mouth daily after breakfast for 30 days, Disp: 30 capsule, Rfl: 2    hydrochlorothiazide (HYDRODIURIL) 25 mg tablet, Take 1 tablet (25 mg total) by mouth daily, Disp: 90 tablet, Rfl: 0    methocarbamol (ROBAXIN) 750 mg tablet, Take 1 tablet (750 mg total) by mouth 2 (two) times a day as needed for muscle spasms, Disp: 60 tablet, Rfl: 2    metoprolol succinate (TOPROL-XL) 50 mg 24 hr tablet, Take 1 tablet (50 mg total) by mouth daily, Disp: 90 tablet, Rfl: 0    Multiple Vitamins-Minerals (CENTRUM SILVER ULTRA WOMENS PO), Take by mouth, Disp: , Rfl:     Omega-3 1000 MG CAPS, Take by mouth daily , Disp: , Rfl:     omeprazole (PriLOSEC) 20 mg delayed release capsule, Take 1 capsule (20 mg total) by mouth every morning, Disp: 90 capsule, Rfl: 0    Lorcaserin HCl 10 MG TABS, Take 1 tablet (10 mg total) by mouth 2 (two) times a day for 30 days, Disp: 60 tablet, Rfl: 3    predniSONE 10 mg tablet, 3 tabs po bid x2 days, then 2 tabs po bid x2 days, then 1 tab bid x2 days, then 1 daily until done , Disp: 26 tablet, Rfl: 0    Review of Systems  Constitutional:     Denies fever, chills ,fatigue ,weakness ,weight loss, +weight gain     ENT: Denies earache ,loss of hearing ,nosebleed, nasal discharge,nasal congestion ,sore throat ,hoarseness  Pulmonary: Denies shortness of breath ,cough  ,dyspnea on exertion, orthopnea  ,PND   Cardiovascular:  Denies bradycardia , tachycardia  ,palpations, lower extremity edema leg, claudication  Breast:  Denies new or changing breast lumps ,nipple discharge ,nipple changes  Abdomen:  Denies abdominal pain , anorexia , indigestion, nausea, vomiting, constipation, diarrhea  Musculoskeletal: Denies myalgias, arthralgias, joint swelling, joint stiffness , limb pain, limb swelling+ lumbar back pain with radiation into left buttock area  Gu: denies dysuria, polyuria  Skin: Denies skin rash, skin lesion, skin wound, itching, dry skin  Neuro: Denies headache, numbness, tingling, confusion, loss of consciousness, dizziness, vertigo  Psychiatric: Denies feelings of depression, suicidal ideation, anxiety, sleep disturbances    OBJECTIVE  /80   Pulse 70   Temp 98 4 °F (36 9 °C)   Ht 4' 11" (1 499 m)   Wt 93 9 kg (207 lb)   LMP  (LMP Unknown) Comment: hysterectomy  BMI 41 81 kg/m²   Constitutional:   NAD, well appearing and well nourished      ENT:   Conjunctiva and lids: no injection, edema, or discharge     Pupils and iris: CORNELIA bilaterally    External inspection of ears and nose: normal without deformities or discharge  Otoscopic exam: Canals patent without erythema  Nasal mucosa, septum and turbinates: Normal or edema or discharge         Oropharynx:  Moist mucosa, normal tongue and tonsils without lesions  No erythema        Pulmonary:Respiratory effort normal rate and rhythm, no increased work of breathing   Auscultation of lungs:  Clear bilaterally with no adventitious breath sounds     Cardiovascular: regular rate and rhythm, S1 and S2, no murmur, no edema and/or varicosities of LE      Abdomen: Soft and non-distended     Positive bowel sounds      No heptomegaly or splenomegaly      Gu: no suprapubic tenderness or CVA tenderness, no urethral discharge  Lymphatic:  No anterior or posterior cervical lymphadenopathy         Musculoskeletal:  Gait and station: Normal gait      Digits and nails normal without clubbing or cyanosis       Inspection/palpation of joints, bones, and muscles:  +tendernessover left PSIS on palpation , no swelling, full active and passive range of motion of lumbar spine      Skin: Normal skin turgor and no rashes      Neuro:    Normal reflexes     Psych:   alert and oriented to person, place and time     normal mood and affect       Assessment/Plan:Diagnoses and all orders for this visit:    Increased BMI  -     Lorcaserin HCl 10 MG TABS; Take 1 tablet (10 mg total) by mouth 2 (two) times a day for 30 days    Lumbar back pain  -     predniSONE 10 mg tablet; 3 tabs po bid x2 days, then 2 tabs po bid x2 days, then 1 tab bid x2 days, then 1 daily until done  Reviewed with patient plan to treat with above plan      Patient instructed to call in 72 hours if not feeling better or if symptoms worsen

## 2019-08-30 ENCOUNTER — TELEPHONE (OUTPATIENT)
Dept: FAMILY MEDICINE CLINIC | Facility: CLINIC | Age: 69
End: 2019-08-30

## 2019-08-30 DIAGNOSIS — R63.8 INCREASED BMI: ICD-10-CM

## 2019-08-30 NOTE — TELEPHONE ENCOUNTER
Pt called and stated that robaxin is not working and her spasms got worse  Pt stated that her PCP placed her on prednisone   Please advise    Pt can be reached at [phone]

## 2019-08-30 NOTE — TELEPHONE ENCOUNTER
Pt called with a 1 wk update of taking the robaxin 750 mg BID and gabapentin 300 mg once a day and said the combination was not helping and the muscle spasms were getting worse  She was seen by Dr Darell Deleon on Thurs for other issue and made her away so she put her on prednisone for 6-7 days  Pt wanted to make FQ aware that the robaxin and gabapentin weren't helping after 1 wk, she didn't want to wait for another week to call w/ update since it wasn't working  I told pt that FQ is out of office until Tues but I will forward a message to him with the update and that prednisone was prescribed by PCP  Told pt we will c/b next Tues or Wed with next treatment plan from

## 2019-08-30 NOTE — TELEPHONE ENCOUNTER
Belviq generic is not available yet the pharm told her    She needs Brand Belviq XR, that's the one with the savings card    She wants to  written script

## 2019-08-30 NOTE — TELEPHONE ENCOUNTER
Patient came into  the script  She needs the script to say XR for it to work for the savings card       Brand Belviq XR 20mg   Please call when ready to will

## 2019-09-03 ENCOUNTER — TELEPHONE (OUTPATIENT)
Dept: FAMILY MEDICINE CLINIC | Facility: CLINIC | Age: 69
End: 2019-09-03

## 2019-09-03 DIAGNOSIS — R63.8 INCREASED BMI: Primary | ICD-10-CM

## 2019-09-03 NOTE — TELEPHONE ENCOUNTER
S/w pt, she said she is still not doing great even with the Prednisone  Pt would like to schedule repeat B/L L4 TFESI  Pt denies blood thinners

## 2019-09-04 DIAGNOSIS — R63.8 INCREASED BMI: ICD-10-CM

## 2019-09-16 NOTE — TELEPHONE ENCOUNTER
I s/w pt who said she is scheduled for an inj on 9/26 and wanted to make sure FQ knows the symptoms now are on both sides and the pain is now constant  I told pt her upcoming inj is scheduled as B/L L4 TFESI  I told pt I can ask FQ for rec to help with the constant pain until her upcoming inj on 9/26  Pt said she takes the robaxin 750 mg BID and neurontin 300 mg once in the AM    I told pt I will reach out to FQ he may want to have her inc the neurontin  Pt said she has some left in her bottle but not sure how many as she is not home at present  Told pt we will c/b tomorrow with FQ's rec

## 2019-09-16 NOTE — TELEPHONE ENCOUNTER
Would recommend increasing the gabapentin to BID dosing and then after a few day to TID dosing  Can send new rx if she agrees

## 2019-09-16 NOTE — TELEPHONE ENCOUNTER
Pt called and left a vm at 2:44pm today  stating her pain increase  I was able to reach out to the pt and she stated the pain is now on both sides of her back and is going gee the outside of both her legs  Pt has been taking her meds as scheduled and not receiving any relief      Pt can be reached at  [phone]

## 2019-09-17 ENCOUNTER — TELEPHONE (OUTPATIENT)
Dept: PAIN MEDICINE | Facility: CLINIC | Age: 69
End: 2019-09-17

## 2019-09-17 RX ORDER — GABAPENTIN 300 MG/1
CAPSULE ORAL
Qty: 90 CAPSULE | Refills: 2 | Status: SHIPPED | OUTPATIENT
Start: 2019-09-17 | End: 2020-01-07 | Stop reason: SDUPTHER

## 2019-09-17 NOTE — TELEPHONE ENCOUNTER
S/w pt, informed her of below  Please will start with BID dosing then increase to TID after 5 days if tolerating  Please send to [place] on file  Pt told to call back in 2 weeks with an update or sooner with any s/e

## 2019-09-17 NOTE — TELEPHONE ENCOUNTER
Michael Burnett routed conversation to [facility] 59 minutes ago (8:43 AM)      Evern Crew [phone]  Michael Burnett 1 hour ago (8:42 AM)      Michael Burnett 1 hour ago (8:42 AM)         Patient called returning nurses call; please call back at # [phone]

## 2019-09-26 ENCOUNTER — HOSPITAL ENCOUNTER (OUTPATIENT)
Dept: RADIOLOGY | Facility: CLINIC | Age: 69
Discharge: HOME | End: 2019-09-26
Attending: ANESTHESIOLOGY | Admitting: ANESTHESIOLOGY
Payer: MEDICARE

## 2019-09-26 VITALS
HEART RATE: 66 BPM | DIASTOLIC BLOOD PRESSURE: 80 MMHG | RESPIRATION RATE: 18 BRPM | OXYGEN SATURATION: 98 % | TEMPERATURE: 98 F | SYSTOLIC BLOOD PRESSURE: 125 MMHG

## 2019-09-26 DIAGNOSIS — M51.16 INTERVERTEBRAL DISC DISORDER WITH RADICULOPATHY OF LUMBAR REGION: ICD-10-CM

## 2019-09-26 PROCEDURE — 64483 NJX AA&/STRD TFRM EPI L/S 1: CPT | Performed by: ANESTHESIOLOGY

## 2019-09-26 RX ORDER — METHYLPREDNISOLONE ACETATE 80 MG/ML
80 INJECTION, SUSPENSION INTRA-ARTICULAR; INTRALESIONAL; INTRAMUSCULAR; PARENTERAL; SOFT TISSUE ONCE
Status: COMPLETED | OUTPATIENT
Start: 2019-09-26 | End: 2019-09-26

## 2019-09-26 RX ORDER — 0.9 % SODIUM CHLORIDE 0.9 %
10 VIAL (ML) INJECTION ONCE
Status: COMPLETED | OUTPATIENT
Start: 2019-09-26 | End: 2019-09-26

## 2019-09-26 RX ORDER — BUPIVACAINE HCL/PF 2.5 MG/ML
10 VIAL (ML) INJECTION ONCE
Status: COMPLETED | OUTPATIENT
Start: 2019-09-26 | End: 2019-09-26

## 2019-09-26 RX ADMIN — Medication 5 ML: at 15:04

## 2019-09-26 RX ADMIN — IOHEXOL 1 ML: 300 INJECTION, SOLUTION INTRAVENOUS at 15:05

## 2019-09-26 RX ADMIN — BUPIVACAINE HYDROCHLORIDE 2 ML: 2.5 INJECTION, SOLUTION EPIDURAL; INFILTRATION; INTRACAUDAL at 15:05

## 2019-09-26 RX ADMIN — METHYLPREDNISOLONE ACETATE 80 MG: 80 INJECTION, SUSPENSION INTRA-ARTICULAR; INTRALESIONAL; INTRAMUSCULAR; PARENTERAL; SOFT TISSUE at 15:05

## 2019-09-26 RX ADMIN — SODIUM CHLORIDE 5 ML: 9 INJECTION, SOLUTION INTRAMUSCULAR; INTRAVENOUS; SUBCUTANEOUS at 15:04

## 2019-09-26 NOTE — DISCHARGE INSTR - LAB
Epidural Steroid Injection   WHAT YOU NEED TO KNOW:   An epidural steroid injection (JOSE) is a procedure to inject steroid medicine into the epidural space  The epidural space is between your spinal cord and vertebrae  Steroids reduce inflammation and fluid buildup in your spine that may be causing pain  You may be given pain medicine along with the steroids  ACTIVITY  · Do not drive or operate machinery today  · No strenuous activity today - bending, lifting, etc   · You may resume normal activites starting tomorrow - start slowly and as tolerated  · You may shower today, but no tub baths or hot tubs  · You may have numbness for several hours from the local anesthetic  Please use caution and common sense, especially with weight-bearing activities  CARE OF THE INJECTION SITE  · If you have soreness or pain, apply ice to the area today (20 minutes on/20 minutes off)  · Starting tomorrow, you may use warm, moist heat or ice if needed  · You may have an increase or change in your discomfort for 36-48 hours after your treatment  · Apply ice and continue with any pain medication you have been prescribed  · Notify the [facility] if you have any of the following: redness, drainage, swelling, headache, stiff neck or fever above 100°F     SPECIAL INSTRUCTIONS  · Our office will contact you in approximately 7 days for a progress report  MEDICATIONS  · Continue to take all routine medications  · Our office may have instructed you to hold some medications  If you have a problem specifically related to your procedure, please call our office at [phone]  Problems not related to your procedure should be directed to your primary care physician

## 2019-09-26 NOTE — H&P
History of Present Illness: The patient is a 81 y o  female who presents with complaints of lower back and leg pain secondary to lumbar disc bulging and is here today for bilateral L4 transforaminal epidural steroid injection      Patient Active Problem List   Diagnosis    Incomplete tear of right rotator cuff    Hypertension    Hyperlipidemia    Depression    Abdominal pain    Primary osteoarthritis of left hip    Pre-op examination    Status post total replacement of both hips    Aftercare following right hip joint replacement surgery    Primary osteoarthritis of one hip, right    Renal insufficiency    S/P hip replacement, right    Transition of care performed with sharing of clinical summary    Right foot pain    Nondisplaced fracture of fifth metatarsal bone, right foot, initial encounter for closed fracture    Pain, joint, ankle and foot, right    Right hip pain    Trochanteric bursitis of left hip    Trochanteric bursitis of right hip    Lumbar back pain with radiculopathy affecting lower extremity    Intervertebral disc disorder with radiculopathy of lumbar region       Past Medical History:   Diagnosis Date    Arthritis     Depression     Endometriosis     GERD (gastroesophageal reflux disease)     Hyperlipidemia     Hypertension     Osteopenia     Pancreatitis 06/2017    Pneumonia        Past Surgical History:   Procedure Laterality Date    ABDOMINAL SURGERY      endometriosis     APPENDECTOMY      BREAST BIOPSY Right 1985    CARPAL TUNNEL RELEASE      COLONOSCOPY      HAND SURGERY      HIP SURGERY      HYSTERECTOMY Bilateral 1985    JOINT REPLACEMENT Bilateral     KNEE    JOINT REPLACEMENT Left     HIP    OOPHORECTOMY Bilateral 1985    [place] ARTHROSCOPY SHOULDER SURGICAL BICEPS TENODESIS Right 5/10/2017    Procedure: ARTHROSCOPIC BICEPS TENODESIS WITH ROTATOR CUFF REPAIR ;  Surgeon: Antoni Oleary MD;  [facility];  Service: Orthopedics    [place] [address] Dor[address] Rash Right 5/10/2017    Procedure: SHOULDER ARTHROSCOPY SUBACROMIAL DECOMPRESSION;  Surgeon: Henrique Vail MD;  [facility];  Service: Orthopedics    [place] TOTAL HIP ARTHROPLASTY Left 3/12/2018    Procedure: ARTHROPLASTY HIP TOTAL;  Surgeon: Humble Christine MD;  [facility];  Service: Orthopedics    [place] TOTAL HIP ARTHROPLASTY Right 7/30/2018    Procedure: RIGHT TOTAL HIP ARTHROPLASTY;  Surgeon: Humble Christine MD;  [facility];  Service: Orthopedics    TONSILLECTOMY           Current Outpatient Medications:     atorvastatin (LIPITOR) 20 mg tablet, Take 1 tablet (20 mg total) by mouth daily for 90 days, Disp: 90 tablet, Rfl: 3    calcium-vitamin D (OSCAL) 250-125 MG-UNIT per tablet, Take 1 tablet by mouth daily, Disp: , Rfl:     Cholecalciferol (VITAMIN D) 2000 units tablet, Take 2,000 Units by mouth daily, Disp: , Rfl:     FLUoxetine (PROzac) 20 mg capsule, Take 1 capsule (20 mg total) by mouth daily, Disp: 90 capsule, Rfl: 0    gabapentin (NEURONTIN) 300 mg capsule, Take 1 tablet twice daily for 5 days then 1 tablet 3 times daily, Disp: 90 capsule, Rfl: 2    hydrochlorothiazide (HYDRODIURIL) 25 mg tablet, Take 1 tablet (25 mg total) by mouth daily, Disp: 90 tablet, Rfl: 0    Lorcaserin HCl 10 MG TABS, Take 1 tablet (10 mg total) by mouth 2 (two) times a day for 30 days, Disp: 60 tablet, Rfl: 3    Lorcaserin HCl ER (BELVIQ XR) 20 MG TB24, Take 1 tablet (20 mg total) by mouth daily, Disp: 30 tablet, Rfl: 3    methocarbamol (ROBAXIN) 750 mg tablet, Take 1 tablet (750 mg total) by mouth 2 (two) times a day as needed for muscle spasms, Disp: 60 tablet, Rfl: 2    metoprolol succinate (TOPROL-XL) 50 mg 24 hr tablet, Take 1 tablet (50 mg total) by mouth daily, Disp: 90 tablet, Rfl: 0    Multiple Vitamins-Minerals (CENTRUM SILVER ULTRA WOMENS PO), Take by mouth, Disp: , Rfl:     Omega-3 1000 MG CAPS, Take by mouth daily , Disp: , Rfl:     omeprazole (PriLOSEC) 20 mg delayed release capsule, Take 1 capsule (20 mg total) by mouth every morning, Disp: 90 capsule, Rfl: 0    predniSONE 10 mg tablet, 3 tabs po bid x2 days, then 2 tabs po bid x2 days, then 1 tab bid x2 days, then 1 daily until done , Disp: 26 tablet, Rfl: 0  No current facility-administered medications for this encounter  Allergies   Allergen Reactions    Hydromorphone Hcl Itching    Percocet [Oxycodone-Acetaminophen] GI Intolerance    Sulfamethoxazole-Trimethoprim Rash       Physical Exam:   Vitals:    09/26/19 1510   BP: 125/80   Pulse: 66   Resp: 18   Temp:    SpO2: 98%     General: Awake, Alert, Oriented x 3, Mood and affect appropriate  Respiratory: Respirations even and unlabored  Cardiovascular: Peripheral pulses intact; no edema  Musculoskeletal Exam:   Lower back tenderness    ASA Score: 2    Patient/Chart Verification  Patient ID Verified: Verbal  ID Band Applied: No  Consents Confirmed: Procedural, To be obtained in the Pre-Procedure area  H&P( within 30 days) Verified: To be obtained in the Pre-Procedure area  Allergies Reviewed: Yes  Anticoag/NSAID held?: No  Currently on antibiotics?: No  Pregnancy denied?: NA    Assessment:   1   Intervertebral disc disorder with radiculopathy of lumbar region        Plan: B/L L4 TFESI

## 2019-10-03 ENCOUNTER — TELEPHONE (OUTPATIENT)
Dept: PAIN MEDICINE | Facility: CLINIC | Age: 69
End: 2019-10-03

## 2019-10-08 DIAGNOSIS — F39 AFFECTIVE DISORDER (HCC): ICD-10-CM

## 2019-10-08 DIAGNOSIS — I10 ESSENTIAL HYPERTENSION: ICD-10-CM

## 2019-10-08 RX ORDER — HYDROCHLOROTHIAZIDE 25 MG/1
TABLET ORAL
Qty: 90 TABLET | Refills: 0 | Status: SHIPPED | OUTPATIENT
Start: 2019-10-08 | End: 2019-12-05 | Stop reason: SDUPTHER

## 2019-10-08 RX ORDER — FLUOXETINE HYDROCHLORIDE 20 MG/1
CAPSULE ORAL
Qty: 90 CAPSULE | Refills: 0 | Status: SHIPPED | OUTPATIENT
Start: 2019-10-08 | End: 2020-02-04

## 2019-10-08 RX ORDER — OMEPRAZOLE 20 MG/1
CAPSULE, DELAYED RELEASE ORAL
Qty: 90 CAPSULE | Refills: 0 | Status: SHIPPED | OUTPATIENT
Start: 2019-10-08 | End: 2019-12-05 | Stop reason: SDUPTHER

## 2019-10-08 RX ORDER — METOPROLOL SUCCINATE 50 MG/1
TABLET, EXTENDED RELEASE ORAL
Qty: 90 TABLET | Refills: 0 | Status: SHIPPED | OUTPATIENT
Start: 2019-10-08 | End: 2020-02-04

## 2019-10-08 NOTE — TELEPHONE ENCOUNTER
Pt reports no improvement at this time  Pain level varies 4-10  Pt states "it's always there and never gone " Pt requested an appt with dr Sue Calhoun  Has appt on 10/16

## 2019-10-11 ENCOUNTER — TELEPHONE (OUTPATIENT)
Dept: OBGYN CLINIC | Facility: HOSPITAL | Age: 69
End: 2019-10-11

## 2019-10-11 NOTE — TELEPHONE ENCOUNTER
Please call in a prescription for this patient  It should read:  Cephalexin 500 milligrams  Four tablets 1 hour before dental visit  Repeat as necessary  Number 40, no refills    Please call patient and inform incomplete

## 2019-10-11 NOTE — TELEPHONE ENCOUNTER
Patient sees Dr Alicia Seaman  She called and left a voicemail stating that she needs a refill on her Cephalexin 500 mg tablets  She states that she get 12 tabs with 3 refills  She uses the [facility] on file

## 2019-10-16 ENCOUNTER — OFFICE VISIT (OUTPATIENT)
Dept: PAIN MEDICINE | Facility: CLINIC | Age: 69
End: 2019-10-16
Payer: MEDICARE

## 2019-10-16 VITALS — TEMPERATURE: 98.5 F | DIASTOLIC BLOOD PRESSURE: 67 MMHG | SYSTOLIC BLOOD PRESSURE: 128 MMHG | HEART RATE: 71 BPM

## 2019-10-16 DIAGNOSIS — M46.1 SACROILIITIS (HCC): ICD-10-CM

## 2019-10-16 DIAGNOSIS — M51.16 INTERVERTEBRAL DISC DISORDER WITH RADICULOPATHY OF LUMBAR REGION: ICD-10-CM

## 2019-10-16 DIAGNOSIS — M47.816 LUMBAR SPONDYLOSIS: Primary | ICD-10-CM

## 2019-10-16 PROCEDURE — 99214 OFFICE O/P EST MOD 30 MIN: CPT | Performed by: ANESTHESIOLOGY

## 2019-10-16 NOTE — PROGRESS NOTES
Assessment:  1  Lumbar spondylosis    2  Intervertebral disc disorder with radiculopathy of lumbar region    3  Sacroiliitis ([address] )        Plan:  The patient is still symptomatic in her lower back and this is likely combination of underlying facet mediated pain, sacroiliitis as well as her lumbar bulging disc  Her main pain generator appears to be her lumbar facets at this point so I discussed performing bilateral L3-5 medial branch blocks to diagnose facet mediated pain  If she gets significant relief, she would be a candidate for medial branch radiofrequency ablation  She was apprised of the most common risks and would like to proceed  She will be scheduled for an upcoming Tuesday or Thursday under fluoroscopic guidance  Complete risks and benefits including bleeding, infection, tissue reaction, nerve injury and allergic reaction were discussed  The approach was demonstrated using models and literature was provided  Verbal and written consent was obtained  My impressions and treatment recommendations were discussed in detail with the patient who verbalized understanding and had no further questions  Discharge instructions were provided  I personally saw and examined the patient and I agree with the above discussed plan of care  Orders Placed This Encounter   Procedures    FL spine and pain procedure     Standing Status:   Future     Standing Expiration Date:   10/16/2023     Order Specific Question:   Reason for Exam:     Answer:   Bilateral L3-5 MBB     Order Specific Question:   Anticoagulant hold needed? Answer:   No     No orders of the defined types were placed in this encounter  History of Present Illness:  Adore Herring is a 81 y o  female who presents for a follow up office visit in regards to Back Pain (radiates into both legs) and Leg Pain  The patient has a history of lumbar disc disorder with radiculopathy and returns for follow-up    She is now status post 2 lumbar epidural steroid injections at the bilateral L4 level and reports only very mild improvement  She continues to experience constant pain in the lower back described to be burning, sharp, throbbing, cramping, shooting  She uses a methocarbamol 750 mg twice daily which provided some mild relief  I have personally reviewed and/or updated the patient's past medical history, past surgical history, family history, social history, current medications, allergies, and vital signs today  Review of Systems   Respiratory: Negative for shortness of breath  Cardiovascular: Negative for chest pain  Gastrointestinal: Positive for diarrhea  Negative for constipation, nausea and vomiting  Musculoskeletal: Positive for gait problem  Negative for arthralgias, joint swelling and myalgias  Skin: Negative for rash  Neurological: Negative for dizziness, seizures and weakness  All other systems reviewed and are negative        Patient Active Problem List   Diagnosis    Incomplete tear of right rotator cuff    Hypertension    Hyperlipidemia    Depression    Abdominal pain    Primary osteoarthritis of left hip    Pre-op examination    Status post total replacement of both hips    Aftercare following right hip joint replacement surgery    Primary osteoarthritis of one hip, right    Renal insufficiency    S/P hip replacement, right    Transition of care performed with sharing of clinical summary    Right foot pain    Nondisplaced fracture of fifth metatarsal bone, right foot, initial encounter for closed fracture    Pain, joint, ankle and foot, right    Right hip pain    Trochanteric bursitis of left hip    Trochanteric bursitis of right hip    Lumbar back pain with radiculopathy affecting lower extremity    Intervertebral disc disorder with radiculopathy of lumbar region       Past Medical History:   Diagnosis Date    Arthritis     Depression     Endometriosis     GERD (gastroesophageal reflux disease)  Hyperlipidemia     Hypertension     Osteopenia     Pancreatitis 06/2017    Pneumonia        Past Surgical History:   Procedure Laterality Date    ABDOMINAL SURGERY      endometriosis     APPENDECTOMY      BREAST BIOPSY Right 1985    CARPAL TUNNEL RELEASE      COLONOSCOPY      HAND SURGERY      HIP SURGERY      HYSTERECTOMY Bilateral 1985    JOINT REPLACEMENT Bilateral     KNEE    JOINT REPLACEMENT Left     HIP    OOPHORECTOMY Bilateral 1985    PA ARTHROSCOPY SHOULDER SURGICAL BICEPS TENODESIS Right 5/10/2017    Procedure: ARTHROSCOPIC BICEPS TENODESIS WITH ROTATOR CUFF REPAIR ;  Surgeon: Pepe Macias MD;  [facility];  Service: Orthopedics    PA SHLDR Naif Poster Right 5/10/2017    Procedure: SHOULDER ARTHROSCOPY SUBACROMIAL DECOMPRESSION;  Surgeon: Pepe Macias MD;  [facility];  Service: Orthopedics    PA TOTAL HIP ARTHROPLASTY Left 3/12/2018    Procedure: ARTHROPLASTY HIP TOTAL;  Surgeon: Casie Benavides MD;  [facility];  Service: Orthopedics    PA TOTAL HIP ARTHROPLASTY Right 7/30/2018    Procedure: RIGHT TOTAL HIP ARTHROPLASTY;  Surgeon: Casie Benavides MD;  [facility];  Service: Orthopedics    TONSILLECTOMY         Family History   Problem Relation Age of Onset    Atrial fibrillation Mother     Breast cancer Mother 76    Stroke Mother     Coronary artery disease Mother     Diabetes Mother     Pancreatitis Mother     Heart attack Father     Arthritis Family     Cancer Family     Endometrial cancer Maternal Grandmother 76    Prostate cancer Paternal Uncle 54       Social History     Occupational History    Not on file   Tobacco Use    Smoking status: Never Smoker    Smokeless tobacco: Never Used   Substance and Sexual Activity    Alcohol use: Yes     Comment: 1-2x / year    Drug use: No    Sexual activity: Not on file       Current Outpatient Medications on File Prior to Visit   Medication Sig    atorvastatin (LIPITOR) 20 mg tablet Take 1 tablet (20 mg total) by mouth daily for 90 days    calcium-vitamin D (OSCAL) 250-125 MG-UNIT per tablet Take 1 tablet by mouth daily    Cholecalciferol (VITAMIN D) 2000 units tablet Take 2,000 Units by mouth daily    FLUoxetine (PROzac) 20 mg capsule TAKE 1 CAPSULE BY MOUTH  DAILY    gabapentin (NEURONTIN) 300 mg capsule Take 1 tablet twice daily for 5 days then 1 tablet 3 times daily    hydrochlorothiazide (HYDRODIURIL) 25 mg tablet Take 1 tablet (25 mg total) by mouth daily    hydrochlorothiazide (HYDRODIURIL) 25 mg tablet TAKE 1 TABLET BY MOUTH  DAILY    Lorcaserin HCl ER (BELVIQ XR) 20 MG TB24 Take 1 tablet (20 mg total) by mouth daily    methocarbamol (ROBAXIN) 750 mg tablet Take 1 tablet (750 mg total) by mouth 2 (two) times a day as needed for muscle spasms    metoprolol succinate (TOPROL-XL) 50 mg 24 hr tablet TAKE 1 TABLET BY MOUTH  DAILY    Multiple Vitamins-Minerals (CENTRUM SILVER ULTRA WOMENS PO) Take by mouth    Omega-3 1000 MG CAPS Take by mouth daily     omeprazole (PriLOSEC) 20 mg delayed release capsule Take 1 capsule (20 mg total) by mouth every morning    omeprazole (PriLOSEC) 20 mg delayed release capsule TAKE 1 CAPSULE BY MOUTH  EVERY MORNING    [DISCONTINUED] predniSONE 10 mg tablet 3 tabs po bid x2 days, then 2 tabs po bid x2 days, then 1 tab bid x2 days, then 1 daily until done   Lorcaserin HCl 10 MG TABS Take 1 tablet (10 mg total) by mouth 2 (two) times a day for 30 days     No current facility-administered medications on file prior to visit  Allergies   Allergen Reactions    Hydromorphone Hcl Itching    Percocet [Oxycodone-Acetaminophen] GI Intolerance    Sulfamethoxazole-Trimethoprim Rash       Physical Exam:    /67   Pulse 71   Temp 98 5 °F (36 9 °C) (Oral)   LMP  (LMP Unknown) Comment: hysterectomy    Constitutional:normal, well developed, well nourished, alert, in no distress and non-toxic and no overt pain behavior  and obese  Eyes:anicteric  HEENT:grossly intact  Neck:supple, symmetric, trachea midline and no masses   Pulmonary:even and unlabored  Cardiovascular:No edema or pitting edema present  Skin:Normal without rashes or lesions and well hydrated  Psychiatric:Mood and affect appropriate  Neurologic:Cranial Nerves II-XII grossly intact  Musculoskeletal:normal     Lumbar Spine Exam  Appearance:  Normal lordosis  Palpation/Tenderness:  left lumbar paraspinal tenderness  right lumbar paraspinal tenderness  Bilateral lumbar facet tenderness at L4-5, L5-S1 with positive facet loading  Sensory:  no sensory deficits noted  Range of Motion:  Flexion:  Minimally limited  with pain  Extension:  Moderately limited  with pain  Lateral Flexion - Left:  Moderately limited  with pain  Lateral Flexion - Right:  Moderately limited  with pain  Rotation - Left:  Moderately limited  with pain  Rotation - Right:  Moderately limited  with pain  Motor Strength:  Left hip flexion:  5/5  Left hip extension:  5/5  Right hip flexion:  5/5  Right hip extension:  5/5  Left knee flexion:  5/5  Left knee extension:  5/5  Right knee flexion:  5/5  Right knee extension:  5/5  Left foot dorsiflexion:  5/5  Left foot plantar flexion:  5/5  Right foot dorsiflexion:  5/5  Right foot plantar flexion:  5/5    Imaging    CT LUMBAR SPINE (6/21/2019)     INDICATION:   M54 16: Radiculopathy, lumbar region      COMPARISON: None      TECHNIQUE:  Contiguous axial images through the lumbar spine were obtained  Sagittal and coronal reconstructions were performed        Radiation dose length product (DLP) for this visit:  146 mGy-cm     This examination, like all CT scans performed in the [facility], was performed utilizing techniques to minimize radiation dose exposure, including the use of iterative   reconstruction and automated exposure control        IMAGE QUALITY:  Diagnostic      FINDINGS:     ALIGNMENT:  There is mild levoscoliosis of the lumbar spine  There is trace anterolisthesis of L4-L5      VERTEBRAL BODIES:  The bones are osteopenic  No acute compression fractures are identified      DEGENERATIVE CHANGES:     Lower Thoracic spine:  Normal lower thoracic disc spaces  ]     L1-2:  There is disc space narrowing  There is vacuum disc phenomenon  There is a bulging annulus  There is no significant bony canal stenosis  There is mild foraminal encroachment      L2-3:  There is disc space narrowing  There is a bulging annulus  There is facet arthrosis  There is mild canal stenosis  There is mild foraminal narrowing      L3-4:  There is a bulging annulus  There is facet arthrosis  There is no significant canal stenosis  There is no significant foraminal narrowing      L4-5:  There is disc space narrowing  There is vacuum disc phenomenon  There is a calcified bulging annulus  There is right-sided endplate spurring  There is facet arthrosis  There is mild canal stenosis  There is mild left foraminal narrowing  There is   moderate to severe right foraminal narrowing      L5-S1:  There is disc space narrowing  There is vacuum disc phenomenon  There is a bulging annulus  There is facet arthrosis  There is no significant canal stenosis  There is left-sided endplate spurring   There is mild left foraminal narrowing      PARASPINAL SOFT TISSUES:  There is atherosclerotic calcification

## 2019-10-22 ENCOUNTER — APPOINTMENT (OUTPATIENT)
Dept: LAB | Facility: CLINIC | Age: 69
End: 2019-10-22
Payer: MEDICARE

## 2019-10-22 DIAGNOSIS — E55.9 VITAMIN D DEFICIENCY: ICD-10-CM

## 2019-10-22 DIAGNOSIS — I10 ESSENTIAL HYPERTENSION: ICD-10-CM

## 2019-10-22 DIAGNOSIS — R53.83 OTHER FATIGUE: ICD-10-CM

## 2019-10-22 DIAGNOSIS — E78.00 HYPERCHOLESTEROLEMIA: ICD-10-CM

## 2019-10-22 LAB
25(OH)D3 SERPL-MCNC: 50.8 NG/ML (ref 30–100)
ALBUMIN SERPL BCP-MCNC: 4 G/DL (ref 3.5–5)
ALP SERPL-CCNC: 53 U/L (ref 46–116)
ALT SERPL W P-5'-P-CCNC: 28 U/L (ref 12–78)
ANION GAP SERPL CALCULATED.3IONS-SCNC: 8 MMOL/L (ref 4–13)
AST SERPL W P-5'-P-CCNC: 18 U/L (ref 5–45)
BILIRUB SERPL-MCNC: 0.58 MG/DL (ref 0.2–1)
BUN SERPL-MCNC: 14 MG/DL (ref 5–25)
CALCIUM SERPL-MCNC: 9.7 MG/DL (ref 8.3–10.1)
CHLORIDE SERPL-SCNC: 103 MMOL/L (ref 100–108)
CHOLEST SERPL-MCNC: 173 MG/DL (ref 50–200)
CO2 SERPL-SCNC: 29 MMOL/L (ref 21–32)
CREAT SERPL-MCNC: 1.13 MG/DL (ref 0.6–1.3)
GFR SERPL CREATININE-BSD FRML MDRD: 50 ML/MIN/1.73SQ M
GLUCOSE P FAST SERPL-MCNC: 87 MG/DL (ref 65–99)
HDLC SERPL-MCNC: 81 MG/DL
LDLC SERPL CALC-MCNC: 78 MG/DL (ref 0–100)
POTASSIUM SERPL-SCNC: 3.6 MMOL/L (ref 3.5–5.3)
PROT SERPL-MCNC: 7.4 G/DL (ref 6.4–8.2)
SODIUM SERPL-SCNC: 140 MMOL/L (ref 136–145)
TRIGL SERPL-MCNC: 72 MG/DL
TSH SERPL DL<=0.05 MIU/L-ACNC: 1.06 UIU/ML (ref 0.36–3.74)

## 2019-10-22 PROCEDURE — 80061 LIPID PANEL: CPT

## 2019-10-22 PROCEDURE — 84443 ASSAY THYROID STIM HORMONE: CPT

## 2019-10-22 PROCEDURE — 80053 COMPREHEN METABOLIC PANEL: CPT

## 2019-10-22 PROCEDURE — 36415 COLL VENOUS BLD VENIPUNCTURE: CPT

## 2019-10-22 PROCEDURE — 82306 VITAMIN D 25 HYDROXY: CPT

## 2019-10-29 ENCOUNTER — HOSPITAL ENCOUNTER (OUTPATIENT)
Dept: RADIOLOGY | Facility: CLINIC | Age: 69
Discharge: HOME | End: 2019-10-29
Attending: ANESTHESIOLOGY
Payer: MEDICARE

## 2019-10-29 VITALS
HEART RATE: 66 BPM | OXYGEN SATURATION: 96 % | DIASTOLIC BLOOD PRESSURE: 77 MMHG | SYSTOLIC BLOOD PRESSURE: 122 MMHG | TEMPERATURE: 98.2 F | RESPIRATION RATE: 18 BRPM

## 2019-10-29 DIAGNOSIS — M47.816 LUMBAR SPONDYLOSIS: ICD-10-CM

## 2019-10-29 PROCEDURE — 64493 INJ PARAVERT F JNT L/S 1 LEV: CPT | Performed by: ANESTHESIOLOGY

## 2019-10-29 PROCEDURE — 64494 INJ PARAVERT F JNT L/S 2 LEV: CPT | Performed by: ANESTHESIOLOGY

## 2019-10-29 RX ORDER — BUPIVACAINE HCL/PF 2.5 MG/ML
10 VIAL (ML) INJECTION ONCE
Status: COMPLETED | OUTPATIENT
Start: 2019-10-29 | End: 2019-10-29

## 2019-10-29 RX ADMIN — BUPIVACAINE HYDROCHLORIDE 3 ML: 2.5 INJECTION, SOLUTION EPIDURAL; INFILTRATION; INTRACAUDAL at 08:17

## 2019-10-29 NOTE — DISCHARGE INSTR - LAB

## 2019-10-29 NOTE — H&P
History of Present Illness: The patient is a 81 y o  female who presents with complaints of lower back pain secondary to lumbar spondylosis and is here today for diagnostic bilateral L3-5 medial branch blocks      Patient Active Problem List   Diagnosis    Incomplete tear of right rotator cuff    Hypertension    Hyperlipidemia    Depression    Abdominal pain    Primary osteoarthritis of left hip    Pre-op examination    Status post total replacement of both hips    Aftercare following right hip joint replacement surgery    Primary osteoarthritis of one hip, right    Renal insufficiency    S/P hip replacement, right    Transition of care performed with sharing of clinical summary    Right foot pain    Nondisplaced fracture of fifth metatarsal bone, right foot, initial encounter for closed fracture    Pain, joint, ankle and foot, right    Right hip pain    Trochanteric bursitis of left hip    Trochanteric bursitis of right hip    Lumbar back pain with radiculopathy affecting lower extremity    Intervertebral disc disorder with radiculopathy of lumbar region       Past Medical History:   Diagnosis Date    Arthritis     Depression     Endometriosis     GERD (gastroesophageal reflux disease)     Hyperlipidemia     Hypertension     Osteopenia     Pancreatitis 06/2017    Pneumonia        Past Surgical History:   Procedure Laterality Date    ABDOMINAL SURGERY      endometriosis     APPENDECTOMY      BREAST BIOPSY Right 1985    CARPAL TUNNEL RELEASE      COLONOSCOPY      HAND SURGERY      HIP SURGERY      HYSTERECTOMY Bilateral 1985    JOINT REPLACEMENT Bilateral     KNEE    JOINT REPLACEMENT Left     HIP    OOPHORECTOMY Bilateral 1985    MS ARTHROSCOPY SHOULDER SURGICAL BICEPS TENODESIS Right 5/10/2017    Procedure: ARTHROSCOPIC BICEPS TENODESIS WITH ROTATOR CUFF REPAIR ;  Surgeon: Serina Perez MD;  [facility];  Service: Orthopedics    MS Berry Mariscal ACROMIOPLAS Right 5/10/2017    Procedure: SHOULDER ARTHROSCOPY SUBACROMIAL DECOMPRESSION;  Surgeon: Maame Miller MD;  [facility];  Service: Orthopedics    CA TOTAL HIP ARTHROPLASTY Left 3/12/2018    Procedure: ARTHROPLASTY HIP TOTAL;  Surgeon: Eloisa Howard MD;  [facility];  Service: Orthopedics    CA TOTAL HIP ARTHROPLASTY Right 7/30/2018    Procedure: RIGHT TOTAL HIP ARTHROPLASTY;  Surgeon: Eloisa Howard MD;  [facility];  Service: Orthopedics    TONSILLECTOMY           Current Outpatient Medications:     atorvastatin (LIPITOR) 20 mg tablet, Take 1 tablet (20 mg total) by mouth daily for 90 days, Disp: 90 tablet, Rfl: 3    calcium-vitamin D (OSCAL) 250-125 MG-UNIT per tablet, Take 1 tablet by mouth daily, Disp: , Rfl:     Cholecalciferol (VITAMIN D) 2000 units tablet, Take 2,000 Units by mouth daily, Disp: , Rfl:     FLUoxetine (PROzac) 20 mg capsule, TAKE 1 CAPSULE BY MOUTH  DAILY, Disp: 90 capsule, Rfl: 0    gabapentin (NEURONTIN) 300 mg capsule, Take 1 tablet twice daily for 5 days then 1 tablet 3 times daily, Disp: 90 capsule, Rfl: 2    hydrochlorothiazide (HYDRODIURIL) 25 mg tablet, Take 1 tablet (25 mg total) by mouth daily, Disp: 90 tablet, Rfl: 0    hydrochlorothiazide (HYDRODIURIL) 25 mg tablet, TAKE 1 TABLET BY MOUTH  DAILY, Disp: 90 tablet, Rfl: 0    Lorcaserin HCl 10 MG TABS, Take 1 tablet (10 mg total) by mouth 2 (two) times a day for 30 days, Disp: 60 tablet, Rfl: 3    Lorcaserin HCl ER (BELVIQ XR) 20 MG TB24, Take 1 tablet (20 mg total) by mouth daily, Disp: 30 tablet, Rfl: 3    methocarbamol (ROBAXIN) 750 mg tablet, Take 1 tablet (750 mg total) by mouth 2 (two) times a day as needed for muscle spasms, Disp: 60 tablet, Rfl: 2    metoprolol succinate (TOPROL-XL) 50 mg 24 hr tablet, TAKE 1 TABLET BY MOUTH  DAILY, Disp: 90 tablet, Rfl: 0    Multiple Vitamins-Minerals (CENTRUM SILVER ULTRA WOMENS PO), Take by mouth, Disp: , Rfl:     Omega-3 1000 MG CAPS, Take by mouth daily , Disp: , Rfl:     omeprazole (PriLOSEC) 20 mg delayed release capsule, Take 1 capsule (20 mg total) by mouth every morning, Disp: 90 capsule, Rfl: 0    omeprazole (PriLOSEC) 20 mg delayed release capsule, TAKE 1 CAPSULE BY MOUTH  EVERY MORNING, Disp: 90 capsule, Rfl: 0    Current Facility-Administered Medications:     bupivacaine (PF) (MARCAINE) 0 25 % injection 10 mL, 10 mL, Perineural, Once, Jodee Barrow MD    Allergies   Allergen Reactions    Hydromorphone Hcl Itching    Percocet [Oxycodone-Acetaminophen] GI Intolerance    Sulfamethoxazole-Trimethoprim Rash       Physical Exam:   Vitals:    10/29/19 0759   BP: 108/71   Pulse: 66   Resp: 18   Temp: 98 2 °F (36 8 °C)   SpO2: 96%     General: Awake, Alert, Oriented x 3, Mood and affect appropriate  Respiratory: Respirations even and unlabored  Cardiovascular: Peripheral pulses intact; no edema  Musculoskeletal Exam:   Lower back tenderness    ASA Score: 2    Patient/Chart Verification  Patient ID Verified: Verbal  ID Band Applied: No  Consents Confirmed: Procedural, To be obtained in the Pre-Procedure area  H&P( within 30 days) Verified: To be obtained in the Pre-Procedure area  Allergies Reviewed: Yes  Anticoag/NSAID held?: No  Currently on antibiotics?: No    Assessment:   1   Lumbar spondylosis        Plan: Bilateral L3-5 MBB

## 2019-11-05 NOTE — TELEPHONE ENCOUNTER
Pt called stating she dropped off a pain diary to the  on Thursday 10/31/19 and is still waiting for the results    Please advise    Pt can be reached at [phone]

## 2019-11-06 ENCOUNTER — TELEPHONE (OUTPATIENT)
Dept: FAMILY MEDICINE CLINIC | Facility: CLINIC | Age: 69
End: 2019-11-06

## 2019-11-06 DIAGNOSIS — E06.0 ACUTE THYROIDITIS: Primary | ICD-10-CM

## 2019-11-06 NOTE — TELEPHONE ENCOUNTER
Patient called stating she wants to repeat her anti-thyroglobulin antibody  Can an order please be placed? Will go to [facility]  Would like a call when completed

## 2019-11-07 ENCOUNTER — APPOINTMENT (OUTPATIENT)
Dept: LAB | Facility: CLINIC | Age: 69
End: 2019-11-07
Payer: MEDICARE

## 2019-11-07 DIAGNOSIS — E06.0 ACUTE THYROIDITIS: ICD-10-CM

## 2019-11-07 PROCEDURE — 86800 THYROGLOBULIN ANTIBODY: CPT

## 2019-11-07 PROCEDURE — 84432 ASSAY OF THYROGLOBULIN: CPT

## 2019-11-14 ENCOUNTER — TELEPHONE (OUTPATIENT)
Dept: FAMILY MEDICINE CLINIC | Facility: CLINIC | Age: 69
End: 2019-11-14

## 2019-11-14 NOTE — TELEPHONE ENCOUNTER
Called [facility] and the thyroglobulin by ARIEL is still pending and will be a few more days , I call pt and let her know we will call her as soon as we get results

## 2019-11-14 NOTE — TELEPHONE ENCOUNTER
Patient is still waiting on results of her blood work from last week   Its still pending in system     Please call her

## 2019-11-20 DIAGNOSIS — M51.16 INTERVERTEBRAL DISC DISORDER WITH RADICULOPATHY OF LUMBAR REGION: ICD-10-CM

## 2019-11-20 NOTE — TELEPHONE ENCOUNTER
Pt contacted Call Center requested refill of their medication  Medication Name:  methocarbamol (ROBAXIN) 750 mg tablet    Frequency of Med:  Sig: Take 1 tablet (750 mg total) by mouth 2 (two) times a day as needed for muscle spasms    Remaining Medication:  8 pills    Pharmacy and Location:  [facility] [id number], [address], [place], [place]    Pt   Preferred Callback Phone Number:  [phone]

## 2019-11-21 LAB
THYROGLOB AB SERPL-ACNC: 9.7 IU/ML (ref 0–0.9)
THYROGLOB SERPL-MCNC: 5.3 NG/ML

## 2019-11-21 RX ORDER — METHOCARBAMOL 750 MG/1
750 TABLET, FILM COATED ORAL 2 TIMES DAILY PRN
Qty: 60 TABLET | Refills: 2 | Status: SHIPPED | OUTPATIENT
Start: 2019-11-21 | End: 2020-04-01 | Stop reason: SDUPTHER

## 2019-11-21 NOTE — TELEPHONE ENCOUNTER
S/w pt she is requesting a RF of Robaxin 750 mg BID, pt said it helps  Pt denies s/e  Pt would like it sent to [place] on file      **No need to c/b once sent

## 2019-11-25 ENCOUNTER — TELEPHONE (OUTPATIENT)
Dept: FAMILY MEDICINE CLINIC | Facility: CLINIC | Age: 69
End: 2019-11-25

## 2019-11-25 NOTE — TELEPHONE ENCOUNTER
Dr Darrion Borjas, [facility] doctor is retiring, can you recommend a new [address] one  She lives in [place]

## 2019-12-05 ENCOUNTER — OFFICE VISIT (OUTPATIENT)
Dept: FAMILY MEDICINE CLINIC | Facility: CLINIC | Age: 69
End: 2019-12-05
Payer: MEDICARE

## 2019-12-05 VITALS
TEMPERATURE: 98.4 F | WEIGHT: 193 LBS | HEIGHT: 59 IN | HEART RATE: 70 BPM | SYSTOLIC BLOOD PRESSURE: 118 MMHG | DIASTOLIC BLOOD PRESSURE: 78 MMHG | BODY MASS INDEX: 38.91 KG/M2

## 2019-12-05 DIAGNOSIS — M54.16 LUMBAR BACK PAIN WITH RADICULOPATHY AFFECTING LOWER EXTREMITY: ICD-10-CM

## 2019-12-05 DIAGNOSIS — I10 ESSENTIAL HYPERTENSION: ICD-10-CM

## 2019-12-05 DIAGNOSIS — Z00.00 MEDICARE ANNUAL WELLNESS VISIT, SUBSEQUENT: Primary | ICD-10-CM

## 2019-12-05 DIAGNOSIS — E78.2 MIXED HYPERLIPIDEMIA: ICD-10-CM

## 2019-12-05 PROBLEM — Z96.643 STATUS POST TOTAL REPLACEMENT OF BOTH HIPS: Status: RESOLVED | Noted: 2018-03-13 | Resolved: 2019-12-05

## 2019-12-05 PROBLEM — M70.62 TROCHANTERIC BURSITIS OF LEFT HIP: Status: RESOLVED | Noted: 2019-02-12 | Resolved: 2019-12-05

## 2019-12-05 PROBLEM — Z96.641 S/P HIP REPLACEMENT, RIGHT: Status: RESOLVED | Noted: 2018-07-31 | Resolved: 2019-12-05

## 2019-12-05 PROBLEM — M25.551 RIGHT HIP PAIN: Status: RESOLVED | Noted: 2018-12-04 | Resolved: 2019-12-05

## 2019-12-05 PROBLEM — S92.354A NONDISPLACED FRACTURE OF FIFTH METATARSAL BONE, RIGHT FOOT, INITIAL ENCOUNTER FOR CLOSED FRACTURE: Status: RESOLVED | Noted: 2018-09-24 | Resolved: 2019-12-05

## 2019-12-05 PROBLEM — M70.61 TROCHANTERIC BURSITIS OF RIGHT HIP: Status: RESOLVED | Noted: 2019-02-12 | Resolved: 2019-12-05

## 2019-12-05 PROCEDURE — 99214 OFFICE O/P EST MOD 30 MIN: CPT | Performed by: NURSE PRACTITIONER

## 2019-12-05 PROCEDURE — G0439 PPPS, SUBSEQ VISIT: HCPCS | Performed by: NURSE PRACTITIONER

## 2019-12-05 NOTE — PROGRESS NOTES
Assessment/Plan:     Diagnoses and all orders for this visit:    Medicare annual wellness visit, subsequent    Essential hypertension  -     Comprehensive metabolic panel; Future    Mixed hyperlipidemia  -     Lipid Panel with Direct LDL reflex; Future    Lumbar back pain with radiculopathy affecting lower extremity    #1 Medicare annual wellness visit, subsequent  #2 Essential hypertension  Stable - continue on current medication and recheck in 6 months  #3 Mixed hyperlipidemia  Well controlled - continue on current medication and recheck in 6 months  #4 Lumbar back pain with radiculopathy affecting lower extermity  Patient is following with pain management with nerve ablation scheduled for December 12th and 23rd with Dr Sunday Harrison  Subjective:      Patient ID: Lani Navarro is a 81 y o  female  81 y  o female presenting for her Medicare annual wellness visit along with routine follow up on chronic medical conditions  Patient had recent lab work performed on 10/22/2019, so result reviewed with her  Her lipid panel, comprehensive metabolic panel and Vitamin d level were all within normal limits  Patient as recently been told she as chronic thyroiditis  Which she believes is effecting her weight loss attempts  She is currently on Belviq for weight loss but may take a break from the medication until lumbar issues are managed  She reports having nerve ablation scheduled for December 12 and 23, 2019 with Dr Sunday Harrison  Patient's blood pressure as been stable with no complaints of headache, chest pain, palpitations, shortness of breath or other CV symptoms  Patient's health maintenance reviewed with her she declined the pneumococcal -23 vaccine and does not want to go for DXA scan due to CT of spine showed the osteopenia  She is in the process of meeting with Dr Dariel Cardenas on 12/30/19 to schedule colonoscopy           Family History   Problem Relation Age of Onset    Atrial fibrillation Mother     Breast cancer Mother 79    Stroke Mother     Coronary artery disease Mother     Diabetes Mother     Pancreatitis Mother     Heart attack Father     Arthritis Family     Cancer Family     Endometrial cancer Maternal Grandmother 76    Prostate cancer Paternal Uncle 54     Social History     Socioeconomic History    Marital status: /Civil Union     Spouse name: Not on file    Number of children: Not on file    Years of education: Not on file    Highest education level: Not on file   Occupational History    Not on file   Social Needs    Financial resource strain: Not on file    Food insecurity:     Worry: Not on file     Inability: Not on file    Transportation needs:     Medical: Not on file     Non-medical: Not on file   Tobacco Use    Smoking status: Never Smoker    Smokeless tobacco: Never Used   Substance and Sexual Activity    Alcohol use: Yes     Comment: 1-2x / year    Drug use: No    Sexual activity: Not on file   Lifestyle    Physical activity:     Days per week: Not on file     Minutes per session: Not on file    Stress: Not on file   Relationships    Social connections:     Talks on phone: Not on file     Gets together: Not on file     Attends Restoration service: Not on file     Active member of club or organization: Not on file     Attends meetings of clubs or organizations: Not on file     Relationship status: Not on file    Intimate partner violence:     Fear of current or ex partner: Not on file     Emotionally abused: Not on file     Physically abused: Not on file     Forced sexual activity: Not on file   Other Topics Concern    Not on file   Social History Narrative    Drinks coffee     Past Medical History:   Diagnosis Date    Arthritis     Depression     Endometriosis     GERD (gastroesophageal reflux disease)     Hyperlipidemia     Hypertension     Nondisplaced fracture of fifth metatarsal bone, right foot, initial encounter for closed fracture 9/24/2018    Osteopenia     Pancreatitis 06/2017    Pneumonia     Right hip pain 12/4/2018    S/P hip replacement, right 7/31/2018    Patient progressing well after right hip replacement     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    Trochanteric bursitis of right hip 2/12/2019     Past Surgical History:   Procedure Laterality Date    ABDOMINAL SURGERY      endometriosis     APPENDECTOMY      BREAST BIOPSY Right 1985    CARPAL TUNNEL RELEASE      COLONOSCOPY      HAND SURGERY      HIP SURGERY      HYSTERECTOMY Bilateral 1985    JOINT REPLACEMENT Bilateral     KNEE    JOINT REPLACEMENT Left     HIP    OOPHORECTOMY Bilateral 1985    MD ARTHROSCOPY SHOULDER SURGICAL BICEPS TENODESIS Right 5/10/2017    Procedure: ARTHROSCOPIC BICEPS TENODESIS WITH ROTATOR CUFF REPAIR ;  Surgeon: Jake Poe MD;  [facility];  Service: Orthopedics    MD SHLDR Maurice Lucas Right 5/10/2017    Procedure: SHOULDER ARTHROSCOPY SUBACROMIAL DECOMPRESSION;  Surgeon: Jake Poe MD;  [facility];  Service: Orthopedics    MD TOTAL HIP ARTHROPLASTY Left 3/12/2018    Procedure: ARTHROPLASTY HIP TOTAL;  Surgeon: Ta Plata MD;  [facility];  Service: Orthopedics    MD TOTAL HIP ARTHROPLASTY Right 7/30/2018    Procedure: RIGHT TOTAL HIP ARTHROPLASTY;  Surgeon: Ta Plata MD;  [facility];  Service: Orthopedics    TONSILLECTOMY       Allergies   Allergen Reactions    Hydromorphone Hcl Itching    Percocet [Oxycodone-Acetaminophen] GI Intolerance    Sulfamethoxazole-Trimethoprim Rash       Current Outpatient Medications:     atorvastatin (LIPITOR) 20 mg tablet, Take 1 tablet (20 mg total) by mouth daily for 90 days, Disp: 90 tablet, Rfl: 3    calcium-vitamin D (OSCAL) 250-125 MG-UNIT per tablet, Take 1 tablet by mouth daily, Disp: , Rfl:     Cholecalciferol (VITAMIN D) 2000 units tablet, Take 2,000 Units by mouth daily, Disp: , Rfl:     FLUoxetine (PROzac) 20 mg capsule, TAKE 1 CAPSULE BY MOUTH  DAILY, Disp: 90 capsule, Rfl: 0    gabapentin (NEURONTIN) 300 mg capsule, Take 1 tablet twice daily for 5 days then 1 tablet 3 times daily, Disp: 90 capsule, Rfl: 2    hydrochlorothiazide (HYDRODIURIL) 25 mg tablet, Take 1 tablet (25 mg total) by mouth daily, Disp: 90 tablet, Rfl: 0    Lorcaserin HCl ER (BELVIQ XR) 20 MG TB24, Take 1 tablet (20 mg total) by mouth daily, Disp: 30 tablet, Rfl: 3    methocarbamol (ROBAXIN) 750 mg tablet, Take 1 tablet (750 mg total) by mouth 2 (two) times a day as needed for muscle spasms, Disp: 60 tablet, Rfl: 2    metoprolol succinate (TOPROL-XL) 50 mg 24 hr tablet, TAKE 1 TABLET BY MOUTH  DAILY, Disp: 90 tablet, Rfl: 0    Multiple Vitamins-Minerals (CENTRUM SILVER ULTRA WOMENS PO), Take by mouth, Disp: , Rfl:     Omega-3 1000 MG CAPS, Take by mouth daily , Disp: , Rfl:     omeprazole (PriLOSEC) 20 mg delayed release capsule, Take 1 capsule (20 mg total) by mouth every morning, Disp: 90 capsule, Rfl: 0    Review of Systems   Constitutional: Positive for fatigue ( Chronic thyroiditis)  Negative for activity change, appetite change and unexpected weight change  Eyes: Negative for visual disturbance  Respiratory: Negative for cough, chest tightness, shortness of breath and wheezing  Cardiovascular: Negative for chest pain, palpitations and leg swelling  Gastrointestinal: Negative for abdominal distention, abdominal pain, constipation, diarrhea and nausea  Endocrine: Negative for polydipsia and polyuria  Genitourinary: Negative for difficulty urinating, frequency and urgency  Musculoskeletal: Positive for back pain  Skin: Negative for color change  Allergic/Immunologic: Negative for environmental allergies  Neurological: Negative for dizziness, weakness, light-headedness and headaches  Psychiatric/Behavioral: Negative for dysphoric mood and sleep disturbance  The patient is not nervous/anxious          Objective:    /78   Pulse 70 Temp 98 4 °F (36 9 °C)   Ht 4' 11" (1 499 m)   Wt 87 5 kg (193 lb)   LMP  (LMP Unknown) Comment: hysterectomy  BMI 38 98 kg/m² (Reviewed)     Physical Exam   Constitutional: She is oriented to person, place, and time  Vital signs are normal  She appears well-developed and well-nourished  HENT:   Head: Normocephalic and atraumatic  Right Ear: Tympanic membrane, external ear and ear canal normal    Left Ear: Tympanic membrane, external ear and ear canal normal    Nose: Nose normal    Mouth/Throat: Uvula is midline, oropharynx is clear and moist and mucous membranes are normal    Eyes: Pupils are equal, round, and reactive to light  Conjunctivae, EOM and lids are normal    Neck: Trachea normal and normal range of motion  Neck supple  Cardiovascular: Normal rate, regular rhythm, normal heart sounds and intact distal pulses  Pulmonary/Chest: Effort normal and breath sounds normal    Abdominal: Soft  Bowel sounds are normal  She exhibits no distension  There is no tenderness  Musculoskeletal:        Lumbar back: She exhibits decreased range of motion and pain  She exhibits no spasm and normal pulse  Lymphadenopathy:     She has no cervical adenopathy  Neurological: She is alert and oriented to person, place, and time  She has normal strength  No cranial nerve deficit  Skin: Skin is warm and dry  Capillary refill takes less than 2 seconds  Psychiatric: She has a normal mood and affect   Her behavior is normal

## 2019-12-05 NOTE — PROGRESS NOTES
Assessment and Plan:     Problem List Items Addressed This Visit        Cardiovascular and Mediastinum    Hypertension    Relevant Orders    Comprehensive metabolic panel       Nervous and Auditory    Lumbar back pain with radiculopathy affecting lower extremity       Other    Hyperlipidemia    Relevant Orders    Lipid Panel with Direct LDL reflex      Other Visit Diagnoses     Medicare annual wellness visit, subsequent    -  Primary           Preventive health issues were discussed with patient, and age appropriate screening tests were ordered as noted in patient's After Visit Summary  Personalized health advice and appropriate referrals for health education or preventive services given if needed, as noted in patient's After Visit Summary       History of Present Illness:     Patient presents for Medicare Annual Wellness visit    Patient Care Team:  Mable Allan DO as PCP - General (Family Medicine)  DO Lillian Jacobs MD (Pain Medicine)     Problem List:     Patient Active Problem List   Diagnosis    Incomplete tear of right rotator cuff    Hypertension    Hyperlipidemia    Depression    Primary osteoarthritis of left hip    Primary osteoarthritis of one hip, right    Renal insufficiency    Pain, joint, ankle and foot, right    Lumbar back pain with radiculopathy affecting lower extremity    Intervertebral disc disorder with radiculopathy of lumbar region    Lumbar spondylosis      Past Medical and Surgical History:     Past Medical History:   Diagnosis Date    Arthritis     Depression     Endometriosis     GERD (gastroesophageal reflux disease)     Hyperlipidemia     Hypertension     Nondisplaced fracture of fifth metatarsal bone, right foot, initial encounter for closed fracture 9/24/2018    Osteopenia     Pancreatitis 06/2017    Pneumonia     Right hip pain 12/4/2018    S/P hip replacement, right 7/31/2018    Patient progressing well after right hip replacement  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    Trochanteric bursitis of right hip 2/12/2019     Past Surgical History:   Procedure Laterality Date    ABDOMINAL SURGERY      endometriosis     APPENDECTOMY      BREAST BIOPSY Right 1985    CARPAL TUNNEL RELEASE      COLONOSCOPY      HAND SURGERY      HIP SURGERY      HYSTERECTOMY Bilateral 1985    JOINT REPLACEMENT Bilateral     KNEE    JOINT REPLACEMENT Left     HIP    OOPHORECTOMY Bilateral 1985    LA ARTHROSCOPY SHOULDER SURGICAL BICEPS TENODESIS Right 5/10/2017    Procedure: ARTHROSCOPIC BICEPS TENODESIS WITH ROTATOR CUFF REPAIR ;  Surgeon: Richar Braun MD;  [facility];  Service: Orthopedics    LA MERRICK Carrie Rodas Right 5/10/2017    Procedure: SHOULDER ARTHROSCOPY SUBACROMIAL DECOMPRESSION;  Surgeon: Richar Braun MD;  [facility];  Service: Orthopedics    LA TOTAL HIP ARTHROPLASTY Left 3/12/2018    Procedure: ARTHROPLASTY HIP TOTAL;  Surgeon: Army Carlene MD;  [facility];  Service: Orthopedics    LA TOTAL HIP ARTHROPLASTY Right 7/30/2018    Procedure: RIGHT TOTAL HIP ARTHROPLASTY;  Surgeon: Army Carlene MD;  [facility];  Service: Orthopedics    TONSILLECTOMY        Family History:     Family History   Problem Relation Age of Onset    Atrial fibrillation Mother     Breast cancer Mother 76    Stroke Mother     Coronary artery disease Mother     Diabetes Mother     Pancreatitis Mother     Heart attack Father     Arthritis Family     Cancer Family     Endometrial cancer Maternal Grandmother 76    Prostate cancer Paternal Uncle 54      Social History:     Social History     Socioeconomic History    Marital status: /Civil Union     Spouse name: None    Number of children: None    Years of education: None    Highest education level: None   Occupational History    None   Social Needs    Financial resource strain: None    Food insecurity:     Worry: None     Inability: None    Transportation needs:     Medical: None     Non-medical: None   Tobacco Use    Smoking status: Never Smoker    Smokeless tobacco: Never Used   Substance and Sexual Activity    Alcohol use: Yes     Comment: 1-2x / year    Drug use: No    Sexual activity: None   Lifestyle    Physical activity:     Days per week: None     Minutes per session: None    Stress: None   Relationships    Social connections:     Talks on phone: None     Gets together: None     Attends Restorationist service: None     Active member of club or organization: None     Attends meetings of clubs or organizations: None     Relationship status: None    Intimate partner violence:     Fear of current or ex partner: None     Emotionally abused: None     Physically abused: None     Forced sexual activity: None   Other Topics Concern    None   Social History Narrative    Drinks coffee       Medications and Allergies:     Current Outpatient Medications   Medication Sig Dispense Refill    atorvastatin (LIPITOR) 20 mg tablet Take 1 tablet (20 mg total) by mouth daily for 90 days 90 tablet 3    calcium-vitamin D (OSCAL) 250-125 MG-UNIT per tablet Take 1 tablet by mouth daily      Cholecalciferol (VITAMIN D) 2000 units tablet Take 2,000 Units by mouth daily      FLUoxetine (PROzac) 20 mg capsule TAKE 1 CAPSULE BY MOUTH  DAILY 90 capsule 0    gabapentin (NEURONTIN) 300 mg capsule Take 1 tablet twice daily for 5 days then 1 tablet 3 times daily 90 capsule 2    hydrochlorothiazide (HYDRODIURIL) 25 mg tablet Take 1 tablet (25 mg total) by mouth daily 90 tablet 0    Lorcaserin HCl ER (BELVIQ XR) 20 MG TB24 Take 1 tablet (20 mg total) by mouth daily 30 tablet 3    methocarbamol (ROBAXIN) 750 mg tablet Take 1 tablet (750 mg total) by mouth 2 (two) times a day as needed for muscle spasms 60 tablet 2    metoprolol succinate (TOPROL-XL) 50 mg 24 hr tablet TAKE 1 TABLET BY MOUTH  DAILY 90 tablet 0    Multiple Vitamins-Minerals (CENTRUM SILVER ULTRA WOMENS PO) Take by mouth      Omega-3 1000 MG CAPS Take by mouth daily       omeprazole (PriLOSEC) 20 mg delayed release capsule Take 1 capsule (20 mg total) by mouth every morning 90 capsule 0     No current facility-administered medications for this visit  Allergies   Allergen Reactions    Hydromorphone Hcl Itching    Percocet [Oxycodone-Acetaminophen] GI Intolerance    Sulfamethoxazole-Trimethoprim Rash      Immunizations:     Immunization History   Administered Date(s) Administered    INFLUENZA 11/05/2015, 10/18/2016, 10/04/2017, 10/08/2018    Influenza Quadrivalent, 6-35 Months IM 10/15/2014, 11/05/2015, 10/18/2016    Pneumococcal Conjugate 13-Valent 07/12/2017, 10/04/2017    Pneumococcal Polysaccharide PPV23 10/01/2013, 01/01/2014    Tdap 06/24/2015    Zoster 01/01/2012, 01/01/2012    influenza, trivalent, adjuvanted 09/19/2019      Health Maintenance:         Topic Date Due    DXA SCAN  1950    CRC Screening: Colonoscopy  1950    MAMMOGRAM  01/16/2020    Hepatitis C Screening  Completed         Topic Date Due    Pneumococcal Vaccine: 65+ Years (2 of 2 - PPSV23) 01/01/2019      Medicare Health Risk Assessment:     /78   Pulse 70   Temp 98 4 °F (36 9 °C)   Ht 4' 11" (1 499 m)   Wt 87 5 kg (193 lb)   LMP  (LMP Unknown) Comment: hysterectomy  BMI 38 98 kg/m²      Syed Barth is here for her Subsequent Wellness visit  Health Risk Assessment:   Patient rates overall health as very good  Patient feels that their physical health rating is slightly worse  Eyesight was rated as same  Hearing was rated as same  Patient feels that their emotional and mental health rating is same  Pain experienced in the last 7 days has been a lot  Patient's pain rating has been 8/10  Patient states that she has experienced no weight loss or gain in last 6 months  Depression Screening:   PHQ-2 Score: 0  PHQ-9 Score: 0      Fall Risk Screening:    In the past year, patient has experienced: no history of falling in past year      Urinary Incontinence Screening:   Patient has not leaked urine accidently in the last six months  Home Safety:  Patient has trouble with stairs inside or outside of their home  Patient has working smoke alarms and has working carbon monoxide detector  Home safety hazards include: none  Nutrition:   Current diet is Regular, Low Cholesterol, Low Saturated Fat, Low Carb and Limited junk food  Medications:   Patient is currently taking over-the-counter supplements  OTC medications include: see medication list  Patient is able to manage medications  Activities of Daily Living (ADLs)/Instrumental Activities of Daily Living (IADLs):   Walk and transfer into and out of bed and chair?: Yes  Dress and groom yourself?: Yes    Bathe or shower yourself?: Yes    Feed yourself? Yes  Do your laundry/housekeeping?: Yes  Manage your money, pay your bills and track your expenses?: Yes  Make your own meals?: Yes    Do your own shopping?: Yes    Previous Hospitalizations:   Any hospitalizations or ED visits within the last 12 months?: No      Advance Care Planning:   Living will: Yes    Durable POA for healthcare:  Yes    Advanced directive: Yes      Cognitive Screening:   Provider or family/friend/caregiver concerned regarding cognition?: No    PREVENTIVE SCREENINGS      Cardiovascular Screening:    General: Screening Not Indicated and History Lipid Disorder      Diabetes Screening:     General: Screening Current      Colorectal Cancer Screening:     General: Risks and Benefits Discussed    Due for: Colonoscopy - Low Risk      Breast Cancer Screening:     General: Screening Current      Cervical Cancer Screening:    General: Screening Not Indicated      Osteoporosis Screening:    General: Risks and Benefits Discussed and Screening Current      Abdominal Aortic Aneurysm (AAA) Screening:        General: Screening Not Indicated      Lung Cancer Screening: General: Screening Not Indicated      Hepatitis C Screening:    General: Screening Current    Other Counseling Topics:   Alcohol use counseling, car/seat belt/driving safety and calcium and vitamin D intake and regular weightbearing exercise         [id number]

## 2019-12-05 NOTE — PATIENT INSTRUCTIONS
Medicare Preventive Visit Patient Instructions  Thank you for completing your Welcome to Medicare Visit or Medicare Annual Wellness Visit today  Your next wellness visit will be due in one year (12/5/2020)  The screening/preventive services that you may require over the next 5-10 years are detailed below  Some tests may not apply to you based off risk factors and/or age  Screening tests ordered at today's visit but not completed yet may show as past due  Also, please note that scanned in results may not display below  Preventive Screenings:  Service Recommendations Previous Testing/Comments   Colorectal Cancer Screening  * Colonoscopy    * Fecal Occult Blood Test (FOBT)/Fecal Immunochemical Test (FIT)  * Fecal DNA/Cologuard Test  * Flexible Sigmoidoscopy Age: 54-65 years old   Colonoscopy: every 10 years (may be performed more frequently if at higher risk)  OR  FOBT/FIT: every 1 year  OR  Cologuard: every 3 years  OR  Sigmoidoscopy: every 5 years  Screening may be recommended earlier than age 48 if at higher risk for colorectal cancer  Also, an individualized decision between you and your healthcare provider will decide whether screening between the ages of 74-80 would be appropriate  Colonoscopy: Not on file  FOBT/FIT: Not on file  Cologuard: Not on file  Sigmoidoscopy: Not on file         Breast Cancer Screening Age: 36 years old  Frequency: every 1-2 years  Not required if history of left and right mastectomy Mammogram: 01/16/2019    Screening Current   Cervical Cancer Screening Between the ages of 21-29, pap smear recommended once every 3 years  Between the ages of 33-67, can perform pap smear with HPV co-testing every 5 years     Recommendations may differ for women with a history of total hysterectomy, cervical cancer, or abnormal pap smears in past  Pap Smear: Not on file    Screening Not Indicated   Hepatitis C Screening Once for adults born between 1945 and 1965  More frequently in patients at high risk for Hepatitis C Hep C Antibody: 07/05/2018    Screening Current   Diabetes Screening 1-2 times per year if you're at risk for diabetes or have pre-diabetes Fasting glucose: 87 mg/dL   A1C: 5 5 %    Screening Current   Cholesterol Screening Once every 5 years if you don't have a lipid disorder  May order more often based on risk factors  Lipid panel: 10/22/2019    Screening Not Indicated  History Lipid Disorder     Other Preventive Screenings Covered by Medicare:  1  Abdominal Aortic Aneurysm (AAA) Screening: covered once if your at risk  You're considered to be at risk if you have a family history of AAA  2  Lung Cancer Screening: covers low dose CT scan once per year if you meet all of the following conditions: (1) Age 50-69; (2) No signs or symptoms of lung cancer; (3) Current smoker or have quit smoking within the last 15 years; (4) You have a tobacco smoking history of at least 30 pack years (packs per day multiplied by number of years you smoked); (5) You get a written order from a healthcare provider  3  Glaucoma Screening: covered annually if you're considered high risk: (1) You have diabetes OR (2) Family history of glaucoma OR (3)  aged 48 and older OR (3)  American aged 72 and older  3  Osteoporosis Screening: covered every 2 years if you meet one of the following conditions: (1) You're estrogen deficient and at risk for osteoporosis based off medical history and other findings; (2) Have a vertebral abnormality; (3) On glucocorticoid therapy for more than 3 months; (4) Have primary hyperparathyroidism; (5) On osteoporosis medications and need to assess response to drug therapy  · Last bone density test (DXA Scan): Not on file  5  HIV Screening: covered annually if you're between the age of 12-76  Also covered annually if you are younger than 13 and older than 72 with risk factors for HIV infection   For pregnant patients, it is covered up to 3 times per pregnancy  Immunizations:  Immunization Recommendations   Influenza Vaccine Annual influenza vaccination during flu season is recommended for all persons aged >= 6 months who do not have contraindications   Pneumococcal Vaccine (Prevnar and Pneumovax)  * Prevnar = PCV13  * Pneumovax = PPSV23   Adults 25-60 years old: 1-3 doses may be recommended based on certain risk factors  Adults 72 years old: Prevnar (PCV13) vaccine recommended followed by Pneumovax (PPSV23) vaccine  If already received PPSV23 since turning 65, then PCV13 recommended at least one year after PPSV23 dose  Hepatitis B Vaccine 3 dose series if at intermediate or high risk (ex: diabetes, end stage renal disease, liver disease)   Tetanus (Td) Vaccine - COST NOT COVERED BY MEDICARE PART B Following completion of primary series, a booster dose should be given every 10 years to maintain immunity against tetanus  Td may also be given as tetanus wound prophylaxis  Tdap Vaccine - COST NOT COVERED BY MEDICARE PART B Recommended at least once for all adults  For pregnant patients, recommended with each pregnancy  Shingles Vaccine (Shingrix) - COST NOT COVERED BY MEDICARE PART B  2 shot series recommended in those aged 48 and above     Health Maintenance Due:      Topic Date Due    DXA SCAN  1950    CRC Screening: Colonoscopy  1950    Cervical Cancer Screening  03/05/1973    MAMMOGRAM  01/16/2020    Hepatitis C Screening  Completed     Immunizations Due:      Topic Date Due    Pneumococcal Vaccine: 65+ Years (2 of 2 - PPSV23) 01/01/2019     Advance Directives   What are advance directives? Advance directives are legal documents that state your wishes and plans for medical care  These plans are made ahead of time in case you lose your ability to make decisions for yourself  Advance directives can apply to any medical decision, such as the treatments you want, and if you want to donate organs     What are the types of advance directives? There are many types of advance directives, and each state has rules about how to use them  You may choose a combination of any of the following:  · Living will: This is a written record of the treatment you want  You can also choose which treatments you do not want, which to limit, and which to stop at a certain time  This includes surgery, medicine, IV fluid, and tube feedings  · Durable power of  for healthcare [facility]): This is a written record that states who you want to make healthcare choices for you when you are unable to make them for yourself  This person, called a proxy, is usually a family member or a friend  You may choose more than 1 proxy  · Do not resuscitate (DNR) order:  A DNR order is used in case your heart stops beating or you stop breathing  It is a request not to have certain forms of treatment, such as CPR  A DNR order may be included in other types of advance directives  · Medical directive: This covers the care that you want if you are in a coma, near death, or unable to make decisions for yourself  You can list the treatments you want for each condition  Treatment may include pain medicine, surgery, blood transfusions, dialysis, IV or tube feedings, and a ventilator (breathing machine)  · Values history: This document has questions about your views, beliefs, and how you feel and think about life  This information can help others choose the care that you would choose  Why are advance directives important? An advance directive helps you control your care  Although spoken wishes may be used, it is better to have your wishes written down  Spoken wishes can be misunderstood, or not followed  Treatments may be given even if you do not want them  An advance directive may make it easier for your family to make difficult choices about your care     Weight Management   Why it is important to manage your weight:  Being overweight increases your risk of health conditions such as heart disease, high blood pressure, type 2 diabetes, and certain types of cancer  It can also increase your risk for osteoarthritis, sleep apnea, and other respiratory problems  Aim for a slow, steady weight loss  Even a small amount of weight loss can lower your risk of health problems  How to lose weight safely:  A safe and healthy way to lose weight is to eat fewer calories and get regular exercise  You can lose up about 1 pound a week by decreasing the number of calories you eat by 500 calories each day  Healthy meal plan for weight management:  A healthy meal plan includes a variety of foods, contains fewer calories, and helps you stay healthy  A healthy meal plan includes the following:  · Eat whole-grain foods more often  A healthy meal plan should contain fiber  Fiber is the part of grains, fruits, and vegetables that is not broken down by your body  Whole-grain foods are healthy and provide extra fiber in your diet  Some examples of whole-grain foods are whole-wheat breads and pastas, oatmeal, brown rice, and bulgur  · Eat a variety of vegetables every day  Include dark, leafy greens such as spinach, kale, brianne greens, and mustard greens  Eat yellow and orange vegetables such as carrots, sweet potatoes, and winter squash  · Eat a variety of fruits every day  Choose fresh or canned fruit (canned in its own juice or light syrup) instead of juice  Fruit juice has very little or no fiber  · Eat low-fat dairy foods  Drink fat-free (skim) milk or 1% milk  Eat fat-free yogurt and low-fat cottage cheese  Try low-fat cheeses such as mozzarella and other reduced-fat cheeses  · Choose meat and other protein foods that are low in fat  Choose beans or other legumes such as split peas or lentils  Choose fish, skinless poultry (chicken or turkey), or lean cuts of red meat (beef or pork)  Before you cook meat or poultry, cut off any visible fat  · Use less fat and oil    Try baking foods instead of frying them  Add less fat, such as margarine, sour cream, regular salad dressing and mayonnaise to foods  Eat fewer high-fat foods  Some examples of high-fat foods include french fries, doughnuts, ice cream, and cakes  · Eat fewer sweets  Limit foods and drinks that are high in sugar  This includes candy, cookies, regular soda, and sweetened drinks  Exercise:  Exercise at least 30 minutes per day on most days of the week  Some examples of exercise include walking, biking, dancing, and swimming  You can also fit in more physical activity by taking the stairs instead of the elevator or parking farther away from stores  Ask your healthcare provider about the best exercise plan for you  © Copyright [employer] 2018 Information is for End User's use only and may not be sold, redistributed or otherwise used for commercial purposes   All illustrations and images included in CareNotes® are the copyrighted property of A D A M , Inc  or [address] [place] [address]

## 2019-12-12 ENCOUNTER — TELEPHONE (OUTPATIENT)
Dept: RADIOLOGY | Facility: CLINIC | Age: 69
End: 2019-12-12

## 2019-12-12 ENCOUNTER — HOSPITAL ENCOUNTER (OUTPATIENT)
Dept: RADIOLOGY | Facility: CLINIC | Age: 69
Discharge: HOME | End: 2019-12-12
Attending: ANESTHESIOLOGY | Admitting: ANESTHESIOLOGY
Payer: MEDICARE

## 2019-12-12 VITALS
HEART RATE: 59 BPM | OXYGEN SATURATION: 96 % | SYSTOLIC BLOOD PRESSURE: 158 MMHG | DIASTOLIC BLOOD PRESSURE: 82 MMHG | TEMPERATURE: 97.8 F | RESPIRATION RATE: 20 BRPM

## 2019-12-12 DIAGNOSIS — M47.816 SPONDYLOSIS OF LUMBAR REGION WITHOUT MYELOPATHY OR RADICULOPATHY: ICD-10-CM

## 2019-12-12 PROCEDURE — 64636 DESTROY L/S FACET JNT ADDL: CPT | Performed by: ANESTHESIOLOGY

## 2019-12-12 PROCEDURE — 64635 DESTROY LUMB/SAC FACET JNT: CPT | Performed by: ANESTHESIOLOGY

## 2019-12-12 RX ORDER — 0.9 % SODIUM CHLORIDE 0.9 %
10 VIAL (ML) INJECTION ONCE
Status: COMPLETED | OUTPATIENT
Start: 2019-12-12 | End: 2019-12-12

## 2019-12-12 RX ORDER — BUPIVACAINE HCL/PF 2.5 MG/ML
10 VIAL (ML) INJECTION ONCE
Status: COMPLETED | OUTPATIENT
Start: 2019-12-12 | End: 2019-12-12

## 2019-12-12 RX ORDER — METHYLPREDNISOLONE ACETATE 80 MG/ML
80 INJECTION, SUSPENSION INTRA-ARTICULAR; INTRALESIONAL; INTRAMUSCULAR; PARENTERAL; SOFT TISSUE ONCE
Status: COMPLETED | OUTPATIENT
Start: 2019-12-12 | End: 2019-12-12

## 2019-12-12 RX ADMIN — BUPIVACAINE HYDROCHLORIDE 3 ML: 2.5 INJECTION, SOLUTION EPIDURAL; INFILTRATION; INTRACAUDAL at 08:25

## 2019-12-12 RX ADMIN — Medication 8 ML: at 08:12

## 2019-12-12 RX ADMIN — METHYLPREDNISOLONE ACETATE 20 MG: 80 INJECTION, SUSPENSION INTRA-ARTICULAR; INTRALESIONAL; INTRAMUSCULAR; PARENTERAL; SOFT TISSUE at 08:25

## 2019-12-12 RX ADMIN — SODIUM CHLORIDE 8 ML: 9 INJECTION, SOLUTION INTRAMUSCULAR; INTRAVENOUS; SUBCUTANEOUS at 08:12

## 2019-12-12 NOTE — DISCHARGE INSTR - LAB

## 2019-12-12 NOTE — H&P
History of Present Illness: The patient is a 81 y o  female who presents with complaints of left lower back pain secondary lumbar spondylosis and is here today for left L3-5 medial branch radiofrequency ablation      Patient Active Problem List   Diagnosis    Incomplete tear of right rotator cuff    Hypertension    Hyperlipidemia    Depression    Primary osteoarthritis of left hip    Primary osteoarthritis of one hip, right    Renal insufficiency    Pain, joint, ankle and foot, right    Lumbar back pain with radiculopathy affecting lower extremity    Intervertebral disc disorder with radiculopathy of lumbar region    Lumbar spondylosis       Past Medical History:   Diagnosis Date    Arthritis     Depression     Endometriosis     GERD (gastroesophageal reflux disease)     Hyperlipidemia     Hypertension     Nondisplaced fracture of fifth metatarsal bone, right foot, initial encounter for closed fracture 9/24/2018    Osteopenia     Pancreatitis 06/2017    Pneumonia     Right hip pain 12/4/2018    S/P hip replacement, right 7/31/2018    Patient progressing well after right hip replacement     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    Trochanteric bursitis of right hip 2/12/2019       Past Surgical History:   Procedure Laterality Date    ABDOMINAL SURGERY      endometriosis     APPENDECTOMY      BREAST BIOPSY Right 1985    CARPAL TUNNEL RELEASE      COLONOSCOPY      HAND SURGERY      HIP SURGERY      HYSTERECTOMY Bilateral 1985    JOINT REPLACEMENT Bilateral     KNEE    JOINT REPLACEMENT Left     HIP    OOPHORECTOMY Bilateral 1985    NC ARTHROSCOPY SHOULDER SURGICAL BICEPS TENODESIS Right 5/10/2017    Procedure: ARTHROSCOPIC BICEPS TENODESIS WITH ROTATOR CUFF REPAIR ;  Surgeon: Maame Miller MD;  [facility];  Service: Orthopedics    NICCI Penn Right 5/10/2017    Procedure: SHOULDER ARTHROSCOPY SUBACROMIAL DECOMPRESSION;  Surgeon: Randi Nelson MD;  [facility];  Service: Orthopedics    NM TOTAL HIP ARTHROPLASTY Left 3/12/2018    Procedure: ARTHROPLASTY HIP TOTAL;  Surgeon: Abena Ramirez MD;  [facility];  Service: Orthopedics    NM TOTAL HIP ARTHROPLASTY Right 7/30/2018    Procedure: RIGHT TOTAL HIP ARTHROPLASTY;  Surgeon: Abena Ramirez MD;  [facility];  Service: Orthopedics    TONSILLECTOMY           Current Outpatient Medications:     atorvastatin (LIPITOR) 20 mg tablet, Take 1 tablet (20 mg total) by mouth daily for 90 days, Disp: 90 tablet, Rfl: 3    calcium-vitamin D (OSCAL) 250-125 MG-UNIT per tablet, Take 1 tablet by mouth daily, Disp: , Rfl:     Cholecalciferol (VITAMIN D) 2000 units tablet, Take 2,000 Units by mouth daily, Disp: , Rfl:     FLUoxetine (PROzac) 20 mg capsule, TAKE 1 CAPSULE BY MOUTH  DAILY, Disp: 90 capsule, Rfl: 0    gabapentin (NEURONTIN) 300 mg capsule, Take 1 tablet twice daily for 5 days then 1 tablet 3 times daily, Disp: 90 capsule, Rfl: 2    hydrochlorothiazide (HYDRODIURIL) 25 mg tablet, Take 1 tablet (25 mg total) by mouth daily, Disp: 90 tablet, Rfl: 0    Lorcaserin HCl ER (BELVIQ XR) 20 MG TB24, Take 1 tablet (20 mg total) by mouth daily, Disp: 30 tablet, Rfl: 3    methocarbamol (ROBAXIN) 750 mg tablet, Take 1 tablet (750 mg total) by mouth 2 (two) times a day as needed for muscle spasms, Disp: 60 tablet, Rfl: 2    metoprolol succinate (TOPROL-XL) 50 mg 24 hr tablet, TAKE 1 TABLET BY MOUTH  DAILY, Disp: 90 tablet, Rfl: 0    Multiple Vitamins-Minerals (CENTRUM SILVER ULTRA WOMENS PO), Take by mouth, Disp: , Rfl:     Omega-3 1000 MG CAPS, Take by mouth daily , Disp: , Rfl:     omeprazole (PriLOSEC) 20 mg delayed release capsule, Take 1 capsule (20 mg total) by mouth every morning, Disp: 90 capsule, Rfl: 0    Current Facility-Administered Medications:     bupivacaine (PF) (MARCAINE) 0 25 % injection 10 mL, 10 mL, Perineural, Once, MD Lela Montano lidocaine (PF) (XYLOCAINE-MPF) 2 % injection 10 mL, 10 mL, Infiltration, Once, Asha Cano MD    methylPREDNISolone acetate (DEPO-MEDROL) injection 80 mg, 80 mg, Perineural, Once, Asha Cano MD    sodium chloride (PF) 0 9 % injection 10 mL, 10 mL, Infiltration, Once, Asha Cano MD    Allergies   Allergen Reactions    Hydromorphone Hcl Itching    Percocet [Oxycodone-Acetaminophen] GI Intolerance    Sulfamethoxazole-Trimethoprim Rash       Physical Exam:   Vitals:    12/12/19 0759   BP: 135/79   Pulse: 61   Resp: 20   Temp: 97 8 °F (36 6 °C)   SpO2: 92%     General: Awake, Alert, Oriented x 3, Mood and affect appropriate  Respiratory: Respirations even and unlabored  Cardiovascular: Peripheral pulses intact; no edema  Musculoskeletal Exam:   Left lower back tenderness    ASA Score: 2    Patient/Chart Verification  Patient ID Verified: Verbal  Consents Confirmed: To be obtained in the Pre-Procedure area  H&P( within 30 days) Verified: To be obtained in the Pre-Procedure area  Allergies Reviewed: Yes  Anticoag/NSAID held?: NA  Currently on antibiotics?: No    Assessment:   1   Spondylosis of lumbar region without myelopathy or radiculopathy        Plan: L L3-5 RFA

## 2019-12-13 NOTE — TELEPHONE ENCOUNTER
Left vm for pt to c/b, c/b # provided  I stated that our office is closing at 11 AM today and will reopen at 8 AM on Monday

## 2019-12-13 NOTE — TELEPHONE ENCOUNTER
I s/w pt who reports she had soreness yest and used some ice and rested  She was able to go to her Baptism party last night but had to take some Tylenol and gabapentin once she got home  Today she feels good so far  I advised the soreness will continue to dissipate, may use the Tylenol, ice or heat today as needed  Pt denies s/s of infection  Pt told it takes 6 wks to see full effect of RFA but FQ did put in some steroid yest which will also help the first few wks  I confirmed her Rt sided RFA for 12/26, arriving at 7:45

## 2019-12-26 ENCOUNTER — TELEPHONE (OUTPATIENT)
Dept: RADIOLOGY | Facility: CLINIC | Age: 69
End: 2019-12-26

## 2019-12-26 ENCOUNTER — HOSPITAL ENCOUNTER (OUTPATIENT)
Dept: RADIOLOGY | Facility: CLINIC | Age: 69
Discharge: HOME | End: 2019-12-26
Attending: ANESTHESIOLOGY
Payer: MEDICARE

## 2019-12-26 VITALS
HEART RATE: 79 BPM | RESPIRATION RATE: 18 BRPM | DIASTOLIC BLOOD PRESSURE: 77 MMHG | TEMPERATURE: 98.2 F | SYSTOLIC BLOOD PRESSURE: 130 MMHG | OXYGEN SATURATION: 95 %

## 2019-12-26 DIAGNOSIS — M47.816 SPONDYLOSIS OF LUMBAR REGION WITHOUT MYELOPATHY OR RADICULOPATHY: ICD-10-CM

## 2019-12-26 PROCEDURE — 64635 DESTROY LUMB/SAC FACET JNT: CPT | Performed by: ANESTHESIOLOGY

## 2019-12-26 PROCEDURE — 64636 DESTROY L/S FACET JNT ADDL: CPT | Performed by: ANESTHESIOLOGY

## 2019-12-26 RX ORDER — METHYLPREDNISOLONE ACETATE 80 MG/ML
80 INJECTION, SUSPENSION INTRA-ARTICULAR; INTRALESIONAL; INTRAMUSCULAR; PARENTERAL; SOFT TISSUE ONCE
Status: COMPLETED | OUTPATIENT
Start: 2019-12-26 | End: 2019-12-26

## 2019-12-26 RX ORDER — 0.9 % SODIUM CHLORIDE 0.9 %
10 VIAL (ML) INJECTION ONCE
Status: COMPLETED | OUTPATIENT
Start: 2019-12-26 | End: 2019-12-26

## 2019-12-26 RX ORDER — BUPIVACAINE HCL/PF 2.5 MG/ML
10 VIAL (ML) INJECTION ONCE
Status: COMPLETED | OUTPATIENT
Start: 2019-12-26 | End: 2019-12-26

## 2019-12-26 RX ADMIN — BUPIVACAINE HYDROCHLORIDE 3 ML: 2.5 INJECTION, SOLUTION EPIDURAL; INFILTRATION; INTRACAUDAL at 08:24

## 2019-12-26 RX ADMIN — SODIUM CHLORIDE 8 ML: 9 INJECTION, SOLUTION INTRAMUSCULAR; INTRAVENOUS; SUBCUTANEOUS at 08:12

## 2019-12-26 RX ADMIN — Medication 8 ML: at 08:12

## 2019-12-26 RX ADMIN — METHYLPREDNISOLONE ACETATE 20 MG: 80 INJECTION, SUSPENSION INTRA-ARTICULAR; INTRALESIONAL; INTRAMUSCULAR; PARENTERAL; SOFT TISSUE at 08:24

## 2019-12-26 NOTE — H&P
History of Present Illness: The patient is a 81 y o  female who presents with complaints of right lower back pain secondary lumbar spondylosis and is here today for right L3-5 medial branch radiofrequency ablation      Patient Active Problem List   Diagnosis    Incomplete tear of right rotator cuff    Hypertension    Hyperlipidemia    Depression    Primary osteoarthritis of left hip    Primary osteoarthritis of one hip, right    Renal insufficiency    Pain, joint, ankle and foot, right    Lumbar back pain with radiculopathy affecting lower extremity    Intervertebral disc disorder with radiculopathy of lumbar region    Lumbar spondylosis       Past Medical History:   Diagnosis Date    Arthritis     Depression     Endometriosis     GERD (gastroesophageal reflux disease)     Hyperlipidemia     Hypertension     Nondisplaced fracture of fifth metatarsal bone, right foot, initial encounter for closed fracture 9/24/2018    Osteopenia     Pancreatitis 06/2017    Pneumonia     Right hip pain 12/4/2018    S/P hip replacement, right 7/31/2018    Patient progressing well after right hip replacement     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    Trochanteric bursitis of right hip 2/12/2019       Past Surgical History:   Procedure Laterality Date    ABDOMINAL SURGERY      endometriosis     APPENDECTOMY      BREAST BIOPSY Right 1985    CARPAL TUNNEL RELEASE      COLONOSCOPY      HAND SURGERY      HIP SURGERY      HYSTERECTOMY Bilateral 1985    JOINT REPLACEMENT Bilateral     KNEE    JOINT REPLACEMENT Left     HIP    OOPHORECTOMY Bilateral 1985    [place] ARTHROSCOPY SHOULDER SURGICAL BICEPS TENODESIS Right 5/10/2017    Procedure: ARTHROSCOPIC BICEPS TENODESIS WITH ROTATOR CUFF REPAIR ;  Surgeon: Jake Poe MD;  [facility];  Service: Orthopedics    WI MERRICK Murg[place] Right 5/10/2017    Procedure: SHOULDER ARTHROSCOPY SUBACROMIAL DECOMPRESSION;  Surgeon: Yaquelin Puentes MD;  [facility];  Service: Orthopedics    ID TOTAL HIP ARTHROPLASTY Left 3/12/2018    Procedure: ARTHROPLASTY HIP TOTAL;  Surgeon: Juwan Avila MD;  [facility];  Service: Orthopedics    ID TOTAL HIP ARTHROPLASTY Right 7/30/2018    Procedure: RIGHT TOTAL HIP ARTHROPLASTY;  Surgeon: Juwan Avila MD;  [facility];  Service: Orthopedics    TONSILLECTOMY           Current Outpatient Medications:     atorvastatin (LIPITOR) 20 mg tablet, Take 1 tablet (20 mg total) by mouth daily for 90 days, Disp: 90 tablet, Rfl: 3    calcium-vitamin D (OSCAL) 250-125 MG-UNIT per tablet, Take 1 tablet by mouth daily, Disp: , Rfl:     Cholecalciferol (VITAMIN D) 2000 units tablet, Take 2,000 Units by mouth daily, Disp: , Rfl:     FLUoxetine (PROzac) 20 mg capsule, TAKE 1 CAPSULE BY MOUTH  DAILY, Disp: 90 capsule, Rfl: 0    gabapentin (NEURONTIN) 300 mg capsule, Take 1 tablet twice daily for 5 days then 1 tablet 3 times daily, Disp: 90 capsule, Rfl: 2    hydrochlorothiazide (HYDRODIURIL) 25 mg tablet, Take 1 tablet (25 mg total) by mouth daily, Disp: 90 tablet, Rfl: 0    Lorcaserin HCl ER (BELVIQ XR) 20 MG TB24, Take 1 tablet (20 mg total) by mouth daily, Disp: 30 tablet, Rfl: 3    methocarbamol (ROBAXIN) 750 mg tablet, Take 1 tablet (750 mg total) by mouth 2 (two) times a day as needed for muscle spasms, Disp: 60 tablet, Rfl: 2    metoprolol succinate (TOPROL-XL) 50 mg 24 hr tablet, TAKE 1 TABLET BY MOUTH  DAILY, Disp: 90 tablet, Rfl: 0    Multiple Vitamins-Minerals (CENTRUM SILVER ULTRA WOMENS PO), Take by mouth, Disp: , Rfl:     Omega-3 1000 MG CAPS, Take by mouth daily , Disp: , Rfl:     omeprazole (PriLOSEC) 20 mg delayed release capsule, Take 1 capsule (20 mg total) by mouth every morning, Disp: 90 capsule, Rfl: 0    Current Facility-Administered Medications:     bupivacaine (PF) (MARCAINE) 0 25 % injection 10 mL, 10 mL, Perineural, Once, MD Ny Madden lidocaine (PF) (XYLOCAINE-MPF) 2 % injection 10 mL, 10 mL, Infiltration, Once, Asha Cano MD    methylPREDNISolone acetate (DEPO-MEDROL) injection 80 mg, 80 mg, Perineural, Once, Asha Cano MD    sodium chloride (PF) 0 9 % injection 10 mL, 10 mL, Infiltration, Once, Asha Cano MD    Allergies   Allergen Reactions    Hydromorphone Hcl Itching    Percocet [Oxycodone-Acetaminophen] GI Intolerance    Sulfamethoxazole-Trimethoprim Rash       Physical Exam:   Vitals:    12/26/19 0758   BP: 129/85   Pulse: 60   Resp: 18   Temp: 98 2 °F (36 8 °C)   SpO2: 95%     General: Awake, Alert, Oriented x 3, Mood and affect appropriate  Respiratory: Respirations even and unlabored  Cardiovascular: Peripheral pulses intact; no edema  Musculoskeletal Exam:   Right lower back tenderness    ASA Score: 2    Patient/Chart Verification  Patient ID Verified: Verbal  ID Band Applied: No  Consents Confirmed: Procedural, To be obtained in the Pre-Procedure area  H&P( within 30 days) Verified: To be obtained in the Pre-Procedure area  Allergies Reviewed: Yes  Anticoag/NSAID held?: No  Currently on antibiotics?: No    Assessment:   1   Spondylosis of lumbar region without myelopathy or radiculopathy        Plan: R L3-5 RFA

## 2019-12-26 NOTE — DISCHARGE INSTR - LAB

## 2019-12-27 NOTE — TELEPHONE ENCOUNTER
Pt is calling back stating she feels pretty good  No pain  Yesterday she stated was not bad at all, she had some discomfort and spasms but it wasn't bad  Pt did ice but did not take any advil or tylenol       Pt is scheduled on 2/3 with FQ

## 2019-12-27 NOTE — TELEPHONE ENCOUNTER
FYI, please see previous tasks  Pt denies s/sx infection, denies sunburn sensation  Advised to cb sooner than office visit with any questions or concerns

## 2019-12-30 PROBLEM — R19.4 CHANGE IN BOWEL HABITS: Status: ACTIVE | Noted: 2019-12-30

## 2020-01-07 ENCOUNTER — TELEPHONE (OUTPATIENT)
Dept: FAMILY MEDICINE CLINIC | Facility: CLINIC | Age: 70
End: 2020-01-07

## 2020-01-07 ENCOUNTER — TELEPHONE (OUTPATIENT)
Dept: PAIN MEDICINE | Facility: MEDICAL CENTER | Age: 70
End: 2020-01-07

## 2020-01-07 DIAGNOSIS — N60.19 FIBROCYSTIC BREAST DISEASE (FCBD), UNSPECIFIED LATERALITY: Primary | ICD-10-CM

## 2020-01-07 DIAGNOSIS — M51.16 INTERVERTEBRAL DISC DISORDER WITH RADICULOPATHY OF LUMBAR REGION: ICD-10-CM

## 2020-01-07 RX ORDER — GABAPENTIN 300 MG/1
300 CAPSULE ORAL 3 TIMES DAILY
Qty: 90 CAPSULE | Refills: 5 | Status: SHIPPED | OUTPATIENT
Start: 2020-01-07 | End: 2020-06-03 | Stop reason: SDUPTHER

## 2020-01-07 NOTE — TELEPHONE ENCOUNTER
S/w pt, requesting refill of gabapentin  Pt states she is taking 300mg 1 capsule three times daily  1 week of medication left in supply  Pt states it is working well and denies s/e  Please send to [facility] on file  Thank you

## 2020-01-07 NOTE — TELEPHONE ENCOUNTER
Pt left a VM @ 12:31pm stating that she would fili to  know why her gabapentin has not been renewed      Pt can be reached at [phone]

## 2020-01-17 ENCOUNTER — LAB REQUISITION (OUTPATIENT)
Dept: LAB | Facility: HOSPITAL | Age: 70
End: 2020-01-17
Payer: MEDICARE

## 2020-01-17 DIAGNOSIS — K57.30 DIVERTICULOSIS OF LARGE INTESTINE WITHOUT PERFORATION OR ABSCESS WITHOUT BLEEDING: ICD-10-CM

## 2020-01-17 DIAGNOSIS — D12.3 BENIGN NEOPLASM OF TRANSVERSE COLON: ICD-10-CM

## 2020-01-17 DIAGNOSIS — Z12.11 ENCOUNTER FOR SCREENING FOR MALIGNANT NEOPLASM OF COLON: ICD-10-CM

## 2020-01-17 PROCEDURE — 88305 TISSUE EXAM BY PATHOLOGIST: CPT | Performed by: PATHOLOGY

## 2020-02-03 ENCOUNTER — TRANSCRIBE ORDERS (OUTPATIENT)
Dept: PAIN MEDICINE | Facility: CLINIC | Age: 70
End: 2020-02-03

## 2020-02-03 ENCOUNTER — OFFICE VISIT (OUTPATIENT)
Dept: PAIN MEDICINE | Facility: CLINIC | Age: 70
End: 2020-02-03
Payer: MEDICARE

## 2020-02-03 VITALS
WEIGHT: 289 LBS | BODY MASS INDEX: 58.37 KG/M2 | DIASTOLIC BLOOD PRESSURE: 68 MMHG | TEMPERATURE: 98.4 F | SYSTOLIC BLOOD PRESSURE: 124 MMHG | HEART RATE: 68 BPM

## 2020-02-03 DIAGNOSIS — M51.16 INTERVERTEBRAL DISC DISORDER WITH RADICULOPATHY OF LUMBAR REGION: ICD-10-CM

## 2020-02-03 DIAGNOSIS — M47.816 LUMBAR SPONDYLOSIS: Primary | ICD-10-CM

## 2020-02-03 PROCEDURE — 3078F DIAST BP <80 MM HG: CPT | Performed by: ANESTHESIOLOGY

## 2020-02-03 PROCEDURE — 1160F RVW MEDS BY RX/DR IN RCRD: CPT | Performed by: ANESTHESIOLOGY

## 2020-02-03 PROCEDURE — 99214 OFFICE O/P EST MOD 30 MIN: CPT | Performed by: ANESTHESIOLOGY

## 2020-02-03 PROCEDURE — 4040F PNEUMOC VAC/ADMIN/RCVD: CPT | Performed by: ANESTHESIOLOGY

## 2020-02-03 PROCEDURE — 3074F SYST BP LT 130 MM HG: CPT | Performed by: ANESTHESIOLOGY

## 2020-02-03 PROCEDURE — 1036F TOBACCO NON-USER: CPT | Performed by: ANESTHESIOLOGY

## 2020-02-03 NOTE — PROGRESS NOTES
Assessment:  1  Lumbar spondylosis    2  Intervertebral disc disorder with radiculopathy of lumbar region        Plan:  The patient has had improvement following the radiofrequency ablation but is still limited in regards to her activity level  At this time, I advised I would like her to start a physical therapy program which she was amenable to  She was given referral for that  She will then follow up with me in 8 weeks for re-evaluation  My impressions and treatment recommendations were discussed in detail with the patient who verbalized understanding and had no further questions  Discharge instructions were provided  I personally saw and examined the patient and I agree with the above discussed plan of care  Orders Placed This Encounter   Procedures    Ambulatory referral to Physical Therapy     Standing Status:   Future     Standing Expiration Date:   2/3/2021     Referral Priority:   Routine     Referral Type:   Physical Therapy     Referral Reason:   Specialty Services Required     Requested Specialty:   Physical Therapy     Number of Visits Requested:   1     Expiration Date:   2/3/2021     No orders of the defined types were placed in this encounter  History of Present Illness:  Jennifer Gupta is a 81 y o  female who presents for a follow up office visit in regards to Back Pain (12/26 Ablation)  The patient has a history of lumbar spondylosis, lumbar disc bulging, lumbar radiculopathy returns for follow-up  She is status post right L3-5 medial branch radiofrequency ablation on December 26th and left L3-5 medial branch radiofrequency ablation December 12  She reports slight improvement  She states that she is now able to climb steps which she was unable to do previously but is still having difficulty with cleaning for extended periods of time  She continues to experience pain that is occasional described to be burning, sharp, throbbing, cramping in the lower back and both buttocks      I have personally reviewed and/or updated the patient's past medical history, past surgical history, family history, social history, current medications, allergies, and vital signs today  Review of Systems   Respiratory: Negative for shortness of breath  Cardiovascular: Negative for chest pain  Gastrointestinal: Negative for constipation, diarrhea, nausea and vomiting  Musculoskeletal: Positive for gait problem  Negative for arthralgias, joint swelling and myalgias  Skin: Negative for rash  Neurological: Negative for dizziness, seizures and weakness  All other systems reviewed and are negative        Patient Active Problem List   Diagnosis    Incomplete tear of right rotator cuff    Hypertension    Hyperlipidemia    Depression    Primary osteoarthritis of left hip    Primary osteoarthritis of one hip, right    Renal insufficiency    Pain, joint, ankle and foot, right    Intervertebral disc disorder with radiculopathy of lumbar region    Lumbar spondylosis    Change in bowel habits       Past Medical History:   Diagnosis Date    Arthritis     Depression     Endometriosis     GERD (gastroesophageal reflux disease)     Hyperlipidemia     Hypertension     Nondisplaced fracture of fifth metatarsal bone, right foot, initial encounter for closed fracture 9/24/2018    Osteopenia     Pancreatitis 06/2017    Pneumonia     Right hip pain 12/4/2018    S/P hip replacement, right 7/31/2018    Patient progressing well after right hip replacement     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    Trochanteric bursitis of right hip 2/12/2019       Past Surgical History:   Procedure Laterality Date    ABDOMINAL SURGERY      endometriosis     APPENDECTOMY      BREAST BIOPSY Right 1985    CARPAL TUNNEL RELEASE      COLONOSCOPY      HAND SURGERY      HIP SURGERY      HYSTERECTOMY Bilateral 1985    JOINT REPLACEMENT Bilateral     KNEE    JOINT REPLACEMENT Left     HIP    OOPHORECTOMY Bilateral 1985    WI ARTHROSCOPY SHOULDER SURGICAL BICEPS TENODESIS Right 5/10/2017    Procedure: ARTHROSCOPIC BICEPS TENODESIS WITH ROTATOR CUFF REPAIR ;  Surgeon: Travis Castro MD;  [facility];  Service: Orthopedics    WI MERRICK [place] Right 5/10/2017    Procedure: SHOULDER ARTHROSCOPY SUBACROMIAL DECOMPRESSION;  Surgeon: Travis Castro MD;  [facility];  Service: Orthopedics    WI TOTAL HIP ARTHROPLASTY Left 3/12/2018    Procedure: ARTHROPLASTY HIP TOTAL;  Surgeon: Alla Vinson MD;  [facility];  Service: Orthopedics    WI TOTAL HIP ARTHROPLASTY Right 7/30/2018    Procedure: RIGHT TOTAL HIP ARTHROPLASTY;  Surgeon: Alla Vinson MD;  [facility];  Service: Orthopedics    TONSILLECTOMY         Family History   Problem Relation Age of Onset    Atrial fibrillation Mother     Breast cancer Mother 76    Stroke Mother     Coronary artery disease Mother     Diabetes Mother     Pancreatitis Mother     Heart attack Father     Arthritis Family     Cancer Family     Endometrial cancer Maternal Grandmother 76    Prostate cancer Paternal Uncle 54       Social History     Occupational History    Not on file   Tobacco Use    Smoking status: Never Smoker    Smokeless tobacco: Never Used   Substance and Sexual Activity    Alcohol use: Yes     Comment: 1-2x / year    Drug use: No    Sexual activity: Not on file       Current Outpatient Medications on File Prior to Visit   Medication Sig    atorvastatin (LIPITOR) 20 mg tablet Take 1 tablet (20 mg total) by mouth daily for 90 days    calcium-vitamin D (OSCAL) 250-125 MG-UNIT per tablet Take 1 tablet by mouth daily    Cholecalciferol (VITAMIN D) 2000 units tablet Take 2,000 Units by mouth daily    FLUoxetine (PROzac) 20 mg capsule TAKE 1 CAPSULE BY MOUTH  DAILY    gabapentin (NEURONTIN) 300 mg capsule Take 1 capsule (300 mg total) by mouth 3 (three) times a day    hydrochlorothiazide (HYDRODIURIL) 25 mg tablet Take 1 tablet (25 mg total) by mouth daily    Lorcaserin HCl ER (BELVIQ XR) 20 MG TB24 Take 1 tablet (20 mg total) by mouth daily    methocarbamol (ROBAXIN) 750 mg tablet Take 1 tablet (750 mg total) by mouth 2 (two) times a day as needed for muscle spasms    metoprolol succinate (TOPROL-XL) 50 mg 24 hr tablet TAKE 1 TABLET BY MOUTH  DAILY    Multiple Vitamins-Minerals (CENTRUM SILVER ULTRA WOMENS PO) Take by mouth    Omega-3 1000 MG CAPS Take by mouth daily     [DISCONTINUED] omeprazole (PriLOSEC) 20 mg delayed release capsule Take 1 capsule (20 mg total) by mouth every morning     No current facility-administered medications on file prior to visit  Allergies   Allergen Reactions    Hydromorphone Hcl Itching    Percocet [Oxycodone-Acetaminophen] GI Intolerance    Sulfamethoxazole-Trimethoprim Rash       Physical Exam:    /68   Pulse 68   Temp 98 4 °F (36 9 °C) (Oral)   Wt 131 kg (289 lb)   LMP  (LMP Unknown) Comment: hysterectomy  BMI 58 37 kg/m²     Constitutional:normal, well developed, well nourished, alert, in no distress and non-toxic and no overt pain behavior   and obese  Eyes:anicteric  HEENT:grossly intact  Neck:supple, symmetric, trachea midline and no masses   Pulmonary:even and unlabored  Cardiovascular:No edema or pitting edema present  Skin:Normal without rashes or lesions and well hydrated  Psychiatric:Mood and affect appropriate  Neurologic:Cranial Nerves II-XII grossly intact  Musculoskeletal:normal       Lumbar Spine Exam  Appearance:  Normal lordosis  Palpation/Tenderness:  left lumbar paraspinal tenderness  right lumbar paraspinal tenderness  Sensory:  no sensory deficits noted  Range of Motion:  Flexion:  Minimally limited  with pain  Extension:  Minimally limited  with pain  Lateral Flexion - Left:  Minimally limited  with pain  Lateral Flexion - Right:  Minimally limited  with pain  Rotation - Left:  Minimally limited with pain  Rotation - Right:  Minimally limited  with pain  Motor Strength:  Left hip flexion:  5/5  Left hip extension:  5/5  Right hip flexion:  5/5  Right hip extension:  5/5  Left knee flexion:  5/5  Left knee extension:  5/5  Right knee flexion:  5/5  Right knee extension:  5/5  Left foot dorsiflexion:  5/5  Left foot plantar flexion:  5/5  Right foot dorsiflexion:  5/5  Right foot plantar flexion:  5/5    Imaging    CT LUMBAR SPINE (6/21/2019)     INDICATION:   M54 16: Radiculopathy, lumbar region      COMPARISON: None      TECHNIQUE:  Contiguous axial images through the lumbar spine were obtained  Sagittal and coronal reconstructions were performed        Radiation dose length product (DLP) for this visit:  146 mGy-cm   This examination, like all CT scans performed in the [facility], was performed utilizing techniques to minimize radiation dose exposure, including the use of iterative   reconstruction and automated exposure control        IMAGE QUALITY:  Diagnostic      FINDINGS:     ALIGNMENT:  There is mild levoscoliosis of the lumbar spine  There is trace anterolisthesis of L4-L5      VERTEBRAL BODIES:  The bones are osteopenic  No acute compression fractures are identified      DEGENERATIVE CHANGES:     Lower Thoracic spine:  Normal lower thoracic disc spaces  ]     L1-2:  There is disc space narrowing  There is vacuum disc phenomenon  There is a bulging annulus  There is no significant bony canal stenosis  There is mild foraminal encroachment      L2-3:  There is disc space narrowing  There is a bulging annulus  There is facet arthrosis  There is mild canal stenosis  There is mild foraminal narrowing      L3-4:  There is a bulging annulus  There is facet arthrosis  There is no significant canal stenosis  There is no significant foraminal narrowing      L4-5:  There is disc space narrowing  There is vacuum disc phenomenon  There is a calcified bulging annulus   There is right-sided endplate spurring  There is facet arthrosis  There is mild canal stenosis  There is mild left foraminal narrowing  There is   moderate to severe right foraminal narrowing      L5-S1:  There is disc space narrowing  There is vacuum disc phenomenon  There is a bulging annulus  There is facet arthrosis  There is no significant canal stenosis  There is left-sided endplate spurring   There is mild left foraminal narrowing      PARASPINAL SOFT TISSUES:  There is atherosclerotic calcification

## 2020-02-04 ENCOUNTER — EVALUATION (OUTPATIENT)
Dept: PHYSICAL THERAPY | Facility: CLINIC | Age: 70
End: 2020-02-04
Payer: MEDICARE

## 2020-02-04 DIAGNOSIS — I10 ESSENTIAL HYPERTENSION: ICD-10-CM

## 2020-02-04 DIAGNOSIS — M47.816 LUMBAR SPONDYLOSIS: Primary | ICD-10-CM

## 2020-02-04 DIAGNOSIS — F39 AFFECTIVE DISORDER (HCC): ICD-10-CM

## 2020-02-04 DIAGNOSIS — K21.9 GASTROESOPHAGEAL REFLUX DISEASE WITHOUT ESOPHAGITIS: Primary | ICD-10-CM

## 2020-02-04 PROCEDURE — 97162 PT EVAL MOD COMPLEX 30 MIN: CPT | Performed by: PHYSICAL THERAPIST

## 2020-02-04 PROCEDURE — 97112 NEUROMUSCULAR REEDUCATION: CPT | Performed by: PHYSICAL THERAPIST

## 2020-02-04 RX ORDER — FLUOXETINE HYDROCHLORIDE 20 MG/1
CAPSULE ORAL
Qty: 90 CAPSULE | Refills: 0 | Status: SHIPPED | OUTPATIENT
Start: 2020-02-04 | End: 2020-05-21 | Stop reason: SDUPTHER

## 2020-02-04 RX ORDER — OMEPRAZOLE 20 MG/1
CAPSULE, DELAYED RELEASE ORAL
Qty: 90 CAPSULE | Refills: 0 | Status: SHIPPED | OUTPATIENT
Start: 2020-02-04 | End: 2020-03-09 | Stop reason: ALTCHOICE

## 2020-02-04 RX ORDER — HYDROCHLOROTHIAZIDE 25 MG/1
TABLET ORAL
Qty: 90 TABLET | Refills: 0 | Status: SHIPPED | OUTPATIENT
Start: 2020-02-04 | End: 2020-05-21 | Stop reason: SDUPTHER

## 2020-02-04 RX ORDER — METOPROLOL SUCCINATE 50 MG/1
TABLET, EXTENDED RELEASE ORAL
Qty: 90 TABLET | Refills: 0 | Status: SHIPPED | OUTPATIENT
Start: 2020-02-04 | End: 2020-05-21 | Stop reason: SDUPTHER

## 2020-02-04 NOTE — PROGRESS NOTES
PT Evaluation     Today's date: 2020  Patient name: Ashok Medel  : 1950  MRN: [id number]  Referring provider: Layla Isidro MD  Dx:   Encounter Diagnosis     ICD-10-CM    1  Lumbar spondylosis M47 816 Ambulatory referral to Physical Therapy              Assessment  Assessment details: Ashok Medel is a 81 y o  female who presents with chronic low back pain and recent lumbar RFA This patient would benefit from skilled physical therapy to address their listed impairments and functional limitations to maximize functional outcome      Impairments: abnormal gait, abnormal or restricted ROM, activity intolerance, impaired physical strength, lacks appropriate home exercise program, pain with function and weight-bearing intolerance     Prognosis: good    Goals  STG Patient is independent with HEP   STG Increase strength by half grade in 2 weeks  STG Range of motion is improved by 25% in 2 weeks  STG Tolerance to weight bearing activities is improved by 25% in 2 weeks  STG Timed up and go improved to <20 seconds in 2 weeks  STG Sit to stand test x5 improved to <20 seconds in 2 weeks  LTG Increase ROM 10 degrees in 4 weeks  LTG Balance & endurance & gait & locomotion are improved by 50% in 4 weeks  LTG No difficulty with stair climbing in 4 weeks  LTG Recreational performance is improved to prior level of function in 4 weeks  LTG Work performance is improved to prior level of function in 4 weeks  LTG No limitations with bed mobility in 4 weeks  LTG ADL performance improved to prior level of function in 4 weeks  LTG Weight bearing tolerance improved 50% in 4 weeks  LTG Increase strength full grade in 4 weeks  LTG Timed up and go improved to <14 seconds  LTG Sit to stand test x5 improved to <16 seconds      Plan  Patient would benefit from: skilled physical therapy  Planned therapy interventions: manual therapy, flexibility, graded activity, graded exercise, functional ROM exercises and home exercise program  Frequency: 2x week  Duration in visits: 8  Duration in weeks: 4  Treatment plan discussed with: patient        Subjective Evaluation    History of Present Illness  Mechanism of injury: Patient has been having low back pain for over 8 months  She has had two epidurals and in 2019 she had lumbar RFA, L3-L5 At one point she was in a wheel chair due to severe back pain  She is now walking without an assistive device  She was having pain down the left side of the leg to the foot which has resolved since the ablations  She denies lower extremity weakness or cramping  Pain  Current pain rating: 3  At worst pain ratin  Location: across the low back  Quality: dull ache, sharp and tight    Patient Goals  Patient goals for therapy: independence with ADLs/IADLs, decreased pain and increased motion          Objective     Palpation   Left   Tenderness of the erector spinae and lumbar paraspinals  Right   Tenderness of the erector spinae and lumbar paraspinals  Trigger point to erector spinae and lumbar paraspinals       Active Range of Motion     Lumbar   Flexion:  WFL  Extension: 15 degrees   Left lateral flexion: 21 degrees       Right lateral flexion: 22 degrees     Additional Active Range of Motion Details  Pain on return flexion  Extension felt 'good'    General Comments:      Lumbar Comments  Able to heel toe walk, no isolated weakness in the leg  Mild weakness in hip abduction, has history of bilateral GERARDO  Gait:  Increased lateral trunk movements, trunk slightly flexed         Precautions: bilateral THAs        Manual  2/4            Gapping bilat SY                                                                    Exercise Diary  2/4            PPT 10            DLS kicks with biofeed back 15ea            Bridges  20            clamshells             Bike/nu step/elliptical             Leg press             Review leg ext/flexion gym             Mini squats             Lifting education Pain science education SY            Progressive core             rows             abdominal bracing, [facility] press :05x15                                                                                                           Modalities

## 2020-02-07 ENCOUNTER — OFFICE VISIT (OUTPATIENT)
Dept: PHYSICAL THERAPY | Facility: CLINIC | Age: 70
End: 2020-02-07
Payer: MEDICARE

## 2020-02-07 ENCOUNTER — TELEPHONE (OUTPATIENT)
Dept: FAMILY MEDICINE CLINIC | Facility: CLINIC | Age: 70
End: 2020-02-07

## 2020-02-07 DIAGNOSIS — R63.8 INCREASED BMI: ICD-10-CM

## 2020-02-07 DIAGNOSIS — M47.816 LUMBAR SPONDYLOSIS: Primary | ICD-10-CM

## 2020-02-07 PROCEDURE — 97110 THERAPEUTIC EXERCISES: CPT

## 2020-02-07 PROCEDURE — 97140 MANUAL THERAPY 1/> REGIONS: CPT

## 2020-02-07 PROCEDURE — 97112 NEUROMUSCULAR REEDUCATION: CPT

## 2020-02-07 NOTE — TELEPHONE ENCOUNTER
Patient asked for her rx Belviq to be sent to [facility] and it was sent to [facility]    Please resend this to [facility] on [address]

## 2020-02-07 NOTE — PROGRESS NOTES
Daily Note     Today's date: 2020  Patient name: Christ Saunders  : 1950  MRN: [id number]  Referring provider: Nora Salas MD  Dx:   Encounter Diagnosis     ICD-10-CM    1  Lumbar spondylosis M47 816                   Subjective: Patient reports she returned to the gym yesterday and is experiencing general LE soreness today, notes compliance with HEP since IE  Objective: See treatment diary below      Assessment: Tolerated treatment well  Challenged by clamshells using yellow theraband  No increased pain reported throughout treatment  Patient demonstrated fatigue post treatment and would benefit from continued PT      Plan: Progress treatment as tolerated         Precautions: bilateral THAs        Manual             Gapping bilat SY MC                                                                   Exercise Diary             PPT 10 15           DLS kicks with biofeed back 15ea 15 ea /c biofeedback           Bridges  20 20           clamshells  Yellow 15 ea           Bike/nu step/elliptical  Nu step 5' L4           Leg press             Review leg ext/flexion gym             Mini squats  20           Lifting education             Pain science education SY            Progressive core             rows  georgette 10# 2x10 /c DLS           abdominal bracing, pball press :99S75 :05x15                                                                                                          Modalities

## 2020-02-10 ENCOUNTER — OFFICE VISIT (OUTPATIENT)
Dept: PHYSICAL THERAPY | Facility: CLINIC | Age: 70
End: 2020-02-10
Payer: MEDICARE

## 2020-02-10 DIAGNOSIS — M47.816 LUMBAR SPONDYLOSIS: Primary | ICD-10-CM

## 2020-02-10 PROCEDURE — 97112 NEUROMUSCULAR REEDUCATION: CPT

## 2020-02-10 PROCEDURE — 97110 THERAPEUTIC EXERCISES: CPT

## 2020-02-10 NOTE — PROGRESS NOTES
Daily Note     Today's date: 2/10/2020  Patient name: Eugenia Choudhury  : 1950  MRN: [id number]  Referring provider: Jacque Calvert MD  Dx:   Encounter Diagnosis     ICD-10-CM    1  Lumbar spondylosis M47 816                   Subjective: Patient complains of increased low back pain when vacuuming this weekend  Objective: See treatment diary below      Assessment: Tolerated treatment well  Continued with progression of core strengthening and initiated leg press without significant difficulty  No increased symptoms reported throughout treatment  Patient demonstrated fatigue post treatment and would benefit from continued PT      Plan: Progress treatment as tolerated         Precautions: bilateral THAs        Manual  2/4 2/7 2/10          Gapping bilat SY MC MC                                                                  Exercise Diary  2/4 2/7 2/10          PPT 10 15 20          DLS kicks with biofeed back 15ea 15 ea /c biofeedback 2x10 ea /c biofeedback          Bridges  20 20 20          clamshells  Yellow 15 ea ytb 20 ea          Bike/nu step/elliptical  Nu step 5' L4 elliptical 5'          Leg press   50# 30          Review leg ext/flexion gym   22# 20 ea          Mini squats  20 20 no UE          Lifting education             Pain science education SY            Progressive core             rows  georgette 10# 2x10 /c DLS georgette 01# 30           abdominal bracing, pball press :14P23 :05x15 :05x20                                                                                                         Modalities

## 2020-02-13 ENCOUNTER — OFFICE VISIT (OUTPATIENT)
Dept: PHYSICAL THERAPY | Facility: CLINIC | Age: 70
End: 2020-02-13
Payer: MEDICARE

## 2020-02-13 DIAGNOSIS — M47.816 LUMBAR SPONDYLOSIS: Primary | ICD-10-CM

## 2020-02-13 PROCEDURE — 97112 NEUROMUSCULAR REEDUCATION: CPT

## 2020-02-13 PROCEDURE — 97110 THERAPEUTIC EXERCISES: CPT

## 2020-02-13 NOTE — PROGRESS NOTES
Daily Note     Today's date: 2020  Patient name: Adore Herring  : 1950  MRN: [id number]  Referring provider: Karel Gruber MD  Dx:   Encounter Diagnosis     ICD-10-CM    1  Lumbar spondylosis M47 816                   Subjective: Patient reports one spasms in L low back since last visit, she notes general muscular soreness after last session  Objective: See treatment diary below       Assessment: Tolerated treatment well  FOTO score improved to 67% (predicted 58)  No increased symptoms reported throughout session  Patient demonstrated fatigue post treatment and would benefit from continued PT      Plan: Progress treatment as tolerated         Precautions: bilateral THAs        Manual  2/4 2/7 2/10 2/13         Gapping bilat SY MC MC MC                                                                 Exercise Diary  2/4 2/7 2/10 2/13         PPT 10 15 20 20         DLS kicks with biofeed back 15ea 15 ea /c biofeedback 2x10 ea /c biofeedback 2x10 ea /c biofeedback         Bridges  20 20 20 30         clamshells  Yellow 15 ea ytb 20 ea ytb 20 ea rtb        Bike/nu step/elliptical  Nu step 5' L4 elliptical 5' Nu step 6' L4         Leg press   50# 30 50# 30         Review leg ext/flexion gym   22# 20 ea 22# 30 ea         Mini squats  20 20 no UE 20 no UE         Lifting education             Pain science education SY            Progressive core             rows  georgette 10# 2x10 /c DLS georgette 39# 30  georgette 10# 30         abdominal bracing, pball press :98X19 :05x15 :05x20 :05x20                                                                                                        Modalities

## 2020-02-17 ENCOUNTER — OFFICE VISIT (OUTPATIENT)
Dept: PHYSICAL THERAPY | Facility: CLINIC | Age: 70
End: 2020-02-17
Payer: MEDICARE

## 2020-02-17 DIAGNOSIS — M47.816 LUMBAR SPONDYLOSIS: Primary | ICD-10-CM

## 2020-02-17 PROCEDURE — 97110 THERAPEUTIC EXERCISES: CPT

## 2020-02-17 PROCEDURE — 97140 MANUAL THERAPY 1/> REGIONS: CPT

## 2020-02-17 PROCEDURE — 97112 NEUROMUSCULAR REEDUCATION: CPT

## 2020-02-17 NOTE — PROGRESS NOTES
Daily Note     Today's date: 2020  Patient name: Eugenia Choudhury  : 1950  MRN: [id number]  Referring provider: Jacque Calvert MD  Dx:   Encounter Diagnosis     ICD-10-CM    1  Lumbar spondylosis M47 816                   Subjective: Patient reports decreased symptoms overall since last visit, she continues to experience muscle spasms in low back after repetitive bending forward  Objective: See treatment diary below      Assessment: Tolerated treatment well  Progressed to single leg lowering for DLS kicks which was challenging, performed without biofeedback today  Initiated goblet squat with light kettle bell, patient required cues for proper squat form though no pain reported with exercises performed this session  Patient demonstrated fatigue post treatment and would benefit from continued PT      Plan: Progress treatment as tolerated         Precautions: bilateral THAs        Manual  2/4 2/7 2/10 2/13 2/17        Gapping bilat SY MC MC MC MC                                                                Exercise Diary  2/4 2/7 2/10 2/13 2/17        PPT 10 15 20 20 20        DLS kicks with biofeed back 15ea 15 ea /c biofeedback 2x10 ea /c biofeedback 2x10 ea /c biofeedback 2x10 /c single leg lowering        Bridges  20 20 20 30 30        clamshells  Yellow 15 ea ytb 20 ea ytb 20 ea rtb        Bike/nu step/elliptical  Nu step 5' L4 elliptical 5' Nu step 6' L4 Nu step 6' L4        Leg press   50# 30 50# 30 55# 30        Review leg ext/flexion gym   22# 20 ea 22# 30 ea 22# 30 ea        Mini squats  20 20 no UE 20 no UE goblet squat         Lifting education             Pain science education SY            Progressive core             rows  georgette 10# 2x10 /c DLS georgette 52# 30  georgette 10# 30 georgette 12# 30        abdominal bracing, pball press :55Z92 :30T10 :07L22 :34X00 :05x20                                                                                                       Modalities

## 2020-02-20 ENCOUNTER — OFFICE VISIT (OUTPATIENT)
Dept: PHYSICAL THERAPY | Facility: CLINIC | Age: 70
End: 2020-02-20
Payer: MEDICARE

## 2020-02-20 DIAGNOSIS — M47.816 LUMBAR SPONDYLOSIS: Primary | ICD-10-CM

## 2020-02-20 PROCEDURE — 97112 NEUROMUSCULAR REEDUCATION: CPT | Performed by: PHYSICAL THERAPIST

## 2020-02-20 PROCEDURE — 97110 THERAPEUTIC EXERCISES: CPT | Performed by: PHYSICAL THERAPIST

## 2020-02-20 PROCEDURE — 97140 MANUAL THERAPY 1/> REGIONS: CPT | Performed by: PHYSICAL THERAPIST

## 2020-02-20 NOTE — PROGRESS NOTES
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)  Her spasms are less frequent and intense      Objective: See treatment diary below      Assessment: Tolerated treatment well  Patient exhibited good technique with therapeutic exercises and would benefit from continued PT  Continued slow progression of core and LE strengthening  No pain/spasms reported throughout treatment  Plan: Progress treatment as tolerated         Precautions: bilateral THAs        Manual  2/4 2/7 2/10 2/13 2/17 2/20       Gapping bilat SY MC MC [facility] SY                                                               Exercise Diary  2/4 2/7 2/10 2/13 2/17 2/20       PPT 10 15 20 20 20 20       DLS kicks with biofeed back 15ea 15 ea /c biofeedback 2x10 ea /c biofeedback 2x10 ea /c biofeedback 2x10 /c single leg lowering #2 with abduction 20ea       Bridges  20 20 20 30 30 30       clamshells  Yellow 15 ea ytb 20 ea ytb 20 ea rtb GTB 20ea       Bike/nu step/elliptical  Nu step 5' L4 elliptical 5' Nu step 6' L4 Nu step 6' L4 Nu step 6' L4       Leg press   50# 30 50# 30 55# 30 #60 30       Review leg ext/flexion gym   22# 20 ea 22# 30 ea 22# 30 ea #22 30ea, unable to increase       Mini squats  20 20 no UE 20 no UE goblet squat  Yellow 20x       Lifting education             Pain science education SY            Progressive core             rows  georgette 10# 2x10 /c DLS georgette 46# 30  georgette 10# 30 georgette 12# 30 Single arm #8 20ea       abdominal bracing, pball press :31P45 :57V77 :04C46 :86M27 :26W76 :18A31                                                                                                      Modalities

## 2020-02-24 ENCOUNTER — OFFICE VISIT (OUTPATIENT)
Dept: PHYSICAL THERAPY | Facility: CLINIC | Age: 70
End: 2020-02-24
Payer: MEDICARE

## 2020-02-24 ENCOUNTER — TELEPHONE (OUTPATIENT)
Dept: PAIN MEDICINE | Facility: MEDICAL CENTER | Age: 70
End: 2020-02-24

## 2020-02-24 DIAGNOSIS — M47.816 LUMBAR SPONDYLOSIS: Primary | ICD-10-CM

## 2020-02-24 PROCEDURE — 97112 NEUROMUSCULAR REEDUCATION: CPT

## 2020-02-24 PROCEDURE — 97140 MANUAL THERAPY 1/> REGIONS: CPT

## 2020-02-24 PROCEDURE — 97110 THERAPEUTIC EXERCISES: CPT

## 2020-02-24 NOTE — PROGRESS NOTES
Daily Note     Today's date: 2020  Patient name: John Al  : 1950  MRN: [id number]  Referring provider: Tess Schneider MD  Dx:   Encounter Diagnosis     ICD-10-CM    1  Lumbar spondylosis M47 816                   Subjective: Patient complains of spasms in low back after cleaning this weekend which included repetitive bending and lifting  Objective: See treatment diary below      Assessment: Tolerated treatment well  Increased to level 5 on Nu Step which was minimally challenging  Verbal cueing needed for goblet squat form with patient demonstrating good carry over  Patient demonstrated fatigue post treatment and would benefit from continued PT      Plan: Progress treatment as tolerated         Precautions: bilateral THAs      Manual  2/4 2/7 2/10 2/13 2/17 2/20 2/24      Gapping bilat SY [facility] SY                                                               Exercise Diary  2/4 2/7 2/10 2/13 2/17 2/20 2/24      PPT 10 15 20 20 20 20 20      DLS kicks with biofeed back 15ea 15 ea /c biofeedback 2x10 ea /c biofeedback 2x10 ea /c biofeedback 2x10 /c single leg lowering #2 with abduction 20ea 2# /c abd 20 ea      Bridges  20 20 20 30 30 30 30      clamshells  Yellow 15 ea ytb 20 ea ytb 20 ea rtb GTB 20ea gtb 20 ea      Bike/nu step/elliptical  Nu step 5' L4 elliptical 5' Nu step 6' L4 Nu step 6' L4 Nu step 6' L4 Nu step 6' L5      Leg press   50# 30 50# 30 55# 30 #60 30 60# 30      Review leg ext/flexion gym   22# 20 ea 22# 30 ea 22# 30 ea #22 30ea, unable to increase 22# 30 ea      Mini squats  20 20 no UE 20 no UE goblet squat  Yellow 20x yellow kb 20      Lifting education             Pain science education SY            Progressive core             rows  georgette 10# 2x10 /c DLS georgette 04# 30  georgette 10# 30 georgette 12# 30 Single arm #8 20ea Uni 8# 20 ea      abdominal bracing, pball press :55S51 :65T89 :66H21 :25D01 :37C36 :37W51 :48N67 Modalities

## 2020-02-24 NOTE — TELEPHONE ENCOUNTER
Left vm for pt to c/b, nurse would like to s/w her further about her symptoms  C/B # and OH provided

## 2020-02-24 NOTE — TELEPHONE ENCOUNTER
FQ stated pt call and let him know how PT was going  Pt states she feels like her muscles are getting stronger  Still getting spasms on both sides of back  Now she is getting sharp spasms now this Saturday  PT scheduled  Her for another 4 weeks      Pt can be reached at [phone]

## 2020-02-25 NOTE — TELEPHONE ENCOUNTER
FYCHACE -    Pt wanted to update FQ about her progress in PT  Pt states she started physical therapy 1 month ago and she has been going twice a week for the past 4 weeks  Pt states she feels therapy is helping and she feels stronger  Pt reports spasms in BL low back with difficultly bending  Pt has an especially strong spasm over the weekend and informed the physical therapist at her appt yesterday  Physical therapist encouraged pt to continue PT for an additional 4 weeks, pt agreed  Pt has appt for f/u with FQ on 3/30

## 2020-02-27 ENCOUNTER — OFFICE VISIT (OUTPATIENT)
Dept: PHYSICAL THERAPY | Facility: CLINIC | Age: 70
End: 2020-02-27
Payer: MEDICARE

## 2020-02-27 DIAGNOSIS — M47.816 LUMBAR SPONDYLOSIS: Primary | ICD-10-CM

## 2020-02-27 PROCEDURE — 97112 NEUROMUSCULAR REEDUCATION: CPT

## 2020-02-27 PROCEDURE — 97140 MANUAL THERAPY 1/> REGIONS: CPT

## 2020-02-27 PROCEDURE — 97110 THERAPEUTIC EXERCISES: CPT

## 2020-02-27 NOTE — PROGRESS NOTES
Daily Note     Today's date: 2020  Patient name: Víctor Reynolds  : 1950  MRN: [id number]  Referring provider: Vidhi Dunn MD  Dx:   Encounter Diagnosis     ICD-10-CM    1  Lumbar spondylosis M47 816                   Subjective: Patient complains of spasms in low back two days ago after cleaning bathroom, notes mild soreness at start of session today  Objective: See treatment diary below      Assessment: Tolerated treatment well  Tender to superior gluts and R piriformis with good response to manual therapy  Progressed weight for DLS series on table with fatigue though no pain reported throughout session  Patient demonstrated fatigue post treatment and would benefit from continued PT      Plan: RE NV       Precautions: bilateral THAs      Manual  2/4 2/7 2/10 2/13 2/17 2/20 2/24 2/27     Gapping bilat SY [facility] [address] [facility]                                                             Exercise Diary  2/4 2/7 2/10 2/13 2/17 2/20 2/24 2/27     PPT 10 15 20 20 20 20 20 20     DLS kicks with biofeed back 15ea 15 ea /c biofeedback 2x10 ea /c biofeedback 2x10 ea /c biofeedback 2x10 /c single leg lowering #2 with abduction 20ea 2# /c abd 20 ea 2 5# /c abd 20 ea     Bridges  20 20 20 30 30 30 30 30     clamshells  Yellow 15 ea ytb 20 ea ytb 20 ea rtb GTB 20ea gtb 20 ea gtb 20 ea     Bike/nu step/elliptical  Nu step 5' L4 elliptical 5' Nu step 6' L4 Nu step 6' L4 Nu step 6' L4 Nu step 6' L5 Nu step 7' L5     Leg press   50# 30 50# 30 55# 30 #60 30 60# 30 65# 30     Review leg ext/flexion gym   22# 20 ea 22# 30 ea 22# 30 ea #22 30ea, unable to increase 22# 30 ea 22# 30 ea     Mini squats  20 20 no UE 20 no UE goblet squat  Yellow 20x yellow kb 20 red kb 20     Lifting education             Pain science education SY            Progressive core             rows  georgette 10# 2x10 /c DLS georgette 34# 30  georgette 10# 30 georgette 12# 30 Single arm #8 20ea Uni 8# 20 ea Uni 9# 20 ea     abdominal bracing, pball press :72I04 :29P55 :66C16 :19H29 :64Z81 :00P74 :77X86 :24C67                                                                                                    Modalities

## 2020-03-02 ENCOUNTER — EVALUATION (OUTPATIENT)
Dept: PHYSICAL THERAPY | Facility: CLINIC | Age: 70
End: 2020-03-02
Payer: MEDICARE

## 2020-03-02 DIAGNOSIS — M47.816 LUMBAR SPONDYLOSIS: Primary | ICD-10-CM

## 2020-03-02 PROCEDURE — 97112 NEUROMUSCULAR REEDUCATION: CPT | Performed by: PHYSICAL THERAPIST

## 2020-03-02 PROCEDURE — 97110 THERAPEUTIC EXERCISES: CPT | Performed by: PHYSICAL THERAPIST

## 2020-03-02 PROCEDURE — 97140 MANUAL THERAPY 1/> REGIONS: CPT | Performed by: PHYSICAL THERAPIST

## 2020-03-02 NOTE — PROGRESS NOTES
PT Re-Evaluation      Today's date: 3/2/2020  Patient name: Marvin England  : 1950  MRN: [id number]  Referring provider: Arturo Menard MD  Dx:         Encounter Diagnosis       ICD-10-CM     1  Lumbar spondylosis M47 816 Ambulatory referral to Physical Therapy      Assessment  Assessment details: Zohreh mobility has improved an most of her pain seems isolated to the SIJ  She continues to have pain with bending activities and when walking  This patient would benefit from skilled physical therapy to address their listed impairments and functional limitations to maximize functional outcome  Impairments: abnormal gait, abnormal or restricted ROM, activity intolerance, impaired physical strength,  pain with function and weight-bearing intolerance     Prognosis: good   Goals  STG Patient is independent with HEP (met)  STG Range of motion is improved by 25% in 2 weeks (met)  LTG ADL performance improved to prior level of function in 4 weeks  LTG Increase strength full grade in 4 weeks     Plan  Patient would benefit from: skilled physical therapy  Planned therapy interventions: manual therapy, flexibility, graded activity, graded exercise, functional ROM exercises and home exercise program  Frequency: 2x week  Duration in visits: 6  Duration in weeks: 3  Treatment plan discussed with: patient         Subjective Evaluation     History of Present Illness  Mechanism of injury: Patient has been having low back pain for over 8 months  She has had two epidurals and in 2019 she had lumbar RFA, L3-L5 At one point she was in a wheel chair due to severe back pain  She has not been having any leg pain but continues to have soreness and spasms around the area of bilateral PSIS  Symptoms in the low back increase with bending activities  There is intermittent aching when walking  Her back feels better with sitting and lying down        Pain  Current pain ratin  At worst pain ratin  Location: posterior SIJ region  Quality: dull ache, sharp and tight     Patient Goals  Patient goals for therapy: independence with ADLs/IADLs, decreased pain and increased motion      Objective      Palpation   Left   PSIS     Right   PSIS      Active Range of Motion      Lumbar   Flexion:  WFL  Extension: 25  (from 15 degrees)   Left lateral flexion: 21 degrees   Right lateral flexion: 22 degrees     Additional Active Range of Motion Details  Pain on return flexion  Extension felt 'good'     Lumbar Comments  Mild weakness in hip abduction, has history of bilateral GERARDO  Gait:  Increased lateral trunk movements, trunk slightly flexed       Daily Note     Today's date: 3/2/2020  Patient name: Williams Braga  : 1950  MRN: [id number]  Referring provider: Rosie Garnica MD  Dx:   Encounter Diagnosis     ICD-10-CM    1  Lumbar spondylosis M47 816                  Precautions: bilateral THAs      Manual  2/4 2/7 2/10 2/13 2/17 2/20 2/24 2/27 3/2    Gapping bilat SY   [facility]  [facility]    Prone pelvic rocking         SY                                               Exercise Diary  2/4 2/7 2/10 2/13 2/17 2/20 2/24 2/27 3/2    PPT 10 15 20 20 20 20 20 20     DLS kicks with biofeed back 15ea 15 ea /c biofeedback 2x10 ea /c biofeedback 2x10 ea /c biofeedback 2x10 /c single leg lowering #2 with abduction 20ea 2# /c abd 20 ea 2 5# /c abd 20 ea 2 5# /c abd 20 ea    XPTDYFY  35 06 73 39 44 32 30 30 :10x10    clamshells  Yellow 15 ea ytb 20 ea ytb 20 ea rtb GTB 20ea gtb 20 ea gtb 20 ea     Bike/nu step/elliptical  Nu step 5' L4 elliptical 5' Nu step 6' L4 Nu step 6' L4 Nu step 6' L4 Nu step 6' L5 Nu step 7' L5 Nu step 7' L5    Leg press   50# 30 50# 30 55# 30 #60 30 60# 30 65# 30 65# 30    Review leg ext/flexion gym   22# 20 ea 22# 30 ea 22# 30 ea #22 30ea, unable to increase 22# 30 ea 22# 30 ea 22# 30 ea    Mini squats  20 20 no UE 20 no UE goblet squat  Yellow 20x yellow kb 20 red kb 20     Lifting education             Pain science education SY            Progressive core             rows  georgette 10# 2x10 /c DLS georgette 51# 30  georgette 10# 30 georgette 12# 30 Single arm #8 20ea Uni 8# 20 ea Uni 9# 20 ea     abdominal bracing, pball press :42L12 :09O82 :93V53 :02M90 :79J08 :98M42 :31L28 :05x20 With PPT :05x15    Prone hip ext         2x10ea    Hip ER isometrics with glut squeeze         :05x15    Hip IR with bolster squeeze         :05x15    Prone trunk ext, arms out to the side         :03x15    Side stepping red TB         90s                                  Modalities

## 2020-03-05 ENCOUNTER — OFFICE VISIT (OUTPATIENT)
Dept: PHYSICAL THERAPY | Facility: CLINIC | Age: 70
End: 2020-03-05
Payer: MEDICARE

## 2020-03-05 DIAGNOSIS — M47.816 LUMBAR SPONDYLOSIS: Primary | ICD-10-CM

## 2020-03-05 PROCEDURE — 97140 MANUAL THERAPY 1/> REGIONS: CPT

## 2020-03-05 PROCEDURE — 97112 NEUROMUSCULAR REEDUCATION: CPT

## 2020-03-05 PROCEDURE — 97110 THERAPEUTIC EXERCISES: CPT

## 2020-03-05 NOTE — PROGRESS NOTES
Daily Note     Today's date: 3/5/2020  Patient name: Earlean Osler  : 1950  MRN: [id number]  Referring provider: Ronda Stanley MD  Dx:   Encounter Diagnosis     ICD-10-CM    1  Lumbar spondylosis M47 816                   Subjective: Patient reports she was sore after last visit though has not had symptoms over the last two days  Objective: See treatment diary below      Assessment: Tolerated treatment well  Did not progress program due to soreness following last session  Notes fatigue in quads with 22# for knee extensions though able to complete 30 reps  No increased pain reported throughout treatment  Patient demonstrated fatigue post treatment and exhibited good technique with therapeutic exercises      Plan: Progress treatment as tolerated         Precautions: bilateral THAs      Manual  2/4 2/7 2/10 2/13 2/17 2/20 2/24 2/27 3/2 3   Gapping bilat SY MC MC MC MC SY MC MC SY MC   Prone pelvic rocking         SY [facility]                                              Exercise Diary  2/4 2/7 2/10 2/13 2/17 2/20 2/24 2/27 3 3   PPT 10 15 20 20 20 20 20 20     DLS kicks with biofeed back 15ea 15 ea /c biofeedback 2x10 ea /c biofeedback 2x10 ea /c biofeedback 2x10 /c single leg lowering #2 with abduction 20ea 2# /c abd 20 ea 2 5# /c abd 20 ea 2 5# /c abd 20 ea 2 5# /c abd 20 ea   Bridges  20 20 20 30 30 30 30 30 :10x10 :10x10   clamshells  Yellow 15 ea ytb 20 ea ytb 20 ea rtb GTB 20ea gtb 20 ea gtb 20 ea     Bike/nu step/elliptical  Nu step 5' L4 elliptical 5' Nu step 6' L4 Nu step 6' L4 Nu step 6' L4 Nu step 6' L5 Nu step 7' L5 Nu step 7' L5 Nu step 7' L5   Leg press   50# 30 50# 30 55# 30 #60 30 60# 30 65# 30 65# 30 65# 30    Review leg ext/flexion gym   22# 20 ea 22# 30 ea 22# 30 ea #22 30ea, unable to increase 22# 30 ea 22# 30 ea 22# 30 ea 22# 30 ea   Mini squats  20 20 no UE 20 no UE goblet squat  Yellow 20x yellow kb 20 red kb 20     Lifting education             Pain science education SY Progressive core             rows  georgette 10# 2x10 /c DLS georgette 78# 30  georgette 10# 30 georgette 12# 30 Single arm #8 20ea Uni 8# 20 ea Uni 9# 20 ea     abdominal bracing, pball press :05x15 :05x15 :05x20 :05x20 :98Z09 :89H15 :07F19 :98I03 With PPT :05x15 :05x15 /c PPT   Prone hip ext         2x10ea 2x10 ea   Hip ER isometrics with glut squeeze         :05x15 :05x15   Hip IR with bolster squeeze         :05x15 :05x15   Prone trunk ext, arms out to the side         :03x15 :03x15   Side stepping red TB         90s 90s                                 Modalities

## 2020-03-09 ENCOUNTER — OFFICE VISIT (OUTPATIENT)
Dept: PHYSICAL THERAPY | Facility: CLINIC | Age: 70
End: 2020-03-09
Payer: MEDICARE

## 2020-03-09 ENCOUNTER — OFFICE VISIT (OUTPATIENT)
Dept: FAMILY MEDICINE CLINIC | Facility: CLINIC | Age: 70
End: 2020-03-09
Payer: MEDICARE

## 2020-03-09 VITALS
HEART RATE: 72 BPM | DIASTOLIC BLOOD PRESSURE: 74 MMHG | TEMPERATURE: 98.4 F | WEIGHT: 184 LBS | SYSTOLIC BLOOD PRESSURE: 124 MMHG | HEIGHT: 60 IN | BODY MASS INDEX: 36.12 KG/M2

## 2020-03-09 DIAGNOSIS — E66.9 OBESITY (BMI 35.0-39.9 WITHOUT COMORBIDITY): Primary | ICD-10-CM

## 2020-03-09 DIAGNOSIS — J11.1 INFLUENZA: ICD-10-CM

## 2020-03-09 DIAGNOSIS — M47.816 LUMBAR SPONDYLOSIS: Primary | ICD-10-CM

## 2020-03-09 PROBLEM — M46.1 SACROILIITIS (HCC): Status: ACTIVE | Noted: 2020-03-09

## 2020-03-09 PROCEDURE — 97140 MANUAL THERAPY 1/> REGIONS: CPT | Performed by: PHYSICAL THERAPIST

## 2020-03-09 PROCEDURE — 1036F TOBACCO NON-USER: CPT | Performed by: FAMILY MEDICINE

## 2020-03-09 PROCEDURE — 99213 OFFICE O/P EST LOW 20 MIN: CPT | Performed by: FAMILY MEDICINE

## 2020-03-09 PROCEDURE — 3078F DIAST BP <80 MM HG: CPT | Performed by: FAMILY MEDICINE

## 2020-03-09 PROCEDURE — 4040F PNEUMOC VAC/ADMIN/RCVD: CPT | Performed by: FAMILY MEDICINE

## 2020-03-09 PROCEDURE — 1160F RVW MEDS BY RX/DR IN RCRD: CPT | Performed by: FAMILY MEDICINE

## 2020-03-09 PROCEDURE — 97110 THERAPEUTIC EXERCISES: CPT | Performed by: PHYSICAL THERAPIST

## 2020-03-09 PROCEDURE — 3074F SYST BP LT 130 MM HG: CPT | Performed by: FAMILY MEDICINE

## 2020-03-09 RX ORDER — OSELTAMIVIR PHOSPHATE 75 MG/1
75 CAPSULE ORAL DAILY
Qty: 10 CAPSULE | Refills: 0 | Status: SHIPPED | OUTPATIENT
Start: 2020-03-09 | End: 2020-03-19

## 2020-03-09 NOTE — PROGRESS NOTES
Daily Note     Today's date: 3/9/2020  Patient name: Christ Saunders  : 1950  MRN: [id number]  Referring provider: Nora Salas MD  Dx:   Encounter Diagnosis     ICD-10-CM    1  Lumbar spondylosis M47 816               Subjective: patient reports her back is definitely feeling better though she is sore for a day after treatment  She had 3 days of minimal pain since the last treatment  Objective: See treatment diary below      Assessment: Tolerated treatment well  Patient exhibited good technique with therapeutic exercises and would benefit from continued PT  No pain reported throughout treatment  Plan: Progress treatment as tolerated         Precautions: bilateral THAs      Manual  3/9            Gapping bilat SY            Prone pelvic rocking SY                                                       Exercise Diary  3/9            PPT             DLS kicks 4 0# /c abd 20 ea            Bridges  :10x10            clamshells             Bike/nu step/elliptical Nu step 7' L5            Leg press             Review leg ext/flexion gym             Mini squats             Lifting education             Pain science education             Progressive core             rows             abdominal bracing, pball press :05x15 c/  PPT            Prone hip ext 2x10ea            Hip ER isometrics with glut squeeze :05x15            Hip IR with bolster squeeze :05x15            Prone trunk ext, arms out to the side :03x15            Side stepping red TB 90s                                          Modalities

## 2020-03-11 NOTE — PROGRESS NOTES
Patient ID: Xavier Bae is a 78 y o  female  HPI: 78 y  o female presents to discuss what agent she can go on to lose weight since [employer] was pulled from [employer]  We discussed various options for patient; I e  [employer], or medical weight loss referral in detail  She is going to checkout these options with her insurance  Her goal is to lose 40 more pounds  Pt also would like tamiflu prophylactically since her  was dx today with influenza  She has no fever, chills or body aches         SUBJECTIVE    Family History   Problem Relation Age of Onset    Atrial fibrillation Mother     Breast cancer Mother 76    Stroke Mother     Coronary artery disease Mother     Diabetes Mother     Pancreatitis Mother     Heart attack Father     Arthritis Family     Cancer Family     Endometrial cancer Maternal Grandmother 76    Prostate cancer Paternal Uncle 54     Social History     Socioeconomic History    Marital status: /Civil Union     Spouse name: Not on file    Number of children: Not on file    Years of education: Not on file    Highest education level: Not on file   Occupational History    Not on file   Social Needs    Financial resource strain: Not on file    Food insecurity:     Worry: Not on file     Inability: Not on file    Transportation needs:     Medical: Not on file     Non-medical: Not on file   Tobacco Use    Smoking status: Never Smoker    Smokeless tobacco: Never Used   Substance and Sexual Activity    Alcohol use: Yes     Comment: 1-2x / year    Drug use: No    Sexual activity: Not on file   Lifestyle    Physical activity:     Days per week: Not on file     Minutes per session: Not on file    Stress: Not on file   Relationships    Social connections:     Talks on phone: Not on file     Gets together: Not on file     Attends Taoism service: Not on file     Active member of club or organization: Not on file     Attends meetings of clubs or organizations: Not on file     Relationship status: Not on file    Intimate partner violence:     Fear of current or ex partner: Not on file     Emotionally abused: Not on file     Physically abused: Not on file     Forced sexual activity: Not on file   Other Topics Concern    Not on file   Social History Narrative    Drinks coffee     Past Medical History:   Diagnosis Date    Arthritis     Depression     Endometriosis     GERD (gastroesophageal reflux disease)     Hyperlipidemia     Hypertension     Nondisplaced fracture of fifth metatarsal bone, right foot, initial encounter for closed fracture 9/24/2018    Osteopenia     Pancreatitis 06/2017    Pneumonia     Right hip pain 12/4/2018    S/P hip replacement, right 7/31/2018    Patient progressing well after right hip replacement     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    Trochanteric bursitis of right hip 2/12/2019     Past Surgical History:   Procedure Laterality Date    ABDOMINAL SURGERY      endometriosis     APPENDECTOMY      BREAST BIOPSY Right 1985    CARPAL TUNNEL RELEASE      COLONOSCOPY      HAND SURGERY      HIP SURGERY      HYSTERECTOMY Bilateral 1985    JOINT REPLACEMENT Bilateral     KNEE    JOINT REPLACEMENT Left     HIP    OOPHORECTOMY Bilateral 1985    ND ARTHROSCOPY SHOULDER SURGICAL BICEPS TENODESIS Right 5/10/2017    Procedure: ARTHROSCOPIC BICEPS TENODESIS WITH ROTATOR CUFF REPAIR ;  Surgeon: Rob Del Rio MD;  [facility];  Service: Orthopedics    ND Ilana Lopez Right 5/10/2017    Procedure: SHOULDER ARTHROSCOPY SUBACROMIAL DECOMPRESSION;  Surgeon: Rob Del Rio MD;  [facility];  Service: Orthopedics    ND TOTAL HIP ARTHROPLASTY Left 3/12/2018    Procedure: ARTHROPLASTY HIP TOTAL;  Surgeon: Hannah Workman MD;  [facility];  Service: Orthopedics    [address] [place] [address] Right 7/30/2018    Procedure: RIGHT TOTAL HIP ARTHROPLASTY;  Surgeon: Hannah Workman MD;  [facility];  Service: Orthopedics    TONSILLECTOMY       Allergies   Allergen Reactions    Hydromorphone Hcl Itching    Percocet [Oxycodone-Acetaminophen] GI Intolerance    Sulfamethoxazole-Trimethoprim Rash       Current Outpatient Medications:     atorvastatin (LIPITOR) 20 mg tablet, Take 1 tablet (20 mg total) by mouth daily for 90 days, Disp: 90 tablet, Rfl: 3    calcium-vitamin D (OSCAL) 250-125 MG-UNIT per tablet, Take 1 tablet by mouth daily, Disp: , Rfl:     Cholecalciferol (VITAMIN D) 2000 units tablet, Take 2,000 Units by mouth daily, Disp: , Rfl:     FLUoxetine (PROzac) 20 mg capsule, TAKE 1 CAPSULE BY MOUTH  DAILY, Disp: 90 capsule, Rfl: 0    gabapentin (NEURONTIN) 300 mg capsule, Take 1 capsule (300 mg total) by mouth 3 (three) times a day, Disp: 90 capsule, Rfl: 5    hydrochlorothiazide (HYDRODIURIL) 25 mg tablet, TAKE 1 TABLET BY MOUTH  DAILY, Disp: 90 tablet, Rfl: 0    methocarbamol (ROBAXIN) 750 mg tablet, Take 1 tablet (750 mg total) by mouth 2 (two) times a day as needed for muscle spasms, Disp: 60 tablet, Rfl: 2    metoprolol succinate (TOPROL-XL) 50 mg 24 hr tablet, TAKE 1 TABLET BY MOUTH  DAILY, Disp: 90 tablet, Rfl: 0    Multiple Vitamins-Minerals (CENTRUM SILVER ULTRA WOMENS PO), Take by mouth, Disp: , Rfl:     Omega-3 1000 MG CAPS, Take by mouth daily , Disp: , Rfl:     oseltamivir (TAMIFLU) 75 mg capsule, Take 1 capsule (75 mg total) by mouth daily for 10 days, Disp: 10 capsule, Rfl: 0    Review of Systems  Constitutional:     Denies fever, chills ,fatigue ,weakness ,weight loss, +weight gain/difficulty losing weight      ENT: Denies earache ,loss of hearing ,nosebleed, nasal discharge,nasal congestion ,sore throat ,hoarseness  Pulmonary: Denies shortness of breath ,cough  ,dyspnea on exertion, orthopnea  ,PND   Cardiovascular:  Denies bradycardia , tachycardia  ,palpations, lower extremity edema leg, claudication  Breast:  Denies new or changing breast lumps ,nipple discharge ,nipple changes  Abdomen:  Denies abdominal pain , anorexia , indigestion, nausea, vomiting, constipation, diarrhea  Musculoskeletal: Denies myalgias, arthralgias, joint swelling, joint stiffness , limb pain, limb swelling  Gu: denies dysuria, polyuria  Skin: Denies skin rash, skin lesion, skin wound, itching, dry skin  Neuro: Denies headache, numbness, tingling, confusion, loss of consciousness, dizziness, vertigo  Psychiatric: Denies feelings of depression, suicidal ideation, anxiety, sleep disturbances    OBJECTIVE  /74   Pulse 72   Temp 98 4 °F (36 9 °C)   Ht 4' 11 75" (1 518 m)   Wt 83 5 kg (184 lb)   LMP  (LMP Unknown) Comment: hysterectomy  BMI 36 24 kg/m²   Constitutional:   NAD, well appearing and well nourished ; obese     ENT:   Conjunctiva and lids: no injection, edema, or discharge     Pupils and iris: CORNELIA bilaterally    External inspection of ears and nose: normal without deformities or discharge  Otoscopic exam: Canals patent without erythema  Nasal mucosa, septum and turbinates: Normal or edema or discharge         Oropharynx:  Moist mucosa, normal tongue and tonsils without lesions  No erythema        Pulmonary:Respiratory effort normal rate and rhythm, no increased work of breathing   Auscultation of lungs:  Clear bilaterally with no adventitious breath sounds       Cardiovascular: regular rate and rhythm, S1 and S2, no murmur, no edema and/or varicosities of LE      Abdomen: Soft and non-distended     Positive bowel sounds      No heptomegaly or splenomegaly      Gu: no suprapubic tenderness or CVA tenderness, no urethral discharge  Lymphatic:  No anterior or posterior cervical lymphadenopathy         Musculoskeletal:  Gait and station: Normal gait      Digits and nails normal without clubbing or cyanosis       Inspection/palpation of joints, bones, and muscles:  No joint tenderness, swelling, full active and passive range of motion       Skin: Normal skin turgor and no rashes      Neuro:    Normal reflexes      Psych:   alert and oriented to person, place and time     normal mood and affect       Assessment/Plan:Diagnoses and all orders for this visit:    Obesity (BMI 35 0-39 9 without comorbidity)  Comments:  Pt to review options discussed above and call with her decision  Influenza  -     oseltamivir (TAMIFLU) 75 mg capsule; Take 1 capsule (75 mg total) by mouth daily for 10 days        Reviewed with patient plan to treat with above plan     Patient instructed to call in 72 hours if not feeling better or if symptoms worsen

## 2020-03-12 ENCOUNTER — OFFICE VISIT (OUTPATIENT)
Dept: PHYSICAL THERAPY | Facility: CLINIC | Age: 70
End: 2020-03-12
Payer: MEDICARE

## 2020-03-12 DIAGNOSIS — M47.816 LUMBAR SPONDYLOSIS: Primary | ICD-10-CM

## 2020-03-12 PROCEDURE — 97110 THERAPEUTIC EXERCISES: CPT

## 2020-03-12 PROCEDURE — 97112 NEUROMUSCULAR REEDUCATION: CPT

## 2020-03-12 PROCEDURE — 97140 MANUAL THERAPY 1/> REGIONS: CPT

## 2020-03-12 NOTE — PROGRESS NOTES
Daily Note     Today's date: 3/12/2020  Patient name: Carl Martinez  : 1950  MRN: [id number]  Referring provider: Luis Miguel Juan MD  Dx:   Encounter Diagnosis     ICD-10-CM    1  Lumbar spondylosis M47 816                   Subjective: Patient reports continued spasms in low back after bending forward to clean yesterday  Objective: See treatment diary below      Assessment: Tolerated treatment well  Patient demonstrated fatigue post treatment, exhibited good technique with therapeutic exercises and would benefit from continued PT      Plan: Progress treatment as tolerated         Precautions: bilateral THAs      Manual  3/9 3/12           Gapping bilat SY MC           Prone pelvic rocking SY [facility]                                                      Exercise Diary  3/9 3/12           PPT             DLS kicks 3 1# /c abd 20 ea 2 5# /c abd 20 ea           Bridges  :10x10 :10x10           clamshells             Bike/nu step/elliptical Nu step 7' L5 Bike 7' L5           Leg press  65# 30           Review leg ext/flexion gym             Mini squats             Lifting education             Pain science education             Progressive core             rows             abdominal bracing, pball press :05x15 c/  PPT :05x15 c/  PPT           Prone hip ext 2x10ea 2x10 ea           Hip ER isometrics with glut squeeze :05x15 :05x20           Hip IR with bolster squeeze :05x15 :05x20           Prone trunk ext, arms out to the side :03x15 :03x15           Side stepping red TB 90s 90s                                         Modalities

## 2020-03-16 ENCOUNTER — APPOINTMENT (OUTPATIENT)
Dept: PHYSICAL THERAPY | Facility: CLINIC | Age: 70
End: 2020-03-16
Payer: MEDICARE

## 2020-03-19 ENCOUNTER — APPOINTMENT (OUTPATIENT)
Dept: PHYSICAL THERAPY | Facility: CLINIC | Age: 70
End: 2020-03-19
Payer: MEDICARE

## 2020-03-23 ENCOUNTER — APPOINTMENT (OUTPATIENT)
Dept: PHYSICAL THERAPY | Facility: CLINIC | Age: 70
End: 2020-03-23
Payer: MEDICARE

## 2020-03-26 ENCOUNTER — APPOINTMENT (OUTPATIENT)
Dept: PHYSICAL THERAPY | Facility: CLINIC | Age: 70
End: 2020-03-26
Payer: MEDICARE

## 2020-04-01 ENCOUNTER — TELEPHONE (OUTPATIENT)
Dept: PAIN MEDICINE | Facility: CLINIC | Age: 70
End: 2020-04-01

## 2020-04-01 DIAGNOSIS — M51.16 INTERVERTEBRAL DISC DISORDER WITH RADICULOPATHY OF LUMBAR REGION: ICD-10-CM

## 2020-04-01 DIAGNOSIS — M79.18 MYOFASCIAL PAIN SYNDROME: Primary | ICD-10-CM

## 2020-04-01 RX ORDER — METHOCARBAMOL 750 MG/1
750 TABLET, FILM COATED ORAL 2 TIMES DAILY PRN
Qty: 60 TABLET | Refills: 5 | Status: SHIPPED | OUTPATIENT
Start: 2020-04-01 | End: 2020-04-03

## 2020-04-03 RX ORDER — TIZANIDINE 4 MG/1
TABLET ORAL
Qty: 60 TABLET | Refills: 1 | Status: SHIPPED | OUTPATIENT
Start: 2020-04-03 | End: 2020-06-03 | Stop reason: SDUPTHER

## 2020-04-30 ENCOUNTER — HOSPITAL ENCOUNTER (OUTPATIENT)
Dept: RADIOLOGY | Facility: MEDICAL CENTER | Age: 70
Discharge: HOME | End: 2020-04-30

## 2020-05-21 DIAGNOSIS — F39 AFFECTIVE DISORDER (HCC): ICD-10-CM

## 2020-05-21 DIAGNOSIS — I10 ESSENTIAL HYPERTENSION: ICD-10-CM

## 2020-05-21 RX ORDER — FLUOXETINE HYDROCHLORIDE 20 MG/1
20 CAPSULE ORAL DAILY
Qty: 90 CAPSULE | Refills: 1 | Status: SHIPPED | OUTPATIENT
Start: 2020-05-21 | End: 2020-10-09

## 2020-05-21 RX ORDER — METOPROLOL SUCCINATE 50 MG/1
50 TABLET, EXTENDED RELEASE ORAL DAILY
Qty: 90 TABLET | Refills: 1 | Status: SHIPPED | OUTPATIENT
Start: 2020-05-21 | End: 2020-10-09

## 2020-05-21 RX ORDER — HYDROCHLOROTHIAZIDE 25 MG/1
25 TABLET ORAL DAILY
Qty: 90 TABLET | Refills: 1 | Status: SHIPPED | OUTPATIENT
Start: 2020-05-21 | End: 2020-10-09

## 2020-06-03 ENCOUNTER — HOSPITAL ENCOUNTER (OUTPATIENT)
Dept: RADIOLOGY | Facility: MEDICAL CENTER | Age: 70
Discharge: HOME | End: 2020-06-03
Payer: MEDICARE

## 2020-06-03 VITALS — BODY MASS INDEX: 37.09 KG/M2 | HEIGHT: 59 IN | WEIGHT: 184 LBS

## 2020-06-03 DIAGNOSIS — N60.19 FIBROCYSTIC BREAST DISEASE (FCBD), UNSPECIFIED LATERALITY: ICD-10-CM

## 2020-06-03 DIAGNOSIS — Z12.31 VISIT FOR SCREENING MAMMOGRAM: ICD-10-CM

## 2020-06-03 PROCEDURE — 77063 BREAST TOMOSYNTHESIS BI: CPT

## 2020-06-03 PROCEDURE — 77067 SCR MAMMO BI INCL CAD: CPT

## 2020-06-03 RX ORDER — TIZANIDINE 4 MG/1
TABLET ORAL
Qty: 180 TABLET | Refills: 1 | Status: SHIPPED | OUTPATIENT
Start: 2020-06-03 | End: 2020-07-28 | Stop reason: SINTOL

## 2020-06-03 RX ORDER — GABAPENTIN 300 MG/1
300 CAPSULE ORAL 3 TIMES DAILY
Qty: 270 CAPSULE | Refills: 1 | Status: SHIPPED | OUTPATIENT
Start: 2020-06-03 | End: 2021-03-16 | Stop reason: SDUPTHER

## 2020-06-08 ENCOUNTER — OFFICE VISIT (OUTPATIENT)
Dept: FAMILY MEDICINE CLINIC | Facility: CLINIC | Age: 70
End: 2020-06-08
Payer: MEDICARE

## 2020-06-08 VITALS
HEIGHT: 59 IN | BODY MASS INDEX: 37.7 KG/M2 | WEIGHT: 187 LBS | SYSTOLIC BLOOD PRESSURE: 124 MMHG | DIASTOLIC BLOOD PRESSURE: 78 MMHG | HEART RATE: 74 BPM | TEMPERATURE: 98.5 F

## 2020-06-08 DIAGNOSIS — R63.8 INCREASED BMI: ICD-10-CM

## 2020-06-08 DIAGNOSIS — I10 ESSENTIAL HYPERTENSION: Primary | ICD-10-CM

## 2020-06-08 DIAGNOSIS — E78.00 HYPERCHOLESTEROLEMIA: ICD-10-CM

## 2020-06-08 PROCEDURE — 3008F BODY MASS INDEX DOCD: CPT | Performed by: FAMILY MEDICINE

## 2020-06-08 PROCEDURE — 3078F DIAST BP <80 MM HG: CPT | Performed by: FAMILY MEDICINE

## 2020-06-08 PROCEDURE — 1036F TOBACCO NON-USER: CPT | Performed by: FAMILY MEDICINE

## 2020-06-08 PROCEDURE — 3074F SYST BP LT 130 MM HG: CPT | Performed by: FAMILY MEDICINE

## 2020-06-08 PROCEDURE — 1160F RVW MEDS BY RX/DR IN RCRD: CPT | Performed by: FAMILY MEDICINE

## 2020-06-08 PROCEDURE — 4040F PNEUMOC VAC/ADMIN/RCVD: CPT | Performed by: FAMILY MEDICINE

## 2020-06-08 PROCEDURE — 99214 OFFICE O/P EST MOD 30 MIN: CPT | Performed by: FAMILY MEDICINE

## 2020-06-09 ENCOUNTER — HOSPITAL ENCOUNTER (OUTPATIENT)
Dept: MAMMOGRAPHY | Facility: CLINIC | Age: 70
Discharge: HOME | End: 2020-06-09
Payer: MEDICARE

## 2020-06-09 VITALS — WEIGHT: 187 LBS | HEIGHT: 59 IN | BODY MASS INDEX: 37.7 KG/M2

## 2020-06-09 DIAGNOSIS — R92.8 ABNORMAL MAMMOGRAM: ICD-10-CM

## 2020-06-09 PROCEDURE — 77065 DX MAMMO INCL CAD UNI: CPT

## 2020-07-08 ENCOUNTER — OFFICE VISIT (OUTPATIENT)
Dept: BARIATRICS | Facility: CLINIC | Age: 70
End: 2020-07-08

## 2020-07-08 VITALS — HEIGHT: 60 IN | TEMPERATURE: 98 F | WEIGHT: 189.4 LBS | BODY MASS INDEX: 37.18 KG/M2

## 2020-07-08 DIAGNOSIS — R63.5 ABNORMAL WEIGHT GAIN: ICD-10-CM

## 2020-07-08 DIAGNOSIS — R63.8 INCREASED BMI: ICD-10-CM

## 2020-07-08 PROCEDURE — WMDI30

## 2020-07-08 PROCEDURE — RECHECK

## 2020-07-08 NOTE — PROGRESS NOTES
Weight Management Medical Nutrition Assessment  Carroll Garcia is here for medical meal planning (bypass MD)  Current wt: 189 4 lbs  Has been managed by PCP in the past, was on Belviq until pulled from market  Feels her chronic thyroiditis is impacting her ability to lose weight  She does not have medication management for this  At times skips lunch and then might graze later on  She was surprised to hear the amount of calories needed for weight loss  Minimal activity due to b/l knee and hip replacements  Realistic goals discussed  Discussed use of meal replacements as an option which she was interested in  Meal plan provided  She will f/u in 1 month with a bundle       Patient seen by Medical Provider in past 6 months:  no  Requested to schedule appointment with Medical Provider: Yes    Anthropometric Measurements  Start Weight (#): 189 4 lbs   Ideal Body Weight (#): 100 lbs  Goal Weight (#): 120-140 lb  Highest: 211 lbs  Lowest: 100 lbs  UBW: 180 lbs    Weight Loss History  Previous weight loss attempts: Belviq with PCP    Food and Nutrition Related History  Wake up: 8:00   Bed Time: 9-10    Food Recall  Breakfast: 9:00 shredded wheat, frosted mini wheats or special K w/1% milk, 1/2 cup 1% milk   Snack: 11:00 "junk"  Lunch: 1:00 or later: s/w on honey wheat bread (2 slice), deli meat sliced thin 2 slices, sometimes cheese, sometimes chips or fruit OR skip 3x/wk OR salad w/ham & cheese, light honey mustard or light ranch  Snack: skip  Dinner: 5:00: ~3 oz lean protein, 1/2 cup carb, sometimes starchy veggies ~1/2 cup or non starchy  Snack: sometimes ice cream or pretzels    Beverages: water, 1% milk, sugar free beverages, diet soda and coffee/tea  Volume of beverage intake: 6-7 glasses water    Weekends: Same  Cravings: sweets, chocolate  Trouble area of day:midmorning    Frequency of Eating out: 1x/wk  Food restrictions: n/a  Cooking: self   Food Shopping: self    Physical Activity Intake  Activity:none  Frequency:never  Physical limitations/barriers to exercise: 2 knee replacements and 2 hip replacements & back issues    Estimated Needs  Energy  [employer] Needs: BMR :1311   1# loss weekly sedentary:  1073                Protein:55-68 gm      (1 2-1 5g/kg IBW)  Fluid: 53 oz     (35mL/kg IBW)    Nutrition Diagnosis  Yes; Overweight/obesity  related to Excess energy intake as evidenced by  BMI more than normative standard for age and sex (obesity-grade II 35-39  9)       Nutrition Intervention    Nutrition Prescription  Calories: 900-1250  Protein: 68-93 gm    Meal Plan (Aleksander/Pro)  Breakfast: 200, 10  Snack: 100-150, 5-10  Lunch: 200, 27  Snack: 0-160, 0-15  Dinner: 300-400, 21  Snack: 100-150, 5-10    Nutrition Education:    Calorie controlled menu  Lean protein food choices  Healthy snack options  Food journaling tips    Nutrition Counseling:  Strategies: meal planning, portion sizes, healthy snack choices, hydration, fiber intake, protein intake, exercise, food journal      Monitoring and Evaluation:  Evaluation criteria:  Energy Intake  Meet protein needs  Maintain adequate hydration  Monitor weekly weight  Meal planning/preparation  Food journal   Decreased portions at mealtimes and snacks  Physical activity     Barriers to learning:none  Readiness to change: Preparation:  (Getting ready to change)   Comprehension: very good  Expected Compliance: very good

## 2020-07-20 ENCOUNTER — TRANSCRIBE ORDERS (OUTPATIENT)
Dept: ADMINISTRATIVE | Facility: HOSPITAL | Age: 70
End: 2020-07-20

## 2020-07-20 DIAGNOSIS — R92.8 ABNORMAL MAMMOGRAM: Primary | ICD-10-CM

## 2020-07-28 ENCOUNTER — TELEPHONE (OUTPATIENT)
Dept: PAIN MEDICINE | Facility: MEDICAL CENTER | Age: 70
End: 2020-07-28

## 2020-07-28 DIAGNOSIS — M79.18 MYOFASCIAL PAIN SYNDROME: Primary | ICD-10-CM

## 2020-07-28 DIAGNOSIS — M79.18 MYOFASCIAL PAIN: Primary | ICD-10-CM

## 2020-07-28 RX ORDER — METHOCARBAMOL 500 MG/1
500 TABLET, FILM COATED ORAL
Qty: 30 TABLET | Refills: 0 | Status: SHIPPED | OUTPATIENT
Start: 2020-07-28 | End: 2020-07-29

## 2020-07-28 NOTE — TELEPHONE ENCOUNTER
I informed pt about the methocarbamol  Pt said she was on that one before the tizanidine and it didn't help  Pt asking for a different alternative

## 2020-07-28 NOTE — TELEPHONE ENCOUNTER
Pt reports the tizanidine was causing diarrhea  She stopped taking it and the diarrhea stopped  Just recently she tried it again just twice and the diarrhea started again  Pt asking for an alternative muscle relaxant to use as needed  [facility] on file please

## 2020-07-28 NOTE — TELEPHONE ENCOUNTER
Pt called stating that the tizanidine gives her diaharria   Pt is not able to take this medication     Pt can be reached at [phone]

## 2020-07-29 RX ORDER — CHLORZOXAZONE 500 MG/1
500 TABLET ORAL 2 TIMES DAILY PRN
Qty: 60 TABLET | Refills: 0 | Status: SHIPPED | OUTPATIENT
Start: 2020-07-29 | End: 2021-05-20 | Stop reason: SDUPTHER

## 2020-07-30 NOTE — TELEPHONE ENCOUNTER
S/w pt, states she would be interested in trying chlorzoxazone, please send to pharmacy on file  Thank you  Per pt, no need to cb when script sent as pharmacy will contact her

## 2020-08-21 DIAGNOSIS — E78.00 PURE HYPERCHOLESTEROLEMIA: ICD-10-CM

## 2020-08-24 ENCOUNTER — APPOINTMENT (OUTPATIENT)
Dept: LAB | Facility: CLINIC | Age: 70
End: 2020-08-24
Payer: MEDICARE

## 2020-08-24 DIAGNOSIS — E78.00 HYPERCHOLESTEROLEMIA: ICD-10-CM

## 2020-08-24 DIAGNOSIS — I10 ESSENTIAL HYPERTENSION: ICD-10-CM

## 2020-08-24 DIAGNOSIS — E78.2 MIXED HYPERLIPIDEMIA: ICD-10-CM

## 2020-08-24 PROBLEM — E66.01 SEVERE OBESITY (BMI 35.0-39.9) WITH COMORBIDITY (HCC): Status: ACTIVE | Noted: 2020-08-24

## 2020-08-24 LAB
ALBUMIN SERPL BCP-MCNC: 3.7 G/DL (ref 3.5–5)
ALP SERPL-CCNC: 55 U/L (ref 46–116)
ALT SERPL W P-5'-P-CCNC: 25 U/L (ref 12–78)
ANION GAP SERPL CALCULATED.3IONS-SCNC: 5 MMOL/L (ref 4–13)
AST SERPL W P-5'-P-CCNC: 15 U/L (ref 5–45)
BILIRUB SERPL-MCNC: 0.56 MG/DL (ref 0.2–1)
BUN SERPL-MCNC: 16 MG/DL (ref 5–25)
CALCIUM SERPL-MCNC: 9.7 MG/DL (ref 8.3–10.1)
CHLORIDE SERPL-SCNC: 104 MMOL/L (ref 100–108)
CHOLEST SERPL-MCNC: 177 MG/DL (ref 50–200)
CO2 SERPL-SCNC: 30 MMOL/L (ref 21–32)
CREAT SERPL-MCNC: 0.97 MG/DL (ref 0.6–1.3)
GFR SERPL CREATININE-BSD FRML MDRD: 60 ML/MIN/1.73SQ M
GLUCOSE P FAST SERPL-MCNC: 81 MG/DL (ref 65–99)
HDLC SERPL-MCNC: 79 MG/DL
LDLC SERPL CALC-MCNC: 82 MG/DL (ref 0–100)
POTASSIUM SERPL-SCNC: 4 MMOL/L (ref 3.5–5.3)
PROT SERPL-MCNC: 7.3 G/DL (ref 6.4–8.2)
SODIUM SERPL-SCNC: 139 MMOL/L (ref 136–145)
TRIGL SERPL-MCNC: 79 MG/DL

## 2020-08-24 PROCEDURE — 80061 LIPID PANEL: CPT

## 2020-08-24 PROCEDURE — 36415 COLL VENOUS BLD VENIPUNCTURE: CPT

## 2020-08-24 PROCEDURE — 80053 COMPREHEN METABOLIC PANEL: CPT

## 2020-08-24 RX ORDER — ATORVASTATIN CALCIUM 20 MG/1
TABLET, FILM COATED ORAL
Qty: 90 TABLET | Refills: 3 | Status: SHIPPED | OUTPATIENT
Start: 2020-08-24 | End: 2021-07-06

## 2020-08-24 NOTE — PROGRESS NOTES
Assessment/Plan:    Severe obesity (BMI 35 0-39  9) with comorbidity ([address] )  -Discussed options of HealthyCORE-Intensive Lifestyle Intervention Program, Very Low Calorie Diet-VLCD and Conservative Program and the role of weight loss medications   -Initial weight loss goal of 5-10% weight loss for improved health  -Avoid Saxenda/Victoza: hx of pancreatitis  -Caution Contrave as she is on fluoxetine  -Phentermine not a good option due to age  -Topamax may be an option, but her cravings have been improving since starting dietary changes  She will continue focusing on lifestyle changes    -Screening labs: Reviewed/UTD  -Patient is interested in pursuing Conservative Program with RD bundle    Reviewed that gabapentin can result in weight gain/prevent weight loss  Recommend she discuss dose reduction/taper with the prescribing provider, but finds this helpful for her back issues  Hypertension  -taking metoprolol and HCTZ  -may improve with weight loss and dietary changes    Hyperlipidemia  -on statin  -lifestyle changes/weight loss    Follow up in approximately 3 months with Non-Surgical Physician/Advanced Practitioner  Colonoscopy- per chart 01/17/2020, 5 year recall recommended    STOP BANG 5/8- [facility] eto message sent to the pt    Scaly lesions on left forehead and left lower neck- states previously had eval by dermatology, but has not seen recently  Recommend annual skin cancer screenings  Goals:  Food log (ie ) www myfitnesspal com,sparkpeople  com,loseit com,calorieking  com,etc  baritastic  No sugary beverages  At least 64oz of water daily  Increase physical activity by 10 minutes daily   Gradually increase physical activity to a goal of 5 days per week for 30 minutes of MODERATE intensity PLUS 2 days per week of FULL BODY resistance training  5-10 servings of fruits and vegetables per day and 25-35 grams of dietary fiber per day, gradually increasing  900-1250 calories    Diagnoses and all orders for this visit:    Severe obesity (BMI 35 0-39  9) with comorbidity ([address] )    Essential hypertension    Mixed hyperlipidemia    Body mass index 35 0-35 9, adult      Subjective:   Chief Complaint   Patient presents with    Consult     Patient is MWM consult  Stop bang 5/8     Patient ID: Laura Minors  is a 78 y o  female with excess weight/obesity here to pursue weight management  Past Medical History:   Diagnosis Date    Arthritis     Depression     Endometriosis     GERD (gastroesophageal reflux disease)     Hyperlipidemia     Hypertension     Nondisplaced fracture of fifth metatarsal bone, right foot, initial encounter for closed fracture 9/24/2018    Osteopenia     Pancreatitis 06/2017    Pneumonia     Right hip pain 12/4/2018    S/P hip replacement, right 7/31/2018    Patient progressing well after right hip replacement     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    Trochanteric bursitis of right hip 2/12/2019     HPI:  Obesity/Excess Weight:  Severity: Severe  Onset:  50s    Modifiers:  Belviq until went off the market- 204 --> 189  Fen-phen- 40s (180-->120)  Contributing factors:  total hysterectomy, worked night shift as a nurse, depression after loss of son in motorcycle accident  Associated symptoms: comorbid conditions, fatigue, joint/back pain    Started medical meal planning and since increasing her protein noticed cravings have resolved  Goals: 140, but would be happy with 160  Occupation: retired nurse    The following portions of the patient's history were reviewed and updated as appropriate: allergies, current medications, past family history, past medical history, past social history, past surgical history and problem list     Review of Systems   Constitutional: Negative for chills and fever  HENT: Negative for sore throat  Respiratory: Negative for cough and shortness of breath  Cardiovascular: Negative for chest pain and palpitations     Gastrointestinal: Negative for constipation and diarrhea  Genitourinary: Negative for dysuria  Musculoskeletal: Positive for arthralgias and back pain  Skin: Negative for rash  Neurological: Negative for headaches  Psychiatric/Behavioral:        Despite many life stressors feels mood is good     Objective:    /72 (BP Location: Right arm)   Pulse 66   Temp 97 7 °F (36 5 °C)   Resp 16   Ht 5' (1 524 m)   Wt 82 2 kg (181 lb 3 2 oz)   LMP  (LMP Unknown) Comment: hysterectomy  BMI 35 39 kg/m²     Physical Exam  Vitals signs and nursing note reviewed  Constitutional   General appearance: Abnormal   well developed and obese  Eyes No conjunctival pallor  Pulmonary   Respiratory effort: No increased work of breathing or signs of respiratory distress  Auscultation of lungs: Clear to auscultation, equal breath sounds bilaterally, no wheezes, no rales, no rhonci  Cardiovascular   Auscultation of heart: Normal rate and rhythm, normal S1 and S2, without murmurs  Examination of extremities for edema and/or varicosities: Normal   no edema  Abdomen   Abdomen: Abnormal   The abdomen was obese  Bowel sounds were normal  The abdomen was soft and nontender     Musculoskeletal   Gait and station: Normal     Skin  Scaly lesions on left forehead and left lower neck  Psychiatric   Orientation to person, place and time: Normal     Affect: appropriate

## 2020-08-24 NOTE — ASSESSMENT & PLAN NOTE
-Discussed options of HealthyCORE-Intensive Lifestyle Intervention Program, Very Low Calorie Diet-VLCD and Conservative Program and the role of weight loss medications   -Initial weight loss goal of 5-10% weight loss for improved health  -Avoid Saxenda/Victoza: hx of pancreatitis  -Caution Contrave as she is on fluoxetine  -Phentermine not a good option due to age  -Topamax may be an option, but her cravings have been improving since starting dietary changes  She will continue focusing on lifestyle changes    -Screening labs: Reviewed/UTD  -Patient is interested in pursuing Conservative Program with RD bundle    Reviewed that gabapentin can result in weight gain/prevent weight loss  Recommend she discuss dose reduction/taper with the prescribing provider, but finds this helpful for her back issues

## 2020-08-26 ENCOUNTER — CONSULT (OUTPATIENT)
Dept: BARIATRICS | Facility: CLINIC | Age: 70
End: 2020-08-26
Payer: MEDICARE

## 2020-08-26 ENCOUNTER — OFFICE VISIT (OUTPATIENT)
Dept: BARIATRICS | Facility: CLINIC | Age: 70
End: 2020-08-26

## 2020-08-26 VITALS
WEIGHT: 181.2 LBS | SYSTOLIC BLOOD PRESSURE: 130 MMHG | RESPIRATION RATE: 16 BRPM | TEMPERATURE: 97.7 F | BODY MASS INDEX: 35.57 KG/M2 | HEART RATE: 66 BPM | DIASTOLIC BLOOD PRESSURE: 72 MMHG | HEIGHT: 60 IN

## 2020-08-26 VITALS — WEIGHT: 181.3 LBS | BODY MASS INDEX: 35.6 KG/M2 | HEIGHT: 60 IN

## 2020-08-26 DIAGNOSIS — E78.2 MIXED HYPERLIPIDEMIA: ICD-10-CM

## 2020-08-26 DIAGNOSIS — I10 ESSENTIAL HYPERTENSION: ICD-10-CM

## 2020-08-26 DIAGNOSIS — R63.5 ABNORMAL WEIGHT GAIN: ICD-10-CM

## 2020-08-26 DIAGNOSIS — E66.01 SEVERE OBESITY (BMI 35.0-39.9) WITH COMORBIDITY (HCC): Primary | ICD-10-CM

## 2020-08-26 PROCEDURE — RECHECK

## 2020-08-26 PROCEDURE — DB3PK

## 2020-08-26 PROCEDURE — 99204 OFFICE O/P NEW MOD 45 MIN: CPT | Performed by: PHYSICIAN ASSISTANT

## 2020-08-26 RX ORDER — CALCIUM CARBONATE 200(500)MG
1 TABLET,CHEWABLE ORAL AS NEEDED
COMMUNITY

## 2020-08-26 RX ORDER — ACETAMINOPHEN 500 MG
500 TABLET ORAL EVERY 6 HOURS PRN
COMMUNITY

## 2020-08-26 NOTE — PATIENT INSTRUCTIONS
Goals: Food log (ie ) www myfitnesspal com,sparkpeople  com,loseit com,calorieking  com,etc  baritastic  No sugary beverages  At least 64oz of water daily  Increase physical activity by 10 minutes daily   Gradually increase physical activity to a goal of 5 days per week for 30 minutes of MODERATE intensity PLUS 2 days per week of FULL BODY resistance training  5-10 servings of fruits and vegetables per day and 25-35 grams of dietary fiber per day, gradually increasing  900-1250 calories

## 2020-10-09 DIAGNOSIS — M51.16 INTERVERTEBRAL DISC DISORDER WITH RADICULOPATHY OF LUMBAR REGION: ICD-10-CM

## 2020-10-09 DIAGNOSIS — I10 ESSENTIAL HYPERTENSION: ICD-10-CM

## 2020-10-09 DIAGNOSIS — F39 AFFECTIVE DISORDER (HCC): ICD-10-CM

## 2020-10-09 RX ORDER — HYDROCHLOROTHIAZIDE 25 MG/1
TABLET ORAL
Qty: 90 TABLET | Refills: 3 | Status: SHIPPED | OUTPATIENT
Start: 2020-10-09 | End: 2021-09-14

## 2020-10-09 RX ORDER — METOPROLOL SUCCINATE 50 MG/1
TABLET, EXTENDED RELEASE ORAL
Qty: 90 TABLET | Refills: 3 | Status: SHIPPED | OUTPATIENT
Start: 2020-10-09 | End: 2021-05-07 | Stop reason: SDUPTHER

## 2020-10-09 RX ORDER — FLUOXETINE HYDROCHLORIDE 20 MG/1
CAPSULE ORAL
Qty: 90 CAPSULE | Refills: 3 | Status: SHIPPED | OUTPATIENT
Start: 2020-10-09 | End: 2021-09-14

## 2020-10-13 RX ORDER — GABAPENTIN 300 MG/1
CAPSULE ORAL
Qty: 270 CAPSULE | Refills: 3 | OUTPATIENT
Start: 2020-10-13

## 2020-10-28 ENCOUNTER — TELEMEDICINE (OUTPATIENT)
Dept: FAMILY MEDICINE CLINIC | Facility: CLINIC | Age: 70
End: 2020-10-28
Payer: MEDICARE

## 2020-10-28 DIAGNOSIS — J01.90 ACUTE SINUSITIS, RECURRENCE NOT SPECIFIED, UNSPECIFIED LOCATION: Primary | ICD-10-CM

## 2020-10-28 PROCEDURE — 99213 OFFICE O/P EST LOW 20 MIN: CPT | Performed by: FAMILY MEDICINE

## 2020-10-28 RX ORDER — AZITHROMYCIN 250 MG/1
TABLET, FILM COATED ORAL
Qty: 6 TABLET | Refills: 0 | Status: SHIPPED | OUTPATIENT
Start: 2020-10-28 | End: 2020-11-01

## 2020-10-28 RX ORDER — BROMPHENIRAMINE MALEATE, PSEUDOEPHEDRINE HYDROCHLORIDE, AND DEXTROMETHORPHAN HYDROBROMIDE 2; 30; 10 MG/5ML; MG/5ML; MG/5ML
5 SYRUP ORAL 4 TIMES DAILY PRN
Qty: 180 ML | Refills: 0 | Status: SHIPPED | OUTPATIENT
Start: 2020-10-28 | End: 2020-12-09 | Stop reason: ALTCHOICE

## 2020-10-29 ENCOUNTER — TELEPHONE (OUTPATIENT)
Dept: FAMILY MEDICINE CLINIC | Facility: CLINIC | Age: 70
End: 2020-10-29

## 2020-10-29 DIAGNOSIS — Z20.822 CLOSE EXPOSURE TO COVID-19 VIRUS: Primary | ICD-10-CM

## 2020-10-29 DIAGNOSIS — Z20.822 CLOSE EXPOSURE TO COVID-19 VIRUS: ICD-10-CM

## 2020-10-29 PROCEDURE — U0003 INFECTIOUS AGENT DETECTION BY NUCLEIC ACID (DNA OR RNA); SEVERE ACUTE RESPIRATORY SYNDROME CORONAVIRUS 2 (SARS-COV-2) (CORONAVIRUS DISEASE [COVID-19]), AMPLIFIED PROBE TECHNIQUE, MAKING USE OF HIGH THROUGHPUT TECHNOLOGIES AS DESCRIBED BY CMS-2020-01-R: HCPCS | Performed by: FAMILY MEDICINE

## 2020-10-29 PROCEDURE — NC001 PR NO CHARGE: Performed by: FAMILY MEDICINE

## 2020-10-30 LAB — SARS-COV-2 RNA SPEC QL NAA+PROBE: NOT DETECTED

## 2020-11-09 ENCOUNTER — TELEPHONE (OUTPATIENT)
Dept: BARIATRICS | Facility: CLINIC | Age: 70
End: 2020-11-09

## 2020-12-08 ENCOUNTER — TRANSCRIBE ORDERS (OUTPATIENT)
Dept: MAMMOGRAPHY | Facility: CLINIC | Age: 70
End: 2020-12-08

## 2020-12-08 ENCOUNTER — HOSPITAL ENCOUNTER (OUTPATIENT)
Dept: MAMMOGRAPHY | Facility: CLINIC | Age: 70
Discharge: HOME | End: 2020-12-08
Payer: MEDICARE

## 2020-12-08 VITALS — WEIGHT: 177 LBS | BODY MASS INDEX: 34.75 KG/M2 | HEIGHT: 60 IN

## 2020-12-08 DIAGNOSIS — R92.8 ABNORMAL MAMMOGRAM: Primary | ICD-10-CM

## 2020-12-08 DIAGNOSIS — R92.8 ABNORMAL MAMMOGRAM: ICD-10-CM

## 2020-12-08 PROCEDURE — 77065 DX MAMMO INCL CAD UNI: CPT

## 2020-12-09 ENCOUNTER — OFFICE VISIT (OUTPATIENT)
Dept: FAMILY MEDICINE CLINIC | Facility: CLINIC | Age: 70
End: 2020-12-09
Payer: MEDICARE

## 2020-12-09 VITALS
OXYGEN SATURATION: 99 % | HEART RATE: 67 BPM | TEMPERATURE: 97.8 F | DIASTOLIC BLOOD PRESSURE: 60 MMHG | BODY MASS INDEX: 35.34 KG/M2 | WEIGHT: 180 LBS | HEIGHT: 60 IN | SYSTOLIC BLOOD PRESSURE: 116 MMHG

## 2020-12-09 DIAGNOSIS — I10 ESSENTIAL HYPERTENSION: ICD-10-CM

## 2020-12-09 DIAGNOSIS — Z00.00 MEDICARE ANNUAL WELLNESS VISIT, SUBSEQUENT: Primary | ICD-10-CM

## 2020-12-09 DIAGNOSIS — R68.89 COLD INTOLERANCE: ICD-10-CM

## 2020-12-09 DIAGNOSIS — R53.83 OTHER FATIGUE: ICD-10-CM

## 2020-12-09 DIAGNOSIS — E78.00 HYPERCHOLESTEROLEMIA: ICD-10-CM

## 2020-12-09 PROCEDURE — G0438 PPPS, INITIAL VISIT: HCPCS | Performed by: FAMILY MEDICINE

## 2020-12-09 PROCEDURE — 99214 OFFICE O/P EST MOD 30 MIN: CPT | Performed by: FAMILY MEDICINE

## 2020-12-09 PROCEDURE — 1123F ACP DISCUSS/DSCN MKR DOCD: CPT | Performed by: FAMILY MEDICINE

## 2020-12-18 ENCOUNTER — TELEMEDICINE (OUTPATIENT)
Dept: FAMILY MEDICINE CLINIC | Facility: CLINIC | Age: 70
End: 2020-12-18
Payer: MEDICARE

## 2020-12-18 DIAGNOSIS — J06.9 UPPER RESPIRATORY TRACT INFECTION, UNSPECIFIED TYPE: Primary | ICD-10-CM

## 2020-12-18 PROCEDURE — 99213 OFFICE O/P EST LOW 20 MIN: CPT | Performed by: FAMILY MEDICINE

## 2020-12-18 RX ORDER — AZITHROMYCIN 250 MG/1
TABLET, FILM COATED ORAL
Qty: 6 TABLET | Refills: 0 | Status: SHIPPED | OUTPATIENT
Start: 2020-12-18 | End: 2020-12-22

## 2020-12-18 RX ORDER — BROMPHENIRAMINE MALEATE, PSEUDOEPHEDRINE HYDROCHLORIDE, AND DEXTROMETHORPHAN HYDROBROMIDE 2; 30; 10 MG/5ML; MG/5ML; MG/5ML
10 SYRUP ORAL 4 TIMES DAILY PRN
Qty: 180 ML | Refills: 0 | Status: SHIPPED | OUTPATIENT
Start: 2020-12-18 | End: 2021-04-26

## 2020-12-21 ENCOUNTER — TELEPHONE (OUTPATIENT)
Dept: FAMILY MEDICINE CLINIC | Facility: CLINIC | Age: 70
End: 2020-12-21

## 2020-12-21 DIAGNOSIS — J06.9 UPPER RESPIRATORY TRACT INFECTION, UNSPECIFIED TYPE: Primary | ICD-10-CM

## 2020-12-21 RX ORDER — METHYLPREDNISOLONE 4 MG/1
TABLET ORAL
Qty: 21 EACH | Refills: 0 | Status: SHIPPED | OUTPATIENT
Start: 2020-12-21 | End: 2021-04-26

## 2020-12-21 RX ORDER — DEXTROMETHORPHAN HYDROBROMIDE AND PROMETHAZINE HYDROCHLORIDE 15; 6.25 MG/5ML; MG/5ML
5 SOLUTION ORAL 4 TIMES DAILY PRN
Qty: 180 ML | Refills: 0 | Status: SHIPPED | OUTPATIENT
Start: 2020-12-21 | End: 2021-04-26

## 2020-12-28 ENCOUNTER — TELEMEDICINE (OUTPATIENT)
Dept: FAMILY MEDICINE CLINIC | Facility: CLINIC | Age: 70
End: 2020-12-28
Payer: MEDICARE

## 2020-12-28 DIAGNOSIS — B34.9 VIRAL INFECTION, UNSPECIFIED: Primary | ICD-10-CM

## 2020-12-28 DIAGNOSIS — B34.9 VIRAL INFECTION, UNSPECIFIED: ICD-10-CM

## 2020-12-28 PROCEDURE — 99214 OFFICE O/P EST MOD 30 MIN: CPT | Performed by: FAMILY MEDICINE

## 2020-12-28 PROCEDURE — U0003 INFECTIOUS AGENT DETECTION BY NUCLEIC ACID (DNA OR RNA); SEVERE ACUTE RESPIRATORY SYNDROME CORONAVIRUS 2 (SARS-COV-2) (CORONAVIRUS DISEASE [COVID-19]), AMPLIFIED PROBE TECHNIQUE, MAKING USE OF HIGH THROUGHPUT TECHNOLOGIES AS DESCRIBED BY CMS-2020-01-R: HCPCS | Performed by: FAMILY MEDICINE

## 2020-12-29 ENCOUNTER — TELEPHONE (OUTPATIENT)
Dept: FAMILY MEDICINE CLINIC | Facility: CLINIC | Age: 70
End: 2020-12-29

## 2020-12-29 LAB — SARS-COV-2 RNA SPEC QL NAA+PROBE: DETECTED

## 2021-01-04 ENCOUNTER — TELEMEDICINE (OUTPATIENT)
Dept: FAMILY MEDICINE CLINIC | Facility: CLINIC | Age: 71
End: 2021-01-04
Payer: MEDICARE

## 2021-01-04 DIAGNOSIS — U07.1 COVID-19: Primary | ICD-10-CM

## 2021-01-04 DIAGNOSIS — N39.0 URINARY TRACT INFECTION WITHOUT HEMATURIA, SITE UNSPECIFIED: ICD-10-CM

## 2021-01-04 PROCEDURE — 99214 OFFICE O/P EST MOD 30 MIN: CPT | Performed by: FAMILY MEDICINE

## 2021-01-04 RX ORDER — CEFUROXIME AXETIL 500 MG/1
500 TABLET ORAL EVERY 12 HOURS SCHEDULED
Qty: 20 TABLET | Refills: 0 | Status: SHIPPED | OUTPATIENT
Start: 2021-01-04 | End: 2021-01-14

## 2021-01-15 NOTE — PROGRESS NOTES
COVID-19 Virtual Visit     Assessment/Plan:    Problem List Items Addressed This Visit     None      Visit Diagnoses     Urinary tract infection without hematuria, site unspecified    -  Primary         Disposition:     I recommended continued isolation until at least 24 hours have passed since recovery defined as resolution of fever without the use of fever-reducing medications AND improvement in COVID symptoms AND 10 days have passed since onset of symptoms (or 10 days have passed since date of first positive viral diagnostic test for asymptomatic patients)  I have spent 15 minutes directly with the patient  Greater than 50% of this time was spent in counseling/coordination of care regarding: diagnostic results, prognosis, risks and benefits of treatment options, instructions for management and patient and family education  Encounter provider Katty Fried DO    Provider located at [address] [place] [id number]    Recent Visits  No visits were found meeting these conditions  Showing recent visits within past 7 days and meeting all other requirements     Future Appointments  No visits were found meeting these conditions  Showing future appointments within next 150 days and meeting all other requirements      This virtual check-in was done via Microsoft Teams and patient was informed that this is a secure, HIPAA-compliant platform  She agrees to proceed  Patient agrees to participate in a virtual check in via telephone or video visit instead of presenting to the office to address urgent/immediate medical needs  Patient is aware this is a billable service  After connecting through [facility], the patient was identified by name and date of birth  Christ Saunders was informed that this was a telemedicine visit and that the exam was being conducted confidentially over secure lines  My office door was closed  No one else was in the room  Neil Vera acknowledged consent and understanding of privacy and security of the telemedicine visit  I informed the patient that I have reviewed her record in Epic and presented the opportunity for her to ask any questions regarding the visit today  The patient agreed to participate  Subjective:   Neil Vera is a 78 y o  female who has been screened for COVID-19  Symptom change since last report: improving  Patient's symptoms include fatigue, nasal congestion, sore throat, anosmia, loss of taste and cough  Patient denies fever, chills, malaise, rhinorrhea, shortness of breath, chest tightness, abdominal pain, nausea, vomiting, diarrhea, myalgias and headaches       Date of symptom onset: 12/27/2020  Date of positive COVID-19 PCR: 12/28/2020    Lab Results   Component Value Date    SARSCOV2 Detected (A) 12/28/2020     Past Medical History:   Diagnosis Date    Arthritis     Depression     Endometriosis     GERD (gastroesophageal reflux disease)     Hyperlipidemia     Hypertension     Nondisplaced fracture of fifth metatarsal bone, right foot, initial encounter for closed fracture 9/24/2018    Osteopenia     Pancreatitis 06/2017    Pneumonia     Right hip pain 12/4/2018    S/P hip replacement, right 7/31/2018    Patient progressing well after right hip replacement     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    Trochanteric bursitis of right hip 2/12/2019     Past Surgical History:   Procedure Laterality Date    ABDOMINAL SURGERY      endometriosis     APPENDECTOMY      BREAST BIOPSY Right 1985    benign    CARPAL TUNNEL RELEASE      COLONOSCOPY      HAND SURGERY      HIP SURGERY      HYSTERECTOMY Bilateral 1985    JOINT REPLACEMENT Bilateral     KNEE    JOINT REPLACEMENT Left     HIP    OOPHORECTOMY Bilateral 1985    [place] ARTHROSCOPY SHOULDER SURGICAL BICEPS TENODESIS Right 5/10/2017    Procedure: ARTHROSCOPIC BICEPS TENODESIS WITH ROTATOR CUFF REPAIR ;  Surgeon: Clinton Choi Christine Spencer MD;  Location: [facility];  Service: [address] Right 5/10/2017    Procedure: SHOULDER ARTHROSCOPY SUBACROMIAL DECOMPRESSION;  Surgeon: Viral Killian MD;  Location[facility];  Service: Orthopedics    MD TOTAL HIP ARTHROPLASTY Left 3/12/2018    Procedure: ARTHROPLASTY HIP TOTAL;  Surgeon: Martha Kelley MD;  [facility];  Service: Orthopedics    MD TOTAL HIP ARTHROPLASTY Right 7/30/2018    Procedure: RIGHT TOTAL HIP ARTHROPLASTY;  Surgeon: Martha Kelley MD;  [facility];  Service: Orthopedics    TONSILLECTOMY       Current Outpatient Medications   Medication Sig Dispense Refill    acetaminophen (TYLENOL) 500 mg tablet Take 500 mg by mouth every 6 (six) hours as needed for mild pain      atorvastatin (LIPITOR) 20 mg tablet TAKE 1 TABLET BY MOUTH  DAILY 90 tablet 3    brompheniramine-pseudoephedrine-DM 30-2-10 MG/5ML syrup Take 10 mL by mouth 4 (four) times a day as needed for congestion or cough 180 mL 0    calcium carbonate (TUMS) 500 mg chewable tablet Chew 1 tablet daily      calcium-vitamin D (OSCAL) 250-125 MG-UNIT per tablet Take 1 tablet by mouth daily      chlorzoxazone (PARAFON FORTE) 500 mg tablet Take 1 tablet (500 mg total) by mouth 2 (two) times a day as needed for muscle spasms 60 tablet 0    Cholecalciferol (VITAMIN D) 2000 units tablet Take 2,000 Units by mouth daily      FLUoxetine (PROzac) 20 mg capsule TAKE 1 CAPSULE BY MOUTH  DAILY 90 capsule 3    gabapentin (NEURONTIN) 300 mg capsule Take 1 capsule (300 mg total) by mouth 3 (three) times a day 270 capsule 1    hydrochlorothiazide (HYDRODIURIL) 25 mg tablet TAKE 1 TABLET BY MOUTH  DAILY 90 tablet 3    methylPREDNISolone 4 MG tablet therapy pack Use as directed on package 21 each 0    metoprolol succinate (TOPROL-XL) 50 mg 24 hr tablet TAKE 1 TABLET BY MOUTH  DAILY 90 tablet 3    Multiple Vitamins-Minerals (CENTRUM SILVER ULTRA WOMENS PO) Take by mouth      Omega-3 1000 MG CAPS Take by mouth daily       Promethazine-DM (PHENERGAN-DM) 6 25-15 mg/5 mL oral syrup Take 5 mL by mouth 4 (four) times a day as needed for cough 180 mL 0     No current facility-administered medications for this visit  Allergies   Allergen Reactions    Hydromorphone Hcl Itching    Percocet [Oxycodone-Acetaminophen] GI Intolerance    Omeprazole Diarrhea    Sulfamethoxazole-Trimethoprim Rash    Tizanidine Diarrhea       Review of Systems   Constitutional: Positive for fatigue  Negative for chills and fever  HENT: Positive for congestion and sore throat  Negative for rhinorrhea  Respiratory: Positive for cough  Negative for chest tightness and shortness of breath  Gastrointestinal: Negative for abdominal pain, diarrhea, nausea and vomiting  Genitourinary: Positive for dysuria, frequency and urgency  Musculoskeletal: Negative for myalgias  Neurological: Negative for headaches  All other systems reviewed and are negative  Objective: There were no vitals filed for this visit  Physical Exam  Vitals signs reviewed  Constitutional:       Appearance: Normal appearance  She is ill-appearing  HENT:      Head: Normocephalic and atraumatic  Eyes:      General:         Right eye: No discharge  Left eye: No discharge  Pulmonary:      Effort: No respiratory distress  Neurological:      Mental Status: She is alert and oriented to person, place, and time  Mental status is at baseline  Psychiatric:         Mood and Affect: Mood normal          Behavior: Behavior normal          Thought Content: Thought content normal          Judgment: Judgment normal        VIRTUAL VISIT DISCLAIMER    Eugenia Choudhury acknowledges that she has consented to an online visit or consultation   She understands that the online visit is based solely on information provided by her, and that, in the absence of a face-to-face physical evaluation by the physician, the diagnosis she receives is both limited and provisional in terms of accuracy and completeness  This is not intended to replace a full medical face-to-face evaluation by the physician  Brittani Castaneda understands and accepts these terms

## 2021-02-13 DIAGNOSIS — Z23 ENCOUNTER FOR IMMUNIZATION: ICD-10-CM

## 2021-02-17 ENCOUNTER — IMMUNIZATIONS (OUTPATIENT)
Dept: FAMILY MEDICINE CLINIC | Facility: HOSPITAL | Age: 71
End: 2021-02-17

## 2021-02-17 DIAGNOSIS — Z23 ENCOUNTER FOR IMMUNIZATION: Primary | ICD-10-CM

## 2021-02-17 PROCEDURE — 91300 SARS-COV-2 / COVID-19 MRNA VACCINE (PFIZER-BIONTECH) 30 MCG: CPT

## 2021-02-17 PROCEDURE — 0001A SARS-COV-2 / COVID-19 MRNA VACCINE (PFIZER-BIONTECH) 30 MCG: CPT

## 2021-03-08 ENCOUNTER — IMMUNIZATIONS (OUTPATIENT)
Dept: FAMILY MEDICINE CLINIC | Facility: HOSPITAL | Age: 71
End: 2021-03-08

## 2021-03-08 DIAGNOSIS — Z23 ENCOUNTER FOR IMMUNIZATION: Primary | ICD-10-CM

## 2021-03-08 PROCEDURE — 0002A SARS-COV-2 / COVID-19 MRNA VACCINE (PFIZER-BIONTECH) 30 MCG: CPT

## 2021-03-08 PROCEDURE — 91300 SARS-COV-2 / COVID-19 MRNA VACCINE (PFIZER-BIONTECH) 30 MCG: CPT

## 2021-03-16 ENCOUNTER — TELEPHONE (OUTPATIENT)
Dept: PAIN MEDICINE | Facility: CLINIC | Age: 71
End: 2021-03-16

## 2021-03-16 DIAGNOSIS — M51.16 INTERVERTEBRAL DISC DISORDER WITH RADICULOPATHY OF LUMBAR REGION: ICD-10-CM

## 2021-03-16 RX ORDER — GABAPENTIN 300 MG/1
300 CAPSULE ORAL 2 TIMES DAILY
Qty: 180 CAPSULE | Refills: 3 | Status: SHIPPED | OUTPATIENT
Start: 2021-03-16 | End: 2021-05-07 | Stop reason: SDUPTHER

## 2021-03-16 NOTE — TELEPHONE ENCOUNTER
Refill request:  Gabapentin 300mg  Take 1 capsule (300 mg total) by mouth 3 (three) times a day  But patient takes it twice a day  Remaining pills: has plenty left in her bottle  [address]  Call back# [phone]

## 2021-03-16 NOTE — TELEPHONE ENCOUNTER
S/w pt regarding refill request  Pt denies s/e and states she feels gabapentin is working well at BID dosing  LP 6/3 #270 1 refill  Please advise, thank you

## 2021-04-26 ENCOUNTER — OFFICE VISIT (OUTPATIENT)
Dept: FAMILY MEDICINE CLINIC | Facility: CLINIC | Age: 71
End: 2021-04-26
Payer: MEDICARE

## 2021-04-26 VITALS
HEART RATE: 70 BPM | TEMPERATURE: 97.8 F | WEIGHT: 184 LBS | HEIGHT: 60 IN | SYSTOLIC BLOOD PRESSURE: 122 MMHG | DIASTOLIC BLOOD PRESSURE: 82 MMHG | OXYGEN SATURATION: 98 % | BODY MASS INDEX: 36.12 KG/M2

## 2021-04-26 DIAGNOSIS — L71.9 ROSACEA: Primary | ICD-10-CM

## 2021-04-26 DIAGNOSIS — L30.9 ECZEMA, UNSPECIFIED TYPE: ICD-10-CM

## 2021-04-26 PROCEDURE — 99214 OFFICE O/P EST MOD 30 MIN: CPT | Performed by: FAMILY MEDICINE

## 2021-04-26 RX ORDER — TRIAMCINOLONE ACETONIDE 5 MG/G
CREAM TOPICAL 2 TIMES DAILY
Qty: 45 G | Refills: 3 | Status: SHIPPED | OUTPATIENT
Start: 2021-04-26 | End: 2021-10-19 | Stop reason: ALTCHOICE

## 2021-04-26 RX ORDER — METRONIDAZOLE 10 MG/G
GEL TOPICAL DAILY
Qty: 45 G | Refills: 3 | Status: SHIPPED | OUTPATIENT
Start: 2021-04-26 | End: 2021-04-28

## 2021-04-27 ENCOUNTER — TELEPHONE (OUTPATIENT)
Dept: FAMILY MEDICINE CLINIC | Facility: CLINIC | Age: 71
End: 2021-04-27

## 2021-04-27 DIAGNOSIS — L71.9 ROSACEA: ICD-10-CM

## 2021-04-27 RX ORDER — METRONIDAZOLE 7.5 MG/G
1 GEL VAGINAL 2 TIMES DAILY
Qty: 70 G | Refills: 1 | Status: SHIPPED | OUTPATIENT
Start: 2021-04-27 | End: 2021-04-28

## 2021-04-27 NOTE — TELEPHONE ENCOUNTER
[facility] pharmacy called to report pt's insurance won't cover Metrogel 1% but will cover 0 75% gel  If the strength isn't enough then another gel can be ordered with the same strength

## 2021-04-28 DIAGNOSIS — L71.9 ROSACEA: Primary | ICD-10-CM

## 2021-04-28 RX ORDER — METRONIDAZOLE 7.5 MG/G
GEL TOPICAL 2 TIMES DAILY
Qty: 45 G | Refills: 0 | Status: SHIPPED | OUTPATIENT
Start: 2021-04-28 | End: 2021-07-27 | Stop reason: ALTCHOICE

## 2021-05-03 NOTE — PROGRESS NOTES
Patient ID: Aishwarya Bright is a 73 y o  female  HPI: 73 y  o female presents for evaluation of red, flushed face  When seh drinks hot beverages she notices her face looks more flushed  She has a non- itchy raised, dried rash on her right arm        SUBJECTIVE    Family History   Problem Relation Age of Onset    Atrial fibrillation Mother     Breast cancer Mother 76    Stroke Mother     Coronary artery disease Mother     Diabetes Mother     Pancreatitis Mother     Cancer Mother         Breast    Hyperlipidemia Mother     Pulmonary embolism Mother     Heart attack Father     Arthritis Father     Arthritis Family     Cancer Family     Endometrial cancer Maternal Grandmother 76    Cancer Maternal Grandmother     Prostate cancer Paternal Uncle 54    No Known Problems Sister     No Known Problems Paternal Grandmother     No Known Problems Paternal Grandfather     No Known Problems Maternal Aunt     No Known Problems Paternal Aunt     No Known Problems Paternal Aunt      Social History     Socioeconomic History    Marital status: /Civil Union     Spouse name: Not on file    Number of children: Not on file    Years of education: Not on file    Highest education level: Not on file   Occupational History    Not on file   Social Needs    Financial resource strain: Not on file    Food insecurity     Worry: Not on file     Inability: Not on file   [place] needs     Medical: Not on file     Non-medical: Not on file   Tobacco Use    Smoking status: Never Smoker    Smokeless tobacco: Never Used   Substance and Sexual Activity    Alcohol use: Yes     Comment: Rarely drink alcohol    Drug use: Never    Sexual activity: Yes     Partners: Male     Birth control/protection: Female Sterilization     Comment: Hysterectomy 1986   Lifestyle    Physical activity     Days per week: Not on file     Minutes per session: Not on file    Stress: Not on file   Relationships    Social connections     Talks on phone: Not on file     Gets together: Not on file     Attends [employer] service: Not on file     Active member of club or organization: Not on file     Attends meetings of clubs or organizations: Not on file     Relationship status: Not on file    Intimate partner violence     Fear of current or ex partner: Not on file     Emotionally abused: Not on file     Physically abused: Not on file     Forced sexual activity: Not on file   Other Topics Concern    Not on file   Social History Narrative    Drinks coffee     Past Medical History:   Diagnosis Date    Arthritis     Depression     Endometriosis     GERD (gastroesophageal reflux disease)     Hyperlipidemia     Hypertension     Nondisplaced fracture of fifth metatarsal bone, right foot, initial encounter for closed fracture 9/24/2018    Osteopenia     Pancreatitis 06/2017    Pneumonia     Right hip pain 12/4/2018    S/P hip replacement, right 7/31/2018    Patient progressing well after right hip replacement     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    Trochanteric bursitis of right hip 2/12/2019     Past Surgical History:   Procedure Laterality Date    ABDOMINAL SURGERY      endometriosis     APPENDECTOMY      BREAST BIOPSY Right 1985    benign    CARPAL TUNNEL RELEASE      COLONOSCOPY      HAND SURGERY      HIP SURGERY      HYSTERECTOMY Bilateral 1985    JOINT REPLACEMENT Bilateral     KNEE    JOINT REPLACEMENT Left     HIP    OOPHORECTOMY Bilateral 1985    LA ARTHROSCOPY SHOULDER SURGICAL BICEPS TENODESIS Right 5/10/2017    Procedure: ARTHROSCOPIC BICEPS TENODESIS WITH ROTATOR CUFF REPAIR ;  Surgeon: Tiffany Nguyen MD;  [facility];  Service: Orthopedics    LA Jennifer Gaona Right 5/10/2017    Procedure: SHOULDER ARTHROSCOPY SUBACROMIAL DECOMPRESSION;  Surgeon: Tiffany Nguyen MD;  [facility];  Service: Orthopedics    LA TOTAL HIP ARTHROPLASTY Left 3/12/2018    Procedure: ARTHROPLASTY HIP TOTAL;  Surgeon: Wilian Ge MD;  [facility];  Service: Orthopedics    RI TOTAL HIP ARTHROPLASTY Right 7/30/2018    Procedure: RIGHT TOTAL HIP ARTHROPLASTY;  Surgeon: Wilian Ge MD;  [facility];  Service: Orthopedics    TONSILLECTOMY       Allergies   Allergen Reactions    Hydromorphone Hcl Itching    Percocet [Oxycodone-Acetaminophen] GI Intolerance    Omeprazole Diarrhea    Sulfamethoxazole-Trimethoprim Rash    Tizanidine Diarrhea       Current Outpatient Medications:     acetaminophen (TYLENOL) 500 mg tablet, Take 500 mg by mouth every 6 (six) hours as needed for mild pain, Disp: , Rfl:     atorvastatin (LIPITOR) 20 mg tablet, TAKE 1 TABLET BY MOUTH  DAILY, Disp: 90 tablet, Rfl: 3    calcium carbonate (TUMS) 500 mg chewable tablet, Chew 1 tablet daily, Disp: , Rfl:     calcium-vitamin D (OSCAL) 250-125 MG-UNIT per tablet, Take 1 tablet by mouth daily, Disp: , Rfl:     chlorzoxazone (PARAFON FORTE) 500 mg tablet, Take 1 tablet (500 mg total) by mouth 2 (two) times a day as needed for muscle spasms, Disp: 60 tablet, Rfl: 0    Cholecalciferol (VITAMIN D) 2000 units tablet, Take 2,000 Units by mouth daily, Disp: , Rfl:     FLUoxetine (PROzac) 20 mg capsule, TAKE 1 CAPSULE BY MOUTH  DAILY, Disp: 90 capsule, Rfl: 3    gabapentin (NEURONTIN) 300 mg capsule, Take 1 capsule (300 mg total) by mouth 2 (two) times a day, Disp: 180 capsule, Rfl: 3    hydrochlorothiazide (HYDRODIURIL) 25 mg tablet, TAKE 1 TABLET BY MOUTH  DAILY, Disp: 90 tablet, Rfl: 3    metoprolol succinate (TOPROL-XL) 50 mg 24 hr tablet, TAKE 1 TABLET BY MOUTH  DAILY, Disp: 90 tablet, Rfl: 3    Multiple Vitamins-Minerals (CENTRUM SILVER ULTRA WOMENS PO), Take by mouth, Disp: , Rfl:     Omega-3 1000 MG CAPS, Take by mouth daily , Disp: , Rfl:     metroNIDAZOLE (METROGEL) 0 75 % gel, Apply topically 2 (two) times a day, Disp: 45 g, Rfl: 0    triamcinolone (KENALOG) 0 5 % cream, Apply topically 2 (two) times a day, Disp: 45 g, Rfl: 3    Review of Systems  Constitutional:     Denies fever, chills ,fatigue ,weakness ,weight loss, weight gain     ENT: Denies earache ,loss of hearing ,nosebleed, nasal discharge,nasal congestion ,sore throat ,hoarseness  Pulmonary: Denies shortness of breath ,cough  ,dyspnea on exertion, orthopnea  ,PND   Cardiovascular:  Denies bradycardia , tachycardia  ,palpations, lower extremity edema leg, claudication  Breast:  Denies new or changing breast lumps ,nipple discharge ,nipple changes  Abdomen:  Denies abdominal pain , anorexia , indigestion, nausea, vomiting, constipation, diarrhea  Musculoskeletal: Denies myalgias, arthralgias, joint swelling, joint stiffness , limb pain, limb swelling  Gu: denies dysuria, polyuria  Skin: Red, flushed skin on face  And raised, dry red patch on right arm  Neuro: Denies headache, numbness, tingling, confusion, loss of consciousness, dizziness, vertigo  Psychiatric: Denies feelings of depression, suicidal ideation, anxiety, sleep disturbances    OBJECTIVE  /82   Pulse 70   Temp 97 8 °F (36 6 °C)   Ht 5' (1 524 m)   Wt 83 5 kg (184 lb)   LMP  (LMP Unknown) Comment: hysterectomy  SpO2 98%   BMI 35 94 kg/m²   Constitutional:   NAD, well appearing and well nourished      ENT:   Conjunctiva and lids: no injection, edema, or discharge     Pupils and iris: CORNELIA bilaterally    External inspection of ears and nose: normal without deformities or discharge  Otoscopic exam: Canals patent without erythema  Nasal mucosa, septum and turbinates: Normal or edema or discharge         Oropharynx:  Moist mucosa, normal tongue and tonsils without lesions  No erythema        Pulmonary:Respiratory effort normal rate and rhythm, no increased work of breathing   Auscultation of lungs:  Clear bilaterally with no adventitious breath sounds       Cardiovascular: regular rate and rhythm, S1 and S2, no murmur, no edema and/or varicosities of LE      Abdomen: Soft and non-distended     Positive bowel sounds      No heptomegaly or splenomegaly      Gu: no suprapubic tenderness or CVA tenderness, no urethral discharge  Lymphatic:  No anterior or posterior cervical lymphadenopathy         Musculoskeletal:  Gait and station: Normal gait      Digits and nails normal without clubbing or cyanosis       Inspection/palpation of joints, bones, and muscles:  No joint tenderness, swelling, full active and passive range of motion       Skin: Normal skin turgor and erythematous , flushed rash on face and dried, erythematous patch on right arm  Neuro:   Normal reflexes      Psych:   alert and oriented to person, place and time     normal mood and affect       Assessment/Plan:Diagnoses and all orders for this visit:    Rosacea  -     Discontinue: metroNIDAZOLE (METROGEL) 1 % gel; Apply topically daily    Eczema, unspecified type  Comments:  Pt to apply eucerin bid adn use non drying soaps such as dove, tone or basis  Orders:  -     triamcinolone (KENALOG) 0 5 % cream; Apply topically 2 (two) times a day        Reviewed with patient plan to treat with above plan      Patient instructed to call in 72 hours if not feeling better or if symptoms worsen

## 2021-05-07 ENCOUNTER — OFFICE VISIT (OUTPATIENT)
Dept: PAIN MEDICINE | Facility: CLINIC | Age: 71
End: 2021-05-07
Payer: MEDICARE

## 2021-05-07 DIAGNOSIS — M47.816 LUMBAR SPONDYLOSIS: Primary | ICD-10-CM

## 2021-05-07 DIAGNOSIS — M51.16 INTERVERTEBRAL DISC DISORDER WITH RADICULOPATHY OF LUMBAR REGION: ICD-10-CM

## 2021-05-07 DIAGNOSIS — I10 ESSENTIAL HYPERTENSION: ICD-10-CM

## 2021-05-07 DIAGNOSIS — M79.18 MYOFASCIAL PAIN SYNDROME: ICD-10-CM

## 2021-05-07 PROCEDURE — 99214 OFFICE O/P EST MOD 30 MIN: CPT | Performed by: ANESTHESIOLOGY

## 2021-05-07 RX ORDER — TIZANIDINE 4 MG/1
4 TABLET ORAL 2 TIMES DAILY
Qty: 60 TABLET | Refills: 1 | Status: SHIPPED | OUTPATIENT
Start: 2021-05-07 | End: 2021-06-18 | Stop reason: ALTCHOICE

## 2021-05-07 RX ORDER — METOPROLOL SUCCINATE 50 MG/1
50 TABLET, EXTENDED RELEASE ORAL DAILY
Qty: 90 TABLET | Refills: 3 | Status: SHIPPED | OUTPATIENT
Start: 2021-05-07 | End: 2022-04-27

## 2021-05-07 RX ORDER — GABAPENTIN 300 MG/1
300 CAPSULE ORAL 3 TIMES DAILY
Qty: 270 CAPSULE | Refills: 3 | Status: SHIPPED | OUTPATIENT
Start: 2021-05-07 | End: 2021-08-23

## 2021-05-07 NOTE — PROGRESS NOTES
Assessment:  1  Lumbar spondylosis    2  Myofascial pain syndrome    3  Intervertebral disc disorder with radiculopathy of lumbar region        Plan:  The patient is currently experiencing a recurrence of pain related to underlying lumbar facet arthropathy  She had excellent relief from the radiofrequency ablation that was performed in December 2019  At this time, I discussed repeating it for relief of symptoms again  She would like to proceed and will be scheduled for left L3-5 medial branch radiofrequency ablation followed by the right side 2 weeks later  In addition, I will switch her back to tizanidine 4 mg twice daily since she feels this works better for the muscle spasms and does not think that it caused diarrhea  I will also increase her gabapentin to t i d  dosing for better relief  Complete risks and benefits including bleeding, infection, tissue reaction, nerve injury and allergic reaction were discussed  The approach was demonstrated using models and literature was provided  Verbal and written consent was obtained  My impressions and treatment recommendations were discussed in detail with the patient who verbalized understanding and had no further questions  Discharge instructions were provided  I personally saw and examined the patient and I agree with the above discussed plan of care  Orders Placed This Encounter   Procedures    FL spine and pain procedure     Prior ablation in 2019     Standing Status:   Future     Standing Expiration Date:   5/7/2025     Order Specific Question:   Reason for Exam:     Answer:   Left L3-5 MB RFA     Order Specific Question:   Anticoagulant hold needed? Answer:   No    FL spine and pain procedure     Prior ablation 2019     Standing Status:   Future     Standing Expiration Date:   5/7/2025     Order Specific Question:   Reason for Exam:     Answer:   Right L3-5 MB RFA     Order Specific Question:   Anticoagulant hold needed? Answer:    No New Medications Ordered This Visit   Medications    tiZANidine (ZANAFLEX) 4 mg tablet     Sig: Take 1 tablet (4 mg total) by mouth 2 (two) times a day     Dispense:  60 tablet     Refill:  1    gabapentin (NEURONTIN) 300 mg capsule     Sig: Take 1 capsule (300 mg total) by mouth 3 (three) times a day     Dispense:  270 capsule     Refill:  3       History of Present Illness:  Valente Melgoza is a 73 y o  female who presents for a follow up office visit in regards to Back Pain  The patient was last seen in February 2020 following radiofrequency ablation in the lumbar spine that was performed in December 2019  She reports excellent relief from that procedure up until about a month ago when symptoms recurred  She is experiencing tightness and spasm in the lower back on both sides slightly worse on the left  Symptoms are rated 8/10 on a numeric rating scale and felt nearly constantly  She has been using the [employer] with mild relief in symptoms  She reports that she thinks the tizanidine she was on previously helped more and that it did not cause diarrhea as is listed in her allergy list     I have personally reviewed and/or updated the patient's past medical history, past surgical history, family history, social history, current medications, allergies, and vital signs today  Review of Systems   Respiratory: Negative for shortness of breath  Cardiovascular: Negative for chest pain  Gastrointestinal: Negative for constipation, diarrhea, nausea and vomiting  Musculoskeletal: Positive for back pain  Negative for arthralgias, gait problem, joint swelling and myalgias  Skin: Negative for rash  Neurological: Negative for dizziness, seizures and weakness  All other systems reviewed and are negative        Patient Active Problem List   Diagnosis    Incomplete tear of right rotator cuff    Hypertension    Hyperlipidemia    Depression    Primary osteoarthritis of left hip    Primary osteoarthritis of one hip, right    Renal insufficiency    Pain, joint, ankle and foot, right    Intervertebral disc disorder with radiculopathy of lumbar region    Lumbar spondylosis    Change in bowel habits    Sacroiliitis (HCC)    Severe obesity (BMI 35 0-39  9) with comorbidity ([address] )       Past Medical History:   Diagnosis Date    Arthritis     Depression     Endometriosis     GERD (gastroesophageal reflux disease)     Hyperlipidemia     Hypertension     Nondisplaced fracture of fifth metatarsal bone, right foot, initial encounter for closed fracture 9/24/2018    Osteopenia     Pancreatitis 06/2017    Pneumonia     Right hip pain 12/4/2018    S/P hip replacement, right 7/31/2018    Patient progressing well after right hip replacement     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    Trochanteric bursitis of right hip 2/12/2019       Past Surgical History:   Procedure Laterality Date    ABDOMINAL SURGERY      endometriosis     APPENDECTOMY      BREAST BIOPSY Right 1985    benign    CARPAL TUNNEL RELEASE      COLONOSCOPY      HAND SURGERY      HIP SURGERY      HYSTERECTOMY Bilateral 1985    JOINT REPLACEMENT Bilateral     KNEE    JOINT REPLACEMENT Left     HIP    OOPHORECTOMY Bilateral 1985    [place] ARTHROSCOPY SHOULDER SURGICAL BICEPS TENODESIS Right 5/10/2017    Procedure: ARTHROSCOPIC BICEPS TENODESIS WITH ROTATOR CUFF REPAIR ;  Surgeon: Daniel Schneider MD;  [facility];  Service: Orthopedics    [place] Sherwin Leahvicki Right 5/10/2017    Procedure: SHOULDER ARTHROSCOPY SUBACROMIAL DECOMPRESSION;  Surgeon: Daniel Schneider MD;  [facility];  Service: Orthopedics    [place] TOTAL HIP ARTHROPLASTY Left 3/12/2018    Procedure: ARTHROPLASTY HIP TOTAL;  Surgeon: Moira Kimball MD;  [facility];  Service: Orthopedics    [place] TOTAL HIP ARTHROPLASTY Right 7/30/2018    Procedure: RIGHT TOTAL HIP ARTHROPLASTY;  Surgeon: Moira Kimball MD; [facility];  Service: Orthopedics    TONSILLECTOMY         Family History   Problem Relation Age of Onset    Atrial fibrillation Mother     Breast cancer Mother 76    Stroke Mother     Coronary artery disease Mother     Diabetes Mother     Pancreatitis Mother     Cancer Mother         Breast    Hyperlipidemia Mother     Pulmonary embolism Mother     Heart attack Father     Arthritis Father     Arthritis Family     Cancer Family     Endometrial cancer Maternal Grandmother 76    Cancer Maternal Grandmother     Prostate cancer Paternal Uncle 54    No Known Problems Sister     No Known Problems Paternal Grandmother     No Known Problems Paternal Grandfather     No Known Problems Maternal Aunt     No Known Problems Paternal Aunt     No Known Problems Paternal Aunt        Social History     Occupational History    Not on file   Tobacco Use    Smoking status: Never Smoker    Smokeless tobacco: Never Used   Substance and Sexual Activity    Alcohol use: Yes     Comment: Rarely drink alcohol    Drug use: Never    Sexual activity: Yes     Partners: Male     Birth control/protection: Female Sterilization     Comment: Hysterectomy 1986       Current Outpatient Medications on File Prior to Visit   Medication Sig    acetaminophen (TYLENOL) 500 mg tablet Take 500 mg by mouth every 6 (six) hours as needed for mild pain    atorvastatin (LIPITOR) 20 mg tablet TAKE 1 TABLET BY MOUTH  DAILY    calcium carbonate (TUMS) 500 mg chewable tablet Chew 1 tablet daily    calcium-vitamin D (OSCAL) 250-125 MG-UNIT per tablet Take 1 tablet by mouth daily    chlorzoxazone (PARAFON FORTE) 500 mg tablet Take 1 tablet (500 mg total) by mouth 2 (two) times a day as needed for muscle spasms    Cholecalciferol (VITAMIN D) 2000 units tablet Take 2,000 Units by mouth daily    FLUoxetine (PROzac) 20 mg capsule TAKE 1 CAPSULE BY MOUTH  DAILY    hydrochlorothiazide (HYDRODIURIL) 25 mg tablet TAKE 1 TABLET BY MOUTH  DAILY    metroNIDAZOLE (METROGEL) 0 75 % gel Apply topically 2 (two) times a day    Multiple Vitamins-Minerals (CENTRUM SILVER ULTRA WOMENS PO) Take by mouth    Omega-3 1000 MG CAPS Take by mouth daily     triamcinolone (KENALOG) 0 5 % cream Apply topically 2 (two) times a day    [DISCONTINUED] gabapentin (NEURONTIN) 300 mg capsule Take 1 capsule (300 mg total) by mouth 2 (two) times a day    [DISCONTINUED] metoprolol succinate (TOPROL-XL) 50 mg 24 hr tablet TAKE 1 TABLET BY MOUTH  DAILY     No current facility-administered medications on file prior to visit  Allergies   Allergen Reactions    Hydromorphone Hcl Itching    Percocet [Oxycodone-Acetaminophen] GI Intolerance    Omeprazole Diarrhea    Sulfamethoxazole-Trimethoprim Rash    Tizanidine Diarrhea       Physical Exam:    LMP  (LMP Unknown) Comment: hysterectomy    Constitutional:normal, well developed, well nourished, alert, in no distress and non-toxic and no overt pain behavior  and obese  Eyes:anicteric  HEENT:grossly intact  Neck:supple, symmetric, trachea midline and no masses   Pulmonary:even and unlabored  Cardiovascular:No edema or pitting edema present  Skin:Normal without rashes or lesions and well hydrated  Psychiatric:Mood and affect appropriate  Neurologic:Cranial Nerves II-XII grossly intact  Musculoskeletal:normal     Lumbar Spine Exam  Appearance:  Normal lordosis  Palpation/Tenderness:  left lumbar paraspinal tenderness  right lumbar paraspinal tenderness  Bilateral lumbar facet tenderness at L4-5, L5-S1 with positive facet loading; bilateral lumbar paraspinal spasm  Range of Motion:  Flexion:   Moderately limited  with pain  Extension:  Moderately limited  with pain  Rotation - Left:  Moderately limited  with pain  Rotation - Right:  Moderately limited  with pain  Motor Strength:  Left hip flexion:  5/5  Left hip extension:  5/5  Right hip flexion:  5/5  Right hip extension:  5/5  Left knee flexion:  5/5  Left knee extension:  5/5  Right knee flexion:  5/5  Right knee extension:  5/5  Left foot dorsiflexion:  5/5  Left foot plantar flexion:  5/5  Right foot dorsiflexion:  5/5  Right foot plantar flexion:  5/5    Imaging    CT LUMBAR SPINE (6/21/2019)     INDICATION:   M54 16: Radiculopathy, lumbar region      COMPARISON: None      TECHNIQUE:  Contiguous axial images through the lumbar spine were obtained  Sagittal and coronal reconstructions were performed        Radiation dose length product (DLP) for this visit:  146 mGy-cm   This examination, like all CT scans performed in the [facility], was performed utilizing techniques to minimize radiation dose exposure, including the use of iterative   reconstruction and automated exposure control        IMAGE QUALITY:  Diagnostic      FINDINGS:     ALIGNMENT:  There is mild levoscoliosis of the lumbar spine  There is trace anterolisthesis of L4-L5      VERTEBRAL BODIES:  The bones are osteopenic  No acute compression fractures are identified      DEGENERATIVE CHANGES:     Lower Thoracic spine:  Normal lower thoracic disc spaces  ]     L1-2:  There is disc space narrowing  There is vacuum disc phenomenon  There is a bulging annulus  There is no significant bony canal stenosis  There is mild foraminal encroachment      L2-3:  There is disc space narrowing  There is a bulging annulus  There is facet arthrosis  There is mild canal stenosis  There is mild foraminal narrowing      L3-4:  There is a bulging annulus  There is facet arthrosis  There is no significant canal stenosis  There is no significant foraminal narrowing      L4-5:  There is disc space narrowing  There is vacuum disc phenomenon  There is a calcified bulging annulus  There is right-sided endplate spurring  There is facet arthrosis  There is mild canal stenosis  There is mild left foraminal narrowing  There is   moderate to severe right foraminal narrowing      L5-S1:  There is disc space narrowing   There is vacuum disc phenomenon  There is a bulging annulus  There is facet arthrosis  There is no significant canal stenosis  There is left-sided endplate spurring   There is mild left foraminal narrowing      PARASPINAL SOFT TISSUES:  There is atherosclerotic calcification

## 2021-05-10 ENCOUNTER — TELEPHONE (OUTPATIENT)
Dept: PAIN MEDICINE | Facility: CLINIC | Age: 71
End: 2021-05-10

## 2021-05-10 NOTE — TELEPHONE ENCOUNTER
Scheduled pt for Lt L3-L5 RFA 6/1/21 & Rt L3-L5 RFA for 6/15/21  Went over pre-procedure instructions below:  Nothing to eat or drink 1 hr prior to procedure  Need to arrange transportation  Proper clothing for procedure  If ill or placed on antibiotics please call to reschedule  Pt had covid booster on 3/8/21  Covid/travel/ and vaccine instructions

## 2021-05-11 ENCOUNTER — TELEPHONE (OUTPATIENT)
Dept: FAMILY MEDICINE CLINIC | Facility: CLINIC | Age: 71
End: 2021-05-11

## 2021-05-11 DIAGNOSIS — L71.9 ROSACEA: Primary | ICD-10-CM

## 2021-05-11 NOTE — TELEPHONE ENCOUNTER
Pt called in to let Dr Molly Porter know that the medication tiZANidine (ZANAFLEX) 4 mg tablet is giving her diarrhea  And would like his recommendation  Please be advise thank you        Please call patient back @ [phone]

## 2021-05-11 NOTE — TELEPHONE ENCOUNTER
I referred her to Dr Gerson Delgado MD at the [facility] ([address] [address]) with phone number [phone]  The office should call her but in case she would like to call

## 2021-05-11 NOTE — TELEPHONE ENCOUNTER
Patient called with update  You saw her for a R area on L upper arm and you gave her ointment  The ointment did not do anything, no change   She is asking for a referral to go to the dermatologist

## 2021-05-11 NOTE — TELEPHONE ENCOUNTER
I think she should go back to the chlorzoxazone but increase to 1 5 tablets bid and see if that helps

## 2021-05-12 NOTE — TELEPHONE ENCOUNTER
Patient states yes it is ok for you to leave a Detailed message on her machine  She will also turn ringer on        Thank you     [phone]

## 2021-05-12 NOTE — TELEPHONE ENCOUNTER
Left vm for pt to c/b  Pt may leave permission if she wants our office to just leave a detailed message on her answering machine to prevent phone tag  C/B # and [place] provided

## 2021-05-12 NOTE — TELEPHONE ENCOUNTER
RN s/w pt and informed her that FQ wants her to go back on the chlorzoxazone but inc to 1 5 tabs twice a day and see if that helps better  Pt told to stop the tizanidine  Pt told to contact the office when RF needed for chlorzoxazone, call 2 days before running out

## 2021-05-20 ENCOUNTER — TELEPHONE (OUTPATIENT)
Dept: PAIN MEDICINE | Facility: CLINIC | Age: 71
End: 2021-05-20

## 2021-05-20 DIAGNOSIS — M79.18 MYOFASCIAL PAIN SYNDROME: ICD-10-CM

## 2021-05-20 RX ORDER — CHLORZOXAZONE 500 MG/1
750 TABLET ORAL 2 TIMES DAILY PRN
Qty: 90 TABLET | Refills: 5 | Status: SHIPPED | OUTPATIENT
Start: 2021-05-20 | End: 2021-08-25

## 2021-05-20 NOTE — TELEPHONE ENCOUNTER
S/w pt, confirmed she did restart taking chlorzoxazone 1 5 tab BID and it is helpful  Denies s/e  Please advise, thank you

## 2021-05-20 NOTE — TELEPHONE ENCOUNTER
Pt contacted [facility] requested refill of their medication          Medication Name:chlorzoxazone (PARAFON FORTE    Dosage of Med: 500 mg       Frequency of Med: 2xs a day       Remaining Medication: about 12      [facility] and [facility]     [id number]

## 2021-06-01 ENCOUNTER — TELEPHONE (OUTPATIENT)
Dept: RADIOLOGY | Facility: CLINIC | Age: 71
End: 2021-06-01

## 2021-06-01 ENCOUNTER — HOSPITAL ENCOUNTER (OUTPATIENT)
Dept: RADIOLOGY | Facility: CLINIC | Age: 71
Discharge: HOME | End: 2021-06-01
Attending: ANESTHESIOLOGY | Admitting: ANESTHESIOLOGY
Payer: MEDICARE

## 2021-06-01 VITALS
RESPIRATION RATE: 20 BRPM | DIASTOLIC BLOOD PRESSURE: 78 MMHG | OXYGEN SATURATION: 99 % | SYSTOLIC BLOOD PRESSURE: 133 MMHG | TEMPERATURE: 97.1 F | HEART RATE: 63 BPM

## 2021-06-01 DIAGNOSIS — M47.816 LUMBAR SPONDYLOSIS: ICD-10-CM

## 2021-06-01 PROCEDURE — 64636 DESTROY L/S FACET JNT ADDL: CPT | Performed by: ANESTHESIOLOGY

## 2021-06-01 PROCEDURE — 64635 DESTROY LUMB/SAC FACET JNT: CPT | Performed by: ANESTHESIOLOGY

## 2021-06-01 RX ORDER — LIDOCAINE HYDROCHLORIDE 10 MG/ML
30 INJECTION, SOLUTION EPIDURAL; INFILTRATION; INTRACAUDAL; PERINEURAL ONCE
Status: COMPLETED | OUTPATIENT
Start: 2021-06-01 | End: 2021-06-01

## 2021-06-01 RX ORDER — BUPIVACAINE HCL/PF 2.5 MG/ML
10 VIAL (ML) INJECTION ONCE
Status: COMPLETED | OUTPATIENT
Start: 2021-06-01 | End: 2021-06-01

## 2021-06-01 RX ORDER — METHYLPREDNISOLONE ACETATE 80 MG/ML
80 INJECTION, SUSPENSION INTRA-ARTICULAR; INTRALESIONAL; INTRAMUSCULAR; PARENTERAL; SOFT TISSUE ONCE
Status: COMPLETED | OUTPATIENT
Start: 2021-06-01 | End: 2021-06-01

## 2021-06-01 RX ADMIN — BUPIVACAINE HYDROCHLORIDE 3 ML: 2.5 INJECTION, SOLUTION EPIDURAL; INFILTRATION; INTRACAUDAL at 15:49

## 2021-06-01 RX ADMIN — LIDOCAINE HYDROCHLORIDE 16 ML: 10 INJECTION, SOLUTION EPIDURAL; INFILTRATION; INTRACAUDAL; PERINEURAL at 15:49

## 2021-06-01 RX ADMIN — METHYLPREDNISOLONE ACETATE 20 MG: 80 INJECTION, SUSPENSION INTRA-ARTICULAR; INTRALESIONAL; INTRAMUSCULAR; PARENTERAL; SOFT TISSUE at 15:49

## 2021-06-01 NOTE — H&P
History of Present Illness: The patient is a 73 y o  female who presents with complaints of left lower back pain secondary to lumbar spondylosis and is here today for left L3-5 medial branch radiofrequency ablation  Patient Active Problem List   Diagnosis    Incomplete tear of right rotator cuff    Hypertension    Hyperlipidemia    Depression    Primary osteoarthritis of left hip    Primary osteoarthritis of one hip, right    Renal insufficiency    Pain, joint, ankle and foot, right    Intervertebral disc disorder with radiculopathy of lumbar region    Lumbar spondylosis    Change in bowel habits    Sacroiliitis (HCC)    Severe obesity (BMI 35 0-39  9) with comorbidity ([address] )       Past Medical History:   Diagnosis Date    Arthritis     Depression     Endometriosis     GERD (gastroesophageal reflux disease)     Hyperlipidemia     Hypertension     Nondisplaced fracture of fifth metatarsal bone, right foot, initial encounter for closed fracture 9/24/2018    Osteopenia     Pancreatitis 06/2017    Pneumonia     Right hip pain 12/4/2018    S/P hip replacement, right 7/31/2018    Patient progressing well after right hip replacement     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    Trochanteric bursitis of right hip 2/12/2019       Past Surgical History:   Procedure Laterality Date    ABDOMINAL SURGERY      endometriosis     APPENDECTOMY      BREAST BIOPSY Right 1985    benign    CARPAL TUNNEL RELEASE      COLONOSCOPY      HAND SURGERY      HIP SURGERY      HYSTERECTOMY Bilateral 1985    JOINT REPLACEMENT Bilateral     KNEE    JOINT REPLACEMENT Left     HIP    OOPHORECTOMY Bilateral 1985    KS ARTHROSCOPY SHOULDER SURGICAL BICEPS TENODESIS Right 5/10/2017    Procedure: ARTHROSCOPIC BICEPS TENODESIS WITH ROTATOR CUFF REPAIR ;  Surgeon: Williams Durant MD;  [facility];  Service: Orthopedics    [address] Right 5/10/2017    Procedure: SHOULDER ARTHROSCOPY SUBACROMIAL DECOMPRESSION;  Surgeon: Harlene Kanner, MD;  [facility];  Service: Orthopedics    AR TOTAL HIP ARTHROPLASTY Left 3/12/2018    Procedure: ARTHROPLASTY HIP TOTAL;  Surgeon: Adelina Espana MD;  [facility];  Service: Orthopedics    AR TOTAL HIP ARTHROPLASTY Right 7/30/2018    Procedure: RIGHT TOTAL HIP ARTHROPLASTY;  Surgeon: Adelina Espana MD;  [facility];  Service: Orthopedics    TONSILLECTOMY           Current Outpatient Medications:     acetaminophen (TYLENOL) 500 mg tablet, Take 500 mg by mouth every 6 (six) hours as needed for mild pain, Disp: , Rfl:     atorvastatin (LIPITOR) 20 mg tablet, TAKE 1 TABLET BY MOUTH  DAILY, Disp: 90 tablet, Rfl: 3    calcium carbonate (TUMS) 500 mg chewable tablet, Chew 1 tablet daily, Disp: , Rfl:     calcium-vitamin D (OSCAL) 250-125 MG-UNIT per tablet, Take 1 tablet by mouth daily, Disp: , Rfl:     chlorzoxazone (PARAFON FORTE) 500 mg tablet, Take 1 5 tablets (750 mg total) by mouth 2 (two) times a day as needed for muscle spasms, Disp: 90 tablet, Rfl: 5    Cholecalciferol (VITAMIN D) 2000 units tablet, Take 2,000 Units by mouth daily, Disp: , Rfl:     FLUoxetine (PROzac) 20 mg capsule, TAKE 1 CAPSULE BY MOUTH  DAILY, Disp: 90 capsule, Rfl: 3    gabapentin (NEURONTIN) 300 mg capsule, Take 1 capsule (300 mg total) by mouth 3 (three) times a day, Disp: 270 capsule, Rfl: 3    hydrochlorothiazide (HYDRODIURIL) 25 mg tablet, TAKE 1 TABLET BY MOUTH  DAILY, Disp: 90 tablet, Rfl: 3    metoprolol succinate (TOPROL-XL) 50 mg 24 hr tablet, Take 1 tablet (50 mg total) by mouth daily, Disp: 90 tablet, Rfl: 3    metroNIDAZOLE (METROGEL) 0 75 % gel, Apply topically 2 (two) times a day, Disp: 45 g, Rfl: 0    Multiple Vitamins-Minerals (CENTRUM SILVER ULTRA WOMENS PO), Take by mouth, Disp: , Rfl:     Omega-3 1000 MG CAPS, Take by mouth daily , Disp: , Rfl:     tiZANidine (ZANAFLEX) 4 mg tablet, Take 1 tablet (4 mg total) by mouth 2 (two) times a day, Disp: 60 tablet, Rfl: 1    triamcinolone (KENALOG) 0 5 % cream, Apply topically 2 (two) times a day, Disp: 45 g, Rfl: 3    Current Facility-Administered Medications:     bupivacaine (PF) (MARCAINE) 0 25 % injection 10 mL, 10 mL, Perineural, Once, Aline Holden MD    lidocaine (PF) (XYLOCAINE-MPF) 1 % injection 30 mL, 30 mL, Infiltration, Once, Aline Holden MD    methylPREDNISolone acetate (DEPO-MEDROL) injection 80 mg, 80 mg, Perineural, Once, Aline Holden MD    Allergies   Allergen Reactions    Hydromorphone Hcl Itching    Percocet [Oxycodone-Acetaminophen] GI Intolerance    Omeprazole Diarrhea    Sulfamethoxazole-Trimethoprim Rash    Tizanidine Diarrhea       Physical Exam:   Vitals:    06/01/21 1527   BP: 125/75   Pulse: 63   Resp: 20   Temp: (!) 97 1 °F (36 2 °C)   SpO2: 98%     General: Awake, Alert, Oriented x 3, Mood and affect appropriate  Respiratory: Respirations even and unlabored  Cardiovascular: Peripheral pulses intact; no edema  Musculoskeletal Exam:  Left lower back tenderness    ASA Score: 2    Patient/Chart Verification  Patient ID Verified: Verbal  Consents Confirmed: To be obtained in the Pre-Procedure area  H&P( within 30 days) Verified: To be obtained in the Pre-Procedure area  Allergies Reviewed: Yes  Anticoag/NSAID held?: NA  Currently on antibiotics?: No    Assessment:   1   Lumbar spondylosis        Plan: Left L3-5 MB RFA

## 2021-06-01 NOTE — DISCHARGE INSTR - LAB

## 2021-06-02 NOTE — TELEPHONE ENCOUNTER
RN s/w pt and she reports she had some soreness yest but iced it and took (2) Tylenol, but it was tolerable  This morning the soreness has lessened  Inj sites look fine and she denies any fevers or sunburn like sensations  Pt told it takes 6 wks to see full effect from ablation  RN confirmed with pt that she is scheduled for Rt side RFA on 6/15/21

## 2021-06-09 ENCOUNTER — HOSPITAL ENCOUNTER (OUTPATIENT)
Dept: MAMMOGRAPHY | Facility: CLINIC | Age: 71
Discharge: HOME | End: 2021-06-09
Payer: MEDICARE

## 2021-06-09 VITALS — BODY MASS INDEX: 36.12 KG/M2 | HEIGHT: 60 IN | WEIGHT: 184 LBS

## 2021-06-09 DIAGNOSIS — R92.8 ABNORMAL MAMMOGRAM: ICD-10-CM

## 2021-06-09 PROCEDURE — G0279 TOMOSYNTHESIS, MAMMO: HCPCS

## 2021-06-09 PROCEDURE — 77066 DX MAMMO INCL CAD BI: CPT

## 2021-06-15 ENCOUNTER — HOSPITAL ENCOUNTER (OUTPATIENT)
Dept: RADIOLOGY | Facility: CLINIC | Age: 71
Discharge: HOME | End: 2021-06-15
Attending: ANESTHESIOLOGY | Admitting: ANESTHESIOLOGY
Payer: MEDICARE

## 2021-06-15 ENCOUNTER — TELEPHONE (OUTPATIENT)
Dept: RADIOLOGY | Facility: CLINIC | Age: 71
End: 2021-06-15

## 2021-06-15 VITALS
RESPIRATION RATE: 20 BRPM | SYSTOLIC BLOOD PRESSURE: 135 MMHG | OXYGEN SATURATION: 97 % | HEART RATE: 66 BPM | TEMPERATURE: 98.3 F | DIASTOLIC BLOOD PRESSURE: 77 MMHG

## 2021-06-15 DIAGNOSIS — M47.816 LUMBAR SPONDYLOSIS: ICD-10-CM

## 2021-06-15 PROCEDURE — 64636 DESTROY L/S FACET JNT ADDL: CPT | Performed by: ANESTHESIOLOGY

## 2021-06-15 PROCEDURE — 64635 DESTROY LUMB/SAC FACET JNT: CPT | Performed by: ANESTHESIOLOGY

## 2021-06-15 RX ORDER — BUPIVACAINE HCL/PF 2.5 MG/ML
10 VIAL (ML) INJECTION ONCE
Status: COMPLETED | OUTPATIENT
Start: 2021-06-15 | End: 2021-06-15

## 2021-06-15 RX ORDER — METHYLPREDNISOLONE ACETATE 80 MG/ML
80 INJECTION, SUSPENSION INTRA-ARTICULAR; INTRALESIONAL; INTRAMUSCULAR; PARENTERAL; SOFT TISSUE ONCE
Status: COMPLETED | OUTPATIENT
Start: 2021-06-15 | End: 2021-06-15

## 2021-06-15 RX ORDER — LIDOCAINE HYDROCHLORIDE 10 MG/ML
30 INJECTION, SOLUTION EPIDURAL; INFILTRATION; INTRACAUDAL; PERINEURAL ONCE
Status: COMPLETED | OUTPATIENT
Start: 2021-06-15 | End: 2021-06-15

## 2021-06-15 RX ADMIN — BUPIVACAINE HYDROCHLORIDE 3 ML: 2.5 INJECTION, SOLUTION EPIDURAL; INFILTRATION; INTRACAUDAL at 15:51

## 2021-06-15 RX ADMIN — LIDOCAINE HYDROCHLORIDE 16 ML: 10 INJECTION, SOLUTION EPIDURAL; INFILTRATION; INTRACAUDAL; PERINEURAL at 15:45

## 2021-06-15 RX ADMIN — METHYLPREDNISOLONE ACETATE 20 MG: 80 INJECTION, SUSPENSION INTRA-ARTICULAR; INTRALESIONAL; INTRAMUSCULAR; PARENTERAL; SOFT TISSUE at 15:51

## 2021-06-15 NOTE — H&P
History of Present Illness: The patient is a 73 y o  female who presents with complaints of right lower back pain secondary lumbar spondylosis and is here today for right L3-5 medial branch radiofrequency ablation  Patient Active Problem List   Diagnosis    Incomplete tear of right rotator cuff    Hypertension    Hyperlipidemia    Depression    Primary osteoarthritis of left hip    Primary osteoarthritis of one hip, right    Renal insufficiency    Pain, joint, ankle and foot, right    Intervertebral disc disorder with radiculopathy of lumbar region    Lumbar spondylosis    Change in bowel habits    Sacroiliitis (HCC)    Severe obesity (BMI 35 0-39  9) with comorbidity ([address] )       Past Medical History:   Diagnosis Date    Arthritis     Depression     Endometriosis     GERD (gastroesophageal reflux disease)     Hyperlipidemia     Hypertension     Nondisplaced fracture of fifth metatarsal bone, right foot, initial encounter for closed fracture 9/24/2018    Osteopenia     Pancreatitis 06/2017    Pneumonia     Right hip pain 12/4/2018    S/P hip replacement, right 7/31/2018    Patient progressing well after right hip replacement     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    Trochanteric bursitis of right hip 2/12/2019       Past Surgical History:   Procedure Laterality Date    ABDOMINAL SURGERY      endometriosis     APPENDECTOMY      BREAST BIOPSY Right 1985    benign    CARPAL TUNNEL RELEASE      COLONOSCOPY      HAND SURGERY      HIP SURGERY      HYSTERECTOMY Bilateral 1985    JOINT REPLACEMENT Bilateral     KNEE    JOINT REPLACEMENT Left     HIP    OOPHORECTOMY Bilateral 1985    NM ARTHROSCOPY SHOULDER SURGICAL BICEPS TENODESIS Right 5/10/2017    Procedure: ARTHROSCOPIC BICEPS TENODESIS WITH ROTATOR CUFF REPAIR ;  Surgeon: Berry Plascencia MD;  [facility];  Service: Orthopedics    [address] Right 5/10/2017    Procedure: SHOULDER ARTHROSCOPY SUBACROMIAL DECOMPRESSION;  Surgeon: Priya Miramontes MD;  [facility];  Service: Orthopedics    [place] TOTAL HIP ARTHROPLASTY Left 3/12/2018    Procedure: ARTHROPLASTY HIP TOTAL;  Surgeon: Darrin Ko MD;  [facility];  Service: Orthopedics    [place] TOTAL HIP ARTHROPLASTY Right 7/30/2018    Procedure: RIGHT TOTAL HIP ARTHROPLASTY;  Surgeon: Darrin Ko MD;  [facility];  Service: Orthopedics    TONSILLECTOMY           Current Outpatient Medications:     acetaminophen (TYLENOL) 500 mg tablet, Take 500 mg by mouth every 6 (six) hours as needed for mild pain, Disp: , Rfl:     atorvastatin (LIPITOR) 20 mg tablet, TAKE 1 TABLET BY MOUTH  DAILY, Disp: 90 tablet, Rfl: 3    calcium carbonate (TUMS) 500 mg chewable tablet, Chew 1 tablet daily, Disp: , Rfl:     calcium-vitamin D (OSCAL) 250-125 MG-UNIT per tablet, Take 1 tablet by mouth daily, Disp: , Rfl:     chlorzoxazone (PARAFON FORTE) 500 mg tablet, Take 1 5 tablets (750 mg total) by mouth 2 (two) times a day as needed for muscle spasms, Disp: 90 tablet, Rfl: 5    Cholecalciferol (VITAMIN D) 2000 units tablet, Take 2,000 Units by mouth daily, Disp: , Rfl:     FLUoxetine (PROzac) 20 mg capsule, TAKE 1 CAPSULE BY MOUTH  DAILY, Disp: 90 capsule, Rfl: 3    gabapentin (NEURONTIN) 300 mg capsule, Take 1 capsule (300 mg total) by mouth 3 (three) times a day, Disp: 270 capsule, Rfl: 3    hydrochlorothiazide (HYDRODIURIL) 25 mg tablet, TAKE 1 TABLET BY MOUTH  DAILY, Disp: 90 tablet, Rfl: 3    metoprolol succinate (TOPROL-XL) 50 mg 24 hr tablet, Take 1 tablet (50 mg total) by mouth daily, Disp: 90 tablet, Rfl: 3    metroNIDAZOLE (METROGEL) 0 75 % gel, Apply topically 2 (two) times a day, Disp: 45 g, Rfl: 0    Multiple Vitamins-Minerals (CENTRUM SILVER ULTRA WOMENS PO), Take by mouth, Disp: , Rfl:     Omega-3 1000 MG CAPS, Take by mouth daily , Disp: , Rfl:     tiZANidine (ZANAFLEX) 4 mg tablet, Take 1 tablet (4 mg total) by mouth 2 (two) times a day, Disp: 60 tablet, Rfl: 1    triamcinolone (KENALOG) 0 5 % cream, Apply topically 2 (two) times a day, Disp: 45 g, Rfl: 3    Current Facility-Administered Medications:     bupivacaine (PF) (MARCAINE) 0 25 % injection 10 mL, 10 mL, Perineural, Once, Uziel Issa MD    lidocaine (PF) (XYLOCAINE-MPF) 1 % injection 30 mL, 30 mL, Infiltration, Once, Uziel Issa MD    methylPREDNISolone acetate (DEPO-MEDROL) injection 80 mg, 80 mg, Perineural, Once, Uziel Issa MD    Allergies   Allergen Reactions    Hydromorphone Hcl Itching    Percocet [Oxycodone-Acetaminophen] GI Intolerance    Omeprazole Diarrhea    Sulfamethoxazole-Trimethoprim Rash    Tizanidine Diarrhea       Physical Exam:   Vitals:    06/15/21 1525   BP: 114/75   Pulse: 65   Resp: 18   Temp: 98 3 °F (36 8 °C)   SpO2: 96%     General: Awake, Alert, Oriented x 3, Mood and affect appropriate  Respiratory: Respirations even and unlabored  Cardiovascular: Peripheral pulses intact; no edema  Musculoskeletal Exam:  Right lower back tenderness    ASA Score: 3    Patient/Chart Verification  Patient ID Verified: Verbal  ID Band Applied: No  Consents Confirmed: Procedural, To be obtained in the Pre-Procedure area  H&P( within 30 days) Verified: To be obtained in the Pre-Procedure area  Allergies Reviewed: Yes  Anticoag/NSAID held?: No  Currently on antibiotics?: No    Assessment:   1   Lumbar spondylosis        Plan: Right L3-5 MB RFA

## 2021-06-15 NOTE — DISCHARGE INSTR - LAB

## 2021-06-16 NOTE — TELEPHONE ENCOUNTER
RN s/w pt and she reported she had more soreness last night than the first RFA, it was like electrical spasms at those 2 areas that were more sensitive  She used ice, took tylenol and then slept well  This AM she has just a little soreness  Her inj sites look fine and she denies fevers or sunburn like sensations  Pt informed it takes 6 wks to se full effect from the ablation  Pt given 6 wk s/p RFA f/u for 7/26 w/ NM

## 2021-06-17 ENCOUNTER — APPOINTMENT (OUTPATIENT)
Dept: LAB | Facility: CLINIC | Age: 71
End: 2021-06-17
Payer: MEDICARE

## 2021-06-17 DIAGNOSIS — E78.00 HYPERCHOLESTEROLEMIA: ICD-10-CM

## 2021-06-17 DIAGNOSIS — R68.89 COLD INTOLERANCE: ICD-10-CM

## 2021-06-17 DIAGNOSIS — R53.83 OTHER FATIGUE: ICD-10-CM

## 2021-06-17 LAB
CHOLEST SERPL-MCNC: 209 MG/DL (ref 50–200)
ERYTHROCYTE [DISTWIDTH] IN BLOOD BY AUTOMATED COUNT: 12.7 % (ref 11.6–15.1)
HCT VFR BLD AUTO: 44.8 % (ref 34.8–46.1)
HDLC SERPL-MCNC: 94 MG/DL
HGB BLD-MCNC: 15 G/DL (ref 11.5–15.4)
LDLC SERPL CALC-MCNC: 101 MG/DL (ref 0–100)
MCH RBC QN AUTO: 30.5 PG (ref 26.8–34.3)
MCHC RBC AUTO-ENTMCNC: 33.5 G/DL (ref 31.4–37.4)
MCV RBC AUTO: 91 FL (ref 82–98)
PLATELET # BLD AUTO: 292 THOUSANDS/UL (ref 149–390)
PMV BLD AUTO: 10.3 FL (ref 8.9–12.7)
RBC # BLD AUTO: 4.92 MILLION/UL (ref 3.81–5.12)
TRIGL SERPL-MCNC: 68 MG/DL
TSH SERPL DL<=0.05 MIU/L-ACNC: 2.7 UIU/ML (ref 0.36–3.74)
WBC # BLD AUTO: 7.07 THOUSAND/UL (ref 4.31–10.16)

## 2021-06-17 PROCEDURE — 80061 LIPID PANEL: CPT

## 2021-06-17 PROCEDURE — 36415 COLL VENOUS BLD VENIPUNCTURE: CPT

## 2021-06-17 PROCEDURE — 85027 COMPLETE CBC AUTOMATED: CPT

## 2021-06-17 PROCEDURE — 84443 ASSAY THYROID STIM HORMONE: CPT

## 2021-06-18 ENCOUNTER — OFFICE VISIT (OUTPATIENT)
Dept: FAMILY MEDICINE CLINIC | Facility: CLINIC | Age: 71
End: 2021-06-18
Payer: MEDICARE

## 2021-06-18 VITALS
BODY MASS INDEX: 36.32 KG/M2 | DIASTOLIC BLOOD PRESSURE: 82 MMHG | WEIGHT: 185 LBS | HEART RATE: 60 BPM | SYSTOLIC BLOOD PRESSURE: 118 MMHG | TEMPERATURE: 98.1 F | HEIGHT: 60 IN | OXYGEN SATURATION: 98 %

## 2021-06-18 DIAGNOSIS — I10 ESSENTIAL HYPERTENSION: Primary | ICD-10-CM

## 2021-06-18 DIAGNOSIS — E78.00 HYPERCHOLESTEROLEMIA: ICD-10-CM

## 2021-06-18 DIAGNOSIS — F33.42 RECURRENT MAJOR DEPRESSIVE DISORDER, IN FULL REMISSION (HCC): ICD-10-CM

## 2021-06-18 DIAGNOSIS — R53.83 OTHER FATIGUE: ICD-10-CM

## 2021-06-18 PROCEDURE — 99214 OFFICE O/P EST MOD 30 MIN: CPT | Performed by: FAMILY MEDICINE

## 2021-06-21 NOTE — PROGRESS NOTES
Patient ID: Marsha Potts is a 73 y o  female  HPI: 73 y  o female presents for follow up hypertension and hypercholesterolemia  Pt denies any dizziness,  chest pain, palpitations, or dypsnea with exertion  She does complain of some mild fatigue, but is sleeping well and denies any changes in weight, sleep , etc   Her depression is well controlled at present time      SUBJECTIVE    Family History   Problem Relation Age of Onset    Atrial fibrillation Mother     Breast cancer Mother 76    Stroke Mother     Coronary artery disease Mother     Diabetes Mother     Pancreatitis Mother     Cancer Mother         Breast    Hyperlipidemia Mother     Pulmonary embolism Mother     Heart attack Father     Arthritis Father     Arthritis Family     Cancer Family     Endometrial cancer Maternal Grandmother 76    Cancer Maternal Grandmother     Prostate cancer Paternal Uncle 54    No Known Problems Sister     No Known Problems Paternal Grandmother     No Known Problems Paternal Grandfather     No Known Problems Maternal Aunt     No Known Problems Paternal Aunt     No Known Problems Paternal Aunt      Social History     Socioeconomic History    Marital status: /Civil Union     Spouse name: Not on file    Number of children: Not on file    Years of education: Not on file    Highest education level: Not on file   Occupational History    Not on file   Tobacco Use    Smoking status: Never Smoker    Smokeless tobacco: Never Used   Substance and Sexual Activity    Alcohol use: Yes     Comment: Rarely drink alcohol    Drug use: Never    Sexual activity: Yes     Partners: Male     Birth control/protection: Female Sterilization     Comment: Hysterectomy 1986   Other Topics Concern    Not on file   Social History Narrative    Drinks coffee     Social Determinants of Health     Financial Resource Strain:     Difficulty of Paying Living Expenses:    Food Insecurity:     Worried About Running Out of Food in the Last Year:    [address] in the Last Year:    Transportation Needs:     Lack of Transportation (Medical):      Lack of Transportation (Non-Medical):    Physical Activity:     Days of Exercise per Week:     Minutes of Exercise per Session:    Stress:     Feeling of Stress :    Social Connections:     Frequency of Communication with Friends and Family:     Frequency of Social Gatherings with Friends and Family:     Attends [employer] Services:     Active Member of Clubs or Organizations:     Attends Club or Organization Meetings:     Marital Status:    Intimate Partner Violence:     Fear of Current or Ex-Partner:     Emotionally Abused:     Physically Abused:     Sexually Abused:      Past Medical History:   Diagnosis Date    Arthritis     Depression     Endometriosis     GERD (gastroesophageal reflux disease)     Hyperlipidemia     Hypertension     Nondisplaced fracture of fifth metatarsal bone, right foot, initial encounter for closed fracture 9/24/2018    Osteopenia     Pancreatitis 06/2017    Pneumonia     Right hip pain 12/4/2018    S/P hip replacement, right 7/31/2018    Patient progressing well after right hip replacement     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    Trochanteric bursitis of right hip 2/12/2019     Past Surgical History:   Procedure Laterality Date    ABDOMINAL SURGERY      endometriosis     APPENDECTOMY      BREAST BIOPSY Right 1985    benign    CARPAL TUNNEL RELEASE      COLONOSCOPY      HAND SURGERY      HIP SURGERY      HYSTERECTOMY Bilateral 1985    JOINT REPLACEMENT Bilateral     KNEE    JOINT REPLACEMENT Left     HIP    OOPHORECTOMY Bilateral 1985    SD ARTHROSCOPY SHOULDER SURGICAL BICEPS TENODESIS Right 5/10/2017    Procedure: ARTHROSCOPIC BICEPS TENODESIS WITH ROTATOR CUFF REPAIR ;  Surgeon: Krzysztof Huerta MD;  [facility];  Service: Orthopedics    SD MERRICK Infante Right 5/10/2017 Procedure: SHOULDER ARTHROSCOPY SUBACROMIAL DECOMPRESSION;  Surgeon: Aram Ureña MD;  [facility];  Service: Orthopedics    DC TOTAL HIP ARTHROPLASTY Left 3/12/2018    Procedure: ARTHROPLASTY HIP TOTAL;  Surgeon: Ashli Ley MD;  [facility];  Service: Orthopedics    DC TOTAL HIP ARTHROPLASTY Right 7/30/2018    Procedure: RIGHT TOTAL HIP ARTHROPLASTY;  Surgeon: Ashli Ley MD;  [facility];  Service: Orthopedics    TONSILLECTOMY       Allergies   Allergen Reactions    Hydromorphone Hcl Itching    Percocet [Oxycodone-Acetaminophen] GI Intolerance    Omeprazole Diarrhea    Sulfamethoxazole-Trimethoprim Rash    Tizanidine Diarrhea       Current Outpatient Medications:     acetaminophen (TYLENOL) 500 mg tablet, Take 500 mg by mouth every 6 (six) hours as needed for mild pain, Disp: , Rfl:     atorvastatin (LIPITOR) 20 mg tablet, TAKE 1 TABLET BY MOUTH  DAILY, Disp: 90 tablet, Rfl: 3    calcium carbonate (TUMS) 500 mg chewable tablet, Chew 1 tablet daily, Disp: , Rfl:     calcium-vitamin D (OSCAL) 250-125 MG-UNIT per tablet, Take 1 tablet by mouth daily, Disp: , Rfl:     chlorzoxazone (PARAFON FORTE) 500 mg tablet, Take 1 5 tablets (750 mg total) by mouth 2 (two) times a day as needed for muscle spasms, Disp: 90 tablet, Rfl: 5    Cholecalciferol (VITAMIN D) 2000 units tablet, Take 2,000 Units by mouth daily, Disp: , Rfl:     FLUoxetine (PROzac) 20 mg capsule, TAKE 1 CAPSULE BY MOUTH  DAILY, Disp: 90 capsule, Rfl: 3    gabapentin (NEURONTIN) 300 mg capsule, Take 1 capsule (300 mg total) by mouth 3 (three) times a day, Disp: 270 capsule, Rfl: 3    hydrochlorothiazide (HYDRODIURIL) 25 mg tablet, TAKE 1 TABLET BY MOUTH  DAILY, Disp: 90 tablet, Rfl: 3    metoprolol succinate (TOPROL-XL) 50 mg 24 hr tablet, Take 1 tablet (50 mg total) by mouth daily, Disp: 90 tablet, Rfl: 3    metroNIDAZOLE (METROGEL) 0 75 % gel, Apply topically 2 (two) times a day, Disp: 45 g, Rfl: 0    Multiple Vitamins-Minerals (CENTRUM SILVER ULTRA WOMENS PO), Take by mouth, Disp: , Rfl:     Omega-3 1000 MG CAPS, Take by mouth daily , Disp: , Rfl:     triamcinolone (KENALOG) 0 5 % cream, Apply topically 2 (two) times a day, Disp: 45 g, Rfl: 3    Review of Systems  Constitutional:     Denies fever, chills  ,weakness ,weight loss, weight gain   + fatigue  ENT: Denies earache ,loss of hearing ,nosebleed, nasal discharge,nasal congestion ,sore throat ,hoarseness  Pulmonary: Denies shortness of breath ,cough  ,dyspnea on exertion, orthopnea  ,PND   Cardiovascular:  Denies bradycardia , tachycardia  ,palpations, lower extremity edema leg, claudication  Breast:  Denies new or changing breast lumps ,nipple discharge ,nipple changes  Abdomen:  Denies abdominal pain , anorexia , indigestion, nausea, vomiting, constipation, diarrhea  Musculoskeletal: Denies myalgias, arthralgias, joint swelling, joint stiffness , limb pain, limb swelling  Gu: denies dysuria, polyuria  Skin: Denies skin rash, skin lesion, skin wound, itching, dry skin  Neuro: Denies headache, numbness, tingling, confusion, loss of consciousness, dizziness, vertigo  Psychiatric: Denies feelings of depression, suicidal ideation, anxiety, sleep disturbances    OBJECTIVE  /82   Pulse 60   Temp 98 1 °F (36 7 °C)   Ht 5' (1 524 m)   Wt 83 9 kg (185 lb)   LMP  (LMP Unknown) Comment: hysterectomy  SpO2 98%   BMI 36 13 kg/m²   Constitutional:   NAD, well appearing and well nourished      ENT:   Conjunctiva and lids: no injection, edema, or discharge     Pupils and iris: CORNELIA bilaterally    External inspection of ears and nose: normal without deformities or discharge  Otoscopic exam: Canals patent without erythema  Nasal mucosa, septum and turbinates: Normal or edema or discharge         Oropharynx:  Moist mucosa, normal tongue and tonsils without lesions   No erythema        Pulmonary:Respiratory effort normal rate and rhythm, no increased work of breathing  Auscultation of lungs:  Clear bilaterally with no adventitious breath sounds       Cardiovascular: regular rate and rhythm, S1 and S2, no murmur, no edema and/or varicosities of LE      Abdomen: Soft and non-distended     Positive bowel sounds      No heptomegaly or splenomegaly      Gu: no suprapubic tenderness or CVA tenderness, no urethral discharge  Lymphatic:  No anterior or posterior cervical lymphadenopathy         Musculoskeletal:  Gait and station: Normal gait      Digits and nails normal without clubbing or cyanosis       Inspection/palpation of joints, bones, and muscles:  No joint tenderness, swelling, full active and passive range of motion       Skin: Normal skin turgor and no rashes      Neuro:      Normal reflexes       Psych:   alert and oriented to person, place and time     normal mood and affect       Assessment/Plan:Diagnoses and all orders for this visit:    Essential hypertension  Comments:  Continue beta blocker therapy   Orders:  -     Comprehensive metabolic panel; Future    Hypercholesterolemia  Comments:  Continue statin therpay   Orders:  -     Lipid Panel with Direct LDL reflex; Future    Other fatigue  -     TSH, 3rd generation; Future    Recurrent major depressive disorder, [facility]  Comments:  Continue prozac therapy         Reviewed with patient plan to treat with above plan      Patient instructed to call in 72 hours if not feeling better or if symptoms worse

## 2021-06-23 ENCOUNTER — TELEPHONE (OUTPATIENT)
Dept: FAMILY MEDICINE CLINIC | Facility: CLINIC | Age: 71
End: 2021-06-23

## 2021-06-23 DIAGNOSIS — M54.50 LUMBAR BACK PAIN: Primary | ICD-10-CM

## 2021-06-23 RX ORDER — TRAMADOL HYDROCHLORIDE 50 MG/1
50 TABLET ORAL EVERY 6 HOURS PRN
Qty: 60 TABLET | Refills: 1 | Status: SHIPPED | OUTPATIENT
Start: 2021-06-23 | End: 2021-07-23

## 2021-06-23 NOTE — TELEPHONE ENCOUNTER
She discussed with you starting Tramadol, can you send the rx to [facility]   She will ck with pharm if no cb

## 2021-07-19 ENCOUNTER — OFFICE VISIT (OUTPATIENT)
Dept: DERMATOLOGY | Facility: CLINIC | Age: 71
End: 2021-07-19
Payer: MEDICARE

## 2021-07-19 VITALS — WEIGHT: 184.97 LBS | TEMPERATURE: 97.6 F | HEIGHT: 60 IN | BODY MASS INDEX: 36.31 KG/M2

## 2021-07-19 DIAGNOSIS — L71.9 ROSACEA: ICD-10-CM

## 2021-07-19 DIAGNOSIS — D48.5 NEOPLASM OF UNCERTAIN BEHAVIOR OF SKIN: Primary | ICD-10-CM

## 2021-07-19 DIAGNOSIS — L21.9 SEBORRHEA: ICD-10-CM

## 2021-07-19 PROCEDURE — 88313 SPECIAL STAINS GROUP 2: CPT | Performed by: STUDENT IN AN ORGANIZED HEALTH CARE EDUCATION/TRAINING PROGRAM

## 2021-07-19 PROCEDURE — 99204 OFFICE O/P NEW MOD 45 MIN: CPT | Performed by: DERMATOLOGY

## 2021-07-19 PROCEDURE — 88305 TISSUE EXAM BY PATHOLOGIST: CPT | Performed by: STUDENT IN AN ORGANIZED HEALTH CARE EDUCATION/TRAINING PROGRAM

## 2021-07-19 PROCEDURE — 88312 SPECIAL STAINS GROUP 1: CPT | Performed by: STUDENT IN AN ORGANIZED HEALTH CARE EDUCATION/TRAINING PROGRAM

## 2021-07-19 PROCEDURE — 11104 PUNCH BX SKIN SINGLE LESION: CPT | Performed by: DERMATOLOGY

## 2021-07-19 RX ORDER — KETOCONAZOLE 20 MG/G
CREAM TOPICAL DAILY
Qty: 30 G | Refills: 2 | Status: SHIPPED | OUTPATIENT
Start: 2021-07-19 | End: 2021-10-19 | Stop reason: ALTCHOICE

## 2021-07-19 NOTE — PATIENT INSTRUCTIONS
Plan:  1  Instructed to keep the wound dry and covered for 24-48h and clean thereafter  2  Warning signs of infection were reviewed  3  Recommended that the patient use acetaminophen as needed for pain  4  Sutures if any should be removed in 10-14 days       [place]    Assessment and Plan:  Based on a thorough discussion of this condition and the management approach to it (including a comprehensive discussion of the known risks, side effects and potential benefits of treatment), the patient (family) agrees to implement the following specific plan:   Start to use Ketoconazole 2% cream; Apply once a day to the eyebrows, bridge of nose and temples     Follow up in 6 weeks

## 2021-07-19 NOTE — PROGRESS NOTES
[facility] Note     Patient Name: Dorene Shaver  Encounter Date: 7/19/2021     Have you been cared for by a [facility]ologist in the last 3 years and, if so, which one? No    · Have you traveled outside of the [address] [place] [address] in the past 3 months or outside of the [facility] area in the last 2 weeks? No     May we call your Preferred Phone number to discuss your specific medical information? Yes     May we leave a detailed message that includes your specific medical information? Yes      Today's Chief Concerns:   Concern #1:  Rash on left upper arm    Past Medical History:  Have you personally ever had or currently have any of the following? · Skin cancer (such as Melanoma, Basal Cell Carcinoma, Squamous Cell Carcinoma? (If Yes, please provide more detail)- No  · Eczema: No  · Psoriasis: No  · HIV/AIDS: No  · Hepatitis B or C: No  · Tuberculosis: No  · Systemic Immunosuppression such as Diabetes, Biologic or Immunotherapy, Chemotherapy, Organ Transplantation, Bone Marrow Transplantation (If YES, please provide more detail): No  · Radiation Treatment (If YES, please provide more detail): No  · Any other major medical conditions/concerns? (If Yes, which types)- No      Family History:  Have any of your "first degree relatives" (parent, brother, sister, or child) had any of the following       · Skin cancer such as Melanoma or Merkel Cell Carcinoma or Pancreatic Cancer? No  · Eczema, Asthma, Hay Fever or Seasonal Allergies: No  · Psoriasis or Psoriatic Arthritis: No  · Do any other medical conditions seem to run in your family? If Yes, what condition and which relatives?   No    Current Medications:   (please update all dermatological medications before printing patient's AVS!)      Current Outpatient Medications:     acetaminophen (TYLENOL) 500 mg tablet, Take 500 mg by mouth every 6 (six) hours as needed for mild pain, Disp: , Rfl:     atorvastatin (LIPITOR) 20 mg tablet, TAKE 1 TABLET BY MOUTH  DAILY, Disp: 90 tablet, Rfl: 3    calcium carbonate (TUMS) 500 mg chewable tablet, Chew 1 tablet daily, Disp: , Rfl:     calcium-vitamin D (OSCAL) 250-125 MG-UNIT per tablet, Take 1 tablet by mouth daily, Disp: , Rfl:     chlorzoxazone (PARAFON FORTE) 500 mg tablet, Take 1 5 tablets (750 mg total) by mouth 2 (two) times a day as needed for muscle spasms, Disp: 90 tablet, Rfl: 5    Cholecalciferol (VITAMIN D) 2000 units tablet, Take 2,000 Units by mouth daily, Disp: , Rfl:     FLUoxetine (PROzac) 20 mg capsule, TAKE 1 CAPSULE BY MOUTH  DAILY, Disp: 90 capsule, Rfl: 3    gabapentin (NEURONTIN) 300 mg capsule, Take 1 capsule (300 mg total) by mouth 3 (three) times a day, Disp: 270 capsule, Rfl: 3    hydrochlorothiazide (HYDRODIURIL) 25 mg tablet, TAKE 1 TABLET BY MOUTH  DAILY, Disp: 90 tablet, Rfl: 3    metoprolol succinate (TOPROL-XL) 50 mg 24 hr tablet, Take 1 tablet (50 mg total) by mouth daily, Disp: 90 tablet, Rfl: 3    metroNIDAZOLE (METROGEL) 0 75 % gel, Apply topically 2 (two) times a day, Disp: 45 g, Rfl: 0    Multiple Vitamins-Minerals (CENTRUM SILVER ULTRA WOMENS PO), Take by mouth, Disp: , Rfl:     Omega-3 1000 MG CAPS, Take by mouth daily , Disp: , Rfl:     traMADol (ULTRAM) 50 mg tablet, Take 1 tablet (50 mg total) by mouth every 6 (six) hours as needed for moderate pain, Disp: 60 tablet, Rfl: 1    triamcinolone (KENALOG) 0 5 % cream, Apply topically 2 (two) times a day, Disp: 45 g, Rfl: 3      Review of Systems:  Have you recently had or currently have any of the following? If YES, what are you doing for the problem? · Fever, chills or unintended weight loss: No  · Sudden loss or change in your vision: No  · Nausea, vomiting or blood in your stool: No  · Painful or swollen joints: No  · Wheezing or cough: No  · Changing mole or non-healing wound: No  · Nosebleeds: No  · Excessive sweating: No  · Easy or prolonged bleeding?   No  · Over the last 2 weeks, how often have you been bothered by the following problems? · Taking little interest or pleasure in doing things: 1 - Not at All  · Feeling down, depressed, or hopeless: 1 - Not at All  · Rapid heartbeat with epinephrine:  No    · FEMALES ONLY:    · Are you pregnant or planning to become pregnant? No  · Are you currently or planning to be nursing or breast feeding? No    · Any known allergies? Allergies   Allergen Reactions    Hydromorphone Hcl Itching    Percocet [Oxycodone-Acetaminophen] GI Intolerance    Omeprazole Diarrhea    Sulfamethoxazole-Trimethoprim Rash    Tizanidine Diarrhea   ·       Physical Exam:     Was a chaperone (Derm Clinical Assistant) present throughout the entire Physical Exam? Yes     Did the Dermatology Team specifically  the patient on the importance of a Full Skin Exam to be sure that nothing is missed clinically? Yes}  o Did the patient ultimately request or accept a Full Skin Exam?  NO  o Did the patient specifically refuse to have the areas "under-the-bra" examined by the Dermatologist? No  o Did the patient specifically refuse to have the areas "under-the-underwear" examined by the Dermatologist? No    CONSTITUTIONAL:   There were no vitals filed for this visit  PSYCH: Normal mood and affect  EYES: Normal conjunctiva  ENT: Normal lips and oral mucosa  CARDIOVASCULAR: No edema  RESPIRATORY: Normal respirations  HEME/LYMPH/IMMUNO:  No regional lymphadenopathy except as noted below in "ASSESSMENT AND PLAN BY DIAGNOSIS"    SKIN:  FULL ORGAN SYSTEM EXAM   Hair, Scalp, Ears, Face Normal except as noted below in Assessment   Right Arm/Hand/Fingers Normal except as noted below in Assessment        Assessment and Plan by Diagnosis:    History of Present Condition:     Duration:  How long has this been an issue for you?    o  December 2020   Location Affected:  Where on the body is this affecting you?     o  Left arm   Quality:  Is there any bleeding, pain, itch, burning/irritation, or redness associated with the skin lesion? o  Redness   Severity:  Describe any bleeding, pain, itch, burning/irritation, or redness on a scale of 1 to 10 (with 10 being the worst)  o  3   Timing:  Does this condition seem to be there pretty constantly or do you notice it more at specific times throughout the day?    o  Constant   Context:  Have you ever noticed that this condition seems to be associated with specific activities you do?    o  Denies   Modifying Factors:  Anything that seems to make the condition worse?    -  Denies  o What have you tried to do to make the condition better?    -  Triamcinolone   - Metronidazole cream   Associated Signs and Symptoms:  Does this skin lesion seem to be associated with any of the following:  o  SL AMB DERM SIGNS AND SYMPTOMS: Redness     1  NEOPLASM OF UNCERTAIN BEHAVIOR OF SKIN    Physical Exam:   (Anatomic Location); (Size and Morphological Description); (Differential Diagnosis):  o Left upper arm; angular pink plaque with minimal scale; Rule out Granuloma Annulare     Additional History of Present Condition:  Present since December 2020  Patient was seen at her PCP and was given a cream to use on the spot  Spot has not gone away, PCP recommend seeing Dermatology  Assessment and Plan:   I have discussed with the patient that a sample of skin via a "skin biopsy would be potentially helpful to further make a specific diagnosis under the microscope   Based on a thorough discussion of this condition and the management approach to it (including a comprehensive discussion of the known risks, side effects and potential benefits of treatment), the patient (family) agrees to implement the following specific plan:    o Procedure:  Skin Biopsy    After a thorough discussion of treatment options and risk/benefits/alternatives (including but not limited to local pain, scarring, dyspigmentation, blistering, possible superinfection, and inability to confirm a diagnosis via histopathology), verbal and written consent were obtained and portion of the rash was biopsied for tissue sample  See below for consent that was obtained from patient and subsequent Procedure Note  PROCEDURE NOTE:  PUNCH BIOPSY      Performing Physician: Dr Gutierrez    Anatomic Location; Clinical Description with size (cm); Pre-Op Diagnosis:     Left upper arm; angular pink plaque with minimal scale; Rule out Granuloma Annulare      Anesthesia: 1% xylocaine with epi       Topical anesthesia: None       Indications: To indicate diagnosis and management plan  Procedure Details     Patient informed of the risks (including bleeding,scaring and infection) and benefits of the procedure explained  Verbal and written informed consent obtained  The area was prepped and draped in the usual fashion  Anesthesia was obtained with 1% lidocaine with epinephrine  The skin was then stretched perpendicular to the skin tension lines and a punch biopsy to an appropriate sampling depth was obtained with a 4 mm punch with a forceps and iris scissors  Hemostasis was obtained with 4-0 Ethilon x 1 sutures  Complications:  None      Specimen has been sent for review by Dermatopathology  Plan:  1  Instructed to keep the wound dry and covered for 24-48h and clean thereafter  2  Warning signs of infection were reviewed  3  Recommended that the patient use acetaminophen as needed for pain  4  Sutures if any should be removed in 10-14 days      Standard post-procedure care has been explained and has been included in written form within the patient's copy of Informed Consent  2  SEBORRHEA    Physical Exam:   Anatomic Location Affected:  Eyebrows, bridge nose, temple   Morphological Description:  Dry scaly patches      Additional History of Present Condition:  Present for several months  Patient was given Metronidazole cream, denies using the cream today   Patient stated the cream never worked  Assessment and Plan:  Based on a thorough discussion of this condition and the management approach to it (including a comprehensive discussion of the known risks, side effects and potential benefits of treatment), the patient (family) agrees to implement the following specific plan:   Start to use Ketoconazole 2% cream; Apply once a day to the eyebrows, bridge of nose and temples     Follow up in 6 weeks      Scribe Attestation    I,:  Monserrat Johnson am acting as a scribe while in the presence of the attending physician :       I,:  Derrell Leo MD personally performed the services described in this documentation    as scribed in my presence :

## 2021-07-23 NOTE — RESULT ENCOUNTER NOTE
Tissue Exam: [id number]  Order: [id number]  Status:  Final result   Visible to patient:  Yes (55 [address] Pérez) Next appt:  07/26/2021 at 08:45 AM in Pain Medicine [facility]) Dx:  Neoplasm of uncertain behavior of skin  0 Result Notes  Component   Case Report  Surgical Pathology Report                         Case: [id number]                                   Authorizing Provider: Larwence Kawasaki, MD      Collected:           07/19/2021 [facility]              Ordering Location:     [facility]      Received:            07/19/2021 [address] [place] [address]                                                                       Pathologist:           Vlad Nicole MD                                                           Specimen:    Skin, Other, A: Left upper arm                                                           Final Diagnosis  A  Skin, left upper arm, punch biopsy:     Granulomatous dermatitis (see note)      Note: The histopathologic findings include solar elastosis, numerous multinucleated giant cells, and scattered eosinophils  Rare polarizable foreign material is seen on polarized microscopy  The presence of polarizable foreign material raises the possibility of foreign body granuloma, but given the rarity of this foreign material, these findings (i e , foreign material) are favored to be incidental  Some of the histopathologic findings are focally suggestive of sarcoidosis, though the histopathologic differential diagnosis includes actinic granuloma and granuloma annulare  Clinical pathological correlation is advised  Pathogenic microorganisms are not seen on PAS, GMS, and AFB stains  Increased dermal mucin deposition is focally seen with colloidal iron stain  Multiple levels examined  DERMATOPATHOLOGY RESULT NOTE    Results reviewed by ordering physician  Called patient to personally discuss results   No answer, left voicemail with result  Instructions for Clinical Derm Team:   (remember to route Result Note to appropriate staff):    None    Result & Plan by Specimen:    Specimen A: indeterminate - granuloma of skin  Plan: Called and left the following message:  the biopsy is suggestive of sarcoid  Because of this we need to send her to do a few things  Have an ophthalmology exam, check some lab work, do a chest xray check an EKG  These are to make sure 1  If it is sarcoid it is just in the skin 2  To help us make a diagnosis    She should contact her PCP to schedule the EKG

## 2021-07-26 ENCOUNTER — OFFICE VISIT (OUTPATIENT)
Dept: PAIN MEDICINE | Facility: CLINIC | Age: 71
End: 2021-07-26
Payer: MEDICARE

## 2021-07-26 ENCOUNTER — APPOINTMENT (OUTPATIENT)
Dept: RADIOLOGY | Facility: MEDICAL CENTER | Age: 71
End: 2021-07-26
Payer: MEDICARE

## 2021-07-26 ENCOUNTER — TELEPHONE (OUTPATIENT)
Dept: FAMILY MEDICINE CLINIC | Facility: CLINIC | Age: 71
End: 2021-07-26

## 2021-07-26 ENCOUNTER — APPOINTMENT (OUTPATIENT)
Dept: LAB | Facility: CLINIC | Age: 71
End: 2021-07-26
Payer: MEDICARE

## 2021-07-26 VITALS
WEIGHT: 189 LBS | HEIGHT: 60 IN | BODY MASS INDEX: 37.11 KG/M2 | RESPIRATION RATE: 18 BRPM | HEART RATE: 65 BPM | DIASTOLIC BLOOD PRESSURE: 71 MMHG | SYSTOLIC BLOOD PRESSURE: 142 MMHG

## 2021-07-26 DIAGNOSIS — L92.9 GRANULOMA OF SKIN: ICD-10-CM

## 2021-07-26 DIAGNOSIS — G89.29 CHRONIC BILATERAL LOW BACK PAIN, UNSPECIFIED WHETHER SCIATICA PRESENT: ICD-10-CM

## 2021-07-26 DIAGNOSIS — G89.4 CHRONIC PAIN SYNDROME: Primary | ICD-10-CM

## 2021-07-26 DIAGNOSIS — M51.16 INTERVERTEBRAL DISC DISORDER WITH RADICULOPATHY OF LUMBAR REGION: ICD-10-CM

## 2021-07-26 DIAGNOSIS — M54.50 CHRONIC BILATERAL LOW BACK PAIN, UNSPECIFIED WHETHER SCIATICA PRESENT: ICD-10-CM

## 2021-07-26 DIAGNOSIS — M47.816 LUMBAR SPONDYLOSIS: ICD-10-CM

## 2021-07-26 LAB — 25(OH)D3 SERPL-MCNC: 64 NG/ML (ref 30–100)

## 2021-07-26 PROCEDURE — 86480 TB TEST CELL IMMUN MEASURE: CPT

## 2021-07-26 PROCEDURE — 36415 COLL VENOUS BLD VENIPUNCTURE: CPT

## 2021-07-26 PROCEDURE — 99214 OFFICE O/P EST MOD 30 MIN: CPT | Performed by: NURSE PRACTITIONER

## 2021-07-26 PROCEDURE — 82306 VITAMIN D 25 HYDROXY: CPT

## 2021-07-26 PROCEDURE — 71046 X-RAY EXAM CHEST 2 VIEWS: CPT

## 2021-07-26 NOTE — TELEPHONE ENCOUNTER
Pt called to set up EKG:  Per bx report from Dr Blank Rice:    Plan: Called and left the following message:  the biopsy is suggestive of sarcoid   Because of this we need to send her to do a few things  [facility] an ophthalmology exam, check some lab work, do a chest xray check an EKG  Nafi Gal are to make sure 1   If it is sarcoid it is just in the skin 2   To help us make a diagnosis   She should contact her PCP to schedule the EKG    Please advise on how you want to proceed

## 2021-07-26 NOTE — PROGRESS NOTES
Assessment:  1  Chronic pain syndrome    2  Chronic bilateral low back pain, unspecified whether sciatica present    3  Lumbar spondylosis    4  Intervertebral disc disorder with radiculopathy of lumbar region        Plan:  Tessa Jasso is a 73 y o  female who presents for a follow up office visit in regards to Back Pain  The patient has a history of chronic pain syndrome secondary to low back pain, lumbar intervertebral disc disorder with radiculopathy, lumbar spondylosis, lumbar stenosis  Patient presents today with ongoing low back pain  She did obtain 40% pain relief from the bilateral L3 through L5 radiofrequency ablation  However she continues to be symptomatic  Upon examination she has muscle spasms throughout the lumbar spine  I instructed her to start consistently taking the chlorzoxazone 500 mg twice a day to better control the spasms  I asked that she contact the office in 2 weeks with an update on her pain symptoms  If still having spasm, I would recommend trigger point injections versus starting Lyrica/weanign off gabapentin    My impressions and treatment recommendations were discussed in detail with the patient who verbalized understanding and had no further questions  Discharge instructions were provided  I personally saw and examined the patient and I agree with the above discussed plan of care  No orders of the defined types were placed in this encounter  No orders of the defined types were placed in this encounter  History of Present Illness:  Tessa Jasso is a 73 y o  female who presents for a follow up office visit in regards to Back Pain  The patient has a history of chronic pain syndrome secondary to low back pain, lumbar intervertebral disc disorder with radiculopathy, lumbar spondylosis, lumbar stenosis  She was last seen in the office on Emily 15, 2021 which she underwent a right L3-L5 radiofrequency ablation  She had underwent a left side on June 1, 2021    She presents today with ongoing low back pain  She states the radiofrequency ablation had provided 40% pain relief  She continues with pain that radiates across the low back  She states that the pain is described as spasms  When bending forward she experiences increased spasms and also intermittent numbness down the left leg to the foot  The pain is described as burning, throbbing and is rated an 8/10 on numeric rating scale    She is taking tramadol 50 mg 1 tablet Q 6 hours as needed which is prescribed by her primary care physician, as well as gabapentin 300 mg 1 tablet 3 times a day and chlorzoxazone 500 mg 1 5 tablets 2 times a day, which she takes as needed  The medication provides 40% pain relief without side effects    I have personally reviewed and/or updated the patient's past medical history, past surgical history, family history, social history, current medications, allergies, and vital signs today  Review of Systems   Respiratory: Negative for shortness of breath  Cardiovascular: Negative for chest pain  Gastrointestinal: Negative for constipation, diarrhea, nausea and vomiting  Musculoskeletal: Positive for back pain  Negative for arthralgias, gait problem, joint swelling and myalgias  Skin: Negative for rash  Neurological: Negative for dizziness, seizures and weakness  All other systems reviewed and are negative  Patient Active Problem List   Diagnosis    Incomplete tear of right rotator cuff    Hypertension    Hyperlipidemia    Depression    Primary osteoarthritis of left hip    Primary osteoarthritis of one hip, right    Renal insufficiency    Pain, joint, ankle and foot, right    Intervertebral disc disorder with radiculopathy of lumbar region    Lumbar spondylosis    Change in bowel habits    Sacroiliitis (HCC)    Severe obesity (BMI 35 0-39  9) with comorbidity [facility])       Past Medical History:   Diagnosis Date    Arthritis     Depression     Endometriosis  GERD (gastroesophageal reflux disease)     Hyperlipidemia     Hypertension     Nondisplaced fracture of fifth metatarsal bone, right foot, initial encounter for closed fracture 9/24/2018    Osteopenia     Pancreatitis 06/2017    Pneumonia     Right hip pain 12/4/2018    S/P hip replacement, right 7/31/2018    Patient progressing well after right hip replacement     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    Trochanteric bursitis of right hip 2/12/2019       Past Surgical History:   Procedure Laterality Date    ABDOMINAL SURGERY      endometriosis     APPENDECTOMY      BREAST BIOPSY Right 1985    benign    CARPAL TUNNEL RELEASE      COLONOSCOPY      HAND SURGERY      HIP SURGERY      HYSTERECTOMY Bilateral 1985    JOINT REPLACEMENT Bilateral     KNEE    JOINT REPLACEMENT Left     HIP    OOPHORECTOMY Bilateral 1985    IN ARTHROSCOPY SHOULDER SURGICAL BICEPS TENODESIS Right 5/10/2017    Procedure: ARTHROSCOPIC BICEPS TENODESIS WITH ROTATOR CUFF REPAIR ;  Surgeon: Stacy Huffman MD;  [facility];  Service: Orthopedics    [facility] Right 5/10/2017    Procedure: SHOULDER ARTHROSCOPY SUBACROMIAL DECOMPRESSION;  Surgeon: Stacy Huffman MD;  [facility];  Service: Orthopedics    IN TOTAL HIP ARTHROPLASTY Left 3/12/2018    Procedure: ARTHROPLASTY HIP TOTAL;  Surgeon: Evalyn Collet, MD;  [facility];  Service: Orthopedics    IN TOTAL HIP ARTHROPLASTY Right 7/30/2018    Procedure: RIGHT TOTAL HIP ARTHROPLASTY;  Surgeon: Evalyn Collet, MD;  [facility];  Service: Orthopedics    TONSILLECTOMY         Family History   Problem Relation Age of Onset    Atrial fibrillation Mother     Breast cancer Mother 76    Stroke Mother     Coronary artery disease Mother     Diabetes Mother     Pancreatitis Mother     Cancer Mother         Breast    Hyperlipidemia Mother     Pulmonary embolism Mother     Heart attack Father  Arthritis Father     Arthritis Family     Cancer Family     Endometrial cancer Maternal Grandmother 76    Cancer Maternal Grandmother     Prostate cancer Paternal Uncle 54    No Known Problems Sister     No Known Problems Paternal Grandmother     No Known Problems Paternal Grandfather     No Known Problems Maternal Aunt     No Known Problems Paternal Aunt     No Known Problems Paternal Aunt        Social History     Occupational History    Not on file   Tobacco Use    Smoking status: Never Smoker    Smokeless tobacco: Never Used   Vaping Use    Vaping Use: Never used   Substance and Sexual Activity    Alcohol use: Yes     Comment: Rarely drink alcohol    Drug use: Never    Sexual activity: Yes     Partners: Male     Birth control/protection: Female Sterilization     Comment: Hysterectomy 1986       Current Outpatient Medications on File Prior to Visit   Medication Sig    acetaminophen (TYLENOL) 500 mg tablet Take 500 mg by mouth every 6 (six) hours as needed for mild pain    atorvastatin (LIPITOR) 20 mg tablet TAKE 1 TABLET BY MOUTH  DAILY    calcium carbonate (TUMS) 500 mg chewable tablet Chew 1 tablet daily    calcium-vitamin D (OSCAL) 250-125 MG-UNIT per tablet Take 1 tablet by mouth daily    chlorzoxazone (PARAFON FORTE) 500 mg tablet Take 1 5 tablets (750 mg total) by mouth 2 (two) times a day as needed for muscle spasms    Cholecalciferol (VITAMIN D) 2000 units tablet Take 2,000 Units by mouth daily    FLUoxetine (PROzac) 20 mg capsule TAKE 1 CAPSULE BY MOUTH  DAILY    gabapentin (NEURONTIN) 300 mg capsule Take 1 capsule (300 mg total) by mouth 3 (three) times a day    hydrochlorothiazide (HYDRODIURIL) 25 mg tablet TAKE 1 TABLET BY MOUTH  DAILY    ketoconazole (NIZORAL) 2 % cream Apply topically daily To face for seborrhea    metoprolol succinate (TOPROL-XL) 50 mg 24 hr tablet Take 1 tablet (50 mg total) by mouth daily    Multiple Vitamins-Minerals (CENTRUM SILVER ULTRA WOMENS PO) Take by mouth    Omega-3 1000 MG CAPS Take by mouth daily     triamcinolone (KENALOG) 0 5 % cream Apply topically 2 (two) times a day    metroNIDAZOLE (METROGEL) 0 75 % gel Apply topically 2 (two) times a day (Patient not taking: Reported on 7/19/2021)     No current facility-administered medications on file prior to visit  Allergies   Allergen Reactions    Hydromorphone Hcl Itching    Percocet [Oxycodone-Acetaminophen] GI Intolerance    Omeprazole Diarrhea    Sulfamethoxazole-Trimethoprim Rash    Tizanidine Diarrhea       Physical Exam:    /71   Pulse 65   Resp 18   Ht 5' (1 524 m)   Wt 85 7 kg (189 lb)   LMP  (LMP Unknown) Comment: hysterectomy  BMI 36 91 kg/m²     Constitutional:normal, well developed, well nourished, alert, in no distress and non-toxic and no overt pain behavior  Eyes:anicteric  HEENT:grossly intact  Neck:supple, symmetric, trachea midline and no masses   Pulmonary:even and unlabored  Cardiovascular:No edema or pitting edema present  Skin:Normal without rashes or lesions and well hydrated  Psychiatric:Mood and affect appropriate  Neurologic:Cranial Nerves II-XII grossly intact  Musculoskeletal:normal     Lumbar Spine Exam    Appearance:  Normal lordosis  Palpation/Tenderness:  left lumbar paraspinal tenderness  right lumbar paraspinal tenderness  Range of Motion:  Extension:  No limitation  without pain  Lateral Flexion - Left:  No limitation  without pain  Lateral Flexion - Right:  No limitation  without pain      Imaging  CT LUMBAR SPINE 6/21/19     INDICATION:   M54 16: Radiculopathy, lumbar region      COMPARISON: None      TECHNIQUE:  Contiguous axial images through the lumbar spine were obtained  Sagittal and coronal reconstructions were performed        Radiation dose length product (DLP) for this visit:  146 mGy-cm     This examination, like all CT scans performed in the [facility], was performed utilizing techniques to minimize radiation dose exposure, including the use of iterative   reconstruction and automated exposure control        IMAGE QUALITY:  Diagnostic      FINDINGS:     ALIGNMENT:  There is mild levoscoliosis of the lumbar spine  There is trace anterolisthesis of L4-L5      VERTEBRAL BODIES:  The bones are osteopenic  No acute compression fractures are identified      DEGENERATIVE CHANGES:     Lower Thoracic spine:  Normal lower thoracic disc spaces  ]     L1-2:  There is disc space narrowing  There is vacuum disc phenomenon  There is a bulging annulus  There is no significant bony canal stenosis  There is mild foraminal encroachment      L2-3:  There is disc space narrowing  There is a bulging annulus  There is facet arthrosis  There is mild canal stenosis  There is mild foraminal narrowing      L3-4:  There is a bulging annulus  There is facet arthrosis  There is no significant canal stenosis  There is no significant foraminal narrowing      L4-5:  There is disc space narrowing  There is vacuum disc phenomenon  There is a calcified bulging annulus  There is right-sided endplate spurring  There is facet arthrosis  There is mild canal stenosis  There is mild left foraminal narrowing  There is   moderate to severe right foraminal narrowing      L5-S1:  There is disc space narrowing  There is vacuum disc phenomenon  There is a bulging annulus  There is facet arthrosis  There is no significant canal stenosis  There is left-sided endplate spurring  There is mild left foraminal narrowing      PARASPINAL SOFT TISSUES:  There is atherosclerotic calcification      IMPRESSION:     Multilevel degenerative changes of the lumbar spine, as described above      Most notable level is at L4-L5 where multifactorial disease results in overall mild canal stenosis  Moderate to severe right foraminal narrowing is present at this level

## 2021-07-27 ENCOUNTER — OFFICE VISIT (OUTPATIENT)
Dept: FAMILY MEDICINE CLINIC | Facility: CLINIC | Age: 71
End: 2021-07-27
Payer: MEDICARE

## 2021-07-27 VITALS
BODY MASS INDEX: 36.52 KG/M2 | SYSTOLIC BLOOD PRESSURE: 118 MMHG | HEART RATE: 57 BPM | TEMPERATURE: 97.5 F | OXYGEN SATURATION: 97 % | WEIGHT: 186 LBS | HEIGHT: 60 IN | DIASTOLIC BLOOD PRESSURE: 78 MMHG

## 2021-07-27 DIAGNOSIS — L30.8 INTERSTITIAL GRANULOMATOUS DERMATITIS: Primary | ICD-10-CM

## 2021-07-27 DIAGNOSIS — R53.83 FATIGUE, UNSPECIFIED TYPE: ICD-10-CM

## 2021-07-27 PROCEDURE — 99213 OFFICE O/P EST LOW 20 MIN: CPT | Performed by: FAMILY MEDICINE

## 2021-07-27 PROCEDURE — 93000 ELECTROCARDIOGRAM COMPLETE: CPT | Performed by: FAMILY MEDICINE

## 2021-07-27 NOTE — PROGRESS NOTES
Patient ID: Santa Contreras is a 73 y o  female  HPI: 73 y  o female presents for an EKG  She had a bipsy done at dermatology office which showed features of sarcoid so she was asked to come in for an EKG  So far labs , urine were normal   Her chest xray results are pending        SUBJECTIVE    Family History   Problem Relation Age of Onset    Atrial fibrillation Mother     Breast cancer Mother 76    Stroke Mother     Coronary artery disease Mother     Diabetes Mother     Pancreatitis Mother     Cancer Mother         Breast    Hyperlipidemia Mother     Pulmonary embolism Mother     Heart attack Father     Arthritis Father     Arthritis Family     Cancer Family     Endometrial cancer Maternal Grandmother 76    Cancer Maternal Grandmother     Prostate cancer Paternal Uncle 54    No Known Problems Sister     No Known Problems Paternal Grandmother     No Known Problems Paternal Grandfather     No Known Problems Maternal Aunt     No Known Problems Paternal Aunt     No Known Problems Paternal Aunt      Social History     Socioeconomic History    Marital status: /Civil Union     Spouse name: Not on file    Number of children: Not on file    Years of education: Not on file    Highest education level: Not on file   Occupational History    Not on file   Tobacco Use    Smoking status: Never Smoker    Smokeless tobacco: Never Used   Vaping Use    Vaping Use: Never used   Substance and Sexual Activity    Alcohol use: Yes     Comment: Rarely drink alcohol    Drug use: Never    Sexual activity: Yes     Partners: Male     Birth control/protection: Female Sterilization     Comment: Hysterectomy 1986   Other Topics Concern    Not on file   Social History Narrative    Drinks coffee     Social Determinants of Health     Financial Resource Strain:     Difficulty of Paying Living Expenses:    Food Insecurity:     Worried About Running Out of Food in the Last Year:     [address] in the Last Year:    Transportation Needs:     Lack of Transportation (Medical):      Lack of Transportation (Non-Medical):    Physical Activity:     Days of Exercise per Week:     Minutes of Exercise per Session:    Stress:     Feeling of Stress :    Social Connections:     Frequency of Communication with Friends and Family:     Frequency of Social Gatherings with Friends and Family:     Attends [employer] Services:     Active Member of Clubs or Organizations:     Attends Club or Organization Meetings:     Marital Status:    Intimate Partner Violence:     Fear of Current or Ex-Partner:     Emotionally Abused:     Physically Abused:     Sexually Abused:      Past Medical History:   Diagnosis Date    Arthritis     Depression     Endometriosis     GERD (gastroesophageal reflux disease)     Hyperlipidemia     Hypertension     Nondisplaced fracture of fifth metatarsal bone, right foot, initial encounter for closed fracture 9/24/2018    Osteopenia     Pancreatitis 06/2017    Pneumonia     Right hip pain 12/4/2018    S/P hip replacement, right 7/31/2018    Patient progressing well after right hip replacement     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    Trochanteric bursitis of right hip 2/12/2019     Past Surgical History:   Procedure Laterality Date    ABDOMINAL SURGERY      endometriosis     APPENDECTOMY      BREAST BIOPSY Right 1985    benign    CARPAL TUNNEL RELEASE      COLONOSCOPY      HAND SURGERY      HIP SURGERY      HYSTERECTOMY Bilateral 1985    JOINT REPLACEMENT Bilateral     KNEE    JOINT REPLACEMENT Left     HIP    OOPHORECTOMY Bilateral 1985    [place] ARTHROSCOPY SHOULDER SURGICAL BICEPS TENODESIS Right 5/10/2017    Procedure: ARTHROSCOPIC BICEPS TENODESIS WITH ROTATOR CUFF REPAIR ;  Surgeon: Neva Gomez MD;  [facility];  Service: Orthopedics    [facility] Right 5/10/2017    Procedure: SHOULDER ARTHROSCOPY SUBACROMIAL DECOMPRESSION;  Surgeon: Betty Sheriff MD;  [facility];  Service: Orthopedics    [place] TOTAL HIP ARTHROPLASTY Left 3/12/2018    Procedure: ARTHROPLASTY HIP TOTAL;  Surgeon: Everardo Hicks MD;  [facility];  Service: Orthopedics    [place] TOTAL HIP ARTHROPLASTY Right 7/30/2018    Procedure: RIGHT TOTAL HIP ARTHROPLASTY;  Surgeon: Everardo Hicks MD;  [facility];  Service: Orthopedics    TONSILLECTOMY       Allergies   Allergen Reactions    Hydromorphone Hcl Itching    Percocet [Oxycodone-Acetaminophen] GI Intolerance    Omeprazole Diarrhea    Sulfamethoxazole-Trimethoprim Rash    Tizanidine Diarrhea       Current Outpatient Medications:     acetaminophen (TYLENOL) 500 mg tablet, Take 500 mg by mouth every 6 (six) hours as needed for mild pain, Disp: , Rfl:     atorvastatin (LIPITOR) 20 mg tablet, TAKE 1 TABLET BY MOUTH  DAILY, Disp: 90 tablet, Rfl: 3    calcium carbonate (TUMS) 500 mg chewable tablet, Chew 1 tablet daily, Disp: , Rfl:     calcium-vitamin D (OSCAL) 250-125 MG-UNIT per tablet, Take 1 tablet by mouth daily, Disp: , Rfl:     chlorzoxazone (PARAFON FORTE) 500 mg tablet, Take 1 5 tablets (750 mg total) by mouth 2 (two) times a day as needed for muscle spasms, Disp: 90 tablet, Rfl: 5    Cholecalciferol (VITAMIN D) 2000 units tablet, Take 2,000 Units by mouth daily, Disp: , Rfl:     FLUoxetine (PROzac) 20 mg capsule, TAKE 1 CAPSULE BY MOUTH  DAILY, Disp: 90 capsule, Rfl: 3    gabapentin (NEURONTIN) 300 mg capsule, Take 1 capsule (300 mg total) by mouth 3 (three) times a day, Disp: 270 capsule, Rfl: 3    hydrochlorothiazide (HYDRODIURIL) 25 mg tablet, TAKE 1 TABLET BY MOUTH  DAILY, Disp: 90 tablet, Rfl: 3    ketoconazole (NIZORAL) 2 % cream, Apply topically daily To face for seborrhea, Disp: 30 g, Rfl: 2    metoprolol succinate (TOPROL-XL) 50 mg 24 hr tablet, Take 1 tablet (50 mg total) by mouth daily, Disp: 90 tablet, Rfl: 3    Multiple Vitamins-Minerals (CENTRUM SILVER ULTRA WOMENS PO), Take by mouth, Disp: , Rfl:     Omega-3 1000 MG CAPS, Take by mouth daily , Disp: , Rfl:     triamcinolone (KENALOG) 0 5 % cream, Apply topically 2 (two) times a day, Disp: 45 g, Rfl: 3    Review of Systems  Constitutional:     Denies fever, chills ,fatigue ,weakness ,weight loss, weight gain     ENT: Denies earache ,loss of hearing ,nosebleed, nasal discharge,nasal congestion ,sore throat ,hoarseness  Pulmonary: Denies shortness of breath ,cough  ,dyspnea on exertion, orthopnea  ,PND   Cardiovascular:  Denies bradycardia , tachycardia  ,palpations, lower extremity edema leg, claudication  Breast:  Denies new or changing breast lumps ,nipple discharge ,nipple changes  Abdomen:  Denies abdominal pain , anorexia , indigestion, nausea, vomiting, constipation, diarrhea  Musculoskeletal: Denies myalgias, arthralgias, joint swelling, joint stiffness , limb pain, limb swelling  Gu: denies dysuria, polyuria  Skin: Denies skin rash, skin lesion, skin wound, itching, dry skin  Neuro: Denies headache, numbness, tingling, confusion, loss of consciousness, dizziness, vertigo  Psychiatric: Denies feelings of depression, suicidal ideation, anxiety, sleep disturbances    OBJECTIVE  /78   Pulse 57   Temp 97 5 °F (36 4 °C)   Ht 5' (1 524 m)   Wt 84 4 kg (186 lb)   LMP  (LMP Unknown) Comment: hysterectomy  SpO2 97%   BMI 36 33 kg/m²   Constitutional:   NAD, well appearing and well nourished      ENT:   Conjunctiva and lids: no injection, edema, or discharge     Pupils and iris: CORNELIA bilaterally    External inspection of ears and nose: normal without deformities or discharge  Otoscopic exam: Canals patent without erythema  Nasal mucosa, septum and turbinates: Normal or edema or discharge         Oropharynx:  Moist mucosa, normal tongue and tonsils without lesions  No erythema        Pulmonary:Respiratory effort normal rate and rhythm, no increased work of breathing   Auscultation of lungs:  Clear bilaterally with no adventitious breath sounds       Cardiovascular: regular rate and rhythm, S1 and S2, no murmur, no edema and/or varicosities of LE      Abdomen: Soft and non-distended     Positive bowel sounds      No heptomegaly or splenomegaly      Gu: no suprapubic tenderness or CVA tenderness, no urethral discharge  Lymphatic:  No anterior or posterior cervical lymphadenopathy         Musculoskeletal:  Gait and station: Normal gait      Digits and nails normal without clubbing or cyanosis       Inspection/palpation of joints, bones, and muscles:  No joint tenderness, swelling, full active and passive range of motion       Skin: Normal skin turgor and no rashes      Neuro:      Normal reflexes     Psych:   alert and oriented to person, place and time     normal mood and affect       Assessment/Plan:Diagnoses and all orders for this visit:    Interstitial granulomatous dermatitis  Comments:  EKG was within normal limits      Orders:  -     POCT ECG

## 2021-07-28 LAB
GAMMA INTERFERON BACKGROUND BLD IA-ACNC: 0.05 IU/ML
M TB IFN-G BLD-IMP: NEGATIVE
M TB IFN-G CD4+ BCKGRND COR BLD-ACNC: 0.03 IU/ML
M TB IFN-G CD4+ BCKGRND COR BLD-ACNC: 0.03 IU/ML
MITOGEN IGNF BCKGRD COR BLD-ACNC: >10 IU/ML

## 2021-07-29 ENCOUNTER — OFFICE VISIT (OUTPATIENT)
Dept: DERMATOLOGY | Facility: CLINIC | Age: 71
End: 2021-07-29

## 2021-07-29 DIAGNOSIS — Z48.02 ENCOUNTER FOR REMOVAL OF SUTURES: Primary | ICD-10-CM

## 2021-07-29 PROCEDURE — RECHECK: Performed by: DERMATOLOGY

## 2021-07-29 NOTE — PROGRESS NOTES
Suture removal    Date/Time: 7/29/2021 9:47 AM  Performed by: Antoine Padilla RN  Authorized by: Danie Silvestre MD   Universal Protocol:  Consent: Verbal consent obtained  Consent given by: patient  Timeout called at: 7/29/2021 9:47 AM   Patient understanding: patient states understanding of the procedure being performed  Patient consent: the patient's understanding of the procedure matches consent given  Procedure consent: procedure consent matches procedure scheduled  Relevant documents: relevant documents present and verified  Test results: test results available and properly labeled  Site marked: the operative site was marked  Radiology Images displayed and confirmed  If images not available, report reviewed: imaging studies not available  Patient identity confirmed: verbally with patient        Patient location:  Clinic  Location:     Laterality:  Left    Location:  Upper extremity    Upper extremity location:  Arm    Arm location:  L upper arm  Procedure details: Tools used:  Suture removal kit    Wound appearance:  No sign(s) of infection, good wound healing and pink    Number of sutures removed:  1  Post-procedure details:     Post-removal:  Band-Aid applied (Vaseline applied )    Patient tolerance of procedure: Tolerated well, no immediate complications  Comments:      Patient had no further questions at this time

## 2021-08-02 ENCOUNTER — TELEPHONE (OUTPATIENT)
Dept: FAMILY MEDICINE CLINIC | Facility: CLINIC | Age: 71
End: 2021-08-02

## 2021-08-02 ENCOUNTER — TELEPHONE (OUTPATIENT)
Dept: DERMATOLOGY | Facility: CLINIC | Age: 71
End: 2021-08-02

## 2021-08-02 DIAGNOSIS — R42 VERTIGO: Primary | ICD-10-CM

## 2021-08-02 RX ORDER — PREDNISONE 10 MG/1
TABLET ORAL
Qty: 26 TABLET | Refills: 0 | Status: SHIPPED | OUTPATIENT
Start: 2021-08-02 | End: 2021-09-09 | Stop reason: ALTCHOICE

## 2021-08-02 NOTE — TELEPHONE ENCOUNTER
Pt aware, states she is being weaned off of gabapentin as well she is down to 100mg daily and will be stopping to go onto lyrica, Unsure if this could be the cause as well she just wanted to make you aware

## 2021-08-02 NOTE — TELEPHONE ENCOUNTER
I called the patient per Dr Rocio Macias to reschedule her appointment for a follow up as she was incorrectly scheduled  I left patient a message giving her a date and two time slots to choose from  I also gave her the option to see a different provider if she wanted to still be seen the week she was scheduled originally; September 13,2021

## 2021-08-02 NOTE — TELEPHONE ENCOUNTER
Pt called this morning with vertigo she's had since yesterday  Taking meclizine  Not working  Please advise  Thank you

## 2021-08-04 NOTE — RESULT ENCOUNTER NOTE
CHEST XRAY/ LABORATORY EVALUATION FOR SARCOIDOSIS RESULT NOTE    Results reviewed by ordering physician  Sent my chart message with results, CXR normal no indication of active sarcoid  Other labs also normal   EKG showed RBB  Recommend treatment with ILTA to cutaneous lesion      Instructions for Clinical Derm Team:   (remember to route Result Note to appropriate staff):    None, already has follow up appointment                 Keegan Hooker MD  [phone] 7/28/2021     Narrative & Impression  CHEST      INDICATION:   L92 9: Granulomatous disorder of the skin and subcutaneous tissue, unspecified  Evaluate for sarcoidosis      COMPARISON:  None     EXAM PERFORMED/VIEWS:  XR CHEST PA & LATERAL        FINDINGS:     Cardiomediastinal silhouette appears unremarkable      Benign linear atelectasis or scar in the right middle lobe  No acute disease  No effusion or pneumothorax      Osseous structures appear within normal limits for patient age      IMPRESSION:     No acute cardiopulmonary disease      Nothing to suggest sarcoidosis

## 2021-08-25 ENCOUNTER — PROCEDURE VISIT (OUTPATIENT)
Dept: PAIN MEDICINE | Facility: CLINIC | Age: 71
End: 2021-08-25
Payer: MEDICARE

## 2021-08-25 VITALS
HEIGHT: 60 IN | HEART RATE: 67 BPM | DIASTOLIC BLOOD PRESSURE: 83 MMHG | SYSTOLIC BLOOD PRESSURE: 149 MMHG | WEIGHT: 192 LBS | BODY MASS INDEX: 37.69 KG/M2 | RESPIRATION RATE: 16 BRPM

## 2021-08-25 DIAGNOSIS — M79.18 MYOFASCIAL PAIN SYNDROME: Primary | ICD-10-CM

## 2021-08-25 PROCEDURE — 20552 NJX 1/MLT TRIGGER POINT 1/2: CPT | Performed by: ANESTHESIOLOGY

## 2021-08-25 RX ORDER — BUPIVACAINE HYDROCHLORIDE 2.5 MG/ML
10 INJECTION, SOLUTION EPIDURAL; INFILTRATION; INTRACAUDAL ONCE
Status: COMPLETED | OUTPATIENT
Start: 2021-08-25 | End: 2021-08-25

## 2021-08-25 RX ORDER — CYCLOBENZAPRINE HCL 10 MG
10 TABLET ORAL
Qty: 30 TABLET | Refills: 2 | Status: SHIPPED | OUTPATIENT
Start: 2021-08-25 | End: 2021-09-13 | Stop reason: SDUPTHER

## 2021-08-25 RX ADMIN — BUPIVACAINE HYDROCHLORIDE 10 ML: 2.5 INJECTION, SOLUTION EPIDURAL; INFILTRATION; INTRACAUDAL at 07:36

## 2021-08-25 NOTE — PROGRESS NOTES
Pre-procedure Diagnosis:   Myofascial pain  Post-procedure Diagnosis:   Myofascial pain  Operation Title(s):  Trigger point injections into left lower paraspinal x2, right lumbar paraspinal x2  Attending Surgeon:   Megan Dkues MD  Anesthesia:   Local    Indications: The patient is a 73y o  year-old female with a diagnosis of myofascial pain  The patient's history and physical exam were reviewed  The risks, benefits and alternatives to the procedure were discussed, and all questions were answered to the patient's satisfaction  The patient agreed to proceed, and written informed consent was obtained  Procedure in Detail: The patient was brought into the exam room and placed in the sitting position on the exam room chair with the head flexed on a pillow  Trigger points were identified in the:  Left lumbar paraspinal x2, right lumbar paraspinal x2    Areas were cleansed with alcohol  Then, using a dry needling technique, each trigger point was injected with a 1 5 inch 25 gauge needle and 1 mL 0 25% bupivacaine     Disposition: The patient tolerated the procedure well and there were no apparent complications  The patient was given written discharge instructions for the procedure

## 2021-09-01 ENCOUNTER — TELEPHONE (OUTPATIENT)
Dept: PAIN MEDICINE | Facility: MEDICAL CENTER | Age: 71
End: 2021-09-01

## 2021-09-01 DIAGNOSIS — M51.16 INTERVERTEBRAL DISC DISORDER WITH RADICULOPATHY OF LUMBAR REGION: Primary | ICD-10-CM

## 2021-09-01 NOTE — TELEPHONE ENCOUNTER
Pt called stating that the injections did not help she is still in a great deal of pain       Pt can be reached at [phone]

## 2021-09-01 NOTE — TELEPHONE ENCOUNTER
S/w pt, advised of the same  Pt verbalized understanding and agreeable to injection  Please place order, thank you

## 2021-09-01 NOTE — TELEPHONE ENCOUNTER
Please have her increase the cyclobenzaprine 10mg to BID  Would also consider performing an epidural steroid injection    She had those before that weren't that helpful but might be more helpful if she's inflamed

## 2021-09-01 NOTE — TELEPHONE ENCOUNTER
S/w pt, states she has not gotten any relief since TPI on 8/25  She is taking lyrica but notes no relief, states she does not want to take tramadol because she is concerned for the risk of addiction  Pt states she is taking flexeril as prescribed at recent OV and it is helpful, but typically her pain is 6-8/10 throughout the day  Pt has OV on 9/14, any further recommendations in the meantime? Pt also asked about a sports medicine physical therapist to help with scar tissue relating to previous injuries

## 2021-09-03 NOTE — TELEPHONE ENCOUNTER
Left message for patient to call me back directly to schedule procedure at [address] in [place]

## 2021-09-07 NOTE — TELEPHONE ENCOUNTER
Pt informed that FQ is aware that her covid test was neg so it is okay to procedure with JOSE on 9/9

## 2021-09-07 NOTE — TELEPHONE ENCOUNTER
Patient  is scheduled for her procedure on 9/9/21  She was exposed to someone at her [employer] over the wknd w/Covid 19  She got tested yesterday & awaiting her results  She would like to know if she must reschedule   Please advise, luther    Call back# [phone]

## 2021-09-07 NOTE — TELEPHONE ENCOUNTER
DENAE  S/w pt, states she is fully vaccinated and has no symptoms of covid at this time  The individual she was exposed to that has COVID is also fully vaccinated  Pt was tested at [facility] yesterday, she was told to expect her results in 1-2 days  Advised pt to cb when results are received, if not received by time of scheduled inj please call office back anyway  Pt understood

## 2021-09-08 ENCOUNTER — TELEPHONE (OUTPATIENT)
Dept: DERMATOLOGY | Facility: CLINIC | Age: 71
End: 2021-09-08

## 2021-09-08 NOTE — TELEPHONE ENCOUNTER
Called pt to inform her that I need to reschedule her with physician and not the resident and that I will call her when Dr Don Bill' [place] schedule opens for the wk of 9/20

## 2021-09-09 ENCOUNTER — HOSPITAL ENCOUNTER (OUTPATIENT)
Dept: RADIOLOGY | Facility: CLINIC | Age: 71
Discharge: HOME | End: 2021-09-09
Attending: ANESTHESIOLOGY | Admitting: ANESTHESIOLOGY
Payer: MEDICARE

## 2021-09-09 VITALS
DIASTOLIC BLOOD PRESSURE: 81 MMHG | OXYGEN SATURATION: 97 % | SYSTOLIC BLOOD PRESSURE: 139 MMHG | HEART RATE: 63 BPM | TEMPERATURE: 97.7 F | RESPIRATION RATE: 20 BRPM

## 2021-09-09 DIAGNOSIS — M51.16 INTERVERTEBRAL DISC DISORDER WITH RADICULOPATHY OF LUMBAR REGION: ICD-10-CM

## 2021-09-09 PROCEDURE — 62323 NJX INTERLAMINAR LMBR/SAC: CPT | Performed by: ANESTHESIOLOGY

## 2021-09-09 RX ORDER — METHYLPREDNISOLONE ACETATE 80 MG/ML
80 INJECTION, SUSPENSION INTRA-ARTICULAR; INTRALESIONAL; INTRAMUSCULAR; PARENTERAL; SOFT TISSUE ONCE
Status: COMPLETED | OUTPATIENT
Start: 2021-09-09 | End: 2021-09-09

## 2021-09-09 RX ORDER — BUPIVACAINE HCL/PF 2.5 MG/ML
10 VIAL (ML) INJECTION ONCE
Status: COMPLETED | OUTPATIENT
Start: 2021-09-09 | End: 2021-09-09

## 2021-09-09 RX ADMIN — METHYLPREDNISOLONE ACETATE 80 MG: 80 INJECTION, SUSPENSION INTRA-ARTICULAR; INTRALESIONAL; INTRAMUSCULAR; PARENTERAL; SOFT TISSUE at 11:24

## 2021-09-09 RX ADMIN — BUPIVACAINE HYDROCHLORIDE 2 ML: 2.5 INJECTION, SOLUTION EPIDURAL; INFILTRATION; INTRACAUDAL at 11:24

## 2021-09-09 RX ADMIN — IOHEXOL 1 ML: 300 INJECTION, SOLUTION INTRAVENOUS at 11:24

## 2021-09-09 NOTE — H&P
History of Present Illness: The patient is a 73 y o  female who presents with complaints of lower back pain secondary to lumbar stenosis and is here today for a lumbar epidural steroid injection    Patient Active Problem List   Diagnosis    Incomplete tear of right rotator cuff    Hypertension    Hyperlipidemia    Depression    Primary osteoarthritis of left hip    Primary osteoarthritis of one hip, right    Renal insufficiency    Pain, joint, ankle and foot, right    Intervertebral disc disorder with radiculopathy of lumbar region    Lumbar spondylosis    Change in bowel habits    [facility] [address] )    Severe obesity (BMI 35 0-39  9) with comorbidity ([facility] [address] )       Past Medical History:   Diagnosis Date    Arthritis     Depression     Endometriosis     GERD (gastroesophageal reflux disease)     Hyperlipidemia     Hypertension     Nondisplaced fracture of fifth metatarsal bone, right foot, initial encounter for closed fracture 9/24/2018    Osteopenia     Pancreatitis 06/2017    Pneumonia     Right hip pain 12/4/2018    S/P hip replacement, right 7/31/2018    Patient progressing well after right hip replacement     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    Trochanteric bursitis of right hip 2/12/2019       Past Surgical History:   Procedure Laterality Date    ABDOMINAL SURGERY      endometriosis     APPENDECTOMY      BREAST BIOPSY Right 1985    benign    CARPAL TUNNEL RELEASE      COLONOSCOPY      HAND SURGERY      HIP SURGERY      HYSTERECTOMY Bilateral 1985    JOINT REPLACEMENT Bilateral     KNEE    JOINT REPLACEMENT Left     HIP    OOPHORECTOMY Bilateral 1985    PA ARTHROSCOPY SHOULDER SURGICAL BICEPS TENODESIS Right 5/10/2017    Procedure: ARTHROSCOPIC BICEPS TENODESIS WITH ROTATOR CUFF REPAIR ;  Surgeon: Stacy Huffman MD;  [facility];  Service: Orthopedics    PA MERRICK Torres Right 5/10/2017    Procedure: SHOULDER ARTHROSCOPY SUBACROMIAL DECOMPRESSION;  Surgeon: Betty Sheriff MD;  [facility];  Service: Orthopedics    AZ TOTAL HIP ARTHROPLASTY Left 3/12/2018    Procedure: ARTHROPLASTY HIP TOTAL;  Surgeon: Everardo Hicks MD;  [facility];  Service: Orthopedics    AZ TOTAL HIP ARTHROPLASTY Right 7/30/2018    Procedure: RIGHT TOTAL HIP ARTHROPLASTY;  Surgeon: Everardo Hicks MD;  [facility];  Service: Orthopedics    TONSILLECTOMY           Current Outpatient Medications:     acetaminophen (TYLENOL) 500 mg tablet, Take 500 mg by mouth every 6 (six) hours as needed for mild pain, Disp: , Rfl:     atorvastatin (LIPITOR) 20 mg tablet, TAKE 1 TABLET BY MOUTH  DAILY, Disp: 90 tablet, Rfl: 3    calcium carbonate (TUMS) 500 mg chewable tablet, Chew 1 tablet daily, Disp: , Rfl:     calcium-vitamin D (OSCAL) 250-125 MG-UNIT per tablet, Take 1 tablet by mouth daily, Disp: , Rfl:     Cholecalciferol (VITAMIN D) 2000 units tablet, Take 2,000 Units by mouth daily, Disp: , Rfl:     cyclobenzaprine (FLEXERIL) 10 mg tablet, Take 1 tablet (10 mg total) by mouth daily at bedtime as needed for muscle spasms, Disp: 30 tablet, Rfl: 2    FLUoxetine (PROzac) 20 mg capsule, TAKE 1 CAPSULE BY MOUTH  DAILY, Disp: 90 capsule, Rfl: 3    hydrochlorothiazide (HYDRODIURIL) 25 mg tablet, TAKE 1 TABLET BY MOUTH  DAILY, Disp: 90 tablet, Rfl: 3    ketoconazole (NIZORAL) 2 % cream, Apply topically daily To face for seborrhea, Disp: 30 g, Rfl: 2    metoprolol succinate (TOPROL-XL) 50 mg 24 hr tablet, Take 1 tablet (50 mg total) by mouth daily, Disp: 90 tablet, Rfl: 3    Multiple Vitamins-Minerals (CENTRUM SILVER ULTRA WOMENS PO), Take by mouth, Disp: , Rfl:     Omega-3 1000 MG CAPS, Take by mouth daily , Disp: , Rfl:     pregabalin (LYRICA) 75 mg capsule, Take 1 tab PO TID , Disp: 90 capsule, Rfl: 0    triamcinolone (KENALOG) 0 5 % cream, Apply topically 2 (two) times a day, Disp: 45 g, Rfl: 3    Current Facility-Administered Medications:     bupivacaine (PF) (MARCAINE) 0 25 % injection 10 mL, 10 mL, Epidural, Once, Lizzy Phelan MD    iohexol (OMNIPAQUE) 300 mg/mL injection 50 mL, 50 mL, Epidural, Once, Lizzy Phelan MD    methylPREDNISolone acetate (DEPO-MEDROL) injection 80 mg, 80 mg, Epidural, Once, Lizzy Phelan MD    Allergies   Allergen Reactions    Hydromorphone Hcl Itching    Percocet [Oxycodone-Acetaminophen] GI Intolerance    Omeprazole Diarrhea    Sulfamethoxazole-Trimethoprim Rash    Tizanidine Diarrhea       Physical Exam:   Vitals:    09/09/21 1040   BP: 126/82   Pulse: 64   Resp: 20   Temp: 97 7 °F (36 5 °C)   SpO2: 98%     General: Awake, Alert, Oriented x 3, Mood and affect appropriate  Respiratory: Respirations even and unlabored  Cardiovascular: Peripheral pulses intact; no edema  Musculoskeletal Exam:  Lower back tenderness    ASA Score: 3    Patient/Chart Verification  Patient ID Verified: Verbal  Consents Confirmed: To be obtained in the Pre-Procedure area  Interval H&P(within 24 hr) Complete (required for Outpatients and Surgery Admit only): To be obtained in the Pre-Procedure area  Allergies Reviewed: Yes  Anticoag/NSAID held?: NA  Currently on antibiotics?: No    Assessment:   1   Intervertebral disc disorder with radiculopathy of lumbar region        Plan: L4 LESI

## 2021-09-09 NOTE — DISCHARGE INSTR - LAB
Epidural Steroid Injection   WHAT YOU NEED TO KNOW:   An epidural steroid injection (JOSE) is a procedure to inject steroid medicine into the epidural space  The epidural space is between your spinal cord and vertebrae  Steroids reduce inflammation and fluid buildup in your spine that may be causing pain  You may be given pain medicine along with the steroids  ACTIVITY  · Do not drive or operate machinery today  · No strenuous activity today - bending, lifting, etc   · You may resume normal activites starting tomorrow - start slowly and as tolerated  · You may shower today, but no tub baths or hot tubs  · You may have numbness for several hours from the local anesthetic  Please use caution and common sense, especially with weight-bearing activities  CARE OF THE INJECTION SITE  · If you have soreness or pain, apply ice to the area today (20 minutes on/20 minutes off)  · Starting tomorrow, you may use warm, moist heat or ice if needed  · You may have an increase or change in your discomfort for 36-48 hours after your treatment  · Apply ice and continue with any pain medication you have been prescribed  · Notify the [facility] if you have any of the following: redness, drainage, swelling, headache, stiff neck or fever above 100°F     SPECIAL INSTRUCTIONS  · Our office will contact you in approximately 7 days for a progress report  MEDICATIONS  · Continue to take all routine medications  · Our office may have instructed you to hold some medications  As no general anesthesia was used in today's procedure, you should not experience any side effects related to anesthesia  If you have a problem specifically related to your procedure, please call our office at [phone]  Problems not related to your procedure should be directed to your primary care physician

## 2021-09-13 ENCOUNTER — TELEPHONE (OUTPATIENT)
Dept: PAIN MEDICINE | Facility: CLINIC | Age: 71
End: 2021-09-13

## 2021-09-13 NOTE — TELEPHONE ENCOUNTER
Patient   [phone]  Dr BOJORQUEZ    Patient is calling in requesting a new script of cyclobenzaprine (FLEXERIL due to Dr Ashli Earl increasing her intake for 1 pill a day to 2 pills a day   Pt contacted Call Center requested refill of their medication          Medication Name:   pregabalin (LYRICA        Dosage of Med: 75 mg       Frequency of Med: 3xs a day       Remaining Medication: 15 maybe      Pharmacy and Location:  [id number]

## 2021-09-13 NOTE — TELEPHONE ENCOUNTER
Pt needs RF for flexeril b/c FQ advised her to inc it to twice a day  Pt had inj last Thurs  Pt needs RF for the lyrica 75 mg TID, only s/e is dry mouth, she's not sure how much it's helping but wants to continue to take the same way  Pls send both RF to [facility] on file  Last ov 7/26/21 w/ NM

## 2021-09-14 ENCOUNTER — OFFICE VISIT (OUTPATIENT)
Dept: DERMATOLOGY | Facility: CLINIC | Age: 71
End: 2021-09-14
Payer: MEDICARE

## 2021-09-14 VITALS — WEIGHT: 196.6 LBS | BODY MASS INDEX: 38.6 KG/M2 | HEIGHT: 60 IN | TEMPERATURE: 98.1 F

## 2021-09-14 DIAGNOSIS — F39 AFFECTIVE DISORDER (HCC): ICD-10-CM

## 2021-09-14 DIAGNOSIS — L92.9 GRANULOMA OF SKIN: ICD-10-CM

## 2021-09-14 DIAGNOSIS — I10 ESSENTIAL HYPERTENSION: ICD-10-CM

## 2021-09-14 DIAGNOSIS — L21.9 SEBORRHEIC DERMATITIS: Primary | ICD-10-CM

## 2021-09-14 PROCEDURE — 99213 OFFICE O/P EST LOW 20 MIN: CPT | Performed by: DERMATOLOGY

## 2021-09-14 RX ORDER — HYDROCHLOROTHIAZIDE 25 MG/1
TABLET ORAL
Qty: 90 TABLET | Refills: 3 | Status: SHIPPED | OUTPATIENT
Start: 2021-09-14

## 2021-09-14 RX ORDER — FLUOXETINE HYDROCHLORIDE 20 MG/1
CAPSULE ORAL
Qty: 90 CAPSULE | Refills: 3 | Status: SHIPPED | OUTPATIENT
Start: 2021-09-14

## 2021-09-14 NOTE — PROGRESS NOTES
[facility] Follow Up Note    Patient Name: Dorene Shaver  Encounter Date: 9/14/2021    Today's Chief Concerns:  [employer] Concern #1:  Left upper arm follow up    Current Medications:    Current Outpatient Medications:     acetaminophen (TYLENOL) 500 mg tablet, Take 500 mg by mouth every 6 (six) hours as needed for mild pain, Disp: , Rfl:     atorvastatin (LIPITOR) 20 mg tablet, TAKE 1 TABLET BY MOUTH  DAILY, Disp: 90 tablet, Rfl: 3    calcium carbonate (TUMS) 500 mg chewable tablet, Chew 1 tablet daily, Disp: , Rfl:     calcium-vitamin D (OSCAL) 250-125 MG-UNIT per tablet, Take 1 tablet by mouth daily, Disp: , Rfl:     Cholecalciferol (VITAMIN D) 2000 units tablet, Take 2,000 Units by mouth daily, Disp: , Rfl:     cyclobenzaprine (FLEXERIL) 10 mg tablet, Take 1 tablet (10 mg total) by mouth 2 (two) times a day as needed for muscle spasms, Disp: 60 tablet, Rfl: 2    FLUoxetine (PROzac) 20 mg capsule, TAKE 1 CAPSULE BY MOUTH  DAILY, Disp: 90 capsule, Rfl: 3    hydrochlorothiazide (HYDRODIURIL) 25 mg tablet, TAKE 1 TABLET BY MOUTH  DAILY, Disp: 90 tablet, Rfl: 3    ketoconazole (NIZORAL) 2 % cream, Apply topically daily To face for seborrhea, Disp: 30 g, Rfl: 2    metoprolol succinate (TOPROL-XL) 50 mg 24 hr tablet, Take 1 tablet (50 mg total) by mouth daily, Disp: 90 tablet, Rfl: 3    Multiple Vitamins-Minerals (CENTRUM SILVER ULTRA WOMENS PO), Take by mouth, Disp: , Rfl:     Omega-3 1000 MG CAPS, Take by mouth daily , Disp: , Rfl:     pregabalin (LYRICA) 75 mg capsule, Take 1 tab PO TID , Disp: 90 capsule, Rfl: 2    triamcinolone (KENALOG) 0 5 % cream, Apply topically 2 (two) times a day, Disp: 45 g, Rfl: 3    CONSTITUTIONAL:   Vitals:    09/14/21 1626   Temp: 98 1 °F (36 7 °C)   TempSrc: Temporal   Weight: 89 2 kg (196 lb 9 6 oz)   Height: 5' (1 524 m)       Specific Alerts:    Have you been seen by a [facility] D[facility]atologist in the last 3 years?   YES    Are you pregnant or planning to become pregnant? No    Are you currently or planning to be nursing or breast feeding? No    Allergies   Allergen Reactions    Hydromorphone Hcl Itching    Percocet [Oxycodone-Acetaminophen] GI Intolerance    Omeprazole Diarrhea    Sulfamethoxazole-Trimethoprim Rash    Tizanidine Diarrhea       May we call your Preferred Phone number to discuss your specific medical information? YES    May we leave a detailed message that includes your specific medical information? YES    Have you traveled outside of the [facility] in the past 3 months? No    Do you currently have a pacemaker or defibrillator? No    Do you have any artificial heart valves, joints, plates, screws, rods, stents, pins, etc? YES   - If Yes, were any placed within the last 2 years? New joint placed > 2 years ago    Do you require any medications prior to a surgical procedure? No    Are you taking any medications that cause you to bleed more easily ("blood thinners") No    Have you ever experienced a rapid heartbeat with epinephrine? No    Have you ever been treated with "gold" (gold sodium thiomalate) therapy? No    [facility] can help with wrinkles, "laugh lines," facial volume loss, "double chin," "love handles," age spots, and more  Are you interested in learning today about some of the skin enhancement procedures that we offer? (If Yes, please provide more detail) No    Review of Systems:  Have you recently had or currently have any of the following?     · Fever or chills: No  · Night Sweats: No  · Headaches: No  · Weight Gain: No  · Weight Loss: No  · Blurry Vision: No  · Nausea: No  · Vomiting: No  · Diarrhea: No  · Blood in Stool: No  · Abdominal Pain: No  · Itchy Skin: No  · Painful Joints: No  · Swollen Joints: No  · Muscle Pain: No  · Irregular Mole: No  · Sun Burn: No  · Dry Skin: YES  · Skin Color Changes: No  · Scar or Keloid: No  · Cold Sores/Fever Blisters: No  · Bacterial Infections/MRSA: No  · Anxiety: No  · Depression: No  · Suicidal or Homicidal Thoughts: No      PSYCH: Normal mood and affect  EYES: Normal conjunctiva  ENT: Normal lips and oral mucosa  CARDIOVASCULAR: No edema  RESPIRATORY: Normal respirations  HEME/LYMPH/IMMUNO:  No regional lymphadenopathy except as noted below in ASSESSMENT AND PLAN BY DIAGNOSIS    FULL ORGAN SYSTEM SKIN EXAM (SKIN)   Face Normal except as noted below in Assessment   Right Arm Normal except as noted below in Assessment       GRANULOMATOUS DERMATITIS    Physical Exam:   Anatomic Location and Morphological Description:  o Left upper arm; light pink annular plaque without scale   Pertinent Positives:   Pertinent Negatives: Additional History of Present Condition:  Biopsy taken of the left upper arm  Patient was able to follow up with ophthalmology and PCP  All tests were WNL with exception of some abnormalities on the chest xray, possibly results from [place]  Patient pleased with healing of biopsy site and seeing overall improvement with the rash  Assessment and Plan:  Based on a thorough discussion of this condition and the management approach to it (including a comprehensive discussion of the known risks, side effects and potential benefits of treatment), the patient (family) agrees to implement the following specific plan:   Discussed this condition is most likely self resolving; no clinical evidence of granulomatous dermatitis today   Based on today's appearance, no further treatment needed      SEBORRHEIC DERMATITIS  Suspicious for possible macular atrophic Actinic Keratosis    Physical Exam:   Anatomic Location Affected:  Eyebrows, bridge of nose, temple    Morphological Description:  Erythematous patches with some scale    Pertinent Positives:   Pertinent Negatives: Additional History of Present Condition:  Patient has been using the ketoconazole 2% cream  Patient does not feel the cream has been helping much       Assessment and Plan:  Based on a thorough discussion of this condition and the management approach to it (including a comprehensive discussion of the known risks, side effects and potential benefits of treatment), the patient (family) agrees to implement the following specific plan:   Apply hydrocortisone 2 5% topically once daily to areas affected for no more than 14 days   Follow up to evaluate improvement and reassess for actinic keratoses     Seborrheic Dermatitis   Seborrheic dermatitis is a common, chronic or relapsing form of eczema/dermatitis that mainly affects the sebaceous, gland-rich regions of the scalp, face, and trunk  There are infantile and adult forms of seborrhoeic dermatitis  It is sometimes associated with psoriasis and, in that clinical scenario, may be referred to as "sebo-psoriasis "  Seborrheic dermatitis is also known as "seborrheic eczema "  Dandruff (also called "pityriasis capitis") is an uninflamed form of seborrhoeic dermatitis  Dandruff presents as bran-like scaly patches scattered within hair-bearing areas of the scalp  In an infant, this condition may be referred to as "cradle cap "  The cause of seborrheic dermatitis is not completely understood  It is associated with proliferation of various species of the skin commensal Malassezia, in its yeast (non-pathogenic) form  Its metabolites (such as the fatty acids oleic acid, malssezin, and indole-3-carbaldehyde) may cause an inflammatory reaction  Differences in skin barrier lipid content and function may account for individual presentations  Infantile Seborrheic Dermatitis  Infantile seborrheic dermatitis affects babies under the age of 1 months and usually resolves by 1012 months of age  Infantile seborrheic dermatitis causes "cradle cap" (diffuse, greasy scaling on scalp)  The rash may spread to affect armpit and groin folds (a type of "napkin dermatitis")  There may be associated salmon-pink colored patches that may flake or peel    The rash in this case is usually not especially itchy, so the baby often appears undisturbed by the rash, even when more generalized  Adult Seborrheic Dermatitis  Adult seborrheic dermatitis tends to begin in late adolescence; prevalence is greatest in young adults and in the elderly  It is more common in males than in females  The following factors are sometimes associated with severe adult seborrheic dermatitis:   Oily skin   Familial tendency to seborrhoeic dermatitis or a family history of psoriasis   Immunosuppression: organ transplant recipient, human immunodeficiency virus (HIV) infection and patients with lymphoma   Neurological and psychiatric diseases: Parkinson disease, tardive dyskinesia, depression, epilepsy, facial nerve palsy, spinal cord injury and congenital disorders such as Down syndrome   Treatment for psoriasis with psoralen and ultraviolet A (PUVA) therapy   Lack of sleep   Stressful events  In adults, seborrheic dermatitis may typically affect the scalp, face (creases around the nose, behind ears, within eyebrows) and upper trunk  Typical clinical features include:   Winter flares, improving in summer following sun exposure   Minimal itch most of the time   Combination oily and dry mid-facial skin   Ill-defined localized scaly patches or diffuse scale in the scalp   Blepharitis; scaly red eyelid margins   [place]-pink, thin, scaly, and ill-defined plaques in skin folds on both sides of the face   Petal or ring-shaped flaky patches on hair-line and on anterior chest   Rash in armpits, under the breasts, in the groin folds and genital creases   Superficial folliculitis (inflamed hair follicles) on cheeks and upper trunk    Seborrheic dermatitis is diagnosed by its clinical appearance and behavior  Skin biopsy may be helpful but is rarely necessary to make this diagnosis                Scribe Attestation    I,:  Aleksandra Pate am acting as a scribe while in the presence of the attending physician :       I,:  Veronica Plaza MD personally performed the services described in this documentation    as scribed in my presence :         Jared Redding, DO

## 2021-09-14 NOTE — PATIENT INSTRUCTIONS
SEBORRHEIC DERMATITIS  Suspicious of Actinic Keratosis  Assessment and Plan:  Based on a thorough discussion of this condition and the management approach to it (including a comprehensive discussion of the known risks, side effects and potential benefits of treatment), the patient (family) agrees to implement the following specific plan:   Apply topically once daily to bridge of nose, eyebrows, and right temple   Follow up to evaluate improvement      GRANULOMATOUS DERMATITIS  Assessment and Plan:  Based on a thorough discussion of this condition and the management approach to it (including a comprehensive discussion of the known risks, side effects and potential benefits of treatment), the patient (family) agrees to implement the following specific plan:   Discussed self resolving issue   Based on today's appearance, no further treatment needed

## 2021-09-16 ENCOUNTER — TELEPHONE (OUTPATIENT)
Dept: PAIN MEDICINE | Facility: CLINIC | Age: 71
End: 2021-09-16

## 2021-10-04 ENCOUNTER — TELEPHONE (OUTPATIENT)
Dept: PAIN MEDICINE | Facility: CLINIC | Age: 71
End: 2021-10-04

## 2021-10-08 ENCOUNTER — TELEPHONE (OUTPATIENT)
Dept: RADIOLOGY | Facility: CLINIC | Age: 71
End: 2021-10-08

## 2021-10-12 ENCOUNTER — OFFICE VISIT (OUTPATIENT)
Dept: DERMATOLOGY | Facility: CLINIC | Age: 71
End: 2021-10-12
Payer: MEDICARE

## 2021-10-12 VITALS — TEMPERATURE: 98.9 F | WEIGHT: 203 LBS | BODY MASS INDEX: 39.85 KG/M2 | HEIGHT: 60 IN

## 2021-10-12 DIAGNOSIS — L57.0 KERATOSIS, ACTINIC: Primary | ICD-10-CM

## 2021-10-12 DIAGNOSIS — L57.8 ACTINIC SKIN DAMAGE: ICD-10-CM

## 2021-10-12 PROCEDURE — 99213 OFFICE O/P EST LOW 20 MIN: CPT | Performed by: DERMATOLOGY

## 2021-10-12 RX ORDER — FLUOROURACIL 50 MG/G
CREAM TOPICAL 2 TIMES DAILY
Qty: 40 G | Refills: 0 | Status: SHIPPED | OUTPATIENT
Start: 2021-10-12 | End: 2022-03-14

## 2021-10-19 ENCOUNTER — HOSPITAL ENCOUNTER (OUTPATIENT)
Dept: RADIOLOGY | Facility: CLINIC | Age: 71
Discharge: HOME | End: 2021-10-19
Attending: ANESTHESIOLOGY | Admitting: ANESTHESIOLOGY
Payer: MEDICARE

## 2021-10-19 VITALS
RESPIRATION RATE: 20 BRPM | DIASTOLIC BLOOD PRESSURE: 72 MMHG | OXYGEN SATURATION: 98 % | SYSTOLIC BLOOD PRESSURE: 130 MMHG | TEMPERATURE: 97.3 F | HEART RATE: 66 BPM

## 2021-10-19 DIAGNOSIS — M51.16 LUMBAR DISC DISEASE WITH RADICULOPATHY: ICD-10-CM

## 2021-10-19 PROCEDURE — 62323 NJX INTERLAMINAR LMBR/SAC: CPT | Performed by: ANESTHESIOLOGY

## 2021-10-19 RX ORDER — BUPIVACAINE HCL/PF 2.5 MG/ML
2 VIAL (ML) INJECTION ONCE
Status: COMPLETED | OUTPATIENT
Start: 2021-10-19 | End: 2021-10-19

## 2021-10-19 RX ORDER — METHYLPREDNISOLONE ACETATE 80 MG/ML
80 INJECTION, SUSPENSION INTRA-ARTICULAR; INTRALESIONAL; INTRAMUSCULAR; PARENTERAL; SOFT TISSUE ONCE
Status: COMPLETED | OUTPATIENT
Start: 2021-10-19 | End: 2021-10-19

## 2021-10-19 RX ADMIN — METHYLPREDNISOLONE ACETATE 80 MG: 80 INJECTION, SUSPENSION INTRA-ARTICULAR; INTRALESIONAL; INTRAMUSCULAR; PARENTERAL; SOFT TISSUE at 13:50

## 2021-10-19 RX ADMIN — BUPIVACAINE HYDROCHLORIDE 2 ML: 2.5 INJECTION, SOLUTION EPIDURAL; INFILTRATION; INTRACAUDAL at 13:50

## 2021-10-19 RX ADMIN — IOHEXOL 1 ML: 300 INJECTION, SOLUTION INTRAVENOUS at 13:50

## 2021-10-26 ENCOUNTER — TELEPHONE (OUTPATIENT)
Dept: PAIN MEDICINE | Facility: CLINIC | Age: 71
End: 2021-10-26

## 2021-11-06 ENCOUNTER — IMMUNIZATIONS (OUTPATIENT)
Dept: FAMILY MEDICINE CLINIC | Facility: HOSPITAL | Age: 71
End: 2021-11-06

## 2021-11-06 DIAGNOSIS — Z23 ENCOUNTER FOR IMMUNIZATION: Primary | ICD-10-CM

## 2021-11-06 PROCEDURE — 0001A COVID-19 PFIZER VACC 0.3 ML: CPT

## 2021-11-06 PROCEDURE — 91300 COVID-19 PFIZER VACC 0.3 ML: CPT

## 2021-11-29 ENCOUNTER — TELEPHONE (OUTPATIENT)
Dept: PAIN MEDICINE | Facility: CLINIC | Age: 71
End: 2021-11-29

## 2021-12-14 ENCOUNTER — RA CDI HCC (OUTPATIENT)
Dept: OTHER | Facility: HOSPITAL | Age: 71
End: 2021-12-14

## 2021-12-15 ENCOUNTER — APPOINTMENT (OUTPATIENT)
Dept: LAB | Facility: CLINIC | Age: 71
End: 2021-12-15
Payer: MEDICARE

## 2021-12-15 DIAGNOSIS — E78.00 HYPERCHOLESTEROLEMIA: ICD-10-CM

## 2021-12-15 DIAGNOSIS — I10 ESSENTIAL HYPERTENSION: ICD-10-CM

## 2021-12-15 DIAGNOSIS — R53.83 OTHER FATIGUE: ICD-10-CM

## 2021-12-15 LAB
ALBUMIN SERPL BCP-MCNC: 3.5 G/DL (ref 3.5–5)
ALP SERPL-CCNC: 49 U/L (ref 46–116)
ALT SERPL W P-5'-P-CCNC: 40 U/L (ref 12–78)
ANION GAP SERPL CALCULATED.3IONS-SCNC: 3 MMOL/L (ref 4–13)
AST SERPL W P-5'-P-CCNC: 28 U/L (ref 5–45)
BILIRUB SERPL-MCNC: 0.7 MG/DL (ref 0.2–1)
BUN SERPL-MCNC: 13 MG/DL (ref 5–25)
CALCIUM SERPL-MCNC: 9.5 MG/DL (ref 8.3–10.1)
CHLORIDE SERPL-SCNC: 106 MMOL/L (ref 100–108)
CHOLEST SERPL-MCNC: 178 MG/DL
CO2 SERPL-SCNC: 31 MMOL/L (ref 21–32)
CREAT SERPL-MCNC: 1.12 MG/DL (ref 0.6–1.3)
GFR SERPL CREATININE-BSD FRML MDRD: 50 ML/MIN/1.73SQ M
GLUCOSE P FAST SERPL-MCNC: 85 MG/DL (ref 65–99)
HDLC SERPL-MCNC: 72 MG/DL
LDLC SERPL CALC-MCNC: 85 MG/DL (ref 0–100)
POTASSIUM SERPL-SCNC: 3.3 MMOL/L (ref 3.5–5.3)
PROT SERPL-MCNC: 6.7 G/DL (ref 6.4–8.2)
SODIUM SERPL-SCNC: 140 MMOL/L (ref 136–145)
TRIGL SERPL-MCNC: 105 MG/DL
TSH SERPL DL<=0.05 MIU/L-ACNC: 3.17 UIU/ML (ref 0.36–3.74)

## 2021-12-15 PROCEDURE — 80061 LIPID PANEL: CPT

## 2021-12-15 PROCEDURE — 36415 COLL VENOUS BLD VENIPUNCTURE: CPT

## 2021-12-15 PROCEDURE — 84443 ASSAY THYROID STIM HORMONE: CPT

## 2021-12-15 PROCEDURE — 80053 COMPREHEN METABOLIC PANEL: CPT

## 2021-12-21 ENCOUNTER — APPOINTMENT (OUTPATIENT)
Dept: LAB | Facility: CLINIC | Age: 71
End: 2021-12-21
Payer: MEDICARE

## 2021-12-21 ENCOUNTER — OFFICE VISIT (OUTPATIENT)
Dept: FAMILY MEDICINE CLINIC | Facility: CLINIC | Age: 71
End: 2021-12-21
Payer: MEDICARE

## 2021-12-21 VITALS
RESPIRATION RATE: 14 BRPM | SYSTOLIC BLOOD PRESSURE: 122 MMHG | BODY MASS INDEX: 39.85 KG/M2 | HEIGHT: 60 IN | DIASTOLIC BLOOD PRESSURE: 70 MMHG | TEMPERATURE: 96.8 F | HEART RATE: 72 BPM | WEIGHT: 203 LBS | OXYGEN SATURATION: 98 %

## 2021-12-21 DIAGNOSIS — Z78.0 ASYMPTOMATIC POSTMENOPAUSAL ESTROGEN DEFICIENCY: ICD-10-CM

## 2021-12-21 DIAGNOSIS — H66.90 ACUTE OTITIS MEDIA, UNSPECIFIED OTITIS MEDIA TYPE: ICD-10-CM

## 2021-12-21 DIAGNOSIS — F33.42 RECURRENT MAJOR DEPRESSIVE DISORDER, IN FULL REMISSION (HCC): ICD-10-CM

## 2021-12-21 DIAGNOSIS — E87.6 HYPOKALEMIA: ICD-10-CM

## 2021-12-21 DIAGNOSIS — E78.2 MIXED HYPERLIPIDEMIA: ICD-10-CM

## 2021-12-21 DIAGNOSIS — I10 PRIMARY HYPERTENSION: ICD-10-CM

## 2021-12-21 DIAGNOSIS — Z00.00 MEDICARE ANNUAL WELLNESS VISIT, SUBSEQUENT: Primary | ICD-10-CM

## 2021-12-21 LAB — POTASSIUM SERPL-SCNC: 3.8 MMOL/L (ref 3.5–5.3)

## 2021-12-21 PROCEDURE — 36415 COLL VENOUS BLD VENIPUNCTURE: CPT

## 2021-12-21 PROCEDURE — 99215 OFFICE O/P EST HI 40 MIN: CPT | Performed by: FAMILY MEDICINE

## 2021-12-21 PROCEDURE — 84132 ASSAY OF SERUM POTASSIUM: CPT

## 2021-12-21 PROCEDURE — G0439 PPPS, SUBSEQ VISIT: HCPCS | Performed by: FAMILY MEDICINE

## 2021-12-21 RX ORDER — FAMOTIDINE 10 MG
TABLET ORAL DAILY
COMMUNITY
Start: 2021-08-01 | End: 2022-03-14

## 2021-12-21 RX ORDER — AZITHROMYCIN 250 MG/1
TABLET, FILM COATED ORAL
Qty: 6 TABLET | Refills: 0 | Status: SHIPPED | OUTPATIENT
Start: 2021-12-21 | End: 2021-12-25

## 2022-02-03 ENCOUNTER — TELEPHONE (OUTPATIENT)
Dept: PAIN MEDICINE | Facility: CLINIC | Age: 72
End: 2022-02-03

## 2022-02-03 NOTE — TELEPHONE ENCOUNTER
S/w pt, states she is having regular back spasms  States she is taking flexeril BID, she is unsure if it is helpful  Pt states she tried tizanidine in the past without relief and it gave her diarrhea  Please advise, thank you

## 2022-02-03 NOTE — TELEPHONE ENCOUNTER
S/w pt, advised of the same  Pt verbalized understanding and appreciation   Scheduled for 2/22 with Von Lin

## 2022-02-03 NOTE — TELEPHONE ENCOUNTER
Patient called to make us aware back spasms are constant    I offered office visit she declined wanted  message sent first      Please advise    Thank you

## 2022-02-03 NOTE — TELEPHONE ENCOUNTER
She can increase the cyclobenzaprine to TID dosing  Would recommend coming in for an office visit  Doesn't look like I saw her since July    She can see Rome Farmer if she has sooner ov

## 2022-02-22 ENCOUNTER — OFFICE VISIT (OUTPATIENT)
Dept: PAIN MEDICINE | Facility: CLINIC | Age: 72
End: 2022-02-22
Payer: MEDICARE

## 2022-02-22 VITALS
BODY MASS INDEX: 39.65 KG/M2 | RESPIRATION RATE: 16 BRPM | HEART RATE: 69 BPM | SYSTOLIC BLOOD PRESSURE: 134 MMHG | DIASTOLIC BLOOD PRESSURE: 78 MMHG | HEIGHT: 60 IN

## 2022-02-22 DIAGNOSIS — M79.18 MYOFASCIAL PAIN SYNDROME: ICD-10-CM

## 2022-02-22 DIAGNOSIS — M51.16 INTERVERTEBRAL DISC DISORDER WITH RADICULOPATHY OF LUMBAR REGION: ICD-10-CM

## 2022-02-22 DIAGNOSIS — G89.4 CHRONIC PAIN SYNDROME: Primary | ICD-10-CM

## 2022-02-22 DIAGNOSIS — M47.816 LUMBAR SPONDYLOSIS: ICD-10-CM

## 2022-02-22 DIAGNOSIS — M46.1 SACROILIITIS (HCC): ICD-10-CM

## 2022-02-22 PROCEDURE — 99214 OFFICE O/P EST MOD 30 MIN: CPT | Performed by: NURSE PRACTITIONER

## 2022-02-22 RX ORDER — METHOCARBAMOL 500 MG/1
500 TABLET, FILM COATED ORAL 3 TIMES DAILY
Qty: 180 TABLET | Refills: 0 | Status: SHIPPED | OUTPATIENT
Start: 2022-02-22 | End: 2022-02-25 | Stop reason: CLARIF

## 2022-02-22 RX ORDER — GABAPENTIN 300 MG/1
300 CAPSULE ORAL 2 TIMES DAILY
Qty: 180 CAPSULE | Refills: 2 | Status: SHIPPED | OUTPATIENT
Start: 2022-02-22 | End: 2022-06-23

## 2022-02-22 NOTE — PROGRESS NOTES
Assessment:  1  Myofascial pain syndrome    2  Intervertebral disc disorder with radiculopathy of lumbar region    3  Lumbar spondylosis    4  Sacroiliitis ([address] )        Plan:  The patient was seen in the office today for follow-up of her chronic pain secondary to lumbar spondylosis, myofascial pain, lumbar disc disease with radiculopathy and sacroiliitis  She was last seen in the office on 2021 and she had bilateral lumbar paraspinal trigger point injections on 2021  This procedure helped her pain symptoms by 50%  Today she presents with symptoms that are identical to the 1 she had prior to these injections  At this time, we will do the followin  We will schedule the patient for bilateral lumbar paraspinal trigger point injections with Dr Sofy Garibay  2  We will stop patient's cyclobenzaprine and start her on methocarbamol 500-1000 mg up to 3 times daily for muscle spasms  Side effects were reviewed with the patient and she verbalized understanding and prescription was sent to the pharmacy on file  Complete risks and benefits including bleeding, infection, tissue reaction, nerve injury and allergic reaction were discussed  The approach was demonstrated using models and literature was provided  Verbal and written consent was obtained  My impressions and treatment recommendations were discussed in detail with the patient who verbalized understanding and had no further questions  Discharge instructions were provided  I personally saw and examined the patient and I agree with the above discussed plan of care  No orders of the defined types were placed in this encounter      New Medications Ordered This Visit   Medications    methocarbamol (ROBAXIN) 500 mg tablet     Sig: Take 1 tablet (500 mg total) by mouth 3 (three) times a day Take 1-2 tablets PO TID PRN     Dispense:  180 tablet     Refill:  0    gabapentin (NEURONTIN) 300 mg capsule     Sig: Take 1 capsule (300 mg total) by mouth 2 (two) times a day     Dispense:  180 capsule     Refill:  2       History of Present Illness:  Cristel Casarez is a 73 y o  female who presents for a follow up office visit in regards to Back Pain  The patients current symptoms include a pain score at the moment of 2/10 that goes up to a 9-10/10 depending on activity  She states that her pain is intermittent  She describes the quality as burning, sharp, spasms that throb and numbness  She states that the pain is in her low back bilaterally  She states that she has been going to water exercises at the [facility] twice a week and has started chair exercises 1 time a week which she feels has helped with her symptoms  She takes over-the-counter medications and she has tramadol that was prescribed by her PCP that she uses sparingly  I have personally reviewed and/or updated the patient's past medical history, past surgical history, family history, social history, current medications, allergies, and vital signs today  Review of Systems   Respiratory: Negative for shortness of breath  Cardiovascular: Negative for chest pain  Gastrointestinal: Negative for constipation, diarrhea, nausea and vomiting  Musculoskeletal: Positive for back pain  Negative for arthralgias, gait problem, joint swelling and myalgias  Skin: Negative for rash  Neurological: Negative for dizziness, seizures and weakness  All other systems reviewed and are negative  Patient Active Problem List   Diagnosis    Incomplete tear of right rotator cuff    Hypertension    Hyperlipidemia    Depression    Primary osteoarthritis of left hip    Primary osteoarthritis of one hip, right    Renal insufficiency    Pain, joint, ankle and foot, right    Lumbar disc disease with radiculopathy    Lumbar spondylosis    Change in bowel habits    Sacroiliitis (HCC)    Severe obesity (BMI 35 0-39  9) with comorbidity [facility])       Past Medical History:   Diagnosis Date    Arthritis     Depression     Endometriosis     GERD (gastroesophageal reflux disease)     Hyperlipidemia     Hypertension     Nondisplaced fracture of fifth metatarsal bone, right foot, initial encounter for closed fracture 9/24/2018    Osteopenia     Pancreatitis 06/2017    Pneumonia     Right hip pain 12/4/2018    S/P hip replacement, right 7/31/2018    Patient progressing well after right hip replacement     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    Trochanteric bursitis of right hip 2/12/2019       Past Surgical History:   Procedure Laterality Date    ABDOMINAL SURGERY      endometriosis     APPENDECTOMY      BREAST BIOPSY Right 1985    benign    CARPAL TUNNEL RELEASE      COLONOSCOPY      HAND SURGERY      HIP SURGERY      HYSTERECTOMY Bilateral 1985    JOINT REPLACEMENT Bilateral     KNEE    JOINT REPLACEMENT Left     HIP    OOPHORECTOMY Bilateral 1985    [facility] ARTHROSCOPY SHOULDER SURGICAL BICEPS TENODESIS Right 5/10/2017    Procedure: ARTHROSCOPIC BICEPS TENODESIS WITH ROTATOR CUFF REPAIR ;  Surgeon: Marilee Vargas MD;  [facility];  Service: Orthopedics    [facility] Radha Plana Right 5/10/2017    Procedure: SHOULDER ARTHROSCOPY SUBACROMIAL DECOMPRESSION;  Surgeon: Marilee Vargas MD;  [facility];  Service: Orthopedics    NH TOTAL HIP ARTHROPLASTY Left 3/12/2018    Procedure: ARTHROPLASTY HIP TOTAL;  Surgeon: Yolanda Saldaña MD;  [facility];  Service: Orthopedics    NH TOTAL HIP ARTHROPLASTY Right 7/30/2018    Procedure: RIGHT TOTAL HIP ARTHROPLASTY;  Surgeon: Yolanda Saldaña MD;  [facility];  Service: Orthopedics    TONSILLECTOMY         Family History   Problem Relation Age of Onset    Atrial fibrillation Mother     Breast cancer Mother 76    Stroke Mother     Coronary artery disease Mother     Diabetes Mother     Pancreatitis Mother     Cancer Mother         Breast    Hyperlipidemia Mother    Anthony Osorio Pulmonary embolism Mother     Heart attack Father     Arthritis Father     Arthritis Family     Cancer Family     Endometrial cancer Maternal Grandmother 76    Cancer Maternal Grandmother     Prostate cancer Paternal Uncle 54    No Known Problems Sister     No Known Problems Paternal Grandmother     No Known Problems Paternal Grandfather     No Known Problems Maternal Aunt     No Known Problems Paternal Aunt     No Known Problems Paternal Aunt        Social History     Occupational History    Not on file   Tobacco Use    Smoking status: Never Smoker    Smokeless tobacco: Never Used   Vaping Use    Vaping Use: Never used   Substance and Sexual Activity    Alcohol use: Yes     Comment: Rarely drink alcohol    Drug use: Never    Sexual activity: Yes     Partners: Male     Birth control/protection: Female Sterilization     Comment: Hysterectomy 1986       Current Outpatient Medications on File Prior to Visit   Medication Sig    acetaminophen (TYLENOL) 500 mg tablet Take 500 mg by mouth every 6 (six) hours as needed for mild pain    atorvastatin (LIPITOR) 20 mg tablet TAKE 1 TABLET BY MOUTH  DAILY    calcium carbonate (TUMS) 500 mg chewable tablet Chew 1 tablet as needed for indigestion or heartburn      calcium-vitamin D (OSCAL) 250-125 MG-UNIT per tablet Take 1 tablet by mouth daily    FLUoxetine (PROzac) 20 mg capsule TAKE 1 CAPSULE BY MOUTH  DAILY    hydrochlorothiazide (HYDRODIURIL) 25 mg tablet TAKE 1 TABLET BY MOUTH  DAILY    metoprolol succinate (TOPROL-XL) 50 mg 24 hr tablet Take 1 tablet (50 mg total) by mouth daily    Multiple Vitamins-Minerals (CENTRUM SILVER ULTRA WOMENS PO) Take by mouth daily      Omega-3 1000 MG CAPS Take by mouth daily     [DISCONTINUED] gabapentin (NEURONTIN) 300 mg capsule Take 1 capsule (300 mg total) by mouth 2 (two) times a day    Cholecalciferol (VITAMIN D) 2000 units tablet Take 5,000 Units by mouth daily      cyclobenzaprine (FLEXERIL) 10 mg tablet Take 1 tablet (10 mg total) by mouth 2 (two) times a day as needed for muscle spasms (Patient not taking: Reported on 2/22/2022 )    famotidine (Pepcid AC) 10 mg tablet Take by mouth daily    fluorouracil (EFUDEX) 5 % cream Apply topically 2 (two) times a day To affected areas of face for 3 weeks  No current facility-administered medications on file prior to visit         Allergies   Allergen Reactions    Hydromorphone Hcl Itching    Percocet [Oxycodone-Acetaminophen] GI Intolerance and Vomiting    Sulfamethoxazole-Trimethoprim Rash    Tizanidine Diarrhea       Physical Exam:    /78   Pulse 69   Resp 16   Ht 5' (1 524 m)   LMP  (LMP Unknown) Comment: hysterectomy  BMI 39 65 kg/m²     Constitutional:obese  Eyes:anicteric  HEENT:grossly intact  Neck:supple, symmetric, trachea midline and no masses   Pulmonary:even and unlabored  Cardiovascular:No edema or pitting edema present  Skin:Normal without rashes or lesions and well hydrated  Psychiatric:Mood and affect appropriate  Neurologic:Cranial Nerves II-XII grossly intact  Musculoskeletal:antalgic    Imaging

## 2022-02-23 ENCOUNTER — OFFICE VISIT (OUTPATIENT)
Dept: URGENT CARE | Facility: MEDICAL CENTER | Age: 72
End: 2022-02-23
Payer: MEDICARE

## 2022-02-23 VITALS
SYSTOLIC BLOOD PRESSURE: 138 MMHG | OXYGEN SATURATION: 97 % | DIASTOLIC BLOOD PRESSURE: 65 MMHG | RESPIRATION RATE: 18 BRPM | TEMPERATURE: 96.9 F | HEART RATE: 70 BPM

## 2022-02-23 DIAGNOSIS — S01.112A LACERATION OF LEFT EYEBROW, INITIAL ENCOUNTER: Primary | ICD-10-CM

## 2022-02-23 PROCEDURE — 12011 RPR F/E/E/N/L/M 2.5 CM/<: CPT | Performed by: PHYSICIAN ASSISTANT

## 2022-02-23 PROCEDURE — 90715 TDAP VACCINE 7 YRS/> IM: CPT

## 2022-02-23 PROCEDURE — 90471 IMMUNIZATION ADMIN: CPT | Performed by: PHYSICIAN ASSISTANT

## 2022-02-23 PROCEDURE — 99213 OFFICE O/P EST LOW 20 MIN: CPT | Performed by: PHYSICIAN ASSISTANT

## 2022-02-23 PROCEDURE — G0463 HOSPITAL OUTPT CLINIC VISIT: HCPCS | Performed by: PHYSICIAN ASSISTANT

## 2022-02-23 NOTE — PROGRESS NOTES
[facility]        NAME: Laurie Lopez is a 73 y o  female  : 1950    MRN: [id number]  DATE: 2022  TIME: 9:23 AM    Assessment and Plan   Laceration of left eyebrow, initial encounter [S01 112A]  1  Laceration of left eyebrow, initial encounter  Tdap Vaccine greater than or equal to 8yo     Laceration closed with sutures without complication  Advised not to get wet for 24 hours and use Tylenol or Motrin for pain  Return for suture removal in 3-5 days  Patient Instructions   Tylenol or Motrin for pain  Do not get wet for 24 hours  Follow up with PCP in 3-5 days  Proceed to  ER if symptoms worsen  Chief Complaint     Chief Complaint   Patient presents with    Facial Laceration         History of Present Illness       Patient is a 74 y/o female who presents today with complaints of facial laceration above left eyebrow  Patient states around 3am she fell out of bed and hit the corner of her head on her nightstand  Denies LOC, headache, nausea, vomiting, dizziness  Last Tdap was about 9 years ago  Bleeding controlled with bandage  Is not on anticoagulants  Review of Systems   Review of Systems   Constitutional: Negative for fever  Eyes: Negative for photophobia and visual disturbance  Respiratory: Negative for shortness of breath  Cardiovascular: Negative for chest pain  Gastrointestinal: Negative for nausea and vomiting  Neurological: Negative for dizziness, light-headedness and headaches           Current Medications       Current Outpatient Medications:     acetaminophen (TYLENOL) 500 mg tablet, Take 500 mg by mouth every 6 (six) hours as needed for mild pain, Disp: , Rfl:     atorvastatin (LIPITOR) 20 mg tablet, TAKE 1 TABLET BY MOUTH  DAILY, Disp: 90 tablet, Rfl: 3    calcium carbonate (TUMS) 500 mg chewable tablet, Chew 1 tablet as needed for indigestion or heartburn  , Disp: , Rfl:     calcium-vitamin D (OSCAL) 250-125 MG-UNIT per tablet, Take 1 tablet by mouth daily, Disp: , Rfl:     Cholecalciferol (VITAMIN D) 2000 units tablet, Take 5,000 Units by mouth daily  , Disp: , Rfl:     cyclobenzaprine (FLEXERIL) 10 mg tablet, Take 1 tablet (10 mg total) by mouth 2 (two) times a day as needed for muscle spasms (Patient not taking: Reported on 2/22/2022 ), Disp: 60 tablet, Rfl: 2    famotidine (Pepcid AC) 10 mg tablet, Take by mouth daily, Disp: , Rfl:     fluorouracil (EFUDEX) 5 % cream, Apply topically 2 (two) times a day To affected areas of face for 3 weeks  , Disp: 40 g, Rfl: 0    FLUoxetine (PROzac) 20 mg capsule, TAKE 1 CAPSULE BY MOUTH  DAILY, Disp: 90 capsule, Rfl: 3    gabapentin (NEURONTIN) 300 mg capsule, Take 1 capsule (300 mg total) by mouth 2 (two) times a day, Disp: 180 capsule, Rfl: 2    hydrochlorothiazide (HYDRODIURIL) 25 mg tablet, TAKE 1 TABLET BY MOUTH  DAILY, Disp: 90 tablet, Rfl: 3    methocarbamol (ROBAXIN) 500 mg tablet, Take 1 tablet (500 mg total) by mouth 3 (three) times a day Take 1-2 tablets PO TID PRN, Disp: 180 tablet, Rfl: 0    metoprolol succinate (TOPROL-XL) 50 mg 24 hr tablet, Take 1 tablet (50 mg total) by mouth daily, Disp: 90 tablet, Rfl: 3    Multiple Vitamins-Minerals (CENTRUM SILVER ULTRA WOMENS PO), Take by mouth daily  , Disp: , Rfl:     Omega-3 1000 MG CAPS, Take by mouth daily , Disp: , Rfl:     Current Allergies     Allergies as of 02/23/2022 - Reviewed 02/22/2022   Allergen Reaction Noted    Hydromorphone hcl Itching     Percocet [oxycodone-acetaminophen] GI Intolerance and Vomiting 05/01/2017    Sulfamethoxazole-trimethoprim Rash     Tizanidine Diarrhea 07/28/2020            The following portions of the patient's history were reviewed and updated as appropriate: allergies, current medications, past family history, past medical history, past social history, past surgical history and problem list      Past Medical History:   Diagnosis Date    Arthritis     Depression     Endometriosis     GERD (gastroesophageal reflux disease)     Hyperlipidemia     Hypertension     Nondisplaced fracture of fifth metatarsal bone, right foot, initial encounter for closed fracture 9/24/2018    Osteopenia     Pancreatitis 06/2017    Pneumonia     Right hip pain 12/4/2018    S/P hip replacement, right 7/31/2018    Patient progressing well after right hip replacement     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    Trochanteric bursitis of right hip 2/12/2019       Past Surgical History:   Procedure Laterality Date    ABDOMINAL SURGERY      endometriosis     APPENDECTOMY      BREAST BIOPSY Right 1985    benign    CARPAL TUNNEL RELEASE      COLONOSCOPY      HAND SURGERY      HIP SURGERY      HYSTERECTOMY Bilateral 1985    JOINT REPLACEMENT Bilateral     KNEE    JOINT REPLACEMENT Left     HIP    OOPHORECTOMY Bilateral 1985    [facility] ARTHROSCOPY SHOULDER SURGICAL BICEPS TENODESIS Right 5/10/2017    Procedure: ARTHROSCOPIC BICEPS TENODESIS WITH ROTATOR CUFF REPAIR ;  Surgeon: Harlene Kanner, MD;  [facility];  Service: Orthopedics    [facility]    Procedure: SHOULDER ARTHROSCOPY SUBACROMIAL DECOMPRESSION;  Surgeon: Harlene Kanner, MD;  [facility];  Service: Orthopedics    [facility] TOTAL HIP ARTHROPLASTY Left 3/12/2018    Procedure: ARTHROPLASTY HIP TOTAL;  Surgeon: Adelina Espana MD;  [facility];  Service: Orthopedics    [facility] TOTAL HIP ARTHROPLASTY Right 7/30/2018    Procedure: RIGHT TOTAL HIP ARTHROPLASTY;  Surgeon: Adelina Espana MD;  [facility];  Service: Orthopedics    TONSILLECTOMY         Family History   Problem Relation Age of Onset    Atrial fibrillation Mother     Breast cancer Mother 76    Stroke Mother     Coronary artery disease Mother     Diabetes Mother     Pancreatitis Mother     Cancer Mother         Breast    Hyperlipidemia Mother     Pulmonary embolism Mother     Heart attack Father    Samgautam Brian Arthritis Father     Arthritis Family     Cancer Family     Endometrial cancer Maternal Grandmother 76    Cancer Maternal Grandmother     Prostate cancer Paternal Uncle 54    No Known Problems Sister     No Known Problems Paternal Grandmother     No Known Problems Paternal Grandfather     No Known Problems Maternal Aunt     No Known Problems Paternal Aunt     No Known Problems Paternal Aunt          Medications have been verified  Objective   /65   Pulse 70   Temp (!) 96 9 °F (36 1 °C)   Resp 18   LMP  (LMP Unknown) Comment: hysterectomy  SpO2 97%        Physical Exam     Physical Exam  Constitutional:       General: She is not in acute distress  Appearance: Normal appearance  She is obese  She is not ill-appearing or toxic-appearing  HENT:      Head: Laceration present  Eyes:      Extraocular Movements: Extraocular movements intact  Conjunctiva/sclera: Conjunctivae normal       Pupils: Pupils are equal, round, and reactive to light  Cardiovascular:      Rate and Rhythm: Normal rate and regular rhythm  Pulmonary:      Effort: Pulmonary effort is normal    Skin:     General: Skin is warm and dry  Neurological:      General: No focal deficit present  Mental Status: She is alert and oriented to person, place, and time  Cranial Nerves: No cranial nerve deficit  Motor: No weakness  Gait: Gait normal    Psychiatric:         Mood and Affect: Mood normal          Behavior: Behavior normal      Laceration repair    Date/Time: 2/23/2022 9:23 AM  Performed by: Alla Linder PA-C  Authorized by: Alla Linder PA-C   Consent: Verbal consent obtained    Consent given by: patient  Patient identity confirmed: verbally with patient  Body area: head/neck  Location details: left eyebrow  Laceration length: 1 5 cm  Foreign bodies: no foreign bodies  Tendon involvement: none  Nerve involvement: none  Vascular damage: no  Anesthesia: local infiltration    Anesthesia:  Local Anesthetic: lidocaine 1% with epinephrine  Anesthetic total: 2 mL    Wound Dehiscence:  Superficial Wound Dehiscence: simple closure      Procedure Details:  Preparation: Patient was prepped and draped in the usual sterile fashion  Irrigation solution: saline  Amount of cleaning: standard  Skin closure: 5-0 nylon  Number of sutures: 5  Technique: simple  Approximation: close  Approximation difficulty: simple  Dressing: band aid    Patient tolerance: patient tolerated the procedure well with no immediate complications

## 2022-02-24 ENCOUNTER — TELEPHONE (OUTPATIENT)
Dept: PAIN MEDICINE | Facility: CLINIC | Age: 72
End: 2022-02-24

## 2022-02-24 NOTE — TELEPHONE ENCOUNTER
----- Message from Iman Doss sent at 2/22/2022 11:48 AM EST -----  Regarding: Schedule TPI  Hi Harriet,Please schedule this patient for bilateral lumbar paraspinal trigger point injections with Dr Charis Isaacs  Thank you,   Jane Mars

## 2022-02-25 ENCOUNTER — TELEPHONE (OUTPATIENT)
Dept: PAIN MEDICINE | Facility: MEDICAL CENTER | Age: 72
End: 2022-02-25

## 2022-02-25 DIAGNOSIS — M79.18 MYOFASCIAL PAIN SYNDROME: Primary | ICD-10-CM

## 2022-02-25 RX ORDER — METHOCARBAMOL 500 MG/1
TABLET, FILM COATED ORAL
Qty: 120 TABLET | Refills: 0 | Status: SHIPPED | OUTPATIENT
Start: 2022-02-25 | End: 2022-03-28 | Stop reason: SDUPTHER

## 2022-02-25 NOTE — TELEPHONE ENCOUNTER
Pharmacy needs clarification for the instruction on the methocarbamol  Sig says take 1 tab three times a day, take 1-2 tabs TID, pls clarify

## 2022-03-01 ENCOUNTER — CLINICAL SUPPORT (OUTPATIENT)
Dept: FAMILY MEDICINE CLINIC | Facility: CLINIC | Age: 72
End: 2022-03-01

## 2022-03-01 DIAGNOSIS — Z48.02 ENCOUNTER FOR REMOVAL OF SUTURES: Primary | ICD-10-CM

## 2022-03-01 NOTE — PROGRESS NOTES
Patient presents in office today to have sutures removed from left eyebrow  5 sutures were removed and area showed no signs of irritation or infection  Cleaned area with gauze, saline, and applied some antibiotic ointment  Refused bandage

## 2022-03-14 ENCOUNTER — PROCEDURE VISIT (OUTPATIENT)
Dept: PAIN MEDICINE | Facility: CLINIC | Age: 72
End: 2022-03-14
Payer: MEDICARE

## 2022-03-14 VITALS
DIASTOLIC BLOOD PRESSURE: 66 MMHG | BODY MASS INDEX: 39.06 KG/M2 | WEIGHT: 200 LBS | SYSTOLIC BLOOD PRESSURE: 121 MMHG | HEART RATE: 70 BPM

## 2022-03-14 DIAGNOSIS — M62.838 MUSCLE SPASM: Primary | ICD-10-CM

## 2022-03-14 DIAGNOSIS — M79.18 MYOFASCIAL PAIN SYNDROME: ICD-10-CM

## 2022-03-14 PROCEDURE — 20552 NJX 1/MLT TRIGGER POINT 1/2: CPT | Performed by: ANESTHESIOLOGY

## 2022-03-14 RX ORDER — BUPIVACAINE HYDROCHLORIDE 2.5 MG/ML
10 INJECTION, SOLUTION EPIDURAL; INFILTRATION; INTRACAUDAL ONCE
Status: COMPLETED | OUTPATIENT
Start: 2022-03-14 | End: 2022-03-14

## 2022-03-14 RX ADMIN — BUPIVACAINE HYDROCHLORIDE 10 ML: 2.5 INJECTION, SOLUTION EPIDURAL; INFILTRATION; INTRACAUDAL at 14:55

## 2022-03-14 NOTE — PROGRESS NOTES
Pre-procedure Diagnosis:   Myofascial pain  Post-procedure Diagnosis:   Myofascial pain  Operation Title(s):  Trigger point injections into left lumbar paraspinal x1, right lumbar paraspinal x2  Attending Surgeon:   Jenny Greenwood MD  Anesthesia:   Local    Indications: The patient is a 81y o  year-old female with a diagnosis of myofascial pain  The patient's history and physical exam were reviewed  The risks, benefits and alternatives to the procedure were discussed, and all questions were answered to the patient's satisfaction  The patient agreed to proceed, and written informed consent was obtained  Procedure in Detail: The patient was brought into the exam room and placed in the sitting position on the exam room chair with the head flexed on a pillow  Trigger points were identified in the:  Left lumbar paraspinal x1, right lumbar paraspinal x2    Areas were cleansed with alcohol  Then, using a dry needling technique, each trigger point was injected with a 1 5 inch 25 gauge needle and 1 mL 0 25% bupivacaine     Disposition: The patient tolerated the procedure well and there were no apparent complications  The patient was given written discharge instructions for the procedure

## 2022-03-21 ENCOUNTER — TELEPHONE (OUTPATIENT)
Dept: PAIN MEDICINE | Facility: MEDICAL CENTER | Age: 72
End: 2022-03-21

## 2022-03-21 DIAGNOSIS — M79.18 MYOFASCIAL PAIN SYNDROME: ICD-10-CM

## 2022-03-28 RX ORDER — METHOCARBAMOL 500 MG/1
TABLET, FILM COATED ORAL
Qty: 120 TABLET | Refills: 5 | Status: SHIPPED | OUTPATIENT
Start: 2022-03-28 | End: 2022-06-23

## 2022-03-28 NOTE — TELEPHONE ENCOUNTER
Pt reports still about 30% improvement 2wk post inj   Pain level 7/10 (with activity)   She said she needs a refill on her Robaxin sent to the [facility] on file

## 2022-03-28 NOTE — TELEPHONE ENCOUNTER
S/w pt, advised of the same  Pt verbalized understanding and agreeable to repeat JOSE  Please place order, thank you

## 2022-03-28 NOTE — TELEPHONE ENCOUNTER
Methocarbamol refill sent to the pharmacy      It might be time to repeat the epidural steroid injection

## 2022-03-29 ENCOUNTER — TELEPHONE (OUTPATIENT)
Dept: PAIN MEDICINE | Facility: CLINIC | Age: 72
End: 2022-03-29

## 2022-03-29 DIAGNOSIS — M51.16 INTERVERTEBRAL DISC DISORDER WITH RADICULOPATHY OF LUMBAR REGION: Primary | ICD-10-CM

## 2022-03-29 NOTE — TELEPHONE ENCOUNTER
[facility] Kaylene Castro MD    Department of Dermatology  [facility]: Phone: [phone], Fax:[phone]  [facility]: Phone: [phone], Fax: [phone]    ____________________________________________    Left message for patient to call me back directly to schedule procedure at  "Brittani Denis PA-C  [address] [place] [address] on close of this encounter.  "

## 2022-04-19 ENCOUNTER — HOSPITAL ENCOUNTER (OUTPATIENT)
Dept: RADIOLOGY | Facility: CLINIC | Age: 72
Discharge: HOME | End: 2022-04-19
Attending: ANESTHESIOLOGY | Admitting: ANESTHESIOLOGY
Payer: MEDICARE

## 2022-04-19 VITALS
RESPIRATION RATE: 20 BRPM | DIASTOLIC BLOOD PRESSURE: 81 MMHG | HEART RATE: 59 BPM | OXYGEN SATURATION: 96 % | SYSTOLIC BLOOD PRESSURE: 134 MMHG | TEMPERATURE: 97.4 F

## 2022-04-19 DIAGNOSIS — M51.16 INTERVERTEBRAL DISC DISORDER WITH RADICULOPATHY OF LUMBAR REGION: ICD-10-CM

## 2022-04-19 PROCEDURE — 62323 NJX INTERLAMINAR LMBR/SAC: CPT | Performed by: ANESTHESIOLOGY

## 2022-04-19 RX ORDER — BUPIVACAINE HCL/PF 2.5 MG/ML
2 VIAL (ML) INJECTION ONCE
Status: COMPLETED | OUTPATIENT
Start: 2022-04-19 | End: 2022-04-19

## 2022-04-19 RX ORDER — METHYLPREDNISOLONE ACETATE 80 MG/ML
80 INJECTION, SUSPENSION INTRA-ARTICULAR; INTRALESIONAL; INTRAMUSCULAR; PARENTERAL; SOFT TISSUE ONCE
Status: COMPLETED | OUTPATIENT
Start: 2022-04-19 | End: 2022-04-19

## 2022-04-19 RX ADMIN — METHYLPREDNISOLONE ACETATE 80 MG: 80 INJECTION, SUSPENSION INTRA-ARTICULAR; INTRALESIONAL; INTRAMUSCULAR; PARENTERAL; SOFT TISSUE at 14:36

## 2022-04-19 RX ADMIN — BUPIVACAINE HYDROCHLORIDE 2 ML: 2.5 INJECTION, SOLUTION EPIDURAL; INFILTRATION; INTRACAUDAL at 14:36

## 2022-04-19 RX ADMIN — IOHEXOL 1 ML: 300 INJECTION, SOLUTION INTRAVENOUS at 14:36

## 2022-04-19 NOTE — H&P
History of Present Illness: The patient is a 81 y o  female who presents with complaints of lower back pain is here today for lumbar epidural steroid injection    Patient Active Problem List   Diagnosis    Incomplete tear of right rotator cuff    Hypertension    Hyperlipidemia    Depression    Primary osteoarthritis of left hip    Primary osteoarthritis of one hip, right    Renal insufficiency    Pain, joint, ankle and foot, right    Lumbar disc disease with radiculopathy    Lumbar spondylosis    Change in bowel habits    Sacroiliitis (HCC)    Severe obesity (BMI 35 0-39  9) with comorbidity ([address] )       Past Medical History:   Diagnosis Date    Arthritis     Depression     Endometriosis     GERD (gastroesophageal reflux disease)     Hyperlipidemia     Hypertension     Nondisplaced fracture of fifth metatarsal bone, right foot, initial encounter for closed fracture 9/24/2018    Osteopenia     Pancreatitis 06/2017    Pneumonia     Right hip pain 12/4/2018    S/P hip replacement, right 7/31/2018    Patient progressing well after right hip replacement     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    Trochanteric bursitis of right hip 2/12/2019       Past Surgical History:   Procedure Laterality Date    ABDOMINAL SURGERY      endometriosis     APPENDECTOMY      BREAST BIOPSY Right 1985    benign    CARPAL TUNNEL RELEASE      COLONOSCOPY      HAND SURGERY      HIP SURGERY      HYSTERECTOMY Bilateral 1985    JOINT REPLACEMENT Bilateral     KNEE    JOINT REPLACEMENT Left     HIP    OOPHORECTOMY Bilateral 1985    NM ARTHROSCOPY SHOULDER SURGICAL BICEPS TENODESIS Right 5/10/2017    Procedure: ARTHROSCOPIC BICEPS TENODESIS WITH ROTATOR CUFF REPAIR ;  Surgeon: Donald Head MD;  [facility];  Service: Orthopedics    NM Sherlyn Baird Right 5/10/2017    Procedure: SHOULDER ARTHROSCOPY SUBACROMIAL DECOMPRESSION;  Surgeon: Donald Head MD; [facility];  Service: Orthopedics    [place] TOTAL HIP ARTHROPLASTY Left 3/12/2018    Procedure: ARTHROPLASTY HIP TOTAL;  Surgeon: Jason Henderson MD;  [facility];  Service: Orthopedics    MN TOTAL HIP ARTHROPLASTY Right 7/30/2018    Procedure: RIGHT TOTAL HIP ARTHROPLASTY;  Surgeon: Jason Henderson MD;  [facility];  Service: Orthopedics    TONSILLECTOMY           Current Outpatient Medications:     acetaminophen (TYLENOL) 500 mg tablet, Take 500 mg by mouth every 6 (six) hours as needed for mild pain, Disp: , Rfl:     atorvastatin (LIPITOR) 20 mg tablet, TAKE 1 TABLET BY MOUTH  DAILY, Disp: 90 tablet, Rfl: 3    calcium carbonate (TUMS) 500 mg chewable tablet, Chew 1 tablet as needed for indigestion or heartburn  , Disp: , Rfl:     calcium-vitamin D (OSCAL) 250-125 MG-UNIT per tablet, Take 1 tablet by mouth daily, Disp: , Rfl:     cyclobenzaprine (FLEXERIL) 10 mg tablet, Take 1 tablet (10 mg total) by mouth 2 (two) times a day as needed for muscle spasms (Patient not taking: Reported on 2/22/2022 ), Disp: 60 tablet, Rfl: 2    FLUoxetine (PROzac) 20 mg capsule, TAKE 1 CAPSULE BY MOUTH  DAILY, Disp: 90 capsule, Rfl: 3    gabapentin (NEURONTIN) 300 mg capsule, Take 1 capsule (300 mg total) by mouth 2 (two) times a day, Disp: 180 capsule, Rfl: 2    hydrochlorothiazide (HYDRODIURIL) 25 mg tablet, TAKE 1 TABLET BY MOUTH  DAILY, Disp: 90 tablet, Rfl: 3    methocarbamol (ROBAXIN) 500 mg tablet, Take 1-2 tablets p o  T i d  P r n   For muscle spasms, Disp: 120 tablet, Rfl: 5    metoprolol succinate (TOPROL-XL) 50 mg 24 hr tablet, Take 1 tablet (50 mg total) by mouth daily, Disp: 90 tablet, Rfl: 3    Multiple Vitamins-Minerals (CENTRUM SILVER ULTRA WOMENS PO), Take by mouth daily  , Disp: , Rfl:     Omega-3 1000 MG CAPS, Take by mouth daily , Disp: , Rfl:     Current Facility-Administered Medications:     bupivacaine (PF) (MARCAINE) 0 25 % injection 2 mL, 2 mL, Epidural, Once,  Hoa Begum MD    iohexol (OMNIPAQUE) 300 mg/mL injection 1 mL, 1 mL, Epidural, Once, Bethany More MD    methylPREDNISolone acetate (DEPO-MEDROL) injection 80 mg, 80 mg, Epidural, Once, Bethany More MD    Allergies   Allergen Reactions    Hydromorphone Hcl Itching    Percocet [Oxycodone-Acetaminophen] GI Intolerance and Vomiting    Sulfamethoxazole-Trimethoprim Rash    Tizanidine Diarrhea       Physical Exam:   Vitals:    04/19/22 1417   BP: 121/76   Pulse: 64   Resp: 20   Temp: (!) 97 4 °F (36 3 °C)   SpO2: 97%     General: Awake, Alert, Oriented x 3, Mood and affect appropriate  Respiratory: Respirations even and unlabored  Cardiovascular: Peripheral pulses intact; no edema  Musculoskeletal Exam:  Lower back pain    ASA Score: 3    Patient/Chart Verification  Patient ID Verified: Verbal  Consents Confirmed: To be obtained in the Pre-Procedure area  Interval H&P(within 24 hr) Complete (required for Outpatients and Surgery Admit only): To be obtained in the Pre-Procedure area  Allergies Reviewed: Yes  Anticoag/NSAID held?: NA  Currently on antibiotics?: No    Assessment:   1   Intervertebral disc disorder with radiculopathy of lumbar region        Plan: CAROLINE

## 2022-04-19 NOTE — DISCHARGE INSTR - LAB
Epidural Steroid Injection   WHAT YOU NEED TO KNOW:   An epidural steroid injection (JOSE) is a procedure to inject steroid medicine into the epidural space  The epidural space is between your spinal cord and vertebrae  Steroids reduce inflammation and fluid buildup in your spine that may be causing pain  You may be given pain medicine along with the steroids  ACTIVITY  Do not drive or operate machinery today  No strenuous activity today - bending, lifting, etc   You may resume normal activites starting tomorrow - start slowly and as tolerated  You may shower today, but no tub baths or hot tubs  You may have numbness for several hours from the local anesthetic  Please use caution and common sense, especially with weight-bearing activities  CARE OF THE INJECTION SITE  If you have soreness or pain, apply ice to the area today (20 minutes on/20 minutes off)  Starting tomorrow, you may use warm, moist heat or ice if needed  You may have an increase or change in your discomfort for 36-48 hours after your treatment  Apply ice and continue with any pain medication you have been prescribed  Notify the [facility] if you have any of the following: redness, drainage, swelling, headache, stiff neck or fever above 100°F     SPECIAL INSTRUCTIONS  Our office will contact you in approximately 7 days for a progress report  MEDICATIONS  Continue to take all routine medications  Our office may have instructed you to hold some medications  As no general anesthesia was used in today's procedure, you should not experience any side effects related to anesthesia  If you have a problem specifically related to your procedure, please call our office at [phone]  Problems not related to your procedure should be directed to your primary care physician

## 2022-04-26 ENCOUNTER — TELEPHONE (OUTPATIENT)
Dept: PAIN MEDICINE | Facility: CLINIC | Age: 72
End: 2022-04-26

## 2022-04-26 DIAGNOSIS — M51.16 LUMBAR DISC DISEASE WITH RADICULOPATHY: Primary | ICD-10-CM

## 2022-04-26 DIAGNOSIS — M48.061 DEGENERATIVE LUMBAR SPINAL STENOSIS: ICD-10-CM

## 2022-04-27 DIAGNOSIS — I10 ESSENTIAL HYPERTENSION: ICD-10-CM

## 2022-04-27 RX ORDER — METOPROLOL SUCCINATE 50 MG/1
TABLET, EXTENDED RELEASE ORAL
Qty: 90 TABLET | Refills: 3 | Status: SHIPPED | OUTPATIENT
Start: 2022-04-27

## 2022-05-09 NOTE — TELEPHONE ENCOUNTER
Pt says there has been no relief  Her pain level is a 7-8/10 depending on what she is doing   Please have a nurse follow up with pt

## 2022-05-09 NOTE — TELEPHONE ENCOUNTER
S/w pt, she is s/p LESIon 4/19/22 and she has 0% relief  Pt said she has pain across her lower back and spasms with any bending  Pt said she has had RFA's, TPI's, and JOSE's with no relief  Pt wants to know where she goes from here

## 2022-05-12 ENCOUNTER — TELEPHONE (OUTPATIENT)
Dept: PAIN MEDICINE | Facility: CLINIC | Age: 72
End: 2022-05-12

## 2022-05-12 DIAGNOSIS — M51.16 LUMBAR DISC DISEASE WITH RADICULOPATHY: Primary | ICD-10-CM

## 2022-05-12 DIAGNOSIS — M48.061 DEGENERATIVE LUMBAR SPINAL STENOSIS: ICD-10-CM

## 2022-05-12 NOTE — TELEPHONE ENCOUNTER
Attempted to call the patient and left a detailed mom in regards to the previous order  Encouraged the patient to call and schedule

## 2022-05-12 NOTE — TELEPHONE ENCOUNTER
[facility]- [facility]   [phone]  Dr Josefina Chandra    They are asking if Dr Josefina Chandra can order a MRI or a CT of there lower back? Which ever one is fine so that the Dr can review it  Please let the [facility] dept know if Dr BOJORQUEZ is able to place the order in

## 2022-06-09 ENCOUNTER — HOSPITAL ENCOUNTER (OUTPATIENT)
Dept: RADIOLOGY | Facility: HOSPITAL | Age: 72
Discharge: HOME | End: 2022-06-09
Attending: ANESTHESIOLOGY
Payer: MEDICARE

## 2022-06-09 DIAGNOSIS — M48.061 DEGENERATIVE LUMBAR SPINAL STENOSIS: ICD-10-CM

## 2022-06-09 DIAGNOSIS — M51.16 LUMBAR DISC DISEASE WITH RADICULOPATHY: ICD-10-CM

## 2022-06-09 PROCEDURE — G1004 CDSM NDSC: HCPCS

## 2022-06-09 PROCEDURE — 72148 MRI LUMBAR SPINE W/O DYE: CPT

## 2022-06-10 ENCOUNTER — TELEPHONE (OUTPATIENT)
Dept: RADIOLOGY | Facility: CLINIC | Age: 72
End: 2022-06-10

## 2022-06-10 NOTE — TELEPHONE ENCOUNTER
Pt informed of lumbar MRI results as per task  Pt said the neurosurgeon's office called her today and asked her karthik questions and told her they would call her back on Mon or tues and let her know who she will be seeing  Pt said to tell FQ that she stopped the Neurontin to see if it would make a difference and it didn't, so she is staying off of it  Told pt to reach back out to our office after seeing the neurosurgeon

## 2022-06-10 NOTE — TELEPHONE ENCOUNTER
Please let patient know that MRI lumbar spine showed stable degenerative changes of lumbar spine with multilevel facet arthritis and mild disc bulging    We will await input from Neurosurgery after they see her

## 2022-06-16 ENCOUNTER — RA CDI HCC (OUTPATIENT)
Dept: OTHER | Facility: HOSPITAL | Age: 72
End: 2022-06-16

## 2022-06-16 NOTE — PROGRESS NOTES
[address]  coding opportunities       Chart reviewed, no opportunity found: CHART REVIEWED, NO OPPORTUNITY FOUND        Patients Insurance     Medicare Insurance: Medicare

## 2022-06-23 ENCOUNTER — OFFICE VISIT (OUTPATIENT)
Dept: FAMILY MEDICINE CLINIC | Facility: CLINIC | Age: 72
End: 2022-06-23
Payer: MEDICARE

## 2022-06-23 VITALS
SYSTOLIC BLOOD PRESSURE: 122 MMHG | TEMPERATURE: 98 F | BODY MASS INDEX: 40.56 KG/M2 | HEIGHT: 60 IN | DIASTOLIC BLOOD PRESSURE: 72 MMHG | HEART RATE: 69 BPM | OXYGEN SATURATION: 96 % | WEIGHT: 206.6 LBS

## 2022-06-23 DIAGNOSIS — E78.2 MIXED HYPERLIPIDEMIA: ICD-10-CM

## 2022-06-23 DIAGNOSIS — F33.42 RECURRENT MAJOR DEPRESSIVE DISORDER, IN FULL REMISSION (HCC): ICD-10-CM

## 2022-06-23 DIAGNOSIS — K21.9 GASTROESOPHAGEAL REFLUX DISEASE WITHOUT ESOPHAGITIS: ICD-10-CM

## 2022-06-23 DIAGNOSIS — M79.18 MYOFASCIAL PAIN SYNDROME: ICD-10-CM

## 2022-06-23 DIAGNOSIS — E66.01 SEVERE OBESITY (BMI 35.0-39.9) WITH COMORBIDITY (HCC): ICD-10-CM

## 2022-06-23 DIAGNOSIS — I10 PRIMARY HYPERTENSION: Primary | ICD-10-CM

## 2022-06-23 PROCEDURE — 99214 OFFICE O/P EST MOD 30 MIN: CPT | Performed by: FAMILY MEDICINE

## 2022-06-23 RX ORDER — OMEPRAZOLE 20 MG/1
20 CAPSULE, DELAYED RELEASE ORAL DAILY
Qty: 30 CAPSULE | Refills: 3 | Status: SHIPPED | OUTPATIENT
Start: 2022-06-23 | End: 2022-06-23

## 2022-06-23 RX ORDER — OMEPRAZOLE 20 MG/1
CAPSULE, DELAYED RELEASE ORAL
Qty: 90 CAPSULE | Refills: 1 | Status: SHIPPED | OUTPATIENT
Start: 2022-06-23

## 2022-06-23 RX ORDER — METHOCARBAMOL 500 MG/1
TABLET, FILM COATED ORAL
Qty: 120 TABLET | Refills: 5
Start: 2022-06-23 | End: 2022-07-20 | Stop reason: SDUPTHER

## 2022-06-23 RX ORDER — CYCLOBENZAPRINE HCL 10 MG
10 TABLET ORAL
Qty: 30 TABLET | Refills: 2 | Status: SHIPPED | OUTPATIENT
Start: 2022-06-23

## 2022-06-23 RX ORDER — OMEPRAZOLE 20 MG/1
20 CAPSULE, DELAYED RELEASE ORAL DAILY
COMMUNITY
End: 2022-06-23 | Stop reason: SDUPTHER

## 2022-06-27 NOTE — PROGRESS NOTES
Patient ID: Williams Braga is a 81 y o  female  HPI: 81 y  o female presents for follow up hypertension and hypercholesterolemia  Pt denies any dizziness,  chest pain, palpitations, or dypsnea with exertion  Pt is also here for followup of depression, myofascial pain disorder and is interested in seeing if her plan will cover a weight loss product so we discussed these today  All issues are doing well  Depression Screening Follow-up Plan: Patient's depression screening was positive with a PHQ-2 score of   Their PHQ-9 score was   Clinically patient does not have depression  No treatment is required  She is in full remission with prozac      SUBJECTIVE    Family History  Problem Relation Age of Onset   Atrial fibrillation Mother    Breast cancer Mother 76   Stroke Mother    Coronary artery disease Mother    Diabetes Mother    Pancreatitis Mother    Cancer Mother        Breast   Hyperlipidemia Mother    Pulmonary embolism Mother    Heart attack Father    Arthritis Father    Arthritis Family    Cancer Family    Endometrial cancer Maternal Grandmother 76   Cancer Maternal Grandmother    Prostate cancer Paternal Uncle 54   No Known Problems Sister    No Known Problems Paternal Grandmother    No Known Problems Paternal Grandfather    No Known Problems Maternal Aunt    No Known Problems Paternal Aunt    No Known Problems Paternal Aunt     Social History    Socioeconomic History   Marital status: /Civil Union    Spouse name: Not on file   Number of children: Not on file   Years of education: Not on file   Highest education level: Not on file  Occupational History   Not on file  Tobacco Use   Smoking status: Never Smoker   Smokeless tobacco: Never Used  Vaping Use   Vaping Use: Never used  Substance and Sexual Activity   Alcohol use: Yes    Comment: Rarely drink alcohol   Drug use: Never   Sexual activity: Yes    Partners: Male    Birth control/protection: Female Sterilization    Comment: Hysterectomy 1986  Other Topics Concern   Not on file  Social History Narrative   Drinks coffee    Social Determinants of Health    Financial Resource Strain: Not on file  Food Insecurity: Not on file  Transportation Needs: Not on file  Physical Activity: Not on file  Stress: Not on file  Social Connections: Not on file  Intimate Partner Violence: Not on file  Housing Stability: Not on file    Past Medical History:  Diagnosis Date   Arthritis    Depression    Endometriosis    GERD (gastroesophageal reflux disease)    Hyperlipidemia    Hypertension    Nondisplaced fracture of fifth metatarsal bone, right foot, initial encounter for closed fracture 9/24/2018   Osteopenia    Pancreatitis 06/2017   Pneumonia    Right hip pain 12/4/2018   S/P hip replacement, right 7/31/2018   Patient progressing well after right hip replacement    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   Trochanteric bursitis of right hip 2/12/2019    Past Surgical History:  Procedure Laterality Date   ABDOMINAL SURGERY     endometriosis    APPENDECTOMY     BREAST BIOPSY Right 1985   benign   CARPAL TUNNEL RELEASE     COLONOSCOPY     HAND SURGERY     HIP SURGERY     HYSTERECTOMY Bilateral 1985   JOINT REPLACEMENT Bilateral    KNEE   JOINT REPLACEMENT Left    HIP   OOPHORECTOMY Bilateral 1985   UT ARTHROSCOPY SHOULDER SURGICAL BICEPS TENODESIS Right 5/10/2017   Procedure: ARTHROSCOPIC BICEPS TENODESIS WITH ROTATOR CUFF REPAIR ;  Surgeon: Venkat Prasad MD;  [facility];  Service: Orthopedics   UT Coretha Grapes Right 5/10/2017   Procedure: SHOULDER ARTHROSCOPY SUBACROMIAL DECOMPRESSION;  Surgeon: Venkat Prasad MD;  [facility];  Service: Orthopedics   UT TOTAL HIP ARTHROPLASTY Left 3/12/2018   Procedure: ARTHROPLASTY HIP TOTAL;  Surgeon: Mario Hicks MD;  [facility];  Service: Orthopedics   [address] [place], [place] [address] ARTHROP[facility]Y Right 7/30/2018   Procedure: RIGHT TOTAL HIP ARTHROPLASTY;  Surgeon: Luis Borrego MD;  [facility];  Service: Orthopedics   TONSILLECTOMY      Allergies  Allergen Reactions   Hydromorphone Hcl Itching   Percocet [Oxycodone-Acetaminophen] GI Intolerance and Vomiting   Sulfamethoxazole-Trimethoprim Rash   Tizanidine Diarrhea      Current Outpatient Medications:     acetaminophen (TYLENOL) 500 mg tablet, Take 500 mg by mouth every 6 (six) hours as needed for mild pain, Disp: , Rfl:     calcium carbonate (TUMS) 500 mg chewable tablet, Chew 1 tablet as needed for indigestion or heartburn  , Disp: , Rfl:     calcium-vitamin D (OSCAL) 250-125 MG-UNIT per tablet, Take 1 tablet by mouth daily, Disp: , Rfl:     cyclobenzaprine (FLEXERIL) 10 mg tablet, Take 1 tablet (10 mg total) by mouth daily at bedtime, Disp: 30 tablet, Rfl: 2    FLUoxetine (PROzac) 20 mg capsule, TAKE 1 CAPSULE BY MOUTH  DAILY, Disp: 90 capsule, Rfl: 3    hydrochlorothiazide (HYDRODIURIL) 25 mg tablet, TAKE 1 TABLET BY MOUTH  DAILY, Disp: 90 tablet, Rfl: 3    methocarbamol (ROBAXIN) 500 mg tablet, Take 1-2 tablets p o  T i d  P r n   For muscle spasms, Disp: 120 tablet, Rfl: 5    metoprolol succinate (TOPROL-XL) 50 mg 24 hr tablet, TAKE 1 TABLET(50 MG) BY MOUTH DAILY, Disp: 90 tablet, Rfl: 3    Multiple Vitamins-Minerals (CENTRUM SILVER ULTRA WOMENS PO), Take by mouth daily  , Disp: , Rfl:     Omega-3 1000 MG CAPS, Take by mouth daily , Disp: , Rfl:     atorvastatin (LIPITOR) 20 mg tablet, TAKE 1 TABLET BY MOUTH  DAILY (Patient not taking: Reported on 6/23/2022), Disp: 90 tablet, Rfl: 3    omeprazole (PriLOSEC) 20 mg delayed release capsule, TAKE 1 CAPSULE(20 MG) BY MOUTH DAILY, Disp: 90 capsule, Rfl: 1    Review of Systems  Constitutional:     Denies fever, chills ,fatigue ,weakness ,weight loss, weight gain     ENT: Denies earache ,loss of hearing ,nosebleed, nasal discharge,nasal congestion ,sore throat ,hoarseness  Pulmonary: Denies shortness of breath ,cough  ,dyspnea on exertion, orthopnea  ,PND   Cardiovascular:  Denies bradycardia , tachycardia  ,palpations, lower extremity edema leg, claudication  Breast:  Denies new or changing breast lumps ,nipple discharge ,nipple changes  Abdomen:  Denies abdominal pain , anorexia , indigestion, nausea, vomiting, constipation, diarrhea  Musculoskeletal: Denies myalgias, arthralgias, joint swelling, joint stiffness , limb pain, limb swelling  Gu: denies dysuria, polyuria  Skin: Denies skin rash, skin lesion, skin wound, itching, dry skin  Neuro: Denies headache, numbness, tingling, confusion, loss of consciousness, dizziness, vertigo  Psychiatric: Denies feelings of depression, suicidal ideation, anxiety, sleep disturbances    OBJECTIVE  /72   Pulse 69   Temp 98 °F (36 7 °C)   Ht 5' (1 524 m)   Wt 93 7 kg (206 lb 9 6 oz)   LMP  (LMP Unknown) Comment: hysterectomy  SpO2 96%   BMI 40 35 kg/m²   Constitutional:   NAD, well appearing and well nourished      ENT:   Conjunctiva and lids: no injection, edema, or discharge     Pupils and iris: CORNELIA bilaterally    External inspection of ears and nose: normal without deformities or discharge  Otoscopic exam: Canals patent without erythema  Nasal mucosa, septum and turbinates: Normal or edema or discharge         Oropharynx:  Moist mucosa, normal tongue and tonsils without lesions  No erythema        Pulmonary:Respiratory effort normal rate and rhythm, no increased work of breathing   Auscultation of lungs:  Clear bilaterally with no adventitious breath sounds       Cardiovascular: regular rate and rhythm, S1 and S2, no murmur, no edema and/or varicosities of LE      Abdomen: Soft and non-distended     Positive bowel sounds      No heptomegaly or splenomegaly      Gu: no suprapubic tenderness or CVA tenderness, no urethral discharge  Lymphatic:  No anterior or posterior cervical lymphadenopathy Musculoskeletal:  Gait and station: Normal gait      Digits and nails normal without clubbing or cyanosis       Inspection/palpation of joints, bones, and muscles:  No joint tenderness, swelling, full active and passive range of motion       Skin: Normal skin turgor and no rashes      Neuro    Normal reflexes     Psych:   alert and oriented to person, place and time     normal mood and affect       Assessment/Plan:Diagnoses and all orders for this visit:    Primary hypertension  Comments:  Continue beta blocker thepray   Orders:  -     Comprehensive metabolic panel; Future    Mixed hyperlipidemia  Comments:  Conitnue statin tx  Orders:  -     Lipid Panel with Direct LDL reflex; Future    Gastroesophageal reflux disease without esophagitis  Comments:  Continue low dose omeprazole tx  Orders:  -     Discontinue: omeprazole (PriLOSEC) 20 mg delayed release capsule; Take 1 capsule (20 mg total) by mouth daily    Myofascial pain syndrome  Comments:  Stable on flexeril and prn antiinflammatories  Orders:  -     methocarbamol (ROBAXIN) 500 mg tablet; Take 1-2 tablets p o  T i d  P r n  For muscle spasms  -     cyclobenzaprine (FLEXERIL) 10 mg tablet; Take 1 tablet (10 mg total) by mouth daily at bedtime    Recurrent major depressive disorder, in full remission ([facility] [address] )  Comments:  Stable on fluoxetine tx  Severe obesity (BMI 35 0-39  9) with comorbidity ([facility] [address] )  Comments:  Discussed several meds with pt- she is to check wiht her plan which med will be covered  Other orders  -     Discontinue: omeprazole (PriLOSEC) 20 mg delayed release capsule; Take 20 mg by mouth daily    I will see patient back in 4 mos or sooner prn

## 2022-07-20 ENCOUNTER — TELEPHONE (OUTPATIENT)
Dept: PAIN MEDICINE | Facility: CLINIC | Age: 72
End: 2022-07-20

## 2022-07-20 ENCOUNTER — TELEPHONE (OUTPATIENT)
Dept: FAMILY MEDICINE CLINIC | Facility: CLINIC | Age: 72
End: 2022-07-20

## 2022-07-20 DIAGNOSIS — M79.18 MYOFASCIAL PAIN SYNDROME: ICD-10-CM

## 2022-07-20 RX ORDER — METHOCARBAMOL 500 MG/1
TABLET, FILM COATED ORAL
Qty: 120 TABLET | Refills: 5 | Status: SHIPPED | OUTPATIENT
Start: 2022-07-20

## 2022-07-20 NOTE — TELEPHONE ENCOUNTER
RN informed pt that Epic shows that her PCP sent a Rx for the methocarbamol with 5 RF to [facility] on 6/23/22  Pt was not aware of that and appreciated that info

## 2022-07-20 NOTE — TELEPHONE ENCOUNTER
[facility] is stating to patient that they never received the prescription for her Robaxin that was sent on 6/23    She is asking if it can please be resent to [facility] pharmacy in [place]

## 2022-07-20 NOTE — TELEPHONE ENCOUNTER
Pt contacted Call Center requested refill of their medication  Medication Name:methocarbamol (ROBAXIN          Dosage of Med:500 mg       Frequency of Med:Take 1-2 tablets p o  T i d  P r n  For muscle spasms      Remaining Medication: 2 weeks       [facility] [address] [place] [address] [phone]  [phone]      Pt  Preferred Callback Phone Number:[phone]      Thank you

## 2022-07-21 DIAGNOSIS — E78.00 PURE HYPERCHOLESTEROLEMIA: ICD-10-CM

## 2022-07-21 RX ORDER — ATORVASTATIN CALCIUM 20 MG/1
TABLET, FILM COATED ORAL
Qty: 90 TABLET | Refills: 3 | Status: SHIPPED | OUTPATIENT
Start: 2022-07-21

## 2022-07-25 ENCOUNTER — CONSULT (OUTPATIENT)
Dept: NEUROSURGERY | Facility: CLINIC | Age: 72
End: 2022-07-25
Payer: MEDICARE

## 2022-07-25 VITALS
WEIGHT: 200 LBS | HEIGHT: 60 IN | TEMPERATURE: 98.6 F | RESPIRATION RATE: 16 BRPM | DIASTOLIC BLOOD PRESSURE: 82 MMHG | BODY MASS INDEX: 39.27 KG/M2 | SYSTOLIC BLOOD PRESSURE: 140 MMHG | HEART RATE: 88 BPM

## 2022-07-25 DIAGNOSIS — M51.16 LUMBAR DISC DISEASE WITH RADICULOPATHY: ICD-10-CM

## 2022-07-25 DIAGNOSIS — M48.061 DEGENERATIVE LUMBAR SPINAL STENOSIS: ICD-10-CM

## 2022-07-25 PROCEDURE — 99203 OFFICE O/P NEW LOW 30 MIN: CPT | Performed by: STUDENT IN AN ORGANIZED HEALTH CARE EDUCATION/TRAINING PROGRAM

## 2022-07-25 RX ORDER — IBUPROFEN 200 MG
TABLET ORAL EVERY 4 HOURS PRN
COMMUNITY

## 2022-07-25 RX ORDER — ARNICA MONTANA 1 [HP_X]/G
GEL TOPICAL CONTINUOUS PRN
COMMUNITY

## 2022-07-25 NOTE — PROGRESS NOTES
Office Note - Neurosurgery   Paul Ireland 81 y o  female MRN: [id number]      Assessment and Plan: This is a 71-year-old female presenting with chronic low back pain  MRI of her lumbar spine demonstrates diffuse degeneration most significantly at L4-5 and L5-S1  At L4-5, the patient has a very mild spondylolisthesis with right foraminal stenosis  At L5-S1, the patient has left-sided foraminal stenosis  I had a long discussion with the patient about her symptoms as well as imaging findings  I believe the left leg numbness could be due to the foraminal stenosis at L5-S1  In regards to her lower back pain, it is unclear the etiology of it  I explained to the patient that the degeneration at L4-5 and F4-B9 could certainly cause the pain however she has a BMI of 39 and given that her symptoms improve when she is in the pool, I do believe there is a weight component to this  She is also 81years old, and I would not want to put her through a 2 level fusion procedure without clear etiology  I advised the patient to continue her water aerobics  I referred the patient to chiropractic  I also advised the patient to continue in her efforts for weight loss  I told the patient to continue with Dr Kelly Solo for further treatments  I will see her on a p r n  basis  History, physical examination and diagnostic tests were reviewed and questions answered  Diagnosis, care plan and treatment options were discussed  The patient and spouse/SO understand instructions and will follow up as directed  Follow-up:  P r n  Diagnoses and all orders for this visit:    Lumbar disc disease with radiculopathy  -     Ambulatory referral to Neurosurgery    Degenerative lumbar spinal stenosis  -     Ambulatory referral to Neurosurgery    Other orders  -     ibuprofen (MOTRIN) 200 mg tablet; Take by mouth every 4 (four) hours as needed for mild pain  -     Homeopathic Products ([employer]) GEL;  Apply topically continuous as needed        Subjective/Objective     Chief Complaint    Low back pain    HPI    This is a 72-year-old female with history of severe osteoarthritis status post bilateral hip and knee replacement presenting with low back pain  The patient states she has had chronic back issues however her pain has worsened in the last 4 years  She reports having low back pain which goes across her lower back  The pain comes on when she is upright and ambulating  She states she is not able to perform any of her usual activities for prolonged period of time  Whenever she stands for a long period of time she occasionally will get numbness that goes down the left leg in the posterior lateral aspect of her thigh to her lateral aspect of her leg to her foot  She denies any right-sided symptoms  Overall the back pain is her biggest issue  The patient has had many treatments for this including injections as well as ablation therapy all which have not helped  The patient undergoes water aerobics and states this helps her exercise and also helps her feel better  She also takes Robaxin and Flexeril as well as ibuprofen for relief  ROS  ROS personally reviewed and updated  Review of Systems   Constitutional: Negative  HENT: Negative  Eyes: Negative  Respiratory: Negative  Cardiovascular: Negative  Gastrointestinal: Negative  Endocrine: Negative  Genitourinary: Negative  Musculoskeletal: Positive for back pain (LBP,at worst with spasms, burning, radiating into bi/hips & buttocks) and myalgias (spasms in lower back with activity)  Skin: Negative  Allergic/Immunologic: Negative  Neurological: Positive for numbness (left hip down into toes with numbness)  Hematological: Bruises/bleeds easily (as 81 mg)  Psychiatric/Behavioral: Negative          Family History    Family History   Problem Relation Age of Onset    Atrial fibrillation Mother     Breast cancer Mother 76    Stroke Mother     Coronary artery disease Mother     Diabetes Mother     Pancreatitis Mother     Cancer Mother         Breast    Hyperlipidemia Mother     Pulmonary embolism Mother     Heart attack Father     Arthritis Father     Arthritis Family     Cancer Family     Endometrial cancer Maternal Grandmother 76    Cancer Maternal Grandmother     Prostate cancer Paternal Uncle 54    No Known Problems Sister     No Known Problems Paternal Grandmother     No Known Problems Paternal Grandfather     No Known Problems Maternal Aunt     No Known Problems Paternal Aunt     No Known Problems Paternal Aunt        Social History    Social History     Socioeconomic History    Marital status: /Civil Union     Spouse name: Not on file    Number of children: Not on file    Years of education: Not on file    Highest education level: Not on file   Occupational History    Not on file   Tobacco Use    Smoking status: Never Smoker    Smokeless tobacco: Never Used   Vaping Use    Vaping Use: Never used   Substance and Sexual Activity    Alcohol use: Yes     Comment: Rarely drink alcohol    Drug use: Never    Sexual activity: Yes     Partners: Male     Birth control/protection: Female Sterilization     Comment: Hysterectomy 1986   Other Topics Concern    Not on file   Social History Narrative    Drinks coffee     Social Determinants of Health     Financial Resource Strain: Not on file   Food Insecurity: Not on file   Transportation Needs: Not on file   Physical Activity: Not on file   Stress: Not on file   Social Connections: Not on file   Intimate Partner Violence: Not on file   Housing Stability: Not on file       Past Medical History    Past Medical History:   Diagnosis Date    Arthritis     Depression     Endometriosis     GERD (gastroesophageal reflux disease)     Hyperlipidemia     Hypertension     Nondisplaced fracture of fifth metatarsal bone, right foot, initial encounter for closed fracture 9/24/2018    Osteopenia     Pancreatitis 06/2017    Pneumonia     Right hip pain 12/4/2018    S/P hip replacement, right 7/31/2018    Patient progressing well after right hip replacement     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    Trochanteric bursitis of right hip 2/12/2019       Surgical History    Past Surgical History:   Procedure Laterality Date    ABDOMINAL SURGERY      endometriosis     APPENDECTOMY      BREAST BIOPSY Right 1985    benign    CARPAL TUNNEL RELEASE      COLONOSCOPY      HAND SURGERY      HIP SURGERY      HYSTERECTOMY Bilateral 1985    JOINT REPLACEMENT Bilateral     KNEE    JOINT REPLACEMENT Left     HIP    OOPHORECTOMY Bilateral 1985    [place] ARTHROSCOPY SHOULDER SURGICAL BICEPS TENODESIS Right 5/10/2017    Procedure: ARTHROSCOPIC BICEPS TENODESIS WITH ROTATOR CUFF REPAIR ;  Surgeon: Calos Mcnally MD;  [facility];  Service: Orthopedics    [place] Right 5/10/2017    Procedure: SHOULDER ARTHROSCOPY SUBACROMIAL DECOMPRESSION;  Surgeon: Calos Mcnally MD;  [facility];  Service: Orthopedics    [place] TOTAL HIP ARTHROPLASTY Left 3/12/2018    Procedure: ARTHROPLASTY HIP TOTAL;  Surgeon: Kelley Mike MD;  [facility];  Service: Orthopedics    [place] TOTAL HIP ARTHROPLASTY Right 7/30/2018    Procedure: RIGHT TOTAL HIP ARTHROPLASTY;  Surgeon: Kelley Mike MD;  [facility];  Service: Orthopedics    TONSILLECTOMY         Medications      Current Outpatient Medications:     acetaminophen (TYLENOL) 500 mg tablet, Take 500 mg by mouth every 6 (six) hours as needed for mild pain, Disp: , Rfl:     atorvastatin (LIPITOR) 20 mg tablet, TAKE 1 TABLET BY MOUTH  DAILY, Disp: 90 tablet, Rfl: 3    calcium-vitamin D (OSCAL) 250-125 MG-UNIT per tablet, Take 1 tablet by mouth daily, Disp: , Rfl:     cyclobenzaprine (FLEXERIL) 10 mg tablet, Take 1 tablet (10 mg total) by mouth daily at bedtime, Disp: 30 tablet, Rfl: 2   FLUoxetine (PROzac) 20 mg capsule, TAKE 1 CAPSULE BY MOUTH  DAILY, Disp: 90 capsule, Rfl: 3    hydrochlorothiazide (HYDRODIURIL) 25 mg tablet, TAKE 1 TABLET BY MOUTH  DAILY, Disp: 90 tablet, Rfl: 3    methocarbamol (ROBAXIN) 500 mg tablet, Take 1-2 tablets p o  T i d  P r n  For muscle spasms, Disp: 120 tablet, Rfl: 5    metoprolol succinate (TOPROL-XL) 50 mg 24 hr tablet, TAKE 1 TABLET(50 MG) BY MOUTH DAILY, Disp: 90 tablet, Rfl: 3    Multiple Vitamins-Minerals (CENTRUM SILVER ULTRA WOMENS PO), Take by mouth daily  , Disp: , Rfl:     Omega-3 1000 MG CAPS, Take by mouth daily , Disp: , Rfl:     omeprazole (PriLOSEC) 20 mg delayed release capsule, TAKE 1 CAPSULE(20 MG) BY MOUTH DAILY, Disp: 90 capsule, Rfl: 1    calcium carbonate (TUMS) 500 mg chewable tablet, Chew 1 tablet as needed for indigestion or heartburn  , Disp: , Rfl:     Allergies    Allergies   Allergen Reactions    Hydromorphone Hcl Itching    Percocet [Oxycodone-Acetaminophen] GI Intolerance and Vomiting    Sulfamethoxazole-Trimethoprim Rash    Tizanidine Diarrhea       The following portions of the patient's history were reviewed and updated as appropriate: allergies, current medications, past family history, past medical history, past social history, past surgical history and problem list     Investigations    I personally reviewed the MRI results with the patient:      Physical Exam    Vitals:  Blood pressure 140/82, pulse 88, temperature 98 6 °F (37 °C), temperature source Tympanic, resp  rate 16, height 5' (1 524 m), weight 90 7 kg (200 lb), not currently breastfeeding  ,Body mass index is 39 06 kg/m²      General:  Normal, well developed, not in distress/pain     Skin:   No issues, no rashes noted     Musculoskeletal:   5/5 strength throughout all muscle groups  No tenderness to palpation of the spine       Neurologic Exam:  Awake and alert  Oriented x3  Speech clear and fluent  CASTLE   Sensation to light touch and pin prick intact throughout  No silva's  No clonus  2+ patellar reflexes     Gait:   normal gait, normal posture

## 2022-08-02 ENCOUNTER — APPOINTMENT (OUTPATIENT)
Dept: RADIOLOGY | Facility: MEDICAL CENTER | Age: 72
End: 2022-08-02
Payer: MEDICARE

## 2022-08-02 ENCOUNTER — HOSPITAL ENCOUNTER (OUTPATIENT)
Dept: RADIOLOGY | Facility: MEDICAL CENTER | Age: 72
Discharge: HOME | End: 2022-08-02
Payer: MEDICARE

## 2022-08-02 DIAGNOSIS — Z78.0 ASYMPTOMATIC POSTMENOPAUSAL ESTROGEN DEFICIENCY: ICD-10-CM

## 2022-08-02 DIAGNOSIS — Z12.31 ENCOUNTER FOR SCREENING MAMMOGRAM FOR MALIGNANT NEOPLASM OF BREAST: ICD-10-CM

## 2022-08-02 PROCEDURE — 77080 DXA BONE DENSITY AXIAL: CPT

## 2022-08-02 PROCEDURE — 77067 SCR MAMMO BI INCL CAD: CPT

## 2022-08-02 PROCEDURE — 77063 BREAST TOMOSYNTHESIS BI: CPT

## 2022-08-08 NOTE — TELEPHONE ENCOUNTER
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 Plan: Lost follow up pt. has since been dx. w/Lymphoma  and rec'd treatment. Unclear if possible correlation. Exam is normal today . Continue to monitor closely. scheduled pt for L4 LESI for 9/9/21  Pt denies rx blood thinners  Went over pre-procedure instructions below:  Nothing to eat or drink 1 hr prior to procedure  Need to arrange transportation  Proper clothing for procedure  If ill or placed on antibiotics please call to reschedule  Covid/travel/ and vaccine instructions

## 2022-10-17 DIAGNOSIS — F39 AFFECTIVE DISORDER (HCC): ICD-10-CM

## 2022-10-17 DIAGNOSIS — M79.18 MYOFASCIAL PAIN SYNDROME: ICD-10-CM

## 2022-10-17 DIAGNOSIS — I10 ESSENTIAL HYPERTENSION: ICD-10-CM

## 2022-10-17 RX ORDER — HYDROCHLOROTHIAZIDE 25 MG/1
TABLET ORAL
Qty: 90 TABLET | Refills: 3 | Status: SHIPPED | OUTPATIENT
Start: 2022-10-17

## 2022-10-17 RX ORDER — CYCLOBENZAPRINE HCL 10 MG
10 TABLET ORAL
Qty: 30 TABLET | Refills: 0 | Status: SHIPPED | OUTPATIENT
Start: 2022-10-17

## 2022-10-17 RX ORDER — FLUOXETINE HYDROCHLORIDE 20 MG/1
CAPSULE ORAL
Qty: 90 CAPSULE | Refills: 3 | Status: SHIPPED | OUTPATIENT
Start: 2022-10-17

## 2022-11-10 DIAGNOSIS — M79.18 MYOFASCIAL PAIN SYNDROME: ICD-10-CM

## 2022-11-10 RX ORDER — CYCLOBENZAPRINE HCL 10 MG
10 TABLET ORAL
Qty: 30 TABLET | Refills: 0 | Status: SHIPPED | OUTPATIENT
Start: 2022-11-10

## 2022-12-12 ENCOUNTER — OFFICE VISIT (OUTPATIENT)
Dept: FAMILY MEDICINE CLINIC | Facility: CLINIC | Age: 72
End: 2022-12-12

## 2022-12-12 VITALS
HEIGHT: 60 IN | BODY MASS INDEX: 40.13 KG/M2 | HEART RATE: 72 BPM | TEMPERATURE: 96 F | WEIGHT: 204.4 LBS | SYSTOLIC BLOOD PRESSURE: 136 MMHG | OXYGEN SATURATION: 99 % | DIASTOLIC BLOOD PRESSURE: 80 MMHG

## 2022-12-12 DIAGNOSIS — H69.83 EUSTACHIAN TUBE DYSFUNCTION, BILATERAL: Primary | ICD-10-CM

## 2022-12-12 DIAGNOSIS — M79.18 MYOFASCIAL PAIN SYNDROME: ICD-10-CM

## 2022-12-12 RX ORDER — CYCLOBENZAPRINE HCL 10 MG
10 TABLET ORAL
Qty: 90 TABLET | Refills: 1 | Status: SHIPPED | OUTPATIENT
Start: 2022-12-12

## 2022-12-12 RX ORDER — CEFUROXIME AXETIL 500 MG/1
500 TABLET ORAL EVERY 12 HOURS SCHEDULED
Qty: 20 TABLET | Refills: 0 | Status: SHIPPED | OUTPATIENT
Start: 2022-12-12 | End: 2022-12-23

## 2022-12-12 RX ORDER — PREDNISONE 10 MG/1
TABLET ORAL
Qty: 26 TABLET | Refills: 0 | Status: SHIPPED | OUTPATIENT
Start: 2022-12-12 | End: 2022-12-23 | Stop reason: ALTCHOICE

## 2022-12-13 NOTE — PROGRESS NOTES
Patient ID: Reji Henderson is a 81 y o  female  HPI: 81 y  o female presenting with symptoms of ear pressure, crackling of ears and dizziness associated with positional changes  Symptoms for one week  She also needs a refill of her meds for myofacial pain syndrome         SUBJECTIVE    Family History   Problem Relation Age of Onset   • Atrial fibrillation Mother    • Breast cancer Mother 76   • Stroke Mother    • Coronary artery disease Mother    • Diabetes Mother    • Pancreatitis Mother    • Cancer Mother         Breast   • Hyperlipidemia Mother    • Pulmonary embolism Mother    • Heart attack Father    • Arthritis Father    • Arthritis Family    • Cancer Family    • Endometrial cancer Maternal Grandmother 76   • Cancer Maternal Grandmother    • Prostate cancer Paternal Uncle 54   • No Known Problems Sister    • No Known Problems Paternal Grandmother    • No Known Problems Paternal Grandfather    • No Known Problems Maternal Aunt    • No Known Problems Paternal Aunt    • No Known Problems Paternal Aunt      Social History     Socioeconomic History   • Marital status: /Civil Union     Spouse name: Not on file   • Number of children: Not on file   • Years of education: Not on file   • Highest education level: Not on file   Occupational History   • Not on file   Tobacco Use   • Smoking status: Never   • Smokeless tobacco: Never   Vaping Use   • Vaping Use: Never used   Substance and Sexual Activity   • Alcohol use: Yes     Comment: Rarely drink alcohol   • Drug use: Never   • Sexual activity: Yes     Partners: Male     Birth control/protection: Female Sterilization     Comment: Hysterectomy 1986   Other Topics Concern   • Not on file   Social History Narrative    Drinks coffee     Social Determinants of Health     Financial Resource Strain: Not on file   Food Insecurity: Not on file   Transportation Needs: Not on file   Physical Activity: Not on file   Stress: Not on file   Social Connections: Not on file Intimate Partner Violence: Not on file   Housing Stability: Not on file     Past Medical History:   Diagnosis Date   • Arthritis    • Depression    • Endometriosis    • GERD (gastroesophageal reflux disease)    • Hyperlipidemia    • Hypertension    • Nondisplaced fracture of fifth metatarsal bone, right foot, initial encounter for closed fracture 9/24/2018   • Osteopenia    • Pancreatitis 06/2017   • Pneumonia    • Right hip pain 12/4/2018   • S/P hip replacement, right 7/31/2018    Patient progressing well after right hip replacement    • 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   • Trochanteric bursitis of right hip 2/12/2019     Past Surgical History:   Procedure Laterality Date   • ABDOMINAL SURGERY      endometriosis    • APPENDECTOMY     • BREAST BIOPSY Right 1985    benign   • CARPAL TUNNEL RELEASE     • COLONOSCOPY     • HAND SURGERY     • HIP SURGERY     • HYSTERECTOMY Bilateral 1985   • JOINT REPLACEMENT Bilateral     KNEE   • JOINT REPLACEMENT Left     HIP   • OOPHORECTOMY Bilateral 1985   • [place] ARTHROSCOPY SHOULDER SURGICAL BICEPS TENODESIS Right 5/10/2017    Procedure: ARTHROSCOPIC BICEPS TENODESIS WITH ROTATOR CUFF REPAIR ;  Surgeon: Berry Plascencia MD;  [facility];  Service: Orthopedics   • [place] SHLDR [place] Right 5/10/2017    Procedure: SHOULDER ARTHROSCOPY SUBACROMIAL DECOMPRESSION;  Surgeon: Berry Plascencia MD;  [facility];  Service: Orthopedics   • [place] TOTAL HIP ARTHROPLASTY Left 3/12/2018    Procedure: ARTHROPLASTY HIP TOTAL;  Surgeon: Nino Almeida MD;  [facility];  Service: Orthopedics   • [place] TOTAL HIP ARTHROPLASTY Right 7/30/2018    Procedure: RIGHT TOTAL HIP ARTHROPLASTY;  Surgeon: Nino Almeida MD;  [facility];  Service: Orthopedics   • TONSILLECTOMY       Allergies   Allergen Reactions   • Hydromorphone Hcl Itching   • Percocet [Oxycodone-Acetaminophen] GI Intolerance and Vomiting   • Sulfamethoxazole-Trimethoprim Rash   • Tizanidine Diarrhea       Current Outpatient Medications:   •  cefuroxime (CEFTIN) 500 mg tablet, Take 1 tablet (500 mg total) by mouth every 12 (twelve) hours for 10 days, Disp: 20 tablet, Rfl: 0  •  cyclobenzaprine (FLEXERIL) 10 mg tablet, Take 1 tablet (10 mg total) by mouth daily at bedtime, Disp: 90 tablet, Rfl: 1  •  predniSONE 10 mg tablet, 3 tabs po bid x2 days, then 2 tabs po bid x2 days, then 1 tab bid x2 days, then 1 daily until done , Disp: 26 tablet, Rfl: 0  •  acetaminophen (TYLENOL) 500 mg tablet, Take 500 mg by mouth every 6 (six) hours as needed for mild pain, Disp: , Rfl:   •  atorvastatin (LIPITOR) 20 mg tablet, TAKE 1 TABLET BY MOUTH  DAILY, Disp: 90 tablet, Rfl: 3  •  calcium carbonate (TUMS) 500 mg chewable tablet, Chew 1 tablet as needed for indigestion or heartburn  , Disp: , Rfl:   •  calcium-vitamin D (OSCAL) 250-125 MG-UNIT per tablet, Take 1 tablet by mouth daily, Disp: , Rfl:   •  FLUoxetine (PROzac) 20 mg capsule, TAKE 1 CAPSULE BY MOUTH  DAILY, Disp: 90 capsule, Rfl: 3  •  Homeopathic Products ([employer]) GEL, Apply topically continuous as needed, Disp: , Rfl:   •  hydrochlorothiazide (HYDRODIURIL) 25 mg tablet, TAKE 1 TABLET BY MOUTH  DAILY, Disp: 90 tablet, Rfl: 3  •  ibuprofen (MOTRIN) 200 mg tablet, Take by mouth every 4 (four) hours as needed for mild pain, Disp: , Rfl:   •  methocarbamol (ROBAXIN) 500 mg tablet, Take 1-2 tablets p o  T i d  P r n   For muscle spasms, Disp: 120 tablet, Rfl: 5  •  metoprolol succinate (TOPROL-XL) 50 mg 24 hr tablet, TAKE 1 TABLET(50 MG) BY MOUTH DAILY, Disp: 90 tablet, Rfl: 3  •  Multiple Vitamins-Minerals (CENTRUM SILVER ULTRA WOMENS PO), Take by mouth daily  , Disp: , Rfl:   •  Omega-3 1000 MG CAPS, Take by mouth daily , Disp: , Rfl:   •  omeprazole (PriLOSEC) 20 mg delayed release capsule, TAKE 1 CAPSULE(20 MG) BY MOUTH DAILY, Disp: 90 capsule, Rfl: 1    Review of Systems  Constitutional:     Denies fever, chills, fatigue, weakness ,weight loss, weight gain       ENT: Denies earache, loss of hearing, nosebleed, nasal discharge,nasal congestion, sore throat,hoarseness, but complains of ear pressure,and crackling    Pulmonary: Denies shortness of breath ,cough , dyspnea on exertionon, orthopnea , PND   Cardiovascular:  Denies bradycardia , tachycardia ,palpations, lower extremity, edema leg, claudication  Breast:  Denies new or changing breast lumps,  nipple discharge, nipple changes,  Abdomen:  Denies abdominal pain , anorexia ,indigestion, nausea ,vomiting, constipation , diarrhea  Musculoskeletal: Denies myalgias, arthralgias, joint swelling, joint stiffness ,limb pain, limb swelling  Lymph:denies swollen glands  Gu: no dysuria or urinary frequency  Skin: Denies skin rash, skin lesion, skin wound, itching,dry skinNeuro: Denies headache, numbness, tingling, confusion, loss of consciousness, but complains of postitional vertigo  Psychiatric: Denies feelings of depression, suicidal ideation, anxiety, sleep disturbances    OBJECTIVE  /80 (BP Location: Right arm, Patient Position: Sitting, Cuff Size: Large)   Pulse 72   Temp (!) 96 °F (35 6 °C)   Ht 5' (1 524 m)   Wt 92 7 kg (204 lb 6 4 oz)   LMP  (LMP Unknown) Comment: hysterectomy  SpO2 99%   BMI 39 92 kg/m²   Constitutional:   NAD, well appearing and well nourished      ENT:   Conjunctiva and lids: no injection, edema, or discharge     Pupils and iris: CORNELIA bilaterally    External inspection of ears and nose: normal without deformities or discharge  Otoscopic exam: Canals patent with tm dull, no erythema,but large effusions noted bilaterally  ENasal mucosa, septum and turbinates: Turbinae injection with discharge   Oropharynx:  Moist mucosa, normal tongue and tonsils without lesions  Erythema and injection  of post pharynx with pnd      Pulmonary:Respiratory effort normal rate and rhythm, no increased work of breathing   Auscultation of lungs:  Clear bilaterally with no adventitious breath sounds       Cardiovascular: regular rate and rhythm, S1 and S2, no murmur, no edema and/or varicosities of LE      Abdomen: Soft and non-distended     Positive bowel sounds      No heptomegaly or splenomegaly      Lymphatic: Anterior and posterior cervical lymphadenopathy         Muscskeletal:  Gait and station: Normal gait      Digits and nails normal without clubbing or cyanosis       Inspection/palpation of joints, bones, and muscles:  No joint tenderness, swelling, full active and passive range of motion       Gu: no suprabubic tenderness, CVA tenderness or urethral discharge  Skin: Normal skin turgor and no rashes      Neuro:       Normal reflexes     Psych:   alert and oriented to person, place and time     normal mood and affect       Assessment/Plan:Diagnoses and all orders for this visit:    Eustachian tube dysfunction, bilateral  -     cefuroxime (CEFTIN) 500 mg tablet; Take 1 tablet (500 mg total) by mouth every 12 (twelve) hours for 10 days  -     predniSONE 10 mg tablet; 3 tabs po bid x2 days, then 2 tabs po bid x2 days, then 1 tab bid x2 days, then 1 daily until done  Myofascial pain syndrome  Comments:  Stable on flexeril and prn antiinflammatories  Orders:  -     cyclobenzaprine (FLEXERIL) 10 mg tablet; Take 1 tablet (10 mg total) by mouth daily at bedtime        Reviewed with patient plan to treat with above plan      Patient instructed to call in 72 hours if not feeling better or if symptoms worsen

## 2022-12-23 ENCOUNTER — OFFICE VISIT (OUTPATIENT)
Dept: FAMILY MEDICINE CLINIC | Facility: CLINIC | Age: 72
End: 2022-12-23

## 2022-12-23 ENCOUNTER — NURSE TRIAGE (OUTPATIENT)
Dept: OTHER | Facility: OTHER | Age: 72
End: 2022-12-23

## 2022-12-23 ENCOUNTER — TELEPHONE (OUTPATIENT)
Dept: FAMILY MEDICINE CLINIC | Facility: CLINIC | Age: 72
End: 2022-12-23

## 2022-12-23 VITALS
HEART RATE: 80 BPM | BODY MASS INDEX: 39.92 KG/M2 | TEMPERATURE: 98.4 F | OXYGEN SATURATION: 100 % | SYSTOLIC BLOOD PRESSURE: 134 MMHG | HEIGHT: 60 IN | DIASTOLIC BLOOD PRESSURE: 74 MMHG

## 2022-12-23 DIAGNOSIS — H69.83 EUSTACHIAN TUBE DYSFUNCTION, BILATERAL: Primary | ICD-10-CM

## 2022-12-23 DIAGNOSIS — H69.83 EUSTACHIAN TUBE DYSFUNCTION, BILATERAL: ICD-10-CM

## 2022-12-23 RX ORDER — MELOXICAM 15 MG/1
TABLET ORAL
Qty: 90 TABLET | Refills: 1 | Status: SHIPPED | OUTPATIENT
Start: 2022-12-23

## 2022-12-23 RX ORDER — MELOXICAM 15 MG/1
15 TABLET ORAL DAILY
Qty: 30 TABLET | Refills: 5 | Status: SHIPPED | OUTPATIENT
Start: 2022-12-23 | End: 2022-12-23

## 2022-12-23 NOTE — TELEPHONE ENCOUNTER
Patient was here on 12/12 for vertigo and prescribed medication    She said last night she began to have pain in both of her ears    She denies having any other symptoms     She is asking for an appointment today or for medication to be sent to the pharmacy

## 2022-12-23 NOTE — TELEPHONE ENCOUNTER
Reason for Disposition  • Earache  (Exceptions: brief ear pain of < 60 minutes duration, earache occurring during air travel    Answer Assessment - Initial Assessment Questions  1  LOCATION: "Which ear is involved?"      Both ears  Right ear seems worse  2  ONSET: "When did the ear start hurting"       Started last evening  3  SEVERITY: "How bad is the pain?"  (Scale 1-10; mild, moderate or severe)    - MILD (1-3): doesn't interfere with normal activities     - MODERATE (4-7): interferes with normal activities or awakens from sleep     - SEVERE (8-10): excruciating pain, unable to do any normal activities             No pain right now- did take Tramadol this morning  At 0530 pain was 10/10- woke her from sleep  4  URI SYMPTOMS: "Do you have a runny nose or cough?"      Denies    5  FEVER: "Do you have a fever?" If Yes, ask: "What is your temperature, how was it measured, and when did it start?"      Denies fever  6  CAUSE: "Have you been swimming recently?", "How often do you use Q-TIPS?", "Have you had any recent air travel or scuba diving?"      12/12- Treated for double ear infection with Ceftin and prednisone- completed both on Tues and Wed 7  OTHER SYMPTOMS: "Do you have any other symptoms?" (e g , headache, stiff neck, dizziness, vomiting, runny nose, decreased hearing)      No other symptoms  Protocols used: EARACHE-ADULT-      Please reach out to pt to schedule same day appt  Unable to schedule due to appt blocks

## 2022-12-23 NOTE — PROGRESS NOTES
Name: Alem Fung      : 1950      MRN: [id number]  Encounter Provider: SHAVONNE Iqbal  Encounter Date: 2022   Encounter department: [address] [place] [address]     1  Eustachian tube dysfunction, bilateral  Discussed with patient plan to treat with meloxicam to reduce inflmmation  Patient instructed to call if no improvement in 72 hours or symptoms worsen       Subjective      72 y  o female presenting with bilateral ear pain for the past few days  Patient was seen in the office on 2022 and was diagnosed with bilateral eustachian tube dysfunction  She was placed on a 10-day course of Ceftin and a tapering dose of prednisone which she has completed  Patient said she also tried to use an antihistamine both Allegra and Claritin but had some side effects of nausea with both  Patient states that last night her right ear was extremely painful and she tried to use some Tylenol to manage  The Tylenol helped for a while but then she also put a warm compress on the ear with some relief of her pain  She has tramadol for chronic back issues that she does not take that often but she does state that the pain in her ear was so bad that she did take it this morning which has helped  Patient reports that she has been having these symptoms on and off since prior to Thanksgiving weekend  Review of Systems   HENT: Positive for ear pain  Negative for congestion and hearing loss  Respiratory: Negative for cough, chest tightness, shortness of breath and wheezing  Cardiovascular: Negative for chest pain and palpitations  Gastrointestinal: Negative for abdominal distention, abdominal pain, diarrhea and nausea  Musculoskeletal: Negative for myalgias  Neurological: Negative for dizziness and headaches  Psychiatric/Behavioral: Negative          Current Outpatient Medications on File Prior to Visit   Medication Sig   • atorvastatin (LIPITOR) 20 mg tablet TAKE 1 TABLET BY MOUTH  DAILY   • calcium-vitamin D (OSCAL) 250-125 MG-UNIT per tablet Take 1 tablet by mouth daily   • cyclobenzaprine (FLEXERIL) 10 mg tablet Take 1 tablet (10 mg total) by mouth daily at bedtime   • FLUoxetine (PROzac) 20 mg capsule TAKE 1 CAPSULE BY MOUTH  DAILY   • Homeopathic Products ([employer]) GEL Apply topically continuous as needed   • hydrochlorothiazide (HYDRODIURIL) 25 mg tablet TAKE 1 TABLET BY MOUTH  DAILY   • ibuprofen (MOTRIN) 200 mg tablet Take by mouth every 4 (four) hours as needed for mild pain   • methocarbamol (ROBAXIN) 500 mg tablet Take 1-2 tablets p o  T i d  P r n  For muscle spasms   • metoprolol succinate (TOPROL-XL) 50 mg 24 hr tablet TAKE 1 TABLET(50 MG) BY MOUTH DAILY   • Multiple Vitamins-Minerals (CENTRUM SILVER ULTRA WOMENS PO) Take by mouth daily     • Omega-3 1000 MG CAPS Take by mouth daily    • omeprazole (PriLOSEC) 20 mg delayed release capsule TAKE 1 CAPSULE(20 MG) BY MOUTH DAILY   • acetaminophen (TYLENOL) 500 mg tablet Take 500 mg by mouth every 6 (six) hours as needed for mild pain   • calcium carbonate (TUMS) 500 mg chewable tablet Chew 1 tablet as needed for indigestion or heartburn   (Patient not taking: Reported on 12/23/2022)   • [DISCONTINUED] cefuroxime (CEFTIN) 500 mg tablet Take 1 tablet (500 mg total) by mouth every 12 (twelve) hours for 10 days   • [DISCONTINUED] predniSONE 10 mg tablet 3 tabs po bid x2 days, then 2 tabs po bid x2 days, then 1 tab bid x2 days, then 1 daily until done  Objective     /74   Pulse 80   Temp 98 4 °F (36 9 °C)   Ht 5' (1 524 m)   LMP  (LMP Unknown) Comment: hysterectomy  SpO2 100%   BMI 39 92 kg/m² (Reviewed)    Physical Exam  Vitals reviewed  Constitutional:       General: She is not in acute distress  Appearance: She is well-developed and well-groomed  She is not ill-appearing  HENT:      Head: Normocephalic and atraumatic        Right Ear: Tympanic membrane, ear canal and external ear normal  Left Ear: Tympanic membrane and ear canal normal       Nose: Nose normal       Mouth/Throat:      Mouth: Mucous membranes are moist       Pharynx: Oropharynx is clear  Eyes:      General: Lids are normal       Extraocular Movements: Extraocular movements intact  Conjunctiva/sclera: Conjunctivae normal       Pupils: Pupils are equal, round, and reactive to light  Neck:      Trachea: Trachea and phonation normal    Cardiovascular:      Rate and Rhythm: Normal rate and regular rhythm  Heart sounds: Normal heart sounds  Pulmonary:      Effort: Pulmonary effort is normal       Breath sounds: Normal breath sounds  Musculoskeletal:      Cervical back: Neck supple  Skin:     General: Skin is warm and dry  Capillary Refill: Capillary refill takes less than 2 seconds  Neurological:      General: No focal deficit present  Mental Status: She is alert and oriented to person, place, and time  Psychiatric:         Mood and Affect: Mood normal          Behavior: Behavior normal  Behavior is cooperative  Thought Content:  Thought content normal        SHAVONNE Vega

## 2022-12-23 NOTE — TELEPHONE ENCOUNTER
Regarding: ear pain  ----- Message from Cris Dave sent at 12/23/2022  8:22 AM EST -----  " I have been having pain in both ears (pain level 10) since last evening at 5 pm  I would like an appt today "

## 2022-12-29 ENCOUNTER — APPOINTMENT (OUTPATIENT)
Dept: LAB | Facility: CLINIC | Age: 72
End: 2022-12-29

## 2022-12-29 DIAGNOSIS — I10 PRIMARY HYPERTENSION: ICD-10-CM

## 2022-12-29 DIAGNOSIS — E78.2 MIXED HYPERLIPIDEMIA: ICD-10-CM

## 2022-12-29 LAB
ALBUMIN SERPL BCP-MCNC: 3.1 G/DL (ref 3.5–5)
ALP SERPL-CCNC: 65 U/L (ref 46–116)
ALT SERPL W P-5'-P-CCNC: 30 U/L (ref 12–78)
ANION GAP SERPL CALCULATED.3IONS-SCNC: 6 MMOL/L (ref 4–13)
AST SERPL W P-5'-P-CCNC: 19 U/L (ref 5–45)
BILIRUB SERPL-MCNC: 0.49 MG/DL (ref 0.2–1)
BUN SERPL-MCNC: 21 MG/DL (ref 5–25)
CALCIUM ALBUM COR SERPL-MCNC: 10.2 MG/DL (ref 8.3–10.1)
CALCIUM SERPL-MCNC: 9.5 MG/DL (ref 8.3–10.1)
CHLORIDE SERPL-SCNC: 105 MMOL/L (ref 96–108)
CHOLEST SERPL-MCNC: 194 MG/DL
CO2 SERPL-SCNC: 30 MMOL/L (ref 21–32)
CREAT SERPL-MCNC: 1.12 MG/DL (ref 0.6–1.3)
GFR SERPL CREATININE-BSD FRML MDRD: 49 ML/MIN/1.73SQ M
GLUCOSE P FAST SERPL-MCNC: 96 MG/DL (ref 65–99)
HDLC SERPL-MCNC: 62 MG/DL
LDLC SERPL CALC-MCNC: 101 MG/DL (ref 0–100)
POTASSIUM SERPL-SCNC: 4.1 MMOL/L (ref 3.5–5.3)
PROT SERPL-MCNC: 6.7 G/DL (ref 6.4–8.4)
SODIUM SERPL-SCNC: 141 MMOL/L (ref 135–147)
TRIGL SERPL-MCNC: 153 MG/DL

## 2023-01-03 ENCOUNTER — OFFICE VISIT (OUTPATIENT)
Dept: FAMILY MEDICINE CLINIC | Facility: CLINIC | Age: 73
End: 2023-01-03

## 2023-01-03 VITALS
TEMPERATURE: 98.2 F | WEIGHT: 208 LBS | DIASTOLIC BLOOD PRESSURE: 76 MMHG | BODY MASS INDEX: 40.84 KG/M2 | OXYGEN SATURATION: 95 % | HEIGHT: 60 IN | HEART RATE: 74 BPM | SYSTOLIC BLOOD PRESSURE: 140 MMHG

## 2023-01-03 DIAGNOSIS — K21.9 GASTROESOPHAGEAL REFLUX DISEASE WITHOUT ESOPHAGITIS: ICD-10-CM

## 2023-01-03 DIAGNOSIS — F33.42 RECURRENT MAJOR DEPRESSIVE DISORDER, IN FULL REMISSION (HCC): ICD-10-CM

## 2023-01-03 DIAGNOSIS — I10 PRIMARY HYPERTENSION: Primary | ICD-10-CM

## 2023-01-03 DIAGNOSIS — E78.2 MIXED HYPERLIPIDEMIA: ICD-10-CM

## 2023-01-03 DIAGNOSIS — E66.01 SEVERE OBESITY (BMI 35.0-39.9) WITH COMORBIDITY (HCC): ICD-10-CM

## 2023-01-03 RX ORDER — OMEPRAZOLE 20 MG/1
20 CAPSULE, DELAYED RELEASE ORAL DAILY
Qty: 90 CAPSULE | Refills: 1 | Status: SHIPPED | OUTPATIENT
Start: 2023-01-03

## 2023-01-04 PROBLEM — M46.1 SACROILIITIS (HCC): Status: RESOLVED | Noted: 2020-03-09 | Resolved: 2023-01-04

## 2023-01-04 NOTE — PROGRESS NOTES
Patient ID: Renea Hinojosa is a 81 y o  female  HPI: 81 y  o female presents for follow up hypertension, GERD, depression  and hypercholesterolemia  Pt denies any dizziness,  chest pain, palpitations, or dypsnea with exertion  She is stable with her depression, but she lost her son several years ago and holidays are a bit difficult for her so she states the prozac really helps to keep her stable  Her acid reflux is controlled but when she stops it, gets breakthrough symptoms  BMI Counseling: Body mass index is 40 62 kg/m²  The BMI is above normal  Nutrition recommendations include reducing portion sizes, decreasing overall calorie intake and moderation in carbohydrate intake    SUBJECTIVE    Family History   Problem Relation Age of Onset   • Atrial fibrillation Mother    • Breast cancer Mother 76   • Stroke Mother    • Coronary artery disease Mother    • Diabetes Mother    • Pancreatitis Mother    • Cancer Mother         Breast   • Hyperlipidemia Mother    • Pulmonary embolism Mother    • Heart attack Father    • Arthritis Father    • Arthritis Family    • Cancer Family    • Endometrial cancer Maternal Grandmother 76   • Cancer Maternal Grandmother    • Prostate cancer Paternal Uncle 54   • No Known Problems Sister    • No Known Problems Paternal Grandmother    • No Known Problems Paternal Grandfather    • No Known Problems Maternal Aunt    • No Known Problems Paternal Aunt    • No Known Problems Paternal Aunt      Social History     Socioeconomic History   • Marital status: /Civil Union     Spouse name: Not on file   • Number of children: Not on file   • Years of education: Not on file   • Highest education level: Not on file   Occupational History   • Not on file   Tobacco Use   • Smoking status: Never   • Smokeless tobacco: Never   Vaping Use   • Vaping Use: Never used   Substance and Sexual Activity   • Alcohol use: Yes     Comment: Rarely drink alcohol   • Drug use: Never   • Sexual activity: Yes Partners: Male     Birth control/protection: Female Sterilization     Comment: Hysterectomy 1986   Other Topics Concern   • Not on file   Social History Narrative    Drinks coffee     Social Determinants of Health     Financial Resource Strain: Not on file   Food Insecurity: Not on file   Transportation Needs: Not on file   Physical Activity: Not on file   Stress: Not on file   Social Connections: Not on file   Intimate Partner Violence: Not on file   Housing Stability: Not on file     Past Medical History:   Diagnosis Date   • Arthritis    • Depression    • Endometriosis    • GERD (gastroesophageal reflux disease)    • Hyperlipidemia    • Hypertension    • Nondisplaced fracture of fifth metatarsal bone, right foot, initial encounter for closed fracture 9/24/2018   • Osteopenia    • Pancreatitis 06/2017   • Pneumonia    • Right hip pain 12/4/2018   • S/P hip replacement, right 7/31/2018    Patient progressing well after right hip replacement    • 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   • Trochanteric bursitis of right hip 2/12/2019     Past Surgical History:   Procedure Laterality Date   • ABDOMINAL SURGERY      endometriosis    • APPENDECTOMY     • BREAST BIOPSY Right 1985    benign   • CARPAL TUNNEL RELEASE     • COLONOSCOPY     • HAND SURGERY     • HIP SURGERY     • HYSTERECTOMY Bilateral 1985   • JOINT REPLACEMENT Bilateral     KNEE   • JOINT REPLACEMENT Left     HIP   • OOPHORECTOMY Bilateral 1985   • SC ARTHRP ACETBLR/PROX FEM PROSTC AGRFT/ALGRFT Left 3/12/2018    Procedure: ARTHROPLASTY HIP TOTAL;  Surgeon: Peggy Sanchez MD;  Location: [facility];  Service: Orthopedics   • SC ARTHRP ACETBLR/PROX FEM PROSTC AGRFT/ALGRFT Right 7/30/2018    Procedure: RIGHT TOTAL HIP ARTHROPLASTY;  Surgeon: Peggy Sanchez MD;  Location: [facility];  Service: Orthopedics   • SC SURGICAL ARTHROSCOPY RAMONE W/CORACOACRM LIGM RLS Right 5/10/2017    Procedure: SHOULDER ARTHROSCOPY SUBACROMIAL DECOMPRESSION;  Surgeon: Kiki Power MD;  Location: [facility];  Service: Orthopedics   • NV SURGICAL ARTHROSCOPY SHOULDER BICEPS TENODESIS Right 5/10/2017    Procedure: ARTHROSCOPIC BICEPS TENODESIS WITH ROTATOR CUFF REPAIR ;  Surgeon: Kiki Power MD;  Location: [facility];  Service: Orthopedics   • TONSILLECTOMY       Allergies   Allergen Reactions   • Hydromorphone Hcl Itching   • Percocet [Oxycodone-Acetaminophen] GI Intolerance and Vomiting   • Sulfamethoxazole-Trimethoprim Rash   • Tizanidine Diarrhea       Current Outpatient Medications:   •  acetaminophen (TYLENOL) 500 mg tablet, Take 500 mg by mouth every 6 (six) hours as needed for mild pain, Disp: , Rfl:   •  atorvastatin (LIPITOR) 20 mg tablet, TAKE 1 TABLET BY MOUTH  DAILY, Disp: 90 tablet, Rfl: 3  •  calcium-vitamin D (OSCAL) 250-125 MG-UNIT per tablet, Take 1 tablet by mouth daily, Disp: , Rfl:   •  cyclobenzaprine (FLEXERIL) 10 mg tablet, Take 1 tablet (10 mg total) by mouth daily at bedtime, Disp: 90 tablet, Rfl: 1  •  FLUoxetine (PROzac) 20 mg capsule, TAKE 1 CAPSULE BY MOUTH  DAILY, Disp: 90 capsule, Rfl: 3  •  Homeopathic Products ([employer]) GEL, Apply topically continuous as needed, Disp: , Rfl:   •  hydrochlorothiazide (HYDRODIURIL) 25 mg tablet, TAKE 1 TABLET BY MOUTH  DAILY, Disp: 90 tablet, Rfl: 3  •  ibuprofen (MOTRIN) 200 mg tablet, Take by mouth every 4 (four) hours as needed for mild pain, Disp: , Rfl:   •  meloxicam (MOBIC) 15 mg tablet, TAKE 1 TABLET(15 MG) BY MOUTH DAILY, Disp: 90 tablet, Rfl: 1  •  methocarbamol (ROBAXIN) 500 mg tablet, Take 1-2 tablets p o  T i d  P r n   For muscle spasms, Disp: 120 tablet, Rfl: 5  •  metoprolol succinate (TOPROL-XL) 50 mg 24 hr tablet, TAKE 1 TABLET(50 MG) BY MOUTH DAILY, Disp: 90 tablet, Rfl: 3  •  Multiple Vitamins-Minerals (CENTRUM SILVER ULTRA WOMENS PO), Take by mouth daily  , Disp: , Rfl:   •  Omega-3 1000 MG CAPS, Take by mouth daily , Disp: , Rfl:   •  omeprazole (PriLOSEC) 20 mg delayed release capsule, Take 1 capsule (20 mg total) by mouth daily, Disp: 90 capsule, Rfl: 1    Review of Systems  Constitutional:     Denies fever, chills ,fatigue ,weakness ,weight loss, weight gain     ENT: Denies earache ,loss of hearing ,nosebleed, nasal discharge,nasal congestion ,sore throat ,hoarseness  Pulmonary: Denies shortness of breath ,cough  ,dyspnea on exertion, orthopnea  ,PND   Cardiovascular:  Denies bradycardia , tachycardia  ,palpations, lower extremity edema leg, claudication  Breast:  Denies new or changing breast lumps ,nipple discharge ,nipple changes  Abdomen:  Denies abdominal pain , anorexia , indigestion, nausea, vomiting, constipation, diarrhea  Musculoskeletal: Denies myalgias, arthralgias, joint swelling, joint stiffness , limb pain, limb swelling  Gu: denies dysuria, polyuria  Skin: Denies skin rash, skin lesion, skin wound, itching, dry skin  Neuro: Denies headache, numbness, tingling, confusion, loss of consciousness, dizziness, vertigo  Psychiatric: Denies feelings of depression, suicidal ideation, anxiety, sleep disturbances    OBJECTIVE  /76   Pulse 74   Temp 98 2 °F (36 8 °C)   Ht 5' (1 524 m)   Wt 94 3 kg (208 lb)   LMP  (LMP Unknown) Comment: hysterectomy  SpO2 95%   BMI 40 62 kg/m²   Constitutional:   NAD, well appearing and well nourished      ENT:   Conjunctiva and lids: no injection, edema, or discharge     Pupils and iris: CORNELIA bilaterally    External inspection of ears and nose: normal without deformities or discharge  Otoscopic exam: Canals patent without erythema  Nasal mucosa, septum and turbinates: Normal or edema or discharge         Oropharynx:  Moist mucosa, normal tongue and tonsils without lesions  No erythema        Pulmonary:Respiratory effort normal rate and rhythm, no increased work of breathing   Auscultation of lungs:  Clear bilaterally with no adventitious breath sounds       Cardiovascular: regular rate and rhythm, S1 and S2, no murmur, no edema and/or varicosities of LE      Abdomen: Soft and non-distended     Positive bowel sounds      No heptomegaly or splenomegaly      Gu: no suprapubic tenderness or CVA tenderness, no urethral discharge  Lymphatic:  No anterior or posterior cervical lymphadenopathy         Musculoskeletal:  Gait and station: Normal gait      Digits and nails normal without clubbing or cyanosis       Inspection/palpation of joints, bones, and muscles:  No joint tenderness, swelling, full active and passive range of motion       Skin: Normal skin turgor and no rashes      Neuro:    Normal reflexes     Psych:   alert and oriented to person, place and time     normal mood and affect       Assessment/Plan:Diagnoses and all orders for this visit:    Primary hypertension  Comments:  Monica current antihypertensive meds  Mixed hyperlipidemia  Comments:  Stable on statin tx  Gastroesophageal reflux disease without esophagitis  Comments:  Continue PPI therapy   Orders:  -     omeprazole (PriLOSEC) 20 mg delayed release capsule; Take 1 capsule (20 mg total) by mouth daily    Recurrent major depressive disorder, in full remission ([facility] [address] )  Comments:  Stable on prozac therpay    Severe obesity (BMI 35 0-39  9) with comorbidity ([facility] [address] )  Comments:  Pt continuing with diet and exercise  I will see patient back in 6 mos or sooner prn

## 2023-01-19 ENCOUNTER — OFFICE VISIT (OUTPATIENT)
Dept: PAIN MEDICINE | Facility: CLINIC | Age: 73
End: 2023-01-19

## 2023-01-19 VITALS
RESPIRATION RATE: 16 BRPM | SYSTOLIC BLOOD PRESSURE: 139 MMHG | HEART RATE: 70 BPM | WEIGHT: 207 LBS | BODY MASS INDEX: 40.64 KG/M2 | DIASTOLIC BLOOD PRESSURE: 83 MMHG | HEIGHT: 60 IN

## 2023-01-19 DIAGNOSIS — G89.4 CHRONIC PAIN SYNDROME: Primary | ICD-10-CM

## 2023-01-19 DIAGNOSIS — M47.816 LUMBAR SPONDYLOSIS: ICD-10-CM

## 2023-01-19 DIAGNOSIS — M79.18 MYOFASCIAL PAIN SYNDROME: ICD-10-CM

## 2023-01-19 NOTE — PROGRESS NOTES
Assessment:  1  Chronic pain syndrome    2  Lumbar spondylosis    3  Myofascial pain syndrome        Plan:  The patient is a 79-year-old female with a history of chronic pain secondary to low back pain, lumbar spondylosis, myofascial pain, lumbar disc disease with radiculopathy and sacroiliitis who presents to the office with worsening bilateral low back pain, left worse than right  On exam, the patient has positive facet loading  I instructed patient we can repeat radiofrequency ablations, as the patient received greater than 50% relief following these procedures when they were last performed on 6/1/2021 and 6/15/2021  Patient would like to proceed with left side first   I instructed our  will schedule  Complete risks and benefits including bleeding, infection, tissue reaction, nerve injury and allergic reaction were discussed  The approach was demonstrated using models and literature was provided  Verbal and written consent was obtained  My impressions and treatment recommendations were discussed in detail with the patient who verbalized understanding and had no further questions  Discharge instructions were provided  I personally saw and examined the patient and I agree with the above discussed plan of care  Orders Placed This Encounter   Procedures   • [place] spine and pain procedure     Dr Jyoti Hernandez     Standing Status:   Future     Standing Expiration Date:   1/19/2027     Order Specific Question:   Reason for Exam:     Answer:   Left L3-5 RFA     Order Specific Question:   Anticoagulant hold needed? Answer:   No   • [place] spine and pain procedure     Dr Jyoti Hernandez     Standing Status:   Future     Standing Expiration Date:   1/19/2027     Order Specific Question:   Reason for Exam:     Answer:   Right L3-5 RFA     Order Specific Question:   Anticoagulant hold needed? Answer:   No     No orders of the defined types were placed in this encounter        History of Present Illness:  Juan Valles Jane Westbrook is a 81 y o  female with a history of chronic pain secondary to low back pain, lumbar spondylosis, myofascial pain, lumbar disc disease with radiculopathy and sacroiliitis  She was last seen on 4/19/2022 where she underwent an L4 LESI which provided her greater than 50% relief of her low back pain symptoms  She presents to the office with worsening bilateral low back pain, left worse than right  She states her pain is worse since last office visit and constant  She states the pain worsens when she walks distances  She rates the quality of her pain is burning, throbbing, pressure-like, numbness and pins/needles and is currently rating it an 8-9/10 on a numeric scale  Current pain medications include ibuprofen as needed, methocarbamol during the day and Flexeril in the evening  She states this medication regimen is providing her little relief of her pain  I have personally reviewed and/or updated the patient's past medical history, past surgical history, family history, social history, current medications, allergies, and vital signs today  Review of Systems   Constitutional: Negative for fever and unexpected weight change  HENT: Negative for trouble swallowing  Eyes: Negative for visual disturbance  Respiratory: Negative for shortness of breath and wheezing  Cardiovascular: Negative for chest pain and palpitations  Gastrointestinal: Negative for constipation, diarrhea, nausea and vomiting  Endocrine: Negative for cold intolerance, heat intolerance and polydipsia  Genitourinary: Negative for difficulty urinating and frequency  Musculoskeletal: Positive for back pain and gait problem  Negative for arthralgias, joint swelling and myalgias  Skin: Negative for rash  Neurological: Negative for dizziness, seizures, syncope, weakness and headaches  Hematological: Does not bruise/bleed easily  Psychiatric/Behavioral: Negative for dysphoric mood     All other systems reviewed and are negative  Patient Active Problem List   Diagnosis   • Incomplete tear of right rotator cuff   • Hypertension   • Hyperlipidemia   • Depression   • Primary osteoarthritis of left hip   • Primary osteoarthritis of one hip, right   • Renal insufficiency   • Pain, joint, ankle and foot, right   • Lumbar disc disease with radiculopathy   • Lumbar spondylosis   • Change in bowel habits   • Severe obesity (BMI 35 0-39  9) with comorbidity ([address] )   • Recurrent major depressive disorder, in full remission [facility])       Past Medical History:   Diagnosis Date   • Arthritis    • Depression    • Endometriosis    • GERD (gastroesophageal reflux disease)    • Hyperlipidemia    • Hypertension    • Nondisplaced fracture of fifth metatarsal bone, right foot, initial encounter for closed fracture 9/24/2018   • Osteopenia    • Pancreatitis 06/2017   • Pneumonia    • Right hip pain 12/4/2018   • S/P hip replacement, right 7/31/2018    Patient progressing well after right hip replacement    • 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   • Trochanteric bursitis of right hip 2/12/2019       Past Surgical History:   Procedure Laterality Date   • ABDOMINAL SURGERY      endometriosis    • APPENDECTOMY     • BREAST BIOPSY Right 1985    benign   • CARPAL TUNNEL RELEASE     • COLONOSCOPY     • HAND SURGERY     • HIP SURGERY     • HYSTERECTOMY Bilateral 1985   • JOINT REPLACEMENT Bilateral     KNEE   • JOINT REPLACEMENT Left     HIP   • OOPHORECTOMY Bilateral 1985   • CT ARTHRP ACETBLR/PROX FEM PROSTC AGRFT/ALGRFT Left 3/12/2018    Procedure: ARTHROPLASTY HIP TOTAL;  Surgeon: Chepe Mahan MD;  [facility];  Service: Orthopedics   • CT ARTHRP ACETBLR/PROX FEM PROSTC AGRFT/ALGRFT Right 7/30/2018    Procedure: RIGHT TOTAL HIP ARTHROPLASTY;  Surgeon: Chepe Mahan MD;  [facility];  Service: Orthopedics   • CT SURGICAL ARTHROSCOPY RAMONE W/CORACOACRM LIGM RLS Right 5/10/2017    Procedure: SHOULDER ARTHROSCOPY SUBACROMIAL DECOMPRESSION;  Surgeon: Rosa Max MD;  [facility];  Service: Orthopedics   • MS SURGICAL ARTHROSCOPY SHOULDER BICEPS TENODESIS Right 5/10/2017    Procedure: ARTHROSCOPIC BICEPS TENODESIS WITH ROTATOR CUFF REPAIR ;  Surgeon: Rosa Max MD;  [facility];  Service: Orthopedics   • TONSILLECTOMY         Family History   Problem Relation Age of Onset   • Atrial fibrillation Mother    • Breast cancer Mother 76   • Stroke Mother    • Coronary artery disease Mother    • Diabetes Mother    • Pancreatitis Mother    • Cancer Mother         Breast   • Hyperlipidemia Mother    • Pulmonary embolism Mother    • Heart attack Father    • Arthritis Father    • Arthritis Family    • Cancer Family    • Endometrial cancer Maternal Grandmother 76   • Cancer Maternal Grandmother    • Prostate cancer Paternal Uncle 54   • No Known Problems Sister    • No Known Problems Paternal Grandmother    • No Known Problems Paternal Grandfather    • No Known Problems Maternal Aunt    • No Known Problems Paternal Aunt    • No Known Problems Paternal Aunt        Social History     Occupational History   • Not on file   Tobacco Use   • Smoking status: Never   • Smokeless tobacco: Never   Vaping Use   • Vaping Use: Never used   Substance and Sexual Activity   • Alcohol use: Yes     Comment: Rarely drink alcohol   • Drug use: Never   • Sexual activity: Yes     Partners: Male     Birth control/protection: Female Sterilization     Comment: Hysterectomy 1986       Current Outpatient Medications on File Prior to Visit   Medication Sig   • acetaminophen (TYLENOL) 500 mg tablet Take 500 mg by mouth every 6 (six) hours as needed for mild pain   • atorvastatin (LIPITOR) 20 mg tablet TAKE 1 TABLET BY MOUTH  DAILY   • calcium-vitamin D (OSCAL) 250-125 MG-UNIT per tablet Take 1 tablet by mouth daily   • cyclobenzaprine (FLEXERIL) 10 mg tablet Take 1 tablet (10 mg total) by mouth daily at bedtime   • FLUoxetine (PROzac) 20 mg capsule TAKE 1 CAPSULE BY MOUTH  DAILY   • Homeopathic Products ([employer]) GEL Apply topically continuous as needed   • hydrochlorothiazide (HYDRODIURIL) 25 mg tablet TAKE 1 TABLET BY MOUTH  DAILY   • ibuprofen (MOTRIN) 200 mg tablet Take by mouth every 4 (four) hours as needed for mild pain   • meloxicam (MOBIC) 15 mg tablet TAKE 1 TABLET(15 MG) BY MOUTH DAILY   • methocarbamol (ROBAXIN) 500 mg tablet Take 1-2 tablets p o  T i d  P r n  For muscle spasms   • metoprolol succinate (TOPROL-XL) 50 mg 24 hr tablet TAKE 1 TABLET(50 MG) BY MOUTH DAILY   • Multiple Vitamins-Minerals (CENTRUM SILVER ULTRA WOMENS PO) Take by mouth daily     • Omega-3 1000 MG CAPS Take by mouth daily    • omeprazole (PriLOSEC) 20 mg delayed release capsule Take 1 capsule (20 mg total) by mouth daily     No current facility-administered medications on file prior to visit  Allergies   Allergen Reactions   • Hydromorphone Hcl Itching   • Percocet [Oxycodone-Acetaminophen] GI Intolerance and Vomiting   • Sulfamethoxazole-Trimethoprim Rash   • Tizanidine Diarrhea       Physical Exam:    /83   Pulse 70   Resp 16   Ht 5' (1 524 m)   Wt 93 9 kg (207 lb)   LMP  (LMP Unknown) Comment: hysterectomy  BMI 40 43 kg/m²     Constitutional:normal, well developed, well nourished, alert, in no distress and non-toxic and no overt pain behavior    Eyes:anicteric  HEENT:grossly intact  Neck:supple, symmetric, trachea midline and no masses   Pulmonary:even and unlabored  Cardiovascular:No edema or pitting edema present  Skin:Normal without rashes or lesions and well hydrated  Psychiatric:Mood and affect appropriate  Neurologic:Cranial Nerves II-XII grossly intact  Musculoskeletal:antalgic     Lumbar Spine Exam    Appearance:  Normal lordosis  Palpation/Tenderness:  left lumbar paraspinal tenderness  right lumbar paraspinal tenderness  Sensory:  no sensory deficits noted  Range of Motion:  Flexion:  Minimally limited  with pain  Extension: Moderately limited  with pain  Motor Strength:  Left hip flexion:  5/5  Right hip flexion:  5/5  Left knee flexion:  5/5  Left knee extension:  5/5  Right knee flexion:  5/5  Right knee extension:  5/5  Left foot dorsiflexion:  5/5  Left foot plantar flexion:  5/5  Right foot dorsiflexion:  5/5  Right foot plantar flexion:  5/5    Imaging

## 2023-01-20 ENCOUNTER — HOSPITAL ENCOUNTER (OUTPATIENT)
Dept: RADIOLOGY | Facility: CLINIC | Age: 73
Discharge: HOME | End: 2023-01-20
Admitting: ANESTHESIOLOGY

## 2023-01-20 ENCOUNTER — TELEPHONE (OUTPATIENT)
Dept: RADIOLOGY | Facility: CLINIC | Age: 73
End: 2023-01-20

## 2023-01-20 VITALS
HEART RATE: 67 BPM | RESPIRATION RATE: 20 BRPM | OXYGEN SATURATION: 98 % | TEMPERATURE: 98 F | SYSTOLIC BLOOD PRESSURE: 135 MMHG | DIASTOLIC BLOOD PRESSURE: 83 MMHG

## 2023-01-20 DIAGNOSIS — M47.816 LUMBAR SPONDYLOSIS: ICD-10-CM

## 2023-01-20 RX ORDER — LIDOCAINE HYDROCHLORIDE 10 MG/ML
30 INJECTION, SOLUTION EPIDURAL; INFILTRATION; INTRACAUDAL; PERINEURAL ONCE
Status: COMPLETED | OUTPATIENT
Start: 2023-01-20 | End: 2023-01-20

## 2023-01-20 RX ORDER — METHYLPREDNISOLONE ACETATE 80 MG/ML
80 INJECTION, SUSPENSION INTRA-ARTICULAR; INTRALESIONAL; INTRAMUSCULAR; PARENTERAL; SOFT TISSUE ONCE
Status: COMPLETED | OUTPATIENT
Start: 2023-01-20 | End: 2023-01-20

## 2023-01-20 RX ORDER — BUPIVACAINE HCL/PF 2.5 MG/ML
10 VIAL (ML) INJECTION ONCE
Status: COMPLETED | OUTPATIENT
Start: 2023-01-20 | End: 2023-01-20

## 2023-01-20 RX ADMIN — METHYLPREDNISOLONE ACETATE 20 MG: 80 INJECTION, SUSPENSION INTRA-ARTICULAR; INTRALESIONAL; INTRAMUSCULAR; PARENTERAL; SOFT TISSUE at 11:45

## 2023-01-20 RX ADMIN — LIDOCAINE HYDROCHLORIDE 16 ML: 10 INJECTION, SOLUTION EPIDURAL; INFILTRATION; INTRACAUDAL at 11:37

## 2023-01-20 RX ADMIN — BUPIVACAINE HYDROCHLORIDE 3 ML: 2.5 INJECTION, SOLUTION EPIDURAL; INFILTRATION; INTRACAUDAL at 11:45

## 2023-01-20 NOTE — DISCHARGE INSTR - LAB
Medial Branch Radiofrequency Ablation     WHAT YOU NEED TO KNOW:   Medial branch radiofrequency ablation (RFA) is a procedure used to treat facet joint pain in your neck, mid back, or lower back  Facet joints are found at the back of each vertebra  A needle electrode is used to send electrical currents to the nerves in your facet joint  The electrical currents create heat that damages the nerve so it cannot send pain signals  ACTIVITY  Do not drive or operate machinery today  No strenuous activity today - bending, lifting, etc   You may shower today, but do not sit in a tub of water  Resume normal activities tomorrow as tolerated  CARE OF THE INJECTION SITE  If you have soreness or pain, apply ice to the area today (20 minutes on/20 minutes off)  Starting tomorrow, you may use warm, moist heat or ice if needed  Notify [facility] if you have any of the following: redness, drainage, swelling, or fever above 100°F     SPECIAL INSTRUCTIONS  Our office will call you tomorrow for a progress report and make an appointment for a follow up visit in 4 weeks  If you feel a sunburn-like sensation in the area of your procedure, call our office  MEDICATIONS  Continue to take all routine medications  Our office may have instructed you to hold some medications  As no general anesthesia was used in today's procedure, you should not experience any side effects related to anesthesia  If you have a problem specifically related to your procedure, please call our office at [phone]  Problems not related to your procedure should be directed to your primary care physician

## 2023-01-20 NOTE — H&P
History of Present Illness:  The patient is a 81 y o  female who presents with complaints of left lower back pain is here today for left L3-5 ablation    Past Medical History:   Diagnosis Date   • Arthritis    • Depression    • Endometriosis    • GERD (gastroesophageal reflux disease)    • Hyperlipidemia    • Hypertension    • Nondisplaced fracture of fifth metatarsal bone, right foot, initial encounter for closed fracture 9/24/2018   • Osteopenia    • Pancreatitis 06/2017   • Pneumonia    • Right hip pain 12/4/2018   • S/P hip replacement, right 7/31/2018    Patient progressing well after right hip replacement    • 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   • Trochanteric bursitis of right hip 2/12/2019       Past Surgical History:   Procedure Laterality Date   • ABDOMINAL SURGERY      endometriosis    • APPENDECTOMY     • BREAST BIOPSY Right 1985    benign   • CARPAL TUNNEL RELEASE     • COLONOSCOPY     • HAND SURGERY     • HIP SURGERY     • HYSTERECTOMY Bilateral 1985   • JOINT REPLACEMENT Bilateral     KNEE   • JOINT REPLACEMENT Left     HIP   • OOPHORECTOMY Bilateral 1985   • NV ARTHRP ACETBLR/PROX FEM PROSTC AGRFT/ALGRFT Left 3/12/2018    Procedure: ARTHROPLASTY HIP TOTAL;  Surgeon: Christal Kawasaki, MD;  [facility];  Service: Orthopedics   • NV ARTHRP ACETBLR/PROX FEM PROSTC AGRFT/ALGRFT Right 7/30/2018    Procedure: RIGHT TOTAL HIP ARTHROPLASTY;  Surgeon: Christal Kawasaki, MD;  [facility];  Service: Orthopedics   • NV SURGICAL ARTHROSCOPY RAMONE W/CORACOACRM LIGM RLS Right 5/10/2017    Procedure: SHOULDER ARTHROSCOPY SUBACROMIAL DECOMPRESSION;  Surgeon: Ben Abdi MD;  [facility];  Service: Orthopedics   • NV SURGICAL ARTHROSCOPY SHOULDER BICEPS TENODESIS Right 5/10/2017    Procedure: ARTHROSCOPIC BICEPS TENODESIS WITH ROTATOR CUFF REPAIR ;  Surgeon: Ben Abdi MD;  [facility];  Service: Orthopedics   • TONSILLECTOMY           Current Outpatient Medications:   •  acetaminophen (TYLENOL) 500 mg tablet, Take 500 mg by mouth every 6 (six) hours as needed for mild pain, Disp: , Rfl:   •  atorvastatin (LIPITOR) 20 mg tablet, TAKE 1 TABLET BY MOUTH  DAILY, Disp: 90 tablet, Rfl: 3  •  calcium-vitamin D (OSCAL) 250-125 MG-UNIT per tablet, Take 1 tablet by mouth daily, Disp: , Rfl:   •  cyclobenzaprine (FLEXERIL) 10 mg tablet, Take 1 tablet (10 mg total) by mouth daily at bedtime, Disp: 90 tablet, Rfl: 1  •  FLUoxetine (PROzac) 20 mg capsule, TAKE 1 CAPSULE BY MOUTH  DAILY, Disp: 90 capsule, Rfl: 3  •  Homeopathic Products ([employer]) GEL, Apply topically continuous as needed, Disp: , Rfl:   •  hydrochlorothiazide (HYDRODIURIL) 25 mg tablet, TAKE 1 TABLET BY MOUTH  DAILY, Disp: 90 tablet, Rfl: 3  •  ibuprofen (MOTRIN) 200 mg tablet, Take by mouth every 4 (four) hours as needed for mild pain, Disp: , Rfl:   •  meloxicam (MOBIC) 15 mg tablet, TAKE 1 TABLET(15 MG) BY MOUTH DAILY, Disp: 90 tablet, Rfl: 1  •  methocarbamol (ROBAXIN) 500 mg tablet, Take 1-2 tablets p o  T i d  P r n   For muscle spasms, Disp: 120 tablet, Rfl: 5  •  metoprolol succinate (TOPROL-XL) 50 mg 24 hr tablet, TAKE 1 TABLET(50 MG) BY MOUTH DAILY, Disp: 90 tablet, Rfl: 3  •  Multiple Vitamins-Minerals (CENTRUM SILVER ULTRA WOMENS PO), Take by mouth daily  , Disp: , Rfl:   •  Omega-3 1000 MG CAPS, Take by mouth daily , Disp: , Rfl:   •  omeprazole (PriLOSEC) 20 mg delayed release capsule, Take 1 capsule (20 mg total) by mouth daily, Disp: 90 capsule, Rfl: 1    Current Facility-Administered Medications:   •  bupivacaine (PF) (MARCAINE) 0 25 % injection 10 mL, 10 mL, Perineural, Once, Mara Zaldivar MD  •  lidocaine (PF) (XYLOCAINE-MPF) 1 % injection 30 mL, 30 mL, Infiltration, Once, Mara Zaldivar MD  •  methylPREDNISolone acetate (DEPO-MEDROL) injection 80 mg, 80 mg, Perineural, Once, Mara Zaldivar MD    Allergies   Allergen Reactions   • Hydromorphone Hcl Itching   • Percocet [Oxycodone-Acetaminophen] GI Intolerance and Vomiting   • Sulfamethoxazole-Trimethoprim Rash   • Tizanidine Diarrhea       Physical Exam:   Vitals:    01/20/23 1119   BP: 124/80   Pulse: 69   Resp: 20   Temp: 98 °F (36 7 °C)   SpO2: 97%     General: Awake, Alert, Oriented x 3, Mood and affect appropriate  Respiratory: Respirations even and unlabored  Cardiovascular: Peripheral pulses intact; no edema  Musculoskeletal Exam: Left lower back pain    ASA Score: 3    Patient/Chart Verification  Patient ID Verified: Verbal  Consents Confirmed: To be obtained in the Pre-Procedure area  Allergies Reviewed: Yes  Anticoag/NSAID held?: NA  Currently on antibiotics?: No    Assessment:   1   Lumbar spondylosis        Plan: Left L3-5 RFA

## 2023-01-23 NOTE — TELEPHONE ENCOUNTER
Pt reports just a little soreness Friday evening after RFA but she iced it  Sat she was pain free  She denies and s/s of infection at inj sites and no sunburn like sensations of the area  Pt told it can take 6 wks to see full benefit from the ablation  RN confirmed with pt her Rt sided RFA for 2/14/23

## 2023-02-14 ENCOUNTER — HOSPITAL ENCOUNTER (OUTPATIENT)
Dept: RADIOLOGY | Facility: CLINIC | Age: 73
Discharge: HOME | End: 2023-02-14
Admitting: ANESTHESIOLOGY

## 2023-02-14 ENCOUNTER — TELEPHONE (OUTPATIENT)
Dept: RADIOLOGY | Facility: CLINIC | Age: 73
End: 2023-02-14

## 2023-02-14 VITALS
HEART RATE: 71 BPM | TEMPERATURE: 97.4 F | SYSTOLIC BLOOD PRESSURE: 139 MMHG | OXYGEN SATURATION: 98 % | DIASTOLIC BLOOD PRESSURE: 85 MMHG | RESPIRATION RATE: 20 BRPM

## 2023-02-14 DIAGNOSIS — M47.816 LUMBAR SPONDYLOSIS: ICD-10-CM

## 2023-02-14 RX ORDER — METHYLPREDNISOLONE ACETATE 80 MG/ML
80 INJECTION, SUSPENSION INTRA-ARTICULAR; INTRALESIONAL; INTRAMUSCULAR; PARENTERAL; SOFT TISSUE ONCE
Status: COMPLETED | OUTPATIENT
Start: 2023-02-14 | End: 2023-02-14

## 2023-02-14 RX ORDER — LIDOCAINE HYDROCHLORIDE 10 MG/ML
30 INJECTION, SOLUTION EPIDURAL; INFILTRATION; INTRACAUDAL; PERINEURAL ONCE
Status: COMPLETED | OUTPATIENT
Start: 2023-02-14 | End: 2023-02-14

## 2023-02-14 RX ORDER — BUPIVACAINE HCL/PF 2.5 MG/ML
10 VIAL (ML) INJECTION ONCE
Status: COMPLETED | OUTPATIENT
Start: 2023-02-14 | End: 2023-02-14

## 2023-02-14 RX ADMIN — BUPIVACAINE HYDROCHLORIDE 10 ML: 2.5 INJECTION, SOLUTION EPIDURAL; INFILTRATION; INTRACAUDAL at 15:18

## 2023-02-14 RX ADMIN — LIDOCAINE HYDROCHLORIDE 17 ML: 10 INJECTION, SOLUTION EPIDURAL; INFILTRATION; INTRACAUDAL at 15:18

## 2023-02-14 RX ADMIN — METHYLPREDNISOLONE ACETATE 80 MG: 80 INJECTION, SUSPENSION INTRA-ARTICULAR; INTRALESIONAL; INTRAMUSCULAR; PARENTERAL; SOFT TISSUE at 15:18

## 2023-02-14 NOTE — H&P
History of Present Illness:  The patient is a 81 y o  female who presents with complaints of right lower back pain and is here today for right L3-5 ablation    Past Medical History:   Diagnosis Date   • Arthritis    • Depression    • Endometriosis    • GERD (gastroesophageal reflux disease)    • Hyperlipidemia    • Hypertension    • Nondisplaced fracture of fifth metatarsal bone, right foot, initial encounter for closed fracture 9/24/2018   • Osteopenia    • Pancreatitis 06/2017   • Pneumonia    • Right hip pain 12/4/2018   • S/P hip replacement, right 7/31/2018    Patient progressing well after right hip replacement    • 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   • Trochanteric bursitis of right hip 2/12/2019       Past Surgical History:   Procedure Laterality Date   • ABDOMINAL SURGERY      endometriosis    • APPENDECTOMY     • BREAST BIOPSY Right 1985    benign   • CARPAL TUNNEL RELEASE     • COLONOSCOPY     • HAND SURGERY     • HIP SURGERY     • HYSTERECTOMY Bilateral 1985   • JOINT REPLACEMENT Bilateral     KNEE   • JOINT REPLACEMENT Left     HIP   • OOPHORECTOMY Bilateral 1985   • AR ARTHRP ACETBLR/PROX FEM PROSTC AGRFT/ALGRFT Left 3/12/2018    Procedure: ARTHROPLASTY HIP TOTAL;  Surgeon: Hendricks Fothergill, MD;  [facility];  Service: Orthopedics   • AR ARTHRP ACETBLR/PROX FEM PROSTC AGRFT/ALGRFT Right 7/30/2018    Procedure: RIGHT TOTAL HIP ARTHROPLASTY;  Surgeon: Hendricks Fothergill, MD;  [facility];  Service: Orthopedics   • AR SURGICAL ARTHROSCOPY RAMONE W/CORACOACRM LIGM RLS Right 5/10/2017    Procedure: SHOULDER ARTHROSCOPY SUBACROMIAL DECOMPRESSION;  Surgeon: Ayesha Stock MD;  [facility];  Service: Orthopedics   • AR SURGICAL ARTHROSCOPY SHOULDER BICEPS TENODESIS Right 5/10/2017    Procedure: ARTHROSCOPIC BICEPS TENODESIS WITH ROTATOR CUFF REPAIR ;  Surgeon: Ayesha Stock MD;  [facility];  Service: Orthopedics   • TONSILLECTOMY           Current Outpatient Medications:   •  acetaminophen (TYLENOL) 500 mg tablet, Take 500 mg by mouth every 6 (six) hours as needed for mild pain, Disp: , Rfl:   •  atorvastatin (LIPITOR) 20 mg tablet, TAKE 1 TABLET BY MOUTH  DAILY, Disp: 90 tablet, Rfl: 3  •  calcium-vitamin D (OSCAL) 250-125 MG-UNIT per tablet, Take 1 tablet by mouth daily, Disp: , Rfl:   •  cyclobenzaprine (FLEXERIL) 10 mg tablet, Take 1 tablet (10 mg total) by mouth daily at bedtime, Disp: 90 tablet, Rfl: 1  •  FLUoxetine (PROzac) 20 mg capsule, TAKE 1 CAPSULE BY MOUTH  DAILY, Disp: 90 capsule, Rfl: 3  •  Homeopathic Products ([employer]) GEL, Apply topically continuous as needed, Disp: , Rfl:   •  hydrochlorothiazide (HYDRODIURIL) 25 mg tablet, TAKE 1 TABLET BY MOUTH  DAILY, Disp: 90 tablet, Rfl: 3  •  ibuprofen (MOTRIN) 200 mg tablet, Take by mouth every 4 (four) hours as needed for mild pain, Disp: , Rfl:   •  meloxicam (MOBIC) 15 mg tablet, TAKE 1 TABLET(15 MG) BY MOUTH DAILY, Disp: 90 tablet, Rfl: 1  •  methocarbamol (ROBAXIN) 500 mg tablet, Take 1-2 tablets p o  T i d  P r n   For muscle spasms, Disp: 120 tablet, Rfl: 5  •  metoprolol succinate (TOPROL-XL) 50 mg 24 hr tablet, TAKE 1 TABLET(50 MG) BY MOUTH DAILY, Disp: 90 tablet, Rfl: 3  •  Multiple Vitamins-Minerals (CENTRUM SILVER ULTRA WOMENS PO), Take by mouth daily  , Disp: , Rfl:   •  Omega-3 1000 MG CAPS, Take by mouth daily , Disp: , Rfl:   •  omeprazole (PriLOSEC) 20 mg delayed release capsule, Take 1 capsule (20 mg total) by mouth daily, Disp: 90 capsule, Rfl: 1    Current Facility-Administered Medications:   •  bupivacaine (PF) (MARCAINE) 0 25 % injection 10 mL, 10 mL, Perineural, Once, Serena Serna MD  •  lidocaine (PF) (XYLOCAINE-MPF) 1 % injection 30 mL, 30 mL, Infiltration, Once, Serena Serna MD  •  methylPREDNISolone acetate (DEPO-MEDROL) injection 80 mg, 80 mg, Perineural, Once, Serena Serna MD    Allergies   Allergen Reactions   • Hydromorphone Hcl Itching   • Percocet [Oxycodone-Acetaminophen] GI Intolerance and Vomiting   • Sulfamethoxazole-Trimethoprim Rash   • Tizanidine Diarrhea       Physical Exam:   Vitals:    02/14/23 1503   BP: 134/82   Pulse: 72   Resp: 20   Temp: (!) 97 4 °F (36 3 °C)   SpO2: 97%     General: Awake, Alert, Oriented x 3, Mood and affect appropriate  Respiratory: Respirations even and unlabored  Cardiovascular: Peripheral pulses intact; no edema  Musculoskeletal Exam: Right lower back pain    ASA Score: 3    Patient/Chart Verification  Patient ID Verified: Verbal  Consents Confirmed: To be obtained in the Pre-Procedure area  Allergies Reviewed: Yes  Anticoag/NSAID held?: NA  Currently on antibiotics?: No    Assessment:   1   Lumbar spondylosis        Plan: Right L3-5 RFA

## 2023-02-14 NOTE — OP NOTE
OPERATIVE REPORT  PATIENT NAME: Mary Sotelo    :  1950  MRN: [id number]  Pt Location: [facility] OR ROOM 15    SURGERY DATE: 3/12/2018    Surgeon(s) and Role:     * David Paris MD - Primary     * Shanelle Macias - Assisting    Preop Diagnosis:  Primary osteoarthritis of left hip [M16 12]    Post-Op Diagnosis Codes:     * Primary osteoarthritis of left hip [M16 12]    Procedure(s) (LRB):  ARTHROPLASTY HIP TOTAL (Left)    Specimen(s):  * No specimens in log *    Estimated Blood Loss:   75 mL    Drains:       Anesthesia Type:   Spinal    Operative Indications:  Primary osteoarthritis of left hip [M16 12]      Operative Findings:  depuy   Cup-48mm metal   Liner-10 degree lipped poly   Head/neck-32 + 1 5mm metal   [id number]    Complications:   None    Procedure and Technique: Following induction of adequate level of spinal anesthesia, Aguirre catheters and sterilely introduced into this patient's bladder  Antibiotics were administered  She was then placed right leg is, left side up position  An axillary roll was placed beneath the right axilla  The left hip and lateral thigh were then prepped draped sterilely  A posterior-lateral approach was created order gain access the hip  Full-thickness flaps raised get to the tensor fascia  This was opened up, expose the deep layer the hip  With the hip in internal rotation, the piriformis tendon was identified, transected, and retracted in a posterior fashion  The remainder of the short external rotators were sectioned as well  A T-type capsulotomy was used open the hip  The femoral head was then delivered posteriorly  Utilizing the femoral neck osteotomy guide, the proper femoral cut was then made  A posterior capsulectomy, anterior capsulotomy was then created in order [place] to circumferentially expose the acetabulum  Reaming started at 55, extent of 48 which point time hemisphere of bleeding cancellous bone countered    Forty trial was inserted and noted to fit well, Tried calling to let patient know that appointment on Friday February 17 has been cancelled as provider will be out of the office no answer left detailed voice message of new date and time.   therefore the 48 mm insert was introduced  A single screw was then placed within the posterior superior quadrant for additional fixation  The 10 degree lip liner was snapped into position  The proximal femur was then prepared for insertion of Press-Fit component  The box osteotome was used of the proximal femur  The patient's canal sequentially broached  With a 11 , and a 32+ 1 5 femoral head and neck, the hip was located, taken through range of motion, found to be quite stable  The trial components removed if the hip was carefully dislocated  The insert components were assembled and introduced the patient's left hip in standard fashion  The hip was once again located, taken through range of motion, found to be quite stable  Satisfied with the extent of surgery, the wounds then flushed with saline and closed  A Betadine soak was initiated  The piriformis tendon was reapproximated to the greater trochanter number Vicryl suture  The tensor fascia was closed number Vicryl suture  The subcu tissue closed with mixture of 1  For deep layer, 2 O Vicryl for the subcutaneous tissues, and skin staples the skin  A sterile dressings applied   She was then transferred to recovery room in stable condition with plans to include physical therapy for weightbear to tolerance, she will require DVT prophylaxis with Lovenox   I was present for the entire procedure    Patient Disposition:  PACU     SIGNATURE: Ashli Ley MD  DATE: March 12, 2018  TIME: 9:14 AM

## 2023-02-14 NOTE — DISCHARGE INSTR - LAB
Medial Branch Radiofrequency Ablation     WHAT YOU NEED TO KNOW:   Medial branch radiofrequency ablation (RFA) is a procedure used to treat facet joint pain in your neck, mid back, or lower back  Facet joints are found at the back of each vertebra  A needle electrode is used to send electrical currents to the nerves in your facet joint  The electrical currents create heat that damages the nerve so it cannot send pain signals  ACTIVITY  Do not drive or operate machinery today  No strenuous activity today - bending, lifting, etc   You may shower today, but do not sit in a tub of water  Resume normal activities tomorrow as tolerated  CARE OF THE INJECTION SITE  If you have soreness or pain, apply ice to the area today (20 minutes on/20 minutes off)  Starting tomorrow, you may use warm, moist heat or ice if needed  Notify the [facility] if you have any of the following: redness, drainage, swelling, or fever above 100°F     SPECIAL INSTRUCTIONS  Our office will call you tomorrow for a progress report and make an appointment for a follow up visit in 4 weeks  If you feel a sunburn-like sensation in the area of your procedure, call our office  MEDICATIONS  Continue to take all routine medications  Our office may have instructed you to hold some medications  As no general anesthesia was used in today's procedure, you should not experience any side effects related to anesthesia  If you have a problem specifically related to your procedure, please call our office at [phone]  Problems not related to your procedure should be directed to your primary care physician

## 2023-02-15 NOTE — TELEPHONE ENCOUNTER
Pt reports she and her  went to cafe after her procedure, she continued to use the w/c all the way to their car  By the time they got home she was able to walk into the house herself  She had a little soreness last evening, used ice  Today she feels good  Bands aids remain on at this time, she will remove later today  Pt denies and sunburn like sensations  Pt aware it will take 6 wks to see full benefit from the ablation  Pt given post RFA ovs w/ FQ for 4/3/23

## 2023-04-03 ENCOUNTER — OFFICE VISIT (OUTPATIENT)
Dept: PAIN MEDICINE | Facility: CLINIC | Age: 73
End: 2023-04-03

## 2023-04-03 VITALS
DIASTOLIC BLOOD PRESSURE: 78 MMHG | BODY MASS INDEX: 40.43 KG/M2 | HEART RATE: 72 BPM | WEIGHT: 207 LBS | SYSTOLIC BLOOD PRESSURE: 138 MMHG

## 2023-04-03 DIAGNOSIS — M47.816 LUMBAR SPONDYLOSIS: ICD-10-CM

## 2023-04-03 DIAGNOSIS — M48.061 DEGENERATIVE LUMBAR SPINAL STENOSIS: Primary | ICD-10-CM

## 2023-04-03 NOTE — PROGRESS NOTES
Assessment:  1  Degenerative lumbar spinal stenosis    2  Lumbar spondylosis        Plan:  The patient is doing about 50% better following the radiofrequency ablation  At this time, I encouraged her to continue with water aerobics as she is doing and to aim for about 20 pound weight loss which would have a significant effect on her lower back pain symptoms  I advised her that should symptoms worsen, we could perform a lumbar epidural steroid injection sometime in the summer if needed  For now, she will follow back up as needed  My impressions and treatment recommendations were discussed in detail with the patient who verbalized understanding and had no further questions  Discharge instructions were provided  I personally saw and examined the patient and I agree with the above discussed plan of care  History of Present Illness:  Poly Al is a 72 y o  female who presents for a follow up office visit in regards to Back Pain (Follow up after RFA - 50% relief/)  Patient is status post bilateral L3-5 medial branch radiofrequency ablation  She reports 50% improvement on the procedure  She states that she is back to doing water aerobics 3-4 times a week and this is also very helpful  She still has pain when she is physically active but then will take Tylenol  She is continue to use methocarbamol 1 to 2 tablets in the morning and then Flexeril at nighttime which helps with the spasms  I have personally reviewed and/or updated the patient's past medical history, past surgical history, family history, social history, current medications, allergies, and vital signs today  Review of Systems   Respiratory: Negative for shortness of breath  Cardiovascular: Negative for chest pain  Gastrointestinal: Negative for constipation, diarrhea, nausea and vomiting  Musculoskeletal: Positive for back pain, gait problem and joint swelling  Negative for arthralgias and myalgias  Skin: Negative for rash  Neurological: Negative for dizziness, seizures and weakness  All other systems reviewed and are negative  Patient Active Problem List   Diagnosis   • Incomplete tear of right rotator cuff   • Hypertension   • Hyperlipidemia   • Depression   • Primary osteoarthritis of left hip   • Primary osteoarthritis of one hip, right   • Renal insufficiency   • Pain, joint, ankle and foot, right   • Lumbar disc disease with radiculopathy   • Lumbar spondylosis   • Change in bowel habits   • Severe obesity (BMI 35 0-39  9) with comorbidity ([address] )   • Recurrent major depressive disorder, in full remission [facility])       Past Medical History:   Diagnosis Date   • Arthritis    • Depression    • Endometriosis    • GERD (gastroesophageal reflux disease)    • Hyperlipidemia    • Hypertension    • Nondisplaced fracture of fifth metatarsal bone, right foot, initial encounter for closed fracture 9/24/2018   • Osteopenia    • Pancreatitis 06/2017   • Pneumonia    • Right hip pain 12/4/2018   • S/P hip replacement, right 7/31/2018    Patient progressing well after right hip replacement    • 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   • Trochanteric bursitis of right hip 2/12/2019       Past Surgical History:   Procedure Laterality Date   • ABDOMINAL SURGERY      endometriosis    • APPENDECTOMY     • BREAST BIOPSY Right 1985    benign   • CARPAL TUNNEL RELEASE     • COLONOSCOPY     • HAND SURGERY     • HIP SURGERY     • HYSTERECTOMY Bilateral 1985   • JOINT REPLACEMENT Bilateral     KNEE   • JOINT REPLACEMENT Left     HIP   • OOPHORECTOMY Bilateral 1985   • HI ARTHRP ACETBLR/PROX FEM PROSTC AGRFT/ALGRFT Left 3/12/2018    Procedure: ARTHROPLASTY HIP TOTAL;  Surgeon: Darrin Teran MD;  [facility];  Service: Orthopedics   • HI ARTHRP ACETBLR/PROX FEM PROSTC AGRFT/ALGRFT Right 7/30/2018    Procedure: RIGHT TOTAL HIP ARTHROPLASTY;  Surgeon: Darrin Teran MD;  Location: [facility];  Service: Orthopedics   • HI SURGICAL ARTHROSCOPY RAMONE W/CORACOACRM LIGM RLS Right 5/10/2017    Procedure: SHOULDER ARTHROSCOPY SUBACROMIAL DECOMPRESSION;  Surgeon: Mary Monterroso MD;  Location: [facility];  Service: Orthopedics   • SD SURGICAL ARTHROSCOPY SHOULDER BICEPS TENODESIS Right 5/10/2017    Procedure: ARTHROSCOPIC BICEPS TENODESIS WITH ROTATOR CUFF REPAIR ;  Surgeon: Mary Monterroso MD;  Location: [facility];  Service: Orthopedics   • TONSILLECTOMY         Family History   Problem Relation Age of Onset   • Atrial fibrillation Mother    • Breast cancer Mother 76   • Stroke Mother    • Coronary artery disease Mother    • Diabetes Mother    • Pancreatitis Mother    • Cancer Mother         Breast   • Hyperlipidemia Mother    • Pulmonary embolism Mother    • Heart attack Father    • Arthritis Father    • Arthritis Family    • Cancer Family    • Endometrial cancer Maternal Grandmother 76   • Cancer Maternal Grandmother    • Prostate cancer Paternal Uncle 54   • No Known Problems Sister    • No Known Problems Paternal Grandmother    • No Known Problems Paternal Grandfather    • No Known Problems Maternal Aunt    • No Known Problems Paternal Aunt    • No Known Problems Paternal Aunt        Social History     Occupational History   • Not on file   Tobacco Use   • Smoking status: Never   • Smokeless tobacco: Never   Vaping Use   • Vaping Use: Never used   Substance and Sexual Activity   • Alcohol use: Yes     Comment: Rarely drink alcohol   • Drug use: Never   • Sexual activity: Yes     Partners: Male     Birth control/protection: Female Sterilization     Comment: Hysterectomy 1986       Current Outpatient Medications on File Prior to Visit   Medication Sig   • acetaminophen (TYLENOL) 500 mg tablet Take 500 mg by mouth every 6 (six) hours as needed for mild pain   • atorvastatin (LIPITOR) 20 mg tablet TAKE 1 TABLET BY MOUTH  DAILY   • calcium-vitamin D (OSCAL) 250-125 MG-UNIT per tablet Take 1 tablet by mouth daily   • cyclobenzaprine (FLEXERIL) 10 mg tablet Take 1 tablet (10 mg total) by mouth daily at bedtime   • FLUoxetine (PROzac) 20 mg capsule TAKE 1 CAPSULE BY MOUTH  DAILY   • Homeopathic Products ([employer]) GEL Apply topically continuous as needed   • hydrochlorothiazide (HYDRODIURIL) 25 mg tablet TAKE 1 TABLET BY MOUTH  DAILY   • ibuprofen (MOTRIN) 200 mg tablet Take by mouth every 4 (four) hours as needed for mild pain   • methocarbamol (ROBAXIN) 500 mg tablet Take 1-2 tablets p o  T i d  P r n  For muscle spasms   • metoprolol succinate (TOPROL-XL) 50 mg 24 hr tablet TAKE 1 TABLET(50 MG) BY MOUTH DAILY   • Multiple Vitamins-Minerals (CENTRUM SILVER ULTRA WOMENS PO) Take by mouth daily     • Omega-3 1000 MG CAPS Take by mouth daily    • omeprazole (PriLOSEC) 20 mg delayed release capsule Take 1 capsule (20 mg total) by mouth daily   • [DISCONTINUED] meloxicam (MOBIC) 15 mg tablet TAKE 1 TABLET(15 MG) BY MOUTH DAILY     No current facility-administered medications on file prior to visit  Allergies   Allergen Reactions   • Hydromorphone Hcl Itching   • Percocet [Oxycodone-Acetaminophen] GI Intolerance and Vomiting   • Sulfamethoxazole-Trimethoprim Rash   • Tizanidine Diarrhea       Physical Exam:    /78   Pulse 72   Wt 93 9 kg (207 lb)   LMP  (LMP Unknown) Comment: hysterectomy  BMI 40 43 kg/m²     Constitutional:normal, well developed, well nourished, alert, in no distress and non-toxic and no overt pain behavior    Eyes:anicteric  HEENT:grossly intact  Neck:supple, symmetric, trachea midline and no masses   Pulmonary:even and unlabored  Cardiovascular:No edema or pitting edema present  Skin:Normal without rashes or lesions and well hydrated  Psychiatric:Mood and affect appropriate  Neurologic:Cranial Nerves II-XII grossly intact  Musculoskeletal:normal     Lumbar Spine Exam  Appearance:  Normal lordosis  Palpation/Tenderness:  no tenderness or spasm  Motor Strength:  Left hip flexion:  5/5  Left hip extension:  5/5  Right hip flexion: 5/5  Right hip extension:  5/5  Left knee flexion:  5/5  Left knee extension:  5/5  Right knee flexion:  5/5  Right knee extension:  5/5  Left foot dorsiflexion:  5/5  Left foot plantar flexion:  5/5  Right foot dorsiflexion:  5/5  Right foot plantar flexion:  5/5    Imaging    MRI LUMBAR SPINE WITHOUT CONTRAST (6/9/2022)     INDICATION: M51 16: Intervertebral disc disorders with radiculopathy, lumbar region  M48 061: Spinal stenosis, lumbar region without neurogenic claudication  Progressive back pain     COMPARISON:  CT lumbar spine 6/21/2019     TECHNIQUE:  Sagittal T1, sagittal T2, sagittal inversion recovery, axial T1 and axial T2, coronal T2     IMAGE QUALITY:  Diagnostic     FINDINGS:     VERTEBRAL BODIES:  There are 5 nonrib-bearing lumbar type vertebral bodies  Slight retrolisthesis of L2 and degenerative anterolisthesis of L4  Asymmetric loss of disc height to the left at L1-2, to the right at L2-L3 and L3-L4, leftward translation of   L4 on L5  Noted type I-2 marrow changes to the left at the L1-L2 endplates  Type II Modic changes at L4/L5      SACRUM:  Normal signal within the sacrum  No evidence of insufficiency or stress fracture      DISTAL CORD AND CONUS:  Normal size and signal within the distal cord and conus    Conus terminates at the L1 level      PARASPINAL SOFT TISSUES:  Paraspinal soft tissues are unremarkable      LOWER THORACIC DISC SPACES:  Normal disc height and signal   No disc herniation, canal stenosis or foraminal narrowing      LUMBAR DISC SPACES:     L1-L2:  Asymmetric loss of disc height, circumferential bulging disc, reactive endplate changes without critical stenosis      L2-L3:  Slight retrolisthesis of L2 on L3, facet arthrosis and minor circumferential bulge      L3-L4:  Asymmetric loss of disc height to the right, circumferential bulge, right greater than left facet arthrosis without critical stenosis      L4-L5:  Advanced bilateral, right greater than left facet arthrosis, circumferential bulge, marginal osteophytes, far right lateral periarticular osteophyte, moderate right foraminal stenosis, correlate for right L4 radiculitis      L5-S1:  Right greater than left facet arthrosis, minor cervical, asymmetric far left lateral osteophytes, no definite root compression

## 2023-04-29 DIAGNOSIS — I10 ESSENTIAL HYPERTENSION: ICD-10-CM

## 2023-05-01 ENCOUNTER — TELEPHONE (OUTPATIENT)
Dept: FAMILY MEDICINE CLINIC | Facility: CLINIC | Age: 73
End: 2023-05-01

## 2023-05-01 DIAGNOSIS — Z12.31 BREAST CANCER SCREENING BY MAMMOGRAM: Primary | ICD-10-CM

## 2023-05-01 RX ORDER — METOPROLOL SUCCINATE 50 MG/1
TABLET, EXTENDED RELEASE ORAL
Qty: 90 TABLET | Refills: 3 | Status: SHIPPED | OUTPATIENT
Start: 2023-05-01

## 2023-05-05 ENCOUNTER — OFFICE VISIT (OUTPATIENT)
Dept: FAMILY MEDICINE CLINIC | Facility: CLINIC | Age: 73
End: 2023-05-05

## 2023-05-05 VITALS
OXYGEN SATURATION: 97 % | HEART RATE: 77 BPM | TEMPERATURE: 97.7 F | WEIGHT: 209.6 LBS | SYSTOLIC BLOOD PRESSURE: 122 MMHG | BODY MASS INDEX: 41.15 KG/M2 | DIASTOLIC BLOOD PRESSURE: 70 MMHG | HEIGHT: 60 IN

## 2023-05-05 DIAGNOSIS — H69.83 EUSTACHIAN TUBE DYSFUNCTION, BILATERAL: Primary | ICD-10-CM

## 2023-05-05 RX ORDER — PREDNISONE 10 MG/1
TABLET ORAL
Qty: 26 TABLET | Refills: 0 | Status: SHIPPED | OUTPATIENT
Start: 2023-05-05

## 2023-05-05 RX ORDER — AZITHROMYCIN 250 MG/1
TABLET, FILM COATED ORAL
Qty: 6 TABLET | Refills: 0 | Status: SHIPPED | OUTPATIENT
Start: 2023-05-05 | End: 2023-05-09

## 2023-05-09 NOTE — PROGRESS NOTES
Patient ID: Melonie Rich is a 72 y o  female  HPI: 72 y  o female presenting with symptoms of ear pressure, crackling of ears and dizziness associated with positional changes  Symptoms have been going on for one week        SUBJECTIVE    Family History   Problem Relation Age of Onset   • Atrial fibrillation Mother    • Breast cancer Mother    • Stroke Mother    • Coronary artery disease Mother    • Diabetes Mother    • Pancreatitis Mother    • Cancer Mother         Breast   • Hyperlipidemia Mother    • Pulmonary embolism Mother    • Depression Mother    • Hypertension Mother    • Thyroid disease Mother         Hypothyroidism   • Hearing loss Mother    • Heart attack Father    • Arthritis Father    • Arthritis Family    • Cancer Family    • Endometrial cancer Maternal Grandmother 76   • Cancer Maternal Grandmother    • Prostate cancer Paternal Uncle 54   • No Known Problems Sister    • No Known Problems Paternal Grandmother    • No Known Problems Paternal Grandfather    • No Known Problems Maternal Aunt    • No Known Problems Paternal Aunt    • No Known Problems Paternal Aunt    • Arthritis Sister      Social History     Socioeconomic History   • Marital status: /Civil Union     Spouse name: Not on file   • Number of children: Not on file   • Years of education: Not on file   • Highest education level: Not on file   Occupational History   • Not on file   Tobacco Use   • Smoking status: Never     Passive exposure: Past   • Smokeless tobacco: Never   Vaping Use   • Vaping Use: Never used   Substance and Sexual Activity   • Alcohol use: Not Currently     Comment: Rarely drink alcohol   • Drug use: Never   • Sexual activity: Yes     Partners: Male     Birth control/protection: Female Sterilization     Comment: Hysterectomy 1986   Other Topics Concern   • Not on file   Social History Narrative    Drinks coffee     Social Determinants of Health     Financial Resource Strain: Not on file   Food Insecurity: Not on file   Transportation Needs: Not on file   Physical Activity: Not on file   Stress: Not on file   Social Connections: Not on file   Intimate Partner Violence: Not on file   Housing Stability: Not on file     Past Medical History:   Diagnosis Date   • Allergic    • Arthritis    • Depression    • Endometriosis    • GERD (gastroesophageal reflux disease)    • Hyperlipidemia    • Hypertension    • Nondisplaced fracture of fifth metatarsal bone, right foot, initial encounter for closed fracture 09/24/2018   • Obesity    • Osteopenia    • Otitis media 2022   • Pancreatitis 06/2017   • Pneumonia    • Right hip pain 12/04/2018   • S/P hip replacement, right 07/31/2018    Patient progressing well after right hip replacement    • 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   • Trochanteric bursitis of right hip 02/12/2019   • Visual impairment 1968    Nearsighted with stidmatism     Past Surgical History:   Procedure Laterality Date   • ABDOMINAL SURGERY      endometriosis    • APPENDECTOMY     • BREAST BIOPSY Right 1985    benign   • CARPAL TUNNEL RELEASE     • COLONOSCOPY     • HAND SURGERY     • HIP SURGERY     • HYSTERECTOMY Bilateral 1985   • JOINT REPLACEMENT Bilateral     KNEE   • JOINT REPLACEMENT Left     HIP   • OOPHORECTOMY Bilateral 1985   • MT ARTHRP ACETBLR/PROX FEM PROSTC AGRFT/ALGRFT Left 3/12/2018    Procedure: ARTHROPLASTY HIP TOTAL;  Surgeon: Alethea Jung MD;  Location: [facility];  Service: Orthopedics   • MT ARTHRP ACETBLR/PROX FEM PROSTC AGRFT/ALGRFT Right 7/30/2018    Procedure: RIGHT TOTAL HIP ARTHROPLASTY;  Surgeon: Alethea Jung MD;  Location: [facility];  Service: Orthopedics   • MT SURGICAL ARTHROSCOPY RAMONE W/CORACOACRM LIGM RLS Right 5/10/2017    Procedure: SHOULDER ARTHROSCOPY SUBACROMIAL DECOMPRESSION;  Surgeon: Rafy Vences MD;  Location: [facility];  Service: Orthopedics   • MT SURGICAL ARTHROSCOPY SHOULDER BICEPS TENODESIS Right 5/10/2017    Procedure: ARTHROSCOPIC BICEPS TENODESIS WITH ROTATOR CUFF REPAIR ;  Surgeon: Sisi Marcial MD;  Location: [facility];  Service: Orthopedics   • TONSILLECTOMY       Allergies   Allergen Reactions   • Hydromorphone Hcl Itching   • Percocet [Oxycodone-Acetaminophen] GI Intolerance and Vomiting   • Sulfamethoxazole-Trimethoprim Rash   • Tizanidine Diarrhea       Current Outpatient Medications:   •  acetaminophen (TYLENOL) 500 mg tablet, Take 500 mg by mouth every 6 (six) hours as needed for mild pain, Disp: , Rfl:   •  atorvastatin (LIPITOR) 20 mg tablet, TAKE 1 TABLET BY MOUTH  DAILY, Disp: 90 tablet, Rfl: 3  •  azithromycin (ZITHROMAX) 250 mg tablet, Take 2 tablets today then 1 tablet daily x 4 days, Disp: 6 tablet, Rfl: 0  •  calcium-vitamin D (OSCAL) 250-125 MG-UNIT per tablet, Take 1 tablet by mouth daily, Disp: , Rfl:   •  cyclobenzaprine (FLEXERIL) 10 mg tablet, Take 1 tablet (10 mg total) by mouth daily at bedtime, Disp: 90 tablet, Rfl: 1  •  FLUoxetine (PROzac) 20 mg capsule, TAKE 1 CAPSULE BY MOUTH  DAILY, Disp: 90 capsule, Rfl: 3  •  Homeopathic Products ([employer]nicare) GEL, Apply topically continuous as needed, Disp: , Rfl:   •  hydrochlorothiazide (HYDRODIURIL) 25 mg tablet, TAKE 1 TABLET BY MOUTH  DAILY, Disp: 90 tablet, Rfl: 3  •  ibuprofen (MOTRIN) 200 mg tablet, Take by mouth every 4 (four) hours as needed for mild pain, Disp: , Rfl:   •  methocarbamol (ROBAXIN) 500 mg tablet, Take 1-2 tablets p o  T i d  P r n   For muscle spasms, Disp: 120 tablet, Rfl: 5  •  metoprolol succinate (TOPROL-XL) 50 mg 24 hr tablet, TAKE 1 TABLET(50 MG) BY MOUTH DAILY, Disp: 90 tablet, Rfl: 3  •  Multiple Vitamins-Minerals (CENTRUM SILVER ULTRA WOMENS PO), Take by mouth daily  , Disp: , Rfl:   •  Omega-3 1000 MG CAPS, Take by mouth daily , Disp: , Rfl:   •  omeprazole (PriLOSEC) 20 mg delayed release capsule, Take 1 capsule (20 mg total) by mouth daily, Disp: 90 capsule, Rfl: 1  •  predniSONE 10 mg tablet, 3 tabs po bid x2 days, then 2 tabs po bid x2 days, then 1 tab bid x2 days, then 1 daily until done , Disp: 26 tablet, Rfl: 0    Review of Systems  Constitutional:     Denies fever, chills, fatigue, weakness ,weight loss, weight gain       ENT: Denies earache, loss of hearing, nosebleed, nasal discharge,nasal congestion, sore throat,hoarseness, but complains of ear pressure,and crackling    Pulmonary: Denies shortness of breath ,cough , dyspnea on exertionon, orthopnea , PND   Cardiovascular:  Denies bradycardia , tachycardia ,palpations, lower extremity, edema leg, claudication  Breast:  Denies new or changing breast lumps,  nipple discharge, nipple changes,  Abdomen:  Denies abdominal pain , anorexia ,indigestion, nausea ,vomiting, constipation , diarrhea  Musculoskeletal: Denies myalgias, arthralgias, joint swelling, joint stiffness ,limb pain, limb swelling  Lymph:denies swollen glands  Gu: no dysuria or urinary frequency  Skin: Denies skin rash, skin lesion, skin wound, itching,dry skin  Neuro: Denies headache, numbness, tingling, confusion, loss of consciousness, but complains of postitional vertigo  Psychiatric: Denies feelings of depression, suicidal ideation, anxiety, sleep disturbances    OBJECTIVE  /70   Pulse 77   Temp 97 7 °F (36 5 °C)   Ht 5' (1 524 m)   Wt 95 1 kg (209 lb 9 6 oz)   LMP  (LMP Unknown) Comment: hysterectomy  SpO2 97%   BMI 40 93 kg/m²   Constitutional:   NAD, well appearing and well nourished      ENT:   Conjunctiva and lids: no injection, edema, or discharge     Pupils and iris: CORNELIA bilaterally    External inspection of ears and nose: normal without deformities or discharge  Otoscopic exam: Canals patent with tm dull, + erythema,but large effusions noted bilaterally  ENasal mucosa, septum and turbinates: Turbinae injection with discharge   Oropharynx:  Moist mucosa, normal tongue and tonsils without lesions  Erythema and injection  of post pharynx with pnd      Pulmonary:Respiratory effort normal rate and rhythm, no increased work of breathing  Auscultation of lungs:  Clear bilaterally with no adventitious breath sounds       Cardiovascular: regular rate and rhythm, S1 and S2, no murmur, no edema and/or varicosities of LE      Abdomen: Soft and non-distended     Positive bowel sounds      No heptomegaly or splenomegaly      Lymphatic: Anterior and posterior cervical lymphadenopathy         Muscskeletal:  Gait and station: Normal gait      Digits and nails normal without clubbing or cyanosis       Inspection/palpation of joints, bones, and muscles:  No joint tenderness, swelling, full active and passive range of motion       Gu: no suprabubic tenderness, CVA tenderness or urethral discharge  Skin: Normal skin turgor and no rashes      Neuro:    Normal reflexes       Psych:   alert and oriented to person, place and time     normal mood and affect       Assessment/Plan:Diagnoses and all orders for this visit:    Eustachian tube dysfunction, bilateral  -     azithromycin (ZITHROMAX) 250 mg tablet; Take 2 tablets today then 1 tablet daily x 4 days  -     predniSONE 10 mg tablet; 3 tabs po bid x2 days, then 2 tabs po bid x2 days, then 1 tab bid x2 days, then 1 daily until done  Reviewed with patient plan to treat with above plan      Patient instructed to call in 72 hours if not feeling better or if symptoms worsen

## 2023-06-19 ENCOUNTER — TELEMEDICINE (OUTPATIENT)
Dept: FAMILY MEDICINE CLINIC | Facility: CLINIC | Age: 73
End: 2023-06-19
Payer: MEDICARE

## 2023-06-19 DIAGNOSIS — U07.1 COVID: Primary | ICD-10-CM

## 2023-06-19 PROCEDURE — 99213 OFFICE O/P EST LOW 20 MIN: CPT | Performed by: FAMILY MEDICINE

## 2023-06-19 RX ORDER — DEXTROMETHORPHAN HYDROBROMIDE AND PROMETHAZINE HYDROCHLORIDE 15; 6.25 MG/5ML; MG/5ML
5 SOLUTION ORAL 4 TIMES DAILY PRN
Qty: 180 ML | Refills: 0 | Status: SHIPPED | OUTPATIENT
Start: 2023-06-19

## 2023-06-19 RX ORDER — PREDNISONE 10 MG/1
TABLET ORAL
Qty: 26 TABLET | Refills: 0 | Status: SHIPPED | OUTPATIENT
Start: 2023-06-19

## 2023-06-19 RX ORDER — AZITHROMYCIN 250 MG/1
TABLET, FILM COATED ORAL
Qty: 12 TABLET | Refills: 0 | Status: SHIPPED | OUTPATIENT
Start: 2023-06-19 | End: 2023-06-23

## 2023-07-03 DIAGNOSIS — K21.9 GASTROESOPHAGEAL REFLUX DISEASE WITHOUT ESOPHAGITIS: ICD-10-CM

## 2023-07-03 RX ORDER — OMEPRAZOLE 20 MG/1
20 CAPSULE, DELAYED RELEASE ORAL DAILY
Qty: 90 CAPSULE | Refills: 1 | Status: SHIPPED | OUTPATIENT
Start: 2023-07-03

## 2023-07-11 ENCOUNTER — OFFICE VISIT (OUTPATIENT)
Dept: FAMILY MEDICINE CLINIC | Facility: CLINIC | Age: 73
End: 2023-07-11
Payer: MEDICARE

## 2023-07-11 VITALS
SYSTOLIC BLOOD PRESSURE: 142 MMHG | HEART RATE: 69 BPM | BODY MASS INDEX: 42.68 KG/M2 | OXYGEN SATURATION: 99 % | WEIGHT: 217.4 LBS | DIASTOLIC BLOOD PRESSURE: 80 MMHG | TEMPERATURE: 97.4 F | HEIGHT: 60 IN

## 2023-07-11 DIAGNOSIS — F33.42 RECURRENT MAJOR DEPRESSIVE DISORDER, IN FULL REMISSION (HCC): ICD-10-CM

## 2023-07-11 DIAGNOSIS — I10 PRIMARY HYPERTENSION: ICD-10-CM

## 2023-07-11 DIAGNOSIS — R63.8 INCREASED BMI: ICD-10-CM

## 2023-07-11 DIAGNOSIS — E78.2 MIXED HYPERLIPIDEMIA: ICD-10-CM

## 2023-07-11 DIAGNOSIS — Z00.00 MEDICARE ANNUAL WELLNESS VISIT, SUBSEQUENT: Primary | ICD-10-CM

## 2023-07-11 PROCEDURE — 99214 OFFICE O/P EST MOD 30 MIN: CPT | Performed by: FAMILY MEDICINE

## 2023-07-11 PROCEDURE — G0439 PPPS, SUBSEQ VISIT: HCPCS | Performed by: FAMILY MEDICINE

## 2023-07-11 NOTE — PROGRESS NOTES
Assessment and Plan:     Problem List Items Addressed This Visit    None       Preventive health issues were discussed with patient, and age appropriate screening tests were ordered as noted in patient's After Visit Summary. Personalized health advice and appropriate referrals for health education or preventive services given if needed, as noted in patient's After Visit Summary. History of Present Illness:     Patient presents for a Medicare Wellness Visit. The patient's hypertension and hyperlipidemia are well controlled at this time. Her mood is stable on fluoxetine therapy. We discussed various medications to help her with weight loss and she would like something with more of an appetite suppressant effect. She chose contrave therapy. HPI   Patient Care Team:  Nicolas Layton DO as PCP - General (Family Medicine)  Brayden Lobo, Alexandrea Vega MD (Pain Medicine)  Tony Rushing MD (Colon and Rectal Surgery)     Review of Systems:     Review of Systems   Constitutional: Positive for unexpected weight change. Negative for appetite change, chills and fever. + weight gain   HENT: Negative for ear pain, facial swelling, rhinorrhea, sinus pain, sore throat and trouble swallowing. Eyes: Negative for discharge and redness. Respiratory: Negative for chest tightness, shortness of breath and wheezing. Cardiovascular: Negative for chest pain and palpitations. Gastrointestinal: Negative for abdominal pain, diarrhea, nausea and vomiting. Endocrine: Negative for polyuria. Genitourinary: Negative for dysuria and urgency. Musculoskeletal: Negative for arthralgias and back pain. Skin: Negative for rash. Neurological: Negative for dizziness, weakness and headaches. Hematological: Negative for adenopathy. Psychiatric/Behavioral: Negative for behavioral problems, confusion and sleep disturbance. All other systems reviewed and are negative.        Problem List: Patient Active Problem List   Diagnosis   • Incomplete tear of right rotator cuff   • Hypertension   • Hyperlipidemia   • Depression   • Primary osteoarthritis of left hip   • Primary osteoarthritis of one hip, right   • Renal insufficiency   • Pain, joint, ankle and foot, right   • Lumbar disc disease with radiculopathy   • Lumbar spondylosis   • Change in bowel habits   • Severe obesity (BMI 35.0-39. 9) with comorbidity ([address])   • Recurrent major depressive disorder, in full remission 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   • Trochanteric bursitis of right hip 02/12/2019   • Visual impairment 1968    Nearsighted with stidmatism     Past Surgical History:   Procedure Laterality Date   • ABDOMINAL SURGERY      endometriosis    • APPENDECTOMY     • BREAST BIOPSY Right 1985    benign   • CARPAL TUNNEL RELEASE     • COLONOSCOPY     • HAND SURGERY     • HIP SURGERY     • HYSTERECTOMY Bilateral 1985   • JOINT REPLACEMENT Bilateral     KNEE   • JOINT REPLACEMENT Left     HIP   • OOPHORECTOMY Bilateral 1985   • IA ARTHRP ACETBLR/PROX FEM PROSTC AGRFT/ALGRFT Left 3/12/2018    Procedure: ARTHROPLASTY HIP TOTAL;  Surgeon: Melissa Martinez MD;  [facility];  Service: Orthopedics   • IA ARTHRP ACETBLR/PROX FEM PROSTC AGRFT/ALGRFT Right 7/30/2018    Procedure: RIGHT TOTAL HIP ARTHROPLASTY;  Surgeon: Melissa Martinez MD;  Location[facility]; Service: Orthopedics   • TN SURGICAL ARTHROSCOPY RAMONE W/CORACOACRM LIGM RLS Right 5/10/2017    Procedure: SHOULDER ARTHROSCOPY SUBACROMIAL DECOMPRESSION;  Surgeon: Cecilio Meyer MD;  Location[facility];  Service: Orthopedics   • TN SURGICAL ARTHROSCOPY SHOULDER BICEPS TENODESIS Right 5/10/2017    Procedure: ARTHROSCOPIC BICEPS TENODESIS WITH ROTATOR CUFF REPAIR ;  Surgeon: Cecilio Meyer MD;  Location[facility];  Service: Orthopedics   • TONSILLECTOMY        Family History:     Family History   Problem Relation Age of Onset   • Atrial fibrillation Mother    • Breast cancer Mother    • Stroke Mother    • Coronary artery disease Mother    • Diabetes Mother    • Pancreatitis Mother    • Cancer Mother         Breast   • Hyperlipidemia Mother    • Pulmonary embolism Mother    • Depression Mother    • Hypertension Mother    • Thyroid disease Mother         Hypothyroidism   • Hearing loss Mother    • Heart attack Father    • Arthritis Father    • Arthritis Family    • Cancer Family    • Endometrial cancer Maternal Grandmother 76   • Cancer Maternal Grandmother    • Prostate cancer Paternal Uncle 54   • No Known Problems Sister    • No Known Problems Paternal Grandmother    • No Known Problems Paternal Grandfather    • No Known Problems Maternal Aunt    • No Known Problems Paternal Aunt    • No Known Problems Paternal Aunt    • Arthritis Sister       Social History:     Social History     Socioeconomic History   • Marital status: /Civil Union     Spouse name: None   • Number of children: None   • Years of education: None   • Highest education level: None   Occupational History   • None   Tobacco Use   • Smoking status: Never     Passive exposure: Past   • Smokeless tobacco: Never   Vaping Use   • Vaping Use: Never used   Substance and Sexual Activity   • Alcohol use: Not Currently     Comment: Rarely drink alcohol   • Drug use: Never   • Sexual activity: Yes     Partners: Male     Birth control/protection: Female Sterilization     Comment: Hysterectomy 1986   Other Topics Concern   • None   Social History Narrative    Drinks coffee     Social Determinants of Health     Financial Resource Strain: Low Risk  (7/4/2023)    Overall Financial Resource Strain (CARDIA)    • Difficulty of Paying Living Expenses: Not hard at all   Food Insecurity: Not on file   Transportation Needs: No Transportation Needs (7/4/2023)    PRAPARE - Transportation    • Lack of Transportation (Medical): No    • Lack of Transportation (Non-Medical): No   Physical Activity: Not on file   Stress: Not on file   Social Connections: Not on file   Intimate Partner Violence: Not on file   Housing Stability: Not on file      Medications and Allergies:     Current Outpatient Medications   Medication Sig Dispense Refill   • acetaminophen (TYLENOL) 500 mg tablet Take 500 mg by mouth every 6 (six) hours as needed for mild pain     • atorvastatin (LIPITOR) 20 mg tablet TAKE 1 TABLET BY MOUTH  DAILY 90 tablet 3   • calcium-vitamin D (OSCAL) 250-125 MG-UNIT per tablet Take 1 tablet by mouth daily     • cyclobenzaprine (FLEXERIL) 10 mg tablet Take 1 tablet (10 mg total) by mouth daily at bedtime 90 tablet 1   • FLUoxetine (PROzac) 20 mg capsule TAKE 1 CAPSULE BY MOUTH  DAILY 90 capsule 3   • Homeopathic Products ([employer]) GEL Apply topically continuous as needed     • hydrochlorothiazide (HYDRODIURIL) 25 mg tablet TAKE 1 TABLET BY MOUTH  DAILY 90 tablet 3   • ibuprofen (MOTRIN) 200 mg tablet Take by mouth every 4 (four) hours as needed for mild pain     • methocarbamol (ROBAXIN) 500 mg tablet Take 1-2 tablets p.o. T.i.d. P.r.n.  For muscle spasms 120 tablet 5   • metoprolol succinate (TOPROL-XL) 50 mg 24 hr tablet TAKE 1 TABLET(50 MG) BY MOUTH DAILY 90 tablet 3   • Multiple Vitamins-Minerals (CENTRUM SILVER ULTRA WOMENS PO) Take by mouth daily       • Omega-3 1000 MG CAPS Take by mouth daily      • omeprazole (PriLOSEC) 20 mg delayed release capsule Take 1 capsule (20 mg total) by mouth daily 90 capsule 1     No current facility-administered medications for this visit. Allergies   Allergen Reactions   • Hydromorphone Hcl Itching   • Percocet [Oxycodone-Acetaminophen] GI Intolerance and Vomiting   • Sulfamethoxazole-Trimethoprim Rash   • Tizanidine Diarrhea      Immunizations:     Immunization History   Administered Date(s) Administered   • COVID-19 PFIZER VACCINE 0.3 ML IM 02/17/2021, 03/08/2021, 11/06/2021   • COVID-19 Pfizer Vac BIVALENT Johnson-sucrose 12 Yr+ IM (BOOSTER ONLY) 10/04/2022   • INFLUENZA 11/05/2015, 10/18/2016, 10/04/2017, 10/08/2018, 10/04/2021, 10/04/2022   • Influenza Quadrivalent, 6-35 Months IM 10/15/2014, 11/05/2015, 10/18/2016   • Influenza, high dose seasonal 0.7 mL 09/29/2020   • Pneumococcal Conjugate 13-Valent 07/12/2017, 10/04/2017   • Pneumococcal Polysaccharide PPV23 10/01/2013, 01/01/2014, 01/11/2020   • Tdap 06/24/2015, 02/23/2022   • Zoster 01/01/2012, 01/01/2012   • Zoster Vaccine Recombinant 03/02/2020, 07/10/2020   • influenza, trivalent, adjuvanted 09/19/2019      Health Maintenance:         Topic Date Due   • Breast Cancer Screening: Mammogram  08/02/2023   • Colorectal Cancer Screening  01/17/2025   • DXA SCAN  08/02/2025   • Hepatitis C Screening  Completed         Topic Date Due   • COVID-19 Vaccine (5 - Pfizer series) 02/04/2023   • Influenza Vaccine (1) 09/01/2023      Medicare Screening Tests and Risk Assessments:     Ramonita Stevenson is here for her Subsequent Wellness visit. Last Medicare Wellness visit information reviewed, patient interviewed, no change since last AWV. Health Risk Assessment:   Patient rates overall health as very good. Patient feels that their physical health rating is same. Patient is very satisfied with their life. Eyesight was rated as same. Hearing was rated as same. Patient feels that their emotional and mental health rating is same. Patients states they are never, rarely angry.  Patient states they are often unusually tired/fatigued. Pain experienced in the last 7 days has been none. Patient states that she has experienced no weight loss or gain in last 6 months. Depression Screening:   PHQ-9 Score: 3      Fall Risk Screening: In the past year, patient has experienced: no history of falling in past year      Urinary Incontinence Screening:   Patient has not leaked urine accidently in the last six months. Home Safety:  Patient does not have trouble with stairs inside or outside of their home. Patient has working smoke alarms and has working carbon monoxide detector. Home safety hazards include: medications that cause fatigue. Nutrition:   Current diet is Regular. Medications:   Patient is currently taking over-the-counter supplements. OTC medications include: Mvi, Calium, Vit D, Omega 3, Ibuprofen. Patient is able to manage medications. Activities of Daily Living (ADLs)/Instrumental Activities of Daily Living (IADLs):   Walk and transfer into and out of bed and chair?: Yes  Dress and groom yourself?: Yes    Bathe or shower yourself?: Yes    Feed yourself? Yes  Do your laundry/housekeeping?: Yes  Manage your money, pay your bills and track your expenses?: Yes  Make your own meals?: Yes    Do your own shopping?: Yes    Previous Hospitalizations:   Any hospitalizations or ED visits within the last 12 months?: No      Advance Care Planning:   Living will: Yes    Durable POA for healthcare:  Yes    Advanced directive: Yes    Advanced directive counseling given: Yes    Five wishes given: Yes    Patient declined ACP directive: No    End of Life Decisions reviewed with patient: Yes    Provider agrees with end of life decisions: Yes      Cognitive Screening:   Provider or family/friend/caregiver concerned regarding cognition?: No    PREVENTIVE SCREENINGS      Cardiovascular Screening:    General: Screening Not Indicated and History Lipid Disorder      Diabetes Screening:     General: Screening Current Colorectal Cancer Screening:     General: Screening Current      Breast Cancer Screening:     General: Screening Current      Cervical Cancer Screening:    General: Screening Not Indicated      Osteoporosis Screening:    General: Screening Current      Abdominal Aortic Aneurysm (AAA) Screening:        General: Screening Current and Screening Not Indicated      Lung Cancer Screening:     General: Screening Not Indicated      Hepatitis C Screening:    General: Screening Current    Screening, Brief Intervention, and Referral to Treatment (SBIRT)    Screening  Typical number of drinks in a day: 0  Typical number of drinks in a week: 0  Interpretation: Low risk drinking behavior. AUDIT-C Screenin) How often did you have a drink containing alcohol in the past year? never  2) How many drinks did you have on a typical day when you were drinking in the past year? 0  3) How often did you have 6 or more drinks on one occasion in the past year? never    AUDIT-C Score: 0  Interpretation: Score 0-2 (female): Negative screen for alcohol misuse    Single Item Drug Screening:  How often have you used an illegal drug (including marijuana) or a prescription medication for non-medical reasons in the past year? never    Single Item Drug Screen Score: 0  Interpretation: Negative screen for possible drug use disorder    No results found. Physical Exam:     Ht 5' (1.524 m)   LMP  (LMP Unknown) Comment: hysterectomy  BMI 40.93 kg/m²     Physical Exam  Vitals and nursing note reviewed. Constitutional:       General: She is not in acute distress. Appearance: Normal appearance. She is not ill-appearing or diaphoretic. HENT:      Head: Normocephalic and atraumatic. Right Ear: Tympanic membrane, ear canal and external ear normal.      Left Ear: Tympanic membrane, ear canal and external ear normal.      Nose: Nose normal. No congestion or rhinorrhea.       Mouth/Throat:      Mouth: Mucous membranes are moist. Pharynx: Oropharynx is clear. No posterior oropharyngeal erythema. Eyes:      General:         Right eye: No discharge. Left eye: No discharge. Extraocular Movements: Extraocular movements intact. Conjunctiva/sclera: Conjunctivae normal.      Pupils: Pupils are equal, round, and reactive to light. Neck:      Vascular: No carotid bruit. Cardiovascular:      Rate and Rhythm: Normal rate and regular rhythm. Pulses: Normal pulses. Heart sounds: Normal heart sounds. No murmur heard. Pulmonary:      Effort: Pulmonary effort is normal. No respiratory distress. Breath sounds: Normal breath sounds. No wheezing or rhonchi. Abdominal:      General: Abdomen is flat. Bowel sounds are normal. There is no distension. Palpations: There is no mass. Tenderness: There is no abdominal tenderness. Musculoskeletal:         General: No swelling or deformity. Normal range of motion. Cervical back: Normal range of motion and neck supple. No rigidity. Right lower leg: No edema. Left lower leg: No edema. Lymphadenopathy:      Cervical: No cervical adenopathy. Skin:     General: Skin is warm and dry. Capillary Refill: Capillary refill takes less than 2 seconds. Coloration: Skin is not jaundiced. Findings: No bruising, erythema or rash. Neurological:      General: No focal deficit present. Mental Status: She is alert and oriented to person, place, and time. Cranial Nerves: No cranial nerve deficit. Sensory: No sensory deficit. Gait: Gait normal.      Deep Tendon Reflexes: Reflexes normal.   Psychiatric:         Mood and Affect: Mood normal.         Behavior: Behavior normal.         Thought Content:  Thought content normal.         Judgment: Judgment normal.          Dawood Rodriguez DO

## 2023-07-11 NOTE — PATIENT INSTRUCTIONS
Medicare Preventive Visit Patient Instructions  Thank you for completing your Welcome to Medicare Visit or Medicare Annual Wellness Visit today. Your next wellness visit will be due in one year (7/11/2024). The screening/preventive services that you may require over the next 5-10 years are detailed below. Some tests may not apply to you based off risk factors and/or age. Screening tests ordered at today's visit but not completed yet may show as past due. Also, please note that scanned in results may not display below. Preventive Screenings:  Service Recommendations Previous Testing/Comments   Colorectal Cancer Screening  * Colonoscopy    * Fecal Occult Blood Test (FOBT)/Fecal Immunochemical Test (FIT)  * Fecal DNA/Cologuard Test  * Flexible Sigmoidoscopy Age: 43-73 years old   Colonoscopy: every 10 years (may be performed more frequently if at higher risk)  OR  FOBT/FIT: every 1 year  OR  Cologuard: every 3 years  OR  Sigmoidoscopy: every 5 years  Screening may be recommended earlier than age 39 if at higher risk for colorectal cancer. Also, an individualized decision between you and your healthcare provider will decide whether screening between the ages of 77-80 would be appropriate. Colonoscopy: 01/17/2020  FOBT/FIT: Not on file  Cologuard: Not on file  Sigmoidoscopy: Not on file    Screening Current     Breast Cancer Screening Age: 36 years old  Frequency: every 1-2 years  Not required if history of left and right mastectomy Mammogram: 08/02/2022    Screening Current   Cervical Cancer Screening Between the ages of 21-29, pap smear recommended once every 3 years. Between the ages of 32-69, can perform pap smear with HPV co-testing every 5 years.    Recommendations may differ for women with a history of total hysterectomy, cervical cancer, or abnormal pap smears in past. Pap Smear: Not on file    Screening Not Indicated   Hepatitis C Screening Once for adults born between 1945 and 1965  More frequently in patients at high risk for Hepatitis C Hep C Antibody: 07/05/2018    Screening Current   Diabetes Screening 1-2 times per year if you're at risk for diabetes or have pre-diabetes Fasting glucose: 96 mg/dL (12/29/2022)  A1C: No results in last 5 years (No results in last 5 years)  Screening Current   Cholesterol Screening Once every 5 years if you don't have a lipid disorder. May order more often based on risk factors. Lipid panel: 12/29/2022    Screening Not Indicated  History Lipid Disorder     Other Preventive Screenings Covered by Medicare:  1. Abdominal Aortic Aneurysm (AAA) Screening: covered once if your at risk. You're considered to be at risk if you have a family history of AAA. 2. Lung Cancer Screening: covers low dose CT scan once per year if you meet all of the following conditions: (1) Age 48-67; (2) No signs or symptoms of lung cancer; (3) Current smoker or have quit smoking within the last 15 years; (4) You have a tobacco smoking history of at least 20 pack years (packs per day multiplied by number of years you smoked); (5) You get a written order from a healthcare provider. 3. Glaucoma Screening: covered annually if you're considered high risk: (1) You have diabetes OR (2) Family history of glaucoma OR (3)  aged 48 and older OR (3)  American aged 72 and older  3. Osteoporosis Screening: covered every 2 years if you meet one of the following conditions: (1) You're estrogen deficient and at risk for osteoporosis based off medical history and other findings; (2) Have a vertebral abnormality; (3) On glucocorticoid therapy for more than 3 months; (4) Have primary hyperparathyroidism; (5) On osteoporosis medications and need to assess response to drug therapy. · Last bone density test (DXA Scan): 08/02/2022.  5. HIV Screening: covered annually if you're between the age of 15-65. Also covered annually if you are younger than 13 and older than 72 with risk factors for HIV infection. For pregnant patients, it is covered up to 3 times per pregnancy. Immunizations:  Immunization Recommendations   Influenza Vaccine Annual influenza vaccination during flu season is recommended for all persons aged >= 6 months who do not have contraindications   Pneumococcal Vaccine   * Pneumococcal conjugate vaccine = PCV13 (Prevnar 13), PCV15 (Vaxneuvance), PCV20 (Prevnar 20)  * Pneumococcal polysaccharide vaccine = PPSV23 (Pneumovax) Adults 20-63 years old: 1-3 doses may be recommended based on certain risk factors  Adults 72 years old: 1-2 doses may be recommended based off what pneumonia vaccine you previously received   Hepatitis B Vaccine 3 dose series if at intermediate or high risk (ex: diabetes, end stage renal disease, liver disease)   Tetanus (Td) Vaccine - COST NOT COVERED BY MEDICARE PART B Following completion of primary series, a booster dose should be given every 10 years to maintain immunity against tetanus. Td may also be given as tetanus wound prophylaxis. Tdap Vaccine - COST NOT COVERED BY MEDICARE PART B Recommended at least once for all adults. For pregnant patients, recommended with each pregnancy. Shingles Vaccine (Shingrix) - COST NOT COVERED BY MEDICARE PART B  2 shot series recommended in those aged 48 and above     Health Maintenance Due:      Topic Date Due   • Breast Cancer Screening: Mammogram  08/02/2023   • Colorectal Cancer Screening  01/17/2025   • DXA SCAN  08/02/2025   • Hepatitis C Screening  Completed     Immunizations Due:      Topic Date Due   • COVID-19 Vaccine (5 - Pfizer series) 02/04/2023   • Influenza Vaccine (1) 09/01/2023     Advance Directives   What are advance directives? Advance directives are legal documents that state your wishes and plans for medical care. These plans are made ahead of time in case you lose your ability to make decisions for yourself.  Advance directives can apply to any medical decision, such as the treatments you want, and if you want to donate organs. What are the types of advance directives? There are many types of advance directives, and each state has rules about how to use them. You may choose a combination of any of the following:  · Living will: This is a written record of the treatment you want. You can also choose which treatments you do not want, which to limit, and which to stop at a certain time. This includes surgery, medicine, IV fluid, and tube feedings. · Durable power of  for healthcare [facility]): This is a written record that states who you want to make healthcare choices for you when you are unable to make them for yourself. This person, called a proxy, is usually a family member or a friend. You may choose more than 1 proxy. · Do not resuscitate (DNR) order:  A DNR order is used in case your heart stops beating or you stop breathing. It is a request not to have certain forms of treatment, such as CPR. A DNR order may be included in other types of advance directives. · Medical directive: This covers the care that you want if you are in a coma, near death, or unable to make decisions for yourself. You can list the treatments you want for each condition. Treatment may include pain medicine, surgery, blood transfusions, dialysis, IV or tube feedings, and a ventilator (breathing machine). · Values history: This document has questions about your views, beliefs, and how you feel and think about life. This information can help others choose the care that you would choose. Why are advance directives important? An advance directive helps you control your care. Although spoken wishes may be used, it is better to have your wishes written down. Spoken wishes can be misunderstood, or not followed. Treatments may be given even if you do not want them. An advance directive may make it easier for your family to make difficult choices about your care.    Weight Management   Why it is important to manage your weight:  Being overweight increases your risk of health conditions such as heart disease, high blood pressure, type 2 diabetes, and certain types of cancer. It can also increase your risk for osteoarthritis, sleep apnea, and other respiratory problems. Aim for a slow, steady weight loss. Even a small amount of weight loss can lower your risk of health problems. How to lose weight safely:  A safe and healthy way to lose weight is to eat fewer calories and get regular exercise. You can lose up about 1 pound a week by decreasing the number of calories you eat by 500 calories each day. Healthy meal plan for weight management:  A healthy meal plan includes a variety of foods, contains fewer calories, and helps you stay healthy. A healthy meal plan includes the following:  · Eat whole-grain foods more often. A healthy meal plan should contain fiber. Fiber is the part of grains, fruits, and vegetables that is not broken down by your body. Whole-grain foods are healthy and provide extra fiber in your diet. Some examples of whole-grain foods are whole-wheat breads and pastas, oatmeal, brown rice, and bulgur. · Eat a variety of vegetables every day. Include dark, leafy greens such as spinach, kale, brianne greens, and mustard greens. Eat yellow and orange vegetables such as carrots, sweet potatoes, and winter squash. · Eat a variety of fruits every day. Choose fresh or canned fruit (canned in its own juice or light syrup) instead of juice. Fruit juice has very little or no fiber. · Eat low-fat dairy foods. Drink fat-free (skim) milk or 1% milk. Eat fat-free yogurt and low-fat cottage cheese. Try low-fat cheeses such as mozzarella and other reduced-fat cheeses. · Choose meat and other protein foods that are low in fat. Choose beans or other legumes such as split peas or lentils. Choose fish, skinless poultry (chicken or turkey), or lean cuts of red meat (beef or pork). Before you cook meat or poultry, cut off any visible fat. · Use less fat and oil. Try baking foods instead of frying them. Add less fat, such as margarine, sour cream, regular salad dressing and mayonnaise to foods. Eat fewer high-fat foods. Some examples of high-fat foods include french fries, doughnuts, ice cream, and cakes. · Eat fewer sweets. Limit foods and drinks that are high in sugar. This includes candy, cookies, regular soda, and sweetened drinks. Exercise:  Exercise at least 30 minutes per day on most days of the week. Some examples of exercise include walking, biking, dancing, and swimming. You can also fit in more physical activity by taking the stairs instead of the elevator or parking farther away from stores. Ask your healthcare provider about the best exercise plan for you. © Copyright [employer] 2018 Information is for End User's use only and may not be sold, redistributed or otherwise used for commercial purposes.  All illustrations and images included in CareNotes® are the copyrighted property of [employer].A[employer] Inc. or [address] [place] [address]

## 2023-07-20 DIAGNOSIS — E78.00 PURE HYPERCHOLESTEROLEMIA: ICD-10-CM

## 2023-07-20 RX ORDER — ATORVASTATIN CALCIUM 20 MG/1
TABLET, FILM COATED ORAL
Qty: 90 TABLET | Refills: 3 | Status: SHIPPED | OUTPATIENT
Start: 2023-07-20

## 2023-07-25 DIAGNOSIS — M79.18 MYOFASCIAL PAIN SYNDROME: ICD-10-CM

## 2023-07-25 RX ORDER — METHOCARBAMOL 500 MG/1
TABLET, FILM COATED ORAL
Qty: 120 TABLET | Refills: 5 | Status: SHIPPED | OUTPATIENT
Start: 2023-07-25

## 2023-08-03 ENCOUNTER — APPOINTMENT (OUTPATIENT)
Dept: RADIOLOGY | Facility: MEDICAL CENTER | Age: 73
End: 2023-08-03
Payer: MEDICARE

## 2023-08-03 ENCOUNTER — HOSPITAL ENCOUNTER (OUTPATIENT)
Dept: RADIOLOGY | Facility: MEDICAL CENTER | Age: 73
Discharge: HOME | End: 2023-08-03
Payer: MEDICARE

## 2023-08-03 VITALS — WEIGHT: 217 LBS | BODY MASS INDEX: 42.6 KG/M2 | HEIGHT: 60 IN

## 2023-08-03 DIAGNOSIS — Z12.31 BREAST CANCER SCREENING BY MAMMOGRAM: ICD-10-CM

## 2023-08-03 PROCEDURE — 77067 SCR MAMMO BI INCL CAD: CPT

## 2023-08-03 PROCEDURE — 77063 BREAST TOMOSYNTHESIS BI: CPT

## 2023-08-15 DIAGNOSIS — E78.00 PURE HYPERCHOLESTEROLEMIA: ICD-10-CM

## 2023-08-15 RX ORDER — ATORVASTATIN CALCIUM 20 MG/1
20 TABLET, FILM COATED ORAL DAILY
Qty: 90 TABLET | Refills: 3 | Status: SHIPPED | OUTPATIENT
Start: 2023-08-15

## 2023-08-21 ENCOUNTER — TELEPHONE (OUTPATIENT)
Age: 73
End: 2023-08-21

## 2023-08-21 NOTE — TELEPHONE ENCOUNTER
Pt said when she had her appt w/ FQ after her ablations he told her she could have JOSE in June. She said she ended up with covid in June and was placed on prednisone and she went pain free for 6 wks due to the prednisone. Her pain has now returned and is up to 9/10. She has burning pain and spasms of her lower lumbar, left worse than right and sometimes it goes into the buttock and hip. No leg pain. Pt asking for an JOSE. Pt denies blood  thinners.

## 2023-08-21 NOTE — TELEPHONE ENCOUNTER
Caller: Patrick Quinones PT    Doctor: Dr Laury Green    Reason for call: Pt called in to schedule a procedure     Call back#: [phone] after 12pm

## 2023-09-15 ENCOUNTER — HOSPITAL ENCOUNTER (OUTPATIENT)
Dept: RADIOLOGY | Facility: CLINIC | Age: 73
End: 2023-09-15
Payer: MEDICARE

## 2023-09-15 VITALS
DIASTOLIC BLOOD PRESSURE: 84 MMHG | HEART RATE: 69 BPM | SYSTOLIC BLOOD PRESSURE: 139 MMHG | OXYGEN SATURATION: 96 % | RESPIRATION RATE: 16 BRPM | TEMPERATURE: 98 F

## 2023-09-15 DIAGNOSIS — M54.16 LUMBAR RADICULOPATHY: ICD-10-CM

## 2023-09-15 PROCEDURE — 62323 NJX INTERLAMINAR LMBR/SAC: CPT | Performed by: ANESTHESIOLOGY

## 2023-09-15 RX ORDER — BUPIVACAINE HCL/PF 2.5 MG/ML
2 VIAL (ML) INJECTION ONCE
Status: COMPLETED | OUTPATIENT
Start: 2023-09-15 | End: 2023-09-15

## 2023-09-15 RX ORDER — METHYLPREDNISOLONE ACETATE 80 MG/ML
80 INJECTION, SUSPENSION INTRA-ARTICULAR; INTRALESIONAL; INTRAMUSCULAR; PARENTERAL; SOFT TISSUE ONCE
Status: COMPLETED | OUTPATIENT
Start: 2023-09-15 | End: 2023-09-15

## 2023-09-15 RX ADMIN — METHYLPREDNISOLONE ACETATE 80 MG: 80 INJECTION, SUSPENSION INTRA-ARTICULAR; INTRALESIONAL; INTRAMUSCULAR; PARENTERAL; SOFT TISSUE at 10:08

## 2023-09-15 RX ADMIN — IOHEXOL 1 ML: 300 INJECTION, SOLUTION INTRAVENOUS at 10:08

## 2023-09-15 RX ADMIN — BUPIVACAINE HYDROCHLORIDE 2 ML: 2.5 INJECTION, SOLUTION EPIDURAL; INFILTRATION; INTRACAUDAL at 10:08

## 2023-09-15 NOTE — H&P
History of Present Illness:  The patient is a 72 y.o. female who presents with complaints of lower back pain injected for lumbar epidural steroid injection    Past Medical History:   Diagnosis Date   • Allergic    • Arthritis    • Depression    • Endometriosis    • GERD (gastroesophageal reflux disease)    • Hyperlipidemia    • Hypertension    • Nondisplaced fracture of fifth metatarsal bone, right foot, initial encounter for closed fracture 09/24/2018   • Obesity    • Osteopenia    • Otitis media 2022   • Pancreatitis 06/2017   • Pneumonia    • Right hip pain 12/04/2018   • S/P hip replacement, right 07/31/2018    Patient progressing well after right hip replacement    • 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   • Trochanteric bursitis of right hip 02/12/2019   • Visual impairment 1968    Nearsighted with stidmatism       Past Surgical History:   Procedure Laterality Date   • ABDOMINAL SURGERY      endometriosis    • APPENDECTOMY     • BREAST BIOPSY Right 1985    benign   • CARPAL TUNNEL RELEASE     • COLONOSCOPY     • HAND SURGERY     • HIP SURGERY     • HYSTERECTOMY Bilateral 1985   • JOINT REPLACEMENT Bilateral     KNEE   • JOINT REPLACEMENT Left     HIP   • OOPHORECTOMY Bilateral 1985   • AL ARTHRP ACETBLR/PROX FEM PROSTC AGRFT/ALGRFT Left 3/12/2018    Procedure: ARTHROPLASTY HIP TOTAL;  Surgeon: Jone Hodges MD;  Location: [facility];  Service: Orthopedics   • AL ARTHRP ACETBLR/PROX FEM PROSTC AGRFT/ALGRFT Right 7/30/2018    Procedure: RIGHT TOTAL HIP ARTHROPLASTY;  Surgeon: Jone Hodges MD;  Location: [facility];  Service: Orthopedics   • AL SURGICAL ARTHROSCOPY RAMONE W/CORACOACRM LIGM RLS Right 5/10/2017    Procedure: SHOULDER ARTHROSCOPY SUBACROMIAL DECOMPRESSION;  Surgeon: Ava Reddy MD;  Location[facility];  Service: Orthopedics   • AL SURGICAL ARTHROSCOPY SHOULDER BICEPS TENODESIS Right 5/10/2017    Procedure: ARTHROSCOPIC BICEPS TENODESIS WITH ROTATOR CUFF REPAIR ;  Surgeon: Florida Martínez MD;  Location: [facility];  Service: Orthopedics   • TONSILLECTOMY           Current Outpatient Medications:   •  acetaminophen (TYLENOL) 500 mg tablet, Take 500 mg by mouth every 6 (six) hours as needed for mild pain, Disp: , Rfl:   •  atorvastatin (LIPITOR) 20 mg tablet, Take 1 tablet (20 mg total) by mouth daily, Disp: 90 tablet, Rfl: 3  •  calcium-vitamin D (OSCAL) 250-125 MG-UNIT per tablet, Take 1 tablet by mouth daily, Disp: , Rfl:   •  cyclobenzaprine (FLEXERIL) 10 mg tablet, Take 1 tablet (10 mg total) by mouth daily at bedtime, Disp: 90 tablet, Rfl: 1  •  FLUoxetine (PROzac) 20 mg capsule, TAKE 1 CAPSULE BY MOUTH  DAILY, Disp: 90 capsule, Rfl: 3  •  Homeopathic Products (Arnicare) GEL, Apply topically continuous as needed, Disp: , Rfl:   •  hydrochlorothiazide (HYDRODIURIL) 25 mg tablet, TAKE 1 TABLET BY MOUTH  DAILY, Disp: 90 tablet, Rfl: 3  •  ibuprofen (MOTRIN) 200 mg tablet, Take by mouth every 4 (four) hours as needed for mild pain, Disp: , Rfl:   •  methocarbamol (ROBAXIN) 500 mg tablet, TAKE 1-2 TABLETS BY MOUTH THREE TIMES A DAY AS NEEDED FOR MUSCLE SPASMS, Disp: 120 tablet, Rfl: 5  •  metoprolol succinate (TOPROL-XL) 50 mg 24 hr tablet, TAKE 1 TABLET(50 MG) BY MOUTH DAILY, Disp: 90 tablet, Rfl: 3  •  Multiple Vitamins-Minerals (CENTRUM SILVER ULTRA WOMENS PO), Take by mouth daily  , Disp: , Rfl:   •  Naltrexone-buPROPion HCl ER 8-90 MG TB12, 1 po bid x1 week then increase to 2 po bid, Disp: 120 tablet, Rfl: 3  •  Omega-3 1000 MG CAPS, Take by mouth daily , Disp: , Rfl:   •  omeprazole (PriLOSEC) 20 mg delayed release capsule, Take 1 capsule (20 mg total) by mouth daily, Disp: 90 capsule, Rfl: 1  •  Wheat Dextrin (BENEFIBER DRINK MIX PO), Take by mouth, Disp: , Rfl:     Current Facility-Administered Medications:   •  bupivacaine (PF) (MARCAINE) 0.25 % injection 2 mL, 2 mL, Epidural, Once, Tommy Lundberg MD  •  iohexol (OMNIPAQUE) 300 mg/mL injection 1 mL, 1 mL, Epidural, Once, Nicolas Jimenez MD  •  methylPREDNISolone acetate (DEPO-MEDROL) injection 80 mg, 80 mg, Epidural, Once, Nicolas Jimenez MD    Allergies   Allergen Reactions   • Hydromorphone Hcl Itching   • Percocet [Oxycodone-Acetaminophen] GI Intolerance and Vomiting   • Sulfamethoxazole-Trimethoprim Rash   • Tizanidine Diarrhea       Physical Exam:   Vitals:    09/15/23 0944   BP: 143/83   Pulse: 64   Resp: 18   Temp: 98 °F (36.7 °C)   SpO2: 98%     General: Awake, Alert, Oriented x 3, Mood and affect appropriate  Respiratory: Respirations even and unlabored  Cardiovascular: Peripheral pulses intact; no edema  Musculoskeletal Exam: Lower back    ASA Score: 3    Patient/Chart Verification  Patient ID Verified: Verbal  ID Band Applied: No  Consents Confirmed: Procedural, To be obtained in the Pre-Procedure area  H&P( within 30 days) Verified: To be obtained in the Pre-Procedure area  Interval H&P(within 24 hr) Complete (required for Outpatients and Surgery Admit only): To be obtained in the Pre-Procedure area  Allergies Reviewed: Yes  Anticoag/NSAID held?: NA  Currently on antibiotics?: No    Assessment:   1.  Lumbar radiculopathy        Plan: CAROLINE

## 2023-09-15 NOTE — DISCHARGE INSTR - LAB
Epidural Steroid Injection   WHAT YOU NEED TO KNOW:   An epidural steroid injection (JOSE) is a procedure to inject steroid medicine into the epidural space. The epidural space is between your spinal cord and vertebrae. Steroids reduce inflammation and fluid buildup in your spine that may be causing pain. You may be given pain medicine along with the steroids. ACTIVITY  Do not drive or operate machinery today. No strenuous activity today - bending, lifting, etc.  You may resume normal activites starting tomorrow - start slowly and as tolerated. You may shower today, but no tub baths or hot tubs. You may have numbness for several hours from the local anesthetic. Please use caution and common sense, especially with weight-bearing activities. CARE OF THE INJECTION SITE  If you have soreness or pain, apply ice to the area today (20 minutes on/20 minutes off). Starting tomorrow, you may use warm, moist heat or ice if needed. You may have an increase or change in your discomfort for 36-48 hours after your treatment. Apply ice and continue with any pain medication you have been prescribed. Notify the [facility] if you have any of the following: redness, drainage, swelling, headache, stiff neck or fever above 100°F.    SPECIAL INSTRUCTIONS  Our office will contact you in approximately 7 days for a progress report. MEDICATIONS  Continue to take all routine medications. Our office may have instructed you to hold some medications. As no general anesthesia was used in today's procedure, you should not experience any side effects related to anesthesia. If you are diabetic, the steroids used in today's injection may temporarily increase your blood sugar levels after the first few days after your injection. Please keep a close eye on your sugars and alert the doctor who manages your diabetes if your sugars are significantly high from your baseline or you are symptomatic.      If you have a problem specifically related to your procedure, please call our office at [phone]. Problems not related to your procedure should be directed to your primary care physician.

## 2023-09-22 ENCOUNTER — TELEPHONE (OUTPATIENT)
Dept: RADIOLOGY | Facility: MEDICAL CENTER | Age: 73
End: 2023-09-22

## 2023-09-22 DIAGNOSIS — M79.18 MYOFASCIAL PAIN SYNDROME: Primary | ICD-10-CM

## 2023-09-22 NOTE — TELEPHONE ENCOUNTER
Patient Reports  10       %     improvement post injection    Pain Level  2-9 when she gets spasms   /10      Patient would like a call back regarding her pain medication. She states that it is not helping.

## 2023-09-22 NOTE — TELEPHONE ENCOUNTER
RN s/w pt and she said she is still getting spasms with activity but not with sitting. She's been trying the robaxin 500 mg (2) twice a day consistently for a while but she doesn't notice that it's helping. She confirmed that she took cyclobenzaprine in the past but she was told only to take that one at bedtime and not during the daytime. She implied b/c it can make you very dorwsy during the daytime. She is asking for either a different muscle relaxer or another type of medication for her spasms that she can take both at nighttime and daytime. Pt said this could be addressed by FQ on Monday, aware he was out of office.

## 2023-09-22 NOTE — TELEPHONE ENCOUNTER
Caller: Juanito Murphy     Doctor Dr Odilon Britton    Reason for call: Patient returning call from rn    Call back#: [phone]

## 2023-09-25 RX ORDER — METAXALONE 800 MG/1
800 TABLET ORAL 2 TIMES DAILY PRN
Qty: 60 TABLET | Refills: 2 | Status: SHIPPED | OUTPATIENT
Start: 2023-09-25

## 2023-10-05 ENCOUNTER — TELEPHONE (OUTPATIENT)
Age: 73
End: 2023-10-05

## 2023-10-05 NOTE — TELEPHONE ENCOUNTER
RN s/w [facility]. The metaxalone is not covered. You can order tizanidine tablets, Ibuprofen or Naproxen which are covered or a Prior Auth would be needed if you want the metaxalone.   Pls advise.

## 2023-10-05 NOTE — TELEPHONE ENCOUNTER
Caller: [facility]     Doctor: Dr Barnes    Reason for call: Pharmacy stating medication metaxalone (SKELAXIN) 800 mg tablet   Not covered by insurance please advise pharmacy if prescribed something different or work on prior auth fir medication     Call back#: [phone]

## 2023-10-06 ENCOUNTER — TELEPHONE (OUTPATIENT)
Age: 73
End: 2023-10-06

## 2023-10-06 NOTE — TELEPHONE ENCOUNTER
PA REQUEST ALONG WITH CLINICALS HAS BEEN SUBMITTED TO PLAN FOR COVERAGE DETERMINATION    CMM KEY [id number]     WILL AWAIT PLAN RESPONSE  1-5 BUSINESS DAYS

## 2023-10-06 NOTE — TELEPHONE ENCOUNTER
Please assist with prior auth and update pt when further information becomes available. Thank you.

## 2023-10-06 NOTE — TELEPHONE ENCOUNTER
Toribio Sweeney: [id number] - PA Case ID: [id number]  Need help? Call us at [phone]  Outcome   Approvedtoday  Request Reference Number: [id number]. METAXALONE TAB 800MG is approved through 12/31/2024. Your patient may now fill this prescription and it will be covered.   Drug    Metaxalone 800MG tablets   Form    [employer] Medicare Part D Electronic Prior Authorization Form (2017 NCP[employer])   PT NOTIFIED

## 2023-10-16 ENCOUNTER — OFFICE VISIT (OUTPATIENT)
Dept: FAMILY MEDICINE CLINIC | Facility: CLINIC | Age: 73
End: 2023-10-16

## 2023-10-16 ENCOUNTER — TELEPHONE (OUTPATIENT)
Age: 73
End: 2023-10-16

## 2023-10-16 VITALS
TEMPERATURE: 98.3 F | OXYGEN SATURATION: 96 % | SYSTOLIC BLOOD PRESSURE: 112 MMHG | HEART RATE: 66 BPM | DIASTOLIC BLOOD PRESSURE: 70 MMHG | BODY MASS INDEX: 42.38 KG/M2 | HEIGHT: 60 IN

## 2023-10-16 DIAGNOSIS — F39 AFFECTIVE DISORDER (HCC): ICD-10-CM

## 2023-10-16 DIAGNOSIS — I10 ESSENTIAL HYPERTENSION: ICD-10-CM

## 2023-10-16 DIAGNOSIS — H69.93 EUSTACHIAN TUBE DYSFUNCTION, BILATERAL: Primary | ICD-10-CM

## 2023-10-16 RX ORDER — FLUOXETINE HYDROCHLORIDE 20 MG/1
CAPSULE ORAL
Qty: 90 CAPSULE | Refills: 1 | Status: SHIPPED | OUTPATIENT
Start: 2023-10-16

## 2023-10-16 RX ORDER — AZITHROMYCIN 250 MG/1
TABLET, FILM COATED ORAL
Qty: 6 TABLET | Refills: 0 | Status: SHIPPED | OUTPATIENT
Start: 2023-10-16 | End: 2023-10-20

## 2023-10-16 RX ORDER — HYDROCHLOROTHIAZIDE 25 MG/1
TABLET ORAL
Qty: 90 TABLET | Refills: 1 | Status: SHIPPED | OUTPATIENT
Start: 2023-10-16

## 2023-10-16 RX ORDER — PREDNISONE 10 MG/1
TABLET ORAL
Qty: 26 TABLET | Refills: 0 | Status: SHIPPED | OUTPATIENT
Start: 2023-10-16

## 2023-10-16 RX ORDER — VIT C/B6/B5/MAGNESIUM/HERB 173 50-5-6-5MG
CAPSULE ORAL
COMMUNITY

## 2023-10-16 NOTE — TELEPHONE ENCOUNTER
Pt called wanting to be seen today for vertigo. First available is not until Wednesday.   Pt said no, she cannot wait until then and she wanted a message sent to Dr. Joshi

## 2023-10-17 NOTE — PROGRESS NOTES
Patient ID: David Mata is a 72 y.o. female. HPI: 72 y. o.female presenting with symptoms of ear pressure, crackling of ears and dizziness associated with positional changes. Symptoms for the past 4 days gradually worsening.     SUBJECTIVE    Family History   Problem Relation Age of Onset    Atrial fibrillation Mother     Breast cancer Mother     Stroke Mother     Coronary artery disease Mother     Diabetes Mother     Pancreatitis Mother     Cancer Mother         Breast    Hyperlipidemia Mother     Pulmonary embolism Mother     Depression Mother     Hypertension Mother     Thyroid disease Mother         Hypothyroidism    Hearing loss Mother     Heart attack Father     Arthritis Father     No Known Problems Sister     Arthritis Sister     Endometrial cancer Maternal Grandmother 76    Cancer Maternal Grandmother     No Known Problems Paternal Grandmother     No Known Problems Paternal Grandfather     No Known Problems Maternal Aunt     No Known Problems Paternal Aunt     No Known Problems Paternal Aunt     Prostate cancer Paternal Uncle 54    Arthritis Family     Cancer Family      Social History     Socioeconomic History    Marital status: /Civil Union     Spouse name: Not on file    Number of children: Not on file    Years of education: Not on file    Highest education level: Not on file   Occupational History    Not on file   Tobacco Use    Smoking status: Never     Passive exposure: Past    Smokeless tobacco: Never   Vaping Use    Vaping Use: Never used   Substance and Sexual Activity    Alcohol use: Not Currently     Comment: Rarely drink alcohol    Drug use: Never    Sexual activity: Yes     Partners: Male     Birth control/protection: Female Sterilization     Comment: Hysterectomy 1986   Other Topics Concern    Not on file   Social History Narrative    Drinks coffee     Social Determinants of Health     Financial Resource Strain: Low Risk  (7/4/2023)    Overall Financial Resource Strain (CARDIA) Difficulty of Paying Living Expenses: Not hard at all   Food Insecurity: Not on file   Transportation Needs: No Transportation Needs (7/4/2023)    PRAPARE - Transportation     Lack of Transportation (Medical): No     Lack of Transportation (Non-Medical):  No   Physical Activity: Not on file   Stress: Not on file   Social Connections: Not on file   Intimate Partner Violence: Not on file   Housing Stability: Not on file     Past Medical History:   Diagnosis Date    Allergic     Arthritis     Depression     Endometriosis     GERD (gastroesophageal reflux disease)     Hyperlipidemia     Hypertension     Nondisplaced fracture of fifth metatarsal bone, right foot, initial encounter for closed fracture 09/24/2018    Obesity     Osteopenia     Otitis media 2022    Pancreatitis 06/2017    Pneumonia     Right hip pain 12/04/2018    S/P hip replacement, right 07/31/2018    Patient progressing well after right hip replacement     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    Trochanteric bursitis of right hip 02/12/2019    Visual impairment 1968    Nearsighted with stidmatism     Past Surgical History:   Procedure Laterality Date    ABDOMINAL SURGERY      endometriosis     APPENDECTOMY      BREAST BIOPSY Right 1985    benign    CARPAL TUNNEL RELEASE      COLONOSCOPY      HAND SURGERY      HIP SURGERY      HYSTERECTOMY Bilateral 1985    JOINT REPLACEMENT Bilateral     KNEE    JOINT REPLACEMENT Left     HIP    OOPHORECTOMY Bilateral 1985    [place] ARTHRP ACETBLR/PROX FEM PROSTC AGRFT/ALGRFT Left 3/12/2018    Procedure: ARTHROPLASTY HIP TOTAL;  Surgeon: Ross Wesley MD;  [facility];  Service: Orthopedics    [place] ARTHRP ACETBLR/PROX FEM PROSTC AGRFT/ALGRFT Right 7/30/2018    Procedure: RIGHT TOTAL HIP ARTHROPLASTY;  Surgeon: Ross Wesley MD;  [facility];  Service: Orthopedics    [place] SURGICAL ARTHROSCOPY RAMONE W/CORACOACRM LIGM RLS Right 5/10/2017    Procedure: SHOULDER ARTHROSCOPY SUBACROMIAL DECOMPRESSION;  Surgeon: Guy Chaudhari MD;  [facility];  Service: Orthopedics    [place] SURGICAL ARTHROSCOPY SHOULDER BICEPS TENODESIS Right 5/10/2017    Procedure: ARTHROSCOPIC BICEPS TENODESIS WITH ROTATOR CUFF REPAIR ;  Surgeon: Guy Chaudhari MD;  [facility];  Service: Orthopedics    TONSILLECTOMY       Allergies   Allergen Reactions    Hydromorphone Hcl Itching    Percocet [Oxycodone-Acetaminophen] GI Intolerance and Vomiting    Sulfamethoxazole-Trimethoprim Rash    Tizanidine Diarrhea       Current Outpatient Medications:     acetaminophen (TYLENOL) 500 mg tablet, Take 500 mg by mouth every 6 (six) hours as needed for mild pain, Disp: , Rfl:     atorvastatin (LIPITOR) 20 mg tablet, Take 1 tablet (20 mg total) by mouth daily, Disp: 90 tablet, Rfl: 3    azithromycin (ZITHROMAX) 250 mg tablet, Take 2 tablets today then 1 tablet daily x 4 days, Disp: 6 tablet, Rfl: 0    calcium-vitamin D (OSCAL) 250-125 MG-UNIT per tablet, Take 1 tablet by mouth daily, Disp: , Rfl:     cyclobenzaprine (FLEXERIL) 10 mg tablet, Take 1 tablet (10 mg total) by mouth daily at bedtime (Patient taking differently: Take 10 mg by mouth if needed), Disp: 90 tablet, Rfl: 1    Homeopathic Products ([employer]) GEL, Apply topically continuous as needed, Disp: , Rfl:     ibuprofen (MOTRIN) 200 mg tablet, Take by mouth every 4 (four) hours as needed for mild pain, Disp: , Rfl:     metaxalone (SKELAXIN) 800 mg tablet, Take 1 tablet (800 mg total) by mouth 2 (two) times a day as needed for muscle spasms, Disp: 60 tablet, Rfl: 2    metoprolol succinate (TOPROL-XL) 50 mg 24 hr tablet, TAKE 1 TABLET(50 MG) BY MOUTH DAILY, Disp: 90 tablet, Rfl: 3    Multiple Vitamins-Minerals (CENTRUM SILVER ULTRA WOMENS PO), Take by mouth daily  , Disp: , Rfl:     Omega-3 1000 MG CAPS, Take by mouth daily , Disp: , Rfl:     omeprazole (PriLOSEC) 20 mg delayed release capsule, Take 1 capsule (20 mg total) by mouth daily, Disp: 90 capsule, Rfl: 1 predniSONE 10 mg tablet, 3 tabs po bid x2 days, then 2 tabs po bid x2 days, then 1 tab bid x2 days, then 1 daily until done., Disp: 26 tablet, Rfl: 0    Turmeric 500 MG CAPS, Take by mouth, Disp: , Rfl:     FLUoxetine (PROzac) 20 mg capsule, TAKE 1 CAPSULE BY MOUTH  DAILY, Disp: 90 capsule, Rfl: 1    hydrochlorothiazide (HYDRODIURIL) 25 mg tablet, TAKE 1 TABLET BY MOUTH  DAILY, Disp: 90 tablet, Rfl: 1    Review of Systems  Constitutional:     Denies fever, chills, fatigue, weakness ,weight loss, weight gain       ENT: Denies earache, loss of hearing, nosebleed, nasal discharge,nasal congestion, sore throat,hoarseness, but complains of ear pressure,and crackling    Pulmonary: Denies shortness of breath ,cough , dyspnea on exertionon, orthopnea , PND   Cardiovascular:  Denies bradycardia , tachycardia ,palpations, lower extremity, edema leg, claudication  Breast:  Denies new or changing breast lumps,  nipple discharge, nipple changes,  Abdomen:  Denies abdominal pain , anorexia ,indigestion, nausea ,vomiting, constipation , diarrhea  Musculoskeletal: Denies myalgias, arthralgias, joint swelling, joint stiffness ,limb pain, limb swelling  Lymph:denies swollen glands  Gu: no dysuria or urinary frequency  Skin: Denies skin rash, skin lesion, skin wound, itching,dry skin  Neuro: Denies headache, numbness, tingling, confusion, loss of consciousness, but complains of postitional vertigo  Psychiatric: Denies feelings of depression, suicidal ideation, anxiety, sleep disturbances    OBJECTIVE  /70   Pulse 66   Temp 98.3 °F (36.8 °C)   Ht 5' (1.524 m)   LMP  (LMP Unknown) Comment: hysterectomy  SpO2 96%   BMI 42.38 kg/m²   Constitutional:   NAD, well appearing and well nourished      ENT:   Conjunctiva and lids: no injection, edema, or discharge     Pupils and iris: CORNELIA bilaterally    External inspection of ears and nose: normal without deformities or discharge.       Otoscopic exam: Canals patent with tm dull, no erythema,but large effusions noted bilaterally  ENasal mucosa, septum and turbinates: Turbinae injection with discharge   Oropharynx:  Moist mucosa, normal tongue and tonsils without lesions. Erythema and injection  of post pharynx with pnd      Pulmonary:Respiratory effort normal rate and rhythm, no increased work of breathing. Auscultation of lungs:  Clear bilaterally with no adventitious breath sounds       Cardiovascular: regular rate and rhythm, S1 and S2, no murmur, no edema and/or varicosities of LE      Abdomen: Soft and non-distended     Positive bowel sounds      No heptomegaly or splenomegaly      Lymphatic: Anterior and posterior cervical lymphadenopathy         Muscskeletal:  Gait and station: Normal gait      Digits and nails normal without clubbing or cyanosis       Inspection/palpation of joints, bones, and muscles:  No joint tenderness, swelling, full active and passive range of motion       Gu: no suprabubic tenderness, CVA tenderness or urethral discharge  Skin: Normal skin turgor and no rashes      Neuro:      Normal reflexes     Psych:   alert and oriented to person, place and time     normal mood and affect       Assessment/Plan:Diagnoses and all orders for this visit:    Eustachian tube dysfunction, bilateral  Comments:  Patient to continue with antihistamine therapy. Orders:  -     predniSONE 10 mg tablet; 3 tabs po bid x2 days, then 2 tabs po bid x2 days, then 1 tab bid x2 days, then 1 daily until done. -     azithromycin (ZITHROMAX) 250 mg tablet; Take 2 tablets today then 1 tablet daily x 4 days    Other orders  -     Turmeric 500 MG CAPS; Take by mouth        Reviewed with patient plan to treat with above plan.     Patient instructed to call in 72 hours if not feeling better or if symptoms worsen

## 2023-10-25 ENCOUNTER — TELEPHONE (OUTPATIENT)
Age: 73
End: 2023-10-25

## 2023-10-25 NOTE — TELEPHONE ENCOUNTER
Patient has not done vestibular therapy or seen ENT. She will agree to ENT as long as her ears are checked prior.  Appt made for 10/26

## 2023-10-25 NOTE — TELEPHONE ENCOUNTER
The patient called she was seen a week ago for vertigo   ---the patient finished the prednisone yesterday    --today she woke up with vertigo   ---please advise thank you

## 2023-10-26 ENCOUNTER — OFFICE VISIT (OUTPATIENT)
Dept: FAMILY MEDICINE CLINIC | Facility: CLINIC | Age: 73
End: 2023-10-26
Payer: MEDICARE

## 2023-10-26 VITALS
SYSTOLIC BLOOD PRESSURE: 134 MMHG | DIASTOLIC BLOOD PRESSURE: 80 MMHG | OXYGEN SATURATION: 99 % | HEART RATE: 66 BPM | TEMPERATURE: 97.9 F | WEIGHT: 211.4 LBS | BODY MASS INDEX: 41.29 KG/M2

## 2023-10-26 DIAGNOSIS — H69.93 DYSFUNCTION OF BOTH EUSTACHIAN TUBES: Primary | ICD-10-CM

## 2023-10-26 PROCEDURE — 99213 OFFICE O/P EST LOW 20 MIN: CPT | Performed by: FAMILY MEDICINE

## 2023-10-26 RX ORDER — LORATADINE 10 MG/1
10 TABLET ORAL DAILY
COMMUNITY

## 2023-10-27 NOTE — PROGRESS NOTES
Patient ID: Mariah Welch is a 72 y.o. female. HPI: 72 y. o.female presents for a recheck of eustachian tube dysfunction. She is feeling better and no longer has ear pressure or dizziness.       SUBJECTIVE    Family History   Problem Relation Age of Onset    Atrial fibrillation Mother     Breast cancer Mother     Stroke Mother     Coronary artery disease Mother     Diabetes Mother     Pancreatitis Mother     Cancer Mother         Breast    Hyperlipidemia Mother     Pulmonary embolism Mother     Depression Mother     Hypertension Mother     Thyroid disease Mother         Hypothyroidism    Hearing loss Mother     Heart attack Father     Arthritis Father     No Known Problems Sister     Arthritis Sister     Endometrial cancer Maternal Grandmother 76    Cancer Maternal Grandmother     No Known Problems Paternal Grandmother     No Known Problems Paternal Grandfather     No Known Problems Maternal Aunt     No Known Problems Paternal Aunt     No Known Problems Paternal Aunt     Prostate cancer Paternal Uncle 54    Arthritis Family     Cancer Family      Social History     Socioeconomic History    Marital status: /Civil Union     Spouse name: Not on file    Number of children: Not on file    Years of education: Not on file    Highest education level: Not on file   Occupational History    Not on file   Tobacco Use    Smoking status: Never     Passive exposure: Past    Smokeless tobacco: Never   Vaping Use    Vaping Use: Never used   Substance and Sexual Activity    Alcohol use: Not Currently     Comment: Rarely drink alcohol    Drug use: Never    Sexual activity: Yes     Partners: Male     Birth control/protection: Female Sterilization     Comment: Hysterectomy 1986   Other Topics Concern    Not on file   Social History Narrative    Drinks coffee     Social Determinants of Health     Financial Resource Strain: Low Risk  (7/4/2023)    Overall Financial Resource Strain (CARDIA)     Difficulty of Paying Living Expenses: Not hard at all   Food Insecurity: Not on file   Transportation Needs: No Transportation Needs (7/4/2023)    PRAPARE - Transportation     Lack of Transportation (Medical): No     Lack of Transportation (Non-Medical):  No   Physical Activity: Not on file   Stress: Not on file   Social Connections: Not on file   Intimate Partner Violence: Not on file   Housing Stability: Not on file     Past Medical History:   Diagnosis Date    Allergic     Arthritis     Depression     Endometriosis     GERD (gastroesophageal reflux disease)     Hyperlipidemia     Hypertension     Nondisplaced fracture of fifth metatarsal bone, right foot, initial encounter for closed fracture 09/24/2018    Obesity     Osteopenia     Otitis media 2022    Pancreatitis 06/2017    Pneumonia     Right hip pain 12/04/2018    S/P hip replacement, right 07/31/2018    Patient progressing well after right hip replacement     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    Trochanteric bursitis of right hip 02/12/2019    Visual impairment 1968    Nearsighted with stidmatism     Past Surgical History:   Procedure Laterality Date    ABDOMINAL SURGERY      endometriosis     APPENDECTOMY      BREAST BIOPSY Right 1985    benign    CARPAL TUNNEL RELEASE      COLONOSCOPY      HAND SURGERY      HIP SURGERY      HYSTERECTOMY Bilateral 1985    JOINT REPLACEMENT Bilateral     KNEE    JOINT REPLACEMENT Left     HIP    OOPHORECTOMY Bilateral 1985    [place] ARTHRP ACETBLR/PROX FEM PROSTC AGRFT/ALGRFT Left 3/12/2018    Procedure: ARTHROPLASTY HIP TOTAL;  Surgeon: Dariel Wise MD;  [facility];  Service: Orthopedics    [place] ARTHRP ACETBLR/PROX FEM PROSTC AGRFT/ALGRFT Right 7/30/2018    Procedure: RIGHT TOTAL HIP ARTHROPLASTY;  Surgeon: Dariel Wise MD;  [facility];  Service: Orthopedics    [place] SURGICAL ARTHROSCOPY RAMONE W/CORACOACRM LIGM RLS Right 5/10/2017    Procedure: SHOULDER ARTHROSCOPY SUBACROMIAL DECOMPRESSION;  Surgeon: Luis Salinas Rico Islas MD;  [facility];  Service: Orthopedics    [place] SURGICAL ARTHROSCOPY SHOULDER BICEPS TENODESIS Right 5/10/2017    Procedure: ARTHROSCOPIC BICEPS TENODESIS WITH ROTATOR CUFF REPAIR ;  Surgeon: Carisa Culp MD;  [facility];  Service: Orthopedics    TONSILLECTOMY       Allergies   Allergen Reactions    Hydromorphone Hcl Itching    Percocet [Oxycodone-Acetaminophen] GI Intolerance and Vomiting    Sulfamethoxazole-Trimethoprim Rash    Tizanidine Diarrhea       Current Outpatient Medications:     acetaminophen (TYLENOL) 500 mg tablet, Take 500 mg by mouth every 6 (six) hours as needed for mild pain, Disp: , Rfl:     atorvastatin (LIPITOR) 20 mg tablet, Take 1 tablet (20 mg total) by mouth daily, Disp: 90 tablet, Rfl: 3    calcium-vitamin D (OSCAL) 250-125 MG-UNIT per tablet, Take 1 tablet by mouth daily, Disp: , Rfl:     cyclobenzaprine (FLEXERIL) 10 mg tablet, Take 1 tablet (10 mg total) by mouth daily at bedtime, Disp: 90 tablet, Rfl: 1    FLUoxetine (PROzac) 20 mg capsule, TAKE 1 CAPSULE BY MOUTH  DAILY, Disp: 90 capsule, Rfl: 1    Homeopathic Products ([employer]) GEL, Apply topically continuous as needed, Disp: , Rfl:     hydrochlorothiazide (HYDRODIURIL) 25 mg tablet, TAKE 1 TABLET BY MOUTH  DAILY, Disp: 90 tablet, Rfl: 1    ibuprofen (MOTRIN) 200 mg tablet, Take by mouth every 4 (four) hours as needed for mild pain, Disp: , Rfl:     loratadine (CLARITIN) 10 mg tablet, Take 10 mg by mouth daily, Disp: , Rfl:     metaxalone (SKELAXIN) 800 mg tablet, Take 1 tablet (800 mg total) by mouth 2 (two) times a day as needed for muscle spasms, Disp: 60 tablet, Rfl: 2    metoprolol succinate (TOPROL-XL) 50 mg 24 hr tablet, TAKE 1 TABLET(50 MG) BY MOUTH DAILY, Disp: 90 tablet, Rfl: 3    Multiple Vitamins-Minerals (CENTRUM SILVER ULTRA WOMENS PO), Take by mouth daily  , Disp: , Rfl:     Omega-3 1000 MG CAPS, Take by mouth daily , Disp: , Rfl:     omeprazole (PriLOSEC) 20 mg delayed release capsule, Take 1 capsule (20 mg total) by mouth daily, Disp: 90 capsule, Rfl: 1    Turmeric 500 MG CAPS, Take by mouth, Disp: , Rfl:     Review of Systems  Constitutional:     Denies fever, chills ,fatigue ,weakness ,weight loss, weight gain     ENT: Denies earache ,loss of hearing ,nosebleed, nasal discharge,nasal congestion ,sore throat ,hoarseness  Pulmonary: Denies shortness of breath ,cough  ,dyspnea on exertion, orthopnea  ,PND   Cardiovascular:  Denies bradycardia , tachycardia  ,palpations, lower extremity edema leg, claudication  Breast:  Denies new or changing breast lumps ,nipple discharge ,nipple changes  Abdomen:  Denies abdominal pain , anorexia , indigestion, nausea, vomiting, constipation, diarrhea  Musculoskeletal: Denies myalgias, arthralgias, joint swelling, joint stiffness , limb pain, limb swelling  Gu: denies dysuria, polyuria  Skin: Denies skin rash, skin lesion, skin wound, itching, dry skin  Neuro: Denies headache, numbness, tingling, confusion, loss of consciousness, dizziness, vertigo  Psychiatric: Denies feelings of depression, suicidal ideation, anxiety, sleep disturbances    OBJECTIVE  /80   Pulse 66   Temp 97.9 °F (36.6 °C)   Wt 95.9 kg (211 lb 6.4 oz)   LMP  (LMP Unknown) Comment: hysterectomy  SpO2 99%   BMI 41.29 kg/m²   Constitutional:   NAD, well appearing and well nourished      ENT:   Conjunctiva and lids: no injection, edema, or discharge     Pupils and iris: CORNELIA bilaterally    External inspection of ears and nose: normal without deformities or discharge. Otoscopic exam: Canals patent without erythema. Nasal mucosa, septum and turbinates: Normal or edema or discharge         Oropharynx:  Moist mucosa, normal tongue and tonsils without lesions. No erythema        Pulmonary:Respiratory effort normal rate and rhythm, no increased work of breathing.  Auscultation of lungs:  Clear bilaterally with no adventitious breath sounds       Cardiovascular: regular rate and rhythm, S1 and S2, no murmur, no edema and/or varicosities of LE      Abdomen: Soft and non-distended     Positive bowel sounds      No heptomegaly or splenomegaly      Gu: no suprapubic tenderness or CVA tenderness, no urethral discharge  Lymphatic:  No anterior or posterior cervical lymphadenopathy         Musculoskeletal:  Gait and station: Normal gait      Digits and nails normal without clubbing or cyanosis       Inspection/palpation of joints, bones, and muscles:  No joint tenderness, swelling, full active and passive range of motion       Skin: Normal skin turgor and no rashes      Neuro:    Normal reflexes       Psych:   alert and oriented to person, place and time     normal mood and affect       Assessment/Plan:Diagnoses and all orders for this visit:    Dysfunction of both eustachian tubes  Comments:  After rechecking, patient's ears are normal.  Effusions have resolved. Other orders  -     loratadine (CLARITIN) 10 mg tablet; Take 10 mg by mouth daily        Reviewed with patient plan to treat with above plan.     Patient instructed to call in 72 hours if not feeling better or if symptoms worsen

## 2023-11-15 ENCOUNTER — OFFICE VISIT (OUTPATIENT)
Dept: FAMILY MEDICINE CLINIC | Facility: CLINIC | Age: 73
End: 2023-11-15
Payer: MEDICARE

## 2023-11-15 VITALS
WEIGHT: 212 LBS | BODY MASS INDEX: 41.62 KG/M2 | OXYGEN SATURATION: 98 % | SYSTOLIC BLOOD PRESSURE: 120 MMHG | DIASTOLIC BLOOD PRESSURE: 78 MMHG | HEART RATE: 72 BPM | TEMPERATURE: 97.8 F | HEIGHT: 60 IN

## 2023-11-15 DIAGNOSIS — R06.09 DYSPNEA ON EXERTION: ICD-10-CM

## 2023-11-15 DIAGNOSIS — I10 ESSENTIAL HYPERTENSION: Primary | ICD-10-CM

## 2023-11-15 DIAGNOSIS — R61 HYPERHIDROSIS: ICD-10-CM

## 2023-11-15 DIAGNOSIS — E78.00 PURE HYPERCHOLESTEROLEMIA: ICD-10-CM

## 2023-11-15 DIAGNOSIS — R23.2 FLUSHING: ICD-10-CM

## 2023-11-15 PROCEDURE — 99214 OFFICE O/P EST MOD 30 MIN: CPT | Performed by: FAMILY MEDICINE

## 2023-11-17 ENCOUNTER — APPOINTMENT (OUTPATIENT)
Dept: LAB | Facility: CLINIC | Age: 73
End: 2023-11-17
Payer: MEDICARE

## 2023-11-17 DIAGNOSIS — R23.2 FLUSHING: ICD-10-CM

## 2023-11-17 DIAGNOSIS — I10 ESSENTIAL HYPERTENSION: ICD-10-CM

## 2023-11-17 DIAGNOSIS — E78.00 PURE HYPERCHOLESTEROLEMIA: ICD-10-CM

## 2023-11-17 DIAGNOSIS — R61 GENERALIZED HYPERHIDROSIS: ICD-10-CM

## 2023-11-17 DIAGNOSIS — R61 HYPERHIDROSIS: ICD-10-CM

## 2023-11-17 LAB
ALBUMIN SERPL BCP-MCNC: 4.3 G/DL (ref 3.5–5)
ALP SERPL-CCNC: 47 U/L (ref 34–104)
ALT SERPL W P-5'-P-CCNC: 17 U/L (ref 7–52)
ANION GAP SERPL CALCULATED.3IONS-SCNC: 9 MMOL/L
AST SERPL W P-5'-P-CCNC: 24 U/L (ref 13–39)
BILIRUB SERPL-MCNC: 0.57 MG/DL (ref 0.2–1)
BUN SERPL-MCNC: 17 MG/DL (ref 5–25)
CALCIUM SERPL-MCNC: 9.8 MG/DL (ref 8.4–10.2)
CHLORIDE SERPL-SCNC: 101 MMOL/L (ref 96–108)
CHOLEST SERPL-MCNC: 170 MG/DL
CO2 SERPL-SCNC: 29 MMOL/L (ref 21–32)
CREAT SERPL-MCNC: 1.17 MG/DL (ref 0.6–1.3)
GFR SERPL CREATININE-BSD FRML MDRD: 46 ML/MIN/1.73SQ M
GLUCOSE P FAST SERPL-MCNC: 88 MG/DL (ref 65–99)
HDLC SERPL-MCNC: 66 MG/DL
LDLC SERPL CALC-MCNC: 81 MG/DL (ref 0–100)
NONHDLC SERPL-MCNC: 104 MG/DL
POTASSIUM SERPL-SCNC: 3.7 MMOL/L (ref 3.5–5.3)
PROT SERPL-MCNC: 7.1 G/DL (ref 6.4–8.4)
SODIUM SERPL-SCNC: 139 MMOL/L (ref 135–147)
TRIGL SERPL-MCNC: 113 MG/DL
TSH SERPL DL<=0.05 MIU/L-ACNC: 2.6 UIU/ML (ref 0.45–4.5)

## 2023-11-17 PROCEDURE — 82384 ASSAY THREE CATECHOLAMINES: CPT

## 2023-11-17 PROCEDURE — 84443 ASSAY THYROID STIM HORMONE: CPT

## 2023-11-17 PROCEDURE — 36415 COLL VENOUS BLD VENIPUNCTURE: CPT

## 2023-11-17 PROCEDURE — 84585 ASSAY OF URINE VMA: CPT

## 2023-11-17 PROCEDURE — 80053 COMPREHEN METABOLIC PANEL: CPT

## 2023-11-17 PROCEDURE — 80061 LIPID PANEL: CPT

## 2023-11-20 NOTE — PROGRESS NOTES
Patient ID: Mariah Welch is a 72 y.o. female. HPI: 72 y. o.female presents for follow up hypertension and hypercholesterolemia. Pt denies any dizziness,  chest pain, palpitations, or dypsnea with exertion. She is complaining of hyperhidrosis, flushing and dyspnea on exertion. She denies any chest pain, arm or viera pain.        SUBJECTIVE    Family History   Problem Relation Age of Onset    Atrial fibrillation Mother     Breast cancer Mother     Stroke Mother     Coronary artery disease Mother     Diabetes Mother     Pancreatitis Mother     Cancer Mother         Breast    Hyperlipidemia Mother     Pulmonary embolism Mother     Depression Mother     Hypertension Mother     Thyroid disease Mother         Hypothyroidism    Hearing loss Mother     Heart attack Father     Arthritis Father     No Known Problems Sister     Arthritis Sister     Endometrial cancer Maternal Grandmother 76    Cancer Maternal Grandmother     No Known Problems Paternal Grandmother     No Known Problems Paternal Grandfather     No Known Problems Maternal Aunt     No Known Problems Paternal Aunt     No Known Problems Paternal Aunt     Prostate cancer Paternal Uncle 54    Arthritis Family     Cancer Family      Social History     Socioeconomic History    Marital status: /Civil Union     Spouse name: Not on file    Number of children: Not on file    Years of education: Not on file    Highest education level: Not on file   Occupational History    Not on file   Tobacco Use    Smoking status: Never     Passive exposure: Past    Smokeless tobacco: Never   Vaping Use    Vaping Use: Never used   Substance and Sexual Activity    Alcohol use: Not Currently     Comment: Rarely drink alcohol    Drug use: Never    Sexual activity: Yes     Partners: Male     Birth control/protection: Female Sterilization     Comment: Hysterectomy 1986   Other Topics Concern    Not on file   Social History Narrative    Drinks coffee     Social Determinants of Health Financial Resource Strain: Low Risk  (7/4/2023)    Overall Financial Resource Strain (CARDIA)     Difficulty of Paying Living Expenses: Not hard at all   Food Insecurity: Not on file   Transportation Needs: No Transportation Needs (7/4/2023)    PRAPARE - Transportation     Lack of Transportation (Medical): No     Lack of Transportation (Non-Medical):  No   Physical Activity: Not on file   Stress: Not on file   Social Connections: Not on file   Intimate Partner Violence: Not on file   Housing Stability: Not on file     Past Medical History:   Diagnosis Date    Allergic     Arthritis     Depression     Endometriosis     GERD (gastroesophageal reflux disease)     Hyperlipidemia     Hypertension     Nondisplaced fracture of fifth metatarsal bone, right foot, initial encounter for closed fracture 09/24/2018    Obesity     Osteopenia     Otitis media 2022    Pancreatitis 06/2017    Pneumonia     Right hip pain 12/04/2018    S/P hip replacement, right 07/31/2018    Patient progressing well after right hip replacement     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    Trochanteric bursitis of right hip 02/12/2019    Visual impairment 1968    Nearsighted with stidmatism     Past Surgical History:   Procedure Laterality Date    ABDOMINAL SURGERY      endometriosis     APPENDECTOMY      BREAST BIOPSY Right 1985    benign    CARPAL TUNNEL RELEASE      COLONOSCOPY      HAND SURGERY      HIP SURGERY      HYSTERECTOMY Bilateral 1985    JOINT REPLACEMENT Bilateral     KNEE    JOINT REPLACEMENT Left     HIP    OOPHORECTOMY Bilateral 1985    ND ARTHRP ACETBLR/PROX FEM PROSTC AGRFT/ALGRFT Left 3/12/2018    Procedure: ARTHROPLASTY HIP TOTAL;  Surgeon: Misha Alaniz MD;  [facility];  Service: Orthopedics    ND ARTHRP ACETBLR/PROX FEM PROSTC AGRFT/ALGRFT Right 7/30/2018    Procedure: RIGHT TOTAL HIP ARTHROPLASTY;  Surgeon: Misha Alaniz MD;  [facility];  Service: Orthopedics    ND SURGICAL ARTHROSCOPY RAMONE W/CORACOACRM LIGM RLS Right 5/10/2017    Procedure: SHOULDER ARTHROSCOPY SUBACROMIAL DECOMPRESSION;  Surgeon: Haydee Beck MD;  [facility];  Service: Orthopedics    AL SURGICAL ARTHROSCOPY SHOULDER BICEPS TENODESIS Right 5/10/2017    Procedure: ARTHROSCOPIC BICEPS TENODESIS WITH ROTATOR CUFF REPAIR ;  Surgeon: Haydee Beck MD;  [facility];  Service: Orthopedics    TONSILLECTOMY       Allergies   Allergen Reactions    Hydromorphone Hcl Itching    Percocet [Oxycodone-Acetaminophen] GI Intolerance and Vomiting    Sulfamethoxazole-Trimethoprim Rash    Tizanidine Diarrhea       Current Outpatient Medications:     acetaminophen (TYLENOL) 500 mg tablet, Take 500 mg by mouth every 6 (six) hours as needed for mild pain, Disp: , Rfl:     atorvastatin (LIPITOR) 20 mg tablet, Take 1 tablet (20 mg total) by mouth daily, Disp: 90 tablet, Rfl: 3    calcium-vitamin D (OSCAL) 250-125 MG-UNIT per tablet, Take 1 tablet by mouth daily, Disp: , Rfl:     cyclobenzaprine (FLEXERIL) 10 mg tablet, Take 1 tablet (10 mg total) by mouth daily at bedtime, Disp: 90 tablet, Rfl: 1    FLUoxetine (PROzac) 20 mg capsule, TAKE 1 CAPSULE BY MOUTH  DAILY, Disp: 90 capsule, Rfl: 1    HOMEOPATHIC PRODUCTS IJ, , Disp: , Rfl:     hydrochlorothiazide (HYDRODIURIL) 25 mg tablet, TAKE 1 TABLET BY MOUTH  DAILY, Disp: 90 tablet, Rfl: 1    ibuprofen (MOTRIN) 200 mg tablet, Take by mouth every 4 (four) hours as needed for mild pain, Disp: , Rfl:     loratadine (CLARITIN) 10 mg tablet, Take 10 mg by mouth daily, Disp: , Rfl:     metaxalone (SKELAXIN) 800 mg tablet, Take 1 tablet (800 mg total) by mouth 2 (two) times a day as needed for muscle spasms, Disp: 60 tablet, Rfl: 2    metoprolol succinate (TOPROL-XL) 50 mg 24 hr tablet, TAKE 1 TABLET(50 MG) BY MOUTH DAILY, Disp: 90 tablet, Rfl: 3    Multiple Vitamins-Minerals (CENTRUM SILVER ULTRA WOMENS PO), Take by mouth daily  , Disp: , Rfl:     Omega-3 1000 MG CAPS, Take by mouth daily , Disp: , Rfl:     omeprazole (PriLOSEC) 20 mg delayed release capsule, Take 1 capsule (20 mg total) by mouth daily, Disp: 90 capsule, Rfl: 1    Turmeric 500 MG CAPS, Take by mouth, Disp: , Rfl:     Review of Systems  Constitutional:     Denies fever, chills ,fatigue ,weakness ,weight loss, weight gain + hyperhidrosis, +flushing    ENT: Denies earache ,loss of hearing ,nosebleed, nasal discharge,nasal congestion ,sore throat ,hoarseness  Pulmonary: Denies shortness of breath ,cough  ,+dyspnea on exertion, no orthopnea  ,no PND   Cardiovascular:  Denies bradycardia , tachycardia  ,palpations, lower extremity edema leg, claudication  Breast:  Denies new or changing breast lumps ,nipple discharge ,nipple changes  Abdomen:  Denies abdominal pain , anorexia , indigestion, nausea, vomiting, constipation, diarrhea  Musculoskeletal: Denies myalgias, arthralgias, joint swelling, joint stiffness , limb pain, limb swelling  Gu: denies dysuria, polyuria  Skin: Denies skin rash, skin lesion, skin wound, itching, dry skin  Neuro: Denies headache, numbness, tingling, confusion, loss of consciousness, dizziness, vertigo  Psychiatric: Denies feelings of depression, suicidal ideation, anxiety, sleep disturbances    OBJECTIVE  /78 (BP Location: Left arm, Patient Position: Sitting, Cuff Size: Large)   Pulse 72   Temp 97.8 °F (36.6 °C)   Ht 5' (1.524 m)   Wt 96.2 kg (212 lb)   LMP  (LMP Unknown) Comment: hysterectomy  SpO2 98%   BMI 41.40 kg/m²   Constitutional:   NAD, well appearing and well nourished      ENT:   Conjunctiva and lids: no injection, edema, or discharge     Pupils and iris: CORNELIA bilaterally    External inspection of ears and nose: normal without deformities or discharge. Otoscopic exam: Canals patent without erythema. Nasal mucosa, septum and turbinates: Normal or edema or discharge         Oropharynx:  Moist mucosa, normal tongue and tonsils without lesions.  No erythema Pulmonary:Respiratory effort normal rate and rhythm, no increased work of breathing. Auscultation of lungs:  Clear bilaterally with no adventitious breath sounds       Cardiovascular: regular rate and rhythm, S1 and S2, no murmur, no edema and/or varicosities of LE      Abdomen: Soft and non-distended     Positive bowel sounds      No heptomegaly or splenomegaly      Gu: no suprapubic tenderness or CVA tenderness, no urethral discharge  Lymphatic:  No anterior or posterior cervical lymphadenopathy         Musculoskeletal:  Gait and station: Normal gait      Digits and nails normal without clubbing or cyanosis       Inspection/palpation of joints, bones, and muscles:  No joint tenderness, swelling, full active and passive range of motion       Skin: Normal skin turgor and no rashes      Neuro:     Normal reflexes      Psych:   alert and oriented to person, place and time     normal mood and affect       Assessment/Plan:Diagnoses and all orders for this visit:    Essential hypertension  -     Comprehensive metabolic panel; Future    Pure hypercholesterolemia  -     Lipid panel; Future    Hyperhidrosis  -     TSH, 3rd generation; Future  -     Cancel: Catecholamine+VMA, 24-Hr Urine; Future    Flushing  -     Cancel: Catecholamine+VMA, 24-Hr Urine; Future    Dyspnea on exertion  -     Echo stress test, exercise; Future        Reviewed with patient plan to treat with above plan.     Patient instructed to call in 72 hours if not feeling better or if symptoms worse

## 2023-11-22 LAB
DOPAMINE 24H UR-MRATE: 113 UG/24 HR (ref 0–510)
DOPAMINE UR-MCNC: 72 UG/L
EPINEPH 24H UR-MRATE: <5 UG/24 HR (ref 0–20)
EPINEPH UR-MCNC: <3 UG/L
NOREPINEPH 24H UR-MRATE: <24 UG/24 HR (ref 0–135)
NOREPINEPH UR-MCNC: <15 UG/L

## 2023-11-24 LAB
VMA 24H UR-MRATE: 1.6 MG/24 HR (ref 0–7.5)
VMA UR-MCNC: 1 MG/L

## 2023-12-06 ENCOUNTER — TELEPHONE (OUTPATIENT)
Dept: FAMILY MEDICINE CLINIC | Facility: CLINIC | Age: 73
End: 2023-12-06

## 2023-12-06 ENCOUNTER — HOSPITAL ENCOUNTER (OUTPATIENT)
Dept: NON INVASIVE DIAGNOSTICS | Facility: HOSPITAL | Age: 73
Discharge: HOME | End: 2023-12-06
Attending: FAMILY MEDICINE
Payer: MEDICARE

## 2023-12-06 VITALS
HEART RATE: 80 BPM | SYSTOLIC BLOOD PRESSURE: 128 MMHG | RESPIRATION RATE: 16 BRPM | WEIGHT: 212 LBS | BODY MASS INDEX: 41.62 KG/M2 | HEIGHT: 60 IN | DIASTOLIC BLOOD PRESSURE: 80 MMHG | OXYGEN SATURATION: 97 %

## 2023-12-06 DIAGNOSIS — R06.09 DYSPNEA ON EXERTION: ICD-10-CM

## 2023-12-06 LAB
CHEST PAIN STATEMENT: NORMAL
MAX DIASTOLIC BP: 80 MMHG
MAX HR PERCENT: 69 %
MAX HR: 104 BPM
MAX PREDICTED HEART RATE: 149 BPM
PROTOCOL NAME: NORMAL
RATE PRESSURE PRODUCT: 208
SL CV LV EF: 65
SL CV STRESS RECOVERY BP: NORMAL MMHG
SL CV STRESS RECOVERY HR: 70 BPM
SL CV STRESS RECOVERY O2 SAT: 98 %
STRESS ANGINA INDEX: 0
STRESS BASELINE BP: NORMAL MMHG
STRESS BASELINE HR: 80 BPM
STRESS O2 SAT REST: 97 %
STRESS PEAK HR: 104 BPM
STRESS POST ESTIMATED WORKLOAD: 4.6 METS
STRESS POST EXERCISE DUR MIN: 2 MIN
STRESS POST EXERCISE DUR MIN: 2 MIN
STRESS POST EXERCISE DUR SEC: 3 SEC
STRESS POST EXERCISE DUR SEC: 3 SEC
STRESS POST O2 SAT PEAK: 96 %
STRESS POST PEAK BP: 2 MMHG
STRESS POST PEAK HR: 104 BPM
STRESS POST PEAK SYSTOLIC BP: 176 MMHG
TARGET HR FORMULA: NORMAL
TEST INDICATION: NORMAL

## 2023-12-06 PROCEDURE — 93350 STRESS TTE ONLY: CPT

## 2023-12-06 PROCEDURE — 93350 STRESS TTE ONLY: CPT | Performed by: INTERNAL MEDICINE

## 2023-12-06 NOTE — TELEPHONE ENCOUNTER
----- Message from James Alberto DO sent at 12/6/2023 12:48 PM EST -----  Her echo looked good; pump function of heart is normal; valves are all normal. They couldn't tell if she had an ischemia(lack of bloodflow during exertion) because they couldn't get her heart rate up to maximal exertion, but because she had symtpoms during exertion, I would like her to see cardiololgy to discuss.

## 2023-12-07 DIAGNOSIS — R07.89 CHEST PRESSURE: Primary | ICD-10-CM

## 2023-12-13 LAB
CHEST PAIN STATEMENT: NORMAL
MAX DIASTOLIC BP: 80 MMHG
MAX PREDICTED HEART RATE: 149 BPM
PROTOCOL NAME: NORMAL
STRESS POST EXERCISE DUR MIN: 2 MIN
STRESS POST EXERCISE DUR SEC: 3 SEC
STRESS POST PEAK HR: 104 BPM
STRESS POST PEAK SYSTOLIC BP: 176 MMHG
TARGET HR FORMULA: NORMAL
TEST INDICATION: NORMAL

## 2023-12-26 DIAGNOSIS — K21.9 GASTROESOPHAGEAL REFLUX DISEASE WITHOUT ESOPHAGITIS: ICD-10-CM

## 2023-12-27 RX ORDER — OMEPRAZOLE 20 MG/1
20 CAPSULE, DELAYED RELEASE ORAL DAILY
Qty: 90 CAPSULE | Refills: 1 | Status: SHIPPED | OUTPATIENT
Start: 2023-12-27

## 2024-02-08 ENCOUNTER — OFFICE VISIT (OUTPATIENT)
Dept: CARDIOLOGY CLINIC | Facility: CLINIC | Age: 74
End: 2024-02-08
Payer: MEDICARE

## 2024-02-08 VITALS
BODY MASS INDEX: 42.01 KG/M2 | HEART RATE: 65 BPM | WEIGHT: 214 LBS | DIASTOLIC BLOOD PRESSURE: 80 MMHG | SYSTOLIC BLOOD PRESSURE: 128 MMHG | HEIGHT: 60 IN

## 2024-02-08 DIAGNOSIS — R06.02 SHORTNESS OF BREATH ON EXERTION: ICD-10-CM

## 2024-02-08 DIAGNOSIS — R07.89 CHEST PRESSURE: ICD-10-CM

## 2024-02-08 DIAGNOSIS — I45.10 RBBB: Primary | ICD-10-CM

## 2024-02-08 DIAGNOSIS — E66.01 SEVERE OBESITY (BMI 35.0-39.9) WITH COMORBIDITY (HCC): ICD-10-CM

## 2024-02-08 PROCEDURE — 93000 ELECTROCARDIOGRAM COMPLETE: CPT | Performed by: INTERNAL MEDICINE

## 2024-02-08 PROCEDURE — 99204 OFFICE O/P NEW MOD 45 MIN: CPT | Performed by: INTERNAL MEDICINE

## 2024-02-08 NOTE — PROGRESS NOTES
Cardiology Consultation     Shadia Parker  [id number]  1950  HEART & VASCULAR [facility]  [address] [place] [id number]      1. RBBB  NM myocardial perfusion spect (rx stress and/or rest)      2. Chest pressure  Ambulatory Referral to Cardiology    NM myocardial perfusion spect (rx stress and/or rest)      3. Severe obesity (BMI 35.0-39.9) with comorbidity (HCC)        4. Shortness of breath on exertion  NM myocardial perfusion spect (rx stress and/or rest)          Discussion/Summary:    73-year-old female with a history of hypertension, dyslipidemia, morbid obesity, family history of coronary artery disease at an early age and her father comes for evaluation of chest pressure and shortness of breath on exertion.    She had a nondiagnostic exercise stress echo secondary to the very poor exercise capacity. I tried to differentiate whether she was limited more by her orthopedic issues or from cardiopulmonary symptoms. Seems like a combination of both, but she is exercising somewhat regularly with water aerobics and does not feel the symptoms. As such, I recommended evaluation with a pharmacologic nuclear stress test.    We did discuss the possibility of doing a coronary CTA for anatomical information. If there are significant abnormalities on the stress test, I did recommend evaluation with a cardiac catheterization subsequent given her pretty significant symptoms on the treadmill.    For now, her blood pressure is controlled with the current regimen. She is on atorvastatin and I reviewed her most recent lipid panel.    She takes NSAIDs regularly for back pain which is pretty debilitating. For now, I have not added aspirin to her regimen but indicated if there is any suggestion of coronary disease I would do so. Additionally, if she did have coronary disease in particular if she needed PCI and required DAPT, then I would  want her to be off of NSAIDs if at all possible understanding that we may need to be cognizant of her significant limitations with her back pain, continuing these and understanding the risk.    She has a right bundle branch block on her ECG. We reviewed this diagnosis in detail. She had an echocardiogram as part of the stress echo which showed no significant abnormalities. No rhythm monitoring is indicated at this time.      History of Present Illness:    73-year-old female. She comes to the office today for evaluation of symptoms of shortness of breath and some chest discomfort on exertion.    She has a history of hypertension and dyslipidemia. She is on 20 mg of atorvastatin. She takes 25 hydrochlorothiazide as well as 50 mg of Toprol-XL. She previously was on well, but changed to metoprolol and this seems to be controlling her blood pressure better.    There is a right bundle branch block on ECG noted.    For about a year, she has been having symptoms of shortness of breath on exertion particularly when she walks up a flight of stairs carrying something heavy. She does some water aerobics and this does not necessarily reproduce the symptoms.    She was referred by her primary care physician to have an exercise stress echo which was performed in December. She was only able to exercise for less than 3 minutes on the Kaleb protocol. She said the incline was very difficult for her. She had orthopedic issues with her back and knees, but also felt short of breath that is that she was going to pass out. However, she feels like this is similar to the level of exertion that she does when she does a water aerobics and that does not necessarily cause the symptoms.    She has not had any rest symptoms. She denies any dizziness or lightheadedness, presyncope, or syncope.    She has no PND, orthopnea, or edema.    Is a strong family history of coronary artery disease. Her father had his first MI at 60. He passed away in his  early 70s from an MI.  Mother with coronary disease and pacemaker, although later in life.    Patient Active Problem List   Diagnosis    Incomplete tear of right rotator cuff    Hypertension    Hyperlipidemia    Depression    Primary osteoarthritis of left hip    Primary osteoarthritis of one hip, right    Renal insufficiency    Pain, joint, ankle and foot, right    Lumbar disc disease with radiculopathy    Lumbar spondylosis    Change in bowel habits    Severe obesity (BMI 35.0-39.9) with comorbidity (HCC)    Recurrent major depressive disorder, in full remission (HCC)     Past Medical History:   Diagnosis Date    Allergic     Arthritis     Depression     Endometriosis     GERD (gastroesophageal reflux disease)     Hyperlipidemia     Hypertension     Nondisplaced fracture of fifth metatarsal bone, right foot, initial encounter for closed fracture 09/24/2018    Obesity     Osteopenia     Otitis media 2022    Pancreatitis 06/2017    Pneumonia     Right hip pain 12/04/2018    S/P hip replacement, right 07/31/2018    Patient progressing well after right hip replacement     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    Trochanteric bursitis of right hip 02/12/2019    Visual impairment 1968    Nearsighted with stidmatism     Social History     Tobacco Use    Smoking status: Never     Passive exposure: Past    Smokeless tobacco: Never   Vaping Use    Vaping status: Never Used   Substance Use Topics    Alcohol use: Not Currently     Comment: Rarely drink alcohol    Drug use: Never      Family History   Problem Relation Age of Onset    Atrial fibrillation Mother     Breast cancer Mother 75    Stroke Mother     Coronary artery disease Mother     Diabetes Mother     Pancreatitis Mother     Cancer Mother         Breast    Hyperlipidemia Mother     Pulmonary embolism Mother     Depression Mother     Hypertension Mother     Thyroid disease Mother         Hypothyroidism    Hearing loss Mother     Heart attack Father      Arthritis Father     No Known Problems Sister     Arthritis Sister     Endometrial cancer Maternal Grandmother 75    Cancer Maternal Grandmother     No Known Problems Paternal Grandmother     No Known Problems Paternal Grandfather     No Known Problems Maternal Aunt     No Known Problems Paternal Aunt     No Known Problems Paternal Aunt     Prostate cancer Paternal Uncle 55    Arthritis Family     Cancer Family      Past Surgical History:   Procedure Laterality Date    ABDOMINAL SURGERY      endometriosis     APPENDECTOMY      BREAST BIOPSY Right 1985    benign    CARPAL TUNNEL RELEASE      COLONOSCOPY      HAND SURGERY      HIP SURGERY      HYSTERECTOMY Bilateral 1985    JOINT REPLACEMENT Bilateral     KNEE    JOINT REPLACEMENT Left     HIP    OOPHORECTOMY Bilateral 1985    IL ARTHRP ACETBLR/PROX FEM PROSTC AGRFT/ALGRFT Left 3/12/2018    Procedure: ARTHROPLASTY HIP TOTAL;  Surgeon: Dread Myrick MD;  [facility];  Service: Orthopedics    IL ARTHRP ACETBLR/PROX FEM PROSTC AGRFT/ALGRFT Right 7/30/2018    Procedure: RIGHT TOTAL HIP ARTHROPLASTY;  Surgeon: Dread Myrick MD;  [facility];  Service: Orthopedics    IL SURGICAL ARTHROSCOPY RAMONE W/CORACOACRM LIGM RLS Right 5/10/2017    Procedure: SHOULDER ARTHROSCOPY SUBACROMIAL DECOMPRESSION;  Surgeon: Mann Ramirez MD;  [facility];  Service: Orthopedics    IL SURGICAL ARTHROSCOPY SHOULDER BICEPS TENODESIS Right 5/10/2017    Procedure: ARTHROSCOPIC BICEPS TENODESIS WITH ROTATOR CUFF REPAIR ;  Surgeon: Mann Ramirez MD;  [facility];  Service: Orthopedics    TONSILLECTOMY         Current Outpatient Medications:     acetaminophen (TYLENOL) 500 mg tablet, Take 500 mg by mouth every 6 (six) hours as needed for mild pain, Disp: , Rfl:     atorvastatin (LIPITOR) 20 mg tablet, Take 1 tablet (20 mg total) by mouth daily, Disp: 90 tablet, Rfl: 3    calcium-vitamin D (OSCAL) 250-125 MG-UNIT per tablet, Take 1 tablet by mouth daily,  Disp: , Rfl:     cyclobenzaprine (FLEXERIL) 10 mg tablet, Take 1 tablet (10 mg total) by mouth daily at bedtime, Disp: 90 tablet, Rfl: 1    FLUoxetine (PROzac) 20 mg capsule, TAKE 1 CAPSULE BY MOUTH  DAILY, Disp: 90 capsule, Rfl: 1    HOMEOPATHIC PRODUCTS IJ, , Disp: , Rfl:     hydrochlorothiazide (HYDRODIURIL) 25 mg tablet, TAKE 1 TABLET BY MOUTH  DAILY, Disp: 90 tablet, Rfl: 1    ibuprofen (MOTRIN) 200 mg tablet, Take by mouth every 4 (four) hours as needed for mild pain, Disp: , Rfl:     loratadine (CLARITIN) 10 mg tablet, Take 10 mg by mouth daily, Disp: , Rfl:     metaxalone (SKELAXIN) 800 mg tablet, Take 1 tablet (800 mg total) by mouth 2 (two) times a day as needed for muscle spasms, Disp: 60 tablet, Rfl: 2    metoprolol succinate (TOPROL-XL) 50 mg 24 hr tablet, TAKE 1 TABLET(50 MG) BY MOUTH DAILY, Disp: 90 tablet, Rfl: 3    Multiple Vitamins-Minerals (CENTRUM SILVER ULTRA WOMENS PO), Take by mouth daily  , Disp: , Rfl:     Omega-3 1000 MG CAPS, Take by mouth daily , Disp: , Rfl:     omeprazole (PriLOSEC) 20 mg delayed release capsule, TAKE 1 CAPSULE(20 MG) BY MOUTH DAILY, Disp: 90 capsule, Rfl: 1    Turmeric 500 MG CAPS, Take by mouth, Disp: , Rfl:   Allergies   Allergen Reactions    Hydromorphone Hcl Itching    Percocet [Oxycodone-Acetaminophen] GI Intolerance and Vomiting    Sulfamethoxazole-Trimethoprim Rash    Tizanidine Diarrhea       Vitals:    02/08/24 1002   BP: 128/80   BP Location: Right arm   Patient Position: Sitting   Cuff Size: Large   Pulse: 65   Weight: 97.1 kg (214 lb)   Height: 5' (1.524 m)     Vitals:    02/08/24 1002   Weight: 97.1 kg (214 lb)      Height: 5' (152.4 cm)   Body mass index is 41.79 kg/m².    Physical Exam:  GENERAL: Alert, well appearing, and in no distress  HEENT:  PERRL, EOMI, no scleral icterus, no conjunctival pallor  NECK:  Supple, No elevated JVP, no thyromegaly, no carotid bruits  HEART:  Regular rate and rhythm, normal S1/S2, no S3/S4, no murmur or rub  LUNGS:   Clear to auscultation bilaterally  ABDOMEN:  Soft, non-tender, positive bowel sounds, no rebound or guarding  EXTREMITIES:  No edema  VASCULAR:  Normal pedal pulses   NEURO: No focal deficits,  SKIN: Normal without suspicious lesions on exposed skin      ROS:  Positive for shortness of breath, chest discomfort,  Except as noted in HPI, is otherwise reviewed in detail and a 12 point review of systems is negative.    Labs:  Lab Results   Component Value Date    SODIUM 139 11/17/2023    K 3.7 11/17/2023     11/17/2023    CREATININE 1.17 11/17/2023    BUN 17 11/17/2023    CO2 29 11/17/2023    ALT 17 11/17/2023    AST 24 11/17/2023    INR 0.92 07/05/2018    GLUF 88 11/17/2023    HGBA1C 5.5 07/05/2018    WBC 7.07 06/17/2021    HGB 15.0 06/17/2021    HCT 44.8 06/17/2021     06/17/2021       Lab Results   Component Value Date    CHOL 174 05/02/2017    CHOL 180 12/14/2015    CHOL 172 06/30/2014     Lab Results   Component Value Date    HDL 66 11/17/2023    HDL 62 12/29/2022    HDL 72 12/15/2021     Lab Results   Component Value Date    LDLCALC 81 11/17/2023    LDLCALC 101 (H) 12/29/2022    LDLCALC 85 12/15/2021     Lab Results   Component Value Date    TRIG 113 11/17/2023    TRIG 153 (H) 12/29/2022    TRIG 105 12/15/2021       Testing:  Stress Echo 12/6/23:    Resting ECG: ECG is abnormal. The ECG shows normal sinus rhythm. Resting ECG shows no ST-segment deviation. ECG demonstrates right bundle branch block.    Stress ECG: A Kaleb protocol stress test was performed. The patient after exercising for 2 min and 3 sec and had a maximal HR of 104 bpm (69 % of MPHR) and 4.6 METS. The patient experienced no angina during the test. The test was stopped because the patient experienced fatigue, dyspnea and leg pain. The patient requested the test to be stopped. The patient reported dyspnea, dizziness, leg pain, fatigue and lightheaded during the stress test. Symptoms began during stress and ended during recovery. Blood  pressure demonstrated a normal response and heart rate demonstrated a blunted response to stress. The patient's heart rate recovery was normal.    Stress ECG: No ST deviation is noted. There were no arrhythmias during stress. There were no arrhythmias during recovery. . The ECG was not diagnostic due to submaximal stress.    Left Ventricle: Left ventricular cavity size is normal. Wall thickness is normal. The left ventricular ejection fraction is 65%. Systolic function is normal. Wall motion is normal.    Right Ventricle: Right ventricular cavity size is normal. Systolic function is normal.    Left Atrium: The atrium is mildly dilated.    Peak Stress Echo: Left ventricle cavity has normal reduction in size at peak stress.  The level of stress achieved was not diagnostic. The left ventricle systolic function is normal at peak stress. The peak stress echo showed normal wall motion.     Non diagnostic stress test   Patient only exercised 2 min and 3 sec (4.6 METS) and  had a maximal HR of 104 bpm (69 % of MPHR)   Appropriate BP response with low level exercise   No chest pain during low level exercise  Baseline ecg sinus with RBBB. No ischemic changes on low level exercise.  Baseline TTE with normal EF with no regional motion abnormalities. Normal LV function with no regional wall motion during exercise.     EKG:  Sinus rhythm. 65 bpm. Right bundle branch block.

## 2024-02-09 ENCOUNTER — TELEPHONE (OUTPATIENT)
Age: 74
End: 2024-02-09

## 2024-02-09 NOTE — TELEPHONE ENCOUNTER
Caller: patient    Doctor: ADONAY    Reason for call: schedule procedure for lumbar spine    Call back#:

## 2024-02-09 NOTE — TELEPHONE ENCOUNTER
S/w pt, requesting repeat LESI last performed 9/15, states symptoms are the same as what she had experienced prior to last injection and confirmed she did receive relief. Please advise, thank you    Pt requesting cb on Monday (2/12) afternoon, as she will be unavailable in the morning.

## 2024-02-12 NOTE — TELEPHONE ENCOUNTER
Left message for patient to contact the scheduling office at [phone] to schedule their procedure.

## 2024-02-19 ENCOUNTER — HOSPITAL ENCOUNTER (OUTPATIENT)
Dept: NON INVASIVE DIAGNOSTICS | Facility: CLINIC | Age: 74
Discharge: HOME | End: 2024-02-19
Payer: MEDICARE

## 2024-02-19 VITALS — DIASTOLIC BLOOD PRESSURE: 78 MMHG | SYSTOLIC BLOOD PRESSURE: 132 MMHG | OXYGEN SATURATION: 98 % | HEART RATE: 61 BPM

## 2024-02-19 DIAGNOSIS — R06.02 SHORTNESS OF BREATH ON EXERTION: ICD-10-CM

## 2024-02-19 DIAGNOSIS — R07.89 CHEST PRESSURE: ICD-10-CM

## 2024-02-19 DIAGNOSIS — I45.10 RBBB: ICD-10-CM

## 2024-02-19 LAB
NUC STRESS EJECTION FRACTION: 70 %
RATE PRESSURE PRODUCT: 200
SL CV REST NUCLEAR ISOTOPE DOSE: 10.71 MCI
SL CV STRESS NUCLEAR ISOTOPE DOSE: 31.7 MCI
SL CV STRESS RECOVERY BP: NORMAL MMHG
SL CV STRESS RECOVERY HR: 76 BPM
SL CV STRESS RECOVERY O2 SAT: 98 %
STRESS ANGINA INDEX: 0
STRESS BASELINE BP: NORMAL MMHG
STRESS BASELINE HR: 61 BPM
STRESS O2 SAT REST: 98 %
STRESS PEAK HR: 100 BPM
STRESS POST O2 SAT PEAK: 98 %
STRESS POST PEAK BP: 2 MMHG
STRESS/REST PERFUSION RATIO: 1.13

## 2024-02-19 PROCEDURE — A9502 TC99M TETROFOSMIN: HCPCS

## 2024-02-19 PROCEDURE — 93018 CV STRESS TEST I&R ONLY: CPT | Performed by: INTERNAL MEDICINE

## 2024-02-19 PROCEDURE — 78452 HT MUSCLE IMAGE SPECT MULT: CPT | Performed by: INTERNAL MEDICINE

## 2024-02-19 PROCEDURE — 93017 CV STRESS TEST TRACING ONLY: CPT

## 2024-02-19 PROCEDURE — 93016 CV STRESS TEST SUPVJ ONLY: CPT | Performed by: INTERNAL MEDICINE

## 2024-02-19 PROCEDURE — G1004 CDSM NDSC: HCPCS

## 2024-02-19 PROCEDURE — 78452 HT MUSCLE IMAGE SPECT MULT: CPT

## 2024-02-19 RX ORDER — REGADENOSON 0.08 MG/ML
0.4 INJECTION, SOLUTION INTRAVENOUS ONCE
Status: COMPLETED | OUTPATIENT
Start: 2024-02-19 | End: 2024-02-19

## 2024-02-19 RX ADMIN — REGADENOSON 0.4 MG: 0.08 INJECTION, SOLUTION INTRAVENOUS at 09:39

## 2024-02-20 LAB
CHEST PAIN STATEMENT: NORMAL
MAX DIASTOLIC BP: 80 MMHG
MAX PREDICTED HEART RATE: 149 BPM
PROTOCOL NAME: NORMAL
REASON FOR TERMINATION: NORMAL
STRESS POST EXERCISE DUR MIN: 3 MIN
STRESS POST EXERCISE DUR SEC: 0 SEC
STRESS POST PEAK HR: 100 BPM
STRESS POST PEAK SYSTOLIC BP: 134 MMHG
TARGET HR FORMULA: NORMAL
TEST INDICATION: NORMAL

## 2024-03-05 ENCOUNTER — HOSPITAL ENCOUNTER (OUTPATIENT)
Dept: RADIOLOGY | Facility: CLINIC | Age: 74
Discharge: HOME | End: 2024-03-05
Payer: MEDICARE

## 2024-03-05 VITALS
RESPIRATION RATE: 18 BRPM | HEART RATE: 60 BPM | TEMPERATURE: 98 F | OXYGEN SATURATION: 97 % | SYSTOLIC BLOOD PRESSURE: 137 MMHG | DIASTOLIC BLOOD PRESSURE: 84 MMHG

## 2024-03-05 DIAGNOSIS — M54.16 LUMBAR RADICULOPATHY: ICD-10-CM

## 2024-03-05 PROCEDURE — 62323 NJX INTERLAMINAR LMBR/SAC: CPT | Performed by: ANESTHESIOLOGY

## 2024-03-05 RX ORDER — METHYLPREDNISOLONE ACETATE 80 MG/ML
80 INJECTION, SUSPENSION INTRA-ARTICULAR; INTRALESIONAL; INTRAMUSCULAR; PARENTERAL; SOFT TISSUE ONCE
Status: COMPLETED | OUTPATIENT
Start: 2024-03-05 | End: 2024-03-05

## 2024-03-05 RX ORDER — BUPIVACAINE HCL/PF 2.5 MG/ML
2 VIAL (ML) INJECTION ONCE
Status: COMPLETED | OUTPATIENT
Start: 2024-03-05 | End: 2024-03-05

## 2024-03-05 RX ADMIN — IOHEXOL 1 ML: 300 INJECTION, SOLUTION INTRAVENOUS at 11:47

## 2024-03-05 RX ADMIN — BUPIVACAINE HYDROCHLORIDE 2 ML: 2.5 INJECTION, SOLUTION EPIDURAL; INFILTRATION; INTRACAUDAL at 11:47

## 2024-03-05 RX ADMIN — METHYLPREDNISOLONE ACETATE 80 MG: 80 INJECTION, SUSPENSION INTRA-ARTICULAR; INTRALESIONAL; INTRAMUSCULAR; PARENTERAL; SOFT TISSUE at 11:47

## 2024-03-05 NOTE — DISCHARGE INSTR - LAB
Epidural Steroid Injection   WHAT YOU NEED TO KNOW:   An epidural steroid injection (JOSE) is a procedure to inject steroid medicine into the epidural space. The epidural space is between your spinal cord and vertebrae. Steroids reduce inflammation and fluid buildup in your spine that may be causing pain. You may be given pain medicine along with the steroids.          ACTIVITY  Do not drive or operate machinery today.  No strenuous activity today - bending, lifting, etc.  You may resume normal activites starting tomorrow - start slowly and as tolerated.  You may shower today, but no tub baths or hot tubs.  You may have numbness for several hours from the local anesthetic. Please use caution and common sense, especially with weight-bearing activities.    CARE OF THE INJECTION SITE  If you have soreness or pain, apply ice to the area today (20 minutes on/20 minutes off).  Starting tomorrow, you may use warm, moist heat or ice if needed.  You may have an increase or change in your discomfort for 36-48 hours after your treatment.  Apply ice and continue with any pain medication you have been prescribed.  Notify the [facility] if you have any of the following: redness, drainage, swelling, headache, stiff neck or fever above 100°F.    SPECIAL INSTRUCTIONS  Our office will contact you in approximately 7 days for a progress report.    MEDICATIONS  Continue to take all routine medications.  Our office may have instructed you to hold some medications.    As no general anesthesia was used in today's procedure, you should not experience any side effects related to anesthesia.     If you are diabetic, the steroids used in today's injection may temporarily increase your blood sugar levels after the first few days after your injection. Please keep a close eye on your sugars and alert the doctor who manages your diabetes if your sugars are significantly high from your baseline or you are symptomatic.     If you have a  problem specifically related to your procedure, please call our office at [phone].  Problems not related to your procedure should be directed to your primary care physician.

## 2024-03-05 NOTE — H&P
History of Present Illness: The patient is a 74 y.o. female who presents with complaints of lower back pain is here today for a lumbar epidural steroid injection    Past Medical History:   Diagnosis Date    Allergic     Arthritis     Depression     Endometriosis     GERD (gastroesophageal reflux disease)     Hyperlipidemia     Hypertension     Nondisplaced fracture of fifth metatarsal bone, right foot, initial encounter for closed fracture 09/24/2018    Obesity     Osteopenia     Otitis media 2022    Pancreatitis 06/2017    Pneumonia     Right hip pain 12/04/2018    S/P hip replacement, right 07/31/2018    Patient progressing well after right hip replacement     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    Trochanteric bursitis of right hip 02/12/2019    Visual impairment 1968    Nearsighted with stidmatism       Past Surgical History:   Procedure Laterality Date    ABDOMINAL SURGERY      endometriosis     APPENDECTOMY      BREAST BIOPSY Right 1985    benign    CARPAL TUNNEL RELEASE      COLONOSCOPY      HAND SURGERY      HIP SURGERY      HYSTERECTOMY Bilateral 1985    JOINT REPLACEMENT Bilateral     KNEE    JOINT REPLACEMENT Left     HIP    OOPHORECTOMY Bilateral 1985    [place] ARTHRP ACETBLR/PROX FEM PROSTC AGRFT/ALGRFT Left 3/12/2018    Procedure: ARTHROPLASTY HIP TOTAL;  Surgeon: Dread Myrick MD;  [facility];  Service: Orthopedics    [place] ARTHRP ACETBLR/PROX FEM PROSTC AGRFT/ALGRFT Right 7/30/2018    Procedure: RIGHT TOTAL HIP ARTHROPLASTY;  Surgeon: Dread Myrick MD;  [facility];  Service: Orthopedics    [place] SURGICAL ARTHROSCOPY RAMONE W/CORACOACRM LIGM RLS Right 5/10/2017    Procedure: SHOULDER ARTHROSCOPY SUBACROMIAL DECOMPRESSION;  Surgeon: Mann Ramirez MD;  [facility];  Service: Orthopedics    [place] SURGICAL ARTHROSCOPY SHOULDER BICEPS TENODESIS Right 5/10/2017    Procedure: ARTHROSCOPIC BICEPS TENODESIS WITH ROTATOR CUFF REPAIR ;  Surgeon: Mann Ramirez MD;   [facility];  Service: Orthopedics    TONSILLECTOMY           Current Outpatient Medications:     acetaminophen (TYLENOL) 500 mg tablet, Take 500 mg by mouth every 6 (six) hours as needed for mild pain, Disp: , Rfl:     atorvastatin (LIPITOR) 20 mg tablet, Take 1 tablet (20 mg total) by mouth daily, Disp: 90 tablet, Rfl: 3    calcium-vitamin D (OSCAL) 250-125 MG-UNIT per tablet, Take 1 tablet by mouth daily, Disp: , Rfl:     cyclobenzaprine (FLEXERIL) 10 mg tablet, Take 1 tablet (10 mg total) by mouth daily at bedtime, Disp: 90 tablet, Rfl: 1    FLUoxetine (PROzac) 20 mg capsule, TAKE 1 CAPSULE BY MOUTH  DAILY, Disp: 90 capsule, Rfl: 1    HOMEOPATHIC PRODUCTS IJ, , Disp: , Rfl:     hydrochlorothiazide (HYDRODIURIL) 25 mg tablet, TAKE 1 TABLET BY MOUTH  DAILY, Disp: 90 tablet, Rfl: 1    ibuprofen (MOTRIN) 200 mg tablet, Take by mouth every 4 (four) hours as needed for mild pain, Disp: , Rfl:     loratadine (CLARITIN) 10 mg tablet, Take 10 mg by mouth daily, Disp: , Rfl:     metaxalone (SKELAXIN) 800 mg tablet, Take 1 tablet (800 mg total) by mouth 2 (two) times a day as needed for muscle spasms, Disp: 60 tablet, Rfl: 2    metoprolol succinate (TOPROL-XL) 50 mg 24 hr tablet, TAKE 1 TABLET(50 MG) BY MOUTH DAILY, Disp: 90 tablet, Rfl: 3    Multiple Vitamins-Minerals (CENTRUM SILVER ULTRA WOMENS PO), Take by mouth daily  , Disp: , Rfl:     Omega-3 1000 MG CAPS, Take by mouth daily , Disp: , Rfl:     omeprazole (PriLOSEC) 20 mg delayed release capsule, TAKE 1 CAPSULE(20 MG) BY MOUTH DAILY, Disp: 90 capsule, Rfl: 1    Turmeric 500 MG CAPS, Take by mouth, Disp: , Rfl:     Current Facility-Administered Medications:     bupivacaine (PF) (MARCAINE) 0.25 % injection 2 mL, 2 mL, Epidural, Once, Nelson Barnes MD    iohexol (OMNIPAQUE) 300 mg/mL injection 1 mL, 1 mL, Epidural, Once, Nelson Barnes MD    methylPREDNISolone acetate (DEPO-MEDROL) injection 80 mg, 80 mg, Epidural, Once, Nelson Barnes MD    Allergies    Allergen Reactions    Hydromorphone Hcl Itching    Percocet [Oxycodone-Acetaminophen] GI Intolerance and Vomiting    Sulfamethoxazole-Trimethoprim Rash    Tizanidine Diarrhea       Physical Exam:   Vitals:    03/05/24 1130   BP: 140/84   Pulse: 65   Resp: 20   Temp: 98 °F (36.7 °C)   SpO2: 97%     General: Awake, Alert, Oriented x 3, Mood and affect appropriate  Respiratory: Respirations even and unlabored  Cardiovascular: Peripheral pulses intact; no edema  Musculoskeletal Exam: Lower back pain    ASA Score: 3    Patient/Chart Verification  Patient ID Verified: Verbal  Consents Confirmed: To be obtained in the Pre-Procedure area  Interval H&P(within 24 hr) Complete (required for Outpatients and Surgery Admit only): To be obtained in the Pre-Procedure area  Allergies Reviewed: Yes  Anticoag/NSAID held?: NA  Currently on antibiotics?: No    Assessment:   1. Lumbar radiculopathy        Plan: L4 LESI

## 2024-03-08 ENCOUNTER — RA CDI HCC (OUTPATIENT)
Dept: OTHER | Facility: HOSPITAL | Age: 74
End: 2024-03-08

## 2024-03-11 ENCOUNTER — TELEPHONE (OUTPATIENT)
Age: 74
End: 2024-03-11

## 2024-03-11 DIAGNOSIS — M79.18 MYOFASCIAL PAIN SYNDROME: Primary | ICD-10-CM

## 2024-03-11 DIAGNOSIS — M51.16 INTERVERTEBRAL DISC DISORDER WITH RADICULOPATHY OF LUMBAR REGION: ICD-10-CM

## 2024-03-11 RX ORDER — DIAZEPAM 5 MG/1
TABLET ORAL
Qty: 10 TABLET | Refills: 0 | Status: SHIPPED | OUTPATIENT
Start: 2024-03-11

## 2024-03-11 NOTE — TELEPHONE ENCOUNTER
"Pt called that Saturday morning she woke up with pain in the middle of her Rt buttock. It feels like a big knot, \"it \". She said it feels similar to when she had sciatic pain a few yrs ago, she thinks a nerve is pinched.   The pain is constant but is the worst when she steps on her Rt foot. Pain level 10/10. She's been laid up all weekend.   She tried robaxin then metaxalone, ibuprofen, and even some old tramadol from 2021 but nothing helps.  Ice packs help. Heat made it worse.   Uses [facility] on file.   Pt not sure if she needs a steroid pack? Pls advise.      Pt is also s/p CAROLINE from 3/5/24; pt reports she is doing well from that inj. Pain level 2/10 with 95% improvement.   "

## 2024-03-11 NOTE — TELEPHONE ENCOUNTER
It is too soon to give her more steroids.  She will still feel the effects of the steroids from the recent injection.  However, I do think she probably has significant spasm of the right piriformis based on where she is describing her symptoms so I will start her on diazepam 5 mg twice daily as needed for severe pain.  Prescription was sent to her pharmacy

## 2024-03-11 NOTE — TELEPHONE ENCOUNTER
Caller: kvng    Doctor: tali    Reason for call: pt thinks she has a pinched nerve on her right buttock and she states the pain is very bad please advise.    Call back#: [phone]

## 2024-03-12 ENCOUNTER — TELEPHONE (OUTPATIENT)
Dept: RADIOLOGY | Facility: HOSPITAL | Age: 74
End: 2024-03-12

## 2024-03-12 NOTE — TELEPHONE ENCOUNTER
S/w pt, advised of the same. Pt verbalized understanding and appreciation. Advised to cb with any further questions or concerns.

## 2024-03-14 RX ORDER — METHYLPREDNISOLONE 4 MG/1
TABLET ORAL
Qty: 21 TABLET | Refills: 0 | Status: SHIPPED | OUTPATIENT
Start: 2024-03-14 | End: 2024-03-18 | Stop reason: SDUPTHER

## 2024-03-14 NOTE — TELEPHONE ENCOUNTER
Pt called to report that the diazepam she started Tues is not helping either. Her pain is continuous, 10/10. Pain is in the Rt buttock and down into the Rt hip. She said she has been able to do nothing for the past 6 days and she is very frustrated! The monty thing that helps briefly is heat, not ice.    Pls review and advise.

## 2024-03-14 NOTE — TELEPHONE ENCOUNTER
Caller: kvng [place]    Doctor: Cameron    Reason for call: pt stated she is still in a lot of pain. Pt stated the medication that was ordered is not working. Please Advise    Call back#: [phone]

## 2024-03-15 ENCOUNTER — OFFICE VISIT (OUTPATIENT)
Dept: FAMILY MEDICINE CLINIC | Facility: CLINIC | Age: 74
End: 2024-03-15
Payer: MEDICARE

## 2024-03-15 VITALS
OXYGEN SATURATION: 98 % | TEMPERATURE: 97.2 F | SYSTOLIC BLOOD PRESSURE: 128 MMHG | WEIGHT: 214 LBS | HEART RATE: 66 BPM | DIASTOLIC BLOOD PRESSURE: 78 MMHG | HEIGHT: 60 IN | BODY MASS INDEX: 42.01 KG/M2

## 2024-03-15 DIAGNOSIS — F33.42 RECURRENT MAJOR DEPRESSIVE DISORDER, IN FULL REMISSION (HCC): ICD-10-CM

## 2024-03-15 DIAGNOSIS — Z12.31 BREAST CANCER SCREENING BY MAMMOGRAM: ICD-10-CM

## 2024-03-15 DIAGNOSIS — M54.31 RIGHT SIDED SCIATICA: Primary | ICD-10-CM

## 2024-03-15 DIAGNOSIS — I10 ESSENTIAL HYPERTENSION: ICD-10-CM

## 2024-03-15 DIAGNOSIS — E78.00 PURE HYPERCHOLESTEROLEMIA: ICD-10-CM

## 2024-03-15 DIAGNOSIS — K21.9 GERD WITHOUT ESOPHAGITIS: ICD-10-CM

## 2024-03-15 PROCEDURE — 99214 OFFICE O/P EST MOD 30 MIN: CPT | Performed by: FAMILY MEDICINE

## 2024-03-15 PROCEDURE — 96372 THER/PROPH/DIAG INJ SC/IM: CPT | Performed by: FAMILY MEDICINE

## 2024-03-15 RX ORDER — TRAMADOL HYDROCHLORIDE 50 MG/1
50 TABLET ORAL EVERY 6 HOURS PRN
Qty: 60 TABLET | Refills: 0 | Status: SHIPPED | OUTPATIENT
Start: 2024-03-15

## 2024-03-15 RX ORDER — METHYLPREDNISOLONE ACETATE 80 MG/ML
80 INJECTION, SUSPENSION INTRA-ARTICULAR; INTRALESIONAL; INTRAMUSCULAR; SOFT TISSUE ONCE
Status: COMPLETED | OUTPATIENT
Start: 2024-03-15 | End: 2024-03-15

## 2024-03-15 RX ORDER — KETOROLAC TROMETHAMINE 30 MG/ML
60 INJECTION, SOLUTION INTRAMUSCULAR; INTRAVENOUS ONCE
Status: COMPLETED | OUTPATIENT
Start: 2024-03-15 | End: 2024-03-15

## 2024-03-15 RX ADMIN — KETOROLAC TROMETHAMINE 60 MG: 30 INJECTION, SOLUTION INTRAMUSCULAR; INTRAVENOUS at 09:55

## 2024-03-15 RX ADMIN — METHYLPREDNISOLONE ACETATE 80 MG: 80 INJECTION, SUSPENSION INTRA-ARTICULAR; INTRALESIONAL; INTRAMUSCULAR; SOFT TISSUE at 09:56

## 2024-03-18 ENCOUNTER — OFFICE VISIT (OUTPATIENT)
Age: 74
End: 2024-03-18
Payer: MEDICARE

## 2024-03-18 VITALS
WEIGHT: 214 LBS | BODY MASS INDEX: 42.01 KG/M2 | HEART RATE: 88 BPM | SYSTOLIC BLOOD PRESSURE: 158 MMHG | HEIGHT: 60 IN | DIASTOLIC BLOOD PRESSURE: 89 MMHG

## 2024-03-18 DIAGNOSIS — M54.41 ACUTE BILATERAL LOW BACK PAIN WITH RIGHT-SIDED SCIATICA: ICD-10-CM

## 2024-03-18 DIAGNOSIS — M54.16 LUMBAR RADICULOPATHY: ICD-10-CM

## 2024-03-18 DIAGNOSIS — M99.04 SEGMENTAL DYSFUNCTION OF SACRAL REGION: ICD-10-CM

## 2024-03-18 DIAGNOSIS — M51.16 INTERVERTEBRAL DISC DISORDER WITH RADICULOPATHY OF LUMBAR REGION: ICD-10-CM

## 2024-03-18 DIAGNOSIS — M99.03 SEGMENTAL DYSFUNCTION OF LUMBAR REGION: ICD-10-CM

## 2024-03-18 DIAGNOSIS — M99.05 SEGMENTAL DYSFUNCTION OF PELVIC REGION: Primary | ICD-10-CM

## 2024-03-18 PROCEDURE — 99202 OFFICE O/P NEW SF 15 MIN: CPT | Performed by: CHIROPRACTOR

## 2024-03-18 PROCEDURE — 98941 CHIROPRACT MANJ 3-4 REGIONS: CPT | Performed by: CHIROPRACTOR

## 2024-03-18 RX ORDER — METHYLPREDNISOLONE 4 MG/1
TABLET ORAL
Qty: 21 TABLET | Refills: 0 | Status: SHIPPED | OUTPATIENT
Start: 2024-03-18

## 2024-03-18 NOTE — TELEPHONE ENCOUNTER
Pt called in with % improvement and pain level /10.    Left side is slightly better.  Pt still having some pain on the ride.  Pt is having sciatic issues and her PCP gave her  2 injections to help the sciatic pain

## 2024-03-20 DIAGNOSIS — F39 AFFECTIVE DISORDER (HCC): ICD-10-CM

## 2024-03-20 DIAGNOSIS — I10 ESSENTIAL HYPERTENSION: ICD-10-CM

## 2024-03-20 RX ORDER — HYDROCHLOROTHIAZIDE 25 MG/1
TABLET ORAL
Qty: 90 TABLET | Refills: 1 | Status: SHIPPED | OUTPATIENT
Start: 2024-03-20

## 2024-03-20 RX ORDER — FLUOXETINE HYDROCHLORIDE 20 MG/1
CAPSULE ORAL
Qty: 90 CAPSULE | Refills: 1 | Status: SHIPPED | OUTPATIENT
Start: 2024-03-20

## 2024-03-21 PROBLEM — K21.9 GERD WITHOUT ESOPHAGITIS: Status: ACTIVE | Noted: 2024-03-21

## 2024-03-21 NOTE — PROGRESS NOTES
Patient ID: Shadia Parker is a 74 y.o. female.    HPI: 74 y.o.female presents for follow up hypertension, depression, GERD and hypercholesterolemia.  She has had sciatica starting in her right buttock down the back of her right leg for the past week without any known injury, weakness or paresthesia of the leg . The pt denies any dizziness,  chest pain, palpitations, or dypsnea with exertion.      SUBJECTIVE    Family History   Problem Relation Age of Onset    Atrial fibrillation Mother     Breast cancer Mother 75    Stroke Mother     Coronary artery disease Mother     Diabetes Mother     Pancreatitis Mother     Cancer Mother         Breast    Hyperlipidemia Mother     Pulmonary embolism Mother     Depression Mother     Hypertension Mother     Thyroid disease Mother         Hypothyroidism    Hearing loss Mother     Heart attack Father     Arthritis Father     No Known Problems Sister     Arthritis Sister     Endometrial cancer Maternal Grandmother 75    Cancer Maternal Grandmother     No Known Problems Paternal Grandmother     No Known Problems Paternal Grandfather     No Known Problems Maternal Aunt     No Known Problems Paternal Aunt     No Known Problems Paternal Aunt     Prostate cancer Paternal Uncle 55    Arthritis Family     Cancer Family      Social History     Socioeconomic History    Marital status: /Civil Union     Spouse name: Not on file    Number of children: Not on file    Years of education: Not on file    Highest education level: Not on file   Occupational History    Not on file   Tobacco Use    Smoking status: Never     Passive exposure: Past    Smokeless tobacco: Never   Vaping Use    Vaping status: Never Used   Substance and Sexual Activity    Alcohol use: Not Currently     Comment: Rarely drink alcohol    Drug use: Never    Sexual activity: Yes     Partners: Male     Birth control/protection: Female Sterilization     Comment: Hysterectomy 1986   Other Topics Concern    Not on file   Social  History Narrative    Drinks coffee     Social Determinants of Health     Financial Resource Strain: Low Risk  (7/4/2023)    Overall Financial Resource Strain (CARDIA)     Difficulty of Paying Living Expenses: Not hard at all   Food Insecurity: Not on file   Transportation Needs: No Transportation Needs (7/4/2023)    PRAPARE - Transportation     Lack of Transportation (Medical): No     Lack of Transportation (Non-Medical): No   Physical Activity: Not on file   Stress: Not on file   Social Connections: Not on file   Intimate Partner Violence: Not on file   Housing Stability: Not on file     Past Medical History:   Diagnosis Date    Allergic     Arthritis     Depression     Endometriosis     GERD (gastroesophageal reflux disease)     Hyperlipidemia     Hypertension     Nondisplaced fracture of fifth metatarsal bone, right foot, initial encounter for closed fracture 09/24/2018    Obesity     Osteopenia     Otitis media 2022    Pancreatitis 06/2017    Pneumonia     Right hip pain 12/04/2018    S/P hip replacement, right 07/31/2018    Patient progressing well after right hip replacement     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    Trochanteric bursitis of right hip 02/12/2019    Visual impairment 1968    Nearsighted with stidmatism     Past Surgical History:   Procedure Laterality Date    ABDOMINAL SURGERY      endometriosis     APPENDECTOMY      BREAST BIOPSY Right 1985    benign    CARPAL TUNNEL RELEASE      COLONOSCOPY      HAND SURGERY      HIP SURGERY      HYSTERECTOMY Bilateral 1985    JOINT REPLACEMENT Bilateral     KNEE    JOINT REPLACEMENT Left     HIP    OOPHORECTOMY Bilateral 1985    [place] ARTHRP ACETBLR/PROX FEM PROSTC AGRFT/ALGRFT Left 3/12/2018    Procedure: ARTHROPLASTY HIP TOTAL;  Surgeon: Dread Myrick MD;  [facility];  Service: Orthopedics    [place] ARTHRP ACETBLR/PROX FEM PROSTC AGRFT/ALGRFT Right 7/30/2018    Procedure: RIGHT TOTAL HIP ARTHROPLASTY;  Surgeon: Dread Myrick MD;   [facility];  Service: Orthopedics    [place] SURGICAL ARTHROSCOPY RAMONE W/CORACOACRM LIGM RLS Right 5/10/2017    Procedure: SHOULDER ARTHROSCOPY SUBACROMIAL DECOMPRESSION;  Surgeon: Mann Ramirez MD;  [facility];  Service: Orthopedics    [place] SURGICAL ARTHROSCOPY SHOULDER BICEPS TENODESIS Right 5/10/2017    Procedure: ARTHROSCOPIC BICEPS TENODESIS WITH ROTATOR CUFF REPAIR ;  Surgeon: Mann Ramirez MD;  [facility];  Service: Orthopedics    TONSILLECTOMY       Allergies   Allergen Reactions    Hydromorphone Hcl Itching    Percocet [Oxycodone-Acetaminophen] GI Intolerance and Vomiting    Pregabalin Other (See Comments)    Sulfamethoxazole-Trimethoprim Rash    Tizanidine Diarrhea       Current Outpatient Medications:     acetaminophen (TYLENOL) 500 mg tablet, Take 500 mg by mouth every 6 (six) hours as needed for mild pain, Disp: , Rfl:     atorvastatin (LIPITOR) 20 mg tablet, Take 1 tablet (20 mg total) by mouth daily, Disp: 90 tablet, Rfl: 3    calcium-vitamin D (OSCAL) 250-125 MG-UNIT per tablet, Take 1 tablet by mouth daily, Disp: , Rfl:     cyclobenzaprine (FLEXERIL) 10 mg tablet, Take 1 tablet (10 mg total) by mouth daily at bedtime, Disp: 90 tablet, Rfl: 1    diazepam (VALIUM) 5 mg tablet, Take 1 tablet twice daily as needed for severe pain, Disp: 10 tablet, Rfl: 0    HOMEOPATHIC PRODUCTS IJ, , Disp: , Rfl:     ibuprofen (MOTRIN) 200 mg tablet, Take by mouth every 4 (four) hours as needed for mild pain, Disp: , Rfl:     loratadine (CLARITIN) 10 mg tablet, Take 10 mg by mouth daily, Disp: , Rfl:     metaxalone (SKELAXIN) 800 mg tablet, Take 1 tablet (800 mg total) by mouth 2 (two) times a day as needed for muscle spasms, Disp: 60 tablet, Rfl: 2    metoprolol succinate (TOPROL-XL) 50 mg 24 hr tablet, TAKE 1 TABLET(50 MG) BY MOUTH DAILY, Disp: 90 tablet, Rfl: 3    Multiple Vitamins-Minerals (CENTRUM SILVER ULTRA WOMENS PO), Take by mouth daily  , Disp: , Rfl:     Omega-3 1000 MG CAPS, Take  by mouth daily , Disp: , Rfl:     omeprazole (PriLOSEC) 20 mg delayed release capsule, TAKE 1 CAPSULE(20 MG) BY MOUTH DAILY, Disp: 90 capsule, Rfl: 1    traMADol (Ultram) 50 mg tablet, Take 1 tablet (50 mg total) by mouth every 6 (six) hours as needed for moderate pain, Disp: 60 tablet, Rfl: 0    Turmeric 500 MG CAPS, Take by mouth, Disp: , Rfl:     FLUoxetine (PROzac) 20 mg capsule, TAKE 1 CAPSULE BY MOUTH DAILY, Disp: 90 capsule, Rfl: 1    hydroCHLOROthiazide 25 mg tablet, TAKE 1 TABLET BY MOUTH DAILY, Disp: 90 tablet, Rfl: 1    methylPREDNISolone 4 MG tablet therapy pack, Use as directed on package, Disp: 21 tablet, Rfl: 0    Review of Systems  Constitutional:     Denies fever, chills ,fatigue ,weakness ,weight loss, weight gain     ENT: Denies earache ,loss of hearing ,nosebleed, nasal discharge,nasal congestion ,sore throat ,hoarseness  Pulmonary: Denies shortness of breath ,cough  ,dyspnea on exertion, orthopnea  ,PND   Cardiovascular:  Denies bradycardia , tachycardia  ,palpations, lower extremity edema leg, claudication  Breast:  Denies new or changing breast lumps ,nipple discharge ,nipple changes  Abdomen:  Denies abdominal pain , anorexia , indigestion, nausea, vomiting, constipation, diarrhea  Musculoskeletal: Denies myalgias, arthralgias, joint swelling, joint stiffness , limb swelling :positive right-sided leg pain starting in the right buttocks and radiating down the back of the leg without weakness or paresthesia  Gu: denies dysuria, polyuria  Skin: Denies skin rash, skin lesion, skin wound, itching, dry skin  Neuro: Denies headache, numbness, tingling, confusion, loss of consciousness, dizziness, vertigo  Psychiatric: Denies feelings of depression, suicidal ideation, anxiety, sleep disturbances    OBJECTIVE  /78 (BP Location: Right arm, Patient Position: Sitting, Cuff Size: Large)   Pulse 66   Temp (!) 97.2 °F (36.2 °C)   Ht 5' (1.524 m)   Wt 97.1 kg (214 lb)   LMP  (LMP Unknown) Comment:  hysterectomy  SpO2 98%   BMI 41.79 kg/m²   Constitutional:   NAD, well appearing and well nourished      ENT:   Conjunctiva and lids: no injection, edema, or discharge     Pupils and iris: CORNELIA bilaterally    External inspection of ears and nose: normal without deformities or discharge.      Otoscopic exam: Canals patent without erythema.       Nasal mucosa, septum and turbinates: Normal or edema or discharge         Oropharynx:  Moist mucosa, normal tongue and tonsils without lesions. No erythema        Pulmonary:Respiratory effort normal rate and rhythm, no increased work of breathing. Auscultation of lungs:  Clear bilaterally with no adventitious breath sounds       Cardiovascular: regular rate and rhythm, S1 and S2, no murmur, no edema and/or varicosities of LE      Abdomen: Soft and non-distended     Positive bowel sounds      No heptomegaly or splenomegaly      Gu: no suprapubic tenderness or CVA tenderness, no urethral discharge  Lymphatic:  No anterior or posterior cervical lymphadenopathy         Musculoskeletal:  Gait and station: Normal gait      Digits and nails normal without clubbing or cyanosis       Inspection/palpation of joints, bones, and muscles: Positive tenderness to the right PSIS, positive straight leg raising on the right at 30 degrees  Skin: Normal skin turgor and no rashes      Neuro:      Normal reflexes       Psych:   alert and oriented to person, place and time     normal mood and affect       Assessment/Plan:Diagnoses and all orders for this visit:    Right sided sciatica  Comments:  Patient to ice right PSIS 3 times daily for 20-minute intervals and call if no better in 5 days time.  Orders:  -     ketorolac (TORADOL) 60 mg/2 mL IM injection 60 mg  -     methylPREDNISolone acetate (DEPO-MEDROL) injection 80 mg  -     traMADol (Ultram) 50 mg tablet; Take 1 tablet (50 mg total) by mouth every 6 (six) hours as needed for moderate pain    Essential hypertension  Comments:  Stable on  beta-blocker therapy  Orders:  -     Comprehensive metabolic panel; Future    Pure hypercholesterolemia  Comments:  Stable on statin therapy  Orders:  -     Lipid panel; Future    Recurrent major depressive disorder, in full remission (HCC)  Comments:  Continue with Prozac therapy    GERD without esophagitis  Comments:  Continue PPI therapy.    Breast cancer screening by mammogram  -     Mammo screening bilateral w 3d & cad; Future        Reviewed with patient plan to treat with above plan.  Patient instructed to call in 72 hours if not feeling better or if symptoms worse

## 2024-03-26 ENCOUNTER — PROCEDURE VISIT (OUTPATIENT)
Age: 74
End: 2024-03-26
Payer: MEDICARE

## 2024-03-26 VITALS
WEIGHT: 214 LBS | BODY MASS INDEX: 42.01 KG/M2 | SYSTOLIC BLOOD PRESSURE: 110 MMHG | HEIGHT: 60 IN | HEART RATE: 80 BPM | DIASTOLIC BLOOD PRESSURE: 65 MMHG

## 2024-03-26 DIAGNOSIS — M99.03 SEGMENTAL DYSFUNCTION OF LUMBAR REGION: ICD-10-CM

## 2024-03-26 DIAGNOSIS — M99.04 SEGMENTAL DYSFUNCTION OF SACRAL REGION: ICD-10-CM

## 2024-03-26 DIAGNOSIS — M54.41 ACUTE BILATERAL LOW BACK PAIN WITH RIGHT-SIDED SCIATICA: ICD-10-CM

## 2024-03-26 DIAGNOSIS — M99.05 SEGMENTAL DYSFUNCTION OF PELVIC REGION: Primary | ICD-10-CM

## 2024-03-26 DIAGNOSIS — M54.16 LUMBAR RADICULOPATHY: ICD-10-CM

## 2024-03-26 PROCEDURE — 98941 CHIROPRACT MANJ 3-4 REGIONS: CPT | Performed by: CHIROPRACTOR

## 2024-03-26 NOTE — PROGRESS NOTES
Date of First Visit: 3/18/2024  Visit number: 1    HPI:  Back Pain  This is a recurrent problem. The current episode started more than 1 year ago. The problem has been waxing and waning since onset. The pain is present in the lumbar spine and sacro-iliac. The quality of the pain is described as aching, shooting and burning. The pain radiates to the right thigh and right knee. The pain is at a severity of 5/10. The pain is moderate. The symptoms are aggravated by bending, position, standing and twisting. Associated symptoms include numbness and tingling. She has tried NSAIDs, muscle relaxant, home exercises and ice for the symptoms. The treatment provided mild relief.     Shadia Parker is a 74 y.o. female presents for evaluation possible treatment for an exacerbation of the low back and right leg pain.  She has has a history of chronic low back pain however recently had a significant flareup of increase symptomatology she has seen both her family physician as well as spine and pain is treating with oral steroids with some relief of symptomatology.  Pain now radiates down the right side is listed as a 5 on a visual pain analog scale today.  She has a history of back and leg pain in the past also is status post bilateral THR TKA.    She denies any weaknesses denies any significant numbness or tingling in the lower extremity.  Reports no associated bowel bladder changes fever chills night sweats or infection.      Chief Complaint   Patient presents with   • Back Pain     Lower lumbar pain that radiates down right side. 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   • Trochanteric bursitis of right hip 02/12/2019   • Visual impairment 1968    Nearsighted with stidmatism      Past Surgical History:   Procedure Laterality Date   • ABDOMINAL SURGERY      endometriosis    • APPENDECTOMY     • BREAST BIOPSY Right 1985    benign   • CARPAL TUNNEL RELEASE     • COLONOSCOPY     • HAND SURGERY     • HIP SURGERY     • HYSTERECTOMY Bilateral 1985   • JOINT REPLACEMENT Bilateral     KNEE   • JOINT REPLACEMENT Left     HIP   • OOPHORECTOMY Bilateral 1985   • AR ARTHRP ACETBLR/PROX FEM PROSTC AGRFT/ALGRFT Left 3/12/2018    Procedure: ARTHROPLASTY HIP TOTAL;  Surgeon: Dread Myrick MD;  Location: [facility];  Service: Orthopedics   • AR ARTHRP ACETBLR/PROX FEM PROSTC AGRFT/ALGRFT Right 7/30/2018    Procedure: RIGHT TOTAL HIP ARTHROPLASTY;  Surgeon: Dread Myrick MD;  Location: [facility];  Service: Orthopedics   • AR SURGICAL ARTHROSCOPY RAMONE W/CORACOACRM LIGM RLS Right 5/10/2017    Procedure: SHOULDER ARTHROSCOPY SUBACROMIAL DECOMPRESSION;  Surgeon: Mann Ramirez MD;  Location: [facility];  Service: Orthopedics   • AR SURGICAL ARTHROSCOPY SHOULDER BICEPS TENODESIS Right 5/10/2017    Procedure: ARTHROSCOPIC BICEPS TENODESIS WITH ROTATOR CUFF REPAIR ;  Surgeon: Mann Ramirez MD;  Location: [facility];  Service: Orthopedics   • TONSILLECTOMY       Family History   Problem Relation Age of Onset   • Atrial fibrillation Mother    • Breast cancer Mother 75   • Stroke Mother    • Coronary artery disease Mother    • Diabetes Mother    • Pancreatitis Mother    • Cancer Mother         Breast   • Hyperlipidemia Mother    • Pulmonary embolism Mother    • Depression Mother    • Hypertension Mother    • Thyroid disease Mother         Hypothyroidism   • Hearing loss Mother    • Heart attack  Father    • Arthritis Father    • No Known Problems Sister    • Arthritis Sister    • Endometrial cancer Maternal Grandmother 75   • Cancer Maternal Grandmother    • No Known Problems Paternal Grandmother    • No Known Problems Paternal Grandfather    • No Known Problems Maternal Aunt    • No Known Problems Paternal Aunt    • No Known Problems Paternal Aunt    • Prostate cancer Paternal Uncle 55   • Arthritis Family    • Cancer Family      Social History     Socioeconomic History   • Marital status: /Civil Union     Spouse name: None   • Number of children: None   • Years of education: None   • Highest education level: None   Occupational History   • None   Tobacco Use   • Smoking status: Never     Passive exposure: Past   • Smokeless tobacco: Never   Vaping Use   • Vaping status: Never Used   Substance and Sexual Activity   • Alcohol use: Not Currently     Comment: Rarely drink alcohol   • Drug use: Never   • Sexual activity: Yes     Partners: Male     Birth control/protection: Female Sterilization     Comment: Hysterectomy 1986   Other Topics Concern   • None   Social History Narrative    Drinks coffee     Social Determinants of Health     Financial Resource Strain: Low Risk  (7/4/2023)    Overall Financial Resource Strain (CARDIA)    • Difficulty of Paying Living Expenses: Not hard at all   Food Insecurity: Not on file   Transportation Needs: No Transportation Needs (7/4/2023)    PRAPARE - Transportation    • Lack of Transportation (Medical): No    • Lack of Transportation (Non-Medical): No   Physical Activity: Not on file   Stress: Not on file   Social Connections: Not on file   Intimate Partner Violence: Not on file   Housing Stability: Not on file       Current Outpatient Medications:   •  acetaminophen (TYLENOL) 500 mg tablet, Take 500 mg by mouth every 6 (six) hours as needed for mild pain, Disp: , Rfl:   •  atorvastatin (LIPITOR) 20 mg tablet, Take 1 tablet (20 mg total) by mouth daily, Disp: 90  tablet, Rfl: 3  •  calcium-vitamin D (OSCAL) 250-125 MG-UNIT per tablet, Take 1 tablet by mouth daily, Disp: , Rfl:   •  cyclobenzaprine (FLEXERIL) 10 mg tablet, Take 1 tablet (10 mg total) by mouth daily at bedtime, Disp: 90 tablet, Rfl: 1  •  diazepam (VALIUM) 5 mg tablet, Take 1 tablet twice daily as needed for severe pain, Disp: 10 tablet, Rfl: 0  •  FLUoxetine (PROzac) 20 mg capsule, TAKE 1 CAPSULE BY MOUTH DAILY, Disp: 90 capsule, Rfl: 1  •  HOMEOPATHIC PRODUCTS IJ, , Disp: , Rfl:   •  hydroCHLOROthiazide 25 mg tablet, TAKE 1 TABLET BY MOUTH DAILY, Disp: 90 tablet, Rfl: 1  •  ibuprofen (MOTRIN) 200 mg tablet, Take by mouth every 4 (four) hours as needed for mild pain, Disp: , Rfl:   •  loratadine (CLARITIN) 10 mg tablet, Take 10 mg by mouth daily, Disp: , Rfl:   •  metaxalone (SKELAXIN) 800 mg tablet, Take 1 tablet (800 mg total) by mouth 2 (two) times a day as needed for muscle spasms, Disp: 60 tablet, Rfl: 2  •  methylPREDNISolone 4 MG tablet therapy pack, Use as directed on package, Disp: 21 tablet, Rfl: 0  •  metoprolol succinate (TOPROL-XL) 50 mg 24 hr tablet, TAKE 1 TABLET(50 MG) BY MOUTH DAILY, Disp: 90 tablet, Rfl: 3  •  Multiple Vitamins-Minerals (CENTRUM SILVER ULTRA WOMENS PO), Take by mouth daily  , Disp: , Rfl:   •  Omega-3 1000 MG CAPS, Take by mouth daily , Disp: , Rfl:   •  omeprazole (PriLOSEC) 20 mg delayed release capsule, TAKE 1 CAPSULE(20 MG) BY MOUTH DAILY, Disp: 90 capsule, Rfl: 1  •  traMADol (Ultram) 50 mg tablet, Take 1 tablet (50 mg total) by mouth every 6 (six) hours as needed for moderate pain, Disp: 60 tablet, Rfl: 0  •  Turmeric 500 MG CAPS, Take by mouth, Disp: , Rfl:   Allergies as of 03/18/2024 - Reviewed 03/15/2024   Allergen Reaction Noted   • Hydromorphone hcl Itching    • Percocet [oxycodone-acetaminophen] GI Intolerance and Vomiting 05/01/2017   • Pregabalin Other (See Comments) 03/14/2024   • Sulfamethoxazole-trimethoprim Rash    • Tizanidine Diarrhea 07/28/2020          The following portions of the patient's history were reviewed and updated as appropriate: allergies, current medications, past family history, past medical history, past social history, past surgical history, and problem list.    Review of Systems   Constitutional: Negative.    Musculoskeletal:  Positive for back pain.   Neurological:  Positive for tingling and numbness.   Psychiatric/Behavioral: Negative.         Physical Exam:  Physical Exam  Vitals reviewed.   Constitutional:       Appearance: Normal appearance.   Cardiovascular:      Rate and Rhythm: Normal rate.      Pulses: Normal pulses.   Pulmonary:      Effort: Pulmonary effort is normal.   Musculoskeletal:      Lumbar back: Spasms and tenderness present. Decreased range of motion. Positive right straight leg raise test. Negative left straight leg raise test.        Back:    Skin:     General: Skin is warm and dry.   Neurological:      General: No focal deficit present.      Mental Status: She is alert and oriented to person, place, and time.      Sensory: Sensation is intact.      Motor: Motor function is intact.      Gait: Gait is intact.      Deep Tendon Reflexes: Reflexes are normal and symmetric.   Psychiatric:         Mood and Affect: Mood normal.         Behavior: Behavior normal.         Thought Content: Thought content normal.         Assessment:  Diagnoses and all orders for this visit:    Segmental dysfunction of pelvic region    Lumbar radiculopathy    Segmental dysfunction of sacral region    Segmental dysfunction of lumbar region    Acute bilateral low back pain with right-sided sciatica         Treatment:  54841  Manipulation today to the right innominate, sacrum, L5 L4 levels via gentle side-lying flexion distraction treatment well-tolerated.    Discussion:  I reviewed past medical notes.  Discussed case with patient today trial of conservative care reassessment 4 weeks.  No follow-ups on file.

## 2024-03-26 NOTE — PROGRESS NOTES
Date of first visit: 3/18/2024      HPI:  Clary is in for treatment today low back pain bilateral in nature very sore 4 and a visual pain analog scale.      The following portions of the patient's history were reviewed and updated as appropriate: allergies, current medications, past family history, past medical history, past social history, past surgical history, and problem list.    Review of Systems    Physical Exam:  Exam reveals pelvic obliquity elevated right versus left nondominant leg with inequality is noted biomechanically joint dysfunction present in the right SI involvement at L4-L5 L5-S1 right-sided paralumbar hypertonicity noted.    Assessment:   Diagnosis ICD-10-CM Associated Orders   1. Segmental dysfunction of pelvic region  M99.05       2. Lumbar radiculopathy  M54.16       3. Segmental dysfunction of sacral region  M99.04       4. Segmental dysfunction of lumbar region  M99.03       5. Acute bilateral low back pain with right-sided sciatica  M54.41                 Treatment: 89736  Manipulation to the right innominate, sacrum, L5 L4 levels via a Browning drop maneuver followed by flexion distraction L4-L5 L5-S1 well-tolerated.    Discussion:  Continue daily stretching and icing see her back for follow-up.

## 2024-03-28 ENCOUNTER — PROCEDURE VISIT (OUTPATIENT)
Age: 74
End: 2024-03-28
Payer: MEDICARE

## 2024-03-28 VITALS
DIASTOLIC BLOOD PRESSURE: 86 MMHG | WEIGHT: 214 LBS | BODY MASS INDEX: 42.01 KG/M2 | SYSTOLIC BLOOD PRESSURE: 146 MMHG | HEART RATE: 90 BPM | HEIGHT: 60 IN

## 2024-03-28 DIAGNOSIS — M54.16 LUMBAR RADICULOPATHY: ICD-10-CM

## 2024-03-28 DIAGNOSIS — M99.04 SEGMENTAL DYSFUNCTION OF SACRAL REGION: ICD-10-CM

## 2024-03-28 DIAGNOSIS — M99.05 SEGMENTAL DYSFUNCTION OF PELVIC REGION: Primary | ICD-10-CM

## 2024-03-28 DIAGNOSIS — M54.41 ACUTE BILATERAL LOW BACK PAIN WITH RIGHT-SIDED SCIATICA: ICD-10-CM

## 2024-03-28 DIAGNOSIS — M99.03 SEGMENTAL DYSFUNCTION OF LUMBAR REGION: ICD-10-CM

## 2024-03-28 PROCEDURE — 98941 CHIROPRACT MANJ 3-4 REGIONS: CPT | Performed by: CHIROPRACTOR

## 2024-03-28 NOTE — PROGRESS NOTES
Date of first visit: 3/18/2024      HPI:  Clary is in for treatment today low back pain bilateral in nature 2 on a visual pain analog scale.  She will still get some spasms and then its rated as an 8-9.      The following portions of the patient's history were reviewed and updated as appropriate: allergies, current medications, past family history, past medical history, past social history, past surgical history, and problem list.    Review of Systems    Physical Exam:  Exam reveals pelvic obliquity elevated right versus left nondominant leg with inequality is noted biomechanically joint dysfunction present in the right SI involvement at L4-L5 L5-S1 right-sided paralumbar hypertonicity noted.    Assessment:   Diagnosis ICD-10-CM Associated Orders   1. Segmental dysfunction of pelvic region  M99.05       2. Lumbar radiculopathy  M54.16       3. Segmental dysfunction of sacral region  M99.04       4. Segmental dysfunction of lumbar region  M99.03       5. Acute bilateral low back pain with right-sided sciatica  M54.41                 Treatment: 44149  Manipulation to the right innominate, sacrum, L5 L4 levels via a Browning drop maneuver followed by flexion distraction L4-L5 L5-S1 well-tolerated.    Discussion:  Continue daily stretching and icing see her back for follow-up.

## 2024-04-01 ENCOUNTER — PROCEDURE VISIT (OUTPATIENT)
Age: 74
End: 2024-04-01
Payer: MEDICARE

## 2024-04-01 VITALS
HEIGHT: 60 IN | DIASTOLIC BLOOD PRESSURE: 81 MMHG | BODY MASS INDEX: 42.01 KG/M2 | WEIGHT: 214 LBS | HEART RATE: 84 BPM | SYSTOLIC BLOOD PRESSURE: 130 MMHG

## 2024-04-01 DIAGNOSIS — M99.03 SEGMENTAL DYSFUNCTION OF LUMBAR REGION: ICD-10-CM

## 2024-04-01 DIAGNOSIS — M54.16 LUMBAR RADICULOPATHY: ICD-10-CM

## 2024-04-01 DIAGNOSIS — M99.04 SEGMENTAL DYSFUNCTION OF SACRAL REGION: ICD-10-CM

## 2024-04-01 DIAGNOSIS — M54.41 ACUTE BILATERAL LOW BACK PAIN WITH RIGHT-SIDED SCIATICA: ICD-10-CM

## 2024-04-01 DIAGNOSIS — M99.05 SEGMENTAL DYSFUNCTION OF PELVIC REGION: Primary | ICD-10-CM

## 2024-04-01 PROCEDURE — 98941 CHIROPRACT MANJ 3-4 REGIONS: CPT | Performed by: CHIROPRACTOR

## 2024-04-01 NOTE — PROGRESS NOTES
Date of first visit: 3/18/2024      HPI:  Clary is in for treatment today low back pain bilateral in nature 2 on a visual pain analog scale.  She will still get some spasms and then its rated as an 5.      The following portions of the patient's history were reviewed and updated as appropriate: allergies, current medications, past family history, past medical history, past social history, past surgical history, and problem list.    Review of Systems    Physical Exam:  Exam reveals pelvic obliquity elevated right versus left nondominant leg with inequality is noted biomechanically joint dysfunction present in the right SI involvement at L4-L5 L5-S1 right-sided paralumbar hypertonicity noted.    Assessment:   Diagnosis ICD-10-CM Associated Orders   1. Segmental dysfunction of pelvic region  M99.05       2. Lumbar radiculopathy  M54.16       3. Segmental dysfunction of sacral region  M99.04       4. Segmental dysfunction of lumbar region  M99.03       5. Acute bilateral low back pain with right-sided sciatica  M54.41                 Treatment: 44150  Manipulation to the right innominate, sacrum, L5 L4 levels via a Browning drop maneuver followed by flexion distraction L4-L5 L5-S1 well-tolerated.    Discussion:  Continue daily stretching and icing see her back for follow-up.

## 2024-04-05 ENCOUNTER — PROCEDURE VISIT (OUTPATIENT)
Age: 74
End: 2024-04-05
Payer: MEDICARE

## 2024-04-05 VITALS — BODY MASS INDEX: 42.01 KG/M2 | HEIGHT: 60 IN | WEIGHT: 214 LBS

## 2024-04-05 DIAGNOSIS — M99.05 SEGMENTAL DYSFUNCTION OF PELVIC REGION: Primary | ICD-10-CM

## 2024-04-05 DIAGNOSIS — M99.04 SEGMENTAL DYSFUNCTION OF SACRAL REGION: ICD-10-CM

## 2024-04-05 DIAGNOSIS — M54.41 ACUTE BILATERAL LOW BACK PAIN WITH RIGHT-SIDED SCIATICA: ICD-10-CM

## 2024-04-05 DIAGNOSIS — M54.16 LUMBAR RADICULOPATHY: ICD-10-CM

## 2024-04-05 DIAGNOSIS — M99.03 SEGMENTAL DYSFUNCTION OF LUMBAR REGION: ICD-10-CM

## 2024-04-05 PROCEDURE — 98941 CHIROPRACT MANJ 3-4 REGIONS: CPT | Performed by: CHIROPRACTOR

## 2024-04-08 ENCOUNTER — PROCEDURE VISIT (OUTPATIENT)
Age: 74
End: 2024-04-08
Payer: MEDICARE

## 2024-04-08 VITALS
WEIGHT: 214 LBS | SYSTOLIC BLOOD PRESSURE: 128 MMHG | DIASTOLIC BLOOD PRESSURE: 80 MMHG | HEIGHT: 60 IN | HEART RATE: 81 BPM | BODY MASS INDEX: 42.01 KG/M2

## 2024-04-08 DIAGNOSIS — M54.41 ACUTE BILATERAL LOW BACK PAIN WITH RIGHT-SIDED SCIATICA: ICD-10-CM

## 2024-04-08 DIAGNOSIS — M99.05 SEGMENTAL DYSFUNCTION OF PELVIC REGION: Primary | ICD-10-CM

## 2024-04-08 DIAGNOSIS — M99.03 SEGMENTAL DYSFUNCTION OF LUMBAR REGION: ICD-10-CM

## 2024-04-08 DIAGNOSIS — M99.04 SEGMENTAL DYSFUNCTION OF SACRAL REGION: ICD-10-CM

## 2024-04-08 DIAGNOSIS — M54.16 LUMBAR RADICULOPATHY: ICD-10-CM

## 2024-04-08 PROCEDURE — 98941 CHIROPRACT MANJ 3-4 REGIONS: CPT | Performed by: CHIROPRACTOR

## 2024-04-08 NOTE — PROGRESS NOTES
Date of first visit: 3/18/2024      HPI:  Clary is in for treatment today low back pain bilateral in nature 0 on a visual pain analog scale.      The following portions of the patient's history were reviewed and updated as appropriate: allergies, current medications, past family history, past medical history, past social history, past surgical history, and problem list.    Review of Systems    Physical Exam:  Exam reveals pelvic obliquity elevated right versus left nondominant leg with inequality is noted biomechanically joint dysfunction present in the right SI involvement at L4-L5 L5-S1 right-sided paralumbar hypertonicity noted.    Assessment:   Diagnosis ICD-10-CM Associated Orders   1. Segmental dysfunction of pelvic region  M99.05       2. Lumbar radiculopathy  M54.16       3. Segmental dysfunction of sacral region  M99.04       4. Segmental dysfunction of lumbar region  M99.03       5. Acute bilateral low back pain with right-sided sciatica  M54.41                 Treatment: 75964  Manipulation to the right innominate, sacrum, L5 L4 levels via a Browning drop maneuver followed by flexion distraction L4-L5 L5-S1 well-tolerated.    Discussion:  Continue daily stretching and icing see her back for follow-up.

## 2024-04-10 ENCOUNTER — PROCEDURE VISIT (OUTPATIENT)
Age: 74
End: 2024-04-10
Payer: MEDICARE

## 2024-04-10 VITALS
BODY MASS INDEX: 42.01 KG/M2 | HEIGHT: 60 IN | SYSTOLIC BLOOD PRESSURE: 134 MMHG | DIASTOLIC BLOOD PRESSURE: 84 MMHG | HEART RATE: 76 BPM | WEIGHT: 214 LBS

## 2024-04-10 DIAGNOSIS — M54.41 ACUTE BILATERAL LOW BACK PAIN WITH RIGHT-SIDED SCIATICA: ICD-10-CM

## 2024-04-10 DIAGNOSIS — M99.04 SEGMENTAL DYSFUNCTION OF SACRAL REGION: ICD-10-CM

## 2024-04-10 DIAGNOSIS — M54.16 LUMBAR RADICULOPATHY: ICD-10-CM

## 2024-04-10 DIAGNOSIS — M99.05 SEGMENTAL DYSFUNCTION OF PELVIC REGION: Primary | ICD-10-CM

## 2024-04-10 DIAGNOSIS — M99.03 SEGMENTAL DYSFUNCTION OF LUMBAR REGION: ICD-10-CM

## 2024-04-10 PROCEDURE — 98941 CHIROPRACT MANJ 3-4 REGIONS: CPT | Performed by: CHIROPRACTOR

## 2024-04-10 NOTE — PROGRESS NOTES
Date of first visit: 3/18/2024      HPI:  Clary is in for treatment today low back pain bilateral in nature 2 on a visual pain analog scale.  Had increased pain yesterday after gardening she was pulling weeds aggravated low back better today.    The following portions of the patient's history were reviewed and updated as appropriate: allergies, current medications, past family history, past medical history, past social history, past surgical history, and problem list.    Review of Systems    Physical Exam:  Exam reveals pelvic obliquity elevated right versus left nondominant leg with inequality is noted biomechanically joint dysfunction present in the right SI involvement at L4-L5 L5-S1 right-sided paralumbar hypertonicity noted.    Assessment:   Diagnosis ICD-10-CM Associated Orders   1. Segmental dysfunction of pelvic region  M99.05       2. Lumbar radiculopathy  M54.16       3. Segmental dysfunction of sacral region  M99.04       4. Segmental dysfunction of lumbar region  M99.03       5. Acute bilateral low back pain with right-sided sciatica  M54.41                 Treatment: 27005  Manipulation to the right innominate, sacrum, L5 L4 levels via a Browning drop maneuver followed by flexion distraction L4-L5 L5-S1 well-tolerated.    Discussion:  Continue daily stretching and icing see her back for follow-up.

## 2024-04-18 ENCOUNTER — PROCEDURE VISIT (OUTPATIENT)
Age: 74
End: 2024-04-18
Payer: MEDICARE

## 2024-04-18 VITALS
HEART RATE: 74 BPM | DIASTOLIC BLOOD PRESSURE: 73 MMHG | BODY MASS INDEX: 42.01 KG/M2 | HEIGHT: 60 IN | SYSTOLIC BLOOD PRESSURE: 124 MMHG | WEIGHT: 214 LBS

## 2024-04-18 DIAGNOSIS — M99.04 SEGMENTAL DYSFUNCTION OF SACRAL REGION: ICD-10-CM

## 2024-04-18 DIAGNOSIS — M99.05 SEGMENTAL DYSFUNCTION OF PELVIC REGION: Primary | ICD-10-CM

## 2024-04-18 DIAGNOSIS — M99.03 SEGMENTAL DYSFUNCTION OF LUMBAR REGION: ICD-10-CM

## 2024-04-18 DIAGNOSIS — M54.16 LUMBAR RADICULOPATHY: ICD-10-CM

## 2024-04-18 DIAGNOSIS — M54.41 ACUTE BILATERAL LOW BACK PAIN WITH RIGHT-SIDED SCIATICA: ICD-10-CM

## 2024-04-18 PROCEDURE — 98941 CHIROPRACT MANJ 3-4 REGIONS: CPT | Performed by: CHIROPRACTOR

## 2024-04-18 NOTE — PROGRESS NOTES
Date of first visit: 3/18/2024      HPI:  Clary is in for treatment today low back pain bilateral in nature 3 on a visual pain analog scale.      The following portions of the patient's history were reviewed and updated as appropriate: allergies, current medications, past family history, past medical history, past social history, past surgical history, and problem list.    Review of Systems    Physical Exam:  Exam reveals pelvic obliquity elevated right versus left nondominant leg with inequality is noted biomechanically joint dysfunction present in the right SI involvement at L4-L5 L5-S1 right-sided paralumbar hypertonicity noted.    Assessment:   Diagnosis ICD-10-CM Associated Orders   1. Segmental dysfunction of pelvic region  M99.05       2. Lumbar radiculopathy  M54.16       3. Segmental dysfunction of sacral region  M99.04       4. Segmental dysfunction of lumbar region  M99.03       5. Acute bilateral low back pain with right-sided sciatica  M54.41                 Treatment: 85051  Manipulation to the right innominate, sacrum, L5 L4 levels via a Browning drop maneuver followed by flexion distraction L4-L5 L5-S1 well-tolerated.    Discussion:  Continue daily stretching and icing see her back for follow-up.

## 2024-04-22 NOTE — PROGRESS NOTES
Date of first visit: 3/18/2024      HPI:  Clary is in for treatment today low back pain bilateral in nature 2 on a visual pain analog scale.        The following portions of the patient's history were reviewed and updated as appropriate: allergies, current medications, past family history, past medical history, past social history, past surgical history, and problem list.    Review of Systems    Physical Exam:  Exam reveals pelvic obliquity elevated right versus left nondominant leg with inequality is noted biomechanically joint dysfunction present in the right SI involvement at L4-L5 L5-S1 right-sided paralumbar hypertonicity noted.    Assessment:   Diagnosis ICD-10-CM Associated Orders   1. Segmental dysfunction of pelvic region  M99.05       2. Lumbar radiculopathy  M54.16       3. Segmental dysfunction of sacral region  M99.04       4. Segmental dysfunction of lumbar region  M99.03       5. Acute bilateral low back pain with right-sided sciatica  M54.41                 Treatment: 26959  Manipulation to the right innominate, sacrum, L5 L4 levels via a Browning drop maneuver followed by flexion distraction L4-L5 L5-S1 well-tolerated.    Discussion:  Continue daily stretching and icing see her back for follow-up.

## 2024-04-26 ENCOUNTER — PROCEDURE VISIT (OUTPATIENT)
Age: 74
End: 2024-04-26
Payer: MEDICARE

## 2024-04-26 VITALS
DIASTOLIC BLOOD PRESSURE: 80 MMHG | BODY MASS INDEX: 42.01 KG/M2 | HEIGHT: 60 IN | WEIGHT: 214 LBS | SYSTOLIC BLOOD PRESSURE: 129 MMHG | HEART RATE: 82 BPM

## 2024-04-26 DIAGNOSIS — M99.04 SEGMENTAL DYSFUNCTION OF SACRAL REGION: ICD-10-CM

## 2024-04-26 DIAGNOSIS — M99.05 SEGMENTAL DYSFUNCTION OF PELVIC REGION: Primary | ICD-10-CM

## 2024-04-26 DIAGNOSIS — M54.41 ACUTE BILATERAL LOW BACK PAIN WITH RIGHT-SIDED SCIATICA: ICD-10-CM

## 2024-04-26 DIAGNOSIS — M54.16 LUMBAR RADICULOPATHY: ICD-10-CM

## 2024-04-26 DIAGNOSIS — M99.03 SEGMENTAL DYSFUNCTION OF LUMBAR REGION: ICD-10-CM

## 2024-04-26 PROCEDURE — 98941 CHIROPRACT MANJ 3-4 REGIONS: CPT | Performed by: CHIROPRACTOR

## 2024-04-26 NOTE — PROGRESS NOTES
Date of first visit: 3/18/2024      HPI:  Clary is in for treatment today low back pain bilateral in nature 5-6 on a visual pain analog scale.  Is been a bad week.  She had a  approximately an hour and a half car right away was standing a lot and sitting notes that with bending increased back pain.  Denies radicular symptoms.  Symptoms primarily left-sided lumbosacral spine.    The following portions of the patient's history were reviewed and updated as appropriate: allergies, current medications, past family history, past medical history, past social history, past surgical history, and problem list.    Review of Systems    Physical Exam:  Exam reveals pelvic obliquity elevated right versus left nondominant leg with inequality is noted biomechanically joint dysfunction present in the right SI involvement at L4-L5 L5-S1 right-sided paralumbar hypertonicity noted.    Assessment:   Diagnosis ICD-10-CM Associated Orders   1. Segmental dysfunction of pelvic region  M99.05       2. Lumbar radiculopathy  M54.16       3. Segmental dysfunction of sacral region  M99.04       4. Segmental dysfunction of lumbar region  M99.03       5. Acute bilateral low back pain with right-sided sciatica  M54.41                 Treatment: 07684  Manipulation to the right innominate, sacrum, L5 L4 levels via a Browning drop maneuver followed by side-lying flexion distraction L4-L5 L5-S1 well-tolerated.  Pretreat US low back    Discussion:  Continue daily stretching and icing see her back for follow-up.

## 2024-05-02 ENCOUNTER — PROCEDURE VISIT (OUTPATIENT)
Age: 74
End: 2024-05-02
Payer: MEDICARE

## 2024-05-02 VITALS
DIASTOLIC BLOOD PRESSURE: 71 MMHG | SYSTOLIC BLOOD PRESSURE: 120 MMHG | HEART RATE: 78 BPM | HEIGHT: 60 IN | WEIGHT: 214 LBS | BODY MASS INDEX: 42.01 KG/M2

## 2024-05-02 DIAGNOSIS — M99.05 SEGMENTAL DYSFUNCTION OF PELVIC REGION: Primary | ICD-10-CM

## 2024-05-02 DIAGNOSIS — M54.41 ACUTE BILATERAL LOW BACK PAIN WITH RIGHT-SIDED SCIATICA: ICD-10-CM

## 2024-05-02 DIAGNOSIS — M99.04 SEGMENTAL DYSFUNCTION OF SACRAL REGION: ICD-10-CM

## 2024-05-02 DIAGNOSIS — M54.16 LUMBAR RADICULOPATHY: ICD-10-CM

## 2024-05-02 DIAGNOSIS — M99.03 SEGMENTAL DYSFUNCTION OF LUMBAR REGION: ICD-10-CM

## 2024-05-02 PROCEDURE — 98941 CHIROPRACT MANJ 3-4 REGIONS: CPT | Performed by: CHIROPRACTOR

## 2024-05-02 RX ORDER — METHOCARBAMOL 500 MG/1
TABLET, FILM COATED ORAL
COMMUNITY
Start: 2024-04-27

## 2024-05-05 NOTE — PROGRESS NOTES
Date of first visit: 3/18/2024      HPI:  Clary is in for treatment today low back pain bilateral in nature 6-7 on a visual pain analog scale.   Denies radicular symptoms.  Symptoms primarily left-sided lumbosacral spine.    The following portions of the patient's history were reviewed and updated as appropriate: allergies, current medications, past family history, past medical history, past social history, past surgical history, and problem list.    Review of Systems    Physical Exam:  Exam reveals pelvic obliquity elevated right versus left nondominant leg with inequality is noted biomechanically joint dysfunction present in the right SI involvement at L4-L5 L5-S1 right-sided paralumbar hypertonicity noted.    Assessment:   Diagnosis ICD-10-CM Associated Orders   1. Segmental dysfunction of pelvic region  M99.05       2. Lumbar radiculopathy  M54.16       3. Segmental dysfunction of sacral region  M99.04       4. Segmental dysfunction of lumbar region  M99.03       5. Acute bilateral low back pain with right-sided sciatica  M54.41                 Treatment: 02247  Manipulation to the right innominate, sacrum, L5 L4 levels via a Browning drop maneuver followed by side-lying flexion distraction L4-L5 L5-S1 well-tolerated.  Pretreat US low back    Discussion:  Continue daily stretching and icing see her back for follow-up.

## 2024-05-06 DIAGNOSIS — M79.18 MYOFASCIAL PAIN SYNDROME: ICD-10-CM

## 2024-05-07 RX ORDER — CYCLOBENZAPRINE HCL 10 MG
10 TABLET ORAL
Qty: 90 TABLET | Refills: 1 | Status: SHIPPED | OUTPATIENT
Start: 2024-05-07

## 2024-05-17 ENCOUNTER — PROCEDURE VISIT (OUTPATIENT)
Age: 74
End: 2024-05-17
Payer: MEDICARE

## 2024-05-17 VITALS
HEART RATE: 76 BPM | WEIGHT: 214 LBS | DIASTOLIC BLOOD PRESSURE: 77 MMHG | SYSTOLIC BLOOD PRESSURE: 130 MMHG | HEIGHT: 60 IN | BODY MASS INDEX: 42.01 KG/M2

## 2024-05-17 DIAGNOSIS — M99.04 SEGMENTAL DYSFUNCTION OF SACRAL REGION: ICD-10-CM

## 2024-05-17 DIAGNOSIS — M54.41 ACUTE BILATERAL LOW BACK PAIN WITH RIGHT-SIDED SCIATICA: ICD-10-CM

## 2024-05-17 DIAGNOSIS — M99.05 SEGMENTAL DYSFUNCTION OF PELVIC REGION: Primary | ICD-10-CM

## 2024-05-17 DIAGNOSIS — M54.16 LUMBAR RADICULOPATHY: ICD-10-CM

## 2024-05-17 DIAGNOSIS — M99.03 SEGMENTAL DYSFUNCTION OF LUMBAR REGION: ICD-10-CM

## 2024-05-17 PROCEDURE — 98941 CHIROPRACT MANJ 3-4 REGIONS: CPT | Performed by: CHIROPRACTOR

## 2024-05-17 NOTE — PROGRESS NOTES
Date of first visit: 3/18/2024      HPI:  Clary is in for treatment today low back pain bilateral in nature 2 on a visual pain analog scale.   Denies radicular symptoms.  Symptoms primarily left-sided lumbosacral spine.    The following portions of the patient's history were reviewed and updated as appropriate: allergies, current medications, past family history, past medical history, past social history, past surgical history, and problem list.    Review of Systems    Physical Exam:  Exam reveals pelvic obliquity elevated right versus left nondominant leg with inequality is noted biomechanically joint dysfunction present in the right SI involvement at L4-L5 L5-S1 right-sided paralumbar hypertonicity noted.    Assessment:   Diagnosis ICD-10-CM Associated Orders   1. Segmental dysfunction of pelvic region  M99.05       2. Lumbar radiculopathy  M54.16       3. Segmental dysfunction of sacral region  M99.04       4. Segmental dysfunction of lumbar region  M99.03       5. Acute bilateral low back pain with right-sided sciatica  M54.41                 Treatment: 04013  Manipulation to the right innominate, sacrum, L5 L4 levels via a Browning drop maneuver followed by side-lying flexion distraction L4-L5 L5-S1 well-tolerated.      Discussion:  Continue daily stretching and icing see her back for follow-up.

## 2024-05-31 ENCOUNTER — PROCEDURE VISIT (OUTPATIENT)
Age: 74
End: 2024-05-31
Payer: MEDICARE

## 2024-05-31 VITALS
HEIGHT: 60 IN | SYSTOLIC BLOOD PRESSURE: 148 MMHG | DIASTOLIC BLOOD PRESSURE: 88 MMHG | WEIGHT: 214 LBS | BODY MASS INDEX: 42.01 KG/M2 | HEART RATE: 87 BPM

## 2024-05-31 DIAGNOSIS — M54.41 ACUTE BILATERAL LOW BACK PAIN WITH RIGHT-SIDED SCIATICA: ICD-10-CM

## 2024-05-31 DIAGNOSIS — M99.05 SEGMENTAL DYSFUNCTION OF PELVIC REGION: Primary | ICD-10-CM

## 2024-05-31 DIAGNOSIS — M99.04 SEGMENTAL DYSFUNCTION OF SACRAL REGION: ICD-10-CM

## 2024-05-31 DIAGNOSIS — M99.03 SEGMENTAL DYSFUNCTION OF LUMBAR REGION: ICD-10-CM

## 2024-05-31 DIAGNOSIS — M54.16 LUMBAR RADICULOPATHY: ICD-10-CM

## 2024-05-31 PROCEDURE — 98941 CHIROPRACT MANJ 3-4 REGIONS: CPT | Performed by: CHIROPRACTOR

## 2024-05-31 NOTE — PROGRESS NOTES
Date of first visit: 3/18/2024      HPI:  Clary is in for treatment today low back pain bilateral in nature 2 on a visual pain analog scale.   Denies radicular symptoms.  Symptoms primarily left-sided lumbosacral spine.    The following portions of the patient's history were reviewed and updated as appropriate: allergies, current medications, past family history, past medical history, past social history, past surgical history, and problem list.    Review of Systems    Physical Exam:  Exam reveals pelvic obliquity elevated right versus left nondominant leg with inequality is noted biomechanically joint dysfunction present in the right SI involvement at L4-L5 L5-S1 right-sided paralumbar hypertonicity noted.    Assessment:   Diagnosis ICD-10-CM Associated Orders   1. Segmental dysfunction of pelvic region  M99.05       2. Lumbar radiculopathy  M54.16       3. Segmental dysfunction of sacral region  M99.04       4. Segmental dysfunction of lumbar region  M99.03       5. Acute bilateral low back pain with right-sided sciatica  M54.41                 Treatment: 23131  Manipulation to the right innominate, sacrum, L5 L4 levels via a Browning drop maneuver followed by side-lying flexion distraction L4-L5 L5-S1 well-tolerated.      Discussion:  Continue daily stretching and icing see her back for follow-up.

## 2024-06-14 ENCOUNTER — PROCEDURE VISIT (OUTPATIENT)
Age: 74
End: 2024-06-14
Payer: MEDICARE

## 2024-06-14 VITALS
HEART RATE: 71 BPM | DIASTOLIC BLOOD PRESSURE: 72 MMHG | BODY MASS INDEX: 42.01 KG/M2 | WEIGHT: 214 LBS | SYSTOLIC BLOOD PRESSURE: 130 MMHG | HEIGHT: 60 IN

## 2024-06-14 DIAGNOSIS — M54.41 ACUTE BILATERAL LOW BACK PAIN WITH RIGHT-SIDED SCIATICA: ICD-10-CM

## 2024-06-14 DIAGNOSIS — M99.04 SEGMENTAL DYSFUNCTION OF SACRAL REGION: ICD-10-CM

## 2024-06-14 DIAGNOSIS — M99.03 SEGMENTAL DYSFUNCTION OF LUMBAR REGION: ICD-10-CM

## 2024-06-14 DIAGNOSIS — M54.16 LUMBAR RADICULOPATHY: ICD-10-CM

## 2024-06-14 DIAGNOSIS — M99.05 SEGMENTAL DYSFUNCTION OF PELVIC REGION: Primary | ICD-10-CM

## 2024-06-14 PROCEDURE — 98941 CHIROPRACT MANJ 3-4 REGIONS: CPT | Performed by: CHIROPRACTOR

## 2024-06-17 NOTE — PROGRESS NOTES
Date of first visit: 3/18/2024      HPI:  Clary is in for treatment today low back pain bilateral in nature 0-1 on a visual pain analog scale.   Denies radicular symptoms.  Symptoms primarily left-sided lumbosacral spine.    The following portions of the patient's history were reviewed and updated as appropriate: allergies, current medications, past family history, past medical history, past social history, past surgical history, and problem list.    Review of Systems    Physical Exam:  Exam reveals pelvic obliquity elevated right versus left nondominant leg with inequality is noted biomechanically joint dysfunction present in the right SI involvement at L4-L5 L5-S1 right-sided paralumbar hypertonicity noted.    Assessment:   Diagnosis ICD-10-CM Associated Orders   1. Segmental dysfunction of pelvic region  M99.05       2. Lumbar radiculopathy  M54.16       3. Segmental dysfunction of sacral region  M99.04       4. Segmental dysfunction of lumbar region  M99.03       5. Acute bilateral low back pain with right-sided sciatica  M54.41                 Treatment: 33532  Manipulation to the right innominate, sacrum, L5 L4 levels via a Browning drop maneuver followed by side-lying flexion distraction L4-L5 L5-S1 well-tolerated.      Discussion:  Continue daily stretching and icing see her back for follow-up.

## 2024-06-18 DIAGNOSIS — I10 ESSENTIAL HYPERTENSION: ICD-10-CM

## 2024-06-18 RX ORDER — METOPROLOL SUCCINATE 50 MG/1
TABLET, EXTENDED RELEASE ORAL
Qty: 90 TABLET | Refills: 1 | Status: SHIPPED | OUTPATIENT
Start: 2024-06-18

## 2024-06-19 DIAGNOSIS — K21.9 GASTROESOPHAGEAL REFLUX DISEASE WITHOUT ESOPHAGITIS: ICD-10-CM

## 2024-06-19 RX ORDER — OMEPRAZOLE 20 MG/1
20 CAPSULE, DELAYED RELEASE ORAL DAILY
Qty: 90 CAPSULE | Refills: 1 | Status: SHIPPED | OUTPATIENT
Start: 2024-06-19

## 2024-06-20 ENCOUNTER — APPOINTMENT (OUTPATIENT)
Dept: LAB | Facility: CLINIC | Age: 74
End: 2024-06-20
Payer: MEDICARE

## 2024-06-20 DIAGNOSIS — I10 ESSENTIAL HYPERTENSION: ICD-10-CM

## 2024-06-20 DIAGNOSIS — E78.00 PURE HYPERCHOLESTEROLEMIA: ICD-10-CM

## 2024-06-20 LAB
ALBUMIN SERPL BCG-MCNC: 4.2 G/DL (ref 3.5–5)
ALP SERPL-CCNC: 48 U/L (ref 34–104)
ALT SERPL W P-5'-P-CCNC: 21 U/L (ref 7–52)
ANION GAP SERPL CALCULATED.3IONS-SCNC: 9 MMOL/L (ref 4–13)
AST SERPL W P-5'-P-CCNC: 23 U/L (ref 13–39)
BILIRUB SERPL-MCNC: 0.5 MG/DL (ref 0.2–1)
BUN SERPL-MCNC: 15 MG/DL (ref 5–25)
CALCIUM SERPL-MCNC: 9.5 MG/DL (ref 8.4–10.2)
CHLORIDE SERPL-SCNC: 102 MMOL/L (ref 96–108)
CHOLEST SERPL-MCNC: 162 MG/DL
CO2 SERPL-SCNC: 30 MMOL/L (ref 21–32)
CREAT SERPL-MCNC: 1.13 MG/DL (ref 0.6–1.3)
GFR SERPL CREATININE-BSD FRML MDRD: 48 ML/MIN/1.73SQ M
GLUCOSE P FAST SERPL-MCNC: 88 MG/DL (ref 65–99)
HDLC SERPL-MCNC: 71 MG/DL
LDLC SERPL CALC-MCNC: 68 MG/DL (ref 0–100)
NONHDLC SERPL-MCNC: 91 MG/DL
POTASSIUM SERPL-SCNC: 3.9 MMOL/L (ref 3.5–5.3)
PROT SERPL-MCNC: 6.8 G/DL (ref 6.4–8.4)
SODIUM SERPL-SCNC: 141 MMOL/L (ref 135–147)
TRIGL SERPL-MCNC: 114 MG/DL

## 2024-06-20 PROCEDURE — 80053 COMPREHEN METABOLIC PANEL: CPT

## 2024-06-20 PROCEDURE — 80061 LIPID PANEL: CPT

## 2024-06-20 PROCEDURE — 36415 COLL VENOUS BLD VENIPUNCTURE: CPT

## 2024-06-26 ENCOUNTER — PROCEDURE VISIT (OUTPATIENT)
Age: 74
End: 2024-06-26
Payer: MEDICARE

## 2024-06-26 VITALS
WEIGHT: 214 LBS | HEIGHT: 60 IN | HEART RATE: 79 BPM | BODY MASS INDEX: 42.01 KG/M2 | SYSTOLIC BLOOD PRESSURE: 136 MMHG | DIASTOLIC BLOOD PRESSURE: 73 MMHG

## 2024-06-26 DIAGNOSIS — M54.16 LUMBAR RADICULOPATHY: ICD-10-CM

## 2024-06-26 DIAGNOSIS — M54.42 ACUTE BILATERAL LOW BACK PAIN WITH LEFT-SIDED SCIATICA: ICD-10-CM

## 2024-06-26 DIAGNOSIS — M99.05 SEGMENTAL DYSFUNCTION OF PELVIC REGION: Primary | ICD-10-CM

## 2024-06-26 DIAGNOSIS — M99.04 SEGMENTAL DYSFUNCTION OF SACRAL REGION: ICD-10-CM

## 2024-06-26 DIAGNOSIS — M99.03 SEGMENTAL DYSFUNCTION OF LUMBAR REGION: ICD-10-CM

## 2024-06-26 PROCEDURE — 98941 CHIROPRACT MANJ 3-4 REGIONS: CPT | Performed by: CHIROPRACTOR

## 2024-06-26 NOTE — PROGRESS NOTES
Date of first visit: 3/18/2024      HPI:  Clary is in for treatment today low back pain bilateral in nature 3-6 on a visual pain analog scale.   Denies radicular symptoms.  Symptoms primarily left-sided lumbosacral spine.    The following portions of the patient's history were reviewed and updated as appropriate: allergies, current medications, past family history, past medical history, past social history, past surgical history, and problem list.    Review of Systems    Physical Exam:  Exam reveals pelvic obliquity elevated right versus left nondominant leg with inequality is noted biomechanically joint dysfunction present in the right SI involvement at L4-L5 L5-S1 right-sided paralumbar hypertonicity noted.    Assessment:   Diagnosis ICD-10-CM Associated Orders   1. Segmental dysfunction of pelvic region  M99.05       2. Lumbar radiculopathy  M54.16       3. Segmental dysfunction of sacral region  M99.04       4. Segmental dysfunction of lumbar region  M99.03       5. Acute bilateral low back pain with left-sided sciatica  M54.42                 Treatment: 52910  Manipulation to the right innominate, sacrum, L5 L4 levels via a Browning drop maneuver followed by side-lying flexion distraction L4-L5 L5-S1 well-tolerated.      Discussion:  Continue daily stretching and icing see her back for follow-up.

## 2024-07-07 DIAGNOSIS — E78.00 PURE HYPERCHOLESTEROLEMIA: ICD-10-CM

## 2024-07-07 RX ORDER — ATORVASTATIN CALCIUM 20 MG/1
20 TABLET, FILM COATED ORAL DAILY
Qty: 100 TABLET | Refills: 1 | Status: SHIPPED | OUTPATIENT
Start: 2024-07-07

## 2024-07-26 ENCOUNTER — OFFICE VISIT (OUTPATIENT)
Dept: FAMILY MEDICINE CLINIC | Facility: CLINIC | Age: 74
End: 2024-07-26
Payer: MEDICARE

## 2024-07-26 VITALS
BODY MASS INDEX: 42.45 KG/M2 | DIASTOLIC BLOOD PRESSURE: 70 MMHG | SYSTOLIC BLOOD PRESSURE: 136 MMHG | WEIGHT: 216.2 LBS | TEMPERATURE: 97.3 F | HEIGHT: 60 IN | HEART RATE: 69 BPM | OXYGEN SATURATION: 99 %

## 2024-07-26 DIAGNOSIS — L02.212 ABSCESS OF BACK: ICD-10-CM

## 2024-07-26 DIAGNOSIS — Z00.00 MEDICARE ANNUAL WELLNESS VISIT, SUBSEQUENT: Primary | ICD-10-CM

## 2024-07-26 PROCEDURE — G0439 PPPS, SUBSEQ VISIT: HCPCS | Performed by: FAMILY MEDICINE

## 2024-07-26 PROCEDURE — 99213 OFFICE O/P EST LOW 20 MIN: CPT | Performed by: FAMILY MEDICINE

## 2024-07-26 RX ORDER — CEFUROXIME AXETIL 500 MG/1
500 TABLET ORAL EVERY 12 HOURS SCHEDULED
Qty: 20 TABLET | Refills: 0 | Status: SHIPPED | OUTPATIENT
Start: 2024-07-26 | End: 2024-08-05

## 2024-07-26 RX ORDER — CETIRIZINE HYDROCHLORIDE 10 MG/1
10 TABLET ORAL DAILY
COMMUNITY

## 2024-07-26 NOTE — PROGRESS NOTES
Ambulatory Visit  Name: Shadia Parker      : 1950      MRN: [id number]  Encounter Provider: Binta Harvey DO  Encounter Date: 2024   Encounter department: [facility]    Assessment & Plan        Preventive health issues were discussed with patient, and age appropriate screening tests were ordered as noted in patient's After Visit Summary. Personalized health advice and appropriate referrals for health education or preventive services given if needed, as noted in patient's After Visit Summary.    History of Present Illness     HPI   Patient Care Team:  Binta Harvey DO as PCP - General (Family Medicine)  DO Nelson Sheth MD (Pain Medicine)  Glenn Servin MD (Colon and Rectal Surgery)    Review of Systems   Constitutional:  Negative for appetite change, chills and fever.   HENT:  Negative for ear pain, facial swelling, rhinorrhea, sinus pain, sore throat and trouble swallowing.    Eyes:  Negative for discharge and redness.   Respiratory:  Negative for chest tightness, shortness of breath and wheezing.    Cardiovascular:  Negative for chest pain and palpitations.   Gastrointestinal:  Negative for abdominal pain, diarrhea, nausea and vomiting.   Endocrine: Negative for polyuria.   Genitourinary:  Negative for dysuria and urgency.   Musculoskeletal:  Negative for arthralgias and back pain.   Skin:  Positive for wound. Negative for rash.        Upper extremity erythematous cystic structure approximately 1 cm in diameter with puncture wound noted   Neurological:  Negative for dizziness, weakness and headaches.   Hematological:  Negative for adenopathy.   Psychiatric/Behavioral:  Negative for behavioral problems, confusion and sleep disturbance.    All other systems reviewed and are negative.    Medical History Reviewed by provider this encounter:       Annual Wellness Visit Questionnaire   Shaida is here for her Subsequent Wellness visit.  Last Medicare Wellness visit information reviewed, patient interviewed, no change since last AWV.     Health Risk Assessment:   Patient rates overall health as good. Patient feels that their physical health rating is same. Patient is satisfied with their life. Eyesight was rated as same. Hearing was rated as same. Patient feels that their emotional and mental health rating is same. Patients states they are never, rarely angry. Patient states they are sometimes unusually tired/fatigued. Pain experienced in the last 7 days has been a lot. Patient's pain rating has been 8/10. Patient states that she has experienced no weight loss or gain in last 6 months.     Depression Screening:   PHQ-9 Score: 2      Fall Risk Screening:   In the past year, patient has experienced: no history of falling in past year      Urinary Incontinence Screening:   Patient has not leaked urine accidently in the last six months.     Home Safety:  Patient has trouble with stairs inside or outside of their home. Patient has working smoke alarms and has working carbon monoxide detector. Home safety hazards include: medications that cause fatigue.     Nutrition:   Current diet is Regular.     Medications:   Patient is currently taking over-the-counter supplements. OTC medications include: see medication list. Patient is able to manage medications.     Activities of Daily Living (ADLs)/Instrumental Activities of Daily Living (IADLs):   Walk and transfer into and out of bed and chair?: Yes  Dress and groom yourself?: Yes    Bathe or shower yourself?: Yes    Feed yourself? Yes  Do your laundry/housekeeping?: Yes  Manage your money, pay your bills and track your expenses?: Yes  Make your own meals?: Yes    Do your own shopping?: Yes    Durable Medical Equipment Suppliers  None    Previous Hospitalizations:   Any hospitalizations or ED visits within the last 12 months?: No      Advance Care Planning:   Living will: Yes    Durable POA for healthcare: Yes     Advanced directive: Yes    Advanced directive counseling given: Yes    ACP document given: Yes    Patient declined ACP directive: No    End of Life Decisions reviewed with patient: Yes    Provider agrees with end of life decisions: Yes      Cognitive Screening:   Provider or family/friend/caregiver concerned regarding cognition?: No    PREVENTIVE SCREENINGS      Cardiovascular Screening:    General: Screening Not Indicated and History Lipid Disorder      Diabetes Screening:     General: Screening Current      Colorectal Cancer Screening:     General: Screening Current      Prostate Cancer Screening:    General: Screening Current      Breast Cancer Screening:     General: Screening Current      Cervical Cancer Screening:    General: Screening Not Indicated      Osteoporosis Screening:    General: Screening Current      Abdominal Aortic Aneurysm (AAA) Screening:        General: Screening Current and Screening Not Indicated      Lung Cancer Screening:     General: Screening Not Indicated      Hepatitis C Screening:    General: Screening Current    Screening, Brief Intervention, and Referral to Treatment (SBIRT)    Screening  Typical number of drinks in a day: 0  Typical number of drinks in a week: 0  Interpretation: Low risk drinking behavior.    AUDIT-C Screenin) How often did you have a drink containing alcohol in the past year? never  2) How many drinks did you have on a typical day when you were drinking in the past year? 0  3) How often did you have 6 or more drinks on one occasion in the past year? never    AUDIT-C Score: 0  Interpretation: Score 0-2 (female): Negative screen for alcohol misuse    Single Item Drug Screening:  How often have you used an illegal drug (including marijuana) or a prescription medication for non-medical reasons in the past year? never    Single Item Drug Screen Score: 0  Interpretation: Negative screen for possible drug use disorder    Review of Current Opioid Use    Opioid Risk  Tool (ORT) Interpretation: Complete Opioid Risk Tool (ORT)    Social Determinants of Health     Financial Resource Strain: Low Risk  (7/4/2023)    Overall Financial Resource Strain (CARDIA)     Difficulty of Paying Living Expenses: Not hard at all   Food Insecurity: No Food Insecurity (7/19/2024)    Hunger Vital Sign     Worried About Running Out of Food in the Last Year: Never true     Ran Out of Food in the Last Year: Never true   Transportation Needs: No Transportation Needs (7/19/2024)    PRAPARE - Transportation     Lack of Transportation (Medical): No     Lack of Transportation (Non-Medical): No   Housing Stability: Unknown (7/19/2024)    Housing Stability Vital Sign     Unable to Pay for Housing in the Last Year: No     Homeless in the Last Year: No   Utilities: Not At Risk (7/19/2024)    [facility] Utilities     Threatened with loss of utilities: No     No results found.    Objective     Ht 5' (1.524 m)   LMP  (LMP Unknown) Comment: hysterectomy  BMI 41.79 kg/m²     Physical Exam  Constitutional:       General: She is not in acute distress.     Appearance: She is well-developed.   HENT:      Head: Normocephalic and atraumatic.      Right Ear: External ear normal.      Left Ear: External ear normal.      Nose: Nose normal.      Mouth/Throat:      Pharynx: No oropharyngeal exudate.   Eyes:      General: No scleral icterus.        Right eye: No discharge.         Left eye: No discharge.      Conjunctiva/sclera: Conjunctivae normal.      Pupils: Pupils are equal, round, and reactive to light.   Neck:      Thyroid: No thyromegaly.      Vascular: No JVD.      Trachea: No tracheal deviation.   Cardiovascular:      Rate and Rhythm: Normal rate and regular rhythm.      Heart sounds: Normal heart sounds. No murmur heard.  Pulmonary:      Effort: No respiratory distress.      Breath sounds: Normal breath sounds. No stridor. No wheezing or rales.   Abdominal:      General: Bowel sounds are normal. There is no distension.       Palpations: Abdomen is soft. There is no mass.      Tenderness: There is no abdominal tenderness. There is no guarding or rebound.   Musculoskeletal:         General: No tenderness. Normal range of motion.      Cervical back: Normal range of motion and neck supple.   Lymphadenopathy:      Cervical: No cervical adenopathy.   Skin:     General: Skin is warm.      Findings: Lesion present. No erythema or rash.      Comments: Erythematous cystic structure, nonfluctuant slightly tender on palpation approximately 1 cm diameter   Neurological:      Mental Status: She is alert and oriented to person, place, and time.      Cranial Nerves: No cranial nerve deficit.      Motor: No abnormal muscle tone.      Coordination: Coordination normal.      Deep Tendon Reflexes: Reflexes are normal and symmetric. Reflexes normal.   Psychiatric:         Behavior: Behavior normal.         Thought Content: Thought content normal.         Judgment: Judgment normal.       Administrative Statements   I have spent a total time of 20 minutes in caring for this patient on the day of the visit/encounter including Diagnostic results, Prognosis, Risks and benefits of tx options, Instructions for management, and Patient and family education.

## 2024-07-26 NOTE — PATIENT INSTRUCTIONS
Medicare Preventive Visit Patient Instructions  Thank you for completing your Welcome to Medicare Visit or Medicare Annual Wellness Visit today. Your next wellness visit will be due in one year (7/27/2025).  The screening/preventive services that you may require over the next 5-10 years are detailed below. Some tests may not apply to you based off risk factors and/or age. Screening tests ordered at today's visit but not completed yet may show as past due. Also, please note that scanned in results may not display below.  Preventive Screenings:  Service Recommendations Previous Testing/Comments   Colorectal Cancer Screening  * Colonoscopy    * Fecal Occult Blood Test (FOBT)/Fecal Immunochemical Test (FIT)  * Fecal DNA/Cologuard Test  * Flexible Sigmoidoscopy Age: 45-75 years old   Colonoscopy: every 10 years (may be performed more frequently if at higher risk)  OR  FOBT/FIT: every 1 year  OR  Cologuard: every 3 years  OR  Sigmoidoscopy: every 5 years  Screening may be recommended earlier than age 45 if at higher risk for colorectal cancer. Also, an individualized decision between you and your healthcare provider will decide whether screening between the ages of 76-85 would be appropriate. Colonoscopy: 01/17/2020  FOBT/FIT: Not on file  Cologuard: Not on file  Sigmoidoscopy: Not on file          Breast Cancer Screening Age: 40+ years old  Frequency: every 1-2 years  Not required if history of left and right mastectomy Mammogram: 08/03/2023        Cervical Cancer Screening Between the ages of 21-29, pap smear recommended once every 3 years.   Between the ages of 30-65, can perform pap smear with HPV co-testing every 5 years.   Recommendations may differ for women with a history of total hysterectomy, cervical cancer, or abnormal pap smears in past. Pap Smear: Not on file        Hepatitis C Screening Once for adults born between 1945 and 1965  More frequently in patients at high risk for Hepatitis C Hep C Antibody:  07/05/2018        Diabetes Screening 1-2 times per year if you're at risk for diabetes or have pre-diabetes Fasting glucose: 88 mg/dL (6/20/2024)  A1C: No results in last 5 years (No results in last 5 years)      Cholesterol Screening Once every 5 years if you don't have a lipid disorder. May order more often based on risk factors. Lipid panel: 06/20/2024          Other Preventive Screenings Covered by Medicare:  Abdominal Aortic Aneurysm (AAA) Screening: covered once if your at risk. You're considered to be at risk if you have a family history of AAA.  Lung Cancer Screening: covers low dose CT scan once per year if you meet all of the following conditions: (1) Age 55-77; (2) No signs or symptoms of lung cancer; (3) Current smoker or have quit smoking within the last 15 years; (4) You have a tobacco smoking history of at least 20 pack years (packs per day multiplied by number of years you smoked); (5) You get a written order from a healthcare provider.  Glaucoma Screening: covered annually if you're considered high risk: (1) You have diabetes OR (2) Family history of glaucoma OR (3)  aged 50 and older OR (4)  American aged 65 and older  Osteoporosis Screening: covered every 2 years if you meet one of the following conditions: (1) You're estrogen deficient and at risk for osteoporosis based off medical history and other findings; (2) Have a vertebral abnormality; (3) On glucocorticoid therapy for more than 3 months; (4) Have primary hyperparathyroidism; (5) On osteoporosis medications and need to assess response to drug therapy.   Last bone density test (DXA Scan): 08/02/2022.  HIV Screening: covered annually if you're between the age of 15-65. Also covered annually if you are younger than 15 and older than 65 with risk factors for HIV infection. For pregnant patients, it is covered up to 3 times per pregnancy.    Immunizations:  Immunization Recommendations   Influenza Vaccine Annual  influenza vaccination during flu season is recommended for all persons aged >= 6 months who do not have contraindications   Pneumococcal Vaccine   * Pneumococcal conjugate vaccine = PCV13 (Prevnar 13), PCV15 (Vaxneuvance), PCV20 (Prevnar 20)  * Pneumococcal polysaccharide vaccine = PPSV23 (Pneumovax) Adults 19-63 yo with certain risk factors or if 65+ yo  If never received any pneumonia vaccine: recommend Prevnar 20 (PCV20)  Give PCV20 if previously received 1 dose of PCV13 or PPSV23   Hepatitis B Vaccine 3 dose series if at intermediate or high risk (ex: diabetes, end stage renal disease, liver disease)   Respiratory syncytial virus (RSV) Vaccine - COVERED BY MEDICARE PART D  * RSVPreF3 (Arexvy) CDC recommends that adults 60 years of age and older may receive a single dose of RSV vaccine using shared clinical decision-making (SCDM)   Tetanus (Td) Vaccine - COST NOT COVERED BY MEDICARE PART B Following completion of primary series, a booster dose should be given every 10 years to maintain immunity against tetanus. Td may also be given as tetanus wound prophylaxis.   Tdap Vaccine - COST NOT COVERED BY MEDICARE PART B Recommended at least once for all adults. For pregnant patients, recommended with each pregnancy.   Shingles Vaccine (Shingrix) - COST NOT COVERED BY MEDICARE PART B  2 shot series recommended in those 19 years and older who have or will have weakened immune systems or those 50 years and older     Health Maintenance Due:      Topic Date Due   • Breast Cancer Screening: Mammogram  08/03/2024   • Colorectal Cancer Screening  01/17/2025   • DXA SCAN  08/02/2025   • Hepatitis C Screening  Completed     Immunizations Due:      Topic Date Due   • Influenza Vaccine (1) 09/01/2024     Advance Directives   What are advance directives?  Advance directives are legal documents that state your wishes and plans for medical care. These plans are made ahead of time in case you lose your ability to make decisions for  yourself. Advance directives can apply to any medical decision, such as the treatments you want, and if you want to donate organs.   What are the types of advance directives?  There are many types of advance directives, and each state has rules about how to use them. You may choose a combination of any of the following:  Living will:  This is a written record of the treatment you want. You can also choose which treatments you do not want, which to limit, and which to stop at a certain time. This includes surgery, medicine, IV fluid, and tube feedings.   Durable power of  for healthcare (DPAHC):  This is a written record that states who you want to make healthcare choices for you when you are unable to make them for yourself. This person, called a proxy, is usually a family member or a friend. You may choose more than 1 proxy.  Do not resuscitate (DNR) order:  A DNR order is used in case your heart stops beating or you stop breathing. It is a request not to have certain forms of treatment, such as CPR. A DNR order may be included in other types of advance directives.  Medical directive:  This covers the care that you want if you are in a coma, near death, or unable to make decisions for yourself. You can list the treatments you want for each condition. Treatment may include pain medicine, surgery, blood transfusions, dialysis, IV or tube feedings, and a ventilator (breathing machine).  Values history:  This document has questions about your views, beliefs, and how you feel and think about life. This information can help others choose the care that you would choose.  Why are advance directives important?  An advance directive helps you control your care. Although spoken wishes may be used, it is better to have your wishes written down. Spoken wishes can be misunderstood, or not followed. Treatments may be given even if you do not want them. An advance directive may make it easier for your family to make  difficult choices about your care.   Weight Management   Why it is important to manage your weight:  Being overweight increases your risk of health conditions such as heart disease, high blood pressure, type 2 diabetes, and certain types of cancer. It can also increase your risk for osteoarthritis, sleep apnea, and other respiratory problems. Aim for a slow, steady weight loss. Even a small amount of weight loss can lower your risk of health problems.  How to lose weight safely:  A safe and healthy way to lose weight is to eat fewer calories and get regular exercise. You can lose up about 1 pound a week by decreasing the number of calories you eat by 500 calories each day.   Healthy meal plan for weight management:  A healthy meal plan includes a variety of foods, contains fewer calories, and helps you stay healthy. A healthy meal plan includes the following:  Eat whole-grain foods more often.  A healthy meal plan should contain fiber. Fiber is the part of grains, fruits, and vegetables that is not broken down by your body. Whole-grain foods are healthy and provide extra fiber in your diet. Some examples of whole-grain foods are whole-wheat breads and pastas, oatmeal, brown rice, and bulgur.  Eat a variety of vegetables every day.  Include dark, leafy greens such as spinach, kale, brianne greens, and mustard greens. Eat yellow and orange vegetables such as carrots, sweet potatoes, and winter squash.   Eat a variety of fruits every day.  Choose fresh or canned fruit (canned in its own juice or light syrup) instead of juice. Fruit juice has very little or no fiber.  Eat low-fat dairy foods.  Drink fat-free (skim) milk or 1% milk. Eat fat-free yogurt and low-fat cottage cheese. Try low-fat cheeses such as mozzarella and other reduced-fat cheeses.  Choose meat and other protein foods that are low in fat.  Choose beans or other legumes such as split peas or lentils. Choose fish, skinless poultry (chicken or turkey), or  lean cuts of red meat (beef or pork). Before you cook meat or poultry, cut off any visible fat.   Use less fat and oil.  Try baking foods instead of frying them. Add less fat, such as margarine, sour cream, regular salad dressing and mayonnaise to foods. Eat fewer high-fat foods. Some examples of high-fat foods include french fries, doughnuts, ice cream, and cakes.  Eat fewer sweets.  Limit foods and drinks that are high in sugar. This includes candy, cookies, regular soda, and sweetened drinks.  Exercise:  Exercise at least 30 minutes per day on most days of the week. Some examples of exercise include walking, biking, dancing, and swimming. You can also fit in more physical activity by taking the stairs instead of the elevator or parking farther away from stores. Ask your healthcare provider about the best exercise plan for you.      © Copyright [employer] 2018 Information is for End User's use only and may not be sold, redistributed or otherwise used for commercial purposes. All illustrations and images included in CareNotes® are the copyrighted property of [employer] or [employer]

## 2024-08-05 DIAGNOSIS — M54.31 RIGHT SIDED SCIATICA: Primary | ICD-10-CM

## 2024-08-05 RX ORDER — METHOCARBAMOL 500 MG/1
TABLET, FILM COATED ORAL
Qty: 120 TABLET | Refills: 5 | Status: SHIPPED | OUTPATIENT
Start: 2024-08-05

## 2024-09-03 ENCOUNTER — TELEPHONE (OUTPATIENT)
Age: 74
End: 2024-09-03

## 2024-09-03 NOTE — TELEPHONE ENCOUNTER
Caller: kvng [place]    Doctor: Cameron    Reason for call: pt calling to schedule another ablation    Call back#: [phone]

## 2024-09-03 NOTE — TELEPHONE ENCOUNTER
Pt asking for repeat lumbar ablations. She said prob for both sides, Lt side worse than Rt side.   Last ablations: Rt side 2/14/23, lt side 1/20/23. LOVS 4/3/23.    Per David an ovs would be needed first for Insurance auth. Pt understood and scheduled for ovs w/ [employer] for 9/24/24.

## 2024-09-09 ENCOUNTER — HOSPITAL ENCOUNTER (OUTPATIENT)
Dept: RADIOLOGY | Facility: MEDICAL CENTER | Age: 74
Discharge: HOME | End: 2024-09-09
Payer: MEDICARE

## 2024-09-09 VITALS — HEIGHT: 60 IN | WEIGHT: 216 LBS | BODY MASS INDEX: 42.41 KG/M2

## 2024-09-09 DIAGNOSIS — Z12.31 BREAST CANCER SCREENING BY MAMMOGRAM: ICD-10-CM

## 2024-09-09 PROCEDURE — 77067 SCR MAMMO BI INCL CAD: CPT

## 2024-09-09 PROCEDURE — 77063 BREAST TOMOSYNTHESIS BI: CPT

## 2024-09-24 ENCOUNTER — OFFICE VISIT (OUTPATIENT)
Dept: PAIN MEDICINE | Facility: CLINIC | Age: 74
End: 2024-09-24
Payer: MEDICARE

## 2024-09-24 VITALS
HEART RATE: 85 BPM | HEIGHT: 60 IN | RESPIRATION RATE: 18 BRPM | DIASTOLIC BLOOD PRESSURE: 93 MMHG | BODY MASS INDEX: 42.42 KG/M2 | SYSTOLIC BLOOD PRESSURE: 152 MMHG | WEIGHT: 216.05 LBS

## 2024-09-24 DIAGNOSIS — M47.816 LUMBAR SPONDYLOSIS: Primary | ICD-10-CM

## 2024-09-24 PROCEDURE — 99214 OFFICE O/P EST MOD 30 MIN: CPT

## 2024-09-24 NOTE — PROGRESS NOTES
Assessment:  1. Lumbar spondylosis        Plan:  The patient is a 74-year-old female with a history of chronic pain secondary to low back pain, lumbar spondylosis and degenerative lumbar spinal stenosis who presents to the office with worsening bilateral low back pain.    The patient has been experiencing moderate to severe axial spine pain that is causing functional deficit.  The pain has been present for at least 3 months and is not improving with conservative care.  Currently the patient is not experiencing any radicular features nor neurogenic claudication.  Non-facet pathology has been ruled out on clinical evaluation.    The patients pain appears facet mediated on examination today. To help with the back pain, occurring from facet arthropathy.  I discussed repeating a right and left L3-5 radiofrequency ablation, as she last had these procedures back in 2023 which did provide her greater than 50% relief of her pain for over 6 months.  Patient would like to proceed.  I instructed our  will schedule her.  Complete risks and benefits including bleeding, infection, tissue reaction, nerve injury and allergic reaction were discussed.  The approach was demonstrated using models and literature was provided.  Verbal and written consent was obtained.    My impressions and treatment recommendations were discussed in detail with the patient who verbalized understanding and had no further questions.  Discharge instructions were provided. I personally saw and examined the patient and I agree with the above discussed plan of care.    Orders Placed This Encounter   Procedures    [place] spine and pain procedure     Dr Barnes     Standing Status:   Future     Standing Expiration Date:   9/24/2028     Order Specific Question:   Reason for Exam:     Answer:   Left L3-5 RFA     Order Specific Question:   Anticoagulant hold needed?     Answer:   No    [place] spine and pain procedure     Dr Barnes     Standing Status:   Future      Standing Expiration Date:   9/24/2028     Order Specific Question:   Reason for Exam:     Answer:   Right L3-5 RFA     Order Specific Question:   Anticoagulant hold needed?     Answer:   No     No orders of the defined types were placed in this encounter.      History of Present Illness:  Shadia Parker is a 74 y.o. female with a history of chronic pain secondary to low back pain, lumbar spondylosis and degenerative lumbar spinal stenosis.  She was last seen on 3/5/2024 where she underwent an L4 LESI.  She presents to the office with worsening bilateral low back pain.      She states her pain is worse since the last office visit and intermittent.  She rates the quality of her pain as burning, sharp, throbbing, pressure-like, shooting, pins/needles and is currently rating her pain a 10/10 on a numeric scale.    Current pain medications include methocarbamol and ibuprofen which are helpful.    I have personally reviewed and/or updated the patient's past medical history, past surgical history, family history, social history, current medications, allergies, and vital signs today.     Review of Systems   Respiratory:  Negative for shortness of breath.    Cardiovascular:  Negative for chest pain.   Gastrointestinal:  Negative for constipation, diarrhea, nausea and vomiting.   Musculoskeletal:  Positive for back pain, gait problem and joint swelling. Negative for arthralgias and myalgias.   Skin:  Negative for rash.   Neurological:  Negative for dizziness, seizures and weakness.   All other systems reviewed and are negative.      Patient Active Problem List   Diagnosis    Incomplete tear of right rotator cuff    Hypertension    Hyperlipidemia    Primary osteoarthritis of left hip    Primary osteoarthritis of one hip, right    Renal insufficiency    Pain, joint, ankle and foot, right    Lumbar radiculopathy    Lumbar spondylosis    Change in bowel habits    Severe obesity (BMI 35.0-39.9) with comorbidity (HCC)    Recurrent  major depressive disorder, in full remission (HCC)    GERD without esophagitis       Past Medical History:   Diagnosis Date    Allergic     Arthritis     Depression     Endometriosis     GERD (gastroesophageal reflux disease)     Hyperlipidemia     Hypertension     Nondisplaced fracture of fifth metatarsal bone, right foot, initial encounter for closed fracture 09/24/2018    Obesity     Osteopenia     Otitis media 2022    Pancreatitis 06/2017    Pneumonia     Right hip pain 12/04/2018    S/P hip replacement, right 07/31/2018    Patient progressing well after right hip replacement     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    Trochanteric bursitis of right hip 02/12/2019    Visual impairment 1968    Nearsighted with stidmatism       Past Surgical History:   Procedure Laterality Date    ABDOMINAL SURGERY      endometriosis     APPENDECTOMY      BREAST BIOPSY Right 1985    benign    CARPAL TUNNEL RELEASE      COLONOSCOPY      HAND SURGERY      HIP SURGERY      HYSTERECTOMY Bilateral 1985    JOINT REPLACEMENT Bilateral     KNEE    JOINT REPLACEMENT Left     HIP    OOPHORECTOMY Bilateral 1985    [place] ARTHRP ACETBLR/PROX FEM PROSTC AGRFT/ALGRFT Left 3/12/2018    Procedure: ARTHROPLASTY HIP TOTAL;  Surgeon: Dread Myrick MD;  [facility];  Service: Orthopedics    [place] ARTHRP ACETBLR/PROX FEM PROSTC AGRFT/ALGRFT Right 7/30/2018    Procedure: RIGHT TOTAL HIP ARTHROPLASTY;  Surgeon: Dread Myrick MD;  [facility];  Service: Orthopedics    [place] SURGICAL ARTHROSCOPY RAMONE W/CORACOACRM LIGM RLS Right 5/10/2017    Procedure: SHOULDER ARTHROSCOPY SUBACROMIAL DECOMPRESSION;  Surgeon: Mann Ramirez MD;  [facility];  Service: Orthopedics    [place] SURGICAL ARTHROSCOPY SHOULDER BICEPS TENODESIS Right 5/10/2017    Procedure: ARTHROSCOPIC BICEPS TENODESIS WITH ROTATOR CUFF REPAIR ;  Surgeon: Mann Ramirez MD;  [facility];  Service: Orthopedics    TONSILLECTOMY         Family History    Problem Relation Age of Onset    Atrial fibrillation Mother     Breast cancer Mother 75    Stroke Mother     Coronary artery disease Mother     Diabetes Mother     Pancreatitis Mother     Cancer Mother         Breast    Hyperlipidemia Mother     Pulmonary embolism Mother     Depression Mother     Hypertension Mother     Thyroid disease Mother         Hypothyroidism    Hearing loss Mother     Heart attack Father     Arthritis Father     No Known Problems Sister     Arthritis Sister     Endometrial cancer Maternal Grandmother 75    Cancer Maternal Grandmother     No Known Problems Maternal Grandfather     No Known Problems Paternal Grandmother     No Known Problems Paternal Grandfather     No Known Problems Maternal Aunt     No Known Problems Paternal Aunt     No Known Problems Paternal Aunt     Prostate cancer Paternal Uncle 55    Arthritis Family     Cancer Family        Social History     Occupational History    Not on file   Tobacco Use    Smoking status: Never     Passive exposure: Past    Smokeless tobacco: Never   Vaping Use    Vaping status: Never Used   Substance and Sexual Activity    Alcohol use: Not Currently     Comment: Rarely drink alcohol    Drug use: Never    Sexual activity: Yes     Partners: Male     Birth control/protection: Female Sterilization     Comment: Hysterectomy 1986       Current Outpatient Medications on File Prior to Visit   Medication Sig    atorvastatin (LIPITOR) 20 mg tablet TAKE 1 TABLET BY MOUTH DAILY    calcium-vitamin D (OSCAL) 250-125 MG-UNIT per tablet Take 1 tablet by mouth daily    cetirizine (ZyrTEC) 10 mg tablet Take 10 mg by mouth daily    cyclobenzaprine (FLEXERIL) 10 mg tablet TAKE 1 TABLET(10 MG) BY MOUTH DAILY AT BEDTIME    FLUoxetine (PROzac) 20 mg capsule TAKE 1 CAPSULE BY MOUTH DAILY    HOMEOPATHIC PRODUCTS IJ     hydroCHLOROthiazide 25 mg tablet TAKE 1 TABLET BY MOUTH DAILY    ibuprofen (MOTRIN) 200 mg tablet Take by mouth every 4 (four) hours as needed for mild  pain    methocarbamol (ROBAXIN) 500 mg tablet TAKE 1-2 TABLETS BY MOUTH THREE TIMES A DAY AS NEEDED FOR MUSCLE SPASMS    metoprolol succinate (TOPROL-XL) 50 mg 24 hr tablet TAKE 1 TABLET(50 MG) BY MOUTH DAILY    Multiple Vitamins-Minerals (CENTRUM SILVER ULTRA WOMENS PO) Take by mouth daily      Omega-3 1000 MG CAPS Take by mouth daily     omeprazole (PriLOSEC) 20 mg delayed release capsule TAKE 1 CAPSULE(20 MG) BY MOUTH DAILY    traMADol (Ultram) 50 mg tablet Take 1 tablet (50 mg total) by mouth every 6 (six) hours as needed for moderate pain    [DISCONTINUED] metaxalone (SKELAXIN) 800 mg tablet Take 1 tablet (800 mg total) by mouth 2 (two) times a day as needed for muscle spasms    acetaminophen (TYLENOL) 500 mg tablet Take 500 mg by mouth every 6 (six) hours as needed for mild pain (Patient not taking: Reported on 7/26/2024)     No current facility-administered medications on file prior to visit.       Allergies   Allergen Reactions    Hydromorphone Hcl Itching    Percocet [Oxycodone-Acetaminophen] GI Intolerance and Vomiting    Pregabalin Other (See Comments)    Sulfamethoxazole-Trimethoprim Rash    Tizanidine Diarrhea       Physical Exam:    /93   Pulse 85   Resp 18   Ht 5' (1.524 m)   Wt 98 kg (216 lb 0.8 oz)   LMP  (LMP Unknown) Comment: hysterectomy  BMI 42.19 kg/m²     Constitutional:normal, well developed, well nourished, alert, in no distress and non-toxic and no overt pain behavior.  Eyes:anicteric  HEENT:grossly intact  Neck:supple, symmetric, trachea midline and no masses   Pulmonary:even and unlabored  Cardiovascular:No edema or pitting edema present  Skin:Normal without rashes or lesions and well hydrated  Psychiatric:Mood and affect appropriate  Neurologic:Cranial Nerves II-XII grossly intact  Musculoskeletal:antalgic    Lumbar Spine Exam    Appearance:  Normal lordosis  Palpation/Tenderness:  left lumbar paraspinal tenderness  right lumbar paraspinal tenderness  Sensory:  no sensory  deficits noted  Range of Motion:  Flexion:  Minimally limited  with pain  Extension:  Minimally limited  with pain  Motor Strength:  Left hip flexion:  5/5  Right hip flexion:  5/5  Left knee flexion:  5/5  Left knee extension:  5/5  Right knee flexion:  5/5  Right knee extension:  5/5  Left foot dorsiflexion:  5/5  Left foot plantar flexion:  5/5  Right foot dorsiflexion:  5/5  Right foot plantar flexion:  5/5    Imaging

## 2024-09-26 ENCOUNTER — TELEPHONE (OUTPATIENT)
Dept: PAIN MEDICINE | Facility: CLINIC | Age: 74
End: 2024-09-26

## 2024-09-26 NOTE — TELEPHONE ENCOUNTER
Patient has reported 50% improvement to function and ADL's for over 6 months after her last RFA's 1/20/23 (Lt) and 2/14/23 (Rt).  We will plan for repeat RFA's.

## 2024-10-03 ENCOUNTER — TELEPHONE (OUTPATIENT)
Dept: RADIOLOGY | Facility: CLINIC | Age: 74
End: 2024-10-03

## 2024-10-03 ENCOUNTER — HOSPITAL ENCOUNTER (OUTPATIENT)
Dept: RADIOLOGY | Facility: CLINIC | Age: 74
Discharge: HOME | End: 2024-10-03
Payer: MEDICARE

## 2024-10-03 VITALS
RESPIRATION RATE: 18 BRPM | TEMPERATURE: 97.6 F | OXYGEN SATURATION: 98 % | HEART RATE: 70 BPM | SYSTOLIC BLOOD PRESSURE: 140 MMHG | DIASTOLIC BLOOD PRESSURE: 81 MMHG

## 2024-10-03 DIAGNOSIS — M47.816 LUMBAR SPONDYLOSIS: ICD-10-CM

## 2024-10-03 PROCEDURE — 64636 DESTROY L/S FACET JNT ADDL: CPT | Performed by: ANESTHESIOLOGY

## 2024-10-03 PROCEDURE — 64635 DESTROY LUMB/SAC FACET JNT: CPT | Performed by: ANESTHESIOLOGY

## 2024-10-03 RX ORDER — BUPIVACAINE HCL/PF 2.5 MG/ML
10 VIAL (ML) INJECTION ONCE
Status: COMPLETED | OUTPATIENT
Start: 2024-10-03 | End: 2024-10-03

## 2024-10-03 RX ORDER — METHYLPREDNISOLONE ACETATE 80 MG/ML
80 INJECTION, SUSPENSION INTRA-ARTICULAR; INTRALESIONAL; INTRAMUSCULAR; PARENTERAL; SOFT TISSUE ONCE
Status: COMPLETED | OUTPATIENT
Start: 2024-10-03 | End: 2024-10-03

## 2024-10-03 RX ORDER — LIDOCAINE HYDROCHLORIDE 10 MG/ML
30 INJECTION, SOLUTION EPIDURAL; INFILTRATION; INTRACAUDAL; PERINEURAL ONCE
Status: COMPLETED | OUTPATIENT
Start: 2024-10-03 | End: 2024-10-03

## 2024-10-03 RX ADMIN — METHYLPREDNISOLONE ACETATE 20 MG: 80 INJECTION, SUSPENSION INTRA-ARTICULAR; INTRALESIONAL; INTRAMUSCULAR; SOFT TISSUE at 14:52

## 2024-10-03 RX ADMIN — BUPIVACAINE HYDROCHLORIDE 3 ML: 2.5 INJECTION, SOLUTION EPIDURAL; INFILTRATION; INTRACAUDAL at 14:52

## 2024-10-03 RX ADMIN — LIDOCAINE HYDROCHLORIDE 16 ML: 10 INJECTION, SOLUTION EPIDURAL; INFILTRATION; INTRACAUDAL; PERINEURAL at 14:41

## 2024-10-03 NOTE — H&P
History of Present Illness: The patient is a 74 y.o. female who presents with complaints of left lower back pain is here today for left L3-5 ablation    Past Medical History:   Diagnosis Date    Allergic     Arthritis     Depression     Endometriosis     GERD (gastroesophageal reflux disease)     Hyperlipidemia     Hypertension     Nondisplaced fracture of fifth metatarsal bone, right foot, initial encounter for closed fracture 09/24/2018    Obesity     Osteopenia     Otitis media 2022    Pancreatitis 06/2017    Pneumonia     Right hip pain 12/04/2018    S/P hip replacement, right 07/31/2018    Patient progressing well after right hip replacement     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    Trochanteric bursitis of right hip 02/12/2019    Visual impairment 1968    Nearsighted with stidmatism       Past Surgical History:   Procedure Laterality Date    ABDOMINAL SURGERY      endometriosis     APPENDECTOMY      BREAST BIOPSY Right 1985    benign    CARPAL TUNNEL RELEASE      COLONOSCOPY      HAND SURGERY      HIP SURGERY      HYSTERECTOMY Bilateral 1985    JOINT REPLACEMENT Bilateral     KNEE    JOINT REPLACEMENT Left     HIP    OOPHORECTOMY Bilateral 1985    [place] ARTHRP ACETBLR/PROX FEM PROSTC AGRFT/ALGRFT Left 3/12/2018    Procedure: ARTHROPLASTY HIP TOTAL;  Surgeon: Dread Myrick MD;  [facility];  Service: Orthopedics    [place] ARTHRP ACETBLR/PROX FEM PROSTC AGRFT/ALGRFT Right 7/30/2018    Procedure: RIGHT TOTAL HIP ARTHROPLASTY;  Surgeon: Dread Myrick MD;  [facility];  Service: Orthopedics    [place] SURGICAL ARTHROSCOPY RAMONE W/CORACOACRM LIGM RLS Right 5/10/2017    Procedure: SHOULDER ARTHROSCOPY SUBACROMIAL DECOMPRESSION;  Surgeon: Mann Ramirez MD;  [facility];  Service: Orthopedics    [place] SURGICAL ARTHROSCOPY SHOULDER BICEPS TENODESIS Right 5/10/2017    Procedure: ARTHROSCOPIC BICEPS TENODESIS WITH ROTATOR CUFF REPAIR ;  Surgeon: Mann Ramirez MD;  [facility];  Service: Orthopedics    TONSILLECTOMY           Current Outpatient Medications:     acetaminophen (TYLENOL) 500 mg tablet, Take 500 mg by mouth every 6 (six) hours as needed for mild pain (Patient not taking: Reported on 7/26/2024), Disp: , Rfl:     atorvastatin (LIPITOR) 20 mg tablet, TAKE 1 TABLET BY MOUTH DAILY, Disp: 100 tablet, Rfl: 1    calcium-vitamin D (OSCAL) 250-125 MG-UNIT per tablet, Take 1 tablet by mouth daily, Disp: , Rfl:     cetirizine (ZyrTEC) 10 mg tablet, Take 10 mg by mouth daily, Disp: , Rfl:     cyclobenzaprine (FLEXERIL) 10 mg tablet, TAKE 1 TABLET(10 MG) BY MOUTH DAILY AT BEDTIME, Disp: 90 tablet, Rfl: 1    FLUoxetine (PROzac) 20 mg capsule, TAKE 1 CAPSULE BY MOUTH DAILY, Disp: 90 capsule, Rfl: 1    HOMEOPATHIC PRODUCTS IJ, , Disp: , Rfl:     hydroCHLOROthiazide 25 mg tablet, TAKE 1 TABLET BY MOUTH DAILY, Disp: 90 tablet, Rfl: 1    ibuprofen (MOTRIN) 200 mg tablet, Take by mouth every 4 (four) hours as needed for mild pain, Disp: , Rfl:     methocarbamol (ROBAXIN) 500 mg tablet, TAKE 1-2 TABLETS BY MOUTH THREE TIMES A DAY AS NEEDED FOR MUSCLE SPASMS, Disp: 120 tablet, Rfl: 5    metoprolol succinate (TOPROL-XL) 50 mg 24 hr tablet, TAKE 1 TABLET(50 MG) BY MOUTH DAILY, Disp: 90 tablet, Rfl: 1    Multiple Vitamins-Minerals (CENTRUM SILVER ULTRA WOMENS PO), Take by mouth daily  , Disp: , Rfl:     Omega-3 1000 MG CAPS, Take by mouth daily , Disp: , Rfl:     omeprazole (PriLOSEC) 20 mg delayed release capsule, TAKE 1 CAPSULE(20 MG) BY MOUTH DAILY, Disp: 90 capsule, Rfl: 1    traMADol (Ultram) 50 mg tablet, Take 1 tablet (50 mg total) by mouth every 6 (six) hours as needed for moderate pain, Disp: 60 tablet, Rfl: 0    Current Facility-Administered Medications:     bupivacaine (PF) (MARCAINE) 0.25 % injection 10 mL, 10 mL, Perineural, Once, Nelson Barnes MD    lidocaine (PF) (XYLOCAINE-MPF) 1 % injection 30 mL, 30 mL, Infiltration, Once, Nelson Barnes MD    methylPREDNISolone acetate  (DEPO-MEDROL) injection 80 mg, 80 mg, Perineural, Once, Nelson Barnes MD    Allergies   Allergen Reactions    Hydromorphone Hcl Itching    Percocet [Oxycodone-Acetaminophen] GI Intolerance and Vomiting    Pregabalin Other (See Comments)    Sulfamethoxazole-Trimethoprim Rash    Tizanidine Diarrhea       Physical Exam:   Vitals:    10/03/24 1430   BP: 138/82   Pulse: 69   Resp: 18   Temp: 97.6 °F (36.4 °C)   SpO2: 98%     General: Awake, Alert, Oriented x 3, Mood and affect appropriate  Respiratory: Respirations even and unlabored  Cardiovascular: Peripheral pulses intact; no edema  Musculoskeletal Exam: Left lower back pain    ASA Score: 3    Patient/Chart Verification  Patient ID Verified: Verbal  ID Band Applied: No  Consents Confirmed: Procedural, To be obtained in the Pre-Procedure area  H&P( within 30 days) Verified: To be obtained in the Procedural area  Allergies Reviewed: Yes  Anticoag/NSAID held?: No  Currently on antibiotics?: No  Does Patient Have a Prosthetic Device/Implant: No (denies cardiac pacer / icd)    Assessment:   1. Lumbar spondylosis        Plan: Left L3-5 RFA

## 2024-10-03 NOTE — DISCHARGE INSTR - LAB

## 2024-10-04 NOTE — TELEPHONE ENCOUNTER
DENAE  S/w pt, states she is doing well following RFA. Reports no pain at this time, denies s/sx infection. Advised 4-6 weeks for full benefit, confirmed upcoming appt

## 2024-10-07 DIAGNOSIS — F39 AFFECTIVE DISORDER (HCC): ICD-10-CM

## 2024-10-07 DIAGNOSIS — I10 ESSENTIAL HYPERTENSION: ICD-10-CM

## 2024-10-08 RX ORDER — HYDROCHLOROTHIAZIDE 25 MG/1
TABLET ORAL
Qty: 90 TABLET | Refills: 1 | Status: SHIPPED | OUTPATIENT
Start: 2024-10-08

## 2024-10-22 ENCOUNTER — HOSPITAL ENCOUNTER (OUTPATIENT)
Dept: RADIOLOGY | Facility: CLINIC | Age: 74
Discharge: HOME | End: 2024-10-22
Payer: MEDICARE

## 2024-10-22 ENCOUNTER — TELEPHONE (OUTPATIENT)
Dept: RADIOLOGY | Facility: CLINIC | Age: 74
End: 2024-10-22

## 2024-10-22 VITALS
TEMPERATURE: 98.2 F | RESPIRATION RATE: 20 BRPM | OXYGEN SATURATION: 92 % | DIASTOLIC BLOOD PRESSURE: 81 MMHG | SYSTOLIC BLOOD PRESSURE: 142 MMHG | HEART RATE: 67 BPM

## 2024-10-22 DIAGNOSIS — G89.4 CHRONIC PAIN SYNDROME: Primary | ICD-10-CM

## 2024-10-22 DIAGNOSIS — M47.816 LUMBAR SPONDYLOSIS: ICD-10-CM

## 2024-10-22 DIAGNOSIS — M51.16 INTERVERTEBRAL DISC DISORDER WITH RADICULOPATHY OF LUMBAR REGION: ICD-10-CM

## 2024-10-22 PROCEDURE — 64636 DESTROY L/S FACET JNT ADDL: CPT | Performed by: ANESTHESIOLOGY

## 2024-10-22 PROCEDURE — 64635 DESTROY LUMB/SAC FACET JNT: CPT | Performed by: ANESTHESIOLOGY

## 2024-10-22 RX ORDER — BUPIVACAINE HCL/PF 2.5 MG/ML
10 VIAL (ML) INJECTION ONCE
Status: COMPLETED | OUTPATIENT
Start: 2024-10-22 | End: 2024-10-22

## 2024-10-22 RX ORDER — LIDOCAINE HYDROCHLORIDE 10 MG/ML
30 INJECTION, SOLUTION EPIDURAL; INFILTRATION; INTRACAUDAL; PERINEURAL ONCE
Status: COMPLETED | OUTPATIENT
Start: 2024-10-22 | End: 2024-10-22

## 2024-10-22 RX ORDER — METHYLPREDNISOLONE ACETATE 80 MG/ML
80 INJECTION, SUSPENSION INTRA-ARTICULAR; INTRALESIONAL; INTRAMUSCULAR; PARENTERAL; SOFT TISSUE ONCE
Status: COMPLETED | OUTPATIENT
Start: 2024-10-22 | End: 2024-10-22

## 2024-10-22 RX ADMIN — LIDOCAINE HYDROCHLORIDE 16 ML: 10 INJECTION, SOLUTION EPIDURAL; INFILTRATION; INTRACAUDAL; PERINEURAL at 11:50

## 2024-10-22 RX ADMIN — BUPIVACAINE HYDROCHLORIDE 3 ML: 2.5 INJECTION, SOLUTION EPIDURAL; INFILTRATION; INTRACAUDAL at 12:00

## 2024-10-22 RX ADMIN — METHYLPREDNISOLONE ACETATE 20 MG: 80 INJECTION, SUSPENSION INTRA-ARTICULAR; INTRALESIONAL; INTRAMUSCULAR; SOFT TISSUE at 12:00

## 2024-10-22 NOTE — DISCHARGE INSTR - LAB

## 2024-10-22 NOTE — H&P
History of Present Illness: The patient is a 74 y.o. female who presents with complaints of right lower back pain and is here today for right L3-5 ablation    Past Medical History:   Diagnosis Date    Allergic     Arthritis     Depression     Endometriosis     GERD (gastroesophageal reflux disease)     Hyperlipidemia     Hypertension     Nondisplaced fracture of fifth metatarsal bone, right foot, initial encounter for closed fracture 09/24/2018    Obesity     Osteopenia     Otitis media 2022    Pancreatitis 06/2017    Pneumonia     Right hip pain 12/04/2018    S/P hip replacement, right 07/31/2018    Patient progressing well after right hip replacement     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    Trochanteric bursitis of right hip 02/12/2019    Visual impairment 1968    Nearsighted with stidmatism       Past Surgical History:   Procedure Laterality Date    ABDOMINAL SURGERY      endometriosis     APPENDECTOMY      BREAST BIOPSY Right 1985    benign    CARPAL TUNNEL RELEASE      COLONOSCOPY      HAND SURGERY      HIP SURGERY      HYSTERECTOMY Bilateral 1985    JOINT REPLACEMENT Bilateral     KNEE    JOINT REPLACEMENT Left     HIP    OOPHORECTOMY Bilateral 1985    [place] ARTHRP ACETBLR/PROX FEM PROSTC AGRFT/ALGRFT Left 3/12/2018    Procedure: ARTHROPLASTY HIP TOTAL;  Surgeon: Dread Myrick MD;  [facility];  Service: Orthopedics    [place] ARTHRP ACETBLR/PROX FEM PROSTC AGRFT/ALGRFT Right 7/30/2018    Procedure: RIGHT TOTAL HIP ARTHROPLASTY;  Surgeon: Dread Myrick MD;  [facility];  Service: Orthopedics    [place] SURGICAL ARTHROSCOPY RAMONE W/CORACOACRM LIGM RLS Right 5/10/2017    Procedure: SHOULDER ARTHROSCOPY SUBACROMIAL DECOMPRESSION;  Surgeon: Mann Ramirez MD;  [facility];  Service: Orthopedics    [place] SURGICAL ARTHROSCOPY SHOULDER BICEPS TENODESIS Right 5/10/2017    Procedure: ARTHROSCOPIC BICEPS TENODESIS WITH ROTATOR CUFF REPAIR ;  Surgeon: Mann Ramirez MD;  [facility];  Service: Orthopedics    TONSILLECTOMY           Current Outpatient Medications:     acetaminophen (TYLENOL) 500 mg tablet, Take 500 mg by mouth every 6 (six) hours as needed for mild pain (Patient not taking: Reported on 7/26/2024), Disp: , Rfl:     atorvastatin (LIPITOR) 20 mg tablet, TAKE 1 TABLET BY MOUTH DAILY, Disp: 100 tablet, Rfl: 1    calcium-vitamin D (OSCAL) 250-125 MG-UNIT per tablet, Take 1 tablet by mouth daily, Disp: , Rfl:     cetirizine (ZyrTEC) 10 mg tablet, Take 10 mg by mouth daily, Disp: , Rfl:     cyclobenzaprine (FLEXERIL) 10 mg tablet, TAKE 1 TABLET(10 MG) BY MOUTH DAILY AT BEDTIME, Disp: 90 tablet, Rfl: 1    FLUoxetine (PROzac) 20 mg capsule, TAKE 1 CAPSULE BY MOUTH DAILY, Disp: 90 capsule, Rfl: 1    HOMEOPATHIC PRODUCTS IJ, , Disp: , Rfl:     hydroCHLOROthiazide 25 mg tablet, TAKE 1 TABLET BY MOUTH DAILY, Disp: 90 tablet, Rfl: 1    ibuprofen (MOTRIN) 200 mg tablet, Take by mouth every 4 (four) hours as needed for mild pain, Disp: , Rfl:     methocarbamol (ROBAXIN) 500 mg tablet, TAKE 1-2 TABLETS BY MOUTH THREE TIMES A DAY AS NEEDED FOR MUSCLE SPASMS, Disp: 120 tablet, Rfl: 5    metoprolol succinate (TOPROL-XL) 50 mg 24 hr tablet, TAKE 1 TABLET(50 MG) BY MOUTH DAILY, Disp: 90 tablet, Rfl: 1    Multiple Vitamins-Minerals (CENTRUM SILVER ULTRA WOMENS PO), Take by mouth daily  , Disp: , Rfl:     Omega-3 1000 MG CAPS, Take by mouth daily , Disp: , Rfl:     omeprazole (PriLOSEC) 20 mg delayed release capsule, TAKE 1 CAPSULE(20 MG) BY MOUTH DAILY, Disp: 90 capsule, Rfl: 1    traMADol (Ultram) 50 mg tablet, Take 1 tablet (50 mg total) by mouth every 6 (six) hours as needed for moderate pain, Disp: 60 tablet, Rfl: 0    Current Facility-Administered Medications:     bupivacaine (PF) (MARCAINE) 0.25 % injection 10 mL, 10 mL, Perineural, Once, Nelson Barnes MD    lidocaine (PF) (XYLOCAINE-MPF) 1 % injection 30 mL, 30 mL, Infiltration, Once, Nelson Barnes MD    methylPREDNISolone acetate  (DEPO-MEDROL) injection 80 mg, 80 mg, Perineural, Once, Nelson Barnes MD    Allergies   Allergen Reactions    Hydromorphone Hcl Itching    Percocet [Oxycodone-Acetaminophen] GI Intolerance and Vomiting    Pregabalin Other (See Comments)    Sulfamethoxazole-Trimethoprim Rash    Tizanidine Diarrhea       Physical Exam:   Vitals:    10/22/24 1137   BP: 136/83   Pulse: 69   Resp: 20   Temp: 98.2 °F (36.8 °C)   SpO2: 96%     General: Awake, Alert, Oriented x 3, Mood and affect appropriate  Respiratory: Respirations even and unlabored  Cardiovascular: Peripheral pulses intact; no edema  Musculoskeletal Exam: Right lower back pain    ASA Score: 3    Patient/Chart Verification  Patient ID Verified: Verbal  Consents Confirmed: To be obtained in the Pre-Procedure area  Interval H&P(within 24 hr) Complete (required for Outpatients and Surgery Admit only): To be obtained in the Procedural area  Allergies Reviewed: Yes  Anticoag/NSAID held?: NA  Currently on antibiotics?: No    Assessment:   1. Lumbar spondylosis        Plan: Right L3-5 RFA

## 2024-10-23 NOTE — TELEPHONE ENCOUNTER
Caller: Shadia    Doctor: ADONAY    Reason for call: This is a pt of dr cesar returning the nurses call.     Call back#: [phone]

## 2024-10-23 NOTE — TELEPHONE ENCOUNTER
DENAE  S/w pt, states she is doing well following RFA, reports pain level 0 / 10, denies s/sx infection, denies sunburn sensation. Pt advised 4-6 weeks for full benefit, scheduled for OV on 12/2

## 2024-10-23 NOTE — TELEPHONE ENCOUNTER
Patient is interested in proceeding with an SCS trial.  I have placed order for MRI thoracic spine.  Please reach out to her with information on psychological evaluation.  She will be a [employer] trial

## 2024-11-30 DIAGNOSIS — I10 ESSENTIAL HYPERTENSION: ICD-10-CM

## 2024-12-02 ENCOUNTER — OFFICE VISIT (OUTPATIENT)
Dept: PAIN MEDICINE | Facility: CLINIC | Age: 74
End: 2024-12-02
Payer: MEDICARE

## 2024-12-02 VITALS
SYSTOLIC BLOOD PRESSURE: 147 MMHG | RESPIRATION RATE: 18 BRPM | DIASTOLIC BLOOD PRESSURE: 83 MMHG | HEART RATE: 81 BPM | HEIGHT: 60 IN | WEIGHT: 220 LBS | BODY MASS INDEX: 43.19 KG/M2

## 2024-12-02 DIAGNOSIS — M48.061 DEGENERATIVE LUMBAR SPINAL STENOSIS: ICD-10-CM

## 2024-12-02 DIAGNOSIS — M47.816 LUMBAR SPONDYLOSIS: ICD-10-CM

## 2024-12-02 DIAGNOSIS — G89.4 CHRONIC PAIN SYNDROME: ICD-10-CM

## 2024-12-02 DIAGNOSIS — M51.16 INTERVERTEBRAL DISC DISORDER WITH RADICULOPATHY OF LUMBAR REGION: ICD-10-CM

## 2024-12-02 DIAGNOSIS — M46.1 SACROILIITIS (HCC): Primary | ICD-10-CM

## 2024-12-02 PROCEDURE — 99214 OFFICE O/P EST MOD 30 MIN: CPT

## 2024-12-02 RX ORDER — METOPROLOL SUCCINATE 50 MG/1
50 TABLET, EXTENDED RELEASE ORAL DAILY
Qty: 90 TABLET | Refills: 1 | Status: SHIPPED | OUTPATIENT
Start: 2024-12-02

## 2024-12-02 NOTE — PROGRESS NOTES
Assessment:  1. Sacroiliitis (HCC)    2. Chronic pain syndrome    3. Intervertebral disc disorder with radiculopathy of lumbar region    4. Degenerative lumbar spinal stenosis    5. Lumbar spondylosis        Plan:  The patient is a 74-year-old female with a history of chronic pain secondary to low back pain, lumbar spondylosis and degenerative lumbar spinal stenosis who presents to the office with worsening bilateral low back pain, right worse than left that is unchanged since her last office visit and unrelieved after undergoing a right and left L3-5 RFA.    On exam, the patient is tender with palpation over bilateral SI joints as well as having positive provocative maneuvers for sacroiliitis.  I instructed patient we can perform bilateral SI joint injections to decrease inflammation and provide them relief.  Patient would like to proceed.  I instructed our  will schedule them.  Complete risks and benefits including bleeding, infection, tissue reaction, nerve injury and allergic reaction were discussed.  The approach was demonstrated using models and literature was provided.  Verbal and written consent was obtained.    Spinal cord stimulation was also discussed at this office visit, however patient would like to proceed with SI joint injection.  Can consider spinal cord stimulation if no relief from SI joint injection.    My impressions and treatment recommendations were discussed in detail with the patient who verbalized understanding and had no further questions.  Discharge instructions were provided. I personally saw and examined the patient and I agree with the above discussed plan of care.    Orders Placed This Encounter   Procedures    [place] spine and pain procedure     Dr Barnes     Standing Status:   Future     Expected Date:   12/2/2024     Expiration Date:   12/2/2028     Reason for Exam::   Bilateral SI joint injection     Anticoagulant hold needed?:   No     No orders of the defined types were  placed in this encounter.      History of Present Illness:  Shadia Parker is a 74 y.o. female with a history of chronic pain secondary to low back pain, lumbar spondylosis and degenerative lumbar spinal stenosis.  She was last seen on 10/22/2024 where she underwent a right-sided L3-5 radiofrequency ablation after undergoing a left-sided L3-5 radiofrequency ablation on 10/3/2024.  She presents to the office with worsening bilateral low back pain, right worse than left.    She states her pain is worse since the last office visit and constant.  She rates quality of her pain is burning, sharp, throbbing, spasms and is currently rating her pain a 10/10 on a numeric scale.    Current pain medications include methocarbamol and ibuprofen which are helpful.     I have personally reviewed and/or updated the patient's past medical history, past surgical history, family history, social history, current medications, allergies, and vital signs today.     Review of Systems   Respiratory:  Negative for shortness of breath.    Cardiovascular:  Negative for chest pain.   Gastrointestinal:  Negative for constipation, diarrhea, nausea and vomiting.   Musculoskeletal:  Positive for back pain and gait problem. Negative for arthralgias, joint swelling and myalgias.   Skin:  Negative for rash.   Neurological:  Negative for dizziness, seizures and weakness.   All other systems reviewed and are negative.      Patient Active Problem List   Diagnosis    Incomplete tear of right rotator cuff    Hypertension    Hyperlipidemia    Primary osteoarthritis of left hip    Primary osteoarthritis of one hip, right    Renal insufficiency    Pain, joint, ankle and foot, right    Lumbar radiculopathy    Lumbar spondylosis    Change in bowel habits    Severe obesity (BMI 35.0-39.9) with comorbidity (HCC)    Recurrent major depressive disorder, in full remission (HCC)    GERD without esophagitis       Past Medical History:   Diagnosis Date    Allergic      Arthritis     Depression     Endometriosis     GERD (gastroesophageal reflux disease)     Hyperlipidemia     Hypertension     Nondisplaced fracture of fifth metatarsal bone, right foot, initial encounter for closed fracture 09/24/2018    Obesity     Osteopenia     Otitis media 2022    Pancreatitis 06/2017    Pneumonia     Right hip pain 12/04/2018    S/P hip replacement, right 07/31/2018    Patient progressing well after right hip replacement     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    Trochanteric bursitis of right hip 02/12/2019    Visual impairment 1968    Nearsighted with stidmatism       Past Surgical History:   Procedure Laterality Date    ABDOMINAL SURGERY      endometriosis     APPENDECTOMY      BREAST BIOPSY Right 1985    benign    CARPAL TUNNEL RELEASE      COLONOSCOPY      HAND SURGERY      HIP SURGERY      HYSTERECTOMY Bilateral 1985    JOINT REPLACEMENT Bilateral     KNEE    JOINT REPLACEMENT Left     HIP    OOPHORECTOMY Bilateral 1985    ME ARTHRP ACETBLR/PROX FEM PROSTC AGRFT/ALGRFT Left 3/12/2018    Procedure: ARTHROPLASTY HIP TOTAL;  Surgeon: Dread Myrick MD;  [facility];  Service: Orthopedics    ME ARTHRP ACETBLR/PROX FEM PROSTC AGRFT/ALGRFT Right 7/30/2018    Procedure: RIGHT TOTAL HIP ARTHROPLASTY;  Surgeon: Dread Myrick MD;  [facility]: [facility];  Service: Orthopedics    ME SURGICAL ARTHROSCOPY RAMONE W/CORACOACRM LIGM RLS Right 5/10/2017    Procedure: SHOULDER ARTHROSCOPY SUBACROMIAL DECOMPRESSION;  Surgeon: Mann Ramirez MD;  [facility]: [facility];  Service: Orthopedics    ME SURGICAL ARTHROSCOPY SHOULDER BICEPS TENODESIS Right 5/10/2017    Procedure: ARTHROSCOPIC BICEPS TENODESIS WITH ROTATOR CUFF REPAIR ;  Surgeon: Mann Ramirez MD;  [facility]: [facility];  Service: Orthopedics    TONSILLECTOMY         Family History   Problem Relation Age of Onset    Atrial fibrillation Mother     Breast cancer Mother 75    Stroke Mother     Coronary artery disease Mother      Diabetes Mother     Pancreatitis Mother     Cancer Mother         Breast    Hyperlipidemia Mother     Pulmonary embolism Mother     Depression Mother     Hypertension Mother     Thyroid disease Mother         Hypothyroidism    Hearing loss Mother     Heart attack Father     Arthritis Father     No Known Problems Sister     Arthritis Sister     Endometrial cancer Maternal Grandmother 75    Cancer Maternal Grandmother     No Known Problems Maternal Grandfather     No Known Problems Paternal Grandmother     No Known Problems Paternal Grandfather     No Known Problems Maternal Aunt     No Known Problems Paternal Aunt     No Known Problems Paternal Aunt     Prostate cancer Paternal Uncle 55    Arthritis Family     Cancer Family        Social History     Occupational History    Not on file   Tobacco Use    Smoking status: Never     Passive exposure: Past    Smokeless tobacco: Never   Vaping Use    Vaping status: Never Used   Substance and Sexual Activity    Alcohol use: Not Currently     Comment: Rarely drink alcohol    Drug use: Never    Sexual activity: Yes     Partners: Male     Birth control/protection: Female Sterilization     Comment: Hysterectomy 1986       Current Outpatient Medications on File Prior to Visit   Medication Sig    atorvastatin (LIPITOR) 20 mg tablet TAKE 1 TABLET BY MOUTH DAILY    calcium-vitamin D (OSCAL) 250-125 MG-UNIT per tablet Take 1 tablet by mouth daily    cetirizine (ZyrTEC) 10 mg tablet Take 10 mg by mouth daily    cyclobenzaprine (FLEXERIL) 10 mg tablet TAKE 1 TABLET(10 MG) BY MOUTH DAILY AT BEDTIME    FLUoxetine (PROzac) 20 mg capsule TAKE 1 CAPSULE BY MOUTH DAILY    HOMEOPATHIC PRODUCTS IJ     hydroCHLOROthiazide 25 mg tablet TAKE 1 TABLET BY MOUTH DAILY    ibuprofen (MOTRIN) 200 mg tablet Take by mouth every 4 (four) hours as needed for mild pain    methocarbamol (ROBAXIN) 500 mg tablet TAKE 1-2 TABLETS BY MOUTH THREE TIMES A DAY AS NEEDED FOR MUSCLE SPASMS    metoprolol succinate  (TOPROL-XL) 50 mg 24 hr tablet TAKE 1 TABLET(50 MG) BY MOUTH DAILY    Multiple Vitamins-Minerals (CENTRUM SILVER ULTRA WOMENS PO) Take by mouth daily      Omega-3 1000 MG CAPS Take by mouth daily     omeprazole (PriLOSEC) 20 mg delayed release capsule TAKE 1 CAPSULE(20 MG) BY MOUTH DAILY    traMADol (Ultram) 50 mg tablet Take 1 tablet (50 mg total) by mouth every 6 (six) hours as needed for moderate pain    acetaminophen (TYLENOL) 500 mg tablet Take 500 mg by mouth every 6 (six) hours as needed for mild pain (Patient not taking: Reported on 7/26/2024)    [DISCONTINUED] metoprolol succinate (TOPROL-XL) 50 mg 24 hr tablet TAKE 1 TABLET(50 MG) BY MOUTH DAILY     No current facility-administered medications on file prior to visit.       Allergies   Allergen Reactions    Hydromorphone Hcl Itching    Percocet [Oxycodone-Acetaminophen] GI Intolerance and Vomiting    Pregabalin Other (See Comments)    Sulfamethoxazole-Trimethoprim Rash    Tizanidine Diarrhea       Physical Exam:    /83   Pulse 81   Resp 18   Ht 5' (1.524 m)   Wt 99.8 kg (220 lb)   LMP  (LMP Unknown) Comment: hysterectomy  BMI 42.97 kg/m²     Constitutional:normal, well developed, well nourished, alert, in no distress and non-toxic and no overt pain behavior.  Eyes:anicteric  HEENT:grossly intact  Neck:supple, symmetric, trachea midline and no masses   Pulmonary:even and unlabored  Cardiovascular:No edema or pitting edema present  Skin:Normal without rashes or lesions and well hydrated  Psychiatric:Mood and affect appropriate  Neurologic:Cranial Nerves II-XII grossly intact  Musculoskeletal:antalgic    Lumbar Spine Exam    Appearance:  Normal lordosis  Palpation/Tenderness:  left sacroiliac joint tenderness  right sacroiliac joint tenderness  Sensory:  no sensory deficits noted  Range of Motion:  Flexion:  Minimally limited  with pain  Extension:  Minimally limited  with pain  Motor Strength:  Left hip flexion:  5/5  Right hip flexion:  5/5  Left  knee flexion:  5/5  Left knee extension:  5/5  Right knee flexion:  5/5  Right knee extension:  5/5  Left foot dorsiflexion:  5/5  Left foot plantar flexion:  5/5  Right foot dorsiflexion:  5/5  Right foot plantar flexion:  5/5  Special Tests:  Left Straight Leg Test:  positive  Right Straight Leg Test:  positive  Left Dread's Maneuver:  positive  Right Dread's Maneuver:  positive  Left Gaenslen's Test:  positive  Right Gaenslen's Test:  positive  Left Pelvic Distraction Test:  positive  Right Pelvic Distraction Test:  positive      Imaging

## 2024-12-06 ENCOUNTER — OFFICE VISIT (OUTPATIENT)
Dept: FAMILY MEDICINE CLINIC | Facility: CLINIC | Age: 74
End: 2024-12-06
Payer: MEDICARE

## 2024-12-06 VITALS
WEIGHT: 220.2 LBS | TEMPERATURE: 97.5 F | OXYGEN SATURATION: 98 % | SYSTOLIC BLOOD PRESSURE: 126 MMHG | DIASTOLIC BLOOD PRESSURE: 78 MMHG | BODY MASS INDEX: 43.23 KG/M2 | HEART RATE: 76 BPM | HEIGHT: 60 IN

## 2024-12-06 DIAGNOSIS — E78.00 PURE HYPERCHOLESTEROLEMIA: ICD-10-CM

## 2024-12-06 DIAGNOSIS — K21.9 GERD WITHOUT ESOPHAGITIS: ICD-10-CM

## 2024-12-06 DIAGNOSIS — I10 PRIMARY HYPERTENSION: Primary | ICD-10-CM

## 2024-12-06 PROCEDURE — G2211 COMPLEX E/M VISIT ADD ON: HCPCS | Performed by: FAMILY MEDICINE

## 2024-12-06 PROCEDURE — 99214 OFFICE O/P EST MOD 30 MIN: CPT | Performed by: FAMILY MEDICINE

## 2024-12-12 ENCOUNTER — HOSPITAL ENCOUNTER (OUTPATIENT)
Dept: RADIOLOGY | Facility: CLINIC | Age: 74
End: 2024-12-12
Payer: MEDICARE

## 2024-12-12 VITALS
TEMPERATURE: 97.7 F | DIASTOLIC BLOOD PRESSURE: 80 MMHG | RESPIRATION RATE: 18 BRPM | HEART RATE: 82 BPM | OXYGEN SATURATION: 92 % | SYSTOLIC BLOOD PRESSURE: 137 MMHG

## 2024-12-12 DIAGNOSIS — M46.1 SACROILIITIS (HCC): ICD-10-CM

## 2024-12-12 PROCEDURE — 27096 INJECT SACROILIAC JOINT: CPT | Performed by: ANESTHESIOLOGY

## 2024-12-12 RX ORDER — 0.9 % SODIUM CHLORIDE 0.9 %
4 VIAL (ML) INJECTION ONCE
Status: COMPLETED | OUTPATIENT
Start: 2024-12-12 | End: 2024-12-12

## 2024-12-12 RX ORDER — ROPIVACAINE HYDROCHLORIDE 2 MG/ML
7 INJECTION, SOLUTION EPIDURAL; INFILTRATION; PERINEURAL ONCE
Status: COMPLETED | OUTPATIENT
Start: 2024-12-12 | End: 2024-12-12

## 2024-12-12 RX ORDER — METHYLPREDNISOLONE ACETATE 80 MG/ML
80 INJECTION, SUSPENSION INTRA-ARTICULAR; INTRALESIONAL; INTRAMUSCULAR; PARENTERAL; SOFT TISSUE ONCE
Status: COMPLETED | OUTPATIENT
Start: 2024-12-12 | End: 2024-12-12

## 2024-12-12 RX ADMIN — ROPIVACAINE HYDROCHLORIDE 7 ML: 2 INJECTION, SOLUTION EPIDURAL; INFILTRATION at 10:21

## 2024-12-12 RX ADMIN — LIDOCAINE HYDROCHLORIDE 4 ML: 20 INJECTION, SOLUTION EPIDURAL; INFILTRATION; INTRACAUDAL at 10:19

## 2024-12-12 RX ADMIN — IOHEXOL 2 ML: 300 INJECTION, SOLUTION INTRAVENOUS at 10:20

## 2024-12-12 RX ADMIN — METHYLPREDNISOLONE ACETATE 80 MG: 80 INJECTION, SUSPENSION INTRA-ARTICULAR; INTRALESIONAL; INTRAMUSCULAR; SOFT TISSUE at 10:21

## 2024-12-12 RX ADMIN — Medication 4 ML: at 10:19

## 2024-12-12 NOTE — H&P
History of Present Illness: The patient is a 74 y.o. female who presents with complaints of lower back pain and is here today for bilateral sacroiliac joint injections    Past Medical History:   Diagnosis Date    Allergic     Arthritis     Depression     Endometriosis     GERD (gastroesophageal reflux disease)     Hyperlipidemia     Hypertension     Nondisplaced fracture of fifth metatarsal bone, right foot, initial encounter for closed fracture 09/24/2018    Obesity     Osteopenia     Otitis media 2022    Pancreatitis 06/2017    Pneumonia     Right hip pain 12/04/2018    S/P hip replacement, right 07/31/2018    Patient progressing well after right hip replacement     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    Trochanteric bursitis of right hip 02/12/2019    Visual impairment 1968    Nearsighted with stidmatism       Past Surgical History:   Procedure Laterality Date    ABDOMINAL SURGERY      endometriosis     APPENDECTOMY      BREAST BIOPSY Right 1985    benign    CARPAL TUNNEL RELEASE      COLONOSCOPY      HAND SURGERY      HIP SURGERY      HYSTERECTOMY Bilateral 1985    JOINT REPLACEMENT Bilateral     KNEE    JOINT REPLACEMENT Left     HIP    OOPHORECTOMY Bilateral 1985    OR ARTHRP ACETBLR/PROX FEM PROSTC AGRFT/ALGRFT Left 3/12/2018    Procedure: ARTHROPLASTY HIP TOTAL;  Surgeon: Dread Myrick MD;  [facility];  Service: Orthopedics    OR ARTHRP ACETBLR/PROX FEM PROSTC AGRFT/ALGRFT Right 7/30/2018    Procedure: RIGHT TOTAL HIP ARTHROPLASTY;  Surgeon: Dread Myrick MD;  [facility];  Service: Orthopedics    OR SURGICAL ARTHROSCOPY RAMONE W/CORACOACRM LIGM RLS Right 5/10/2017    Procedure: SHOULDER ARTHROSCOPY SUBACROMIAL DECOMPRESSION;  Surgeon: Mann Ramirez MD;  [facility];  Service: Orthopedics    OR SURGICAL ARTHROSCOPY SHOULDER BICEPS TENODESIS Right 5/10/2017    Procedure: ARTHROSCOPIC BICEPS TENODESIS WITH ROTATOR CUFF REPAIR ;  Surgeon: Mann Ramirez MD;   [facility];  Service: Orthopedics    TONSILLECTOMY           Current Outpatient Medications:     acetaminophen (TYLENOL) 500 mg tablet, Take 500 mg by mouth every 6 (six) hours as needed for mild pain (Patient not taking: Reported on 12/6/2024), Disp: , Rfl:     atorvastatin (LIPITOR) 20 mg tablet, TAKE 1 TABLET BY MOUTH DAILY, Disp: 100 tablet, Rfl: 1    calcium-vitamin D (OSCAL) 250-125 MG-UNIT per tablet, Take 1 tablet by mouth daily, Disp: , Rfl:     cetirizine (ZyrTEC) 10 mg tablet, Take 10 mg by mouth daily, Disp: , Rfl:     cyclobenzaprine (FLEXERIL) 10 mg tablet, TAKE 1 TABLET(10 MG) BY MOUTH DAILY AT BEDTIME, Disp: 90 tablet, Rfl: 1    FLUoxetine (PROzac) 20 mg capsule, TAKE 1 CAPSULE BY MOUTH DAILY, Disp: 90 capsule, Rfl: 1    HOMEOPATHIC PRODUCTS IJ, , Disp: , Rfl:     hydroCHLOROthiazide 25 mg tablet, TAKE 1 TABLET BY MOUTH DAILY, Disp: 90 tablet, Rfl: 1    ibuprofen (MOTRIN) 200 mg tablet, Take by mouth every 4 (four) hours as needed for mild pain, Disp: , Rfl:     methocarbamol (ROBAXIN) 500 mg tablet, TAKE 1-2 TABLETS BY MOUTH THREE TIMES A DAY AS NEEDED FOR MUSCLE SPASMS, Disp: 120 tablet, Rfl: 5    metoprolol succinate (TOPROL-XL) 50 mg 24 hr tablet, TAKE 1 TABLET(50 MG) BY MOUTH DAILY, Disp: 90 tablet, Rfl: 1    Multiple Vitamins-Minerals (CENTRUM SILVER ULTRA WOMENS PO), Take by mouth daily  , Disp: , Rfl:     Omega-3 1000 MG CAPS, Take by mouth daily , Disp: , Rfl:     omeprazole (PriLOSEC) 20 mg delayed release capsule, TAKE 1 CAPSULE(20 MG) BY MOUTH DAILY, Disp: 90 capsule, Rfl: 1    traMADol (Ultram) 50 mg tablet, Take 1 tablet (50 mg total) by mouth every 6 (six) hours as needed for moderate pain, Disp: 60 tablet, Rfl: 0    Current Facility-Administered Medications:     iohexol (OMNIPAQUE) 300 mg/mL injection 2 mL, 2 mL, Intra-articular, Once, Nelson Barnes MD    lidocaine (PF) (XYLOCAINE-MPF) 2 % injection 4 mL, 4 mL, Infiltration, Once, Nelson Barnes MD    methylPREDNISolone  acetate (DEPO-MEDROL) injection 80 mg, 80 mg, Intra-articular, Once, Nelson Barnes MD    ropivacaine (NAROPIN) injection 7 mL, 7 mL, Intra-articular, Once, Nelson Barnes MD    sodium chloride (PF) 0.9 % injection 4 mL, 4 mL, Infiltration, Once, Nelson Barnes MD    Allergies   Allergen Reactions    Hydromorphone Hcl Itching    Percocet [Oxycodone-Acetaminophen] GI Intolerance and Vomiting    Pregabalin Other (See Comments)    Sulfamethoxazole-Trimethoprim Rash    Tizanidine Diarrhea       Physical Exam:   Vitals:    12/12/24 0959   BP: 122/74   Pulse: 83   Resp: 18   Temp: 97.7 °F (36.5 °C)   SpO2: 94%     General: Awake, Alert, Oriented x 3, Mood and affect appropriate  Respiratory: Respirations even and unlabored  Cardiovascular: Peripheral pulses intact; no edema  Musculoskeletal Exam: Lower back pain    ASA Score: 3    Patient/Chart Verification  Patient ID Verified: Verbal  ID Band Applied: No  Consents Confirmed: Procedural, To be obtained in the Pre-Procedure area  H&P( within 30 days) Verified: To be obtained in the Procedural area  Allergies Reviewed: Yes  Anticoag/NSAID held?: No  Currently on antibiotics?: No    Assessment:   1. Sacroiliitis (HCC)        Plan: Bilateral SI joint injection

## 2024-12-12 NOTE — DISCHARGE INSTR - LAB

## 2024-12-13 DIAGNOSIS — K21.9 GASTROESOPHAGEAL REFLUX DISEASE WITHOUT ESOPHAGITIS: ICD-10-CM

## 2024-12-21 NOTE — PROGRESS NOTES
Name: Shadia Parker      : 1950      MRN: [id number]  Encounter Provider: Binta Harvey DO  Encounter Date: 2024   Encounter department: [facility]    Assessment & Plan  Primary hypertension  Continue current therapy  Orders:    Comprehensive metabolic panel; Future    Pure hypercholesterolemia  Stable on statin therapy  Orders:    Lipid panel; Future    GERD without esophagitis  Continue PPI therapy            History of Present Illness     The patient presents for follow up of hypertension, hypercholesterolemai, and GERD.  All are well controlled at this time.  She denies any chest pain, dyspnea or breakthrough acid relux.        Review of Systems   Constitutional:  Negative for appetite change, chills and fever.   HENT:  Negative for ear pain, facial swelling, rhinorrhea, sinus pain, sore throat and trouble swallowing.    Eyes:  Negative for discharge and redness.   Respiratory:  Negative for chest tightness, shortness of breath and wheezing.    Cardiovascular:  Negative for chest pain and palpitations.   Gastrointestinal:  Negative for abdominal pain, diarrhea, nausea and vomiting.   Endocrine: Negative for polyuria.   Genitourinary:  Negative for dysuria and urgency.   Musculoskeletal:  Negative for arthralgias and back pain.   Skin:  Negative for rash.   Neurological:  Negative for dizziness, weakness and headaches.   Hematological:  Negative for adenopathy.   Psychiatric/Behavioral:  Negative for behavioral problems, confusion and sleep disturbance.    All other systems reviewed and are negative.    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    Trochanteric bursitis of right hip 02/12/2019    Visual impairment 1968    Nearsighted with stidmatism     Past Surgical History:   Procedure Laterality Date    ABDOMINAL SURGERY      endometriosis     APPENDECTOMY      BREAST BIOPSY Right 1985    benign    CARPAL TUNNEL RELEASE      COLONOSCOPY      HAND SURGERY      HIP SURGERY      HYSTERECTOMY Bilateral 1985    JOINT REPLACEMENT Bilateral     KNEE    JOINT REPLACEMENT Left     HIP    OOPHORECTOMY Bilateral 1985    [facility] ARTHRP ACETBLR/PROX FEM PROSTC AGRFT/ALGRFT Left 3/12/2018    Procedure: ARTHROPLASTY HIP TOTAL;  Surgeon: Dread Myrick MD;  [facility];  Service: Orthopedics    [facility] ARTHRP ACETBLR/PROX FEM PROSTC AGRFT/ALGRFT Right 7/30/2018    Procedure: RIGHT TOTAL HIP ARTHROPLASTY;  Surgeon: Dread Myrick MD;  [facility];  Service: Orthopedics    [facility] SURGICAL ARTHROSCOPY RAMONE W/CORACOACRM LIGM RLS Right 5/10/2017    Procedure: SHOULDER ARTHROSCOPY SUBACROMIAL DECOMPRESSION;  Surgeon: Mann Ramirez MD;  [facility];  Service: Orthopedics    [facility] SURGICAL ARTHROSCOPY SHOULDER BICEPS TENODESIS Right 5/10/2017    Procedure: ARTHROSCOPIC BICEPS TENODESIS WITH ROTATOR CUFF REPAIR ;  Surgeon: Mann Ramirez MD;  [facility];  Service: Orthopedics    TONSILLECTOMY       Family History   Problem Relation Age of Onset    Atrial fibrillation Mother     Breast cancer Mother 75    Stroke Mother     Coronary artery disease Mother     Diabetes Mother     Pancreatitis Mother     Cancer Mother         Breast    Hyperlipidemia Mother     Pulmonary embolism Mother     Depression Mother     Hypertension Mother     Thyroid disease Mother         Hypothyroidism    Hearing loss Mother     Heart attack Father     Arthritis Father     No Known  Problems Sister     Arthritis Sister     Endometrial cancer Maternal Grandmother 75    Cancer Maternal Grandmother     No Known Problems Maternal Grandfather     No Known Problems Paternal Grandmother     No Known Problems Paternal Grandfather     No Known Problems Maternal Aunt     No Known Problems Paternal Aunt     No Known Problems Paternal Aunt     Prostate cancer Paternal Uncle 55    Arthritis Family     Cancer Family      Social History     Tobacco Use    Smoking status: Never     Passive exposure: Past    Smokeless tobacco: Never   Vaping Use    Vaping status: Never Used   Substance and Sexual Activity    Alcohol use: Not Currently     Comment: Rarely drink alcohol    Drug use: Never    Sexual activity: Yes     Partners: Male     Birth control/protection: Female Sterilization     Comment: Hysterectomy 1986     Current Outpatient Medications on File Prior to Visit   Medication Sig    atorvastatin (LIPITOR) 20 mg tablet TAKE 1 TABLET BY MOUTH DAILY    calcium-vitamin D (OSCAL) 250-125 MG-UNIT per tablet Take 1 tablet by mouth daily    cetirizine (ZyrTEC) 10 mg tablet Take 10 mg by mouth daily    cyclobenzaprine (FLEXERIL) 10 mg tablet TAKE 1 TABLET(10 MG) BY MOUTH DAILY AT BEDTIME    FLUoxetine (PROzac) 20 mg capsule TAKE 1 CAPSULE BY MOUTH DAILY    HOMEOPATHIC PRODUCTS IJ     hydroCHLOROthiazide 25 mg tablet TAKE 1 TABLET BY MOUTH DAILY    ibuprofen (MOTRIN) 200 mg tablet Take by mouth every 4 (four) hours as needed for mild pain    methocarbamol (ROBAXIN) 500 mg tablet TAKE 1-2 TABLETS BY MOUTH THREE TIMES A DAY AS NEEDED FOR MUSCLE SPASMS    metoprolol succinate (TOPROL-XL) 50 mg 24 hr tablet TAKE 1 TABLET(50 MG) BY MOUTH DAILY    Multiple Vitamins-Minerals (CENTRUM SILVER ULTRA WOMENS PO) Take by mouth daily      Omega-3 1000 MG CAPS Take by mouth daily     traMADol (Ultram) 50 mg tablet Take 1 tablet (50 mg total) by mouth every 6 (six) hours as needed for moderate pain    acetaminophen (TYLENOL) 500 mg  tablet Take 500 mg by mouth every 6 (six) hours as needed for mild pain (Patient not taking: Reported on 12/6/2024)     Allergies   Allergen Reactions    Hydromorphone Hcl Itching    Percocet [Oxycodone-Acetaminophen] GI Intolerance and Vomiting    Pregabalin Other (See Comments)    Sulfamethoxazole-Trimethoprim Rash    Tizanidine Diarrhea     Immunization History   Administered Date(s) Administered    COVID-19 PFIZER VACCINE 0.3 ML IM 02/17/2021, 03/08/2021, 11/06/2021    COVID-19 Pfizer Vac BIVALENT Johnson-sucrose 12 Yr+ IM 10/04/2022    COVID-19 Pfizer mRNA vacc PF johnson-sucrose 12 yr and older (Comirnaty) 10/08/2023, 09/16/2024    COVID-19 Pfizer vac (Johnson-sucrose, gray cap) 12 yr+ IM 05/20/2022    INFLUENZA 11/05/2015, 10/18/2016, 10/04/2017, 10/08/2018, 10/04/2021, 10/04/2022, 10/08/2023    Influenza Quadrivalent, 6-35 Months IM 10/15/2014, 11/05/2015, 10/18/2016    Influenza Split High Dose Preservative Free IM 09/16/2024    Influenza, high dose seasonal 0.7 mL 09/29/2020    Pneumococcal Conjugate 13-Valent 07/12/2017, 10/04/2017    Pneumococcal Polysaccharide PPV23 10/01/2013, 01/01/2014, 01/11/2020    Respiratory Syncytial Virus Vaccine (Recombinant) 09/16/2024    Tdap 06/24/2015, 02/23/2022    Zoster 01/01/2012, 01/01/2012    Zoster Vaccine Recombinant 03/02/2020, 07/10/2020    influenza, trivalent, adjuvanted 09/19/2019     Objective   /78   Pulse 76   Temp 97.5 °F (36.4 °C)   Ht 5' (1.524 m)   Wt 99.9 kg (220 lb 3.2 oz)   LMP  (LMP Unknown) Comment: hysterectomy  SpO2 98%   BMI 43.00 kg/m²     Physical Exam  Vitals and nursing note reviewed.   Constitutional:       General: She is not in acute distress.     Appearance: Normal appearance. She is well-developed. She is not ill-appearing or diaphoretic.   HENT:      Head: Normocephalic and atraumatic.      Right Ear: Tympanic membrane, ear canal and external ear normal.      Left Ear: Tympanic membrane, ear canal and external ear normal.       Nose: Nose normal. No congestion or rhinorrhea.      Mouth/Throat:      Mouth: Mucous membranes are moist.      Pharynx: Oropharynx is clear. No oropharyngeal exudate or posterior oropharyngeal erythema.   Eyes:      General: No scleral icterus.        Right eye: No discharge.         Left eye: No discharge.      Extraocular Movements: Extraocular movements intact.      Conjunctiva/sclera: Conjunctivae normal.      Pupils: Pupils are equal, round, and reactive to light.   Neck:      Thyroid: No thyromegaly.      Vascular: No carotid bruit or JVD.      Trachea: No tracheal deviation.   Cardiovascular:      Rate and Rhythm: Normal rate and regular rhythm.      Pulses: Normal pulses.      Heart sounds: Normal heart sounds. No murmur heard.  Pulmonary:      Effort: Pulmonary effort is normal. No respiratory distress.      Breath sounds: Normal breath sounds. No stridor. No wheezing, rhonchi or rales.   Abdominal:      General: Abdomen is flat. Bowel sounds are normal. There is no distension.      Palpations: Abdomen is soft. There is no mass.      Tenderness: There is no abdominal tenderness. There is no guarding or rebound.   Musculoskeletal:         General: No swelling, tenderness or deformity. Normal range of motion.      Cervical back: Normal range of motion and neck supple. No rigidity.      Right lower leg: No edema.      Left lower leg: No edema.   Lymphadenopathy:      Cervical: No cervical adenopathy.   Skin:     General: Skin is warm and dry.      Capillary Refill: Capillary refill takes less than 2 seconds.      Coloration: Skin is not jaundiced.      Findings: No bruising, erythema or rash.   Neurological:      General: No focal deficit present.      Mental Status: She is alert and oriented to person, place, and time.      Cranial Nerves: No cranial nerve deficit.      Sensory: No sensory deficit.      Motor: No abnormal muscle tone.      Coordination: Coordination normal.      Gait: Gait normal.      Deep  Tendon Reflexes: Reflexes are normal and symmetric. Reflexes normal.   Psychiatric:         Mood and Affect: Mood normal.         Behavior: Behavior normal.         Thought Content: Thought content normal.         Judgment: Judgment normal.       Administrative Statements   I have spent a total time of 20 minutes in caring for this patient on the day of the visit/encounter including Diagnostic results, Prognosis, Risks and benefits of tx options, Instructions for management, Patient and family education, and Importance of tx compliance. Topics discussed with the patient / family include symptom assessment and management, medication review, medication adjustment, psychosocial support, and advanced directives.

## 2024-12-26 ENCOUNTER — TELEPHONE (OUTPATIENT)
Dept: PAIN MEDICINE | Facility: MEDICAL CENTER | Age: 74
End: 2024-12-26

## 2025-02-09 DIAGNOSIS — E78.00 PURE HYPERCHOLESTEROLEMIA: ICD-10-CM

## 2025-02-10 ENCOUNTER — TRANSCRIBE ORDERS (OUTPATIENT)
Dept: PAIN MEDICINE | Facility: CLINIC | Age: 75
End: 2025-02-10

## 2025-02-10 ENCOUNTER — TELEPHONE (OUTPATIENT)
Dept: PAIN MEDICINE | Facility: CLINIC | Age: 75
End: 2025-02-10

## 2025-02-10 RX ORDER — ATORVASTATIN CALCIUM 20 MG/1
20 TABLET, FILM COATED ORAL DAILY
Qty: 90 TABLET | Refills: 1 | Status: SHIPPED | OUTPATIENT
Start: 2025-02-10

## 2025-02-10 NOTE — TELEPHONE ENCOUNTER
Psych eval scanned into chart, please review and advise if OK to proceed w SCS trial (SCANNED 2X BC FIRST TIME WAS MISSING PAGE 1 OF EVAL- INCORRECT VERSION WILL BE REMOVED BY EPIC IT)    Which company do you want pt to proceed with for SCS?

## 2025-02-10 NOTE — TELEPHONE ENCOUNTER
Psych eval reviewed - ok to proceed.    [employer]   Body Location Override (Optional - Billing Will Still Be Based On Selected Body Map Location If Applicable): 2921= right upper arm Detail Level: Zone Size Of Lesion In Cm (Optional): 0 Body Location Override (Optional - Billing Will Still Be Based On Selected Body Map Location If Applicable): may 2022= left forearm

## 2025-02-20 NOTE — TELEPHONE ENCOUNTER
Caller: Shadia     Doctor: Dr. Barnes    Reason for call: Patient calling asking next steps patient states completed eval please advised     Call back#: [phone]

## 2025-02-21 NOTE — TELEPHONE ENCOUNTER
Spoke to pt, she is going to call central scheduling to set up MRI appt.  She is aware that once that's received we can proceed w insurance auth.

## 2025-02-21 NOTE — TELEPHONE ENCOUNTER
You ordered MRI of thoracic spine on 10/03 but I don't see that it was ever done, this needs to be completed before proceeding correct? Medicare wont require an auth.

## 2025-02-25 ENCOUNTER — HOSPITAL ENCOUNTER (OUTPATIENT)
Dept: MRI IMAGING | Facility: HOSPITAL | Age: 75
Discharge: HOME | End: 2025-02-25
Attending: ANESTHESIOLOGY
Payer: MEDICARE

## 2025-02-25 DIAGNOSIS — G89.4 CHRONIC PAIN SYNDROME: ICD-10-CM

## 2025-02-25 DIAGNOSIS — M51.16 INTERVERTEBRAL DISC DISORDER WITH RADICULOPATHY OF LUMBAR REGION: ICD-10-CM

## 2025-02-25 PROCEDURE — 72146 MRI CHEST SPINE W/O DYE: CPT

## 2025-02-26 ENCOUNTER — TELEPHONE (OUTPATIENT)
Dept: PAIN MEDICINE | Facility: CLINIC | Age: 75
End: 2025-02-26

## 2025-02-26 NOTE — TELEPHONE ENCOUNTER
Reviewed MRI thoracic spine.  She has some mild scattered disc bulges but there should be no problem with placing spinal cord stimulator lead  
No

## 2025-02-28 ENCOUNTER — TELEPHONE (OUTPATIENT)
Dept: RADIOLOGY | Facility: CLINIC | Age: 75
End: 2025-02-28

## 2025-03-07 DIAGNOSIS — M79.18 MYOFASCIAL PAIN SYNDROME: ICD-10-CM

## 2025-03-07 RX ORDER — CYCLOBENZAPRINE HCL 10 MG
10 TABLET ORAL
Qty: 90 TABLET | Refills: 1 | Status: SHIPPED | OUTPATIENT
Start: 2025-03-07

## 2025-03-25 ENCOUNTER — TRANSCRIBE ORDERS (OUTPATIENT)
Dept: PAIN MEDICINE | Facility: CLINIC | Age: 75
End: 2025-03-25

## 2025-03-26 DIAGNOSIS — Z79.899 ENCOUNTER FOR LONG-TERM (CURRENT) USE OF MEDICATIONS: ICD-10-CM

## 2025-03-26 DIAGNOSIS — G89.4 CHRONIC PAIN SYNDROME: Primary | ICD-10-CM

## 2025-03-26 DIAGNOSIS — Z79.01 CHRONIC ANTICOAGULATION: ICD-10-CM

## 2025-03-27 ENCOUNTER — OFFICE VISIT (OUTPATIENT)
Dept: PAIN MEDICINE | Facility: CLINIC | Age: 75
End: 2025-03-27
Payer: MEDICARE

## 2025-03-27 ENCOUNTER — APPOINTMENT (OUTPATIENT)
Dept: LAB | Facility: CLINIC | Age: 75
End: 2025-03-27
Payer: MEDICARE

## 2025-03-27 ENCOUNTER — RESULTS FOLLOW-UP (OUTPATIENT)
Dept: FAMILY MEDICINE CLINIC | Facility: CLINIC | Age: 75
End: 2025-03-27

## 2025-03-27 DIAGNOSIS — G89.4 CHRONIC PAIN SYNDROME: ICD-10-CM

## 2025-03-27 DIAGNOSIS — E78.00 PURE HYPERCHOLESTEROLEMIA: ICD-10-CM

## 2025-03-27 DIAGNOSIS — Z79.01 CHRONIC ANTICOAGULATION: ICD-10-CM

## 2025-03-27 DIAGNOSIS — M47.816 LUMBAR SPONDYLOSIS: ICD-10-CM

## 2025-03-27 DIAGNOSIS — M51.16 INTERVERTEBRAL DISC DISORDER WITH RADICULOPATHY OF LUMBAR REGION: Primary | ICD-10-CM

## 2025-03-27 DIAGNOSIS — Z79.899 ENCOUNTER FOR LONG-TERM (CURRENT) USE OF MEDICATIONS: ICD-10-CM

## 2025-03-27 DIAGNOSIS — M48.061 DEGENERATIVE LUMBAR SPINAL STENOSIS: ICD-10-CM

## 2025-03-27 DIAGNOSIS — I10 PRIMARY HYPERTENSION: ICD-10-CM

## 2025-03-27 LAB
ALBUMIN SERPL BCG-MCNC: 4.6 G/DL (ref 3.5–5)
ALP SERPL-CCNC: 61 U/L (ref 34–104)
ALT SERPL W P-5'-P-CCNC: 19 U/L (ref 7–52)
ANION GAP SERPL CALCULATED.3IONS-SCNC: 8 MMOL/L (ref 4–13)
APTT PPP: 27 SECONDS (ref 23–34)
AST SERPL W P-5'-P-CCNC: 22 U/L (ref 13–39)
BASOPHILS # BLD AUTO: 0.05 THOUSANDS/ÂΜL (ref 0–0.1)
BASOPHILS NFR BLD AUTO: 1 % (ref 0–1)
BILIRUB SERPL-MCNC: 0.54 MG/DL (ref 0.2–1)
BUN SERPL-MCNC: 13 MG/DL (ref 5–25)
CALCIUM SERPL-MCNC: 9.9 MG/DL (ref 8.4–10.2)
CHLORIDE SERPL-SCNC: 102 MMOL/L (ref 96–108)
CHOLEST SERPL-MCNC: 188 MG/DL (ref ?–200)
CO2 SERPL-SCNC: 29 MMOL/L (ref 21–32)
CREAT SERPL-MCNC: 1.15 MG/DL (ref 0.6–1.3)
EOSINOPHIL # BLD AUTO: 0.22 THOUSAND/ÂΜL (ref 0–0.61)
EOSINOPHIL NFR BLD AUTO: 3 % (ref 0–6)
ERYTHROCYTE [DISTWIDTH] IN BLOOD BY AUTOMATED COUNT: 14 % (ref 11.6–15.1)
EST. AVERAGE GLUCOSE BLD GHB EST-MCNC: 117 MG/DL
GFR SERPL CREATININE-BSD FRML MDRD: 47 ML/MIN/1.73SQ M
GLUCOSE P FAST SERPL-MCNC: 95 MG/DL (ref 65–99)
HBA1C MFR BLD: 5.7 %
HCT VFR BLD AUTO: 40.1 % (ref 34.8–46.1)
HDLC SERPL-MCNC: 81 MG/DL
HGB BLD-MCNC: 12.9 G/DL (ref 11.5–15.4)
IMM GRANULOCYTES # BLD AUTO: 0.01 THOUSAND/UL (ref 0–0.2)
IMM GRANULOCYTES NFR BLD AUTO: 0 % (ref 0–2)
INR PPP: 0.88 (ref 0.85–1.19)
LDLC SERPL CALC-MCNC: 88 MG/DL (ref 0–100)
LYMPHOCYTES # BLD AUTO: 2.53 THOUSANDS/ÂΜL (ref 0.6–4.47)
LYMPHOCYTES NFR BLD AUTO: 35 % (ref 14–44)
MCH RBC QN AUTO: 27.9 PG (ref 26.8–34.3)
MCHC RBC AUTO-ENTMCNC: 32.2 G/DL (ref 31.4–37.4)
MCV RBC AUTO: 87 FL (ref 82–98)
MONOCYTES # BLD AUTO: 0.51 THOUSAND/ÂΜL (ref 0.17–1.22)
MONOCYTES NFR BLD AUTO: 7 % (ref 4–12)
NEUTROPHILS # BLD AUTO: 3.92 THOUSANDS/ÂΜL (ref 1.85–7.62)
NEUTS SEG NFR BLD AUTO: 54 % (ref 43–75)
NONHDLC SERPL-MCNC: 107 MG/DL
NRBC BLD AUTO-RTO: 0 /100 WBCS
PLATELET # BLD AUTO: 344 THOUSANDS/UL (ref 149–390)
PMV BLD AUTO: 10.1 FL (ref 8.9–12.7)
POTASSIUM SERPL-SCNC: 3.6 MMOL/L (ref 3.5–5.3)
PROT SERPL-MCNC: 7.6 G/DL (ref 6.4–8.4)
PROTHROMBIN TIME: 12.6 SECONDS (ref 12.3–15)
RBC # BLD AUTO: 4.62 MILLION/UL (ref 3.81–5.12)
SODIUM SERPL-SCNC: 139 MMOL/L (ref 135–147)
TRIGL SERPL-MCNC: 96 MG/DL (ref ?–150)
WBC # BLD AUTO: 7.24 THOUSAND/UL (ref 4.31–10.16)

## 2025-03-27 PROCEDURE — 85610 PROTHROMBIN TIME: CPT

## 2025-03-27 PROCEDURE — 85730 THROMBOPLASTIN TIME PARTIAL: CPT

## 2025-03-27 PROCEDURE — 83036 HEMOGLOBIN GLYCOSYLATED A1C: CPT

## 2025-03-27 PROCEDURE — 85025 COMPLETE CBC W/AUTO DIFF WBC: CPT

## 2025-03-27 PROCEDURE — 99214 OFFICE O/P EST MOD 30 MIN: CPT

## 2025-03-27 PROCEDURE — 36415 COLL VENOUS BLD VENIPUNCTURE: CPT

## 2025-03-27 PROCEDURE — 80061 LIPID PANEL: CPT

## 2025-03-27 PROCEDURE — 80053 COMPREHEN METABOLIC PANEL: CPT

## 2025-03-27 NOTE — H&P (VIEW-ONLY)
Assessment    1. Intervertebral disc disorder with radiculopathy of lumbar region        2. Chronic pain syndrome        3. Lumbar spondylosis        4. Degenerative lumbar spinal stenosis            Plan  The spinal cord stimulator trial was discussed in depth with the patient. The patient was advised that a stimulator lead will be placed percutaneously through an epidural needle, with intra-op stimulation done to confirm appropriate lead placement.  The lead will then be connected to an external generator and secured to the skin.  The patient was advised that an industry clinical specialist will be present for the trial, program the stimulator and explain how to use it.  During the trial, the patient was instructed to perform their activities of daily living, but limit twisting and bending motions, and no lifting greater than 10 pounds.  The patient was advised to refrain from tub baths, showers, swimming, and hot tubs during the trial. The patient will return to the office for trial evaluation and lead removal on Wednesday, 4/9/2025. At that time, if the trial is successful, the patient will be referred to neurosurgery for permanent spinal cord stimulator placement.     Pre-procedure instructions were reviewed with the patient. The patient was given a prescription for blood work to be done by today, 3/27/2025. Complete risks and benefits including bleeding, infection, headache, tissue reaction, nerve injury and allergic reaction were discussed. The approach was demonstrated using models and literature was provided. The patient was advised that they would receive pre-procedure antibiotics and a prescription to take during the week of the trial.    The patient was advised to hold ibuprofen starting on 4/1/2025 and for the duration of the trial    The patient will next follow- up on Wednesday, 4/2/2025 at 10 AM for the stimulator trial.    No orders of the defined types were placed in this encounter.      History of  Present Illness  The patient is a 75 y.o. female scheduled for an [employer] spinal cord stimulator trial to treat chronic pain from low back pain, lumbar intervertebral disc disorder with radiculopathy, lumbar spondylosis and degenerative lumbar spinal stenosis.  The current pain pattern includes bilateral low back pain and numbness that radiates into the left lower extremity intermittently.   The patient's goals for the trial include improvement with pain with doing chores, such as vacuuming and improvement in pain with walking..    Pain Assessment Measures   Numeric Rating Scale 8   Oswestry Disability Index Score/Neck Disability Index Score 42   PROMIS-29   Physical Function 8   Anxiety 4   Depression 6   Fatigue 8   Sleep Disturbance 13   Ability to Participate in Social Roles And Activities 11   Pain Interference 17     I have personally reviewed and/or updated the patient's past medical history, past surgical history, family history, social history, current medications, allergies, and vital signs today.     Review of Systems   Respiratory:  Negative for shortness of breath.    Cardiovascular:  Negative for chest pain.   Gastrointestinal:  Negative for constipation, diarrhea, nausea and vomiting.   Musculoskeletal:  Positive for back pain and gait problem. Negative for arthralgias, joint swelling and myalgias.   Skin:  Negative for rash.   Neurological:  Negative for dizziness, seizures and weakness.   All other systems reviewed and are negative.       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    Trochanteric bursitis of right hip 02/12/2019    Visual impairment 1968    Nearsighted with stidmatism       Past Surgical History:   Procedure Laterality Date    ABDOMINAL SURGERY      endometriosis     APPENDECTOMY      BREAST BIOPSY Right 1985    benign    CARPAL TUNNEL RELEASE      COLONOSCOPY      HAND SURGERY      HIP SURGERY      HYSTERECTOMY Bilateral 1985    JOINT REPLACEMENT Bilateral     KNEE    JOINT REPLACEMENT Left     HIP    OOPHORECTOMY Bilateral 1985    [place] ARTHRP ACETBLR/PROX FEM PROSTC AGRFT/ALGRFT Left 3/12/2018    Procedure: ARTHROPLASTY HIP TOTAL;  Surgeon: Dread Myrick MD;  [facility];  Service: Orthopedics    [place] ARTHRP ACETBLR/PROX FEM PROSTC AGRFT/ALGRFT Right 7/30/2018    Procedure: RIGHT TOTAL HIP ARTHROPLASTY;  Surgeon: Dread Myrick MD;  [facility];  Service: Orthopedics    [place] SURGICAL ARTHROSCOPY RAMONE W/CORACOACRM LIGM RLS Right 5/10/2017    Procedure: SHOULDER ARTHROSCOPY SUBACROMIAL DECOMPRESSION;  Surgeon: Mann Ramirez MD;  [facility];  Service: Orthopedics    [place] SURGICAL ARTHROSCOPY SHOULDER BICEPS TENODESIS Right 5/10/2017    Procedure: ARTHROSCOPIC BICEPS TENODESIS WITH ROTATOR CUFF REPAIR ;  Surgeon: Mann Ramirez MD;  [facility];  Service: Orthopedics    TONSILLECTOMY         Family History   Problem Relation Age of Onset    Atrial fibrillation Mother     Breast cancer Mother 75    Stroke Mother     Coronary artery disease Mother     Diabetes Mother     Pancreatitis Mother     Cancer Mother         Breast    Hyperlipidemia Mother     Pulmonary embolism Mother     Depression Mother     Hypertension Mother     Thyroid disease Mother         Hypothyroidism    Hearing loss Mother     Heart attack Father     Arthritis Father     No Known Problems Sister     Arthritis Sister     Endometrial cancer Maternal Grandmother 75    Cancer  Maternal Grandmother     No Known Problems Maternal Grandfather     No Known Problems Paternal Grandmother     No Known Problems Paternal Grandfather     No Known Problems Maternal Aunt     No Known Problems Paternal Aunt     No Known Problems Paternal Aunt     Prostate cancer Paternal Uncle 55    Arthritis Family     Cancer Family        Social History     Occupational History    Not on file   Tobacco Use    Smoking status: Never     Passive exposure: Past    Smokeless tobacco: Never   Vaping Use    Vaping status: Never Used   Substance and Sexual Activity    Alcohol use: Not Currently     Comment: Rarely drink alcohol    Drug use: Never    Sexual activity: Yes     Partners: Male     Birth control/protection: Female Sterilization     Comment: Hysterectomy 1986         Current Outpatient Medications:     atorvastatin (LIPITOR) 20 mg tablet, TAKE 1 TABLET BY MOUTH DAILY, Disp: 90 tablet, Rfl: 1    calcium-vitamin D (OSCAL) 250-125 MG-UNIT per tablet, Take 1 tablet by mouth daily, Disp: , Rfl:     cetirizine (ZyrTEC) 10 mg tablet, Take 10 mg by mouth daily, Disp: , Rfl:     cyclobenzaprine (FLEXERIL) 10 mg tablet, TAKE 1 TABLET(10 MG) BY MOUTH DAILY AT BEDTIME, Disp: 90 tablet, Rfl: 1    FLUoxetine (PROzac) 20 mg capsule, TAKE 1 CAPSULE BY MOUTH DAILY, Disp: 90 capsule, Rfl: 1    HOMEOPATHIC PRODUCTS IJ, , Disp: , Rfl:     hydroCHLOROthiazide 25 mg tablet, TAKE 1 TABLET BY MOUTH DAILY, Disp: 90 tablet, Rfl: 1    ibuprofen (MOTRIN) 200 mg tablet, Take by mouth every 4 (four) hours as needed for mild pain, Disp: , Rfl:     methocarbamol (ROBAXIN) 500 mg tablet, TAKE 1-2 TABLETS BY MOUTH THREE TIMES A DAY AS NEEDED FOR MUSCLE SPASMS, Disp: 120 tablet, Rfl: 5    metoprolol succinate (TOPROL-XL) 50 mg 24 hr tablet, TAKE 1 TABLET(50 MG) BY MOUTH DAILY, Disp: 90 tablet, Rfl: 1    Multiple Vitamins-Minerals (CENTRUM SILVER ULTRA WOMENS PO), Take by mouth daily  , Disp: , Rfl:     Omega-3 1000 MG CAPS, Take by mouth daily ,  Disp: , Rfl:     omeprazole (PriLOSEC) 20 mg delayed release capsule, TAKE 1 CAPSULE(20 MG) BY MOUTH DAILY, Disp: 90 capsule, Rfl: 1    traMADol (Ultram) 50 mg tablet, Take 1 tablet (50 mg total) by mouth every 6 (six) hours as needed for moderate pain, Disp: 60 tablet, Rfl: 0    acetaminophen (TYLENOL) 500 mg tablet, Take 500 mg by mouth every 6 (six) hours as needed for mild pain (Patient not taking: Reported on 12/6/2024), Disp: , Rfl:     Allergies   Allergen Reactions    Hydromorphone Hcl Itching    Percocet [Oxycodone-Acetaminophen] GI Intolerance and Vomiting    Pregabalin Other (See Comments)    Sulfamethoxazole-Trimethoprim Rash    Tizanidine Diarrhea       Physical Exam    LMP  (LMP Unknown) Comment: hysterectomy    Constitutional:normal, well developed, well nourished, alert, in no distress and non-toxic and no overt pain behavior.  Eyes:anicteric  HEENT:grossly intact  Neck:supple, symmetric, trachea midline and no masses   Pulmonary:even and unlabored.  Lungs clear to auscultation  Cardiovascular:No edema or pitting edema present.  Heart rate regular  Skin:Normal without rashes or lesions and well hydrated  Psychiatric:Mood and affect appropriate  Neurologic:Cranial Nerves II-XII grossly intact  Musculoskeletal:antalgic  Gastrointestinal: Bowel sounds present x 4 quadrants    Imaging

## 2025-03-27 NOTE — PROGRESS NOTES
Assessment    1. Intervertebral disc disorder with radiculopathy of lumbar region        2. Chronic pain syndrome        3. Lumbar spondylosis        4. Degenerative lumbar spinal stenosis            Plan  The spinal cord stimulator trial was discussed in depth with the patient. The patient was advised that a stimulator lead will be placed percutaneously through an epidural needle, with intra-op stimulation done to confirm appropriate lead placement.  The lead will then be connected to an external generator and secured to the skin.  The patient was advised that an industry clinical specialist will be present for the trial, program the stimulator and explain how to use it.  During the trial, the patient was instructed to perform their activities of daily living, but limit twisting and bending motions, and no lifting greater than 10 pounds.  The patient was advised to refrain from tub baths, showers, swimming, and hot tubs during the trial. The patient will return to the office for trial evaluation and lead removal on Wednesday, 4/9/2025. At that time, if the trial is successful, the patient will be referred to neurosurgery for permanent spinal cord stimulator placement.     Pre-procedure instructions were reviewed with the patient. The patient was given a prescription for blood work to be done by today, 3/27/2025. Complete risks and benefits including bleeding, infection, headache, tissue reaction, nerve injury and allergic reaction were discussed. The approach was demonstrated using models and literature was provided. The patient was advised that they would receive pre-procedure antibiotics and a prescription to take during the week of the trial.    The patient was advised to hold ibuprofen starting on 4/1/2025 and for the duration of the trial    The patient will next follow- up on Wednesday, 4/2/2025 at 10 AM for the stimulator trial.    No orders of the defined types were placed in this encounter.      History of  Present Illness  The patient is a 75 y.o. female scheduled for an Medtronic spinal cord stimulator trial to treat chronic pain from low back pain, lumbar intervertebral disc disorder with radiculopathy, lumbar spondylosis and degenerative lumbar spinal stenosis.  The current pain pattern includes bilateral low back pain and numbness that radiates into the left lower extremity intermittently.   The patient's goals for the trial include improvement with pain with doing chores, such as vacuuming and improvement in pain with walking..    Pain Assessment Measures   Numeric Rating Scale 8   Oswestry Disability Index Score/Neck Disability Index Score 42   PROMIS-29   Physical Function 8   Anxiety 4   Depression 6   Fatigue 8   Sleep Disturbance 13   Ability to Participate in Social Roles And Activities 11   Pain Interference 17     I have personally reviewed and/or updated the patient's past medical history, past surgical history, family history, social history, current medications, allergies, and vital signs today.     Review of Systems   Respiratory:  Negative for shortness of breath.    Cardiovascular:  Negative for chest pain.   Gastrointestinal:  Negative for constipation, diarrhea, nausea and vomiting.   Musculoskeletal:  Positive for back pain and gait problem. Negative for arthralgias, joint swelling and myalgias.   Skin:  Negative for rash.   Neurological:  Negative for dizziness, seizures and weakness.   All other systems reviewed and are negative.       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    Trochanteric bursitis of right hip 02/12/2019    Visual impairment 1968    Nearsighted with stidmatism       Past Surgical History:   Procedure Laterality Date    ABDOMINAL SURGERY      endometriosis     APPENDECTOMY      BREAST BIOPSY Right 1985    benign    CARPAL TUNNEL RELEASE      COLONOSCOPY      HAND SURGERY      HIP SURGERY      HYSTERECTOMY Bilateral 1985    JOINT REPLACEMENT Bilateral     KNEE    JOINT REPLACEMENT Left     HIP    OOPHORECTOMY Bilateral 1985    CT ARTHRP ACETBLR/PROX FEM PROSTC AGRFT/ALGRFT Left 3/12/2018    Procedure: ARTHROPLASTY HIP TOTAL;  Surgeon: Dread Myrick MD;  [facility];  Service: Orthopedics    CT ARTHRP ACETBLR/PROX FEM PROSTC AGRFT/ALGRFT Right 7/30/2018    Procedure: RIGHT TOTAL HIP ARTHROPLASTY;  Surgeon: Dread Myrick MD;  [facility];  Service: Orthopedics    CT SURGICAL ARTHROSCOPY RAMONE W/CORACOACRM LIGM RLS Right 5/10/2017    Procedure: SHOULDER ARTHROSCOPY SUBACROMIAL DECOMPRESSION;  Surgeon: Mann Ramirez MD;  [facility];  Service: Orthopedics    CT SURGICAL ARTHROSCOPY SHOULDER BICEPS TENODESIS Right 5/10/2017    Procedure: ARTHROSCOPIC BICEPS TENODESIS WITH ROTATOR CUFF REPAIR ;  Surgeon: Mann Ramirez MD;  [facility];  Service: Orthopedics    TONSILLECTOMY         Family History   Problem Relation Age of Onset    Atrial fibrillation Mother     Breast cancer Mother 75    Stroke Mother     Coronary artery disease Mother     Diabetes Mother     Pancreatitis Mother     Cancer Mother         Breast    Hyperlipidemia Mother     Pulmonary embolism Mother     Depression Mother     Hypertension Mother     Thyroid disease Mother         Hypothyroidism    Hearing loss Mother     Heart attack Father     Arthritis Father     No Known Problems Sister     Arthritis Sister     Endometrial cancer Maternal Grandmother 75    Cancer  Maternal Grandmother     No Known Problems Maternal Grandfather     No Known Problems Paternal Grandmother     No Known Problems Paternal Grandfather     No Known Problems Maternal Aunt     No Known Problems Paternal Aunt     No Known Problems Paternal Aunt     Prostate cancer Paternal Uncle 55    Arthritis Family     Cancer Family        Social History     Occupational History    Not on file   Tobacco Use    Smoking status: Never     Passive exposure: Past    Smokeless tobacco: Never   Vaping Use    Vaping status: Never Used   Substance and Sexual Activity    Alcohol use: Not Currently     Comment: Rarely drink alcohol    Drug use: Never    Sexual activity: Yes     Partners: Male     Birth control/protection: Female Sterilization     Comment: Hysterectomy 1986         Current Outpatient Medications:     atorvastatin (LIPITOR) 20 mg tablet, TAKE 1 TABLET BY MOUTH DAILY, Disp: 90 tablet, Rfl: 1    calcium-vitamin D (OSCAL) 250-125 MG-UNIT per tablet, Take 1 tablet by mouth daily, Disp: , Rfl:     cetirizine (ZyrTEC) 10 mg tablet, Take 10 mg by mouth daily, Disp: , Rfl:     cyclobenzaprine (FLEXERIL) 10 mg tablet, TAKE 1 TABLET(10 MG) BY MOUTH DAILY AT BEDTIME, Disp: 90 tablet, Rfl: 1    FLUoxetine (PROzac) 20 mg capsule, TAKE 1 CAPSULE BY MOUTH DAILY, Disp: 90 capsule, Rfl: 1    HOMEOPATHIC PRODUCTS IJ, , Disp: , Rfl:     hydroCHLOROthiazide 25 mg tablet, TAKE 1 TABLET BY MOUTH DAILY, Disp: 90 tablet, Rfl: 1    ibuprofen (MOTRIN) 200 mg tablet, Take by mouth every 4 (four) hours as needed for mild pain, Disp: , Rfl:     methocarbamol (ROBAXIN) 500 mg tablet, TAKE 1-2 TABLETS BY MOUTH THREE TIMES A DAY AS NEEDED FOR MUSCLE SPASMS, Disp: 120 tablet, Rfl: 5    metoprolol succinate (TOPROL-XL) 50 mg 24 hr tablet, TAKE 1 TABLET(50 MG) BY MOUTH DAILY, Disp: 90 tablet, Rfl: 1    Multiple Vitamins-Minerals (CENTRUM SILVER ULTRA WOMENS PO), Take by mouth daily  , Disp: , Rfl:     Omega-3 1000 MG CAPS, Take by mouth daily ,  Disp: , Rfl:     omeprazole (PriLOSEC) 20 mg delayed release capsule, TAKE 1 CAPSULE(20 MG) BY MOUTH DAILY, Disp: 90 capsule, Rfl: 1    traMADol (Ultram) 50 mg tablet, Take 1 tablet (50 mg total) by mouth every 6 (six) hours as needed for moderate pain, Disp: 60 tablet, Rfl: 0    acetaminophen (TYLENOL) 500 mg tablet, Take 500 mg by mouth every 6 (six) hours as needed for mild pain (Patient not taking: Reported on 12/6/2024), Disp: , Rfl:     Allergies   Allergen Reactions    Hydromorphone Hcl Itching    Percocet [Oxycodone-Acetaminophen] GI Intolerance and Vomiting    Pregabalin Other (See Comments)    Sulfamethoxazole-Trimethoprim Rash    Tizanidine Diarrhea       Physical Exam    LMP  (LMP Unknown) Comment: hysterectomy    Constitutional:normal, well developed, well nourished, alert, in no distress and non-toxic and no overt pain behavior.  Eyes:anicteric  HEENT:grossly intact  Neck:supple, symmetric, trachea midline and no masses   Pulmonary:even and unlabored.  Lungs clear to auscultation  Cardiovascular:No edema or pitting edema present.  Heart rate regular  Skin:Normal without rashes or lesions and well hydrated  Psychiatric:Mood and affect appropriate  Neurologic:Cranial Nerves II-XII grossly intact  Musculoskeletal:antalgic  Gastrointestinal: Bowel sounds present x 4 quadrants    Imaging

## 2025-04-02 ENCOUNTER — TELEPHONE (OUTPATIENT)
Dept: RADIOLOGY | Facility: CLINIC | Age: 75
End: 2025-04-02

## 2025-04-02 ENCOUNTER — HOSPITAL ENCOUNTER (OUTPATIENT)
Dept: RADIOLOGY | Facility: CLINIC | Age: 75
Discharge: HOME | End: 2025-04-02
Payer: MEDICARE

## 2025-04-02 VITALS
OXYGEN SATURATION: 98 % | RESPIRATION RATE: 18 BRPM | TEMPERATURE: 97.9 F | SYSTOLIC BLOOD PRESSURE: 138 MMHG | HEART RATE: 75 BPM | DIASTOLIC BLOOD PRESSURE: 77 MMHG

## 2025-04-02 DIAGNOSIS — M48.061 SPINAL STENOSIS, LUMBAR REGION, WITHOUT NEUROGENIC CLAUDICATION: ICD-10-CM

## 2025-04-02 DIAGNOSIS — G89.4 CHRONIC PAIN SYNDROME: ICD-10-CM

## 2025-04-02 DIAGNOSIS — M51.16 LUMBAR DISC DISEASE WITH RADICULOPATHY: ICD-10-CM

## 2025-04-02 PROCEDURE — 96374 THER/PROPH/DIAG INJ IV PUSH: CPT | Performed by: ANESTHESIOLOGY

## 2025-04-02 PROCEDURE — 63650 IMPLANT NEUROELECTRODES: CPT | Performed by: ANESTHESIOLOGY

## 2025-04-02 PROCEDURE — C1787 PATIENT PROGR, NEUROSTIM: HCPCS | Performed by: ANESTHESIOLOGY

## 2025-04-02 PROCEDURE — 95972 ALYS CPLX SP/PN NPGT W/PRGRM: CPT | Performed by: ANESTHESIOLOGY

## 2025-04-02 PROCEDURE — C1897 LEAD, NEUROSTIM TEST KIT: HCPCS | Performed by: ANESTHESIOLOGY

## 2025-04-02 RX ORDER — CEFAZOLIN SODIUM 2 G/50ML
2000 SOLUTION INTRAVENOUS ONCE
Status: COMPLETED | OUTPATIENT
Start: 2025-04-02 | End: 2025-04-02

## 2025-04-02 RX ORDER — CEPHALEXIN 500 MG/1
500 CAPSULE ORAL EVERY 6 HOURS SCHEDULED
Qty: 28 CAPSULE | Refills: 0 | Status: SHIPPED | OUTPATIENT
Start: 2025-04-02 | End: 2025-04-09

## 2025-04-02 RX ADMIN — CEFAZOLIN SODIUM 2000 MG: 2 SOLUTION INTRAVENOUS at 10:30

## 2025-04-02 RX ADMIN — LIDOCAINE HYDROCHLORIDE 5 ML: 10; .005 INJECTION, SOLUTION EPIDURAL; INFILTRATION; INTRACAUDAL; PERINEURAL at 11:47

## 2025-04-02 NOTE — DISCHARGE INSTR - LAB
"SPINE AND PAIN: SPINAL CORD STIMULATION/PERIPHERAL NERVE STIMULATION TRIAL/ PERMANENT IMPLANT DISCHARGE INSTRUCTIONS    ACTIVITY RESTRICTIONS DURING TRIAL PERIOD  Do not drive with the SCS \"on\".  Do not bend or twist at the waist.  Do not lift anything that weighs over 5 pounds.  When you lie down, \"log roll\" (keep spine aligned) to change positions. Do not sleep on your stomach.  Limit stair climbing and strenuous activity.    CARE OF THE INJECTION SITE  CHECK THE DRESSING DAILY. It should intact.  Notify the [facility] if you have any of the following: redness, drainage, swelling, chills or fever over 100°F.  Do not change dressing, only reinforce edges if necessary.  Keep the dressing dry. Do not shower, (sponge baths only) until evaluated in office.    SPECIAL INSTRUCTIONS  Take your temperature daily.  Follow-up for lead removal scheduled for Wed 4/9 willie Goff, arrive at 11:15.   The  box is expensive. DO NOT drop or get wet.  Do not pull on the cable or leads. Do not disconnect the cable and lead.   Do activities that normally cause you to have pain and try different  settings.      MEDICATIONS  Continue to take all routine medications.  Our office may have instructed you to hold some medications. No Ibuprofen, Motrin, Advil, Aleve, Aspirin products during trial. May use your Tramadol or Tylenol for pain.   Take antiobiotic as prescribed: Will be sent to [facility]. Take until gone.     If you have any problems specifically related to your procedure, please call our office at [phone]. Problems not related to your procedure should be directed at your primary care physician.    Contact the company representative with any questions regarding settings or operation of the external  box.    As no general anesthesia was used in today's procedure, you should not experience any side effects related to anesthesia.     "

## 2025-04-02 NOTE — INTERVAL H&P NOTE
Update:     H&P reviewed. After examining the patient, I find no changed to the H&P since it had been written.    Patient re-evaluated. Accept as history and physical.    Nelson Barnes MD/April 2, 2025/11:00 AM

## 2025-04-02 NOTE — TELEPHONE ENCOUNTER
Call pt 4/3 s/p Medtronic SCS Trial w/ FQ on 4/2.  Lead removal on Wed 4/9 w/ AS, arrive at 11:15.

## 2025-04-03 NOTE — TELEPHONE ENCOUNTER
Pt said her LB and buttocks doesn't hurt. She does have a lot of soreness from the procedure especially at her left thoracic area. She took a tramadol last night and this morning that soreness is a little more tolerable. Told pt if she doesn't want to use the tramadol for the soreness then she can use tylenol up to 3000 mg per day.    Pt reports her drsg looks fine. She denies any fever and she started the abx last night.  She said when she coughed once she got a zinger. Told pt that is normal with coughing and sneezing b/c of the lead moving within the spine.  Pt has  contact # for Hernando from [facility].  We confirmed her lead removal for 4/9 with arrival time of 11:15.

## 2025-04-09 ENCOUNTER — OFFICE VISIT (OUTPATIENT)
Dept: PAIN MEDICINE | Facility: CLINIC | Age: 75
End: 2025-04-09
Payer: MEDICARE

## 2025-04-09 DIAGNOSIS — M47.816 LUMBAR SPONDYLOSIS: ICD-10-CM

## 2025-04-09 DIAGNOSIS — M51.16 INTERVERTEBRAL DISC DISORDER WITH RADICULOPATHY OF LUMBAR REGION: Primary | ICD-10-CM

## 2025-04-09 DIAGNOSIS — M48.061 DEGENERATIVE LUMBAR SPINAL STENOSIS: ICD-10-CM

## 2025-04-09 DIAGNOSIS — G89.4 CHRONIC PAIN SYNDROME: ICD-10-CM

## 2025-04-09 PROCEDURE — 99214 OFFICE O/P EST MOD 30 MIN: CPT

## 2025-04-09 NOTE — PROGRESS NOTES
Assessment  1. Intervertebral disc disorder with radiculopathy of lumbar region  Ambulatory referral to Neurosurgery      2. Chronic pain syndrome        3. Degenerative lumbar spinal stenosis        4. Lumbar spondylosis  Ambulatory referral to Neurosurgery          Plan  The patient had a successful Medtronic spinal cord stimulator trial.  The patient will be referred to Dr. Brown with neurosurgery for permanent implantation.  The patient will then follow back in our office 6 weeks after permanent implantation for re-evaluation.    The patient was advised to complete their antibiotics course.    Orders Placed This Encounter   Procedures    Ambulatory referral to Neurosurgery     Standing Status:   Future     Expiration Date:   4/9/2026     Referral Priority:   Routine     Referral Type:   Consult - AMB     Referral Reason:   Specialty Services Required     Referred to Provider:   Aureliano Brown MD     Requested Specialty:   Neurosurgery     Number of Visits Requested:   1     Expiration Date:   4/9/2026         History of Present Illness    The patient is a 75 y.o. female status post Medtronic spinal cord stimulator percutaneous lead placement on 4/2/2025.  The patient's reported an overall reduction in pain of 75% during the trial.  The patient reported functional improvement included improvement with pain while doing chores and improvement in pain with walking.  She also required less of her pain medication during the week she had the spinal cord stimulator placed.    Pain Assessment Measures   Numeric Rating Scale 2   Oswestry Disability Index Score/Neck Disability Index Score 26   PROMIS-29   Physical Function 15   Anxiety 4   Depression 4   Fatigue 7   Sleep Disturbance 9   Ability to Participate in Social Roles And Activities 17   Pain Interference 7     Review of Systems   Respiratory:  Negative for shortness of breath.    Cardiovascular:  Negative for chest pain.   Gastrointestinal:  Negative for  constipation, diarrhea, nausea and vomiting.   Musculoskeletal:  Positive for back pain and gait problem. Negative for arthralgias, joint swelling and myalgias.        Left leg pain     Skin:  Negative for rash.   Neurological:  Negative for dizziness, seizures and weakness.   All other systems reviewed and are negative.      Patient Active Problem List   Diagnosis    Incomplete tear of right rotator cuff    Hypertension    Hyperlipidemia    Primary osteoarthritis of left hip    Primary osteoarthritis of one hip, right    Renal insufficiency    Pain, joint, ankle and foot, right    Lumbar disc disease with radiculopathy    Lumbar spondylosis    Change in bowel habits    Severe obesity (BMI 35.0-39.9) with comorbidity (HCC)    Recurrent major depressive disorder, in full remission (HCC)    GERD without esophagitis    Sacroiliitis (HCC)    Chronic pain syndrome        Past Medical History:   Diagnosis Date    Allergic     Arthritis     Depression     Endometriosis     GERD (gastroesophageal reflux disease)     Hyperlipidemia     Hypertension     Nondisplaced fracture of fifth metatarsal bone, right foot, initial encounter for closed fracture 09/24/2018    Obesity     Osteopenia     Otitis media 2022    Pancreatitis 06/2017    Pneumonia     Right hip pain 12/04/2018    S/P hip replacement, right 07/31/2018    Patient progressing well after right hip replacement     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    Trochanteric bursitis of right hip 02/12/2019    Visual impairment 1968    Nearsighted with stidmatism       Past Surgical History:   Procedure Laterality Date    ABDOMINAL SURGERY      endometriosis     APPENDECTOMY      BREAST BIOPSY Right 1985    benign    CARPAL TUNNEL RELEASE      COLONOSCOPY      HAND SURGERY      HIP SURGERY      HYSTERECTOMY Bilateral 1985    JOINT REPLACEMENT Bilateral     KNEE    JOINT REPLACEMENT Left     HIP    OOPHORECTOMY Bilateral 1985    OK ARTHRP ACETBLR/PROX FEM  PROSTC AGRFT/ALGRFT Left 3/12/2018    Procedure: ARTHROPLASTY HIP TOTAL;  Surgeon: Dread Myrick MD;  [facility];  Service: Orthopedics    TX ARTHRP ACETBLR/PROX FEM PROSTC AGRFT/ALGRFT Right 7/30/2018    Procedure: RIGHT TOTAL HIP ARTHROPLASTY;  Surgeon: Dread Myrick MD;  [facility];  Service: Orthopedics    TX SURGICAL ARTHROSCOPY RAMONE W/CORACOACRM LIGM RLS Right 5/10/2017    Procedure: SHOULDER ARTHROSCOPY SUBACROMIAL DECOMPRESSION;  Surgeon: Mann Ramirez MD;  [facility];  Service: Orthopedics    TX SURGICAL ARTHROSCOPY SHOULDER BICEPS TENODESIS Right 5/10/2017    Procedure: ARTHROSCOPIC BICEPS TENODESIS WITH ROTATOR CUFF REPAIR ;  Surgeon: Mann Ramirez MD;  [facility];  Service: Orthopedics    TONSILLECTOMY         Family History   Problem Relation Age of Onset    Atrial fibrillation Mother     Breast cancer Mother 75    Stroke Mother     Coronary artery disease Mother     Diabetes Mother     Pancreatitis Mother     Cancer Mother         Breast    Hyperlipidemia Mother     Pulmonary embolism Mother     Depression Mother     Hypertension Mother     Thyroid disease Mother         Hypothyroidism    Hearing loss Mother     Heart attack Father     Arthritis Father     No Known Problems Sister     Arthritis Sister     Endometrial cancer Maternal Grandmother 75    Cancer Maternal Grandmother     No Known Problems Maternal Grandfather     No Known Problems Paternal Grandmother     No Known Problems Paternal Grandfather     No Known Problems Maternal Aunt     No Known Problems Paternal Aunt     No Known Problems Paternal Aunt     Prostate cancer Paternal Uncle 55    Arthritis Family     Cancer Family        Social History     Occupational History    Not on file   Tobacco Use    Smoking status: Never     Passive exposure: Past    Smokeless tobacco: Never   Vaping Use    Vaping status: Never Used   Substance and Sexual Activity    Alcohol use: Not Currently     Comment:  Rarely drink alcohol    Drug use: Never    Sexual activity: Yes     Partners: Male     Birth control/protection: Female Sterilization     Comment: Hysterectomy 1986         Current Outpatient Medications:     atorvastatin (LIPITOR) 20 mg tablet, TAKE 1 TABLET BY MOUTH DAILY, Disp: 90 tablet, Rfl: 1    calcium-vitamin D (OSCAL) 250-125 MG-UNIT per tablet, Take 1 tablet by mouth daily, Disp: , Rfl:     cephalexin (KEFLEX) 500 mg capsule, Take 1 capsule (500 mg total) by mouth every 6 (six) hours for 7 days, Disp: 28 capsule, Rfl: 0    cetirizine (ZyrTEC) 10 mg tablet, Take 10 mg by mouth daily, Disp: , Rfl:     cyclobenzaprine (FLEXERIL) 10 mg tablet, TAKE 1 TABLET(10 MG) BY MOUTH DAILY AT BEDTIME, Disp: 90 tablet, Rfl: 1    FLUoxetine (PROzac) 20 mg capsule, TAKE 1 CAPSULE BY MOUTH DAILY, Disp: 90 capsule, Rfl: 1    HOMEOPATHIC PRODUCTS IJ, , Disp: , Rfl:     hydroCHLOROthiazide 25 mg tablet, TAKE 1 TABLET BY MOUTH DAILY, Disp: 90 tablet, Rfl: 1    ibuprofen (MOTRIN) 200 mg tablet, Take by mouth every 4 (four) hours as needed for mild pain, Disp: , Rfl:     methocarbamol (ROBAXIN) 500 mg tablet, TAKE 1-2 TABLETS BY MOUTH THREE TIMES A DAY AS NEEDED FOR MUSCLE SPASMS, Disp: 120 tablet, Rfl: 5    metoprolol succinate (TOPROL-XL) 50 mg 24 hr tablet, TAKE 1 TABLET(50 MG) BY MOUTH DAILY, Disp: 90 tablet, Rfl: 1    Multiple Vitamins-Minerals (CENTRUM SILVER ULTRA WOMENS PO), Take by mouth daily  , Disp: , Rfl:     Omega-3 1000 MG CAPS, Take by mouth daily , Disp: , Rfl:     omeprazole (PriLOSEC) 20 mg delayed release capsule, TAKE 1 CAPSULE(20 MG) BY MOUTH DAILY, Disp: 90 capsule, Rfl: 1    traMADol (Ultram) 50 mg tablet, Take 1 tablet (50 mg total) by mouth every 6 (six) hours as needed for moderate pain, Disp: 60 tablet, Rfl: 0    acetaminophen (TYLENOL) 500 mg tablet, Take 500 mg by mouth every 6 (six) hours as needed for mild pain (Patient not taking: Reported on 12/6/2024), Disp: , Rfl:     Allergies   Allergen  Reactions    Hydromorphone Hcl Itching    Percocet [Oxycodone-Acetaminophen] GI Intolerance and Vomiting    Pregabalin Other (See Comments)    Sulfamethoxazole-Trimethoprim Rash    Tizanidine Diarrhea         Physical Exam    LMP  (LMP Unknown) Comment: hysterectomy    Thoracic Spine:  Spinal cord stimulator lead removed with tip intact; no erythema, tenderness or signs of infection; area cleansed with alcohol and bandage placed

## 2025-04-14 DIAGNOSIS — I10 ESSENTIAL HYPERTENSION: ICD-10-CM

## 2025-04-14 DIAGNOSIS — F39 AFFECTIVE DISORDER (HCC): ICD-10-CM

## 2025-04-15 RX ORDER — HYDROCHLOROTHIAZIDE 25 MG/1
25 TABLET ORAL DAILY
Qty: 90 TABLET | Refills: 1 | Status: SHIPPED | OUTPATIENT
Start: 2025-04-15

## 2025-05-07 ENCOUNTER — OFFICE VISIT (OUTPATIENT)
Dept: NEUROSURGERY | Facility: CLINIC | Age: 75
End: 2025-05-07
Payer: MEDICARE

## 2025-05-07 VITALS
SYSTOLIC BLOOD PRESSURE: 155 MMHG | HEART RATE: 70 BPM | TEMPERATURE: 97.9 F | DIASTOLIC BLOOD PRESSURE: 81 MMHG | WEIGHT: 220 LBS | OXYGEN SATURATION: 97 % | BODY MASS INDEX: 43.19 KG/M2 | HEIGHT: 60 IN

## 2025-05-07 DIAGNOSIS — Z79.01 LONG TERM (CURRENT) USE OF ANTICOAGULANTS: ICD-10-CM

## 2025-05-07 DIAGNOSIS — M51.16 INTERVERTEBRAL DISC DISORDER WITH RADICULOPATHY OF LUMBAR REGION: ICD-10-CM

## 2025-05-07 DIAGNOSIS — Z79.899 ENCOUNTER FOR LONG-TERM (CURRENT) USE OF MEDICATIONS: ICD-10-CM

## 2025-05-07 DIAGNOSIS — Z01.812 PRE-OPERATIVE LABORATORY EXAMINATION: Primary | ICD-10-CM

## 2025-05-07 DIAGNOSIS — M47.816 LUMBAR SPONDYLOSIS: ICD-10-CM

## 2025-05-07 DIAGNOSIS — E66.01 SEVERE OBESITY (BMI 35.0-39.9) WITH COMORBIDITY (HCC): ICD-10-CM

## 2025-05-07 PROCEDURE — 99214 OFFICE O/P EST MOD 30 MIN: CPT | Performed by: STUDENT IN AN ORGANIZED HEALTH CARE EDUCATION/TRAINING PROGRAM

## 2025-05-07 RX ORDER — CEFAZOLIN SODIUM 2 G/50ML
2000 SOLUTION INTRAVENOUS ONCE
OUTPATIENT
Start: 2025-05-07 | End: 2025-05-07

## 2025-05-07 RX ORDER — CHLORHEXIDINE GLUCONATE ORAL RINSE 1.2 MG/ML
15 SOLUTION DENTAL ONCE
OUTPATIENT
Start: 2025-05-07 | End: 2025-05-07

## 2025-05-07 NOTE — ASSESSMENT & PLAN NOTE
As above, plan for SCS placement.   Orders:    Ambulatory referral to Neurosurgery    Case request operating room: Placement of percutaenous thoracic spinal cord stimulator with battery in the right flank; Standing

## 2025-05-07 NOTE — PROGRESS NOTES
Name: Shadia Parker      : 1950      MRN: [id number]  Encounter Provider: Aureliano Brown MD  Encounter Date: 2025   [facility]  :  Assessment & Plan  Intervertebral disc disorder with radiculopathy of lumbar region  Shadia Parker is a 75 year old female with chronic back and leg pain referred for consideration of permanent SCS placement. She recently completed a trial with Dr. Barnes and [employer]. She reported greater than 75% improvement with increased tolerance of activity. I did review her MRI T spine which demonstrates no anatomic contraindication to SCS placement. I also reviewed her neuropsych evaluation which deems them an appropriate candidate. As such I would recommend permanent SCS placement. We discussed risks including bleeding, infection, CSF leak, device malfunction and loss of efficacy. This will be an outpatient procedure done under general anesthesia. After discussion of the procedure and risks they were agreeable;e to proceed and signed consent. We will work to schedule a surgical date.    Orders:    Ambulatory referral to Neurosurgery    Case request operating room: Placement of percutaenous thoracic spinal cord stimulator with battery in the right flank; Standing    Lumbar spondylosis  As above, plan for SCS placement.   Orders:    Ambulatory referral to Neurosurgery    Case request operating room: Placement of percutaenous thoracic spinal cord stimulator with battery in the right flank; Standing    Severe obesity (BMI 35.0-39.9) with comorbidity (HCC)             History of Present Illness     HPI   Shadia Parker is a 75 year old female with chronic back and leg pain referred for consideration of permanent SCS placement. See assessment and plan.  Review of Systems   Constitutional: Negative.    HENT: Negative.     Eyes: Negative.    Respiratory: Negative.     Cardiovascular: Negative.    Gastrointestinal: Negative.     Endocrine: Negative.    Genitourinary: Negative.    Musculoskeletal:  Positive for back pain.        Lumbar Stenosis/Spondylosis  T/S Mri on 2/25/25   Psych Eval on 2/5/25  [employer] SCS trial on 4/2/25- Dr Barnes  Meds: Flexeril, Ibuprofen, Tramadol, robaxin   No AC/non-smoker     Skin: Negative.    Allergic/Immunologic: Negative.    Neurological: Negative.    Hematological: Negative.    Psychiatric/Behavioral: Negative.     All other systems reviewed and are negative.    I have personally reviewed the MA's review of systems and made changes as necessary.    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    Trochanteric bursitis of right hip 02/12/2019    Visual impairment 1968    Nearsighted with stidmatism     Past Surgical History:   Procedure Laterality Date    ABDOMINAL SURGERY      endometriosis     APPENDECTOMY      BREAST BIOPSY Right 1985    benign    CARPAL TUNNEL RELEASE      COLONOSCOPY      HAND SURGERY      HIP SURGERY      HYSTERECTOMY Bilateral 1985    JOINT REPLACEMENT Bilateral     KNEE    JOINT REPLACEMENT Left     HIP    OOPHORECTOMY Bilateral 1985    MT ARTHRP ACETBLR/PROX FEM PROSTC AGRFT/ALGRFT Left 3/12/2018    Procedure: ARTHROPLASTY HIP TOTAL;  Surgeon: Dread Myrick MD;  [facility];  Service: Orthopedics    MT ARTHRP ACETBLR/PROX FEM PROSTC AGRFT/ALGRFT Right 7/30/2018    Procedure: RIGHT TOTAL HIP ARTHROPLASTY;  Surgeon: Dread Myrick MD;   [facility];  Service: Orthopedics    [place] SURGICAL ARTHROSCOPY RAMONE W/CORACOACRM LIGM RLS Right 5/10/2017    Procedure: SHOULDER ARTHROSCOPY SUBACROMIAL DECOMPRESSION;  Surgeon: Mann Ramirez MD;  [facility];  Service: Orthopedics    [place] SURGICAL ARTHROSCOPY SHOULDER BICEPS TENODESIS Right 5/10/2017    Procedure: ARTHROSCOPIC BICEPS TENODESIS WITH ROTATOR CUFF REPAIR ;  Surgeon: Mann Ramirez MD;  [facility];  Service: Orthopedics    TONSILLECTOMY       Family History   Problem Relation Age of Onset    Atrial fibrillation Mother     Breast cancer Mother 75    Stroke Mother     Coronary artery disease Mother     Diabetes Mother     Pancreatitis Mother     Cancer Mother         Breast    Hyperlipidemia Mother     Pulmonary embolism Mother     Depression Mother     Hypertension Mother     Thyroid disease Mother         Hypothyroidism    Hearing loss Mother     Heart attack Father     Arthritis Father     No Known Problems Sister     Arthritis Sister     Endometrial cancer Maternal Grandmother 75    Cancer Maternal Grandmother     No Known Problems Maternal Grandfather     No Known Problems Paternal Grandmother     No Known Problems Paternal Grandfather     No Known Problems Maternal Aunt     No Known Problems Paternal Aunt     No Known Problems Paternal Aunt     Prostate cancer Paternal Uncle 55    Arthritis Family     Cancer Family      she reports that she has never smoked. She has been exposed to tobacco smoke. She has never used smokeless tobacco. She reports that she does not currently use alcohol. She reports that she does not use drugs.  Current Outpatient Medications   Medication Instructions    acetaminophen (TYLENOL) 500 mg, Every 6 hours PRN    atorvastatin (LIPITOR) 20 mg, Oral, Daily    calcium-vitamin D (OSCAL) 250-125 MG-UNIT per tablet 1 tablet, Daily    cetirizine (ZYRTEC) 10 mg, Daily    cyclobenzaprine (FLEXERIL) 10 mg, Oral, Daily at bedtime    FLUoxetine (PROZAC) 20  mg, Oral, Daily    HOMEOPATHIC PRODUCTS IJ     hydroCHLOROthiazide 25 mg, Oral, Daily    ibuprofen (MOTRIN) 200 mg tablet Every 4 hours PRN    methocarbamol (ROBAXIN) 500 mg tablet TAKE 1-2 TABLETS BY MOUTH THREE TIMES A DAY AS NEEDED FOR MUSCLE SPASMS    metoprolol succinate (TOPROL-XL) 50 mg, Oral, Daily    Multiple Vitamins-Minerals (CENTRUM SILVER ULTRA WOMENS PO) Daily    Omega-3 1000 MG CAPS Daily    omeprazole (PRILOSEC) 20 mg, Oral, Daily    traMADol (ULTRAM) 50 mg, Oral, Every 6 hours PRN     Allergies   Allergen Reactions    Hydromorphone Hcl Itching    Percocet [Oxycodone-Acetaminophen] GI Intolerance and Vomiting    Pregabalin Other (See Comments)    Sulfamethoxazole-Trimethoprim Rash    Tizanidine Diarrhea      Objective   LMP  (LMP Unknown) Comment: hysterectomy    Physical Exam  Neurological Exam  Awake and alert  Oriented and appropriate  5/5 throughout

## 2025-05-14 ENCOUNTER — TELEPHONE (OUTPATIENT)
Dept: NEUROSURGERY | Facility: CLINIC | Age: 75
End: 2025-05-14

## 2025-05-14 NOTE — TELEPHONE ENCOUNTER
"Received following voicemail on nurseline from patient, \"Hi, this is Clary Parker, date of birth March 5th 1950. I need to get a message to Doctor Christiano Brown. I saw him last week and I was approved for a permanent nerve stimulator in my back, but I'd like him to know that I've had two episodes this week of numbness going down my left leg all the way to my foot, and it's lasted quite a while, so I'm a little concerned. My number is [phone] and let him know that I still haven't heard from [facility] to schedule the implant. Thank you very much. Have a good afternoon. Bye.\" Will route to provider and surgery scheduler    "

## 2025-05-15 ENCOUNTER — TELEPHONE (OUTPATIENT)
Age: 75
End: 2025-05-15

## 2025-05-15 NOTE — TELEPHONE ENCOUNTER
RN s/w pt and she said she had been taking ibuprofen for about the last 5 yrs, she stopped it herself, just wanted to give kidneys are break.  She said she also had to stop it for the SCS trial. Her permanent SCS sx is scheduled for 6/13/25 and she will need to be off the NSAIDS for a week before that sx.  She currently takes Tylenol ES (2) twice a day. She felt the ibuprofen helped better than Tylenol.  She would be willing to do shourt course of oral steroids.

## 2025-05-15 NOTE — TELEPHONE ENCOUNTER
05/15/2025-Called pt, advised Dr Brown's response below. She will still like to proceed with scheduling surgery w/Dr Brown. Sx scheduled on 06/13/2025

## 2025-05-15 NOTE — TELEPHONE ENCOUNTER
RN s/w pt and she said she's had Rt numbness in the past but it was minor, now it's happening more often and lasted longer.  Starting Sunday while shopping at [employer], it goes down the outer aspect of the Rt leg to the foot.  Yest while at [employer] it happened again and lasted 12 hrs. Today while helping at a rummage sale at [employer] just after standing 5 min it started.   She said she stopped taking ibuprofen 4 wks ago and wondered if there is inflammation going on that is causing it. She is asking if oral steroids would help?   She can't take the gabapentin or lyrica type meds, doesn't tolerate them.  Uses [employer] on file.

## 2025-05-15 NOTE — TELEPHONE ENCOUNTER
Caller: Shadia    Doctor: Dr. URIAS    Reason for call: pt is calling with numbness in leg. She did stop taking ibuprofen x 4 wks ago. She is asking if you feel this could cause this. she is asking if she can have prednisone ? Please advise     Call back#: [phone]

## 2025-05-15 NOTE — TELEPHONE ENCOUNTER
Caller: Patient     Doctor/Office: Dr Barnes    Call regarding :  Returning nurse call      Call was transferred to: Triage nurse

## 2025-05-15 NOTE — TELEPHONE ENCOUNTER
How come she stopped taking the ibuprofen, was it required to stop prior to her spinal cord stimulator permanent implantation?  Oral steroids may help, does she want me to send to pharmacy?

## 2025-05-16 DIAGNOSIS — M51.16 INTERVERTEBRAL DISC DISORDER WITH RADICULOPATHY OF LUMBAR REGION: Primary | ICD-10-CM

## 2025-05-16 RX ORDER — METHYLPREDNISOLONE 4 MG/1
TABLET ORAL
Qty: 1 EACH | Refills: 0 | Status: SHIPPED | OUTPATIENT
Start: 2025-05-16

## 2025-05-16 NOTE — TELEPHONE ENCOUNTER
Medrol pack sent to patient's pharmacy.  Please have her follow directions on the package and avoid NSAIDs while utilizing the steroids.

## 2025-05-20 ENCOUNTER — CONSULT (OUTPATIENT)
Dept: FAMILY MEDICINE CLINIC | Facility: CLINIC | Age: 75
End: 2025-05-20
Payer: MEDICARE

## 2025-05-20 VITALS
SYSTOLIC BLOOD PRESSURE: 128 MMHG | DIASTOLIC BLOOD PRESSURE: 70 MMHG | TEMPERATURE: 97.4 F | HEART RATE: 70 BPM | WEIGHT: 218.6 LBS | HEIGHT: 60 IN | RESPIRATION RATE: 11 BRPM | OXYGEN SATURATION: 98 % | BODY MASS INDEX: 42.92 KG/M2

## 2025-05-20 DIAGNOSIS — I10 PRIMARY HYPERTENSION: ICD-10-CM

## 2025-05-20 DIAGNOSIS — M51.16 LUMBAR DISC DISEASE WITH RADICULOPATHY: ICD-10-CM

## 2025-05-20 DIAGNOSIS — Z01.818 PREOPERATIVE CLEARANCE: Primary | ICD-10-CM

## 2025-05-20 PROCEDURE — G2211 COMPLEX E/M VISIT ADD ON: HCPCS | Performed by: FAMILY MEDICINE

## 2025-05-20 PROCEDURE — 99214 OFFICE O/P EST MOD 30 MIN: CPT | Performed by: FAMILY MEDICINE

## 2025-05-20 PROCEDURE — 93000 ELECTROCARDIOGRAM COMPLETE: CPT | Performed by: FAMILY MEDICINE

## 2025-05-27 ENCOUNTER — RESULTS FOLLOW-UP (OUTPATIENT)
Dept: FAMILY MEDICINE CLINIC | Facility: CLINIC | Age: 75
End: 2025-05-27

## 2025-05-27 ENCOUNTER — APPOINTMENT (OUTPATIENT)
Dept: LAB | Facility: CLINIC | Age: 75
End: 2025-05-27
Payer: MEDICARE

## 2025-05-27 DIAGNOSIS — Z01.812 PRE-OPERATIVE LABORATORY EXAMINATION: ICD-10-CM

## 2025-05-27 DIAGNOSIS — M47.816 LUMBAR SPONDYLOSIS: ICD-10-CM

## 2025-05-27 DIAGNOSIS — Z79.899 ENCOUNTER FOR LONG-TERM (CURRENT) USE OF MEDICATIONS: ICD-10-CM

## 2025-05-27 DIAGNOSIS — M51.16 INTERVERTEBRAL DISC DISORDER WITH RADICULOPATHY OF LUMBAR REGION: ICD-10-CM

## 2025-05-27 DIAGNOSIS — E66.01 SEVERE OBESITY (BMI 35.0-39.9) WITH COMORBIDITY (HCC): ICD-10-CM

## 2025-05-27 DIAGNOSIS — Z79.01 LONG TERM (CURRENT) USE OF ANTICOAGULANTS: ICD-10-CM

## 2025-05-27 LAB
ALBUMIN SERPL BCG-MCNC: 4 G/DL (ref 3.5–5)
ALP SERPL-CCNC: 59 U/L (ref 34–104)
ALT SERPL W P-5'-P-CCNC: 23 U/L (ref 7–52)
ANION GAP SERPL CALCULATED.3IONS-SCNC: 7 MMOL/L (ref 4–13)
APTT PPP: 27 SECONDS (ref 23–34)
AST SERPL W P-5'-P-CCNC: 22 U/L (ref 13–39)
BACTERIA UR QL AUTO: ABNORMAL /HPF
BASOPHILS # BLD AUTO: 0.06 THOUSANDS/ÂΜL (ref 0–0.1)
BASOPHILS NFR BLD AUTO: 1 % (ref 0–1)
BILIRUB SERPL-MCNC: 0.47 MG/DL (ref 0.2–1)
BILIRUB UR QL STRIP: NEGATIVE
BUN SERPL-MCNC: 18 MG/DL (ref 5–25)
CALCIUM SERPL-MCNC: 9.5 MG/DL (ref 8.4–10.2)
CHLORIDE SERPL-SCNC: 102 MMOL/L (ref 96–108)
CLARITY UR: CLEAR
CO2 SERPL-SCNC: 31 MMOL/L (ref 21–32)
COLOR UR: ABNORMAL
CREAT SERPL-MCNC: 1.13 MG/DL (ref 0.6–1.3)
EOSINOPHIL # BLD AUTO: 0.27 THOUSAND/ÂΜL (ref 0–0.61)
EOSINOPHIL NFR BLD AUTO: 4 % (ref 0–6)
ERYTHROCYTE [DISTWIDTH] IN BLOOD BY AUTOMATED COUNT: 13.4 % (ref 11.6–15.1)
EST. AVERAGE GLUCOSE BLD GHB EST-MCNC: 128 MG/DL
GFR SERPL CREATININE-BSD FRML MDRD: 48 ML/MIN/1.73SQ M
GLUCOSE P FAST SERPL-MCNC: 89 MG/DL (ref 65–99)
GLUCOSE UR STRIP-MCNC: NEGATIVE MG/DL
HBA1C MFR BLD: 6.1 %
HCT VFR BLD AUTO: 39.1 % (ref 34.8–46.1)
HGB BLD-MCNC: 12.1 G/DL (ref 11.5–15.4)
HGB UR QL STRIP.AUTO: NEGATIVE
IMM GRANULOCYTES # BLD AUTO: 0.02 THOUSAND/UL (ref 0–0.2)
IMM GRANULOCYTES NFR BLD AUTO: 0 % (ref 0–2)
INR PPP: 0.85 (ref 0.85–1.19)
KETONES UR STRIP-MCNC: NEGATIVE MG/DL
LEUKOCYTE ESTERASE UR QL STRIP: ABNORMAL
LYMPHOCYTES # BLD AUTO: 3.02 THOUSANDS/ÂΜL (ref 0.6–4.47)
LYMPHOCYTES NFR BLD AUTO: 42 % (ref 14–44)
MCH RBC QN AUTO: 27.7 PG (ref 26.8–34.3)
MCHC RBC AUTO-ENTMCNC: 30.9 G/DL (ref 31.4–37.4)
MCV RBC AUTO: 90 FL (ref 82–98)
MONOCYTES # BLD AUTO: 0.51 THOUSAND/ÂΜL (ref 0.17–1.22)
MONOCYTES NFR BLD AUTO: 7 % (ref 4–12)
NEUTROPHILS # BLD AUTO: 3.36 THOUSANDS/ÂΜL (ref 1.85–7.62)
NEUTS SEG NFR BLD AUTO: 46 % (ref 43–75)
NITRITE UR QL STRIP: NEGATIVE
NON-SQ EPI CELLS URNS QL MICRO: ABNORMAL /HPF
NRBC BLD AUTO-RTO: 0 /100 WBCS
PH UR STRIP.AUTO: 6.5 [PH]
PLATELET # BLD AUTO: 287 THOUSANDS/UL (ref 149–390)
PMV BLD AUTO: 10 FL (ref 8.9–12.7)
POTASSIUM SERPL-SCNC: 4 MMOL/L (ref 3.5–5.3)
PROT SERPL-MCNC: 6.3 G/DL (ref 6.4–8.4)
PROT UR STRIP-MCNC: NEGATIVE MG/DL
PROTHROMBIN TIME: 12 SECONDS (ref 12.3–15)
RBC # BLD AUTO: 4.37 MILLION/UL (ref 3.81–5.12)
RBC #/AREA URNS AUTO: ABNORMAL /HPF
SODIUM SERPL-SCNC: 140 MMOL/L (ref 135–147)
SP GR UR STRIP.AUTO: 1.02 (ref 1–1.03)
UROBILINOGEN UR STRIP-ACNC: <2 MG/DL
WBC # BLD AUTO: 7.24 THOUSAND/UL (ref 4.31–10.16)
WBC #/AREA URNS AUTO: ABNORMAL /HPF

## 2025-05-27 PROCEDURE — 85730 THROMBOPLASTIN TIME PARTIAL: CPT

## 2025-05-27 PROCEDURE — 85610 PROTHROMBIN TIME: CPT

## 2025-05-27 PROCEDURE — 80053 COMPREHEN METABOLIC PANEL: CPT

## 2025-05-27 PROCEDURE — 83036 HEMOGLOBIN GLYCOSYLATED A1C: CPT

## 2025-05-27 PROCEDURE — 81001 URINALYSIS AUTO W/SCOPE: CPT

## 2025-05-27 PROCEDURE — 87086 URINE CULTURE/COLONY COUNT: CPT

## 2025-05-27 PROCEDURE — 36415 COLL VENOUS BLD VENIPUNCTURE: CPT

## 2025-05-27 PROCEDURE — 85025 COMPLETE CBC W/AUTO DIFF WBC: CPT

## 2025-05-28 LAB — BACTERIA UR CULT: NORMAL

## 2025-05-28 NOTE — PROGRESS NOTES
Pre-operative Clearance  Name: Shadia Parker      : 1950      MRN: [id number]  Encounter Provider: Binta Harvey DO  Encounter Date: 2025   Encounter department: [facility]    :  Assessment & Plan  Preoperative clearance  The patient is clear medically for surgery.         Lumbar disc disease with radiculopathy  Pt for a spinal cord stimulator.         Primary hypertension  Continue with beta-blocker therapy.             Pre-operative Clearance:     Revised Cardiac Risk Index:  RCI RISK CLASS I (0 risk factors, risk of major cardiac complications approximately 0.5%)    Clearance:  Patient is medically optimized (CLEARED) for proposed surgery without any additional cardiac testing.      Medication Instructions:   - Avoid herbs or non-directed vitamins one week prior to surgery    - Avoid aspirin containing medications or non-steroidal anti-inflammatory drugs one week preceding surgery    - May take tylenol for pain up until the night before surgery    - Antidepressants: Continue to take this medication on your normal schedule.  - Beta blockers:  Continue to take this medication on your normal schedule.  - Corticosteroids: Continue to take this medication on your normal schedule.  - Diuretics: Continue taking this medication up to the evening before surgery/procedure, but do not take in the morning of the day of surgery/procedure.  - Hyperlipidemia meds: Continue to take this medication on your normal schedule.  - Opioids: Continue to take this medication on your normal schedule.       History of Present Illness     Pre-Op Examination     Surgery: Spinal cord stimulator   Anticipated Date of Surgery: 25   Surgeon:      The patient presents for preop clearance due to chronic lumbar back pain with radiculopathy down both legs.  She was for a spinal cord stimulator on 2025.  She has failed conservative treatment including physical therapy,  anti-inflammatory meds, and epidurals.     Previous history of bleeding disorders or clots?: No    Previous Anesthesia reaction?: No    Prolonged steroid use in the last 6 months?: No      Assessment of Cardiac Risk:   - Unstable or severe angina or MI in the last 6 weeks or history of stent placement in the last year?: No    - Decompensated heart failure (e.g. New onset heart failure, NYHA  Class IV heart failure, or worsening existing heart failure)?: No    - Significant arrhythmias such as high grade AV block, symptomatic ventricular arrhythmia, newly recognized ventricular tachycardia, supraventricular tachycardia with resting heart rate >100, or symptomatic bradycardia?: No    - Severe heart valve disease including aortic stenosis or symptomatic mitral stenosis?: No      Pre-operative Risk Factors:    - History of cerebrovascular disease: No    - History of ischemic heart disease: No    - History of congestive heart failure: No    - Pre-operative treatment with insulin: No    - Pre-operative creatinine >2 mg/dL: No      Medications of Perioperative Concern:    Beta blockers    Review of Systems   Constitutional:  Negative for appetite change, chills and fever.   HENT:  Negative for ear pain, facial swelling, rhinorrhea, sinus pain, sore throat and trouble swallowing.    Eyes:  Negative for discharge and redness.   Respiratory:  Negative for chest tightness, shortness of breath and wheezing.    Cardiovascular:  Negative for chest pain and palpitations.   Gastrointestinal:  Negative for abdominal pain, diarrhea, nausea and vomiting.   Endocrine: Negative for polyuria.   Genitourinary:  Negative for dysuria and urgency.   Musculoskeletal:  Positive for back pain. Negative for arthralgias.        Positive chronic lumbar back pain with bilateral leg radiculopathy without weakness or paresthesia of the legs   Skin:  Negative for rash.   Neurological:  Negative for dizziness, weakness and headaches.   Hematological:   Negative for adenopathy.   Psychiatric/Behavioral:  Negative for behavioral problems, confusion and sleep disturbance.    All other systems reviewed and are negative.    Past Medical History   Past Medical History[1]  Past Surgical History[2]  Family History[3]  Social History[4]  Medications[5]  Allergies   Allergen Reactions    Hydromorphone Hcl Itching    Percocet [Oxycodone-Acetaminophen] GI Intolerance and Vomiting    Pregabalin Other (See Comments)    Sulfamethoxazole-Trimethoprim Rash    Tizanidine Diarrhea     Objective   /70   Pulse 70   Temp (!) 97.4 °F (36.3 °C)   Resp (!) 11   Ht 5' (1.524 m)   Wt 99.2 kg (218 lb 9.6 oz)   LMP  (LMP Unknown) Comment: hysterectomy  SpO2 98%   BMI 42.69 kg/m²     Physical Exam  Vitals and nursing note reviewed.   Constitutional:       General: She is not in acute distress.     Appearance: Normal appearance. She is well-developed. She is not ill-appearing or diaphoretic.   HENT:      Head: Normocephalic and atraumatic.      Right Ear: Tympanic membrane, ear canal and external ear normal.      Left Ear: Tympanic membrane, ear canal and external ear normal.      Nose: Nose normal. No congestion or rhinorrhea.      Mouth/Throat:      Mouth: Mucous membranes are moist.      Pharynx: Oropharynx is clear. No oropharyngeal exudate or posterior oropharyngeal erythema.     Eyes:      General: No scleral icterus.        Right eye: No discharge.         Left eye: No discharge.      Extraocular Movements: Extraocular movements intact.      Conjunctiva/sclera: Conjunctivae normal.      Pupils: Pupils are equal, round, and reactive to light.     Neck:      Thyroid: No thyromegaly.      Vascular: No carotid bruit or JVD.      Trachea: No tracheal deviation.     Cardiovascular:      Rate and Rhythm: Normal rate and regular rhythm.      Pulses: Normal pulses.      Heart sounds: Normal heart sounds. No murmur heard.  Pulmonary:      Effort: Pulmonary effort is normal. No  respiratory distress.      Breath sounds: Normal breath sounds. No stridor. No wheezing, rhonchi or rales.   Abdominal:      General: Abdomen is flat. Bowel sounds are normal. There is no distension.      Palpations: Abdomen is soft. There is no mass.      Tenderness: There is no abdominal tenderness. There is no guarding or rebound.     Musculoskeletal:         General: Tenderness present. No swelling or deformity. Normal range of motion.      Cervical back: Normal range of motion and neck supple. No rigidity.      Right lower leg: No edema.      Left lower leg: No edema.      Comments: Positive increased paravertebral muscle tension lumbar paraspinal musculature bilaterally  Full flexion extension lumbar spine with pain   Lymphadenopathy:      Cervical: No cervical adenopathy.     Skin:     General: Skin is warm and dry.      Capillary Refill: Capillary refill takes less than 2 seconds.      Coloration: Skin is not jaundiced.      Findings: No bruising, erythema or rash.     Neurological:      General: No focal deficit present.      Mental Status: She is alert and oriented to person, place, and time.      Cranial Nerves: No cranial nerve deficit.      Sensory: No sensory deficit.      Motor: No abnormal muscle tone.      Coordination: Coordination normal.      Gait: Gait normal.      Deep Tendon Reflexes: Reflexes are normal and symmetric. Reflexes normal.     Psychiatric:         Mood and Affect: Mood normal.         Behavior: Behavior normal.         Thought Content: Thought content normal.         Judgment: Judgment normal.       Administrative Statements   I have spent a total time of 20 minutes in caring for this patient on the day of the visit/encounter including Diagnostic results, Prognosis, Risks and benefits of tx options, Instructions for management, Patient and family education, Importance of tx compliance, Risk factor reductions, and Impressions.    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    Trochanteric bursitis of right hip 02/12/2019    Visual impairment 1968    Nearsighted with stidmatism   [2]   Past Surgical History:  Procedure Laterality Date    ABDOMINAL SURGERY      endometriosis     APPENDECTOMY      BREAST BIOPSY Right 1985    benign    CARPAL TUNNEL RELEASE      COLONOSCOPY      HAND SURGERY      HIP SURGERY      HYSTERECTOMY Bilateral 1985    JOINT REPLACEMENT Bilateral     KNEE    JOINT REPLACEMENT Left     HIP    OOPHORECTOMY Bilateral 1985    ORTHOPEDIC SURGERY      NM ARTHRP ACETBLR/PROX FEM PROSTC AGRFT/ALGRFT Left 03/12/2018    Procedure: ARTHROPLASTY HIP TOTAL;  Surgeon: Dread Myrick MD;  [facility];  Service: Orthopedics    NM ARTHRP ACETBLR/PROX FEM PROSTC AGRFT/ALGRFT Right 07/30/2018    Procedure: RIGHT TOTAL HIP ARTHROPLASTY;  Surgeon: Dread Myrick MD;  [facility];  Service: Orthopedics    NM SURGICAL ARTHROSCOPY RAMONE W/CORACOACRM LIGM RLS Right 05/10/2017    Procedure: SHOULDER ARTHROSCOPY SUBACROMIAL DECOMPRESSION;  Surgeon: Mann Ramirez MD;  [facility];  Service: Orthopedics    NM SURGICAL ARTHROSCOPY SHOULDER BICEPS TENODESIS Right 05/10/2017    Procedure: ARTHROSCOPIC BICEPS TENODESIS WITH ROTATOR CUFF REPAIR ;  Surgeon: Mann Ramirez MD;  [facility];  Service: Orthopedics    SKIN BIOPSY  7-2021    L upper arm    TONSILLECTOMY       TRIGGER POINT INJECTION     [3]   Family History  Problem Relation Name Age of Onset    Atrial fibrillation Mother GG     Breast cancer Mother GG 75    Stroke Mother GG     Coronary artery disease Mother GG     Diabetes Mother GG     Pancreatitis Mother GG     Cancer Mother GG         Breast    Hyperlipidemia Mother GG     Pulmonary embolism Mother GG     Depression Mother GG     Hypertension Mother GG     Thyroid disease Mother GG         Hypothyroidism    Hearing loss Mother GG     Heart failure Mother GG     Heart attack Father Both parents     Arthritis Father Both parents     No Known Problems Sister      Arthritis Sister Funmilayo     Endometrial cancer Maternal Grandmother GH 75    Cancer Maternal Grandmother GH     No Known Problems Maternal Grandfather      No Known Problems Paternal Grandmother      No Known Problems Paternal Grandfather      No Known Problems Maternal Aunt      No Known Problems Paternal Aunt      No Known Problems Paternal Aunt      Prostate cancer Paternal Uncle Lazarus 55    Arthritis Family      Cancer Family     [4]   Social History  Tobacco Use    Smoking status: Never     Passive exposure: Past    Smokeless tobacco: Never   Vaping Use    Vaping status: Never Used   Substance and Sexual Activity    Alcohol use: Not Currently     Comment: Rarely drink alcohol    Drug use: Never    Sexual activity: Yes     Partners: Male     Birth control/protection: Female Sterilization     Comment: Hysterectomy 1986   [5]   Current Outpatient Medications on File Prior to Visit   Medication Sig    acetaminophen (TYLENOL) 500 mg tablet Take 500 mg by mouth every 6 (six) hours as needed for mild pain    atorvastatin (LIPITOR) 20 mg tablet TAKE 1 TABLET BY MOUTH DAILY    calcium-vitamin D (OSCAL) 250-125 MG-UNIT per tablet Take 1 tablet by mouth in the morning.    cetirizine (ZyrTEC) 10 mg tablet Take 10 mg by mouth in the morning.    cyclobenzaprine (FLEXERIL) 10 mg tablet TAKE 1 TABLET(10 MG) BY MOUTH DAILY  AT BEDTIME    FLUoxetine (PROzac) 20 mg capsule TAKE 1 CAPSULE BY MOUTH DAILY    HOMEOPATHIC PRODUCTS IJ     hydroCHLOROthiazide 25 mg tablet TAKE 1 TABLET BY MOUTH DAILY    methocarbamol (ROBAXIN) 500 mg tablet TAKE 1-2 TABLETS BY MOUTH THREE TIMES A DAY AS NEEDED FOR MUSCLE SPASMS    methylPREDNISolone 4 MG tablet therapy pack Use as directed on package    metoprolol succinate (TOPROL-XL) 50 mg 24 hr tablet TAKE 1 TABLET(50 MG) BY MOUTH DAILY    Multiple Vitamins-Minerals (CENTRUM SILVER ULTRA WOMENS PO) Take by mouth in the morning.    Omega-3 1000 MG CAPS Take by mouth in the morning.    omeprazole (PriLOSEC) 20 mg delayed release capsule TAKE 1 CAPSULE(20 MG) BY MOUTH DAILY    traMADol (Ultram) 50 mg tablet Take 1 tablet (50 mg total) by mouth every 6 (six) hours as needed for moderate pain    ibuprofen (MOTRIN) 200 mg tablet Take by mouth every 4 (four) hours as needed for mild pain (Patient not taking: Reported on 5/7/2025)

## 2025-06-03 DIAGNOSIS — M54.31 RIGHT SIDED SCIATICA: ICD-10-CM

## 2025-06-03 DIAGNOSIS — K21.9 GASTROESOPHAGEAL REFLUX DISEASE WITHOUT ESOPHAGITIS: ICD-10-CM

## 2025-06-03 DIAGNOSIS — I10 ESSENTIAL HYPERTENSION: ICD-10-CM

## 2025-06-03 NOTE — TELEPHONE ENCOUNTER
Medication: metoprolol succinate (TOPROL-XL) 50 mg 24 hr tablet     Dose/Frequency:        TAKE 1 TABLET(50 MG) BY MOUTH DAILY                      Quantity: 90    Pharmacy: [facility]    Office:   [x] PCP/Provider -   [] Speciality/Provider -     Does the patient have enough for 3 days?   [x] Yes   [] No - Send as HP to POD     Medication: omeprazole (PriLOSEC) 20 mg delayed release capsule     Dose/Frequency: TAKE 1 CAPSULE(20 MG) BY MOUTH DAILY     Quantity: 90    Pharmacy: [facility]    Office:   [x] PCP/Provider -   [] Speciality/Provider -     Does the patient have enough for 3 days?   [x] Yes   [] No - Send as HP to POD

## 2025-06-04 RX ORDER — METHOCARBAMOL 500 MG/1
TABLET, FILM COATED ORAL
Qty: 120 TABLET | Refills: 5 | Status: SHIPPED | OUTPATIENT
Start: 2025-06-04

## 2025-06-04 RX ORDER — METOPROLOL SUCCINATE 50 MG/1
50 TABLET, EXTENDED RELEASE ORAL DAILY
Qty: 90 TABLET | Refills: 1 | Status: SHIPPED | OUTPATIENT
Start: 2025-06-04

## 2025-06-04 RX ORDER — OMEPRAZOLE 20 MG/1
20 CAPSULE, DELAYED RELEASE ORAL DAILY
Qty: 90 CAPSULE | Refills: 1 | Status: SHIPPED | OUTPATIENT
Start: 2025-06-04

## 2025-06-04 NOTE — PRE-PROCEDURE INSTRUCTIONS
Pre-Surgery Instructions:   Medication Instructions    acetaminophen (TYLENOL) 500 mg tablet Uses PRN- OK to take day of surgery    atorvastatin (LIPITOR) 20 mg tablet Take day of surgery.    calcium-vitamin D (OSCAL) 250-125 MG-UNIT per tablet Stop taking 7 days prior to surgery.    cetirizine (ZyrTEC) 10 mg tablet Take day of surgery.    cyclobenzaprine (FLEXERIL) 10 mg tablet Uses PRN- DO NOT take day of surgery    FLUoxetine (PROzac) 20 mg capsule Take day of surgery.    hydroCHLOROthiazide 25 mg tablet Hold day of surgery.    ibuprofen (MOTRIN) 200 mg tablet Stop taking 3 days prior to surgery.    methocarbamol (ROBAXIN) 500 mg tablet Uses PRN- DO NOT take day of surgery    metoprolol succinate (TOPROL-XL) 50 mg 24 hr tablet Take day of surgery.    Multiple Vitamins-Minerals (CENTRUM SILVER ULTRA WOMENS PO) Stop taking 7 days prior to surgery.    Omega-3 1000 MG CAPS Stop taking 7 days prior to surgery.    omeprazole (PriLOSEC) 20 mg delayed release capsule Take day of surgery.    traMADol (Ultram) 50 mg tablet Uses PRN- OK to take day of surgery    VITAMIN D, CHOLECALCIFEROL, PO Stop taking 7 days prior to surgery.     Medication instructions for day of surgery reviewed. Please take all instructed medications with only a sip of water. Please do not take any over the counter (non-prescribed) vitamins or supplements for one week prior to date of surgery.      You will receive a call one business day prior to surgery with an arrival time and hospital directions. If your surgery is scheduled on a Monday, the hospital will be calling you on the Friday prior to your surgery. If you have not heard from anyone by 8pm, please call the hospital supervisor through the hospital  at [phone]. ([place] [phone] or [place] [phone]).    Do not eat or drink anything after midnight the night before your surgery, including candy, mints, lifesavers, or chewing gum. Do not drink alcohol 24hrs before your  surgery. Try not to smoke at least 24hrs before your surgery.       Follow the pre surgery showering instructions as listed in the “My Surgical Experience Booklet” or otherwise provided by your surgeon's office. Do not use a blade to shave the surgical area 1 week before surgery. It is okay to use a clean electric clippers up to 24 hours before surgery. Do not apply any lotions, creams, including makeup, cologne, deodorant, or perfumes after showering on the day of your surgery. Do not use dry shampoo, hair spray, hair gel, or any type of hair products.     No contact lenses, eye make-up, or artificial eyelashes. Remove nail polish, including gel polish, and any artificial, gel, or acrylic nails if possible. Remove all jewelry including rings and body piercing jewelry.     Wear causal clothing that is easy to take on and off. Consider your type of surgery.    Keep any valuables, jewelry, piercings at home. Please bring any specially ordered equipment (sling, braces) if indicated.    Arrange for a responsible person to drive you to and from the hospital on the day of your surgery. Please confirm the visitor policy for the day of your procedure when you receive your phone call with an arrival time.     Call the surgeon's office with any new illnesses, exposures, or additional questions prior to surgery.    Please reference your “My Surgical Experience Booklet” for additional information to prepare for your upcoming surgery.

## 2025-06-13 ENCOUNTER — APPOINTMENT (OUTPATIENT)
Dept: RADIOLOGY | Facility: HOSPITAL | Age: 75
End: 2025-06-13
Payer: MEDICARE

## 2025-06-13 ENCOUNTER — ANESTHESIA EVENT (OUTPATIENT)
Dept: PERIOP | Facility: HOSPITAL | Age: 75
End: 2025-06-13
Payer: MEDICARE

## 2025-06-13 ENCOUNTER — ANESTHESIA (OUTPATIENT)
Dept: PERIOP | Facility: HOSPITAL | Age: 75
End: 2025-06-13
Payer: MEDICARE

## 2025-06-13 ENCOUNTER — HOSPITAL ENCOUNTER (OUTPATIENT)
Facility: HOSPITAL | Age: 75
Setting detail: OUTPATIENT SURGERY
Discharge: HOME | End: 2025-06-13
Attending: STUDENT IN AN ORGANIZED HEALTH CARE EDUCATION/TRAINING PROGRAM | Admitting: STUDENT IN AN ORGANIZED HEALTH CARE EDUCATION/TRAINING PROGRAM
Payer: MEDICARE

## 2025-06-13 VITALS
WEIGHT: 217.2 LBS | HEIGHT: 60 IN | DIASTOLIC BLOOD PRESSURE: 78 MMHG | SYSTOLIC BLOOD PRESSURE: 182 MMHG | OXYGEN SATURATION: 98 % | TEMPERATURE: 98.2 F | BODY MASS INDEX: 42.64 KG/M2 | RESPIRATION RATE: 12 BRPM | HEART RATE: 72 BPM

## 2025-06-13 DIAGNOSIS — Z45.42 BATTERY END OF LIFE OF SPINAL CORD STIMULATOR: Primary | ICD-10-CM

## 2025-06-13 DIAGNOSIS — Z96.89 S/P INSERTION OF SPINAL CORD STIMULATOR: ICD-10-CM

## 2025-06-13 PROCEDURE — 99024 POSTOP FOLLOW-UP VISIT: CPT | Performed by: STUDENT IN AN ORGANIZED HEALTH CARE EDUCATION/TRAINING PROGRAM

## 2025-06-13 PROCEDURE — C1826 NEURO STIM INCEPTIV: HCPCS | Performed by: STUDENT IN AN ORGANIZED HEALTH CARE EDUCATION/TRAINING PROGRAM

## 2025-06-13 PROCEDURE — 63650 IMPLANT NEUROELECTRODES: CPT | Performed by: STUDENT IN AN ORGANIZED HEALTH CARE EDUCATION/TRAINING PROGRAM

## 2025-06-13 PROCEDURE — C1778 LEAD, NEUROSTIMULATOR: HCPCS | Performed by: STUDENT IN AN ORGANIZED HEALTH CARE EDUCATION/TRAINING PROGRAM

## 2025-06-13 PROCEDURE — 72070 X-RAY EXAM THORAC SPINE 2VWS: CPT

## 2025-06-13 PROCEDURE — C1826 CAPIT NEURO INCEPTIV PERCUTANEOUS SYS 9771CLP: HCPCS | Performed by: STUDENT IN AN ORGANIZED HEALTH CARE EDUCATION/TRAINING PROGRAM

## 2025-06-13 PROCEDURE — C1787 PATIENT PROGR, NEUROSTIM: HCPCS | Performed by: STUDENT IN AN ORGANIZED HEALTH CARE EDUCATION/TRAINING PROGRAM

## 2025-06-13 PROCEDURE — 63685 INS/RPLC SPI NPG/RCVR POCKET: CPT | Performed by: STUDENT IN AN ORGANIZED HEALTH CARE EDUCATION/TRAINING PROGRAM

## 2025-06-13 DEVICE — LEAD 977A260 VECTRIS MRICS COMPACT
Type: IMPLANTABLE DEVICE | Site: SPINE THORACIC | Status: FUNCTIONAL
Brand: VECTRIS™ SURESCAN®

## 2025-06-13 DEVICE — ENVELOPE NMRM6133 ABSORB LRG MR
Type: IMPLANTABLE DEVICE | Site: FLANK | Status: FUNCTIONAL
Brand: TYRX™

## 2025-06-13 DEVICE — INS 977119 INCEPTIV MRI US EMAN
Type: IMPLANTABLE DEVICE | Site: FLANK | Status: FUNCTIONAL
Brand: INCEPTIV™

## 2025-06-13 RX ORDER — CHLORHEXIDINE GLUCONATE ORAL RINSE 1.2 MG/ML
15 SOLUTION DENTAL ONCE
Status: COMPLETED | OUTPATIENT
Start: 2025-06-13 | End: 2025-06-13

## 2025-06-13 RX ORDER — LIDOCAINE HYDROCHLORIDE AND EPINEPHRINE 10; 10 MG/ML; UG/ML
INJECTION, SOLUTION INFILTRATION; PERINEURAL AS NEEDED
Status: DISCONTINUED | OUTPATIENT
Start: 2025-06-13 | End: 2025-06-13 | Stop reason: HOSPADM

## 2025-06-13 RX ORDER — DEXAMETHASONE SODIUM PHOSPHATE 10 MG/ML
INJECTION, SOLUTION INTRAMUSCULAR; INTRAVENOUS AS NEEDED
Status: DISCONTINUED | OUTPATIENT
Start: 2025-06-13 | End: 2025-06-13

## 2025-06-13 RX ORDER — ALBUTEROL SULFATE 0.83 MG/ML
2.5 SOLUTION RESPIRATORY (INHALATION) ONCE AS NEEDED
Status: DISCONTINUED | OUTPATIENT
Start: 2025-06-13 | End: 2025-06-13 | Stop reason: HOSPADM

## 2025-06-13 RX ORDER — ROCURONIUM BROMIDE 10 MG/ML
INJECTION, SOLUTION INTRAVENOUS AS NEEDED
Status: DISCONTINUED | OUTPATIENT
Start: 2025-06-13 | End: 2025-06-13

## 2025-06-13 RX ORDER — PROPOFOL 10 MG/ML
INJECTION, EMULSION INTRAVENOUS AS NEEDED
Status: DISCONTINUED | OUTPATIENT
Start: 2025-06-13 | End: 2025-06-13

## 2025-06-13 RX ORDER — FENTANYL CITRATE/PF 50 MCG/ML
50 SYRINGE (ML) INJECTION
Status: DISCONTINUED | OUTPATIENT
Start: 2025-06-13 | End: 2025-06-13 | Stop reason: HOSPADM

## 2025-06-13 RX ORDER — FENTANYL CITRATE 50 UG/ML
INJECTION, SOLUTION INTRAMUSCULAR; INTRAVENOUS AS NEEDED
Status: DISCONTINUED | OUTPATIENT
Start: 2025-06-13 | End: 2025-06-13

## 2025-06-13 RX ORDER — ONDANSETRON 2 MG/ML
INJECTION INTRAMUSCULAR; INTRAVENOUS AS NEEDED
Status: DISCONTINUED | OUTPATIENT
Start: 2025-06-13 | End: 2025-06-13

## 2025-06-13 RX ORDER — LIDOCAINE HYDROCHLORIDE 10 MG/ML
INJECTION, SOLUTION EPIDURAL; INFILTRATION; INTRACAUDAL; PERINEURAL AS NEEDED
Status: DISCONTINUED | OUTPATIENT
Start: 2025-06-13 | End: 2025-06-13

## 2025-06-13 RX ORDER — SODIUM CHLORIDE, SODIUM LACTATE, POTASSIUM CHLORIDE, CALCIUM CHLORIDE 600; 310; 30; 20 MG/100ML; MG/100ML; MG/100ML; MG/100ML
INJECTION, SOLUTION INTRAVENOUS CONTINUOUS PRN
Status: DISCONTINUED | OUTPATIENT
Start: 2025-06-13 | End: 2025-06-13

## 2025-06-13 RX ORDER — ONDANSETRON 2 MG/ML
4 INJECTION INTRAMUSCULAR; INTRAVENOUS ONCE AS NEEDED
Status: DISCONTINUED | OUTPATIENT
Start: 2025-06-13 | End: 2025-06-13 | Stop reason: HOSPADM

## 2025-06-13 RX ORDER — CEFAZOLIN SODIUM 2 G/50ML
2000 SOLUTION INTRAVENOUS ONCE
Status: COMPLETED | OUTPATIENT
Start: 2025-06-13 | End: 2025-06-13

## 2025-06-13 RX ORDER — VANCOMYCIN HYDROCHLORIDE 1 G/20ML
INJECTION, POWDER, LYOPHILIZED, FOR SOLUTION INTRAVENOUS AS NEEDED
Status: DISCONTINUED | OUTPATIENT
Start: 2025-06-13 | End: 2025-06-13 | Stop reason: HOSPADM

## 2025-06-13 RX ADMIN — SUGAMMADEX 200 MG: 100 INJECTION, SOLUTION INTRAVENOUS at 09:20

## 2025-06-13 RX ADMIN — PROPOFOL 200 MG: 10 INJECTION, EMULSION INTRAVENOUS at 08:25

## 2025-06-13 RX ADMIN — ROCURONIUM BROMIDE 50 MG: 10 INJECTION, SOLUTION INTRAVENOUS at 08:26

## 2025-06-13 RX ADMIN — SODIUM CHLORIDE, SODIUM LACTATE, POTASSIUM CHLORIDE, AND CALCIUM CHLORIDE: .6; .31; .03; .02 INJECTION, SOLUTION INTRAVENOUS at 08:03

## 2025-06-13 RX ADMIN — DEXAMETHASONE SODIUM PHOSPHATE 10 MG: 10 INJECTION, SOLUTION INTRAMUSCULAR; INTRAVENOUS at 08:25

## 2025-06-13 RX ADMIN — LIDOCAINE HYDROCHLORIDE 50 MG: 10 INJECTION, SOLUTION EPIDURAL; INFILTRATION; INTRACAUDAL at 08:25

## 2025-06-13 RX ADMIN — FENTANYL CITRATE 50 MCG: 50 INJECTION, SOLUTION INTRAMUSCULAR; INTRAVENOUS at 08:25

## 2025-06-13 RX ADMIN — CEFAZOLIN SODIUM 2000 MG: 2 SOLUTION INTRAVENOUS at 08:30

## 2025-06-13 RX ADMIN — FENTANYL CITRATE 50 MCG: 50 INJECTION, SOLUTION INTRAMUSCULAR; INTRAVENOUS at 08:53

## 2025-06-13 RX ADMIN — CHLORHEXIDINE GLUCONATE 15 ML: 1.2 SOLUTION ORAL at 07:44

## 2025-06-13 RX ADMIN — ONDANSETRON 4 MG: 2 INJECTION INTRAMUSCULAR; INTRAVENOUS at 08:25

## 2025-06-13 NOTE — OP NOTE
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 System interrogated and functioning appropriately at conclusion of case.        Complications:   None    Procedure and Technique:  General endotracheal anesthesia was induced.  Appropriate intravenous antibiotics were given. Serial compression devices were applied to the legs and the patient was placed prone on the Tobin table with all pressure points padded.  X-ray confirmation of the appropriate level was obtained using fluoroscopy.  The back was prepped and draped in the usual sterile fashion.  Timeout was performed per protocol.      The loss of resistance technique was used to enter the epidural space with the spinal needle on the right, starting at the pedicle of L2 and aiming towards the air bubble at the T11/12 disk space in the midline. The lead was placed through the needle using the navigating stylet to mimic the trial with the tip at the top of T8.    The loss of resistance technique was used to enter the epidural space with the spinal needle on the right, starting at  the pedicle of L2 and aiming towards the air bubble at the T11/12 disk space in the midline. The lead was placed through the needle using the navigating stylet to mimic the trial with the tip at the top of T9.    A midline incision connected the 2 trochar entry points was made sharply  The subcutaneous tissue above the fascia was dissected on each side until the needle was visualized.  The fascia was incised around the the needle to allow room for the locking flange on both sides.  The needles and stylets were carefully removed so that the leads were not displaced.  This was confirmed radiographically.  The leads were then passed from the percutaneous insertion sites to the midline.  The locking flanges were placed over the wires, partially through the fascial openings on both sided.  They were locked down using the torque wrench.  They were secured in place with 2-0- silk suture.    Attention was then turned towards the hip incision.  A horizontal incision at the waistline on the right was created with a #15 blade.  Blunt and sharp dissection were used to make a pocket for the battery approximately 1 cm deep to the skin.  Hemostasis was obtained.  The pulse generator battery pack fit well in the pocket created for it.  It was removed.  The wires were tunneled from superior to the inferior incision.  They were cleaned and inserted into the pulse generator battery pack per protocol.  The device was grounded and tested.  Good impedance was noted at all leads.  The leads were final tightened to the battery using the torque wrench at each lead.  The battery was then placed in the pouch for the final time with the lettering facing dorsally and the wires coiled underneath.  Vancomycin powder was applied to that wound.  The wounds were closed in layers of 0 Vicryl, 2-0 Vicryl, 5-0 Chromic gut and glue at the skin.       I was present for the entire procedure.     Patient Disposition:  PACU     Implant Name Type Inv. Item  Serial No.  Lot No. LRB No. Used Action   LEAD VECTRIS SURESCAN MRI 1X 8 COMPACT 60CM KIT - [id number]  LEAD VECTRIS SURESCAN MRI 1X 8 COMPACT 60CM KIT [id number] [employer]   1 Implanted   LEAD VECTRIS SURESCAN MRI 1X 8 COMPACT 60CM KIT - [id number]  LEAD VECTRIS SURESCAN MRI 1X 8 COMPACT 60CM KIT [id number] [employer]  Right 1 Implanted   NEURO STIM INCEPTIV - [id number]  NEURO STIM INCEPTIV [id number] [employer]  Right 1 Implanted   ENVELOPE TYRX NEURO ABS ANBCTRL LRG - [id number]  ENVELOPE TYRX NEURO ABS ANBCTRL LRG  [employer] [id number] Right 1 Implanted           SIGNATURE: Aureliano Brown MD  DATE: June 13, 2025  TIME: 7:00 AM

## 2025-06-13 NOTE — ANESTHESIA PREPROCEDURE EVALUATION
Procedure:  Placement of percutaneous thoracic spinal cord stimulator with battery in the right flank (Right: Spine Thoracic)    Relevant Problems   CARDIO   (+) Hyperlipidemia   (+) Hypertension      GI/HEPATIC   (+) GERD without esophagitis      /RENAL   (+) Renal insufficiency      MUSCULOSKELETAL   (+) Primary osteoarthritis of left hip   (+) Primary osteoarthritis of one hip, right      NEURO/PSYCH   (+) Chronic pain syndrome   (+) Recurrent major depressive disorder, in full remission (HCC)      Orthopedic/Musculoskeletal   (+) Sacroiliitis (HCC)      Recent labs personally reviewed:  Lab Results   Component Value Date    WBC 7.24 05/27/2025    HGB 12.1 05/27/2025     05/27/2025     Lab Results   Component Value Date     05/09/2017    K 4.0 05/27/2025    BUN 18 05/27/2025    CREATININE 1.13 05/27/2025    GLUCOSE 67 05/09/2017     Lab Results   Component Value Date    PTT 27 05/27/2025      Lab Results   Component Value Date    INR 0.85 05/27/2025       Lab Results   Component Value Date    HGBA1C 6.1 (H) 05/27/2025       Type and Screen:  AB      Physical Exam    Airway     Mallampati score: III  TM Distance: >3 FB  Neck ROM: full      Cardiovascular  Cardiovascular exam normal    Dental   No notable dental hx     Pulmonary  Pulmonary exam normal     Neurological      Other Findings  post-pubertal.      Anesthesia Plan  ASA Score- 3     Anesthesia Type- general with ASA Monitors.         Additional Monitors:     Airway Plan: Oral ETT.           Plan Factors-Exercise tolerance (METS): >4 METS.    Chart reviewed.    Patient summary reviewed.    Patient is not a current smoker.              Induction- intravenous.    Postoperative Plan- .   Monitoring Plan - Monitoring plan - standard ASA monitoring          Informed Consent- Anesthetic plan and risks discussed with patient.  I personally reviewed this patient with the CRNA. Discussed and agreed on the Anesthesia Plan with the CRNA..      NPO  Status:  Vitals Value Taken Time   Date of last liquid 06/13/25 06/13/25 07:16   Time of last liquid 0500 06/13/25 07:16   Date of last solid 06/12/25 06/13/25 07:16   Time of last solid 1700 06/13/25 07:16

## 2025-06-13 NOTE — ANESTHESIA POSTPROCEDURE EVALUATION
Post-Op Assessment Note    CV Status:  Stable    Pain management: adequate       Mental Status:  Alert and awake   Hydration Status:  Euvolemic   PONV Controlled:  Controlled   Airway Patency:  Patent     Post Op Vitals Reviewed: Yes    No anethesia notable event occurred.    Staff: Anesthesiologist, with CRNAs           Last Filed PACU Vitals:  Vitals Value Taken Time   Temp 98.2 °F (36.8 °C) 06/13/25 09:47   Pulse 72 06/13/25 09:47   /77 06/13/25 10:08   Resp 12 06/13/25 09:47   SpO2 98 % 06/13/25 09:47   Vitals shown include unfiled device data.    Modified Muna:     Vitals Value Taken Time   Activity 2 06/13/25 09:37   Respiration 2 06/13/25 09:37   Circulation 2 06/13/25 09:37   Consciousness 2 06/13/25 09:37   Oxygen Saturation 2 06/13/25 09:37     Modified Muna Score: 10

## 2025-06-13 NOTE — DISCHARGE INSTR - AVS FIRST PAGE
Post Operative Spinal Cord Stimulator                                                                              Discharge Instructions                                                                                    Aureliano Brown MD     Post op Management  After your spinal cord stimulator surgery, you will work closely with your spinal cord stimulator representative in the postoperative course who will be available for any technical questions you may have concerning the management of your new stimulator.    Expected Hospital Stay  Most patients will be expected to leave the hospital the same  day of the surgery. After the procedure, you will stay in the postop unit where you will be monitored closely. Once you receive education on how to use the  and have had the device programmed by your representative, you will then be able to leave the hospital.    Care of Your Incision  You will have two incisions. One located at the center of your spine usually in the mid to upper back region. The other incision will be located in the right or left upper buttock area. The buttock  incision is the site where the battery will be surgically placed, and you will decide prior to surgery which side you prefer for the battery. Both incisions will be closed in the same manner. Your incisions will be closed with buried stitches that will dissolve within a couple of weeks. Strips of adhesive tape called   The surgical dressings should be left on for 2 days after surgery.Then after 2 days, you may shower but you should not scrub your incision or soak in a pool, hot tub or tub bath for at least two  weeks. It is ok to gently pat it dry. Slight drainage the first day or so, limited swelling or mild  bruising is common and usually not of concern. If there is significant leaking or any marked redness or a large amount of swelling you should call the  office.    Post-op Pain Management  Most patients after spinal cord stimulator surgery complain of pain and discomfort at the incisional sites. During the surgery, local anesthetic will be used at the site of the incisions and the pain relief effect should last about 6 hours. It is not uncommon to have incisional pain or discomfort later that evening and especially the day after surgery A postoperative pain medication plan will be discussed at the preoperative appointment. In the post-operative period, heat or ice packs may be used as necessary for incisional discomfort and swelling. However, it is important to understand that post-op pain medications will not take 100% of your pain away, and it is common to still feel discomfort at the incisional sites while taking pain medication.    Activity  Unless you have been instructed otherwise, you should focus on gentle walking the first 2 weeks after the surgery. You should start with brief walks in the house, and gradually increase the time and speed of your walks. It is best to limit stair walking to 1 or 2 times a day for the first week. As you feel better, you should start to take longer walks outside and up inclines. You should avoid driving or being in the car for the first 2 weeks. After that, start with short drives with another person in the car. You should avoid lifting anything heavier than a half gallon of milk for the first 2 weeks. After that, you can start to lift light objects if you are comfortable. Remember-if it hurts, don’t do it! Sexual activity can be resumed when you feel comfortable. You can discuss returning to an exercise regimen with your physical therapist. For most patients, working with a physical therapist after the surgery can help with the recovery process. No bending for 2 weeks.     Diet and medication  You can resume your regular diet immediately after the surgery. Your regular medications may be restarted right away. However, aspirin  is usually started the day after surgery unless otherwise  instructed by your surgeon. If you are taking an anticoagulant or “blood thinner” such as warfarin (coumadin), plavix (clopidogrel), pradaxa (dabigatran), or any other anticoagulant medication,  you will be told when to restart the medication. You should then follow-up with the doctor who prescribes the anticoagulant medicine. Constipation is a common problem after spine surgery.  Over the counter stimulants and stool softeners can be beneficial, along with plenty of fresh water.    Work  Your return to work will be discussed in your office  follow up appointment.  Follow up Appointments  Your first surgical post-op appointment will be 2 weeks after surgery.       What to Watch Out For  These symptoms should cause you to call immediately or dial 911  to come to the emergency department   Paralysis or inability to fully move your legs   Severe chest pain, difficulty breathing   Loss of control of your bowels and bladder.The following symptoms may indicate a problem. You should call the office number listed below.    Fever higher than 101 F Increasing back and/or leg pain   Difficulty passing urine   New numbness or change in symptoms from before surgery   Redness or drainage from the incision   Unusual headache, especially if it is much worse when you  stand up

## 2025-06-13 NOTE — H&P
Neurosurgery History and Physical    Prior clinic encounter from 5/7/25 reviewed. After examining the patient I find no changes in the patients condition since the encounter.    Patient personally seen and examined.  Neurological examination unchanged compared to last office/progress note, with the following exceptions:    Ht 5' (1.524 m)   Wt 98.9 kg (218 lb)   LMP  (LMP Unknown) Comment: hysterectomy  BMI 42.58 kg/m²      Awake and alert.  Regular cardiac rate and rhythm.  No respiratory distress.  Abdomen nontender.  Normocephalic. Extremities warm, well perfused.    Post operative instructions and medications have been reviewed with the patient.    Assessment and Plan:    All questions have been answered to the patient satisfaction.  Plan to proceed with Placement of percutaenous thoracic spinal cord stimulator with battery in the right flank. They are in agreement with proceeding.

## 2025-06-13 NOTE — ANESTHESIA POSTPROCEDURE EVALUATION
Post-Op Assessment Note    CV Status:  Stable  Pain Score: 0    Pain management: adequate       Mental Status:  Alert and awake   Hydration Status:  Euvolemic   PONV Controlled:  Controlled   Airway Patency:  Patent     Post Op Vitals Reviewed: Yes    No anethesia notable event occurred.    Staff: Anesthesiologist, CRNA           Last Filed PACU Vitals:  Vitals Value Taken Time   Temp 97.9    Pulse 84    /73    Resp 18    SpO2 99

## 2025-06-16 ENCOUNTER — TELEPHONE (OUTPATIENT)
Age: 75
End: 2025-06-16

## 2025-06-26 ENCOUNTER — CLINICAL SUPPORT (OUTPATIENT)
Dept: NEUROSURGERY | Facility: CLINIC | Age: 75
End: 2025-06-26

## 2025-06-26 DIAGNOSIS — Z09 FOLLOW-UP EXAMINATION, FOLLOWING OTHER SURGERY: Primary | ICD-10-CM

## 2025-06-26 PROCEDURE — 99024 POSTOP FOLLOW-UP VISIT: CPT | Performed by: STUDENT IN AN ORGANIZED HEALTH CARE EDUCATION/TRAINING PROGRAM

## 2025-06-26 NOTE — PROGRESS NOTES
Post-Op Visit- Neurosurgery    Shadia Parker 75 y.o. female MRN: [id number]    Chief Complaint:  Patient presents post: Placement of percutaneous thoracic spinal cord stimulator with battery in the right flank - Right    History of Present Illness:  Patient presents for 2 week POV for incision check alone and ambulating without an assistive device.  Patient reports she is doing well overall and denies any incisional issues or fevers.  she denies any new weakness, numbness or tingling since the surgery.  Patient has been compliant with taking the post-operative antibiotic as prescribed.  Patient denies surgical pain at this time and rates their pain as a Pain Score: 0-No pain/10.          Wound Exam: Incision well approximated.  No erythema, edema or drainage present.   Location: back and right flank       Discussion/Summary:  Doing well postoperatively. Reviewed incision care with patient including daily observation for s/s infection including: increased erythema, edema, drainage, dehiscence of incision or fever >101.  Should these be observed, she understands that she is to call and/or return immediately for reassessment.  Advised patient to continue cleansing area with mild soap and water and pat dry. Not to apply any lotions, creams, or ointments, & not to submerge in any water for 4 more weeks.     She is to maintain activity restrictions until cleared by the surgeon. Activity levels were also reviewed with the patient in detail, she is to lift no greater than 10 pounds and ambulation is encouraged as tolerated.     Verified date/time/location of upcoming POV and reminded her to complete x-rays prior to her next appointment. She is to call the office with any further questions or concerns, or if any incisional issues or fevers would arise.     Patient met with [employer] at the conclusion of our visit.

## 2025-07-21 ENCOUNTER — HOSPITAL ENCOUNTER (OUTPATIENT)
Dept: RADIOLOGY | Facility: HOSPITAL | Age: 75
Discharge: HOME | End: 2025-07-21
Payer: MEDICARE

## 2025-07-21 DIAGNOSIS — Z09 FOLLOW-UP EXAMINATION, FOLLOWING OTHER SURGERY: ICD-10-CM

## 2025-07-21 PROCEDURE — 72070 X-RAY EXAM THORAC SPINE 2VWS: CPT

## 2025-07-22 ENCOUNTER — RA CDI HCC (OUTPATIENT)
Dept: OTHER | Facility: HOSPITAL | Age: 75
End: 2025-07-22

## 2025-07-29 ENCOUNTER — OFFICE VISIT (OUTPATIENT)
Dept: FAMILY MEDICINE CLINIC | Facility: CLINIC | Age: 75
End: 2025-07-29
Payer: MEDICARE

## 2025-07-29 VITALS
WEIGHT: 220 LBS | TEMPERATURE: 97.6 F | HEART RATE: 66 BPM | HEIGHT: 60 IN | DIASTOLIC BLOOD PRESSURE: 70 MMHG | SYSTOLIC BLOOD PRESSURE: 122 MMHG | OXYGEN SATURATION: 98 % | BODY MASS INDEX: 43.19 KG/M2

## 2025-07-29 DIAGNOSIS — K21.9 GERD WITHOUT ESOPHAGITIS: ICD-10-CM

## 2025-07-29 DIAGNOSIS — E78.00 PURE HYPERCHOLESTEROLEMIA: ICD-10-CM

## 2025-07-29 DIAGNOSIS — E78.2 MIXED HYPERLIPIDEMIA: ICD-10-CM

## 2025-07-29 DIAGNOSIS — Z12.11 COLON CANCER SCREENING: ICD-10-CM

## 2025-07-29 DIAGNOSIS — I10 PRIMARY HYPERTENSION: ICD-10-CM

## 2025-07-29 DIAGNOSIS — Z00.00 MEDICARE ANNUAL WELLNESS VISIT, SUBSEQUENT: Primary | ICD-10-CM

## 2025-07-29 DIAGNOSIS — Z78.0 POSTMENOPAUSAL ESTROGEN DEFICIENCY: ICD-10-CM

## 2025-07-29 DIAGNOSIS — Z12.31 ENCOUNTER FOR SCREENING MAMMOGRAM FOR BREAST CANCER: ICD-10-CM

## 2025-07-29 PROCEDURE — G0439 PPPS, SUBSEQ VISIT: HCPCS | Performed by: FAMILY MEDICINE

## 2025-07-29 PROCEDURE — G2211 COMPLEX E/M VISIT ADD ON: HCPCS | Performed by: FAMILY MEDICINE

## 2025-07-29 PROCEDURE — 99214 OFFICE O/P EST MOD 30 MIN: CPT | Performed by: FAMILY MEDICINE

## 2025-07-30 ENCOUNTER — TELEPHONE (OUTPATIENT)
Age: 75
End: 2025-07-30

## 2025-07-30 ENCOUNTER — OFFICE VISIT (OUTPATIENT)
Dept: NEUROSURGERY | Facility: CLINIC | Age: 75
End: 2025-07-30
Payer: MEDICARE

## 2025-07-30 VITALS
TEMPERATURE: 96.7 F | BODY MASS INDEX: 43.19 KG/M2 | SYSTOLIC BLOOD PRESSURE: 140 MMHG | HEIGHT: 60 IN | DIASTOLIC BLOOD PRESSURE: 82 MMHG | HEART RATE: 68 BPM | WEIGHT: 220 LBS | OXYGEN SATURATION: 96 % | RESPIRATION RATE: 16 BRPM

## 2025-07-30 DIAGNOSIS — Z96.89 SPINAL CORD STIMULATOR STATUS: Primary | ICD-10-CM

## 2025-07-30 PROCEDURE — 99212 OFFICE O/P EST SF 10 MIN: CPT | Performed by: STUDENT IN AN ORGANIZED HEALTH CARE EDUCATION/TRAINING PROGRAM

## 2025-07-30 RX ORDER — ATORVASTATIN CALCIUM 20 MG/1
20 TABLET, FILM COATED ORAL DAILY
Qty: 90 TABLET | Refills: 1 | Status: SHIPPED | OUTPATIENT
Start: 2025-07-30

## 2025-08-01 ENCOUNTER — PREP FOR PROCEDURE (OUTPATIENT)
Age: 75
End: 2025-08-01

## 2025-08-01 ENCOUNTER — TELEPHONE (OUTPATIENT)
Age: 75
End: 2025-08-01

## 2025-08-01 DIAGNOSIS — Z12.11 SCREENING FOR COLON CANCER: Primary | ICD-10-CM

## 2025-08-11 ENCOUNTER — HOSPITAL ENCOUNTER (OUTPATIENT)
Dept: RADIOLOGY | Facility: MEDICAL CENTER | Age: 75
Discharge: HOME | End: 2025-08-11
Attending: FAMILY MEDICINE
Payer: MEDICARE

## (undated) DEVICE — SHOULDER SUSPENSION KIT 6 PER BOX

## (undated) DEVICE — THREADED CLEAR CANNULA WITH OBTURATOR 5.5MM X 75MM

## (undated) DEVICE — EXPRESSEW III SUTURE NEEDLE FOR USE WITH EXPRESSEW II OR III SUTURE PASSER: Brand: EXPRESSEW

## (undated) DEVICE — DISPOSABLE EQUIPMENT COVER: Brand: SMALL TOWEL DRAPE

## (undated) DEVICE — INTENDED FOR TISSUE SEPARATION, AND OTHER PROCEDURES THAT REQUIRE A SHARP SURGICAL BLADE TO PUNCTURE OR CUT.: Brand: BARD-PARKER SAFETY BLADES SIZE 10, STERILE

## (undated) DEVICE — DRESSING MEPILEX AG BORDER 4 X 12 IN

## (undated) DEVICE — GLOVE SRG BIOGEL 8

## (undated) DEVICE — BUTTON SWITCH PENCIL HOLSTER: Brand: VALLEYLAB

## (undated) DEVICE — THE SIMPULSE SOLO SYSTEM WITH ULTREX RETRACTABLE SPLASH SHIELD TIP: Brand: SIMPULSE SOLO

## (undated) DEVICE — VAPR COOLPULSE 90 ELECTRODE 90 DEGREES SUCTION WITH INTEGRATED HANDPIECE: Brand: VAPR COOLPULSE

## (undated) DEVICE — TUBING ARTHROSCOPIC WAVE  MAIN PUMP

## (undated) DEVICE — 2108 SERIES SAGITTAL BLADE (18.6 X 0.64 X 61.1MM)

## (undated) DEVICE — BETHLEHEM UNIVERSAL MINOR GEN: Brand: CARDINAL HEALTH

## (undated) DEVICE — PROGRAMMER INCEPTIV PATIENT

## (undated) DEVICE — SUT VICRYL PLUS 1 CTB-1 36 IN VCPB947H

## (undated) DEVICE — CHLORAPREP HI-LITE 26ML ORANGE

## (undated) DEVICE — SOFT SILICONE HYDROCELLULAR FOAM DRESSING WITH LOCK AWAY LAYER: Brand: ALLEVYN LIFE M 12.9X12.9 CTN10

## (undated) DEVICE — EXOFIN PRECISION PEN HIGH VISCOSITY TOPICAL SKIN ADHESIVE: Brand: EXOFIN PRECISION PEN, 1G

## (undated) DEVICE — INTENDED FOR TISSUE SEPARATION, AND OTHER PROCEDURES THAT REQUIRE A SHARP SURGICAL BLADE TO PUNCTURE OR CUT.: Brand: BARD-PARKER SAFETY BLADES SIZE 11, STERILE

## (undated) DEVICE — JACKSON TABLE FOAM POSITIONING KIT: Brand: CARDINAL HEALTH

## (undated) DEVICE — PACK SHOULDER ARTHROSCOPY PBDS

## (undated) DEVICE — DRAPE SHEET THREE QUARTER

## (undated) DEVICE — GLOVE INDICATOR PI UNDERGLOVE SZ 8.5 BLUE

## (undated) DEVICE — INTENDED FOR TISSUE SEPARATION, AND OTHER PROCEDURES THAT REQUIRE A SHARP SURGICAL BLADE TO PUNCTURE OR CUT.: Brand: BARD-PARKER ® CARBON RIB-BACK BLADES

## (undated) DEVICE — 3M™ IOBAN™ 2 ANTIMICROBIAL INCISE DRAPE 6650EZ: Brand: IOBAN™ 2

## (undated) DEVICE — SUT MONOCRYL 5-0 P-3 18 IN Y493G

## (undated) DEVICE — SPONGE PVP SCRUB WING STERILE

## (undated) DEVICE — STOCKINETTE REGULAR

## (undated) DEVICE — RECHARGER INCEPTIV

## (undated) DEVICE — NEPTUNE E-SEP SMOKE EVACUATION PENCIL, COATED, 70MM BLADE, PUSH BUTTON SWITCH: Brand: NEPTUNE E-SEP

## (undated) DEVICE — REM POLYHESIVE ADULT PATIENT RETURN ELECTRODE: Brand: VALLEYLAB

## (undated) DEVICE — TUBING SUCTION 5MM X 12 FT

## (undated) DEVICE — CAPIT HIP MOP -METAL ON POLY

## (undated) DEVICE — CAUTERY TIP POLISHER: Brand: DEVON

## (undated) DEVICE — C-ARM: Brand: UNBRANDED

## (undated) DEVICE — SUT SILK 2-0 SH 30 IN K833H

## (undated) DEVICE — INSULATED BLADE ELECTRODE: Brand: EDGE

## (undated) DEVICE — DRESSING MEPILEX AG BORDER 4 X 4 IN

## (undated) DEVICE — GLOVE SRG BIOGEL 7.5

## (undated) DEVICE — HOOD: Brand: FLYTE, SURGICOOL

## (undated) DEVICE — THREADED CLEAR CANNULA WITH OBTURATOR 7MM X 75MM

## (undated) DEVICE — BELT FIXATION SCH PH LRG

## (undated) DEVICE — ANTIBACTERIAL VIOLET BRAIDED (POLYGLACTIN 910), SYNTHETIC ABSORBABLE SUTURE: Brand: COATED VICRYL

## (undated) DEVICE — TRAY FOLEY 16FR URIMETER SURESTEP

## (undated) DEVICE — BETHLEHEM TOTAL HIP, KIT: Brand: CARDINAL HEALTH

## (undated) DEVICE — SCD SEQUENTIAL COMPRESSION COMFORT SLEEVE MEDIUM KNEE LENGTH: Brand: KENDALL SCD

## (undated) DEVICE — SYRINGE EPI 8ML LUER SLIP LOSS OF RESISTANCE PLASTIC PERFIX

## (undated) DEVICE — SUT VICRYL PLUS 2-0 CTB-1 27 IN VCPB259H

## (undated) DEVICE — THREADED CLEAR CANNULA WITH OBTURATOR 8.5MM X 75MM

## (undated) DEVICE — GLOVE RADIATION 7 1/2 PF

## (undated) DEVICE — GLOVE INDICATOR PI UNDERGLOVE SZ 7.5 BLUE

## (undated) DEVICE — CAPIT NEURO INCEPTIV PERCUTANEOUS SYS 9771CLP

## (undated) DEVICE — SUT MONOCRYL 4-0 PS-2 18 IN Y496G

## (undated) DEVICE — DRAPE C-ARMOUR

## (undated) DEVICE — RX-2™ COUDÉ®  EPIDURAL NEEDLE 14G TW X 4.0": Brand: EPIMED

## (undated) DEVICE — PACK TOTAL HIP PBDS

## (undated) DEVICE — CASE PATIENT PRGRMMR AND COMMUNICATOR

## (undated) DEVICE — SPONGE SCRUB 4 PCT CHLORHEXIDINE

## (undated) DEVICE — SPLIT SHEET: Brand: CONVERTORS

## (undated) DEVICE — ADHESIVE SKN CLSR HISTOACRYL FLEX 0.5ML LF

## (undated) DEVICE — BLADE SHAVER DISSECTOR 3.5MM 13CM COOLCUT